# Patient Record
Sex: FEMALE | Race: WHITE | NOT HISPANIC OR LATINO | Employment: OTHER | URBAN - METROPOLITAN AREA
[De-identification: names, ages, dates, MRNs, and addresses within clinical notes are randomized per-mention and may not be internally consistent; named-entity substitution may affect disease eponyms.]

---

## 2017-01-30 ENCOUNTER — OFFICE VISIT (OUTPATIENT)
Dept: NEUROSURGERY | Facility: CLINIC | Age: 62
End: 2017-01-30

## 2017-01-30 VITALS
SYSTOLIC BLOOD PRESSURE: 102 MMHG | DIASTOLIC BLOOD PRESSURE: 64 MMHG | WEIGHT: 225 LBS | BODY MASS INDEX: 34.1 KG/M2 | HEART RATE: 68 BPM | RESPIRATION RATE: 16 BRPM | HEIGHT: 68 IN

## 2017-01-30 DIAGNOSIS — I72.9 ANEURYSM (HCC): Primary | ICD-10-CM

## 2017-01-30 PROCEDURE — 99243 OFF/OP CNSLTJ NEW/EST LOW 30: CPT | Performed by: NURSE PRACTITIONER

## 2017-01-30 RX ORDER — OMEPRAZOLE 40 MG/1
40 CAPSULE, DELAYED RELEASE ORAL EVERY MORNING
COMMUNITY
End: 2021-03-25 | Stop reason: SDUPTHER

## 2017-01-30 RX ORDER — CHLORAL HYDRATE 500 MG
CAPSULE ORAL
COMMUNITY
End: 2017-04-04 | Stop reason: ALTCHOICE

## 2017-01-30 RX ORDER — RANITIDINE 150 MG/1
150 TABLET ORAL 2 TIMES DAILY
COMMUNITY
End: 2020-08-14 | Stop reason: HOSPADM

## 2017-01-30 NOTE — LETTER
"January 30, 2017     Lex Morris MD  [address] [place] [id number]    Patient: Khalida Gilliland   YOB: 1955   Date of Visit: 1/30/2017       Dear Dr. Arturo MD:    Khalida Gilliland was in my office today. Below is a copy of my note.    If you have questions, please do not hesitate to call me. I look forward to following Khalida along with you.         Sincerely,        Lucila Lu, TATE        CC: Mikael David MD    Subjective   Patient ID: Khalida Gilliland is a 61 y.o. female is being seen for consultation today at the request of Mikael David MD for cerebral aneurysm. She is unaccompanied.     History of Present Illness   Patient since for evaluation and treatment recommendations of cerebral aneurysm.  She has a history of a \"leaking\" right MCA aneurysm status post craniotomy and clipping in 2003 by Dr. Sims.  Patient's last imaging was in 2011. She believes that she had an angiogram following the surgery. She has been followed by neurology in the past.     She reports right frontal rare headaches; however, she had a sudden onset of severe holocephalic headache at [place] time while out walking. She went home and used a hot pack that resolved the headache within 30 minutes. There was no associated nausea, vomiting, visual changes. It has not happened again. She did not go to ER.     The following portions of the patient's history were reviewed and updated as appropriate: allergies, current medications, past family history, past medical history, past social history, past surgical history and problem list.    Review of Systems   Constitutional: Negative for fever.   HENT: Negative for trouble swallowing.    Eyes: Negative for visual disturbance.   Respiratory: Negative for cough and wheezing.    Cardiovascular: Negative for chest pain and palpitations.   Gastrointestinal: Positive for abdominal pain (possible current kidney stone).   Genitourinary: Negative for " difficulty urinating and enuresis.   Musculoskeletal: Positive for back pain (possible kidney stone). Negative for gait problem.   Neurological: Negative for dizziness, speech difficulty, light-headedness and headaches (history of migraines).   Psychiatric/Behavioral: Negative for confusion.       Objective   Physical Exam   Constitutional: She is oriented to person, place, and time. She appears well-developed and well-nourished.   obese   HENT:   Head: Normocephalic and atraumatic.   Neck: Normal range of motion. Neck supple. Carotid bruit is not present.   Cardiovascular: Normal rate, regular rhythm, normal heart sounds and intact distal pulses.    Pulmonary/Chest: Effort normal. No respiratory distress. She has rhonchi in the right upper field.   Neurological: She is alert and oriented to person, place, and time. She has normal strength. She has a normal Finger-Nose-Finger Test, a normal Heel to Shin Test, a normal Romberg Test and a normal Tandem Gait Test. Gait normal.   Skin: Skin is warm and dry.   Psychiatric: She has a normal mood and affect. Her speech is normal and behavior is normal. Thought content normal.   Vitals reviewed.    Neurologic Exam     Mental Status   Oriented to person, place, and time.   Attention: normal. Concentration: normal.   Speech: speech is normal   Level of consciousness: alert  Knowledge: good.   Normal comprehension.     Cranial Nerves   Cranial nerves II through XII intact.     Motor Exam   Muscle bulk: normal  Overall muscle tone: normal  Right arm pronator drift: absent  Left arm pronator drift: absent    Strength   Strength 5/5 throughout.     Sensory Exam   Light touch normal.     Gait, Coordination, and Reflexes     Gait  Gait: normal    Coordination   Romberg: negative  Finger to nose coordination: normal  Heel to shin coordination: normal  Tandem walking coordination: normal    Reflexes   Right Chowdary: absent  Left Chowdary: absent      Assessment/Plan   Independent  Review of Radiographic Studies:    MRA head from [facility] dated May 31, 2011 by report indicates artifact seen related to aneurysm clip on the right.  Otherwise negative MRA.    CTA head dated February 2, 2007 by report indicates no intracranial stenosis or aneurysm identified however, the cavernous regions are poorly seen due to skull base artifact from aneurysm clip.    Medical Decision Making:    Patient presents to establish care for history of cerebral aneurysm status post craniotomy and clipping over 13 years ago.  It seems that she has not had a cerebral angiogram in some prolonged period of time; however, she reports an episode of severe swelling and respiratory difficulties following IV contrast injection in [place] for an abdominal study several years ago.  The swelling was in just 1 arm and based on her report it sounds like she had contrast extravasation, but she does report that she had severe respiratory difficulties and was near intubation.  She recently had a severe sudden headache and instead of presenting to the hospital for evaluation with a history of aneurysm she went home and the symptoms subsided rather quickly.  She has had no further episodes.    Her exam is as noted above with no neurologic red flags.  Her most recent imaging that she brings with her is from 2011.  It would be a best case scenario for her to be able to have a cerebral angiogram, but I'm concerned about her having contrast without a definitive need.  We will repeat an MRA for comparison.  She will follow-up with Dr. Bella after.    Plan: Return to office after MRA head no contrast.  Khalida was seen today for cerebral aneurysm.    Diagnoses and all orders for this visit:    Aneurysm  -     Cancel: MRI Angiogram Head Without Contrast; Future  -     MRI Angiogram Head Without Contrast; Future      Return for Follow-up with Dr. Bella, with imaging.

## 2017-01-30 NOTE — LETTER
"January 30, 2017     Mikael David MD  [address]  [address]  [place] [id number]    Patient: Khalida Gilliland   YOB: 1955   Date of Visit: 1/30/2017       Dear Dr. Joann MD:    Thank you for referring Khalida Gilliland to me for evaluation. Below is a copy of my consult note.    If you have questions, please do not hesitate to call me. I look forward to following Khalida along with you.         Sincerely,        TATE Walls        CC: No Recipients    Subjective   Patient ID: Khalida Gilliland is a 61 y.o. female is being seen for consultation today at the request of Mikael David MD for cerebral aneurysm. She is unaccompanied.     History of Present Illness   Patient since for evaluation and treatment recommendations of cerebral aneurysm.  She has a history of a \"leaking\" right MCA aneurysm status post craniotomy and clipping in 2003 by Dr. Sims.  Patient's last imaging was in 2011. She believes that she had an angiogram following the surgery. She has been followed by neurology in the past.     She reports right frontal rare headaches; however, she had a sudden onset of severe holocephalic headache at [place] time while out walking. She went home and used a hot pack that resolved the headache within 30 minutes. There was no associated nausea, vomiting, visual changes. It has not happened again. She did not go to ER.     The following portions of the patient's history were reviewed and updated as appropriate: allergies, current medications, past family history, past medical history, past social history, past surgical history and problem list.    Review of Systems   Constitutional: Negative for fever.   HENT: Negative for trouble swallowing.    Eyes: Negative for visual disturbance.   Respiratory: Negative for cough and wheezing.    Cardiovascular: Negative for chest pain and palpitations.   Gastrointestinal: Positive for abdominal pain (possible current kidney stone).   "   Genitourinary: Negative for difficulty urinating and enuresis.   Musculoskeletal: Positive for back pain (possible kidney stone). Negative for gait problem.   Neurological: Negative for dizziness, speech difficulty, light-headedness and headaches (history of migraines).   Psychiatric/Behavioral: Negative for confusion.       Objective   Physical Exam   Constitutional: She is oriented to person, place, and time. She appears well-developed and well-nourished.   obese   HENT:   Head: Normocephalic and atraumatic.   Neck: Normal range of motion. Neck supple. Carotid bruit is not present.   Cardiovascular: Normal rate, regular rhythm, normal heart sounds and intact distal pulses.    Pulmonary/Chest: Effort normal. No respiratory distress. She has rhonchi in the right upper field.   Neurological: She is alert and oriented to person, place, and time. She has normal strength. She has a normal Finger-Nose-Finger Test, a normal Heel to Shin Test, a normal Romberg Test and a normal Tandem Gait Test. Gait normal.   Skin: Skin is warm and dry.   Psychiatric: She has a normal mood and affect. Her speech is normal and behavior is normal. Thought content normal.   Vitals reviewed.    Neurologic Exam     Mental Status   Oriented to person, place, and time.   Attention: normal. Concentration: normal.   Speech: speech is normal   Level of consciousness: alert  Knowledge: good.   Normal comprehension.     Cranial Nerves   Cranial nerves II through XII intact.     Motor Exam   Muscle bulk: normal  Overall muscle tone: normal  Right arm pronator drift: absent  Left arm pronator drift: absent    Strength   Strength 5/5 throughout.     Sensory Exam   Light touch normal.     Gait, Coordination, and Reflexes     Gait  Gait: normal    Coordination   Romberg: negative  Finger to nose coordination: normal  Heel to shin coordination: normal  Tandem walking coordination: normal    Reflexes   Right Chowdary: absent  Left Chowdary:  absent      Assessment/Plan   Independent Review of Radiographic Studies:    MRA head from [facility] dated May 31, 2011 by report indicates artifact seen related to aneurysm clip on the right.  Otherwise negative MRA.    CTA head dated February 2, 2007 by report indicates no intracranial stenosis or aneurysm identified however, the cavernous regions are poorly seen due to skull base artifact from aneurysm clip.    Medical Decision Making:    Patient presents to establish care for history of cerebral aneurysm status post craniotomy and clipping over 13 years ago.  It seems that she has not had a cerebral angiogram in some prolonged period of time; however, she reports an episode of severe swelling and respiratory difficulties following IV contrast injection in [place] for an abdominal study several years ago.  The swelling was in just 1 arm and based on her report it sounds like she had contrast extravasation, but she does report that she had severe respiratory difficulties and was near intubation.  She recently had a severe sudden headache and instead of presenting to the hospital for evaluation with a history of aneurysm she went home and the symptoms subsided rather quickly.  She has had no further episodes.    Her exam is as noted above with no neurologic red flags.  Her most recent imaging that she brings with her is from 2011.  It would be a best case scenario for her to be able to have a cerebral angiogram, but I'm concerned about her having contrast without a definitive need.  We will repeat an MRA for comparison.  She will follow-up with Dr. Bella after.    Plan: Return to office after MRA head no contrast.  Khalida was seen today for cerebral aneurysm.    Diagnoses and all orders for this visit:    Aneurysm  -     Cancel: MRI Angiogram Head Without Contrast; Future  -     MRI Angiogram Head Without Contrast; Future      Return for Follow-up with Dr. Bella, with imaging.

## 2017-01-30 NOTE — PROGRESS NOTES
"Subjective   Patient ID: Khalida Gilliland is a 61 y.o. female is being seen for consultation today at the request of Mikael David MD for cerebral aneurysm. She is unaccompanied.     History of Present Illness   Patient since for evaluation and treatment recommendations of cerebral aneurysm.  She has a history of a \"leaking\" right MCA aneurysm status post craniotomy and clipping in 2003 by Dr. Sims.  Patient's last imaging was in 2011. She believes that she had an angiogram following the surgery. She has been followed by neurology in the past.     She reports right frontal rare headaches; however, she had a sudden onset of severe holocephalic headache at [place] time while out walking. She went home and used a hot pack that resolved the headache within 30 minutes. There was no associated nausea, vomiting, visual changes. It has not happened again. She did not go to ER.     The following portions of the patient's history were reviewed and updated as appropriate: allergies, current medications, past family history, past medical history, past social history, past surgical history and problem list.    Review of Systems   Constitutional: Negative for fever.   HENT: Negative for trouble swallowing.    Eyes: Negative for visual disturbance.   Respiratory: Negative for cough and wheezing.    Cardiovascular: Negative for chest pain and palpitations.   Gastrointestinal: Positive for abdominal pain (possible current kidney stone).   Genitourinary: Negative for difficulty urinating and enuresis.   Musculoskeletal: Positive for back pain (possible kidney stone). Negative for gait problem.   Neurological: Negative for dizziness, speech difficulty, light-headedness and headaches (history of migraines).   Psychiatric/Behavioral: Negative for confusion.       Objective   Physical Exam   Constitutional: She is oriented to person, place, and time. She appears well-developed and well-nourished.   obese   HENT:   Head: " Normocephalic and atraumatic.   Neck: Normal range of motion. Neck supple. Carotid bruit is not present.   Cardiovascular: Normal rate, regular rhythm, normal heart sounds and intact distal pulses.    Pulmonary/Chest: Effort normal. No respiratory distress. She has rhonchi in the right upper field.   Neurological: She is alert and oriented to person, place, and time. She has normal strength. She has a normal Finger-Nose-Finger Test, a normal Heel to Shin Test, a normal Romberg Test and a normal Tandem Gait Test. Gait normal.   Skin: Skin is warm and dry.   Psychiatric: She has a normal mood and affect. Her speech is normal and behavior is normal. Thought content normal.   Vitals reviewed.    Neurologic Exam     Mental Status   Oriented to person, place, and time.   Attention: normal. Concentration: normal.   Speech: speech is normal   Level of consciousness: alert  Knowledge: good.   Normal comprehension.     Cranial Nerves   Cranial nerves II through XII intact.     Motor Exam   Muscle bulk: normal  Overall muscle tone: normal  Right arm pronator drift: absent  Left arm pronator drift: absent    Strength   Strength 5/5 throughout.     Sensory Exam   Light touch normal.     Gait, Coordination, and Reflexes     Gait  Gait: normal    Coordination   Romberg: negative  Finger to nose coordination: normal  Heel to shin coordination: normal  Tandem walking coordination: normal    Reflexes   Right Chowdary: absent  Left Chowdary: absent      Assessment/Plan   Independent Review of Radiographic Studies:    MRA head from [facility] dated May 31, 2011 by report indicates artifact seen related to aneurysm clip on the right.  Otherwise negative MRA.    CTA head dated February 2, 2007 by report indicates no intracranial stenosis or aneurysm identified however, the cavernous regions are poorly seen due to skull base artifact from aneurysm clip.    Medical Decision Making:    Patient presents to establish  care for history of cerebral aneurysm status post craniotomy and clipping over 13 years ago.  It seems that she has not had a cerebral angiogram in some prolonged period of time; however, she reports an episode of severe swelling and respiratory difficulties following IV contrast injection in [place] for an abdominal study several years ago.  The swelling was in just 1 arm and based on her report it sounds like she had contrast extravasation, but she does report that she had severe respiratory difficulties and was near intubation.  She recently had a severe sudden headache and instead of presenting to the hospital for evaluation with a history of aneurysm she went home and the symptoms subsided rather quickly.  She has had no further episodes.    Her exam is as noted above with no neurologic red flags.  Her most recent imaging that she brings with her is from 2011.  It would be a best case scenario for her to be able to have a cerebral angiogram, but I'm concerned about her having contrast without a definitive need.  We will repeat an MRA for comparison.  She will follow-up with Dr. Bella after.    Plan: Return to office after MRA head no contrast.  Khalida was seen today for cerebral aneurysm.    Diagnoses and all orders for this visit:    Aneurysm  -     Cancel: MRI Angiogram Head Without Contrast; Future  -     MRI Angiogram Head Without Contrast; Future      Return for Follow-up with Dr. Bella, with imaging.

## 2017-01-30 NOTE — MR AVS SNAPSHOT
Khalida Gilliland   1/30/2017 11:00 AM   Office Visit    Dept Phone:  [phone]   Encounter #:  [id number]    Provider:  TATE Swain   Department:  [facility] CTR ADV NEUROSURGERY                Your Full Care Plan              Today's Medication Changes          These changes are accurate as of: 1/30/17 11:31 AM.  If you have any questions, ask your nurse or doctor.               Medication(s)that have changed:     albuterol 108 (90 BASE) MCG/ACT inhaler   Commonly known as:  PROVENTIL HFA;VENTOLIN HFA   Inhale 2 puffs every 6 (six) hours as needed for wheezing or shortness of breath (Cough).   What changed:  Another medication with the same name was removed. Continue taking this medication, and follow the directions you see here.         Stop taking medication(s)listed here:     pantoprazole 40 MG EC tablet   Commonly known as:  PROTONIX   Stopped by:  TATE Swain                      Your Updated Medication List          This list is accurate as of: 1/30/17 11:31 AM.  Always use your most recent med list.                albuterol 108 (90 BASE) MCG/ACT inhaler   Commonly known as:  PROVENTIL HFA;VENTOLIN HFA   Inhale 2 puffs every 6 (six) hours as needed for wheezing or shortness of breath (Cough).       aspirin 325 MG tablet       cycloSPORINE 0.05 % ophthalmic emulsion   Commonly known as:  RESTASIS       diphenhydrAMINE-acetaminophen  MG tablet per tablet   Commonly known as:  TYLENOL PM       fish oil 1000 MG capsule capsule       fluticasone 27.5 MCG/SPRAY nasal spray   Commonly known as:  VERAMYST       glucosamine-chondroitin 500-400 MG capsule capsule       hydrochlorothiazide 12.5 MG tablet   Commonly known as:  HYDRODIURIL       MULTIVITAL PO       omeprazole 40 MG capsule   Commonly known as:  priLOSEC       pregabalin 100 MG capsule   Commonly known as:  LYRICA       propranolol 40 MG tablet   Commonly known as:  INDERAL       raNITIdine 150 MG  tablet   Commonly known as:  ZANTAC       VITAMIN C PO               You Were Diagnosed With        Codes Comments    Aneurysm    -  Primary ICD-10-CM: I72.9  ICD-9-CM: 442.9       Instructions     None    Patient Instructions History      Upcoming Appointments     Visit Type Date Time Department    NEW PATIENT 1/30/2017 11:00 AM [facility] CTR ADV NEURO [facility]    MRA RONALDO HEAD WO CONTRAST 2/2/2017  8:45 PM  [facility]    OFFICE VISIT 2/21/2017 11:45 AM [facility] ADV NEUR[facility]    LAB 5/24/2017 10:00 AM  [facility] ON[facility] CBC LAB [facility]      MyChart Signup     Our records indicate that you have an active [facility] [employer] account.    You can view your After Visit Summary by going to [employer] and logging in with your [employer] username and password.  If you don't have a [employer] username and password but a parent or guardian has access to your record, the parent or guardian should login with their own [employer] username and password and access your record to view the After Visit Summary.    If you have questions, you can email [email or web address] or call [phone] to talk to our [employer] staff.  Remember, [employer] is NOT to be used for urgent needs.  For medical emergencies, dial 911.               Other Info from Your Visit           Your Appointments     Feb 02, 2017  8:45 PM EST   MRA ronaldo head wo contrast with RON[facility] MRI 3   [facility] ([place])    [address] [facility] [address] [phone]           Arrive 30 minutes prior to exam time. Bring a current list of medications. Wear clothing without metal snaps, zippers, or other metalic artifacts. Do not wear jewelry.            Feb 21, 2017 11:45 AM EST   Office Visit with Orlando Bella MD   [facility] CTR ADV NEUROSURGERY (--)    [address]  [facility] [address] [phone]           Arrive 15 minutes prior to appointment.            May 24, 2017 10:00 AM EDT   Lab with LAB CHAIR 3 [facility]  "[facility] ONCOLOGY CBC LAB ([place])    [address]  [facility] [address] [phone]              Allergies     Contrast Dye  Shortness Of Breath    Iodinated Diagnostic Agents  Shortness Of Breath    Codeine  Nausea Only    Methylprednisolone  Anxiety      Reason for Visit     Cerebral Aneurysm           Vital Signs     Blood Pressure Pulse Respirations Height Weight Body Mass Index    102/64 (BP Location: Right arm, Patient Position: Sitting) 68 16 68\" (172.7 cm) 225 lb (102 kg) 34.21 kg/m2    Smoking Status                   Never Smoker           No Longer an Issue     Aneurysm        "

## 2017-02-02 ENCOUNTER — HOSPITAL ENCOUNTER (OUTPATIENT)
Dept: MRI IMAGING | Facility: HOSPITAL | Age: 62
Discharge: HOME | End: 2017-02-02
Admitting: NURSE PRACTITIONER

## 2017-02-02 DIAGNOSIS — I72.9 ANEURYSM (HCC): ICD-10-CM

## 2017-02-02 PROCEDURE — 70544 MR ANGIOGRAPHY HEAD W/O DYE: CPT

## 2017-02-21 ENCOUNTER — OFFICE VISIT (OUTPATIENT)
Dept: NEUROSURGERY | Facility: CLINIC | Age: 62
End: 2017-02-21

## 2017-02-21 VITALS
BODY MASS INDEX: 34.36 KG/M2 | WEIGHT: 226 LBS | DIASTOLIC BLOOD PRESSURE: 76 MMHG | RESPIRATION RATE: 16 BRPM | SYSTOLIC BLOOD PRESSURE: 132 MMHG | HEART RATE: 64 BPM

## 2017-02-21 DIAGNOSIS — Z98.890 HX OF CEREBRAL ANEURYSM REPAIR: Primary | ICD-10-CM

## 2017-02-21 DIAGNOSIS — Z86.79 HX OF CEREBRAL ANEURYSM REPAIR: Primary | ICD-10-CM

## 2017-02-21 PROCEDURE — 99213 OFFICE O/P EST LOW 20 MIN: CPT | Performed by: NEUROLOGICAL SURGERY

## 2017-02-21 RX ORDER — FAMOTIDINE 10 MG
20 TABLET ORAL
Status: CANCELLED | OUTPATIENT
Start: 2017-02-21

## 2017-02-21 RX ORDER — SODIUM CHLORIDE 0.9 % (FLUSH) 0.9 %
1-10 SYRINGE (ML) INJECTION AS NEEDED
Status: CANCELLED | OUTPATIENT
Start: 2017-02-21

## 2017-02-21 RX ORDER — SODIUM CHLORIDE 9 MG/ML
100 INJECTION, SOLUTION INTRAVENOUS CONTINUOUS
Status: CANCELLED | OUTPATIENT
Start: 2017-02-21

## 2017-02-21 NOTE — PROGRESS NOTES
Subjective   Patient ID: Khalida Gilliland is a 61 y.o. female is here today for follow-up of cerebral aneurysm with new imaging MRA head. She is unaccompanied.    History of Present Illness     The patient is an extremely pleasant woman who returns to the office following an MRA for evaluation of an aneurysm clip 13 years ago.    He denies any changes since her last office visit.    The following portions of the patient's history were reviewed and updated as appropriate: allergies, current medications, past family history, past medical history, past social history, past surgical history and problem list.    Review of Systems   Musculoskeletal: Negative for gait problem.   Neurological: Negative for dizziness, speech difficulty, light-headedness and headaches.       Objective   Physical Exam   Constitutional: She is oriented to person, place, and time.   Eyes: EOM are normal. Pupils are equal, round, and reactive to light.   Neurological: She is oriented to person, place, and time. She has a normal Finger-Nose-Finger Test, a normal Heel to Shin Test, a normal Romberg Test and a normal Tandem Gait Test. Gait normal.   Reflex Scores:       Tricep reflexes are 2+ on the right side and 2+ on the left side.       Bicep reflexes are 2+ on the right side and 2+ on the left side.       Brachioradialis reflexes are 2+ on the right side and 2+ on the left side.       Patellar reflexes are 2+ on the right side and 2+ on the left side.       Achilles reflexes are 2+ on the right side and 2+ on the left side.  Psychiatric: Her speech is normal.     Neurologic Exam     Mental Status   Oriented to person, place, and time.   Registration: recalls 3 of 3 objects. Recall at 5 minutes: recalls 3 of 3 objects. Follows 3 step commands.   Attention: normal. Concentration: normal.   Speech: speech is normal   Level of consciousness: alert  Knowledge: good.   Able to name object. Able to read. Able to repeat. Able to write. Normal  comprehension.     Cranial Nerves     CN II   Visual fields full to confrontation.     CN III, IV, VI   Pupils are equal, round, and reactive to light.  Extraocular motions are normal.   Right pupil: Consensual response: intact.   Left pupil: Consensual response: intact.   CN III: no CN III palsy  CN VI: no CN VI palsy  Nystagmus: none   Diplopia: none  Ophthalmoparesis: none  Upgaze: normal  Downgaze: normal  Conjugate gaze: present  Vestibulo-ocular reflex: present    CN V   Facial sensation intact.     CN VII   Facial expression full, symmetric.     CN VIII   CN VIII normal.     CN IX, X   CN IX normal.     CN XI   CN XI normal.     CN XII   CN XII normal.     Motor Exam   Muscle bulk: normal  Overall muscle tone: normal  Right arm tone: normal  Left arm tone: normal  Right arm pronator drift: absent  Left arm pronator drift: absent  Right leg tone: normal  Left leg tone: normal    Strength   Right neck flexion: 5/5  Left neck flexion: 5/5  Right neck extension: 5/5  Left neck extension: 5/5  Right deltoid: 5/5  Left deltoid: 5/5  Right biceps: 5/5  Left biceps: 5/5  Right triceps: 5/5  Left triceps: 5/5  Right wrist flexion: 5/5  Left wrist flexion: 5/5  Right wrist extension: 5/5  Left wrist extension: 5/5  Right interossei: 5/5  Left interossei: 5/5  Right abdominals: 5/5  Left abdominals: 5/5  Right iliopsoas: 5/5  Left iliopsoas: 5/5  Right quadriceps: 5/5  Left quadriceps: 5/5  Right hamstrin/5  Left hamstrin/5  Right glutei: 5/5  Left glutei: 5/5  Right anterior tibial: 5/5  Left anterior tibial: 5/5  Right posterior tibial: 5/5  Left posterior tibial: 5/5  Right peroneal: 5/5  Left peroneal: 5/5  Right gastroc: 5/5  Left gastroc: 5/5    Sensory Exam   Light touch normal.   Vibration normal.   Proprioception normal.   Pinprick normal.     Gait, Coordination, and Reflexes     Gait  Gait: normal    Coordination   Romberg: negative  Finger to nose coordination: normal  Heel to shin coordination:  normal  Tandem walking coordination: normal    Tremor   Resting tremor: absent  Intention tremor: absent  Action tremor: absent    Reflexes   Right brachioradialis: 2+  Left brachioradialis: 2+  Right biceps: 2+  Left biceps: 2+  Right triceps: 2+  Left triceps: 2+  Right patellar: 2+  Left patellar: 2+  Right achilles: 2+  Left achilles: 2+  Right : 2+  Left : 2+  Right plantar: normal  Left plantar: normal  Right Chowdary: absent  Left Chowdary: absent  Right ankle clonus: absent  Left ankle clonus: absent      Assessment/Plan   Independent Review of Radiographic Studies:    I personally reviewed the MRI and MRA that was done recently.  There is so much clip artifact that I am unable to assess the region of the aneurysm.  The previous imaging that was accomplished also had a significant amount of clip artifact and the radiologist could not comment on the region of the previous aneurysm.  Medical Decision Making:    The patient returns the office and is doing well.  There are certainly no red flags.    I have recommended that she undergo a cerebral angiogram to assess the adequacy of clipping of her aneurysm now these many years following surgical intervention.    The risks, benefits and alternatives of cerebral angiography were explained in detail to the patient. The alternative is not to undergo this procedure. The benefit of cerebral angiography should be, though is not guaranteed, the elucidation of the vascular anatomy of the brain and the blood vessels leading to the brain. The risks of the procedure include, but are not limited to, the possibility of stroke, bleeding, damage to an artery in the brain, groin or neck(possibly requiring surgery to correct), infection, reaction to the contrast dye resulting in itching, swelling, anaphylaxis or even death, lack of ability to perform the procedure, need for further operative intervention, blindness, etcetera. The patient voiced understanding of the risks,  benefits and alternatives and requests that we proceed with cerebral angiography.    Plan: Cerebral angiography and return to the office.  This will be at the patient's convenience.  Khalida was seen today for cerebral aneurysm.    Diagnoses and all orders for this visit:    Hx of cerebral aneurysm repair  -     Case Request; Standing  -     CBC and Differential; Future  -     Basic metabolic panel; Future  -     XR chest 1 vw; Future  -     sodium chloride 0.9 % flush 1-10 mL; Infuse 1-10 mL into a venous catheter As Needed for line care.  -     sodium chloride 0.9 % infusion; Infuse 100 mL/hr into a venous catheter Continuous.  -     famotidine (PEPCID) tablet 20 mg; Take 2 tablets by mouth 30 Min Pre-Op.  -     Case Request    Other orders  -     Follow anesthesia standing orders.  -     Obtain informed consent  -     JR hose- To be placed on patient in pre-op; Standing  -     SCD (sequential compression device)- to be placed on patient in Pre-op; Standing  -     Clip operative site; Standing  -     Insert Peripheral IV; Standing  -     Saline Lock & Maintain IV Access; Standing      Return for Follow up after testing.

## 2017-02-23 ENCOUNTER — TELEPHONE (OUTPATIENT)
Dept: NEUROSURGERY | Facility: CLINIC | Age: 62
End: 2017-02-23

## 2017-02-23 RX ORDER — PREDNISONE 50 MG/1
50 TABLET ORAL TAKE AS DIRECTED
Qty: 3 TABLET | Refills: 0 | OUTPATIENT
Start: 2017-02-23 | End: 2017-04-18

## 2017-02-23 NOTE — TELEPHONE ENCOUNTER
[phone] LVM telling patient Rx has been ordered.

## 2017-02-23 NOTE — TELEPHONE ENCOUNTER
----- Message from TATE Swain sent at 2/23/2017  3:29 PM EST -----  Can you take care of this please?    ----- Message -----     From: Aminata Dunn     Sent: 2/23/2017  12:27 PM       To: TATE Swain    Pt was here Tuesday to see Hodes and we have an angio scheduled for April. However patient has a dye allergy so she needs the prednisone 50 mg (1 pill 13 hours prior, 1  Pill 7 hours prior, and 1 pill 1 hour prior) ordered to her pharmacy as soon as possible as she is going out of town for the month prior. Can you call this in for her today? Thank you!

## 2017-02-26 ENCOUNTER — RESULTS ENCOUNTER (OUTPATIENT)
Dept: NEUROSURGERY | Facility: CLINIC | Age: 62
End: 2017-02-26

## 2017-02-26 DIAGNOSIS — Z86.79 HX OF CEREBRAL ANEURYSM REPAIR: ICD-10-CM

## 2017-02-26 DIAGNOSIS — Z98.890 HX OF CEREBRAL ANEURYSM REPAIR: ICD-10-CM

## 2017-04-04 ENCOUNTER — HOSPITAL ENCOUNTER (OUTPATIENT)
Dept: GENERAL RADIOLOGY | Facility: HOSPITAL | Age: 62
Discharge: HOME | End: 2017-04-04
Admitting: NEUROLOGICAL SURGERY

## 2017-04-04 ENCOUNTER — APPOINTMENT (OUTPATIENT)
Dept: PREADMISSION TESTING | Facility: HOSPITAL | Age: 62
End: 2017-04-04

## 2017-04-04 ENCOUNTER — OFFICE VISIT (OUTPATIENT)
Dept: NEUROSURGERY | Facility: CLINIC | Age: 62
End: 2017-04-04

## 2017-04-04 VITALS
HEART RATE: 72 BPM | SYSTOLIC BLOOD PRESSURE: 124 MMHG | WEIGHT: 229 LBS | DIASTOLIC BLOOD PRESSURE: 70 MMHG | HEIGHT: 68 IN | BODY MASS INDEX: 34.71 KG/M2

## 2017-04-04 VITALS
TEMPERATURE: 97.4 F | HEIGHT: 68 IN | RESPIRATION RATE: 16 BRPM | DIASTOLIC BLOOD PRESSURE: 72 MMHG | SYSTOLIC BLOOD PRESSURE: 145 MMHG | WEIGHT: 230 LBS | HEART RATE: 72 BPM | BODY MASS INDEX: 34.86 KG/M2 | OXYGEN SATURATION: 98 %

## 2017-04-04 DIAGNOSIS — Z98.890 HX OF CEREBRAL ANEURYSM REPAIR: ICD-10-CM

## 2017-04-04 DIAGNOSIS — Z98.890 HX OF CEREBRAL ANEURYSM REPAIR: Primary | ICD-10-CM

## 2017-04-04 DIAGNOSIS — Z86.79 HX OF CEREBRAL ANEURYSM REPAIR: ICD-10-CM

## 2017-04-04 DIAGNOSIS — Z86.79 HX OF CEREBRAL ANEURYSM REPAIR: Primary | ICD-10-CM

## 2017-04-04 DIAGNOSIS — Z91.041 ALLERGY TO RADIOGRAPHIC DYE: ICD-10-CM

## 2017-04-04 LAB
ANION GAP SERPL CALCULATED.3IONS-SCNC: 14.5 MMOL/L
BASOPHILS # BLD AUTO: 0.02 10*3/MM3 (ref 0–0.2)
BASOPHILS NFR BLD AUTO: 0.4 % (ref 0–1.5)
BUN BLD-MCNC: 12 MG/DL (ref 8–23)
BUN/CREAT SERPL: 19 (ref 7–25)
CALCIUM SPEC-SCNC: 9.5 MG/DL (ref 8.6–10.5)
CHLORIDE SERPL-SCNC: 101 MMOL/L (ref 98–107)
CO2 SERPL-SCNC: 24.5 MMOL/L (ref 22–29)
CREAT BLD-MCNC: 0.63 MG/DL (ref 0.57–1)
DEPRECATED RDW RBC AUTO: 46.6 FL (ref 37–54)
EOSINOPHIL # BLD AUTO: 0.2 10*3/MM3 (ref 0–0.7)
EOSINOPHIL NFR BLD AUTO: 4.2 % (ref 0.3–6.2)
ERYTHROCYTE [DISTWIDTH] IN BLOOD BY AUTOMATED COUNT: 13.6 % (ref 11.7–13)
GFR SERPL CREATININE-BSD FRML MDRD: 96 ML/MIN/1.73
GLUCOSE BLD-MCNC: 138 MG/DL (ref 65–99)
HCT VFR BLD AUTO: 41.4 % (ref 35.6–45.5)
HGB BLD-MCNC: 13.7 G/DL (ref 11.9–15.5)
IMM GRANULOCYTES # BLD: 0 10*3/MM3 (ref 0–0.03)
IMM GRANULOCYTES NFR BLD: 0 % (ref 0–0.5)
LYMPHOCYTES # BLD AUTO: 2.39 10*3/MM3 (ref 0.9–4.8)
LYMPHOCYTES NFR BLD AUTO: 50.7 % (ref 19.6–45.3)
MCH RBC QN AUTO: 31.1 PG (ref 26.9–32)
MCHC RBC AUTO-ENTMCNC: 33.1 G/DL (ref 32.4–36.3)
MCV RBC AUTO: 93.9 FL (ref 80.5–98.2)
MONOCYTES # BLD AUTO: 0.21 10*3/MM3 (ref 0.2–1.2)
MONOCYTES NFR BLD AUTO: 4.5 % (ref 5–12)
NEUTROPHILS # BLD AUTO: 1.89 10*3/MM3 (ref 1.9–8.1)
NEUTROPHILS NFR BLD AUTO: 40.2 % (ref 42.7–76)
PLATELET # BLD AUTO: 200 10*3/MM3 (ref 140–500)
PMV BLD AUTO: 10.6 FL (ref 6–12)
POTASSIUM BLD-SCNC: 4.3 MMOL/L (ref 3.5–5.2)
RBC # BLD AUTO: 4.41 10*6/MM3 (ref 3.9–5.2)
SODIUM BLD-SCNC: 140 MMOL/L (ref 136–145)
WBC NRBC COR # BLD: 4.71 10*3/MM3 (ref 4.5–10.7)

## 2017-04-04 PROCEDURE — 99213 OFFICE O/P EST LOW 20 MIN: CPT | Performed by: NEUROLOGICAL SURGERY

## 2017-04-04 PROCEDURE — 36415 COLL VENOUS BLD VENIPUNCTURE: CPT | Performed by: NEUROLOGICAL SURGERY

## 2017-04-04 PROCEDURE — 71010 HC CHEST PA OR AP: CPT

## 2017-04-04 PROCEDURE — 85025 COMPLETE CBC W/AUTO DIFF WBC: CPT | Performed by: NEUROLOGICAL SURGERY

## 2017-04-04 PROCEDURE — 80048 BASIC METABOLIC PNL TOTAL CA: CPT | Performed by: NEUROLOGICAL SURGERY

## 2017-04-04 PROCEDURE — 93005 ELECTROCARDIOGRAM TRACING: CPT

## 2017-04-04 PROCEDURE — 93010 ELECTROCARDIOGRAM REPORT: CPT | Performed by: INTERNAL MEDICINE

## 2017-04-04 RX ORDER — DIPHENHYDRAMINE HCL 25 MG
25 CAPSULE ORAL EVERY 8 HOURS
Status: SHIPPED | OUTPATIENT
Start: 2017-04-04 | End: 2017-04-08

## 2017-04-04 RX ORDER — CETIRIZINE HYDROCHLORIDE 10 MG/1
10 TABLET ORAL DAILY
COMMUNITY
End: 2020-11-11 | Stop reason: SDUPTHER

## 2017-04-04 NOTE — PROGRESS NOTES
Subjective   Patient ID: Khalida Gilliland is a 61 y.o. female who is here today for follow-up for a cerebral aneurysm. She is here for a new H&P for a C-Angio on 4/12/17. She presents unaccompanied.     History of Present Illness     The patient returns today for preoperative evaluation prior to undergoing the cerebral arteriogram April 12, 2017.  She denies any particular issues or changes.  He is concerned with regard to a contrast allergy that she has.    The following portions of the patient's history were reviewed and updated as appropriate: allergies, current medications, past family history, past medical history, past social history, past surgical history and problem list.    Review of Systems   Constitutional: Negative for chills and fever.   Eyes: Negative for visual disturbance.   Respiratory: Positive for cough. Negative for shortness of breath.    Cardiovascular: Negative for chest pain and palpitations.   Gastrointestinal: Negative for nausea and vomiting.   Musculoskeletal: Negative for gait problem and neck pain.   Skin: Negative for rash and wound.   Neurological: Negative for dizziness, weakness and headaches.   Hematological: Does not bruise/bleed easily.   Psychiatric/Behavioral: Negative for sleep disturbance. The patient is not nervous/anxious.      Past Medical History:   Diagnosis Date   • Aneurysm    • Asthma    • Cancer 07/30/2014    Basal Cell Carcinoma Left Arm,SKIN CANCER   • Diabetes mellitus    • GERD (gastroesophageal reflux disease)    • H/O diverticulitis of colon     Sigmoid colon   • History of kidney stones 06/2011   • History of surgery for cerebral aneurysm     Clipping of cerebral Aneurysm   • Hx of migraines    • Hypertension    • MGUS (monoclonal gammopathy of unknown significance)    • Neuropathy, feet    • PONV (postoperative nausea and vomiting)    • Protein overload    • Stroke 10/2011    Possible, workup negative       Past Surgical History:   Procedure Laterality Date    • APPENDECTOMY  1982    Emergency at time of 2nd    • BASAL CELL CARCINOMA EXCISION Left 2014    Excision basal cell carcinoma, left arm (1.1 cm) with frozen section control and layered wound closure ( 3.1 cm), Dr. Isael Andrade, [facility]   • BRAIN SURGERY      Ruptured aneurysm, clipped/Dr. Sims   • CEREBRAL ANEURYSM REPAIR      clipping of cerebral aneurysm   •  SECTION  ,    • CHOLECYSTECTOMY     • COLON RESECTION WITH COLOSTOMY Right    • COLON SURGERY      Sigmoid colectomy in the past   • COLONOSCOPY     • INCISIONAL HERNIA REPAIR      Patient suffered some nerve entrapment secondary to the attack, some of which were removed during a secondary operation.   • OSTOMY TAKE DOWN     • TUBAL ABDOMINAL LIGATION     • URETERAL STENT INSERTION  2011    Due to kidney stones       Social History     Social History   • Marital status:      Spouse name: Reo   • Number of children: 3   • Years of education: N/A     Occupational History   •  Unemployed     Social History Main Topics   • Smoking status: Never Smoker   • Smokeless tobacco: Never Used   • Alcohol use Yes      Comment: Occasional   • Drug use: No   • Sexual activity: Yes     Partners: Male     Other Topics Concern   • Not on file     Social History Narrative       Family History   Problem Relation Age of Onset   • Skin cancer Mother    • Heart disease Father    • Heart attack Father    • Hypertension Father    • Cholelithiasis Son 22        Allergies   Allergen Reactions   • Contrast Dye Shortness Of Breath   • Iodinated Diagnostic Agents Shortness Of Breath   • Codeine Nausea Only   • Methylprednisolone Anxiety         Current Outpatient Prescriptions:   •  albuterol (PROVENTIL HFA;VENTOLIN HFA) 108 (90 BASE) MCG/ACT inhaler, Inhale 2 puffs every 6 (six) hours as needed for wheezing or shortness of breath (Cough)., Disp: 1 inhaler, Rfl: 0  •  Ascorbic Acid (VITAMIN C PO), Take 1,000 mg by mouth Daily. HOLD  PRIOR TO SURGERY, Disp: , Rfl:   •  aspirin 325 MG tablet, Take 325 mg by mouth Daily. CONTINUE UP TO DAY OF SURGERY NOT ON DAY OF SURGERY PER DR. CALLUM BELLA,, Disp: , Rfl:   •  cetirizine (zyrTEC) 10 MG tablet, Take 10 mg by mouth Daily., Disp: , Rfl:   •  cycloSPORINE (RESTASIS) 0.05 % ophthalmic emulsion, 1 drop 2 (two) times a day., Disp: , Rfl:   •  diphenhydrAMINE-acetaminophen (TYLENOL PM)  MG tablet per tablet, Take 1 tablet by mouth at night as needed for sleep., Disp: , Rfl:   •  fluticasone (VERAMYST) 27.5 MCG/SPRAY nasal spray, 2 sprays into each nostril daily., Disp: , Rfl:   •  glucosamine-chondroitin 500-400 MG capsule capsule, Take 1 capsule by mouth 2 (Two) Times a Day With Meals. HOLD PRIOR TO SURGERY, Disp: , Rfl:   •  hydrochlorothiazide (HYDRODIURIL) 12.5 MG tablet, Take 12.5 mg by mouth daily., Disp: , Rfl:   •  omeprazole (priLOSEC) 40 MG capsule, Take 40 mg by mouth Daily., Disp: , Rfl:   •  pregabalin (LYRICA) 100 MG capsule, Take 100 mg by mouth 2 (two) times a day., Disp: , Rfl:   •  propranolol (INDERAL) 40 MG tablet, Take 40 mg by mouth 2 (two) times a day., Disp: , Rfl:   •  raNITIdine (ZANTAC) 150 MG tablet, Take 150 mg by mouth 2 (Two) Times a Day., Disp: , Rfl:   •  predniSONE (DELTASONE) 50 MG tablet, Take 1 tablet by mouth Take As Directed., Disp: 3 tablet, Rfl: 0    Current Facility-Administered Medications:   •  diphenhydrAMINE (BENADRYL) capsule 25 mg, 25 mg, Oral, Q8H, Callum Bella MD    Objective   Physical Exam   Constitutional: She is oriented to person, place, and time. She appears well-developed and well-nourished. She is cooperative.   HENT:   Head: Normocephalic and atraumatic.   Eyes: EOM are normal. Pupils are equal, round, and reactive to light. No scleral icterus.   Neck: Normal range of motion. Neck supple.   Cardiovascular: Normal rate, regular rhythm and intact distal pulses.    No murmur heard.  Pulmonary/Chest: Effort normal and breath sounds  normal.   Abdominal: Soft. Bowel sounds are normal. There is no tenderness.   Musculoskeletal: Normal range of motion.   Neurological: She is alert and oriented to person, place, and time. She has normal strength. She displays no atrophy. No cranial nerve deficit or sensory deficit. She exhibits normal muscle tone. She displays a negative Romberg sign. Coordination and gait normal. GCS eye subscore is 4. GCS verbal subscore is 5. GCS motor subscore is 6.   Reflex Scores:       Tricep reflexes are 2+ on the right side and 2+ on the left side.       Bicep reflexes are 2+ on the right side and 2+ on the left side.       Brachioradialis reflexes are 2+ on the right side and 2+ on the left side.       Patellar reflexes are 2+ on the right side and 2+ on the left side.       Achilles reflexes are 2+ on the right side and 2+ on the left side.  Negative Chowdary and clonus  Finger to nose intact   Heel to shin intact  Able to tandem walk  Able to walk on heels and toes   Skin: Skin is warm, dry and intact.   Psychiatric: She has a normal mood and affect. Her speech is normal and behavior is normal. Judgment and thought content normal. Cognition and memory are normal.   Vitals reviewed.    Neurologic Exam     Mental Status   Oriented to person, place, and time.   Speech: speech is normal     Cranial Nerves     CN III, IV, VI   Pupils are equal, round, and reactive to light.  Extraocular motions are normal.     Motor Exam     Strength   Strength 5/5 throughout.     Gait, Coordination, and Reflexes     Reflexes   Right brachioradialis: 2+  Left brachioradialis: 2+  Right biceps: 2+  Left biceps: 2+  Right triceps: 2+  Left triceps: 2+  Right patellar: 2+  Left patellar: 2+  Right achilles: 2+  Left achilles: 2+      Assessment/Plan   Independent Review of Radiographic Studies:    none  Medical Decision Making:    The patient returns to office for reevaluation prior to undergoing cerebral angiography for history of a cerebral  aneurysm.    Questions are answered.    The risks, benefits and alternatives of cerebral angiography were explained in detail to the patient. The alternative is not to undergo this procedure. The benefit of cerebral angiography should be, though is not guaranteed, the elucidation of the vascular anatomy of the brain and the blood vessels leading to the brain. The risks of the procedure include, but are not limited to, the possibility of stroke, bleeding, damage to an artery in the brain, groin or neck(possibly requiring surgery to correct), infection, reaction to the contrast dye resulting in itching, swelling, anaphylaxis or even death, lack of ability to perform the procedure, need for further operative intervention, blindness, etcetera. The patient voiced understanding of the risks, benefits and alternatives and requests that we proceed with cerebral angiography.    Khalida was seen today for cerebral aneurysm.    Diagnoses and all orders for this visit:    Hx of cerebral aneurysm repair    Allergy to radiographic dye  -     diphenhydrAMINE (BENADRYL) capsule 25 mg; Take 1 capsule by mouth Every 8 (Eight) Hours.      Return for Recheck 2 weeks after surgery.

## 2017-04-04 NOTE — DISCHARGE INSTRUCTIONS
Take the following medications the morning of surgery with a small sip of water:    ALBUTEROL  OMEPRAZOLE  PROPRANOLOL      General Instructions:  • Do not eat solid food after midnight the night before surgery.  • You may drink clear liquids the day of surgery prior to leaving your house for the hospital.  Do not have anything once you have left your house for the hospital.  • You are to have nothing to drink two hours before surgery.  • Clear liquids are liquids you can see through. Nothing red in color.  Plain water    Sports drinks  Sodas     Gelatin (Jell-O)  Fruit juices without pulp such as white grape juice and apple juice  Popsicles that contain no fruit or yogurt  Tea or coffee (no cream or milk added)    • It is beneficial for you to have a clear drink that contains carbohydrates just before you leave your house and before your fasting time begins.  We suggest a 20 ounce bottle of Gatorade or Powerade for non-diabetic patients or a 20 ounce bottle of G2 or Powerade Zero for diabetic patients. (Pediatric patients will not be advised to drink a 20 ounce carbohydrate drink).   • Patients who avoid smoking, chewing tobacco and alcohol for 4 weeks prior to surgery have a reduced risk of post-operative complications.  Quit smoking as many days before surgery as you can.  • Do not smoke, use chewing tobacco or drink alcohol the day of surgery.   • If applicable bring your C-PAP/ BI-PAP machine.  • Bring any papers given to you in the doctor’s office.  • Wear clean comfortable clothes and socks.  • Do not wear contact lenses or make-up.  Bring a case for your glasses.   • Leave all other valuables and jewelry at home.  • The Pre-Admission Testing nurse will instruct you to bring medications if unable to obtain an accurate list in Pre-Admission Testing.      Preventing a Surgical Site Infection:  • For 2 to 3 days before surgery, avoid shaving with a razor because the razor can irritate skin and make it easier to  develop an infection.  • The night prior to surgery sleep in a clean bed with clean clothing.  Do not allow pets to sleep with you.  • Shower on the morning of surgery using a fresh bar of anti-bacterial soap (such as Dial) and clean washcloth.  Dry with a clean towel and dress in clean clothing.  • Ask your surgeon if you will be receiving antibiotics prior to surgery.  • Make sure you, your family, and all healthcare providers clean their hands with soap and water or an alcohol based hand  before caring for you or your wound.    Day of surgery: 04- ARRIVE @ 0600 AM REPORT TO THE MAIN OR   Upon arrival, a Pre-op nurse and Anesthesiologist will review your health history, obtain vital signs, and answer questions you may have.  The only belongings needed at this time will be your home medications.  If you are staying overnight your family can leave the rest of your belongings in the car and bring them to your room later.  A Pre-op nurse will start an IV and you may receive medication in preparation for surgery, including something to help you relax.  Your family will be able to see you in the Pre-op area.  While you are in surgery your family should notify the waiting room  if they leave the waiting room area and provide a contact phone number.    Please be aware that surgery does come with discomfort.  We want to make every effort to control your discomfort so please discuss any uncontrolled symptoms with your nurse.   Your doctor will most likely have prescribed pain medications.      If you are going home after surgery you will receive individualized written care instructions before being discharged.  A responsible adult must drive you to and from the hospital on the day of your surgery and stay with you for 24 hours.     If you are staying overnight following surgery, you will be transported to your hospital room following the recovery period.  [facility] has all private  rooms.    If you have any questions please call Pre-Admission Testing at [phone].  Deductibles and co-payments are collected on the day of service. Please be prepared to pay the required co-pay, deductible or deposit on the day of service as defined by your plan.

## 2017-04-12 ENCOUNTER — ANESTHESIA EVENT (OUTPATIENT)
Dept: PERIOP | Facility: HOSPITAL | Age: 62
End: 2017-04-12

## 2017-04-12 ENCOUNTER — ANESTHESIA (OUTPATIENT)
Dept: PERIOP | Facility: HOSPITAL | Age: 62
End: 2017-04-12

## 2017-04-12 ENCOUNTER — APPOINTMENT (OUTPATIENT)
Dept: GENERAL RADIOLOGY | Facility: HOSPITAL | Age: 62
End: 2017-04-12

## 2017-04-12 ENCOUNTER — HOSPITAL ENCOUNTER (OUTPATIENT)
Facility: HOSPITAL | Age: 62
Setting detail: HOSPITAL OUTPATIENT SURGERY
Discharge: HOME | End: 2017-04-12
Attending: NEUROLOGICAL SURGERY | Admitting: NEUROLOGICAL SURGERY

## 2017-04-12 VITALS
OXYGEN SATURATION: 94 % | SYSTOLIC BLOOD PRESSURE: 113 MMHG | HEART RATE: 91 BPM | WEIGHT: 228.38 LBS | DIASTOLIC BLOOD PRESSURE: 54 MMHG | HEIGHT: 68 IN | BODY MASS INDEX: 34.61 KG/M2 | TEMPERATURE: 98 F | RESPIRATION RATE: 18 BRPM

## 2017-04-12 DIAGNOSIS — G44.201 ACUTE INTRACTABLE TENSION-TYPE HEADACHE: Primary | ICD-10-CM

## 2017-04-12 DIAGNOSIS — D47.2 MONOCLONAL GAMMOPATHY: ICD-10-CM

## 2017-04-12 DIAGNOSIS — Z91.041 ALLERGY HISTORY, RADIOGRAPHIC DYE: ICD-10-CM

## 2017-04-12 DIAGNOSIS — Z98.890 HX OF CEREBRAL ANEURYSM REPAIR: ICD-10-CM

## 2017-04-12 DIAGNOSIS — Z86.79 HX OF CEREBRAL ANEURYSM REPAIR: ICD-10-CM

## 2017-04-12 LAB
GLUCOSE BLDC GLUCOMTR-MCNC: 180 MG/DL (ref 70–130)
GLUCOSE BLDC GLUCOMTR-MCNC: 231 MG/DL (ref 70–130)

## 2017-04-12 PROCEDURE — C1760 CLOSURE DEV, VASC: HCPCS | Performed by: NEUROLOGICAL SURGERY

## 2017-04-12 PROCEDURE — 36226 PLACE CATH VERTEBRAL ART: CPT | Performed by: NEUROLOGICAL SURGERY

## 2017-04-12 PROCEDURE — 0 IODIXANOL PER 1 ML: Performed by: NEUROLOGICAL SURGERY

## 2017-04-12 PROCEDURE — C1769 GUIDE WIRE: HCPCS | Performed by: NEUROLOGICAL SURGERY

## 2017-04-12 PROCEDURE — 82962 GLUCOSE BLOOD TEST: CPT

## 2017-04-12 PROCEDURE — 25010000002 HEPARIN (PORCINE) PER 1000 UNITS: Performed by: NEUROLOGICAL SURGERY

## 2017-04-12 PROCEDURE — C1894 INTRO/SHEATH, NON-LASER: HCPCS | Performed by: NEUROLOGICAL SURGERY

## 2017-04-12 PROCEDURE — 25010000002 MIDAZOLAM PER 1 MG: Performed by: ANESTHESIOLOGY

## 2017-04-12 PROCEDURE — 25010000002 HYDROCORTISONE SODIUM SUCCINATE 100 MG RECONSTITUTED SOLUTION: Performed by: ANESTHESIOLOGY

## 2017-04-12 PROCEDURE — 76377 3D RENDER W/INTRP POSTPROCES: CPT

## 2017-04-12 PROCEDURE — 25010000002 DEXAMETHASONE PER 1 MG: Performed by: NEUROLOGICAL SURGERY

## 2017-04-12 PROCEDURE — 25010000002 FENTANYL CITRATE (PF) 100 MCG/2ML SOLUTION: Performed by: NURSE ANESTHETIST, CERTIFIED REGISTERED

## 2017-04-12 PROCEDURE — 25010000002 DIPHENHYDRAMINE PER 50 MG: Performed by: ANESTHESIOLOGY

## 2017-04-12 PROCEDURE — 36224 PLACE CATH CAROTD ART: CPT | Performed by: NEUROLOGICAL SURGERY

## 2017-04-12 PROCEDURE — 25010000002 ONDANSETRON PER 1 MG: Performed by: NURSE ANESTHETIST, CERTIFIED REGISTERED

## 2017-04-12 RX ORDER — LIDOCAINE AND PRILOCAINE 25; 25 MG/G; MG/G
CREAM TOPICAL
Status: COMPLETED
Start: 2017-04-12 | End: 2017-04-12

## 2017-04-12 RX ORDER — PROMETHAZINE HYDROCHLORIDE 25 MG/ML
6.25 INJECTION, SOLUTION INTRAMUSCULAR; INTRAVENOUS ONCE AS NEEDED
Status: DISCONTINUED | OUTPATIENT
Start: 2017-04-12 | End: 2017-04-12 | Stop reason: HOSPADM

## 2017-04-12 RX ORDER — PROMETHAZINE HYDROCHLORIDE 25 MG/ML
12.5 INJECTION, SOLUTION INTRAMUSCULAR; INTRAVENOUS ONCE AS NEEDED
Status: DISCONTINUED | OUTPATIENT
Start: 2017-04-12 | End: 2017-04-12 | Stop reason: HOSPADM

## 2017-04-12 RX ORDER — MIDAZOLAM HYDROCHLORIDE 1 MG/ML
2 INJECTION INTRAMUSCULAR; INTRAVENOUS
Status: DISCONTINUED | OUTPATIENT
Start: 2017-04-12 | End: 2017-04-12 | Stop reason: HOSPADM

## 2017-04-12 RX ORDER — PROMETHAZINE HYDROCHLORIDE 25 MG/1
25 SUPPOSITORY RECTAL ONCE AS NEEDED
Status: DISCONTINUED | OUTPATIENT
Start: 2017-04-12 | End: 2017-04-12 | Stop reason: HOSPADM

## 2017-04-12 RX ORDER — BUTALBITAL, ACETAMINOPHEN AND CAFFEINE 50; 325; 40 MG/1; MG/1; MG/1
2 TABLET ORAL EVERY 4 HOURS PRN
Status: DISCONTINUED | OUTPATIENT
Start: 2017-04-12 | End: 2017-04-12 | Stop reason: HOSPADM

## 2017-04-12 RX ORDER — SODIUM CHLORIDE, SODIUM LACTATE, POTASSIUM CHLORIDE, CALCIUM CHLORIDE 600; 310; 30; 20 MG/100ML; MG/100ML; MG/100ML; MG/100ML
INJECTION, SOLUTION INTRAVENOUS CONTINUOUS PRN
Status: DISCONTINUED | OUTPATIENT
Start: 2017-04-12 | End: 2017-04-12

## 2017-04-12 RX ORDER — FENTANYL CITRATE 50 UG/ML
INJECTION, SOLUTION INTRAMUSCULAR; INTRAVENOUS AS NEEDED
Status: DISCONTINUED | OUTPATIENT
Start: 2017-04-12 | End: 2017-04-12 | Stop reason: SURG

## 2017-04-12 RX ORDER — IODIXANOL 320 MG/ML
150 INJECTION, SOLUTION INTRAVASCULAR
Status: COMPLETED | OUTPATIENT
Start: 2017-04-12 | End: 2017-04-12

## 2017-04-12 RX ORDER — SODIUM CHLORIDE, SODIUM LACTATE, POTASSIUM CHLORIDE, CALCIUM CHLORIDE 600; 310; 30; 20 MG/100ML; MG/100ML; MG/100ML; MG/100ML
9 INJECTION, SOLUTION INTRAVENOUS CONTINUOUS
Status: DISCONTINUED | OUTPATIENT
Start: 2017-04-12 | End: 2017-04-12 | Stop reason: HOSPADM

## 2017-04-12 RX ORDER — DIPHENHYDRAMINE HYDROCHLORIDE 50 MG/ML
12.5 INJECTION INTRAMUSCULAR; INTRAVENOUS
Status: DISCONTINUED | OUTPATIENT
Start: 2017-04-12 | End: 2017-04-12 | Stop reason: HOSPADM

## 2017-04-12 RX ORDER — FAMOTIDINE 10 MG/ML
20 INJECTION, SOLUTION INTRAVENOUS ONCE
Status: COMPLETED | OUTPATIENT
Start: 2017-04-12 | End: 2017-04-12

## 2017-04-12 RX ORDER — MIDAZOLAM HYDROCHLORIDE 1 MG/ML
1 INJECTION INTRAMUSCULAR; INTRAVENOUS
Status: DISCONTINUED | OUTPATIENT
Start: 2017-04-12 | End: 2017-04-12 | Stop reason: HOSPADM

## 2017-04-12 RX ORDER — DIPHENHYDRAMINE HCL 25 MG
25 CAPSULE ORAL ONCE
COMMUNITY
Start: 2017-04-11 | End: 2017-05-22

## 2017-04-12 RX ORDER — HEPARIN SODIUM 1000 [USP'U]/ML
INJECTION, SOLUTION INTRAVENOUS; SUBCUTANEOUS
Status: DISCONTINUED | OUTPATIENT
Start: 1840-12-31 | End: 2017-04-12 | Stop reason: HOSPADM

## 2017-04-12 RX ORDER — PROMETHAZINE HYDROCHLORIDE 25 MG/1
25 TABLET ORAL ONCE AS NEEDED
Status: DISCONTINUED | OUTPATIENT
Start: 2017-04-12 | End: 2017-04-12 | Stop reason: HOSPADM

## 2017-04-12 RX ORDER — FENTANYL CITRATE 50 UG/ML
50 INJECTION, SOLUTION INTRAMUSCULAR; INTRAVENOUS
Status: DISCONTINUED | OUTPATIENT
Start: 2017-04-12 | End: 2017-04-12 | Stop reason: HOSPADM

## 2017-04-12 RX ORDER — ONDANSETRON 2 MG/ML
4 INJECTION INTRAMUSCULAR; INTRAVENOUS ONCE AS NEEDED
Status: COMPLETED | OUTPATIENT
Start: 2017-04-12 | End: 2017-04-12

## 2017-04-12 RX ORDER — FAMOTIDINE 20 MG/1
TABLET, FILM COATED ORAL
Status: COMPLETED
Start: 2017-04-12 | End: 2017-04-12

## 2017-04-12 RX ORDER — SODIUM CHLORIDE 0.9 % (FLUSH) 0.9 %
1-10 SYRINGE (ML) INJECTION AS NEEDED
Status: DISCONTINUED | OUTPATIENT
Start: 2017-04-12 | End: 2017-04-12 | Stop reason: HOSPADM

## 2017-04-12 RX ORDER — DIPHENHYDRAMINE HYDROCHLORIDE 50 MG/ML
25 INJECTION INTRAMUSCULAR; INTRAVENOUS ONCE
Status: COMPLETED | OUTPATIENT
Start: 2017-04-12 | End: 2017-04-12

## 2017-04-12 RX ORDER — SODIUM CHLORIDE 9 MG/ML
100 INJECTION, SOLUTION INTRAVENOUS CONTINUOUS
Status: DISCONTINUED | OUTPATIENT
Start: 2017-04-12 | End: 2017-04-12 | Stop reason: HOSPADM

## 2017-04-12 RX ORDER — FAMOTIDINE 10 MG
20 TABLET ORAL
Status: COMPLETED | OUTPATIENT
Start: 2017-04-12 | End: 2017-04-12

## 2017-04-12 RX ADMIN — MIDAZOLAM HYDROCHLORIDE 0.5 MG: 1 INJECTION, SOLUTION INTRAMUSCULAR; INTRAVENOUS at 09:01

## 2017-04-12 RX ADMIN — BUTALBITAL, ACETAMINOPHEN, AND CAFFEINE 2 TABLET: 50; 325; 40 TABLET ORAL at 12:30

## 2017-04-12 RX ADMIN — IODIXANOL 101 ML: 320 INJECTION, SOLUTION INTRAVASCULAR at 09:40

## 2017-04-12 RX ADMIN — FENTANYL CITRATE 25 MCG: 50 INJECTION INTRAMUSCULAR; INTRAVENOUS at 09:15

## 2017-04-12 RX ADMIN — DEXAMETHASONE SODIUM PHOSPHATE 8 MG: 4 INJECTION, SOLUTION INTRAMUSCULAR; INTRAVENOUS at 11:37

## 2017-04-12 RX ADMIN — MIDAZOLAM 2 MG: 1 INJECTION INTRAMUSCULAR; INTRAVENOUS at 07:44

## 2017-04-12 RX ADMIN — FAMOTIDINE 20 MG: 10 INJECTION, SOLUTION INTRAVENOUS at 07:45

## 2017-04-12 RX ADMIN — FENTANYL CITRATE 50 MCG: 50 INJECTION INTRAMUSCULAR; INTRAVENOUS at 09:51

## 2017-04-12 RX ADMIN — SODIUM CHLORIDE 100 ML/HR: 9 INJECTION, SOLUTION INTRAVENOUS at 07:46

## 2017-04-12 RX ADMIN — LIDOCAINE AND PRILOCAINE: 25; 25 CREAM TOPICAL at 07:23

## 2017-04-12 RX ADMIN — MIDAZOLAM HYDROCHLORIDE 1 MG: 1 INJECTION, SOLUTION INTRAMUSCULAR; INTRAVENOUS at 08:09

## 2017-04-12 RX ADMIN — ONDANSETRON 4 MG: 2 INJECTION INTRAMUSCULAR; INTRAVENOUS at 10:39

## 2017-04-12 RX ADMIN — MIDAZOLAM HYDROCHLORIDE 0.5 MG: 1 INJECTION, SOLUTION INTRAMUSCULAR; INTRAVENOUS at 08:22

## 2017-04-12 RX ADMIN — DIPHENHYDRAMINE HYDROCHLORIDE 25 MG: 50 INJECTION INTRAMUSCULAR; INTRAVENOUS at 07:46

## 2017-04-12 RX ADMIN — FENTANYL CITRATE 50 MCG: 50 INJECTION INTRAMUSCULAR; INTRAVENOUS at 08:09

## 2017-04-12 RX ADMIN — FENTANYL CITRATE 50 MCG: 50 INJECTION INTRAMUSCULAR; INTRAVENOUS at 09:28

## 2017-04-12 RX ADMIN — FENTANYL CITRATE 25 MCG: 50 INJECTION INTRAMUSCULAR; INTRAVENOUS at 08:22

## 2017-04-12 RX ADMIN — FAMOTIDINE 20 MG: 20 TABLET, FILM COATED ORAL at 07:21

## 2017-04-12 RX ADMIN — Medication 20 MG: at 07:21

## 2017-04-12 RX ADMIN — FENTANYL CITRATE 50 MCG: 50 INJECTION INTRAMUSCULAR; INTRAVENOUS at 11:08

## 2017-04-12 RX ADMIN — HYDROCORTISONE SODIUM SUCCINATE 100 MG: 100 INJECTION, POWDER, FOR SOLUTION INTRAMUSCULAR; INTRAVENOUS at 07:45

## 2017-04-12 NOTE — PERIOPERATIVE NURSING NOTE
Phone call to Dr. Bella in the OR to inform him of patient's c/o severe headache worsening.  Orders received.  Call to Cyn Rene at his direction to request him to come evaluate patient. She will come.

## 2017-04-12 NOTE — PLAN OF CARE
Problem: Perioperative Period (Adult)  Goal: Signs and Symptoms of Listed Potential Problems Will be Absent or Manageable (Perioperative Period)  Outcome: Ongoing (interventions implemented as appropriate)    04/12/17 0703   Perioperative Period   Problems Assessed (Perioperative Period) infection   Problems Present (Perioperative Period) none

## 2017-04-12 NOTE — ANESTHESIA PREPROCEDURE EVALUATION
Anesthesia Evaluation     Patient summary reviewed and Nursing notes reviewed   history of anesthetic complications:  NPO Status: > 8 hours   Airway   Mallampati: III  TM distance: <3 FB  Neck ROM: full  possible difficult intubation  Dental - normal exam     Pulmonary     breath sounds clear to auscultation  (+) asthma,   Cardiovascular     Rhythm: regular    (+) hypertension,       Neuro/Psych  (+) CVA,    GI/Hepatic/Renal/Endo    (+) obesity, morbid obesity, GERD, diabetes mellitus,     Musculoskeletal     Abdominal   (+) obese,    Substance History      OB/GYN          Other                                    Anesthesia Plan    ASA 3     MAC     intravenous induction   Anesthetic plan and risks discussed with patient.

## 2017-04-12 NOTE — DISCHARGE INSTRUCTIONS
Orlando Bella MD  [facility]    [address]  [address]  [place] [address]   P: [phone]    SEDATION DISCHARGE INSTRUCTIONS.  IMPORTANT: The following information will help you return to your best level of health.  Sedation.  You have had a procedure that called for some medicine to reduce anxiety and pain. This medicine (or medicines) is called sedation. After receiving the medicine, you may be sleepy, but able to breathe on your own. The effects of the medicine may last for several hours.  Follow these instructions after sedation:  Go right home. Rest quietly at home today, then you can be up and about.  Do not drink alcohol, drive or operate machinery for 24 hours.  Do not do anything where light-headedness or clumsiness would be dangerous.  Do not make important decisions or sign any legal papers for the next 24 hours.  Make sure A RESPONSIBLE PERSON stays with you the rest of today and overnight for your protection and safety.  Start your diet with fluids and light foods (jello, soup, juice, toast). Then, slowly progress to your usual diet if you are not sick to your stomach.  Call your doctor if you have:  a gray or blue skin color.  excess sleepiness.  repeated vomiting.  trouble breathing.  any new problems or concerns.    POSTOPERATIVE CARE INSTRUCTIONS FOR CEREBRAL ANGIOGRAPHY    1. After your angiogram, you will need to be less active than normal. you should plan to be off work and relax for the next two days. This is because Dr. Bella has placed a plug in your artery to close it up, but it still needs some time to heal.    2. You may feel some stinging and see some slight swelling. You may also see bruising; possibly a large amount of bruising. This is normal. An ice pack will help relieve the stinging and swelling. Use the pack at your puncture site for about 20 minutes; then remove it for at least 30 minutes. You may repeat this as often as you  need.    3. Because Dr. Bella injected some dye into your artery, you will need to drink a lot of fluids over the next 2 days in order to flush it out of your body. Water and juice are the best choices. If you need to drink soda, coffee or tea, choose decaffeinated. Caffeine will dehydrate you, and it will take you longer to flush out the dye from your body. You will know you are getting enough fluids if your urine is clear or very pale yellow.    4. Avoid strenuous activity and heavy lifting intil you return to the office. Heavy lifting is considered anything over 10 pounds. A gallon of milk weighs 8 pounds. If you have to hold anything more than 10 pounds (such as a baby), have someone place that object in your lap or arms.    5. Check your dressing occasionally. You may remove it 48 hours after your procedure. If you notice that you are bleeding or your dressing becomes soaked with fresh blood, call 911 and have them take you to the emergency room.   Hold pressure over the site. Call our office immediately ([phone]) if you notice the following: any bright redness at the puncture site, coldness in the leg, periods of the chills, red streaks running up your abdomen or down your leg, a fever over 101 degrees F orally, green or yellow discharge or discharge that has a foul odor. If it is after office hours, your call will be forwarded to the neurosurgeon on-call.    6. If you have any questions or concerns at any time, please feel free to call the office at ([phone]).Dr. Bella or neurosurgeon on-call is available 24-hours a day. Our staff is here to help you in any way we can.

## 2017-04-12 NOTE — PLAN OF CARE
Problem: Patient Care Overview (Adult)  Goal: Plan of Care Review  Outcome: Outcome(s) achieved Date Met:  04/12/17 04/12/17 8584   Outcome Evaluation   Outcome Summary/Follow up Plan READY FOR D/C       Goal: Adult Individualization and Mutuality  Outcome: Outcome(s) achieved Date Met:  04/12/17  Goal: Discharge Needs Assessment  Outcome: Outcome(s) achieved Date Met:  04/12/17    Problem: Perioperative Period (Adult)  Goal: Signs and Symptoms of Listed Potential Problems Will be Absent or Manageable (Perioperative Period)  Outcome: Outcome(s) achieved Date Met:  04/12/17

## 2017-04-12 NOTE — H&P (VIEW-ONLY)
Subjective   Patient ID: Khalida Gilliland is a 61 y.o. female who is here today for follow-up for a cerebral aneurysm. She is here for a new H&P for a C-Angio on 4/12/17. She presents unaccompanied.     History of Present Illness     The patient returns today for preoperative evaluation prior to undergoing the cerebral arteriogram April 12, 2017.  She denies any particular issues or changes.  He is concerned with regard to a contrast allergy that she has.    The following portions of the patient's history were reviewed and updated as appropriate: allergies, current medications, past family history, past medical history, past social history, past surgical history and problem list.    Review of Systems   Constitutional: Negative for chills and fever.   Eyes: Negative for visual disturbance.   Respiratory: Positive for cough. Negative for shortness of breath.    Cardiovascular: Negative for chest pain and palpitations.   Gastrointestinal: Negative for nausea and vomiting.   Musculoskeletal: Negative for gait problem and neck pain.   Skin: Negative for rash and wound.   Neurological: Negative for dizziness, weakness and headaches.   Hematological: Does not bruise/bleed easily.   Psychiatric/Behavioral: Negative for sleep disturbance. The patient is not nervous/anxious.      Past Medical History:   Diagnosis Date   • Aneurysm    • Asthma    • Cancer 07/30/2014    Basal Cell Carcinoma Left Arm,SKIN CANCER   • Diabetes mellitus    • GERD (gastroesophageal reflux disease)    • H/O diverticulitis of colon     Sigmoid colon   • History of kidney stones 06/2011   • History of surgery for cerebral aneurysm     Clipping of cerebral Aneurysm   • Hx of migraines    • Hypertension    • MGUS (monoclonal gammopathy of unknown significance)    • Neuropathy, feet    • PONV (postoperative nausea and vomiting)    • Protein overload    • Stroke 10/2011    Possible, workup negative       Past Surgical History:   Procedure Laterality Date    • APPENDECTOMY  1982    Emergency at time of 2nd    • BASAL CELL CARCINOMA EXCISION Left 2014    Excision basal cell carcinoma, left arm (1.1 cm) with frozen section control and layered wound closure ( 3.1 cm), Dr. Isael Andrade, [facility]   • BRAIN SURGERY      Ruptured aneurysm, clipped/Dr. Sims   • CEREBRAL ANEURYSM REPAIR      clipping of cerebral aneurysm   •  SECTION  ,    • CHOLECYSTECTOMY     • COLON RESECTION WITH COLOSTOMY Right    • COLON SURGERY      Sigmoid colectomy in the past   • COLONOSCOPY     • INCISIONAL HERNIA REPAIR      Patient suffered some nerve entrapment secondary to the attack, some of which were removed during a secondary operation.   • OSTOMY TAKE DOWN     • TUBAL ABDOMINAL LIGATION     • URETERAL STENT INSERTION  2011    Due to kidney stones       Social History     Social History   • Marital status:      Spouse name: Roe   • Number of children: 3   • Years of education: N/A     Occupational History   •  Unemployed     Social History Main Topics   • Smoking status: Never Smoker   • Smokeless tobacco: Never Used   • Alcohol use Yes      Comment: Occasional   • Drug use: No   • Sexual activity: Yes     Partners: Male     Other Topics Concern   • Not on file     Social History Narrative       Family History   Problem Relation Age of Onset   • Skin cancer Mother    • Heart disease Father    • Heart attack Father    • Hypertension Father    • Cholelithiasis Son 22        Allergies   Allergen Reactions   • Contrast Dye Shortness Of Breath   • Iodinated Diagnostic Agents Shortness Of Breath   • Codeine Nausea Only   • Methylprednisolone Anxiety         Current Outpatient Prescriptions:   •  albuterol (PROVENTIL HFA;VENTOLIN HFA) 108 (90 BASE) MCG/ACT inhaler, Inhale 2 puffs every 6 (six) hours as needed for wheezing or shortness of breath (Cough)., Disp: 1 inhaler, Rfl: 0  •  Ascorbic Acid (VITAMIN C PO), Take 1,000 mg by mouth Daily. HOLD  PRIOR TO SURGERY, Disp: , Rfl:   •  aspirin 325 MG tablet, Take 325 mg by mouth Daily. CONTINUE UP TO DAY OF SURGERY NOT ON DAY OF SURGERY PER DR. CALLUM BELLA,, Disp: , Rfl:   •  cetirizine (zyrTEC) 10 MG tablet, Take 10 mg by mouth Daily., Disp: , Rfl:   •  cycloSPORINE (RESTASIS) 0.05 % ophthalmic emulsion, 1 drop 2 (two) times a day., Disp: , Rfl:   •  diphenhydrAMINE-acetaminophen (TYLENOL PM)  MG tablet per tablet, Take 1 tablet by mouth at night as needed for sleep., Disp: , Rfl:   •  fluticasone (VERAMYST) 27.5 MCG/SPRAY nasal spray, 2 sprays into each nostril daily., Disp: , Rfl:   •  glucosamine-chondroitin 500-400 MG capsule capsule, Take 1 capsule by mouth 2 (Two) Times a Day With Meals. HOLD PRIOR TO SURGERY, Disp: , Rfl:   •  hydrochlorothiazide (HYDRODIURIL) 12.5 MG tablet, Take 12.5 mg by mouth daily., Disp: , Rfl:   •  omeprazole (priLOSEC) 40 MG capsule, Take 40 mg by mouth Daily., Disp: , Rfl:   •  pregabalin (LYRICA) 100 MG capsule, Take 100 mg by mouth 2 (two) times a day., Disp: , Rfl:   •  propranolol (INDERAL) 40 MG tablet, Take 40 mg by mouth 2 (two) times a day., Disp: , Rfl:   •  raNITIdine (ZANTAC) 150 MG tablet, Take 150 mg by mouth 2 (Two) Times a Day., Disp: , Rfl:   •  predniSONE (DELTASONE) 50 MG tablet, Take 1 tablet by mouth Take As Directed., Disp: 3 tablet, Rfl: 0    Current Facility-Administered Medications:   •  diphenhydrAMINE (BENADRYL) capsule 25 mg, 25 mg, Oral, Q8H, Callum Bella MD    Objective   Physical Exam   Constitutional: She is oriented to person, place, and time. She appears well-developed and well-nourished. She is cooperative.   HENT:   Head: Normocephalic and atraumatic.   Eyes: EOM are normal. Pupils are equal, round, and reactive to light. No scleral icterus.   Neck: Normal range of motion. Neck supple.   Cardiovascular: Normal rate, regular rhythm and intact distal pulses.    No murmur heard.  Pulmonary/Chest: Effort normal and breath sounds  normal.   Abdominal: Soft. Bowel sounds are normal. There is no tenderness.   Musculoskeletal: Normal range of motion.   Neurological: She is alert and oriented to person, place, and time. She has normal strength. She displays no atrophy. No cranial nerve deficit or sensory deficit. She exhibits normal muscle tone. She displays a negative Romberg sign. Coordination and gait normal. GCS eye subscore is 4. GCS verbal subscore is 5. GCS motor subscore is 6.   Reflex Scores:       Tricep reflexes are 2+ on the right side and 2+ on the left side.       Bicep reflexes are 2+ on the right side and 2+ on the left side.       Brachioradialis reflexes are 2+ on the right side and 2+ on the left side.       Patellar reflexes are 2+ on the right side and 2+ on the left side.       Achilles reflexes are 2+ on the right side and 2+ on the left side.  Negative Chowdary and clonus  Finger to nose intact   Heel to shin intact  Able to tandem walk  Able to walk on heels and toes   Skin: Skin is warm, dry and intact.   Psychiatric: She has a normal mood and affect. Her speech is normal and behavior is normal. Judgment and thought content normal. Cognition and memory are normal.   Vitals reviewed.    Neurologic Exam     Mental Status   Oriented to person, place, and time.   Speech: speech is normal     Cranial Nerves     CN III, IV, VI   Pupils are equal, round, and reactive to light.  Extraocular motions are normal.     Motor Exam     Strength   Strength 5/5 throughout.     Gait, Coordination, and Reflexes     Reflexes   Right brachioradialis: 2+  Left brachioradialis: 2+  Right biceps: 2+  Left biceps: 2+  Right triceps: 2+  Left triceps: 2+  Right patellar: 2+  Left patellar: 2+  Right achilles: 2+  Left achilles: 2+      Assessment/Plan   Independent Review of Radiographic Studies:    none  Medical Decision Making:    The patient returns to office for reevaluation prior to undergoing cerebral angiography for history of a cerebral  aneurysm.    Questions are answered.    The risks, benefits and alternatives of cerebral angiography were explained in detail to the patient. The alternative is not to undergo this procedure. The benefit of cerebral angiography should be, though is not guaranteed, the elucidation of the vascular anatomy of the brain and the blood vessels leading to the brain. The risks of the procedure include, but are not limited to, the possibility of stroke, bleeding, damage to an artery in the brain, groin or neck(possibly requiring surgery to correct), infection, reaction to the contrast dye resulting in itching, swelling, anaphylaxis or even death, lack of ability to perform the procedure, need for further operative intervention, blindness, etcetera. The patient voiced understanding of the risks, benefits and alternatives and requests that we proceed with cerebral angiography.    Khalida was seen today for cerebral aneurysm.    Diagnoses and all orders for this visit:    Hx of cerebral aneurysm repair    Allergy to radiographic dye  -     diphenhydrAMINE (BENADRYL) capsule 25 mg; Take 1 capsule by mouth Every 8 (Eight) Hours.      Return for Recheck 2 weeks after surgery.

## 2017-04-12 NOTE — PERIOPERATIVE NURSING NOTE
Educated on post femoral sheath removal restrictions/instructions. Verbalized and demonstrated results.

## 2017-04-12 NOTE — OP NOTE
CEREBRAL ANGIOGRAM  Procedure Note  Cerebral Angiogram    Khalida Gilliland  4/12/2017  [id number]    SURGEON  Orlando Bella MD    ASSISTANT:  Sophy Robison CFA  INDICATION:  History of SAH and repair of aneurysm    Pre-op Diagnosis:   Hx of cerebral aneurysm repair [Z98.890, Z86.79]    Post-op Diagnosis:     Post-Op Diagnosis Codes:     * Hx of cerebral aneurysm repair [Z98.890, Z86.79]    PROCEDURE PERFORMED:  Procedure(s):  CEREBRAL ANGIOGRAM     1. Selective catheterization and bilateral internal carotid angiography  2. 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 Continuous monitoring was accomplished during the operative intervention. She was given intravenous sedation and the groin was prepped in the usual sterile manner.     Local anesthesia was given.  Singlewall puncture technique 5 Chinese introducing sheath. 5 Chinese Jacob catheter.    Under fluoroscopic guidance selective catheterization of the right internal carotid artery and the left internal carotid artery.  Bilateral internal carotid artery cerebral angiography in multiple projections demonstrates the normal branching pattern of the internal carotid artery bilaterally. There is no evidence of aneurysmal formation, arterio-venous malformation, arterio-venous fistula or venous anomaly. There is evidence of prior craniotomy and two aneurysm clips are seen near the  right internal carotid artery. 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 It is in the area of the carotid cave.    Selective catheterization of the right vertebral artery and the left vertebral artery.  Bilateral vertebral arteriography multiple projections demonstrates the normal branching pattern of the vertebral arteries bilaterally. There is no evidence of aneurysmal formation, arterio-venous malformation, arterio-venous fistula or venous anomaly.    Arteriography through the existing sheath demonstrates the sheath to be within the common femoral artery. Minyx system was used to close the artery, pressure was held for the appropriate length of time and the patient was transferred to the recovery room in stable and good condition.      Orlando Bella MD     Date: 4/12/2017  Time: 9:11 AM

## 2017-04-12 NOTE — ANESTHESIA POSTPROCEDURE EVALUATION
Patient: Khalida Gilliland    Procedure Summary     Date Anesthesia Start Anesthesia Stop Room / Location    04/12/17 0755 0923  [facility] OR 19 INV /  [facility] HYBRID OR 18/19       Procedure Diagnosis Provider Provider    CEREBRAL ANGIOGRAM  (N/A ) Hx of cerebral aneurysm repair  (Hx of cerebral aneurysm repair [Z98.890, Z86.79]) MD Gino Valentino MD          Anesthesia Type: MAC  Last vitals  /81 (04/12/17 1025)    Temp      Pulse 95 (04/12/17 1025)   Resp 18 (04/12/17 1025)    SpO2 97 % (04/12/17 1025)      Post Anesthesia Care and Evaluation    Patient location during evaluation: PACU  Patient participation: complete - patient participated  Level of consciousness: awake and alert  Pain management: adequate  Airway patency: patent  Anesthetic complications: No anesthetic complications    Cardiovascular status: acceptable  Respiratory status: acceptable  Hydration status: acceptable

## 2017-04-12 NOTE — PLAN OF CARE
Problem: Patient Care Overview (Adult)  Goal: Plan of Care Review  Outcome: Ongoing (interventions implemented as appropriate)    04/12/17 0702   Coping/Psychosocial Response Interventions   Plan Of Care Reviewed With patient   Patient Care Overview   Progress progress toward functional goals as expected       Goal: Adult Individualization and Mutuality  Outcome: Ongoing (interventions implemented as appropriate)

## 2017-04-18 ENCOUNTER — OFFICE VISIT (OUTPATIENT)
Dept: NEUROSURGERY | Facility: CLINIC | Age: 62
End: 2017-04-18

## 2017-04-18 VITALS
HEART RATE: 64 BPM | BODY MASS INDEX: 34.25 KG/M2 | DIASTOLIC BLOOD PRESSURE: 88 MMHG | SYSTOLIC BLOOD PRESSURE: 136 MMHG | WEIGHT: 226 LBS | HEIGHT: 68 IN

## 2017-04-18 DIAGNOSIS — M54.2 NECK PAIN: ICD-10-CM

## 2017-04-18 DIAGNOSIS — Z86.79 HX OF CEREBRAL ANEURYSM REPAIR: Primary | ICD-10-CM

## 2017-04-18 DIAGNOSIS — Z98.890 HX OF CEREBRAL ANEURYSM REPAIR: Primary | ICD-10-CM

## 2017-04-18 DIAGNOSIS — I67.1 CEREBRAL ANEURYSM, NONRUPTURED: ICD-10-CM

## 2017-04-18 PROCEDURE — 99214 OFFICE O/P EST MOD 30 MIN: CPT | Performed by: NEUROLOGICAL SURGERY

## 2017-04-18 RX ORDER — SODIUM CHLORIDE 9 MG/ML
100 INJECTION, SOLUTION INTRAVENOUS CONTINUOUS
Status: CANCELLED | OUTPATIENT
Start: 2017-04-18

## 2017-04-18 RX ORDER — SODIUM CHLORIDE 0.9 % (FLUSH) 0.9 %
1-10 SYRINGE (ML) INJECTION AS NEEDED
Status: CANCELLED | OUTPATIENT
Start: 2017-04-18

## 2017-04-18 RX ORDER — CEFAZOLIN SODIUM 2 G/100ML
2 INJECTION, SOLUTION INTRAVENOUS ONCE
Status: CANCELLED | OUTPATIENT
Start: 2017-04-18 | End: 2017-04-18

## 2017-04-18 NOTE — PROGRESS NOTES
" HPI:   Khalida Gilliland is a 61 y.o. female for follow-up of her treated cerebral aneurysm. She is status post renal angiography on April 12, 2017. Pathology revealed post operative changes. She was discharged to home. She has not had postoperative complications.    She presents with continuing neck discomfort since her arteriogram.  This is on the right side of the neck.  She it feels a bit like a \"cord\" in the neck and the back part of the neck.  He is also been having some headaches.  Post angiogram in the recovery area she developed a very severe migrainous headache which has thankfully mostly resolved.    She does note that in December she had a sudden onset of headache which really inspired her to seek further evaluation of her cerebral aneurysm because, today she states that the headache that she had in December reminded her specifically of the aneurysmal headache that she had that prompted treatment 13 years ago.    Review of Systems   Eyes: Negative for visual disturbance.   Cardiovascular: Negative for leg swelling.   Musculoskeletal: Positive for neck pain.   Skin: Negative for color change (or coolness of procedure leg), pallor and wound (Denies swelling, excess bruising or bleeding of groin site  ).   Neurological: Positive for headaches. Negative for facial asymmetry, speech difficulty, weakness and numbness (of procedure leg).        /88 (BP Location: Right arm, Patient Position: Sitting, Cuff Size: Large Adult)  Pulse 64  Ht 68\" (172.7 cm)  Wt 226 lb (103 kg)  BMI 34.36 kg/m2    Physical Exam   Constitutional: She is oriented to person, place, and time. She appears well-developed and well-nourished. She is cooperative.   HENT:   Head: Normocephalic and atraumatic.   Eyes: EOM are normal. Pupils are equal, round, and reactive to light. No scleral icterus.   Neck: Normal range of motion. Neck supple.       Cardiovascular: Normal rate, regular rhythm and intact distal pulses.    No murmur " heard.  Pulmonary/Chest: Effort normal and breath sounds normal.   Abdominal: Soft. Bowel sounds are normal. There is no tenderness.   Musculoskeletal: Normal range of motion.   Neurological: She is alert and oriented to person, place, and time. She has normal strength. She displays no atrophy. No cranial nerve deficit or sensory deficit. She exhibits normal muscle tone. She displays a negative Romberg sign. Coordination and gait normal. GCS eye subscore is 4. GCS verbal subscore is 5. GCS motor subscore is 6.   Reflex Scores:       Tricep reflexes are 2+ on the right side and 2+ on the left side.       Bicep reflexes are 2+ on the right side and 2+ on the left side.       Brachioradialis reflexes are 2+ on the right side and 2+ on the left side.       Patellar reflexes are 2+ on the right side and 2+ on the left side.       Achilles reflexes are 2+ on the right side and 2+ on the left side.  Negative Chowdary and clonus  Finger to nose intact   Heel to shin intact  Able to tandem walk  Able to walk on heels and toes   Skin: Skin is warm, dry and intact.   Psychiatric: She has a normal mood and affect. Her speech is normal and behavior is normal. Judgment and thought content normal. Cognition and memory are normal.   Vitals reviewed.    Neurologic Exam     Mental Status   Oriented to person, place, and time.   Speech: speech is normal     Cranial Nerves     CN III, IV, VI   Pupils are equal, round, and reactive to light.  Extraocular motions are normal.     Motor Exam     Strength   Strength 5/5 throughout.     Gait, Coordination, and Reflexes     Reflexes   Right brachioradialis: 2+  Left brachioradialis: 2+  Right biceps: 2+  Left biceps: 2+  Right triceps: 2+  Left triceps: 2+  Right patellar: 2+  Left patellar: 2+  Right achilles: 2+  Left achilles: 2+      Findings/Results:  I personally reviewed the cerebral angiogram that I performed April 12: This demonstrates postoperative changes around the right internal  carotid artery with a laterally projecting 4 mm aneurysm adjacent to 2 cerebral aneurysm clips.  Assessment/Plan:    Khalida was seen today for cerebral aneurysm.    Diagnoses and all orders for this visit:    Hx of cerebral aneurysm repair    Cerebral aneurysm, nonruptured  -     Case Request; Standing  -     CBC and Differential; Future  -     Basic metabolic panel; Future  -     XR chest 1 vw; Future  -     sodium chloride 0.9 % flush 1-10 mL; Infuse 1-10 mL into a venous catheter As Needed for Line Care.  -     sodium chloride 0.9 % infusion; Infuse 100 mL/hr into a venous catheter Continuous.  -     Platelet Response Aspirin; Future  -     P2Y12 Platelet Inhibition; Future  -     ceFAZolin (ANCEF) 2 g in sodium chloride 0.9 % 100 mL IVPB; Infuse 2 g into a venous catheter 1 (One) Time.  -     Case Request    Neck pain  -     Ambulatory Referral to Physical Therapy Evaluate and treat    Other orders  -     Follow anesthesia standing orders.  -     Obtain informed consent  -     Follow Anesthesia Guidelines / Standing Orders; Standing  -     JR hose- To be placed on patient in pre-op; Standing  -     SCD (sequential compression device)- to be placed on patient in Pre-op; Standing  -     Clip operative site; Standing  -     Insert Peripheral IV; Standing  -     Saline Lock & Maintain IV Access; Standing  -     Inpatient Admission; Standing  -     Verify informed consent; Standing  -     Verify NPO Status; Standing  -     Clorhexidine skin prep  -     Provide instructions to patient on NPO status        Discussion/Summary    The patient presents at this time with some postoperative neck discomfort.  We will prescribe physical therapy for her and that would likely help.    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  Most disturbing is the severe headache that she experienced in  December that reminds her very specifically of a headache that she had when she had bleeding from her cerebral aneurysm.    Treatment options are observation or endovascular management with a pipeline embolization device.  I explained to the patient the treatment would involve essentially lining the carotid artery with a device that would die of her blood flow from the aneurysm and over a 3-6 month timeframe allow the aneurysm to go ahead and clot and be cured.    As this office visit has progressed and it has become clear to me that she had an extremely severe headache in December I am inclined to recommend that we go ahead and treat the carotid aneurysm on the right side.    The risks, benefits and alternatives of cerebral angiography and placement of PIPELINE stent for use in the affected artery to prevent blood flow into the aneurysm and to support coil embolization (if needed) were explained in detail to the patient. The alternative is not to undergo this procedure. The benefit of cerebral angiography with embolization should be, though is not guaranteed, the elucidation of the vascular anatomy of the brain and the blood vessels leading to the brain, and placement of a series of coils into the aneurysm in order to prevent blood from flowing into it, thus potentially preventing rupture of the aneurysm. The benefit of PIPELINE stent is that it is less invasive than other surgical procedures, and provides a network of support for the coils and is useful for complex aneurysm. The risks of the procedure include, but are not limited to, the possibility of stroke, bleeding, blindness, damage to an artery in the brain, groin or neck (possibly requiring surgery to correct), infection, reaction to the contrast dye resulting in itching, swelling, anaphylaxis or even death, lack of ability to perform the procedure, need for further operative intervention, lack of ability to completely obliterate the aneurysm, reopening of  the aneurysm, need for further embolization treatment or even open surgery to obliterate the aneurysm, compaction of the coils within the aneurysm causing possible reopening or rupture, etcetera. The patient voiced understanding of these risks, benefits and alternatives and requests that we proceed with cerebral angiography with coil embolization and stent placement.    After a complete physical exam, the patient has been informed of the consequences, benefits, appropriate us, and office policies regarding the medication being prescribed. A MARTITA check will be made on-line, and will be repeated if prescription is renewed after a 90 day period. The patient agrees to adhering to the medication regimen as prescribed.      The patient has been advised that we will manage post-operative pain for 1 month. If further narcotic medication is needed beyond that period, a referral back to the primary care physician or to a pain management specialist will be made. If the patient cancels or fails to show for scheduled follow-up visits or the pain management referral,  further narcotic prescriptions from this practice may cease.          Plan: Return for Recheck 2 weeks after surgery, Follow up with nurse practitioner.         Patient Care Team    Patient Care Team:  Mikael David MD as PCP - General (Internal Medicine)    Orlando Bella MD  4/18/2017

## 2017-04-19 ENCOUNTER — TELEPHONE (OUTPATIENT)
Dept: NEUROSURGERY | Facility: CLINIC | Age: 62
End: 2017-04-19

## 2017-04-19 RX ORDER — CLOPIDOGREL BISULFATE 75 MG/1
75 TABLET ORAL DAILY
Qty: 30 TABLET | Refills: 6 | Status: SHIPPED | OUTPATIENT
Start: 2017-04-19 | End: 2017-05-05

## 2017-04-19 RX ORDER — ASPIRIN 81 MG/1
81 TABLET ORAL DAILY
Qty: 30 TABLET | Refills: 6 | Status: SHIPPED | OUTPATIENT
Start: 2017-04-19

## 2017-04-19 NOTE — TELEPHONE ENCOUNTER
----- Message from Aminata Dunn sent at 4/19/2017 10:41 AM EDT -----  Dr. Bella never finished her note from yesterday and he did confirm with me that she needed to be on aspirin and plavix up to surgery. I do not know how to check to see if he entered a script for plavix. Can you check and see and order this if he has not? Thank you!

## 2017-04-19 NOTE — TELEPHONE ENCOUNTER
Done. Patient needs to begin 3 days prior to preadmission testing (PAT). I sent to her Aryanr. She needs to stop full strength aspirin and take the baby aspirin prescribed with the Plavix.

## 2017-04-21 ENCOUNTER — TELEPHONE (OUTPATIENT)
Dept: NEUROSURGERY | Facility: CLINIC | Age: 62
End: 2017-04-21

## 2017-04-21 NOTE — TELEPHONE ENCOUNTER
We received a form from [employer] asking us to consider either having patient undergo a test to see how her system responds to taking both Plavix (our Rx prior to stent) and omeprazole (PCP rx for GI problems).    Lucila has reviewed form and requests that a better option would be to have patient contact her PCP to see if he can prescribe a different medication that would not interfere with the Plavix. (specifically, omeprazole, esomeprazole and cimetidine are all contra-indicated).     Patient contacted. She doesn't start the Plavix until 4/27; she will contact her PCP on Monday.

## 2017-04-23 ENCOUNTER — RESULTS ENCOUNTER (OUTPATIENT)
Dept: NEUROSURGERY | Facility: CLINIC | Age: 62
End: 2017-04-23

## 2017-04-23 DIAGNOSIS — I67.1 CEREBRAL ANEURYSM, NONRUPTURED: ICD-10-CM

## 2017-05-01 ENCOUNTER — APPOINTMENT (OUTPATIENT)
Dept: PREADMISSION TESTING | Facility: HOSPITAL | Age: 62
End: 2017-05-01

## 2017-05-01 ENCOUNTER — HOSPITAL ENCOUNTER (OUTPATIENT)
Dept: GENERAL RADIOLOGY | Facility: HOSPITAL | Age: 62
Discharge: HOME | End: 2017-05-01
Admitting: NEUROLOGICAL SURGERY

## 2017-05-01 VITALS
RESPIRATION RATE: 16 BRPM | HEIGHT: 68 IN | OXYGEN SATURATION: 98 % | BODY MASS INDEX: 34.86 KG/M2 | HEART RATE: 62 BPM | DIASTOLIC BLOOD PRESSURE: 74 MMHG | WEIGHT: 230 LBS | TEMPERATURE: 98 F | SYSTOLIC BLOOD PRESSURE: 115 MMHG

## 2017-05-01 DIAGNOSIS — I67.1 CEREBRAL ANEURYSM, NONRUPTURED: ICD-10-CM

## 2017-05-01 LAB
ANION GAP SERPL CALCULATED.3IONS-SCNC: 13.6 MMOL/L
ASA PLATELET INHIBITION: 410 ARU
BASOPHILS # BLD AUTO: 0.01 10*3/MM3 (ref 0–0.2)
BASOPHILS NFR BLD AUTO: 0.2 % (ref 0–1.5)
BUN BLD-MCNC: 11 MG/DL (ref 8–23)
BUN/CREAT SERPL: 17.2 (ref 7–25)
CALCIUM SPEC-SCNC: 9.8 MG/DL (ref 8.6–10.5)
CHLORIDE SERPL-SCNC: 100 MMOL/L (ref 98–107)
CO2 SERPL-SCNC: 26.4 MMOL/L (ref 22–29)
CREAT BLD-MCNC: 0.64 MG/DL (ref 0.57–1)
DEPRECATED RDW RBC AUTO: 46.5 FL (ref 37–54)
EOSINOPHIL # BLD AUTO: 0.26 10*3/MM3 (ref 0–0.7)
EOSINOPHIL NFR BLD AUTO: 4.6 % (ref 0.3–6.2)
ERYTHROCYTE [DISTWIDTH] IN BLOOD BY AUTOMATED COUNT: 13.6 % (ref 11.7–13)
GFR SERPL CREATININE-BSD FRML MDRD: 94 ML/MIN/1.73
GLUCOSE BLD-MCNC: 118 MG/DL (ref 65–99)
HCT VFR BLD AUTO: 41.6 % (ref 35.6–45.5)
HGB BLD-MCNC: 13.5 G/DL (ref 11.9–15.5)
IMM GRANULOCYTES # BLD: 0 10*3/MM3 (ref 0–0.03)
IMM GRANULOCYTES NFR BLD: 0 % (ref 0–0.5)
LYMPHOCYTES # BLD AUTO: 2.53 10*3/MM3 (ref 0.9–4.8)
LYMPHOCYTES NFR BLD AUTO: 44.7 % (ref 19.6–45.3)
MCH RBC QN AUTO: 30.5 PG (ref 26.9–32)
MCHC RBC AUTO-ENTMCNC: 32.5 G/DL (ref 32.4–36.3)
MCV RBC AUTO: 94.1 FL (ref 80.5–98.2)
MONOCYTES # BLD AUTO: 0.61 10*3/MM3 (ref 0.2–1.2)
MONOCYTES NFR BLD AUTO: 10.8 % (ref 5–12)
NEUTROPHILS # BLD AUTO: 2.25 10*3/MM3 (ref 1.9–8.1)
NEUTROPHILS NFR BLD AUTO: 39.7 % (ref 42.7–76)
PA ADP PRP-ACNC: 295 PRU (ref 194–418)
PLATELET # BLD AUTO: 213 10*3/MM3 (ref 140–500)
PMV BLD AUTO: 11 FL (ref 6–12)
POTASSIUM BLD-SCNC: 4.1 MMOL/L (ref 3.5–5.2)
RBC # BLD AUTO: 4.42 10*6/MM3 (ref 3.9–5.2)
SODIUM BLD-SCNC: 140 MMOL/L (ref 136–145)
WBC NRBC COR # BLD: 5.66 10*3/MM3 (ref 4.5–10.7)

## 2017-05-01 PROCEDURE — 85576 BLOOD PLATELET AGGREGATION: CPT | Performed by: NEUROLOGICAL SURGERY

## 2017-05-01 PROCEDURE — 36415 COLL VENOUS BLD VENIPUNCTURE: CPT | Performed by: NEUROLOGICAL SURGERY

## 2017-05-01 PROCEDURE — 85025 COMPLETE CBC W/AUTO DIFF WBC: CPT | Performed by: NEUROLOGICAL SURGERY

## 2017-05-01 PROCEDURE — 80048 BASIC METABOLIC PNL TOTAL CA: CPT | Performed by: NEUROLOGICAL SURGERY

## 2017-05-02 ENCOUNTER — TELEPHONE (OUTPATIENT)
Dept: NEUROSURGERY | Facility: CLINIC | Age: 62
End: 2017-05-02

## 2017-05-02 RX ORDER — CLOPIDOGREL BISULFATE 75 MG/1
300 TABLET ORAL ONCE
Start: 2017-05-02 | End: 2017-05-02

## 2017-05-05 ENCOUNTER — LAB (OUTPATIENT)
Dept: LAB | Facility: HOSPITAL | Age: 62
End: 2017-05-05

## 2017-05-05 ENCOUNTER — TELEPHONE (OUTPATIENT)
Dept: NEUROSURGERY | Facility: CLINIC | Age: 62
End: 2017-05-05

## 2017-05-05 DIAGNOSIS — I72.9 ANEURYSM (HCC): Primary | ICD-10-CM

## 2017-05-05 DIAGNOSIS — I67.1 CEREBRAL ANEURYSM, NONRUPTURED: ICD-10-CM

## 2017-05-05 LAB — PA ADP PRP-ACNC: 222 PRU (ref 194–418)

## 2017-05-05 PROCEDURE — 36415 COLL VENOUS BLD VENIPUNCTURE: CPT

## 2017-05-05 PROCEDURE — 85576 BLOOD PLATELET AGGREGATION: CPT

## 2017-05-08 ENCOUNTER — HOSPITAL ENCOUNTER (INPATIENT)
Facility: HOSPITAL | Age: 62
LOS: 2 days | Discharge: HOME | End: 2017-05-10
Attending: NEUROLOGICAL SURGERY | Admitting: NEUROLOGICAL SURGERY

## 2017-05-08 ENCOUNTER — ANESTHESIA EVENT (OUTPATIENT)
Dept: PERIOP | Facility: HOSPITAL | Age: 62
End: 2017-05-08

## 2017-05-08 ENCOUNTER — APPOINTMENT (OUTPATIENT)
Dept: GENERAL RADIOLOGY | Facility: HOSPITAL | Age: 62
End: 2017-05-08

## 2017-05-08 ENCOUNTER — ANESTHESIA (OUTPATIENT)
Dept: PERIOP | Facility: HOSPITAL | Age: 62
End: 2017-05-08

## 2017-05-08 DIAGNOSIS — I67.1 CEREBRAL ANEURYSM, NONRUPTURED: ICD-10-CM

## 2017-05-08 DIAGNOSIS — I72.9 ANEURYSM (HCC): ICD-10-CM

## 2017-05-08 DIAGNOSIS — G44.201 ACUTE INTRACTABLE TENSION-TYPE HEADACHE: Primary | ICD-10-CM

## 2017-05-08 LAB
GLUCOSE BLDC GLUCOMTR-MCNC: 168 MG/DL (ref 70–130)
GLUCOSE BLDC GLUCOMTR-MCNC: 199 MG/DL (ref 70–130)
PA ADP PRP-ACNC: 52 PRU (ref 194–418)

## 2017-05-08 PROCEDURE — 25010000003 CEFAZOLIN IN DEXTROSE 2-4 GM/100ML-% SOLUTION: Performed by: NEUROLOGICAL SURGERY

## 2017-05-08 PROCEDURE — 25010000002 FENTANYL CITRATE (PF) 100 MCG/2ML SOLUTION: Performed by: NURSE ANESTHETIST, CERTIFIED REGISTERED

## 2017-05-08 PROCEDURE — 25010000002 PROMETHAZINE PER 50 MG: Performed by: NURSE ANESTHETIST, CERTIFIED REGISTERED

## 2017-05-08 PROCEDURE — 25010000002 FENTANYL CITRATE (PF) 100 MCG/2ML SOLUTION: Performed by: ANESTHESIOLOGY

## 2017-05-08 PROCEDURE — 25810000003 SODIUM CHLORIDE 0.9 % WITH KCL 20 MEQ 20-0.9 MEQ/L-% SOLUTION: Performed by: NEUROLOGICAL SURGERY

## 2017-05-08 PROCEDURE — 61624 TCAT PERM OCCLS/EMBOLJ CNS: CPT | Performed by: NEUROLOGICAL SURGERY

## 2017-05-08 PROCEDURE — 25010000002 HEPARIN (PORCINE) PER 1000 UNITS: Performed by: NURSE ANESTHETIST, CERTIFIED REGISTERED

## 2017-05-08 PROCEDURE — 75894 X-RAYS TRANSCATH THERAPY: CPT

## 2017-05-08 PROCEDURE — 25010000002 MORPHINE SULFATE (PF) 2 MG/ML SOLUTION

## 2017-05-08 PROCEDURE — 25010000002 PROTAMINE SULFATE PER 10 MG: Performed by: NURSE ANESTHETIST, CERTIFIED REGISTERED

## 2017-05-08 PROCEDURE — B3161ZZ FLUOROSCOPY OF RIGHT INTERNAL CAROTID ARTERY USING LOW OSMOLAR CONTRAST: ICD-10-PCS | Performed by: NEUROLOGICAL SURGERY

## 2017-05-08 PROCEDURE — 25010000002 MIDAZOLAM PER 1 MG: Performed by: ANESTHESIOLOGY

## 2017-05-08 PROCEDURE — C1769 GUIDE WIRE: HCPCS | Performed by: NEUROLOGICAL SURGERY

## 2017-05-08 PROCEDURE — 85576 BLOOD PLATELET AGGREGATION: CPT | Performed by: NEUROLOGICAL SURGERY

## 2017-05-08 PROCEDURE — 25010000002 PHENYLEPHRINE PER 1 ML: Performed by: NURSE ANESTHETIST, CERTIFIED REGISTERED

## 2017-05-08 PROCEDURE — C1894 INTRO/SHEATH, NON-LASER: HCPCS | Performed by: NEUROLOGICAL SURGERY

## 2017-05-08 PROCEDURE — 36228 PLACE CATH INTRACRANIAL ART: CPT | Performed by: NEUROLOGICAL SURGERY

## 2017-05-08 PROCEDURE — 25010000002 ONDANSETRON PER 1 MG: Performed by: NURSE ANESTHETIST, CERTIFIED REGISTERED

## 2017-05-08 PROCEDURE — 94799 UNLISTED PULMONARY SVC/PX: CPT

## 2017-05-08 PROCEDURE — 75894 X-RAYS TRANSCATH THERAPY: CPT | Performed by: NEUROLOGICAL SURGERY

## 2017-05-08 PROCEDURE — C1887 CATHETER, GUIDING: HCPCS | Performed by: NEUROLOGICAL SURGERY

## 2017-05-08 PROCEDURE — 85347 COAGULATION TIME ACTIVATED: CPT

## 2017-05-08 PROCEDURE — 36224 PLACE CATH CAROTD ART: CPT | Performed by: NEUROLOGICAL SURGERY

## 2017-05-08 PROCEDURE — 82962 GLUCOSE BLOOD TEST: CPT

## 2017-05-08 PROCEDURE — 75898 FOLLOW-UP ANGIOGRAPHY: CPT

## 2017-05-08 PROCEDURE — C1760 CLOSURE DEV, VASC: HCPCS | Performed by: NEUROLOGICAL SURGERY

## 2017-05-08 PROCEDURE — 03VK3DZ RESTRICTION OF RIGHT INTERNAL CAROTID ARTERY WITH INTRALUMINAL DEVICE, PERCUTANEOUS APPROACH: ICD-10-PCS | Performed by: NEUROLOGICAL SURGERY

## 2017-05-08 PROCEDURE — 0 IOVERSOL 68 % SOLUTION: Performed by: NEUROLOGICAL SURGERY

## 2017-05-08 PROCEDURE — 25010000002 MIDAZOLAM PER 1 MG: Performed by: NURSE ANESTHETIST, CERTIFIED REGISTERED

## 2017-05-08 PROCEDURE — 25010000002 DEXAMETHASONE PER 1 MG: Performed by: NURSE ANESTHETIST, CERTIFIED REGISTERED

## 2017-05-08 PROCEDURE — 25010000002 HEPARIN (PORCINE) PER 1000 UNITS: Performed by: NEUROLOGICAL SURGERY

## 2017-05-08 PROCEDURE — 63710000001 INSULIN ASPART PER 5 UNITS: Performed by: INTERNAL MEDICINE

## 2017-05-08 PROCEDURE — 25010000002 PROPOFOL 10 MG/ML EMULSION: Performed by: NURSE ANESTHETIST, CERTIFIED REGISTERED

## 2017-05-08 PROCEDURE — 25010000002 MORPHINE PER 10 MG: Performed by: NEUROLOGICAL SURGERY

## 2017-05-08 DEVICE — IMPLANTABLE DEVICE: Type: IMPLANTABLE DEVICE | Site: CEREBRUM | Status: FUNCTIONAL

## 2017-05-08 RX ORDER — PROTAMINE SULFATE 10 MG/ML
INJECTION, SOLUTION INTRAVENOUS AS NEEDED
Status: DISCONTINUED | OUTPATIENT
Start: 2017-05-08 | End: 2017-05-08 | Stop reason: SURG

## 2017-05-08 RX ORDER — PROMETHAZINE HYDROCHLORIDE 25 MG/1
25 TABLET ORAL ONCE AS NEEDED
Status: COMPLETED | OUTPATIENT
Start: 2017-05-08 | End: 2017-05-08

## 2017-05-08 RX ORDER — DIPHENHYDRAMINE HCL 25 MG
25 CAPSULE ORAL ONCE
Status: DISCONTINUED | OUTPATIENT
Start: 2017-05-08 | End: 2017-05-08

## 2017-05-08 RX ORDER — CYCLOSPORINE 0.5 MG/ML
1 EMULSION OPHTHALMIC 2 TIMES DAILY
Status: DISCONTINUED | OUTPATIENT
Start: 2017-05-08 | End: 2017-05-10 | Stop reason: HOSPADM

## 2017-05-08 RX ORDER — ACETAMINOPHEN,DIPHENHYDRAMINE HCL 500; 25 MG/1; MG/1
1 TABLET, FILM COATED ORAL NIGHTLY PRN
Status: DISCONTINUED | OUTPATIENT
Start: 2017-05-08 | End: 2017-05-08 | Stop reason: CLARIF

## 2017-05-08 RX ORDER — MIDAZOLAM HYDROCHLORIDE 1 MG/ML
2 INJECTION INTRAMUSCULAR; INTRAVENOUS
Status: DISCONTINUED | OUTPATIENT
Start: 2017-05-08 | End: 2017-05-08 | Stop reason: HOSPADM

## 2017-05-08 RX ORDER — DIPHENHYDRAMINE HCL 25 MG
25 CAPSULE ORAL NIGHTLY PRN
Status: DISCONTINUED | OUTPATIENT
Start: 2017-05-08 | End: 2017-05-08

## 2017-05-08 RX ORDER — PROMETHAZINE HYDROCHLORIDE 25 MG/ML
12.5 INJECTION, SOLUTION INTRAMUSCULAR; INTRAVENOUS ONCE AS NEEDED
Status: COMPLETED | OUTPATIENT
Start: 2017-05-08 | End: 2017-05-08

## 2017-05-08 RX ORDER — HYDRALAZINE HYDROCHLORIDE 20 MG/ML
5 INJECTION INTRAMUSCULAR; INTRAVENOUS
Status: DISCONTINUED | OUTPATIENT
Start: 2017-05-08 | End: 2017-05-08 | Stop reason: HOSPADM

## 2017-05-08 RX ORDER — PROPOFOL 10 MG/ML
VIAL (ML) INTRAVENOUS AS NEEDED
Status: DISCONTINUED | OUTPATIENT
Start: 2017-05-08 | End: 2017-05-08 | Stop reason: SURG

## 2017-05-08 RX ORDER — LIDOCAINE HYDROCHLORIDE 20 MG/ML
INJECTION, SOLUTION INFILTRATION; PERINEURAL AS NEEDED
Status: DISCONTINUED | OUTPATIENT
Start: 2017-05-08 | End: 2017-05-08 | Stop reason: SURG

## 2017-05-08 RX ORDER — HYDROMORPHONE HYDROCHLORIDE 1 MG/ML
0.5 INJECTION, SOLUTION INTRAMUSCULAR; INTRAVENOUS; SUBCUTANEOUS
Status: DISCONTINUED | OUTPATIENT
Start: 2017-05-08 | End: 2017-05-08 | Stop reason: HOSPADM

## 2017-05-08 RX ORDER — ACETAMINOPHEN 500 MG
500 TABLET ORAL NIGHTLY PRN
Status: DISCONTINUED | OUTPATIENT
Start: 2017-05-08 | End: 2017-05-08

## 2017-05-08 RX ORDER — SODIUM CHLORIDE 0.9 % (FLUSH) 0.9 %
1-10 SYRINGE (ML) INJECTION AS NEEDED
Status: DISCONTINUED | OUTPATIENT
Start: 2017-05-08 | End: 2017-05-08 | Stop reason: HOSPADM

## 2017-05-08 RX ORDER — SODIUM CHLORIDE 9 MG/ML
100 INJECTION, SOLUTION INTRAVENOUS CONTINUOUS
Status: DISCONTINUED | OUTPATIENT
Start: 2017-05-08 | End: 2017-05-08 | Stop reason: SDUPTHER

## 2017-05-08 RX ORDER — PREGABALIN 100 MG/1
100 CAPSULE ORAL 2 TIMES DAILY
Status: DISCONTINUED | OUTPATIENT
Start: 2017-05-08 | End: 2017-05-10 | Stop reason: HOSPADM

## 2017-05-08 RX ORDER — FENTANYL CITRATE 50 UG/ML
50 INJECTION, SOLUTION INTRAMUSCULAR; INTRAVENOUS
Status: DISCONTINUED | OUTPATIENT
Start: 2017-05-08 | End: 2017-05-08 | Stop reason: HOSPADM

## 2017-05-08 RX ORDER — FLUMAZENIL 0.1 MG/ML
0.2 INJECTION INTRAVENOUS AS NEEDED
Status: DISCONTINUED | OUTPATIENT
Start: 2017-05-08 | End: 2017-05-08 | Stop reason: HOSPADM

## 2017-05-08 RX ORDER — ONDANSETRON 2 MG/ML
INJECTION INTRAMUSCULAR; INTRAVENOUS AS NEEDED
Status: DISCONTINUED | OUTPATIENT
Start: 2017-05-08 | End: 2017-05-08 | Stop reason: SURG

## 2017-05-08 RX ORDER — ZOLPIDEM TARTRATE 5 MG/1
5 TABLET ORAL NIGHTLY PRN
Status: DISCONTINUED | OUTPATIENT
Start: 2017-05-08 | End: 2017-05-10 | Stop reason: HOSPADM

## 2017-05-08 RX ORDER — PREDNISONE 50 MG/1
50 TABLET ORAL TAKE AS DIRECTED
Status: DISCONTINUED | OUTPATIENT
Start: 2017-05-08 | End: 2017-05-08

## 2017-05-08 RX ORDER — SODIUM CHLORIDE AND POTASSIUM CHLORIDE 150; 900 MG/100ML; MG/100ML
60 INJECTION, SOLUTION INTRAVENOUS CONTINUOUS
Status: DISCONTINUED | OUTPATIENT
Start: 2017-05-08 | End: 2017-05-10 | Stop reason: HOSPADM

## 2017-05-08 RX ORDER — MIDAZOLAM HYDROCHLORIDE 1 MG/ML
1 INJECTION INTRAMUSCULAR; INTRAVENOUS
Status: DISCONTINUED | OUTPATIENT
Start: 2017-05-08 | End: 2017-05-08 | Stop reason: HOSPADM

## 2017-05-08 RX ORDER — MORPHINE SULFATE 2 MG/ML
2 INJECTION, SOLUTION INTRAMUSCULAR; INTRAVENOUS EVERY 4 HOURS PRN
Status: DISCONTINUED | OUTPATIENT
Start: 2017-05-08 | End: 2017-05-10 | Stop reason: HOSPADM

## 2017-05-08 RX ORDER — HYDROCODONE BITARTRATE AND ACETAMINOPHEN 5; 325 MG/1; MG/1
1 TABLET ORAL EVERY 4 HOURS PRN
Status: DISCONTINUED | OUTPATIENT
Start: 2017-05-08 | End: 2017-05-10 | Stop reason: HOSPADM

## 2017-05-08 RX ORDER — NALOXONE HCL 0.4 MG/ML
0.4 VIAL (ML) INJECTION
Status: DISCONTINUED | OUTPATIENT
Start: 2017-05-08 | End: 2017-05-10 | Stop reason: HOSPADM

## 2017-05-08 RX ORDER — CEFAZOLIN SODIUM 2 G/100ML
2 INJECTION, SOLUTION INTRAVENOUS ONCE
Status: COMPLETED | OUTPATIENT
Start: 2017-05-08 | End: 2017-05-08

## 2017-05-08 RX ORDER — ROCURONIUM BROMIDE 10 MG/ML
INJECTION, SOLUTION INTRAVENOUS AS NEEDED
Status: DISCONTINUED | OUTPATIENT
Start: 2017-05-08 | End: 2017-05-08 | Stop reason: SURG

## 2017-05-08 RX ORDER — PROMETHAZINE HYDROCHLORIDE 25 MG/1
25 SUPPOSITORY RECTAL ONCE AS NEEDED
Status: COMPLETED | OUTPATIENT
Start: 2017-05-08 | End: 2017-05-08

## 2017-05-08 RX ORDER — PROPRANOLOL HYDROCHLORIDE 40 MG/1
40 TABLET ORAL 2 TIMES DAILY
Status: DISCONTINUED | OUTPATIENT
Start: 2017-05-08 | End: 2017-05-10 | Stop reason: HOSPADM

## 2017-05-08 RX ORDER — NICOTINE POLACRILEX 4 MG
15 LOZENGE BUCCAL
Status: DISCONTINUED | OUTPATIENT
Start: 2017-05-08 | End: 2017-05-10 | Stop reason: HOSPADM

## 2017-05-08 RX ORDER — MORPHINE SULFATE 2 MG/ML
INJECTION, SOLUTION INTRAMUSCULAR; INTRAVENOUS
Status: COMPLETED
Start: 2017-05-08 | End: 2017-05-08

## 2017-05-08 RX ORDER — CETIRIZINE HYDROCHLORIDE 10 MG/1
10 TABLET ORAL DAILY
Status: DISCONTINUED | OUTPATIENT
Start: 2017-05-08 | End: 2017-05-09

## 2017-05-08 RX ORDER — ONDANSETRON 2 MG/ML
4 INJECTION INTRAMUSCULAR; INTRAVENOUS ONCE AS NEEDED
Status: DISCONTINUED | OUTPATIENT
Start: 2017-05-08 | End: 2017-05-08 | Stop reason: HOSPADM

## 2017-05-08 RX ORDER — ACETAMINOPHEN 325 MG/1
650 TABLET ORAL EVERY 6 HOURS PRN
Status: DISCONTINUED | OUTPATIENT
Start: 2017-05-08 | End: 2017-05-10 | Stop reason: HOSPADM

## 2017-05-08 RX ORDER — OXYCODONE AND ACETAMINOPHEN 7.5; 325 MG/1; MG/1
1 TABLET ORAL ONCE AS NEEDED
Status: DISCONTINUED | OUTPATIENT
Start: 2017-05-08 | End: 2017-05-08 | Stop reason: HOSPADM

## 2017-05-08 RX ORDER — SODIUM CHLORIDE 9 MG/ML
100 INJECTION, SOLUTION INTRAVENOUS CONTINUOUS
Status: ACTIVE | OUTPATIENT
Start: 2017-05-08 | End: 2017-05-08

## 2017-05-08 RX ORDER — PREDNISONE 50 MG/1
50 TABLET ORAL TAKE AS DIRECTED
COMMUNITY
End: 2017-05-10 | Stop reason: HOSPADM

## 2017-05-08 RX ORDER — PROMETHAZINE HYDROCHLORIDE 25 MG/1
12.5 TABLET ORAL ONCE AS NEEDED
Status: DISCONTINUED | OUTPATIENT
Start: 2017-05-08 | End: 2017-05-08 | Stop reason: HOSPADM

## 2017-05-08 RX ORDER — DIPHENHYDRAMINE HYDROCHLORIDE 50 MG/ML
12.5 INJECTION INTRAMUSCULAR; INTRAVENOUS
Status: DISCONTINUED | OUTPATIENT
Start: 2017-05-08 | End: 2017-05-08 | Stop reason: HOSPADM

## 2017-05-08 RX ORDER — HYDROCODONE BITARTRATE AND ACETAMINOPHEN 7.5; 325 MG/1; MG/1
1 TABLET ORAL ONCE AS NEEDED
Status: DISCONTINUED | OUTPATIENT
Start: 2017-05-08 | End: 2017-05-08 | Stop reason: HOSPADM

## 2017-05-08 RX ORDER — NALOXONE HCL 0.4 MG/ML
0.2 VIAL (ML) INJECTION AS NEEDED
Status: DISCONTINUED | OUTPATIENT
Start: 2017-05-08 | End: 2017-05-08 | Stop reason: HOSPADM

## 2017-05-08 RX ORDER — DEXTROSE MONOHYDRATE 25 G/50ML
25 INJECTION, SOLUTION INTRAVENOUS
Status: DISCONTINUED | OUTPATIENT
Start: 2017-05-08 | End: 2017-05-10 | Stop reason: HOSPADM

## 2017-05-08 RX ORDER — LABETALOL HYDROCHLORIDE 5 MG/ML
5 INJECTION, SOLUTION INTRAVENOUS
Status: DISCONTINUED | OUTPATIENT
Start: 2017-05-08 | End: 2017-05-08 | Stop reason: HOSPADM

## 2017-05-08 RX ORDER — ONDANSETRON 2 MG/ML
4 INJECTION INTRAMUSCULAR; INTRAVENOUS EVERY 6 HOURS PRN
Status: DISCONTINUED | OUTPATIENT
Start: 2017-05-08 | End: 2017-05-10 | Stop reason: HOSPADM

## 2017-05-08 RX ORDER — FENTANYL CITRATE 50 UG/ML
25 INJECTION, SOLUTION INTRAMUSCULAR; INTRAVENOUS
Status: DISCONTINUED | OUTPATIENT
Start: 2017-05-08 | End: 2017-05-08 | Stop reason: HOSPADM

## 2017-05-08 RX ORDER — ASPIRIN 81 MG/1
81 TABLET ORAL DAILY
Status: DISCONTINUED | OUTPATIENT
Start: 2017-05-09 | End: 2017-05-10 | Stop reason: HOSPADM

## 2017-05-08 RX ORDER — SCOLOPAMINE TRANSDERMAL SYSTEM 1 MG/1
1 PATCH, EXTENDED RELEASE TRANSDERMAL ONCE
Status: DISCONTINUED | OUTPATIENT
Start: 2017-05-08 | End: 2017-05-09

## 2017-05-08 RX ORDER — HYDROCHLOROTHIAZIDE 25 MG/1
12.5 TABLET ORAL DAILY
Status: DISCONTINUED | OUTPATIENT
Start: 2017-05-08 | End: 2017-05-10 | Stop reason: HOSPADM

## 2017-05-08 RX ORDER — FAMOTIDINE 10 MG/ML
20 INJECTION, SOLUTION INTRAVENOUS
Status: DISCONTINUED | OUTPATIENT
Start: 2017-05-08 | End: 2017-05-08 | Stop reason: HOSPADM

## 2017-05-08 RX ORDER — DEXAMETHASONE SODIUM PHOSPHATE 10 MG/ML
INJECTION INTRAMUSCULAR; INTRAVENOUS AS NEEDED
Status: DISCONTINUED | OUTPATIENT
Start: 2017-05-08 | End: 2017-05-08 | Stop reason: SURG

## 2017-05-08 RX ORDER — FENTANYL CITRATE 50 UG/ML
INJECTION, SOLUTION INTRAMUSCULAR; INTRAVENOUS AS NEEDED
Status: DISCONTINUED | OUTPATIENT
Start: 2017-05-08 | End: 2017-05-08 | Stop reason: SURG

## 2017-05-08 RX ORDER — HEPARIN SODIUM 1000 [USP'U]/ML
INJECTION, SOLUTION INTRAVENOUS; SUBCUTANEOUS AS NEEDED
Status: DISCONTINUED | OUTPATIENT
Start: 2017-05-08 | End: 2017-05-08 | Stop reason: SURG

## 2017-05-08 RX ORDER — DIPHENHYDRAMINE HCL 25 MG
25 CAPSULE ORAL EVERY 6 HOURS PRN
Status: DISCONTINUED | OUTPATIENT
Start: 2017-05-08 | End: 2017-05-09

## 2017-05-08 RX ORDER — ALBUTEROL SULFATE 90 UG/1
2 AEROSOL, METERED RESPIRATORY (INHALATION) EVERY 4 HOURS PRN
Status: DISCONTINUED | OUTPATIENT
Start: 2017-05-08 | End: 2017-05-10 | Stop reason: CLARIF

## 2017-05-08 RX ADMIN — FENTANYL CITRATE 25 MCG: 50 INJECTION INTRAMUSCULAR; INTRAVENOUS at 12:49

## 2017-05-08 RX ADMIN — FENTANYL CITRATE 50 MCG: 50 INJECTION INTRAMUSCULAR; INTRAVENOUS at 16:04

## 2017-05-08 RX ADMIN — PHENYLEPHRINE HYDROCHLORIDE 200 MCG: 10 INJECTION INTRAVENOUS at 13:48

## 2017-05-08 RX ADMIN — PREGABALIN 100 MG: 100 CAPSULE ORAL at 17:43

## 2017-05-08 RX ADMIN — MORPHINE SULFATE 2 MG: 2 INJECTION, SOLUTION INTRAMUSCULAR; INTRAVENOUS at 16:34

## 2017-05-08 RX ADMIN — MIDAZOLAM 1 MG: 1 INJECTION INTRAMUSCULAR; INTRAVENOUS at 12:49

## 2017-05-08 RX ADMIN — EPHEDRINE SULFATE 10 MG: 50 INJECTION INTRAMUSCULAR; INTRAVENOUS; SUBCUTANEOUS at 13:59

## 2017-05-08 RX ADMIN — CEFAZOLIN SODIUM 2 G: 2 INJECTION, SOLUTION INTRAVENOUS at 12:59

## 2017-05-08 RX ADMIN — POTASSIUM CHLORIDE AND SODIUM CHLORIDE 60 ML/HR: 900; 150 INJECTION, SOLUTION INTRAVENOUS at 20:51

## 2017-05-08 RX ADMIN — PROMETHAZINE HYDROCHLORIDE 12.5 MG: 25 INJECTION INTRAMUSCULAR; INTRAVENOUS at 15:28

## 2017-05-08 RX ADMIN — ROCURONIUM BROMIDE 40 MG: 10 INJECTION INTRAVENOUS at 13:09

## 2017-05-08 RX ADMIN — HYDROCODONE BITARTRATE AND ACETAMINOPHEN 1 TABLET: 5; 325 TABLET ORAL at 18:42

## 2017-05-08 RX ADMIN — LIDOCAINE HYDROCHLORIDE 60 MG: 20 INJECTION, SOLUTION INFILTRATION; PERINEURAL at 13:09

## 2017-05-08 RX ADMIN — PROTAMINE SULFATE 40 MG: 10 INJECTION, SOLUTION INTRAVENOUS at 14:32

## 2017-05-08 RX ADMIN — MORPHINE SULFATE 2 MG: 2 INJECTION, SOLUTION INTRAMUSCULAR; INTRAVENOUS at 20:47

## 2017-05-08 RX ADMIN — CETIRIZINE HYDROCHLORIDE 10 MG: 10 TABLET, FILM COATED ORAL at 17:43

## 2017-05-08 RX ADMIN — DEXAMETHASONE SODIUM PHOSPHATE 8 MG: 10 INJECTION INTRAMUSCULAR; INTRAVENOUS at 14:25

## 2017-05-08 RX ADMIN — PHENYLEPHRINE HYDROCHLORIDE 200 MCG: 10 INJECTION INTRAVENOUS at 14:13

## 2017-05-08 RX ADMIN — FENTANYL CITRATE 25 MCG: 50 INJECTION INTRAMUSCULAR; INTRAVENOUS at 11:43

## 2017-05-08 RX ADMIN — SODIUM CHLORIDE 100 ML/HR: 9 INJECTION, SOLUTION INTRAVENOUS at 17:28

## 2017-05-08 RX ADMIN — PHENYLEPHRINE HYDROCHLORIDE 200 MCG: 10 INJECTION INTRAVENOUS at 13:57

## 2017-05-08 RX ADMIN — SUGAMMADEX 400 MG: 100 INJECTION, SOLUTION INTRAVENOUS at 14:44

## 2017-05-08 RX ADMIN — MIDAZOLAM 1 MG: 1 INJECTION INTRAMUSCULAR; INTRAVENOUS at 15:15

## 2017-05-08 RX ADMIN — INSULIN ASPART 2 UNITS: 100 INJECTION, SOLUTION INTRAVENOUS; SUBCUTANEOUS at 20:32

## 2017-05-08 RX ADMIN — IOVERSOL 84.4 ML: 678 INJECTION INTRA-ARTERIAL; INTRAVENOUS at 15:08

## 2017-05-08 RX ADMIN — FENTANYL CITRATE 100 MCG: 50 INJECTION INTRAMUSCULAR; INTRAVENOUS at 13:05

## 2017-05-08 RX ADMIN — HEPARIN SODIUM 8000 UNITS: 1000 INJECTION, SOLUTION INTRAVENOUS; SUBCUTANEOUS at 13:54

## 2017-05-08 RX ADMIN — FAMOTIDINE 20 MG: 10 INJECTION, SOLUTION INTRAVENOUS at 11:42

## 2017-05-08 RX ADMIN — SCOPALAMINE 1 PATCH: 1 PATCH, EXTENDED RELEASE TRANSDERMAL at 11:42

## 2017-05-08 RX ADMIN — PROPRANOLOL HYDROCHLORIDE 40 MG: 40 TABLET ORAL at 18:32

## 2017-05-08 RX ADMIN — PROPOFOL 150 MG: 10 INJECTION, EMULSION INTRAVENOUS at 13:09

## 2017-05-08 RX ADMIN — MIDAZOLAM 1 MG: 1 INJECTION INTRAMUSCULAR; INTRAVENOUS at 11:43

## 2017-05-08 RX ADMIN — PHENYLEPHRINE HYDROCHLORIDE 200 MCG: 10 INJECTION INTRAVENOUS at 14:06

## 2017-05-08 RX ADMIN — SODIUM CHLORIDE: 9 INJECTION, SOLUTION INTRAVENOUS at 14:38

## 2017-05-08 RX ADMIN — INSULIN ASPART 2 UNITS: 100 INJECTION, SOLUTION INTRAVENOUS; SUBCUTANEOUS at 18:36

## 2017-05-08 RX ADMIN — CYCLOSPORINE 1 DROP: 0.5 EMULSION OPHTHALMIC at 18:32

## 2017-05-08 RX ADMIN — FENTANYL CITRATE 50 MCG: 50 INJECTION INTRAMUSCULAR; INTRAVENOUS at 15:42

## 2017-05-08 RX ADMIN — ONDANSETRON 4 MG: 2 INJECTION INTRAMUSCULAR; INTRAVENOUS at 14:41

## 2017-05-08 RX ADMIN — SODIUM CHLORIDE 100 ML/HR: 9 INJECTION, SOLUTION INTRAVENOUS at 11:42

## 2017-05-08 RX ADMIN — ROCURONIUM BROMIDE 20 MG: 10 INJECTION INTRAVENOUS at 14:14

## 2017-05-08 RX ADMIN — HYDROCHLOROTHIAZIDE 12.5 MG: 25 TABLET ORAL at 17:43

## 2017-05-08 RX ADMIN — HEPARIN SODIUM 2000 UNITS: 1000 INJECTION, SOLUTION INTRAVENOUS; SUBCUTANEOUS at 14:10

## 2017-05-09 LAB
ACT BLD: 142 SECONDS (ref 82–152)
ACT BLD: 147 SECONDS (ref 82–152)
ACT BLD: 270 SECONDS (ref 82–152)
ACT BLD: 281 SECONDS (ref 82–152)
ALBUMIN SERPL-MCNC: 3.5 G/DL (ref 3.5–5.2)
ALBUMIN/GLOB SERPL: 1.1 G/DL
ALP SERPL-CCNC: 65 U/L (ref 39–117)
ALT SERPL W P-5'-P-CCNC: 62 U/L (ref 1–33)
ANION GAP SERPL CALCULATED.3IONS-SCNC: 13.9 MMOL/L
AST SERPL-CCNC: 33 U/L (ref 1–32)
BILIRUB SERPL-MCNC: 0.3 MG/DL (ref 0.1–1.2)
BUN BLD-MCNC: 16 MG/DL (ref 8–23)
BUN/CREAT SERPL: 25 (ref 7–25)
CALCIUM SPEC-SCNC: 8.8 MG/DL (ref 8.6–10.5)
CHLORIDE SERPL-SCNC: 103 MMOL/L (ref 98–107)
CO2 SERPL-SCNC: 24.1 MMOL/L (ref 22–29)
CREAT BLD-MCNC: 0.64 MG/DL (ref 0.57–1)
DEPRECATED RDW RBC AUTO: 50.3 FL (ref 37–54)
ERYTHROCYTE [DISTWIDTH] IN BLOOD BY AUTOMATED COUNT: 14.6 % (ref 11.7–13)
GFR SERPL CREATININE-BSD FRML MDRD: 94 ML/MIN/1.73
GLOBULIN UR ELPH-MCNC: 3.1 GM/DL
GLUCOSE BLD-MCNC: 207 MG/DL (ref 65–99)
GLUCOSE BLDC GLUCOMTR-MCNC: 134 MG/DL (ref 70–130)
GLUCOSE BLDC GLUCOMTR-MCNC: 182 MG/DL (ref 70–130)
GLUCOSE BLDC GLUCOMTR-MCNC: 201 MG/DL (ref 70–130)
GLUCOSE BLDC GLUCOMTR-MCNC: 252 MG/DL (ref 70–130)
HCT VFR BLD AUTO: 37.4 % (ref 35.6–45.5)
HGB BLD-MCNC: 11.8 G/DL (ref 11.9–15.5)
INR PPP: 1.11 (ref 0.9–1.1)
MCH RBC QN AUTO: 30.3 PG (ref 26.9–32)
MCHC RBC AUTO-ENTMCNC: 31.6 G/DL (ref 32.4–36.3)
MCV RBC AUTO: 95.9 FL (ref 80.5–98.2)
PLATELET # BLD AUTO: 200 10*3/MM3 (ref 140–500)
PMV BLD AUTO: 10.9 FL (ref 6–12)
POTASSIUM BLD-SCNC: 4.2 MMOL/L (ref 3.5–5.2)
PROT SERPL-MCNC: 6.6 G/DL (ref 6–8.5)
PROTHROMBIN TIME: 13.9 SECONDS (ref 11.7–14.2)
RBC # BLD AUTO: 3.9 10*6/MM3 (ref 3.9–5.2)
SODIUM BLD-SCNC: 141 MMOL/L (ref 136–145)
WBC NRBC COR # BLD: 7.52 10*3/MM3 (ref 4.5–10.7)

## 2017-05-09 PROCEDURE — 63710000001 INSULIN ASPART PER 5 UNITS: Performed by: INTERNAL MEDICINE

## 2017-05-09 PROCEDURE — 25010000002 ONDANSETRON PER 1 MG: Performed by: INTERNAL MEDICINE

## 2017-05-09 PROCEDURE — 99024 POSTOP FOLLOW-UP VISIT: CPT | Performed by: NEUROLOGICAL SURGERY

## 2017-05-09 PROCEDURE — 82962 GLUCOSE BLOOD TEST: CPT

## 2017-05-09 PROCEDURE — 25010000002 MORPHINE SULFATE (PF) 2 MG/ML SOLUTION: Performed by: NEUROLOGICAL SURGERY

## 2017-05-09 PROCEDURE — 25010000002 DEXAMETHASONE PER 1 MG: Performed by: NURSE PRACTITIONER

## 2017-05-09 PROCEDURE — 25010000002 MORPHINE PER 10 MG: Performed by: NEUROLOGICAL SURGERY

## 2017-05-09 PROCEDURE — 85027 COMPLETE CBC AUTOMATED: CPT | Performed by: INTERNAL MEDICINE

## 2017-05-09 PROCEDURE — 80053 COMPREHEN METABOLIC PANEL: CPT | Performed by: INTERNAL MEDICINE

## 2017-05-09 PROCEDURE — 85610 PROTHROMBIN TIME: CPT | Performed by: INTERNAL MEDICINE

## 2017-05-09 RX ORDER — PANTOPRAZOLE SODIUM 40 MG/1
40 TABLET, DELAYED RELEASE ORAL
Status: DISCONTINUED | OUTPATIENT
Start: 2017-05-09 | End: 2017-05-09

## 2017-05-09 RX ORDER — ALPRAZOLAM 0.25 MG/1
0.25 TABLET ORAL 3 TIMES DAILY PRN
Status: DISCONTINUED | OUTPATIENT
Start: 2017-05-09 | End: 2017-05-10 | Stop reason: HOSPADM

## 2017-05-09 RX ORDER — BUTALBITAL, ACETAMINOPHEN AND CAFFEINE 50; 325; 40 MG/1; MG/1; MG/1
2 TABLET ORAL EVERY 6 HOURS PRN
Status: DISCONTINUED | OUTPATIENT
Start: 2017-05-09 | End: 2017-05-10 | Stop reason: HOSPADM

## 2017-05-09 RX ORDER — DIPHENHYDRAMINE HCL 25 MG
25 CAPSULE ORAL NIGHTLY PRN
Status: DISCONTINUED | OUTPATIENT
Start: 2017-05-09 | End: 2017-05-10 | Stop reason: HOSPADM

## 2017-05-09 RX ADMIN — PREGABALIN 100 MG: 100 CAPSULE ORAL at 17:16

## 2017-05-09 RX ADMIN — CYCLOSPORINE 1 DROP: 0.5 EMULSION OPHTHALMIC at 09:05

## 2017-05-09 RX ADMIN — BUTALBITAL, ACETAMINOPHEN, AND CAFFEINE 2 TABLET: 50; 325; 40 TABLET ORAL at 11:18

## 2017-05-09 RX ADMIN — HYDROCODONE BITARTRATE AND ACETAMINOPHEN 1 TABLET: 5; 325 TABLET ORAL at 17:16

## 2017-05-09 RX ADMIN — BUTALBITAL, ACETAMINOPHEN, AND CAFFEINE 2 TABLET: 50; 325; 40 TABLET ORAL at 20:42

## 2017-05-09 RX ADMIN — INSULIN ASPART 3 UNITS: 100 INJECTION, SOLUTION INTRAVENOUS; SUBCUTANEOUS at 11:17

## 2017-05-09 RX ADMIN — ASPIRIN 81 MG: 81 TABLET ORAL at 09:05

## 2017-05-09 RX ADMIN — DEXAMETHASONE SODIUM PHOSPHATE 10 MG: 4 INJECTION, SOLUTION INTRAMUSCULAR; INTRAVENOUS at 14:23

## 2017-05-09 RX ADMIN — MORPHINE SULFATE 2 MG: 2 INJECTION, SOLUTION INTRAMUSCULAR; INTRAVENOUS at 19:08

## 2017-05-09 RX ADMIN — MORPHINE SULFATE 2 MG: 2 INJECTION, SOLUTION INTRAMUSCULAR; INTRAVENOUS at 23:00

## 2017-05-09 RX ADMIN — INSULIN ASPART 2 UNITS: 100 INJECTION, SOLUTION INTRAVENOUS; SUBCUTANEOUS at 08:58

## 2017-05-09 RX ADMIN — HYDROCODONE BITARTRATE AND ACETAMINOPHEN 1 TABLET: 5; 325 TABLET ORAL at 00:33

## 2017-05-09 RX ADMIN — MORPHINE SULFATE 2 MG: 2 INJECTION, SOLUTION INTRAMUSCULAR; INTRAVENOUS at 08:27

## 2017-05-09 RX ADMIN — ONDANSETRON 4 MG: 2 INJECTION INTRAMUSCULAR; INTRAVENOUS at 09:05

## 2017-05-09 RX ADMIN — MORPHINE SULFATE 2 MG: 2 INJECTION, SOLUTION INTRAMUSCULAR; INTRAVENOUS at 04:42

## 2017-05-09 RX ADMIN — CYCLOSPORINE 1 DROP: 0.5 EMULSION OPHTHALMIC at 17:16

## 2017-05-09 RX ADMIN — HYDROCHLOROTHIAZIDE 12.5 MG: 25 TABLET ORAL at 09:05

## 2017-05-09 RX ADMIN — ZOLPIDEM TARTRATE 5 MG: 5 TABLET, FILM COATED ORAL at 22:55

## 2017-05-09 RX ADMIN — MORPHINE SULFATE 2 MG: 2 INJECTION, SOLUTION INTRAMUSCULAR; INTRAVENOUS at 00:33

## 2017-05-09 RX ADMIN — CETIRIZINE HYDROCHLORIDE 10 MG: 10 TABLET, FILM COATED ORAL at 09:05

## 2017-05-09 RX ADMIN — PREGABALIN 100 MG: 100 CAPSULE ORAL at 09:05

## 2017-05-09 RX ADMIN — INSULIN ASPART 4 UNITS: 100 INJECTION, SOLUTION INTRAVENOUS; SUBCUTANEOUS at 20:45

## 2017-05-09 RX ADMIN — HYDROCODONE BITARTRATE AND ACETAMINOPHEN 1 TABLET: 5; 325 TABLET ORAL at 08:27

## 2017-05-09 RX ADMIN — MORPHINE SULFATE 2 MG: 2 INJECTION, SOLUTION INTRAMUSCULAR; INTRAVENOUS at 15:07

## 2017-05-10 ENCOUNTER — TELEPHONE (OUTPATIENT)
Dept: NEUROSURGERY | Facility: CLINIC | Age: 62
End: 2017-05-10

## 2017-05-10 VITALS
HEIGHT: 68 IN | SYSTOLIC BLOOD PRESSURE: 113 MMHG | HEART RATE: 68 BPM | DIASTOLIC BLOOD PRESSURE: 60 MMHG | RESPIRATION RATE: 16 BRPM | TEMPERATURE: 97.6 F | BODY MASS INDEX: 34.19 KG/M2 | OXYGEN SATURATION: 97 % | WEIGHT: 225.6 LBS

## 2017-05-10 LAB
GLUCOSE BLDC GLUCOMTR-MCNC: 115 MG/DL (ref 70–130)
GLUCOSE BLDC GLUCOMTR-MCNC: 174 MG/DL (ref 70–130)

## 2017-05-10 PROCEDURE — 63710000001 INSULIN ASPART PER 5 UNITS: Performed by: INTERNAL MEDICINE

## 2017-05-10 PROCEDURE — 82962 GLUCOSE BLOOD TEST: CPT

## 2017-05-10 RX ORDER — BISACODYL 10 MG
10 SUPPOSITORY, RECTAL RECTAL DAILY PRN
Status: DISCONTINUED | OUTPATIENT
Start: 2017-05-10 | End: 2017-05-10 | Stop reason: HOSPADM

## 2017-05-10 RX ORDER — ALBUTEROL SULFATE 2.5 MG/3ML
2.5 SOLUTION RESPIRATORY (INHALATION) EVERY 4 HOURS PRN
Status: DISCONTINUED | OUTPATIENT
Start: 2017-05-10 | End: 2017-05-10 | Stop reason: HOSPADM

## 2017-05-10 RX ORDER — HYDROCODONE BITARTRATE AND ACETAMINOPHEN 5; 325 MG/1; MG/1
1 TABLET ORAL EVERY 6 HOURS PRN
Qty: 30 TABLET | Refills: 0 | Status: SHIPPED | OUTPATIENT
Start: 2017-05-10 | End: 2017-05-18

## 2017-05-10 RX ADMIN — BISACODYL 10 MG: 10 SUPPOSITORY RECTAL at 09:54

## 2017-05-10 RX ADMIN — PREGABALIN 100 MG: 100 CAPSULE ORAL at 08:38

## 2017-05-10 RX ADMIN — INSULIN ASPART 2 UNITS: 100 INJECTION, SOLUTION INTRAVENOUS; SUBCUTANEOUS at 08:38

## 2017-05-10 RX ADMIN — HYDROCODONE BITARTRATE AND ACETAMINOPHEN 1 TABLET: 5; 325 TABLET ORAL at 12:13

## 2017-05-10 RX ADMIN — ASPIRIN 81 MG: 81 TABLET ORAL at 08:38

## 2017-05-10 RX ADMIN — HYDROCHLOROTHIAZIDE 12.5 MG: 25 TABLET ORAL at 08:38

## 2017-05-10 RX ADMIN — HYDROCODONE BITARTRATE AND ACETAMINOPHEN 1 TABLET: 5; 325 TABLET ORAL at 05:49

## 2017-05-10 RX ADMIN — PROPRANOLOL HYDROCHLORIDE 40 MG: 40 TABLET ORAL at 08:38

## 2017-05-15 ENCOUNTER — TELEPHONE (OUTPATIENT)
Dept: NEUROSURGERY | Facility: CLINIC | Age: 62
End: 2017-05-15

## 2017-05-22 ENCOUNTER — LAB (OUTPATIENT)
Dept: LAB | Facility: HOSPITAL | Age: 62
End: 2017-05-22

## 2017-05-22 ENCOUNTER — OFFICE VISIT (OUTPATIENT)
Dept: NEUROSURGERY | Facility: CLINIC | Age: 62
End: 2017-05-22

## 2017-05-22 VITALS
BODY MASS INDEX: 34.4 KG/M2 | DIASTOLIC BLOOD PRESSURE: 86 MMHG | HEIGHT: 68 IN | SYSTOLIC BLOOD PRESSURE: 142 MMHG | WEIGHT: 227 LBS | HEART RATE: 60 BPM

## 2017-05-22 DIAGNOSIS — I67.1 CEREBRAL ANEURYSM, NONRUPTURED: ICD-10-CM

## 2017-05-22 DIAGNOSIS — I67.1 CEREBRAL ANEURYSM, NONRUPTURED: Primary | ICD-10-CM

## 2017-05-22 DIAGNOSIS — D47.2 MONOCLONAL GAMMOPATHY: Primary | ICD-10-CM

## 2017-05-22 LAB
BASOPHILS # BLD AUTO: 0.03 10*3/MM3 (ref 0–0.1)
BASOPHILS NFR BLD AUTO: 0.4 % (ref 0–1.1)
DEPRECATED RDW RBC AUTO: 46.3 FL (ref 37–49)
EOSINOPHIL # BLD AUTO: 0.36 10*3/MM3 (ref 0–0.36)
EOSINOPHIL NFR BLD AUTO: 4.9 % (ref 1–5)
ERYTHROCYTE [DISTWIDTH] IN BLOOD BY AUTOMATED COUNT: 13.5 % (ref 11.7–14.5)
HCT VFR BLD AUTO: 42.1 % (ref 34–45)
HGB BLD-MCNC: 13.8 G/DL (ref 11.5–14.9)
IMM GRANULOCYTES # BLD: 0.03 10*3/MM3 (ref 0–0.03)
IMM GRANULOCYTES NFR BLD: 0.4 % (ref 0–0.5)
LYMPHOCYTES # BLD AUTO: 2.59 10*3/MM3 (ref 1–3.5)
LYMPHOCYTES NFR BLD AUTO: 35.2 % (ref 20–49)
MCH RBC QN AUTO: 30.5 PG (ref 27–33)
MCHC RBC AUTO-ENTMCNC: 32.8 G/DL (ref 32–35)
MCV RBC AUTO: 93.1 FL (ref 83–97)
MONOCYTES # BLD AUTO: 0.72 10*3/MM3 (ref 0.25–0.8)
MONOCYTES NFR BLD AUTO: 9.8 % (ref 4–12)
NEUTROPHILS # BLD AUTO: 3.62 10*3/MM3 (ref 1.5–7)
NEUTROPHILS NFR BLD AUTO: 49.3 % (ref 39–75)
NRBC BLD MANUAL-RTO: 0 /100 WBC (ref 0–0)
PA ADP PRP-ACNC: 81 PRU (ref 194–418)
PLATELET # BLD AUTO: 248 10*3/MM3 (ref 150–375)
PMV BLD AUTO: 10.1 FL (ref 8.9–12.1)
RBC # BLD AUTO: 4.52 10*6/MM3 (ref 3.9–5)
WBC NRBC COR # BLD: 7.35 10*3/MM3 (ref 4–10)

## 2017-05-22 PROCEDURE — 85576 BLOOD PLATELET AGGREGATION: CPT

## 2017-05-22 PROCEDURE — 99213 OFFICE O/P EST LOW 20 MIN: CPT | Performed by: NURSE PRACTITIONER

## 2017-05-22 PROCEDURE — 85025 COMPLETE CBC W/AUTO DIFF WBC: CPT

## 2017-05-22 PROCEDURE — 36415 COLL VENOUS BLD VENIPUNCTURE: CPT

## 2017-05-24 ENCOUNTER — APPOINTMENT (OUTPATIENT)
Dept: LAB | Facility: HOSPITAL | Age: 62
End: 2017-05-24

## 2017-05-24 LAB
ALBUMIN SERPL-MCNC: 3.8 G/DL (ref 2.9–4.4)
ALBUMIN/GLOB SERPL: 1.1 {RATIO} (ref 0.7–1.7)
ALPHA1 GLOB FLD ELPH-MCNC: 0.2 G/DL (ref 0–0.4)
ALPHA2 GLOB SERPL ELPH-MCNC: 0.9 G/DL (ref 0.4–1)
B-GLOBULIN SERPL ELPH-MCNC: 1.1 G/DL (ref 0.7–1.3)
GAMMA GLOB SERPL ELPH-MCNC: 1.6 G/DL (ref 0.4–1.8)
GLOBULIN SER CALC-MCNC: 3.8 G/DL (ref 2.2–3.9)
IGA SERPL-MCNC: 121 MG/DL (ref 87–352)
IGG SERPL-MCNC: 1497 MG/DL (ref 700–1600)
IGM SERPL-MCNC: 129 MG/DL (ref 26–217)
INTERPRETATION SERPL IEP-IMP: ABNORMAL
KAPPA LC SERPL-MCNC: 16.98 MG/L (ref 3.3–19.4)
KAPPA LC/LAMBDA SER: 1.25 {RATIO} (ref 0.26–1.65)
LAMBDA LC FREE SERPL-MCNC: 13.61 MG/L (ref 5.71–26.3)
Lab: ABNORMAL
M-SPIKE: 0.9 G/DL
PROT SERPL-MCNC: 7.6 G/DL (ref 6–8.5)

## 2017-05-26 ENCOUNTER — TELEPHONE (OUTPATIENT)
Dept: ONCOLOGY | Facility: CLINIC | Age: 62
End: 2017-05-26

## 2017-06-03 ENCOUNTER — HOSPITAL ENCOUNTER (EMERGENCY)
Facility: HOSPITAL | Age: 62
Discharge: HOME | End: 2017-06-03
Attending: EMERGENCY MEDICINE | Admitting: EMERGENCY MEDICINE

## 2017-06-03 ENCOUNTER — APPOINTMENT (OUTPATIENT)
Dept: CT IMAGING | Facility: HOSPITAL | Age: 62
End: 2017-06-03

## 2017-06-03 VITALS
HEIGHT: 68 IN | OXYGEN SATURATION: 94 % | WEIGHT: 220 LBS | BODY MASS INDEX: 33.34 KG/M2 | TEMPERATURE: 97.1 F | HEART RATE: 58 BPM | SYSTOLIC BLOOD PRESSURE: 96 MMHG | RESPIRATION RATE: 16 BRPM | DIASTOLIC BLOOD PRESSURE: 45 MMHG

## 2017-06-03 DIAGNOSIS — G89.29 CHRONIC NONINTRACTABLE HEADACHE, UNSPECIFIED HEADACHE TYPE: Primary | ICD-10-CM

## 2017-06-03 DIAGNOSIS — R51.9 CHRONIC NONINTRACTABLE HEADACHE, UNSPECIFIED HEADACHE TYPE: Primary | ICD-10-CM

## 2017-06-03 LAB
HBV SURFACE AG SERPL QL IA: NORMAL
HCV AB SER DONR QL: NORMAL
HIV1 P24 AG SER QL: NORMAL
HIV1+2 AB SER QL: NORMAL

## 2017-06-03 PROCEDURE — 87521 HEPATITIS C PROBE&RVRS TRNSC: CPT | Performed by: EMERGENCY MEDICINE

## 2017-06-03 PROCEDURE — 25010000002 HYDROMORPHONE PER 4 MG: Performed by: EMERGENCY MEDICINE

## 2017-06-03 PROCEDURE — 87899 AGENT NOS ASSAY W/OPTIC: CPT | Performed by: EMERGENCY MEDICINE

## 2017-06-03 PROCEDURE — G0432 EIA HIV-1/HIV-2 SCREEN: HCPCS | Performed by: EMERGENCY MEDICINE

## 2017-06-03 PROCEDURE — 25010000002 PROCHLORPERAZINE EDISYLATE PER 10 MG: Performed by: EMERGENCY MEDICINE

## 2017-06-03 PROCEDURE — 96376 TX/PRO/DX INJ SAME DRUG ADON: CPT

## 2017-06-03 PROCEDURE — 86803 HEPATITIS C AB TEST: CPT | Performed by: EMERGENCY MEDICINE

## 2017-06-03 PROCEDURE — 25010000002 DIPHENHYDRAMINE PER 50 MG: Performed by: EMERGENCY MEDICINE

## 2017-06-03 PROCEDURE — 25010000002 ONDANSETRON PER 1 MG: Performed by: EMERGENCY MEDICINE

## 2017-06-03 PROCEDURE — 87340 HEPATITIS B SURFACE AG IA: CPT | Performed by: EMERGENCY MEDICINE

## 2017-06-03 PROCEDURE — 99283 EMERGENCY DEPT VISIT LOW MDM: CPT

## 2017-06-03 PROCEDURE — 96374 THER/PROPH/DIAG INJ IV PUSH: CPT

## 2017-06-03 PROCEDURE — 96375 TX/PRO/DX INJ NEW DRUG ADDON: CPT

## 2017-06-03 PROCEDURE — 70450 CT HEAD/BRAIN W/O DYE: CPT

## 2017-06-03 RX ORDER — ONDANSETRON 2 MG/ML
4 INJECTION INTRAMUSCULAR; INTRAVENOUS ONCE
Status: COMPLETED | OUTPATIENT
Start: 2017-06-03 | End: 2017-06-03

## 2017-06-03 RX ORDER — OXYCODONE HYDROCHLORIDE AND ACETAMINOPHEN 5; 325 MG/1; MG/1
1-2 TABLET ORAL EVERY 4 HOURS PRN
Qty: 10 TABLET | Refills: 0 | Status: SHIPPED | OUTPATIENT
Start: 2017-06-03 | End: 2017-09-28

## 2017-06-03 RX ORDER — ONDANSETRON 4 MG/1
4 TABLET, FILM COATED ORAL EVERY 8 HOURS PRN
Qty: 10 TABLET | Refills: 0 | Status: SHIPPED | OUTPATIENT
Start: 2017-06-03 | End: 2017-09-28

## 2017-06-03 RX ORDER — SODIUM CHLORIDE 0.9 % (FLUSH) 0.9 %
10 SYRINGE (ML) INJECTION AS NEEDED
Status: DISCONTINUED | OUTPATIENT
Start: 2017-06-03 | End: 2017-06-03 | Stop reason: HOSPADM

## 2017-06-03 RX ORDER — DIPHENHYDRAMINE HYDROCHLORIDE 50 MG/ML
25 INJECTION INTRAMUSCULAR; INTRAVENOUS ONCE
Status: COMPLETED | OUTPATIENT
Start: 2017-06-03 | End: 2017-06-03

## 2017-06-03 RX ADMIN — Medication 10 ML: at 05:19

## 2017-06-03 RX ADMIN — Medication 10 ML: at 06:03

## 2017-06-03 RX ADMIN — HYDROMORPHONE HYDROCHLORIDE 1 MG: 1 INJECTION, SOLUTION INTRAMUSCULAR; INTRAVENOUS; SUBCUTANEOUS at 06:02

## 2017-06-03 RX ADMIN — ONDANSETRON 4 MG: 2 INJECTION INTRAMUSCULAR; INTRAVENOUS at 05:19

## 2017-06-03 RX ADMIN — HYDROMORPHONE HYDROCHLORIDE 1 MG: 1 INJECTION, SOLUTION INTRAMUSCULAR; INTRAVENOUS; SUBCUTANEOUS at 05:19

## 2017-06-03 RX ADMIN — DIPHENHYDRAMINE HYDROCHLORIDE 25 MG: 50 INJECTION, SOLUTION INTRAMUSCULAR; INTRAVENOUS at 06:02

## 2017-06-03 RX ADMIN — PROCHLORPERAZINE EDISYLATE 10 MG: 5 INJECTION INTRAMUSCULAR; INTRAVENOUS at 06:03

## 2017-06-03 NOTE — ED NOTES
"Pt complains of pain in her head when she stands, but she states that when she lays down it goes away. I asked pt if she felt like she was ok with going home and she said \"I just need to go home and lay down in my own bed and I will feel better.\"  is with pt and feels comfortable to take home     Katie Vickers RN  06/03/17 7747    "

## 2017-06-03 NOTE — ED PROVIDER NOTES
EMERGENCY DEPARTMENT ENCOUNTER    CHIEF COMPLAINT  Chief Complaint: Headache  History given by: patient   History limited by: n/a  Room Number:   PMD: Mikael David MD      HPI:  Pt is a 61 y.o. female who presents complaining of a posterior headache. Pt is s/p cerebral embolization on , and states that she has had a headache since the procedure. Pt states that the headache acutely worsened tonight at 22:30. Pt also complains of nausea. Pt states that she took Loratab and Tylenol without improvement      Duration:  26 days, worse tonight   Onset: gradual  Timing: constant   Location: posterior   Radiation: none  Quality: pain  Intensity/Severity: severe   Progression: worse tonight  Associated Symptoms: nausea  Aggravating Factors: none  Alleviating Factors: none  Previous Episodes: s/p cerebral aneurysm repair  Treatment before arrival: Loratab, Tylenol    PAST MEDICAL HISTORY  Active Ambulatory Problems     Diagnosis Date Noted   • Monoclonal gammopathy 2016   • Hx of cerebral aneurysm repair 2017   • Allergy history, radiographic dye 2017   • Cerebral aneurysm, nonruptured 2017   • Neck pain 2017     Resolved Ambulatory Problems     Diagnosis Date Noted   • Aneurysm 2017     Past Medical History:   Diagnosis Date   • Aneurysm    • Asthma    • Cancer 2014   • Diabetes mellitus    • GERD (gastroesophageal reflux disease)    • H/O diverticulitis of colon    • History of kidney stones 2011   • History of surgery for cerebral aneurysm    • Hx of migraines    • Hypertension    • MGUS (monoclonal gammopathy of unknown significance)    • Neuropathy, feet    • PONV (postoperative nausea and vomiting)    • Protein overload    • Stroke 10/2011       PAST SURGICAL HISTORY  Past Surgical History:   Procedure Laterality Date   • APPENDECTOMY  1982    Emergency at time of 2nd    • BASAL CELL CARCINOMA EXCISION Left 2014    Excision basal cell carcinoma,  left arm (1.1 cm) with frozen section control and layered wound closure ( 3.1 cm), Dr. Isael Andrade, [facility]   • BRAIN SURGERY  2004    Ruptured aneurysm, clipped/Dr. Sims   • CEREBRAL ANEURYSM REPAIR      clipping of cerebral aneurysm   • CEREBRAL ANGIOGRAM N/A 2017    Procedure: CEREBRAL ANGIOGRAM ;  Surgeon: Orlando Bella MD;  Location: [facility] ;  Service:    •  SECTION  , ,    • CHOLECYSTECTOMY     • COLON RESECTION WITH COLOSTOMY Right    • COLON SURGERY      Sigmoid colectomy in the past   • COLONOSCOPY     • EMBOLIZATION CEREBRAL N/A 2017    Procedure: Cerebral angiography and embolization of cerebral aneurysm with Pipeline Embolization Device;  Surgeon: Orlando Bella MD;  Location: [facility] ;  Service:    • INCISIONAL HERNIA REPAIR      Patient suffered some nerve entrapment secondary to the attack, some of which were removed during a secondary operation.   • OSTOMY TAKE DOWN     • TUBAL ABDOMINAL LIGATION     • URETERAL STENT INSERTION  2011    Due to kidney stones       FAMILY HISTORY  Family History   Problem Relation Age of Onset   • Skin cancer Mother    • Heart disease Father    • Heart attack Father    • Hypertension Father    • Cholelithiasis Son 22       SOCIAL HISTORY  Social History     Social History   • Marital status:      Spouse name: Roe   • Number of children: 3   • Years of education: N/A     Occupational History   •  Unemployed     Social History Main Topics   • Smoking status: Never Smoker   • Smokeless tobacco: Never Used   • Alcohol use Yes      Comment: Occasional   • Drug use: No   • Sexual activity: Defer     Other Topics Concern   • Not on file     Social History Narrative       ALLERGIES  Contrast dye; Iodinated diagnostic agents; Codeine; and Methylprednisolone    REVIEW OF SYSTEMS  Review of Systems   Constitutional: Negative for chills and fever.   HENT: Negative for congestion and sore throat.     Eyes: Negative.    Respiratory: Negative for cough and shortness of breath.    Cardiovascular: Negative for chest pain and leg swelling.   Gastrointestinal: Positive for nausea. Negative for abdominal pain, diarrhea and vomiting.   Genitourinary: Negative for difficulty urinating and dysuria.   Musculoskeletal: Negative for back pain and neck pain.   Skin: Negative for rash and wound.   Allergic/Immunologic: Negative.    Neurological: Positive for headaches. Negative for dizziness, weakness and numbness.   Psychiatric/Behavioral: Negative.    All other systems reviewed and are negative.      PHYSICAL EXAM  ED Triage Vitals   Temp Heart Rate Resp BP SpO2   06/03/17 0405 06/03/17 0405 06/03/17 0405 06/03/17 0418 06/03/17 0405   98 °F (36.7 °C) 85 24 122/76 96 %      Temp src Heart Rate Source Patient Position BP Location FiO2 (%)   06/03/17 0405 06/03/17 0405 -- -- --   Tympanic Monitor          Physical Exam   Constitutional: She is oriented to person, place, and time. She appears distressed.   HENT:   Head: Normocephalic and atraumatic.   Eyes: EOM are normal. Pupils are equal, round, and reactive to light.   Photophobia present   Neck: Normal range of motion. Neck supple.   Cardiovascular: Normal rate, regular rhythm and normal heart sounds.    Pulmonary/Chest: Effort normal and breath sounds normal. No respiratory distress.   Abdominal: Soft. There is no tenderness. There is no rebound and no guarding.   Musculoskeletal: Normal range of motion. She exhibits no edema.   Neurological: She is alert and oriented to person, place, and time.   Skin: Skin is warm and dry. No rash noted.   Psychiatric: Mood and affect normal.   Nursing note and vitals reviewed.      LAB RESULTS  Lab Results (last 24 hours)     Procedure Component Value Units Date/Time    Needle Stick Pt Source [[id number]] Collected:  06/03/17 0536    Specimen:  Blood Updated:  06/03/17 0628    Narrative:       The following orders were created for panel  order Needle Stick Pt Source.  Procedure                               Abnormality         Status                     ---------                               -----------         ------                     HCV RNA, PCR, Qualitative[[id number]]                        In process                 Hepatitis B Surface Antigen[[id number]]  Normal              Final result               HIV-1 / O / 2 Ag / Antib...[[id number]]  Normal              Final result               Hepatitis C Antibody[[id number]]         Normal              Final result                 Please view results for these tests on the individual orders.    HCV RNA, PCR, Qualitative [[id number]] Collected:  06/03/17 0536    Specimen:  Blood Updated:  06/03/17 0559    Hepatitis B Surface Antigen [[id number]]  (Normal) Collected:  06/03/17 0536    Specimen:  Blood Updated:  06/03/17 0628     Hepatitis B Surface Ag Non-Reactive    HIV-1 / O / 2 Ag / Antibody 4th Generation [[id number]]  (Normal) Collected:  06/03/17 0536    Specimen:  Blood Updated:  06/03/17 0626     HIV-1/ HIV-2 Non-Reactive     HIV-1 P24 Ag Screen Non-Reactive    Hepatitis C Antibody [[id number]]  (Normal) Collected:  06/03/17 0536    Specimen:  Blood Updated:  06/03/17 0628     Hepatitis C Ab Non-Reactive          I ordered the above labs and reviewed the results    RADIOLOGY  CT Head Without Contrast   Preliminary Result   No definite acute intracranial pathology. Overall no significant change.   Findings suggestive of acute sinusitis of the left sphenoid sinus                       I ordered the above noted radiological studies. Interpreted by radiologist. Reviewed by me in PACS.       PROCEDURES  Procedures      PROGRESS AND CONSULTS  ED Course     04:30  CT head ordered for further evaluation. Dilaudid and Zofran ordered to treat pain.     05:19  BP- 122/76 HR- 85 Temp- 98 °F (36.7 °C) (Tympanic) O2 sat- 96%  Rechecked the patient who is in NAD and is resting comfortably. Advised pt that the  CT shows NAD. Pt states that her headache is not improved, pt has not yet received pain medication. Plan to monitor in the ED. Pt understands and agrees with the plan, all questions answered.    05:47  BP- 122/76 HR- 85 Temp- 98 °F (36.7 °C) (Tympanic) O2 sat- 96%  Rechecked the patient who is in NAD and is resting comfortably. Pt states that the headache is unimproved. Will ordered additional medication. Pt understands and agrees with the plan, all questions answered.    05:50  Dilaudid, Compazine, and Benadryl ordered to treat headache.     06:45  BP- 122/76 HR- 85 Temp- 98 °F (36.7 °C) (Tympanic) O2 sat- 96%   Pt is now sleeping. Pt will be discharged.     MEDICAL DECISION MAKING  Results were reviewed/discussed with the patient and they were also made aware of online access. Pt also made aware that some labs, such as cultures, will not be resulted during ER visit and follow up with PMD is necessary.     MDM  Number of Diagnoses or Management Options     Amount and/or Complexity of Data Reviewed  Tests in the radiology section of CPT®: reviewed and ordered (CT head shows NAD)    Patient Progress  Patient progress: stable         DIAGNOSIS  Final diagnoses:   Chronic nonintractable headache, unspecified headache type       DISPOSITION  DISCHARGE    Patient discharged in stable condition.    Reviewed implications of results, diagnosis, meds, responsibility to follow up, warning signs and symptoms of possible worsening, potential complications and reasons to return to ER.    Patient/Family voiced understanding of above instructions.    Discussed plan for discharge, as there is no emergent indication for admission.  Pt/family is agreeable and understands need for follow up and repeat testing.  Pt is aware that discharge does not mean that nothing is wrong but it indicates no emergency is present that requires admission and they must continue care with follow-up as given below or physician of their choice.      FOLLOW-UP  Mikael David MD  [address]   [address]  [facility] [address]  [phone]    Call           Medication List      New Prescriptions          ondansetron 4 MG tablet   Commonly known as:  ZOFRAN   Take 1 tablet by mouth Every 8 (Eight) Hours As Needed for Nausea.       oxyCODONE-acetaminophen 5-325 MG per tablet   Commonly known as:  PERCOCET   Take 1-2 tablets by mouth Every 4 (Four) Hours As Needed for Moderate Pain   (4-6).         Changed          albuterol 108 (90 BASE) MCG/ACT inhaler   Commonly known as:  PROVENTIL HFA;VENTOLIN HFA   Inhale 2 puffs every 6 (six) hours as needed for wheezing or shortness of   breath (Cough).   What changed:  when to take this       aspirin 81 MG EC tablet   Take 1 tablet by mouth Daily.   What changed:  additional instructions           Latest Documented Vital Signs:  As of 7:00 AM  BP- 122/76 HR- 85 Temp- 98 °F (36.7 °C) (Tympanic) O2 sat- 96%    --  Documentation assistance provided by nicola Tavera for Dr Martins.  Information recorded by the nicola was done at my direction and has been verified and validated by me.          Maricruz Tavera  06/03/17 [id number]       Lane Martins MD  06/03/17 1101

## 2017-06-03 NOTE — ED TRIAGE NOTES
Had cerebral aneurysm repair on may 8 by Dr Bella, peter started last night approx 2230, pain worse in left posterior head, nausea

## 2017-06-05 LAB — HEPATITIS C RNA-NAA: NEGATIVE

## 2017-06-06 ENCOUNTER — TELEPHONE (OUTPATIENT)
Dept: NEUROSURGERY | Facility: CLINIC | Age: 62
End: 2017-06-06

## 2017-06-06 DIAGNOSIS — G44.001 INTRACTABLE CLUSTER HEADACHE SYNDROME, UNSPECIFIED CHRONICITY PATTERN: Primary | ICD-10-CM

## 2017-06-06 NOTE — TELEPHONE ENCOUNTER
Explained Mulu's note to patient. She will check w/PCP, has used [facility] in past but that doesn't seem to work now. I have put in the referral for neurology. Niurka please follow-up on that referral.

## 2017-06-06 NOTE — TELEPHONE ENCOUNTER
----- Message from Abdirahman Shahid Rep sent at 6/5/2017  9:41 AM EDT -----  Regarding: Pt recently in ER  Contact: [phone]  Pt went to ED this past Saturday with severe headaches.  Pt states she has had headaches since her last Angio.  Dr David in the ER recommended her seeing someone in Neurology for the headaches and she would like to know who we recommend for this?    Pt can be reached at [phone]

## 2017-06-06 NOTE — TELEPHONE ENCOUNTER
There is nothing to do from our standpoint to manage her migraine-like headaches.  There should be no correlation between last angio with pipeline stent placement and increase in frequency/duration of migraine headaches, which she has a known history of.  I would have her get these managed by primary care provider until she can get in to see neurology

## 2017-06-06 NOTE — TELEPHONE ENCOUNTER
Patient had a cerebral angiography done on 5/8 by Dr Bella. Patient last seen on 5/22. Patient called to say that she has been having headaches and they are getting worse by the hour. She went to [facility] ER on sat and they did a CT.She said that the headaches are so unbearable that she can't walk, has shooting pains, some feel like electric shock. Patient was crying because she does not know what to do. Patient wants to know what we recommend. Khalida [phone]

## 2017-07-25 RX ORDER — TICAGRELOR 60 MG/1
TABLET ORAL
Qty: 60 TABLET | Refills: 3 | Status: SHIPPED | OUTPATIENT
Start: 2017-07-25 | End: 2017-07-28 | Stop reason: SDUPTHER

## 2017-07-27 ENCOUNTER — TELEPHONE (OUTPATIENT)
Dept: NEUROSURGERY | Facility: CLINIC | Age: 62
End: 2017-07-27

## 2017-07-27 DIAGNOSIS — I67.1 CEREBRAL ANEURYSM, NONRUPTURED: Primary | ICD-10-CM

## 2017-07-27 DIAGNOSIS — Z86.79 HX OF CEREBRAL ANEURYSM REPAIR: ICD-10-CM

## 2017-07-27 DIAGNOSIS — Z98.890 HX OF CEREBRAL ANEURYSM REPAIR: ICD-10-CM

## 2017-07-27 NOTE — TELEPHONE ENCOUNTER
Patient last seen on 5/22/17 for aneurysm. Pt is calling about her Brilinta 60 mg.  She would like to know if we can give her more than 60 pills a month. She said that when she travels she will run short on her pills. She does not like having to go each month to the pharmacy. She asked if we couldn't increase pills if we could please  send in a prescription to [facility] mail order company (not in her chart)  She gave me these phone numbers for [employer] [employer]  [phone] and [phone]

## 2017-09-28 ENCOUNTER — OFFICE VISIT (OUTPATIENT)
Dept: NEUROSURGERY | Facility: CLINIC | Age: 62
End: 2017-09-28

## 2017-09-28 VITALS
DIASTOLIC BLOOD PRESSURE: 80 MMHG | SYSTOLIC BLOOD PRESSURE: 110 MMHG | BODY MASS INDEX: 32.74 KG/M2 | RESPIRATION RATE: 18 BRPM | HEIGHT: 68 IN | HEART RATE: 60 BPM | WEIGHT: 216 LBS

## 2017-09-28 DIAGNOSIS — Z91.041 CONTRAST MEDIA ALLERGY: ICD-10-CM

## 2017-09-28 DIAGNOSIS — Z86.79 HX OF CEREBRAL ANEURYSM REPAIR: ICD-10-CM

## 2017-09-28 DIAGNOSIS — Z98.890 HX OF CEREBRAL ANEURYSM REPAIR: ICD-10-CM

## 2017-09-28 DIAGNOSIS — I67.1 CEREBRAL ANEURYSM, NONRUPTURED: Primary | ICD-10-CM

## 2017-09-28 PROCEDURE — 99214 OFFICE O/P EST MOD 30 MIN: CPT | Performed by: NURSE PRACTITIONER

## 2017-09-28 RX ORDER — CEFAZOLIN SODIUM IN 0.9 % NACL 3 G/100 ML
3 INTRAVENOUS SOLUTION, PIGGYBACK (ML) INTRAVENOUS ONCE
Status: CANCELLED | OUTPATIENT
Start: 2017-10-11 | End: 2017-09-28

## 2017-09-28 RX ORDER — PREDNISONE 50 MG/1
TABLET ORAL
Qty: 3 TABLET | Refills: 0 | Status: SHIPPED | OUTPATIENT
Start: 2017-09-28 | End: 2017-12-29

## 2017-09-28 RX ORDER — SODIUM CHLORIDE 9 MG/ML
100 INJECTION, SOLUTION INTRAVENOUS CONTINUOUS
Status: CANCELLED | OUTPATIENT
Start: 2017-10-11

## 2017-09-28 NOTE — PROGRESS NOTES
Subjective   Patient ID: Khalida Gilliland is a 62 y.o. female is here today for follow-up aneurysm. Patient presents unaccompanied.    History of Present Illness   Patient presents for follow-up of cerebral aneurysm.  She has a history of a right ICA aneurysm status post craniotomy and clipping nearly 13 years ago.  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  Pipeline stent placement was accomplished on May 8, 2017.  She has remained on Brillinta and aspirin since that time.  Since her last office visit she has had no particular problems.  She denies any significant headaches but reports intermittent mild headaches.    The following portions of the patient's history were reviewed and updated as appropriate: allergies, current medications, past family history, past medical history, past social history, past surgical history and problem list.    Review of Systems   Constitutional: Negative for chills and fever.   Eyes: Negative for visual disturbance.   Respiratory: Negative for cough and shortness of breath.    Cardiovascular: Negative for chest pain and palpitations.   Gastrointestinal: Negative for nausea and vomiting.   Musculoskeletal: Negative for gait problem and neck pain.   Skin: Negative for rash and wound.   Neurological: Negative for dizziness, weakness and headaches.   Hematological: Does not bruise/bleed easily.   Psychiatric/Behavioral: Negative for sleep disturbance. The patient is not nervous/anxious.        Objective   Physical Exam   Constitutional: She appears well-developed and well-nourished.   obese   Neck: Neck supple. Normal carotid pulses present. Carotid bruit is not present.   Cardiovascular: Normal rate, regular rhythm, normal heart sounds and intact distal pulses.    Pulmonary/Chest: Effort normal and breath sounds normal.   Abdominal: Soft. There is no tenderness.   obese   Neurological:  She is alert. She has normal strength. She has a normal Finger-Nose-Finger Test. Gait normal.   Skin: Skin is warm and dry.   Psychiatric: She has a normal mood and affect. Her speech is normal and behavior is normal. Judgment normal.   Vitals reviewed.    Neurologic Exam     Mental Status   Attention: normal. Concentration: normal.   Speech: speech is normal   Level of consciousness: alert  Knowledge: good.   Normal comprehension.     Cranial Nerves   Cranial nerves II through XII intact.     Motor Exam   Right arm pronator drift: absent  Left arm pronator drift: absent    Strength   Strength 5/5 throughout.     Sensory Exam   Light touch normal.     Gait, Coordination, and Reflexes     Gait  Gait: normal    Coordination   Finger to nose coordination: normal      Assessment/Plan   Independent Review of Radiographic Studies:    No new imaging    Medical Decision Making:    Patient presents for follow-up of cerebral aneurysm.  She is due for surveillance cerebral angiogram.  She's had no particular problems since her last office visit.  Her exam is as noted above with no neurologic red flags.  She remains on aspirin and Brillinta at this time.  She reports a contrast allergy and therefore I have prescribed prednisone as well as our dye allergy protocol which includes histamine blockers.    The risks, benefits and alternatives of cerebral angiography were explained in detail to the patient. The alternative is not to undergo this procedure. The benefit of cerebral angiography should be, though is not guaranteed, the elucidation of the vascular anatomy of the brain and the blood vessels leading to the brain. The risks of the procedure include, but are not limited to, the possibility of stroke, bleeding, damage to an artery in the brain, groin or neck(possibly requiring surgery to correct), infection, reaction to the contrast dye resulting in itching, swelling, anaphylaxis or even death, lack of ability to perform the  procedure, need for further operative intervention, blindness, etcetera. The patient voiced understanding of the risks, benefits and alternatives and requests that we proceed with cerebral angiography.    After a complete physical exam, the patient has been informed of the consequences, benefits, appropriate us, and office policies regarding the medication being prescribed. A MARTITA check will be made on-line, and will be repeated if prescription is renewed after a 90 day period. The patient agrees to adhering to the medication regimen as prescribed.    Plan: Cerebral angiogram and return to office following.     Khalida was seen today for cerebral aneurysm.    Diagnoses and all orders for this visit:    Cerebral aneurysm, nonruptured  -     Case Request; Standing  -     CBC and Differential; Future  -     Basic metabolic panel; Future  -     ECG 12 Lead; Future  -     sodium chloride 0.9 % infusion; Infuse 100 mL/hr into a venous catheter Continuous.  -     ceFAZolin (ANCEF) 3 g in sodium chloride 0.9 % 100 mL IVPB; Infuse 3 g into a venous catheter 1 (One) Time.  -     Case Request    Hx of cerebral aneurysm repair  -     Case Request; Standing  -     CBC and Differential; Future  -     Basic metabolic panel; Future  -     ECG 12 Lead; Future  -     sodium chloride 0.9 % infusion; Infuse 100 mL/hr into a venous catheter Continuous.  -     ceFAZolin (ANCEF) 3 g in sodium chloride 0.9 % 100 mL IVPB; Infuse 3 g into a venous catheter 1 (One) Time.  -     Case Request    Contrast media allergy    Other orders  -     predniSONE (DELTASONE) 50 MG tablet; 1 PO 13 hours, 1 PO 7 hours, 1 PO 1 hour prior  -     Provide instructions to patient on NPO status  -     Obtain informed consent  -     Follow Anesthesia Guidelines / Standing Orders; Standing  -     Verify NPO Status; Standing  -     Clip operative site; Standing  -     Have Patient Void Prior to Procedure; Standing      Return for After angiogram.

## 2017-10-03 ENCOUNTER — RESULTS ENCOUNTER (OUTPATIENT)
Dept: NEUROSURGERY | Facility: CLINIC | Age: 62
End: 2017-10-03

## 2017-10-03 DIAGNOSIS — Z98.890 HX OF CEREBRAL ANEURYSM REPAIR: ICD-10-CM

## 2017-10-03 DIAGNOSIS — Z86.79 HX OF CEREBRAL ANEURYSM REPAIR: ICD-10-CM

## 2017-10-03 DIAGNOSIS — I67.1 CEREBRAL ANEURYSM, NONRUPTURED: ICD-10-CM

## 2017-10-04 ENCOUNTER — APPOINTMENT (OUTPATIENT)
Dept: PREADMISSION TESTING | Facility: HOSPITAL | Age: 62
End: 2017-10-04

## 2017-10-05 ENCOUNTER — APPOINTMENT (OUTPATIENT)
Dept: PREADMISSION TESTING | Facility: HOSPITAL | Age: 62
End: 2017-10-05

## 2017-10-05 VITALS
OXYGEN SATURATION: 99 % | TEMPERATURE: 97.3 F | HEART RATE: 57 BPM | BODY MASS INDEX: 32.58 KG/M2 | WEIGHT: 215 LBS | SYSTOLIC BLOOD PRESSURE: 139 MMHG | DIASTOLIC BLOOD PRESSURE: 72 MMHG | HEIGHT: 68 IN | RESPIRATION RATE: 16 BRPM

## 2017-10-05 DIAGNOSIS — Z98.890 HX OF CEREBRAL ANEURYSM REPAIR: ICD-10-CM

## 2017-10-05 DIAGNOSIS — I67.1 CEREBRAL ANEURYSM, NONRUPTURED: ICD-10-CM

## 2017-10-05 DIAGNOSIS — Z86.79 HX OF CEREBRAL ANEURYSM REPAIR: ICD-10-CM

## 2017-10-05 LAB
ANION GAP SERPL CALCULATED.3IONS-SCNC: 13.3 MMOL/L
BASOPHILS # BLD AUTO: 0.03 10*3/MM3 (ref 0–0.2)
BASOPHILS NFR BLD AUTO: 0.5 % (ref 0–1.5)
BUN BLD-MCNC: 11 MG/DL (ref 8–23)
BUN/CREAT SERPL: 16.9 (ref 7–25)
CALCIUM SPEC-SCNC: 10.4 MG/DL (ref 8.6–10.5)
CHLORIDE SERPL-SCNC: 102 MMOL/L (ref 98–107)
CO2 SERPL-SCNC: 27.7 MMOL/L (ref 22–29)
CREAT BLD-MCNC: 0.65 MG/DL (ref 0.57–1)
DEPRECATED RDW RBC AUTO: 47.9 FL (ref 37–54)
EOSINOPHIL # BLD AUTO: 0.47 10*3/MM3 (ref 0–0.7)
EOSINOPHIL NFR BLD AUTO: 8.2 % (ref 0.3–6.2)
ERYTHROCYTE [DISTWIDTH] IN BLOOD BY AUTOMATED COUNT: 14 % (ref 11.7–13)
GFR SERPL CREATININE-BSD FRML MDRD: 92 ML/MIN/1.73
GLUCOSE BLD-MCNC: 102 MG/DL (ref 65–99)
HCT VFR BLD AUTO: 44.5 % (ref 35.6–45.5)
HGB BLD-MCNC: 14.5 G/DL (ref 11.9–15.5)
IMM GRANULOCYTES # BLD: 0 10*3/MM3 (ref 0–0.03)
IMM GRANULOCYTES NFR BLD: 0 % (ref 0–0.5)
LYMPHOCYTES # BLD AUTO: 2.36 10*3/MM3 (ref 0.9–4.8)
LYMPHOCYTES NFR BLD AUTO: 41.1 % (ref 19.6–45.3)
MCH RBC QN AUTO: 30.9 PG (ref 26.9–32)
MCHC RBC AUTO-ENTMCNC: 32.6 G/DL (ref 32.4–36.3)
MCV RBC AUTO: 94.9 FL (ref 80.5–98.2)
MONOCYTES # BLD AUTO: 0.53 10*3/MM3 (ref 0.2–1.2)
MONOCYTES NFR BLD AUTO: 9.2 % (ref 5–12)
NEUTROPHILS # BLD AUTO: 2.35 10*3/MM3 (ref 1.9–8.1)
NEUTROPHILS NFR BLD AUTO: 41 % (ref 42.7–76)
PLATELET # BLD AUTO: 214 10*3/MM3 (ref 140–500)
PMV BLD AUTO: 11.3 FL (ref 6–12)
POTASSIUM BLD-SCNC: 4.5 MMOL/L (ref 3.5–5.2)
RBC # BLD AUTO: 4.69 10*6/MM3 (ref 3.9–5.2)
SODIUM BLD-SCNC: 143 MMOL/L (ref 136–145)
WBC NRBC COR # BLD: 5.74 10*3/MM3 (ref 4.5–10.7)

## 2017-10-05 PROCEDURE — 93010 ELECTROCARDIOGRAM REPORT: CPT | Performed by: INTERNAL MEDICINE

## 2017-10-05 PROCEDURE — 80048 BASIC METABOLIC PNL TOTAL CA: CPT | Performed by: NURSE PRACTITIONER

## 2017-10-05 PROCEDURE — 85025 COMPLETE CBC W/AUTO DIFF WBC: CPT | Performed by: NURSE PRACTITIONER

## 2017-10-05 PROCEDURE — 36415 COLL VENOUS BLD VENIPUNCTURE: CPT | Performed by: NURSE PRACTITIONER

## 2017-10-05 PROCEDURE — 93005 ELECTROCARDIOGRAM TRACING: CPT

## 2017-10-05 RX ORDER — MAGNESIUM GLUCONATE 30 MG(550)
595 TABLET ORAL DAILY
COMMUNITY
End: 2019-11-22

## 2017-10-05 NOTE — DISCHARGE INSTRUCTIONS
Take the following medications the morning of surgery with a small sip of water:    ZANTAC,  PROPANOLOL, LYRICA, OMEPRAZOLE    ARRIVE AT 9:30    General Instructions:  • Do not eat or drink anything after midnight the night before surgery.  • Infants may have breast milk up to four hours before surgery.  • Infants drinking formula may drink formula up to six hours before surgery.   • Patients who avoid smoking, chewing tobacco and alcohol for 4 weeks prior to surgery have a reduced risk of post-operative complications.  Quit smoking as many days before surgery as you can.  • Do not smoke, use chewing tobacco or drink alcohol the day of surgery.   • If applicable bring your C-PAP/ BI-PAP machine.  • Bring any papers given to you in the doctor’s office.  • Wear clean comfortable clothes and socks.  • Do not wear contact lenses or make-up.  Bring a case for your glasses.   • Bring crutches or walker if applicable.  • Leave all other valuables and jewelry at home.  • The Pre-Admission Testing nurse will instruct you to bring medications if unable to obtain an accurate list in Pre-Admission Testing.        If you were given a blood bank ID arm band remember to bring it with you the day of surgery.    Preventing a Surgical Site Infection:  • For 2 to 3 days before surgery, avoid shaving with a razor because the razor can irritate skin and make it easier to develop an infection.  • The night prior to surgery sleep in a clean bed with clean clothing.  Do not allow pets to sleep with you.  • Shower on the morning of surgery using a fresh bar of anti-bacterial soap (such as Dial) and clean washcloth.  Dry with a clean towel and dress in clean clothing.  • Ask your surgeon if you will be receiving antibiotics prior to surgery.  • Make sure you, your family, and all healthcare providers clean their hands with soap and water or an alcohol based hand  before caring for you or your wound.    Day of surgery:  Upon arrival,  a Pre-op nurse and Anesthesiologist will review your health history, obtain vital signs, and answer questions you may have.  The only belongings needed at this time will be your home medications and if applicable your C-PAP/BI-PAP machine.  If you are staying overnight your family can leave the rest of your belongings in the car and bring them to your room later.  A Pre-op nurse will start an IV and you may receive medication in preparation for surgery, including something to help you relax.  Your family will be able to see you in the Pre-op area.  While you are in surgery your family should notify the waiting room  if they leave the waiting room area and provide a contact phone number.    Please be aware that surgery does come with discomfort.  We want to make every effort to control your discomfort so please discuss any uncontrolled symptoms with your nurse.   Your doctor will most likely have prescribed pain medications.      If you are going home after surgery you will receive individualized written care instructions before being discharged.  A responsible adult must drive you to and from the hospital on the day of your surgery and stay with you for 24 hours.    If you are staying overnight following surgery, you will be transported to your hospital room following the recovery period.  [facility].    If you have any questions please call Pre-Admission Testing at [phone].  Deductibles and co-payments are collected on the day of service. Please be prepared to pay the required co-pay, deductible or deposit on the day of service as defined by your plan.

## 2017-10-11 ENCOUNTER — HOSPITAL ENCOUNTER (OUTPATIENT)
Facility: HOSPITAL | Age: 62
Setting detail: HOSPITAL OUTPATIENT SURGERY
Discharge: HOME | End: 2017-10-11
Attending: NEUROLOGICAL SURGERY | Admitting: NEUROLOGICAL SURGERY

## 2017-10-11 ENCOUNTER — ANESTHESIA (OUTPATIENT)
Dept: PERIOP | Facility: HOSPITAL | Age: 62
End: 2017-10-11

## 2017-10-11 ENCOUNTER — ANESTHESIA EVENT (OUTPATIENT)
Dept: PERIOP | Facility: HOSPITAL | Age: 62
End: 2017-10-11

## 2017-10-11 ENCOUNTER — APPOINTMENT (OUTPATIENT)
Dept: GENERAL RADIOLOGY | Facility: HOSPITAL | Age: 62
End: 2017-10-11

## 2017-10-11 VITALS
BODY MASS INDEX: 33.38 KG/M2 | HEART RATE: 77 BPM | RESPIRATION RATE: 18 BRPM | HEIGHT: 68 IN | DIASTOLIC BLOOD PRESSURE: 57 MMHG | WEIGHT: 220.25 LBS | SYSTOLIC BLOOD PRESSURE: 118 MMHG | TEMPERATURE: 97.9 F | OXYGEN SATURATION: 95 %

## 2017-10-11 DIAGNOSIS — Z86.79 HX OF CEREBRAL ANEURYSM REPAIR: ICD-10-CM

## 2017-10-11 DIAGNOSIS — I67.1 CEREBRAL ANEURYSM, NONRUPTURED: ICD-10-CM

## 2017-10-11 DIAGNOSIS — Z98.890 HX OF CEREBRAL ANEURYSM REPAIR: ICD-10-CM

## 2017-10-11 LAB — GLUCOSE BLDC GLUCOMTR-MCNC: 179 MG/DL (ref 70–130)

## 2017-10-11 PROCEDURE — C1760 CLOSURE DEV, VASC: HCPCS | Performed by: NEUROLOGICAL SURGERY

## 2017-10-11 PROCEDURE — 36224 PLACE CATH CAROTD ART: CPT | Performed by: NEUROLOGICAL SURGERY

## 2017-10-11 PROCEDURE — 25010000002 HEPARIN (PORCINE) PER 1000 UNITS: Performed by: NEUROLOGICAL SURGERY

## 2017-10-11 PROCEDURE — 76377 3D RENDER W/INTRP POSTPROCES: CPT

## 2017-10-11 PROCEDURE — 82962 GLUCOSE BLOOD TEST: CPT

## 2017-10-11 PROCEDURE — C1894 INTRO/SHEATH, NON-LASER: HCPCS | Performed by: NEUROLOGICAL SURGERY

## 2017-10-11 PROCEDURE — 25010000002 FENTANYL CITRATE (PF) 100 MCG/2ML SOLUTION: Performed by: NEUROLOGICAL SURGERY

## 2017-10-11 PROCEDURE — 0 IODIXANOL PER 1 ML: Performed by: NEUROLOGICAL SURGERY

## 2017-10-11 PROCEDURE — 76377 3D RENDER W/INTRP POSTPROCES: CPT | Performed by: NEUROLOGICAL SURGERY

## 2017-10-11 PROCEDURE — C1769 GUIDE WIRE: HCPCS | Performed by: NEUROLOGICAL SURGERY

## 2017-10-11 PROCEDURE — 25010000002 MIDAZOLAM PER 1 MG: Performed by: NEUROLOGICAL SURGERY

## 2017-10-11 RX ORDER — LIDOCAINE AND PRILOCAINE 25; 25 MG/G; MG/G
CREAM TOPICAL
Status: COMPLETED
Start: 2017-10-11 | End: 2017-10-11

## 2017-10-11 RX ORDER — MIDAZOLAM HYDROCHLORIDE 5 MG/ML
INJECTION INTRAMUSCULAR; INTRAVENOUS
Status: DISCONTINUED | OUTPATIENT
Start: 2017-10-11 | End: 2017-10-11 | Stop reason: HOSPADM

## 2017-10-11 RX ORDER — CEFAZOLIN SODIUM IN 0.9 % NACL 3 G/100 ML
3 INTRAVENOUS SOLUTION, PIGGYBACK (ML) INTRAVENOUS ONCE
Status: COMPLETED | OUTPATIENT
Start: 2017-10-11 | End: 2017-10-11

## 2017-10-11 RX ORDER — MIDAZOLAM HYDROCHLORIDE 5 MG/ML
INJECTION INTRAMUSCULAR; INTRAVENOUS
Status: COMPLETED | OUTPATIENT
Start: 2017-10-11 | End: 2017-10-11

## 2017-10-11 RX ORDER — FENTANYL CITRATE 50 UG/ML
INJECTION, SOLUTION INTRAMUSCULAR; INTRAVENOUS
Status: COMPLETED | OUTPATIENT
Start: 2017-10-11 | End: 2017-10-11

## 2017-10-11 RX ORDER — LIDOCAINE AND PRILOCAINE 25; 25 MG/G; MG/G
CREAM TOPICAL ONCE
Status: COMPLETED | OUTPATIENT
Start: 2017-10-11 | End: 2017-10-11

## 2017-10-11 RX ORDER — HYDROCODONE BITARTRATE AND ACETAMINOPHEN 5; 325 MG/1; MG/1
1 TABLET ORAL EVERY 4 HOURS PRN
Status: DISCONTINUED | OUTPATIENT
Start: 2017-10-11 | End: 2017-10-11 | Stop reason: HOSPADM

## 2017-10-11 RX ORDER — SODIUM CHLORIDE 9 MG/ML
100 INJECTION, SOLUTION INTRAVENOUS CONTINUOUS
Status: DISCONTINUED | OUTPATIENT
Start: 2017-10-11 | End: 2017-10-11 | Stop reason: HOSPADM

## 2017-10-11 RX ORDER — DIPHENHYDRAMINE HCL 25 MG
25 CAPSULE ORAL ONCE
COMMUNITY
End: 2017-12-29

## 2017-10-11 RX ORDER — IODIXANOL 320 MG/ML
250 INJECTION, SOLUTION INTRAVASCULAR
Status: COMPLETED | OUTPATIENT
Start: 2017-10-11 | End: 2017-10-11

## 2017-10-11 RX ADMIN — SODIUM CHLORIDE 100 ML/HR: 9 INJECTION, SOLUTION INTRAVENOUS at 10:16

## 2017-10-11 RX ADMIN — MIDAZOLAM HYDROCHLORIDE 2 MG: 5 INJECTION, SOLUTION INTRAMUSCULAR; INTRAVENOUS at 12:35

## 2017-10-11 RX ADMIN — FENTANYL CITRATE 50 MCG: 50 INJECTION INTRAMUSCULAR; INTRAVENOUS at 12:28

## 2017-10-11 RX ADMIN — LIDOCAINE AND PRILOCAINE 1 APPLICATION: 25; 25 CREAM TOPICAL at 10:40

## 2017-10-11 RX ADMIN — MIDAZOLAM HYDROCHLORIDE 2 MG: 5 INJECTION, SOLUTION INTRAMUSCULAR; INTRAVENOUS at 12:27

## 2017-10-11 RX ADMIN — CEFAZOLIN 3 G: 1 INJECTION, POWDER, FOR SOLUTION INTRAMUSCULAR; INTRAVENOUS; PARENTERAL at 12:12

## 2017-10-11 RX ADMIN — IODIXANOL 50.4 ML: 320 INJECTION, SOLUTION INTRAVASCULAR at 12:15

## 2017-10-11 NOTE — PLAN OF CARE
Problem: Patient Care Overview (Adult)  Goal: Discharge Needs Assessment  Outcome: Outcome(s) achieved Date Met:  10/11/17    10/11/17 3060   Discharge Needs Assessment   Concerns To Be Addressed no discharge needs identified

## 2017-10-11 NOTE — PLAN OF CARE
Problem: Patient Care Overview (Adult)  Goal: Adult Individualization and Mutuality  Outcome: Outcome(s) achieved Date Met:  10/11/17

## 2017-10-11 NOTE — PLAN OF CARE
Problem: Perioperative Period (Adult)  Goal: Signs and Symptoms of Listed Potential Problems Will be Absent or Manageable (Perioperative Period)  Outcome: Outcome(s) achieved Date Met:  10/11/17    10/11/17 1520   Perioperative Period   Problems Assessed (Perioperative Period) all   Problems Present (Perioperative Period) none

## 2017-10-11 NOTE — DISCHARGE INSTRUCTIONS
Orlando Bella MD  [facility]    [address]  [address]  [place] [address]   P: [phone]    SEDATION DISCHARGE INSTRUCTIONS.    IMPORTANT: The following information will help you return to your best level of health.  Sedation.  You have had a procedure that called for some medicine to reduce anxiety and pain. This medicine (or medicines) is called sedation. After receiving the medicine, you may be sleepy, but able to breathe on your own. The effects of the medicine may last for several hours.    Follow these instructions after sedation:  Go right home. Rest quietly at home today, then you can be up and about.  Do not drink alcohol, drive or operate machinery for 24 hours.  Do not do anything where light-headedness or clumsiness would be dangerous.  Do not make important decisions or sign any legal papers for the next 24 hours.  Make sure A RESPONSIBLE PERSON stays with you the rest of today and overnight for your protection and safety.  Start your diet with fluids and light foods (jello, soup, juice, toast). Then, slowly progress to your usual diet if you are not sick to your stomach.      Call your doctor if you have:  a gray or blue skin color.  excess sleepiness.  repeated vomiting.  trouble breathing.  any new problems or concerns.    POSTOPERATIVE CARE INSTRUCTIONS FOR CEREBRAL ANGIOGRAPHY    1. After your angiogram, you will need to be less active than normal. you should plan to be off work and relax for the next two days. This is because Dr. Bella has placed a plug in your artery to close it up, but it still needs some time to heal.    2. You may feel some stinging and see some slight swelling. You may also see bruising; possibly a large amount of bruising. This is normal. An ice pack will help relieve the stinging and swelling. Use the pack at your puncture site for about 20 minutes; then remove it for at least 30 minutes. You may repeat this as often as you  need.    3. Because Dr. Bella injected some dye into your artery, you will need to drink a lot of fluids over the next 2 days in order to flush it out of your body. Water and juice are the best choices. If you need to drink soda, coffee or tea, choose decaffeinated. Caffeine will dehydrate you, and it will take you longer to flush out the dye from your body. You will know you are getting enough fluids if your urine is clear or very pale yellow.    4. Avoid strenuous activity and heavy lifting intil you return to the office. Heavy lifting is considered anything over 10 pounds. A gallon of milk weighs 8 pounds. If you have to hold anything more than 10 pounds (such as a baby), have someone place that object in your lap or arms.    5. Check your dressing occasionally. You may remove it 48 hours after your procedure. If you notice that you are bleeding or your dressing becomes soaked with fresh blood, call 911 and have them take you to the emergency room.   Hold pressure over the site. Call our office immediately ([phone]) if you notice the following: any bright redness at the puncture site, coldness in the leg, periods of the chills, red streaks running up your abdomen or down your leg, a fever over 101 degrees F orally, green or yellow discharge or discharge that has a foul odor. If it is after office hours, your call will be forwarded to the neurosurgeon on-call.    6. If you have any questions or concerns at any time, please feel free to call the office at ([phone]).Dr. Bella or neurosurgeon on-call is available 24-hours a day. Our staff is here to help you in any way we can.

## 2017-10-11 NOTE — PLAN OF CARE
Problem: Patient Care Overview (Adult)  Goal: Plan of Care Review  Outcome: Outcome(s) achieved Date Met:  10/11/17    10/11/17 6300   Coping/Psychosocial Response Interventions   Plan Of Care Reviewed With patient;spouse   Patient Care Overview   Progress improving   Outcome Evaluation   Outcome Summary/Follow up Plan ready for d/c

## 2017-10-18 ENCOUNTER — OFFICE VISIT (OUTPATIENT)
Dept: NEUROSURGERY | Facility: CLINIC | Age: 62
End: 2017-10-18

## 2017-10-18 VITALS
WEIGHT: 221 LBS | SYSTOLIC BLOOD PRESSURE: 120 MMHG | RESPIRATION RATE: 18 BRPM | HEART RATE: 72 BPM | BODY MASS INDEX: 33.49 KG/M2 | HEIGHT: 68 IN | DIASTOLIC BLOOD PRESSURE: 72 MMHG

## 2017-10-18 DIAGNOSIS — Z86.79 HX OF CEREBRAL ANEURYSM REPAIR: ICD-10-CM

## 2017-10-18 DIAGNOSIS — Z98.890 HX OF CEREBRAL ANEURYSM REPAIR: ICD-10-CM

## 2017-10-18 DIAGNOSIS — I67.1 CEREBRAL ANEURYSM, NONRUPTURED: Primary | ICD-10-CM

## 2017-10-18 PROCEDURE — 99213 OFFICE O/P EST LOW 20 MIN: CPT | Performed by: NURSE PRACTITIONER

## 2017-10-18 NOTE — PROGRESS NOTES
" HPI:   Khalida Gilliland is a 62 y.o. female she returns to the office today for first postop visit after undergoing repeat cerebral angiogram for continued surveillance of a previously embolized right medial supraclinoid carotid wall cerebral aneurysm.  Cervical angio was on October 11 with Dr. Bella.    She has a history of a right ICA aneurysm status post craniotomy and clipping 13 years ago.  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  She underwent pipeline stent placement on May 8, 2017 and was started on Brilinta and aspirin      She is doing well following the most recent cerebral angiogram.  She states that she felt more awake for this last procedure than ever before as she was able to remember specific details about the angiogram.  She denies groin site swelling, bruising or hematoma.    She continues with intermittent headaches, overall she is doing very well.  She remains on Brilinta twice daily as well as baby aspirin    She presents with her .        /72 (BP Location: Left arm, Patient Position: Sitting)  Pulse 72  Resp 18  Ht 68\" (172.7 cm)  Wt 221 lb (100 kg)  BMI 33.6 kg/m2      Review of Systems   Constitutional: Negative for chills and fever.   HENT: Positive for rhinorrhea. Negative for facial swelling and trouble swallowing.    Eyes: Negative for photophobia, redness and visual disturbance.   Musculoskeletal: Negative for gait problem.   Skin: Negative for color change.   Neurological: Positive for headaches. Negative for dizziness, tremors, seizures, syncope, facial asymmetry, speech difficulty, weakness, light-headedness and numbness.   Psychiatric/Behavioral: Negative for sleep disturbance.        /72 (BP Location: Left arm, Patient Position: Sitting)  Pulse 72  Resp 18  Ht 68\" (172.7 cm)  Wt 221 lb (100 kg)  BMI 33.6 kg/m2    Physical Exam   Constitutional: She is " "oriented to person, place, and time. She appears well-developed and well-nourished. She is cooperative.   Very pleasant female   HENT:   Head: Normocephalic and atraumatic.   Eyes: EOM are normal. Pupils are equal, round, and reactive to light.   Neck: Normal range of motion. Neck supple.   Pulmonary/Chest: Effort normal and breath sounds normal.   Abdominal: Soft.   Musculoskeletal: Normal range of motion.   Neurological: She is alert and oriented to person, place, and time. She has normal strength. No cranial nerve deficit or sensory deficit. Coordination and gait normal. GCS eye subscore is 4. GCS verbal subscore is 5. GCS motor subscore is 6.   Skin: Skin is warm and dry.   Psychiatric: She has a normal mood and affect. Her behavior is normal. Judgment and thought content normal.   Vitals reviewed.    Neurologic Exam     Mental Status   Oriented to person, place, and time.     Cranial Nerves     CN III, IV, VI   Pupils are equal, round, and reactive to light.  Extraocular motions are normal.     Motor Exam     Strength   Strength 5/5 throughout.       Findings/Results:  OP notes as d/w Dr Bella with reveals pipeline embolization device in good position covering the neck of the previously clipped right lateral internal carotid supraclinoid aneurysm and that\" right medial supraclinoid carotid wall cerebral aneurysm.  However, there is persistent filling of the right medial supraclinoid carotid wall cerebral aneurysm.        Assessment/Plan:  Khalida was seen today for cerebral aneurysm.    Diagnoses and all orders for this visit:    Cerebral aneurysm, nonruptured    Hx of cerebral aneurysm repair        Discussion/Summary  She returns the office today for ongoing follow-up status post recent cerebral aneurysm with Dr. Bella for continued aneurysm surveillance.  Exam as noted above, no red flags.  As discussed with Dr. Bella, she does have some refilling the aneurysm following stent placement.  He would like for her " to return to the office in 1 year for a repeat cerebral angiogram.  In the interim, she is to stay on the Brilinta twice daily as well as the baby aspirin.  We re-addressed signs of aneurysm rupture and she expressed understanding.  She is to call our office at anytime with any questions or concerns.    Plan: Return to office in 1 year for repeat cerebral angiogram for continued aneurysm surveillance.        Plan: Return in about 1 year (around 10/18/2018).         Patient Care Team    Patient Care Team:  Mikael David MD as PCP - General (Internal Medicine)    Mulu Rene, APRN  10/18/2017    Dragon disclaimer:   Much of this encounter note is an electronic transcription/translation of spoken language to printed text. The electronic translation of spoken language may permit erroneous, or at times, nonsensical words or phrases to be inadvertently transcribed; Although I have reviewed the note for such errors, some may still exist.

## 2017-12-29 ENCOUNTER — OFFICE VISIT (OUTPATIENT)
Dept: NEUROSURGERY | Facility: CLINIC | Age: 62
End: 2017-12-29

## 2017-12-29 VITALS
DIASTOLIC BLOOD PRESSURE: 70 MMHG | SYSTOLIC BLOOD PRESSURE: 150 MMHG | WEIGHT: 225 LBS | RESPIRATION RATE: 16 BRPM | BODY MASS INDEX: 34.21 KG/M2 | HEART RATE: 84 BPM

## 2017-12-29 DIAGNOSIS — Z86.79 HX OF CEREBRAL ANEURYSM REPAIR: Primary | ICD-10-CM

## 2017-12-29 DIAGNOSIS — Z98.890 HX OF CEREBRAL ANEURYSM REPAIR: Primary | ICD-10-CM

## 2017-12-29 DIAGNOSIS — G43.711 INTRACTABLE CHRONIC MIGRAINE WITHOUT AURA AND WITH STATUS MIGRAINOSUS: ICD-10-CM

## 2017-12-29 PROBLEM — G43.901 MIGRAINE WITH STATUS MIGRAINOSUS: Status: ACTIVE | Noted: 2017-12-29

## 2017-12-29 PROCEDURE — 99213 OFFICE O/P EST LOW 20 MIN: CPT | Performed by: NEUROLOGICAL SURGERY

## 2017-12-29 RX ORDER — BUTALBITAL, ACETAMINOPHEN AND CAFFEINE 50; 325; 40 MG/1; MG/1; MG/1
1 TABLET ORAL EVERY 4 HOURS PRN
COMMUNITY
End: 2019-04-15 | Stop reason: SDUPTHER

## 2017-12-29 NOTE — PROGRESS NOTES
Subjective   Patient ID: Khalida Gilliland is a 62 y.o. female is here today for follow-up of aneurysm with questions. She is accompanied by her .    History of Present Illness     The patient returns with intractable headaches.  The headaches have been a chronic problem for her but appeared to be worsening as time.  They are chronic.  They are persistent.  They are thought to be migrating in nature.  She previously was treated with Wellbutrin but is a little longer on the market and no longer is available and therefore she is living with this chronic problem.  Nothing seems to help much.  Lites bother her.  She is worried with regard to her headache.    She denies any other neurologic issues.    The following portions of the patient's history were reviewed and updated as appropriate: allergies, current medications, past family history, past medical history, past social history, past surgical history and problem list.    Review of Systems   Eyes: Negative for visual disturbance.   Musculoskeletal: Negative for gait problem.   Neurological: Positive for headaches. Negative for dizziness and light-headedness.       Objective   Physical Exam   Constitutional: She is oriented to person, place, and time.   Eyes: EOM are normal. Pupils are equal, round, and reactive to light.   Neurological: She is oriented to person, place, and time. She has a normal Finger-Nose-Finger Test, a normal Heel to Shin Test, a normal Romberg Test and a normal Tandem Gait Test. Gait normal.   Reflex Scores:       Tricep reflexes are 2+ on the right side and 2+ on the left side.       Bicep reflexes are 2+ on the right side and 2+ on the left side.       Brachioradialis reflexes are 2+ on the right side and 2+ on the left side.       Patellar reflexes are 2+ on the right side and 2+ on the left side.       Achilles reflexes are 2+ on the right side and 2+ on the left side.  Psychiatric: Her speech is normal.     Neurologic Exam     Mental  Status   Oriented to person, place, and time.   Follows 3 step commands.   Attention: normal. Concentration: normal.   Speech: speech is normal   Level of consciousness: alert  Knowledge: good.   Normal comprehension.     Cranial Nerves     CN II   Visual fields full to confrontation.     CN III, IV, VI   Pupils are equal, round, and reactive to light.  Extraocular motions are normal.   Right pupil: Consensual response: intact.   Left pupil: Consensual response: intact.   CN III: no CN III palsy  CN VI: no CN VI palsy  Nystagmus: none   Diplopia: none  Ophthalmoparesis: none  Upgaze: normal  Downgaze: normal  Conjugate gaze: present  Vestibulo-ocular reflex: present    CN V   Facial sensation intact.     CN VII   Facial expression full, symmetric.     CN VIII   CN VIII normal.     CN IX, X   CN IX normal.     CN XI   CN XI normal.     CN XII   CN XII normal.     Motor Exam   Muscle bulk: normal  Overall muscle tone: normal  Right arm tone: normal  Left arm tone: normal  Right arm pronator drift: absent  Left arm pronator drift: absent  Right leg tone: normal  Left leg tone: normal    Strength   Right neck flexion: 5/5  Left neck flexion: 5/5  Right neck extension: 5/5  Left neck extension: 5/5  Right deltoid: 5/5  Left deltoid: 5/5  Right biceps: 5/5  Left biceps: 5/5  Right triceps: 5/5  Left triceps: 5/5  Right wrist flexion: 5/5  Left wrist flexion: 5/5  Right wrist extension: 5/5  Left wrist extension: 5/5  Right interossei: 5/5  Left interossei: 5/5  Right abdominals: 5/5  Left abdominals: 5/5  Right iliopsoas: 5/5  Left iliopsoas: 5/5  Right quadriceps: 5/5  Left quadriceps: 5/5  Right hamstrin/5  Left hamstrin/5  Right glutei: 5/5  Left glutei: 5/5  Right anterior tibial: 5/5  Left anterior tibial: 5/5  Right posterior tibial: 5/5  Left posterior tibial: 5/5  Right peroneal: 5/5  Left peroneal: 5/5  Right gastroc: 5/5  Left gastroc: 5/5    Sensory Exam   Light touch normal.   Vibration normal.    Proprioception normal.   Pinprick normal.     Gait, Coordination, and Reflexes     Gait  Gait: normal    Coordination   Romberg: negative  Finger to nose coordination: normal  Heel to shin coordination: normal  Tandem walking coordination: normal    Tremor   Resting tremor: absent  Intention tremor: absent  Action tremor: absent    Reflexes   Right brachioradialis: 2+  Left brachioradialis: 2+  Right biceps: 2+  Left biceps: 2+  Right triceps: 2+  Left triceps: 2+  Right patellar: 2+  Left patellar: 2+  Right achilles: 2+  Left achilles: 2+  Right : 2+  Left : 2+  Right plantar: normal  Left plantar: normal  Right Chowdary: absent  Left Chowdary: absent  Right ankle clonus: absent  Left ankle clonus: absent      Assessment/Plan   Independent Review of Radiographic Studies:      none  Medical Decision Making:    The patient presents primarily with headache symptomatology.  She has no other significant symptoms.    I recommended that she be evaluated by a neurologist with 8 subspecialty in headache management.  I am making the specific referral.    With regard to her cerebral aneurysm I anticipate a follow-up cerebral angiogram at the end of October 2018.  Explained to the patient that it may take a year to year and a half for the aneurysm to occlude after placement of the flow diverting pipeline embolization device.  She remains on Brillenta and a baby aspirin.    OV in September for preop visit for angio in October.    Khalida was seen today for cerebral aneurysm.    Diagnoses and all orders for this visit:    Hx of cerebral aneurysm repair    Intractable chronic migraine without aura and with status migrainosus  -     Ambulatory Referral to Neurology      Return in about 9 months (around 9/29/2018).

## 2018-01-30 ENCOUNTER — LAB (OUTPATIENT)
Dept: LAB | Facility: HOSPITAL | Age: 63
End: 2018-01-30

## 2018-01-30 DIAGNOSIS — Z86.79 HX OF CEREBRAL ANEURYSM REPAIR: ICD-10-CM

## 2018-01-30 DIAGNOSIS — I67.1 CEREBRAL ANEURYSM, NONRUPTURED: ICD-10-CM

## 2018-01-30 DIAGNOSIS — D47.2 MONOCLONAL GAMMOPATHY: Primary | ICD-10-CM

## 2018-01-30 DIAGNOSIS — Z98.890 HX OF CEREBRAL ANEURYSM REPAIR: ICD-10-CM

## 2018-01-30 LAB
ALBUMIN SERPL-MCNC: 4.6 G/DL (ref 3.5–5.2)
ALBUMIN/GLOB SERPL: 1.3 G/DL (ref 1.1–2.4)
ALP SERPL-CCNC: 84 U/L (ref 38–116)
ALT SERPL W P-5'-P-CCNC: 48 U/L (ref 0–33)
ANION GAP SERPL CALCULATED.3IONS-SCNC: 11.5 MMOL/L
AST SERPL-CCNC: 37 U/L (ref 0–32)
BASOPHILS # BLD AUTO: 0.03 10*3/MM3 (ref 0–0.1)
BASOPHILS NFR BLD AUTO: 0.5 % (ref 0–1.1)
BILIRUB SERPL-MCNC: 0.6 MG/DL (ref 0.1–1.2)
BUN BLD-MCNC: 14 MG/DL (ref 6–20)
BUN/CREAT SERPL: 23 (ref 7.3–30)
CALCIUM SPEC-SCNC: 10 MG/DL (ref 8.5–10.2)
CHLORIDE SERPL-SCNC: 103 MMOL/L (ref 98–107)
CO2 SERPL-SCNC: 27.5 MMOL/L (ref 22–29)
CREAT BLD-MCNC: 0.61 MG/DL (ref 0.6–1.1)
DEPRECATED RDW RBC AUTO: 44.8 FL (ref 37–49)
EOSINOPHIL # BLD AUTO: 0.27 10*3/MM3 (ref 0–0.36)
EOSINOPHIL NFR BLD AUTO: 4.5 % (ref 1–5)
ERYTHROCYTE [DISTWIDTH] IN BLOOD BY AUTOMATED COUNT: 13.2 % (ref 11.7–14.5)
GFR SERPL CREATININE-BSD FRML MDRD: 99 ML/MIN/1.73
GLOBULIN UR ELPH-MCNC: 3.6 GM/DL (ref 1.8–3.5)
GLUCOSE BLD-MCNC: 133 MG/DL (ref 74–124)
HCT VFR BLD AUTO: 44.6 % (ref 34–45)
HGB BLD-MCNC: 14.6 G/DL (ref 11.5–14.9)
IMM GRANULOCYTES # BLD: 0.01 10*3/MM3 (ref 0–0.03)
IMM GRANULOCYTES NFR BLD: 0.2 % (ref 0–0.5)
LYMPHOCYTES # BLD AUTO: 2.52 10*3/MM3 (ref 1–3.5)
LYMPHOCYTES NFR BLD AUTO: 42.2 % (ref 20–49)
MCH RBC QN AUTO: 30.5 PG (ref 27–33)
MCHC RBC AUTO-ENTMCNC: 32.7 G/DL (ref 32–35)
MCV RBC AUTO: 93.3 FL (ref 83–97)
MONOCYTES # BLD AUTO: 0.5 10*3/MM3 (ref 0.25–0.8)
MONOCYTES NFR BLD AUTO: 8.4 % (ref 4–12)
NEUTROPHILS # BLD AUTO: 2.64 10*3/MM3 (ref 1.5–7)
NEUTROPHILS NFR BLD AUTO: 44.2 % (ref 39–75)
NRBC BLD MANUAL-RTO: 0 /100 WBC (ref 0–0)
PLATELET # BLD AUTO: 232 10*3/MM3 (ref 150–375)
PMV BLD AUTO: 10.6 FL (ref 8.9–12.1)
POTASSIUM BLD-SCNC: 4.6 MMOL/L (ref 3.5–4.7)
PROT SERPL-MCNC: 8.2 G/DL (ref 6.3–8)
RBC # BLD AUTO: 4.78 10*6/MM3 (ref 3.9–5)
SODIUM BLD-SCNC: 142 MMOL/L (ref 134–145)
WBC NRBC COR # BLD: 5.97 10*3/MM3 (ref 4–10)

## 2018-01-30 PROCEDURE — 80053 COMPREHEN METABOLIC PANEL: CPT

## 2018-01-30 PROCEDURE — 85025 COMPLETE CBC W/AUTO DIFF WBC: CPT | Performed by: INTERNAL MEDICINE

## 2018-01-30 PROCEDURE — 36415 COLL VENOUS BLD VENIPUNCTURE: CPT | Performed by: INTERNAL MEDICINE

## 2018-01-31 LAB
ALBUMIN SERPL-MCNC: 3.9 G/DL (ref 2.9–4.4)
ALBUMIN/GLOB SERPL: 1.1 {RATIO} (ref 0.7–1.7)
ALPHA1 GLOB FLD ELPH-MCNC: 0.3 G/DL (ref 0–0.4)
ALPHA2 GLOB SERPL ELPH-MCNC: 0.9 G/DL (ref 0.4–1)
B-GLOBULIN SERPL ELPH-MCNC: 1 G/DL (ref 0.7–1.3)
GAMMA GLOB SERPL ELPH-MCNC: 1.7 G/DL (ref 0.4–1.8)
GLOBULIN SER CALC-MCNC: 3.9 G/DL (ref 2.2–3.9)
IGA SERPL-MCNC: 108 MG/DL (ref 87–352)
IGG SERPL-MCNC: 1461 MG/DL (ref 700–1600)
IGM SERPL-MCNC: 122 MG/DL (ref 26–217)
INTERPRETATION SERPL IEP-IMP: ABNORMAL
KAPPA LC SERPL-MCNC: 14.7 MG/L (ref 3.3–19.4)
KAPPA LC/LAMBDA SER: 1.43 {RATIO} (ref 0.26–1.65)
LAMBDA LC FREE SERPL-MCNC: 10.3 MG/L (ref 5.7–26.3)
Lab: ABNORMAL
M-SPIKE: 1.1 G/DL
PROT SERPL-MCNC: 7.8 G/DL (ref 6–8.5)

## 2018-02-06 ENCOUNTER — OFFICE VISIT (OUTPATIENT)
Dept: ONCOLOGY | Facility: CLINIC | Age: 63
End: 2018-02-06

## 2018-02-06 ENCOUNTER — APPOINTMENT (OUTPATIENT)
Dept: LAB | Facility: HOSPITAL | Age: 63
End: 2018-02-06

## 2018-02-06 VITALS
WEIGHT: 215.2 LBS | HEIGHT: 69 IN | BODY MASS INDEX: 31.87 KG/M2 | HEART RATE: 55 BPM | OXYGEN SATURATION: 97 % | TEMPERATURE: 97.8 F | SYSTOLIC BLOOD PRESSURE: 110 MMHG | RESPIRATION RATE: 16 BRPM | DIASTOLIC BLOOD PRESSURE: 72 MMHG

## 2018-02-06 DIAGNOSIS — D47.2 MONOCLONAL GAMMOPATHY: Primary | ICD-10-CM

## 2018-02-06 DIAGNOSIS — I67.1 CEREBRAL ANEURYSM, NONRUPTURED: ICD-10-CM

## 2018-02-06 PROCEDURE — 99213 OFFICE O/P EST LOW 20 MIN: CPT | Performed by: INTERNAL MEDICINE

## 2018-02-06 PROCEDURE — G0463 HOSPITAL OUTPT CLINIC VISIT: HCPCS | Performed by: INTERNAL MEDICINE

## 2018-02-06 NOTE — PROGRESS NOTES
Subjective     REASON FOR FOLLOWUP:   1. MGUS. Since June 2010  2. ? TIA 10/2011   3. Recurrent diverticulitis October 2014.             History of Present Illness  patient is a 61-year-old female with monoclonal gammopathy of undetermined significance for the last 6 years she's here for routine annual follow-up feeling very well overall.  She is found to be diabetic and is managing this with oral diet control but has not been able to reduce any more weight.  Her neuropathy stable she has no bone pain and otherwise feels well she is up-to-date with mammography Pap smears and colonoscopy  Her IgG kappa MGUS is slightly higher at 1.1 g/dL with a normal free light chain ratio  She had further stenting of an aneurysm in her brain which apparently was not successful and she is going to have the procedure done again-she tells me her son also has an aneurysm and I think some genetic testing is indicated possibly for [facility] station and we will look into this further  Because of the slight upward increase in her M protein we will see her in 4 months with repeat testing I told her bone marrow be done if she is clearly increasing and going up to 1.5 g/dL      PAST MEDICAL HISTORY: Significant for kidney stones in June 2011. Reflux symptoms, hypertension. She has been anemic in the past but more recently this has not been an issue. She complains of neuropathy in her feet for the las t year and has had migraine headaches all her life. Possible TIA in October 2011. Negative workup for stroke.       PAST SURGICAL HISTORY: C section 1980 and 1982. Emergency appendectomy at the time of her second C section. Tubal ligation with C section. G allbladder surgery 1997. Ruptured brain aneurysm 2004 which was clipped by Dr. Sims. Colon resection for diverticulitis but the anastomosis broke down and she had to have a diverting ileostomy for 3 months. She had repair of the colon and reanastomosi s with takedown of the ileostomy in 2009.  Stent was placed for kidney stones in 2011. Incisional hernia repair in 2009 withy mesh done laparoscopically.       OB/GYN: G3, P3, menopause . She has not been on any hormones.       HEMATOLOGY HISTORY: History from previous dates can be found in the separate document.         HEMATOLOGY HISTORY: The patient is a 56-year-old white female with hypertension, reflux, kidney stones, who had multiple surgeries over the last couple of years for diverticulosis, breakdown of the anastomosis, and most recently incisional hernia repair b y Dr. Peter Kay in 2011 with a mesh done laparoscopically. The patient was noted to have a monoclonal gammopathy on blood testing in  and this was done because of some complaints of neuropathy in her feet for the last year, which actually ap pears to be improving as she has lost 20 pounds over the last 3 months after her surgery.       She has no bone pain, anemia, or other complaints at this time and the neuropathy does not involve her fingers although she states when she exercises in the gym in the past her hands have felt numb at the end of exercising. Most of the problems in her feet are pain and aching which is sometimes worse at night, especially if she has been on her feet all day, but she has not tried any medication for this.       Immunoglobulins are quantitatively normal. Serum free light chain ratio was normal. Urine electrophoresis was negative. Small MGUS, which is to be followed perspectively and no significant signs of amyloidosis.       The patient was seen 2014. MGUS is stable. Q.6 month paraprotein checks continued.   Patient seen in 2016 with a stable MGUS is 0.9 g/dL of IgG kappa        SOCIAL HISTORY: , lives with her . 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 Nonsmoker, nondrinker. No risk factors for HIV.       FAMILY HISTORY: Father  at 46 of MI. Mother  recently at 95 of old age. Two brothers,  healthy. No blood cancers or malignancies. Son kidney stones at age 22.       Review of Systems   A comprehensive 14 point review of systems was performed and was negative except as mentioned.    Medications:  The current medication list was reviewed in the EMR    ALLERGIES:    Allergies   Allergen Reactions   • Contrast Dye Shortness Of Breath   • Iodinated Diagnostic Agents Shortness Of Breath   • Codeine Nausea Only   • Methylprednisolone Anxiety       Objective      There were no vitals filed for this visit.  Current Status 11/21/2016   ECOG score 0       Physical Exam    GENERAL:  Well-developed, well-nourished in no acute distress.   SKIN:  Warm, dry without rashes, purpura or petechiae.  EYES:  Pupils equal, round and reactive to light.  EOMs intact.  Conjunctivae normal.  EARS:  Hearing intact.  NOSE:  Septum midline.  No excoriations or nasal discharge.  MOUTH:  Tongue is well-papillated; no stomatitis or ulcers.  Lips normal.  THROAT:  Oropharynx without lesions or exudates.  NECK:  Supple with good range of motion; no thyromegaly or masses, no JVD.  LYMPHATICS:  No cervical, supraclavicular, axillary or inguinal adenopathy.  CHEST:  Lungs clear to auscultation. Good airflow.  CARDIAC:  Regular rate and rhythm without murmurs, rubs or gallops. Normal S1,S2.  ABDOMEN:  Soft, nontender with no hepatosplenomegaly or masses.  EXTREMITIES:  No clubbing, cyanosis or edema.  NEUROLOGICAL:  Cranial Nerves II-XII grossly intact.  No focal neurological deficits.  PSYCHIATRIC:  Normal affect and mood.        RECENT LABS:  Hematology WBC   Date Value Ref Range Status   01/30/2018 5.97 4.00 - 10.00 10*3/mm3 Final     RBC   Date Value Ref Range Status   01/30/2018 4.78 3.90 - 5.00 10*6/mm3 Final     Hemoglobin   Date Value Ref Range Status   01/30/2018 14.6 11.5 - 14.9 g/dL Final     Hematocrit   Date Value Ref Range Status   01/30/2018 44.6 34.0 - 45.0 % Final     Platelets   Date Value Ref Range Status   01/30/2018  232 150 - 375 10*3/mm3 Final       M Protein 1.1 g/dl       Assessment/Plan   1.  IgG kappa MGUS with a normal light chain ratio The slight increase from 0.9-1.1 g/dL     Plan    1.  Repeat SPEP in 4 months and see me a week later-I told her if there is an upward trend we will do a bone marrow although the normal light chain ratio is reassuring                       2/6/2018      CC:

## 2018-02-11 ENCOUNTER — HOSPITAL ENCOUNTER (EMERGENCY)
Facility: HOSPITAL | Age: 63
Discharge: HOME | End: 2018-02-11
Attending: EMERGENCY MEDICINE | Admitting: EMERGENCY MEDICINE

## 2018-02-11 VITALS
TEMPERATURE: 97.9 F | WEIGHT: 190 LBS | BODY MASS INDEX: 28.79 KG/M2 | OXYGEN SATURATION: 98 % | RESPIRATION RATE: 18 BRPM | SYSTOLIC BLOOD PRESSURE: 106 MMHG | HEIGHT: 68 IN | DIASTOLIC BLOOD PRESSURE: 53 MMHG | HEART RATE: 58 BPM

## 2018-02-11 DIAGNOSIS — R11.0 NAUSEA: Primary | ICD-10-CM

## 2018-02-11 LAB
ALBUMIN SERPL-MCNC: 4.2 G/DL (ref 3.5–5.2)
ALBUMIN/GLOB SERPL: 1.1 G/DL
ALP SERPL-CCNC: 77 U/L (ref 39–117)
ALT SERPL W P-5'-P-CCNC: 37 U/L (ref 1–33)
ANION GAP SERPL CALCULATED.3IONS-SCNC: 13 MMOL/L
AST SERPL-CCNC: 26 U/L (ref 1–32)
BASOPHILS # BLD AUTO: 0.02 10*3/MM3 (ref 0–0.2)
BASOPHILS NFR BLD AUTO: 0.3 % (ref 0–1.5)
BILIRUB SERPL-MCNC: 0.3 MG/DL (ref 0.1–1.2)
BILIRUB UR QL STRIP: NEGATIVE
BUN BLD-MCNC: 12 MG/DL (ref 8–23)
BUN/CREAT SERPL: 20.3 (ref 7–25)
CALCIUM SPEC-SCNC: 10 MG/DL (ref 8.6–10.5)
CHLORIDE SERPL-SCNC: 102 MMOL/L (ref 98–107)
CLARITY UR: CLEAR
CO2 SERPL-SCNC: 28 MMOL/L (ref 22–29)
COLOR UR: YELLOW
CREAT BLD-MCNC: 0.59 MG/DL (ref 0.57–1)
DEPRECATED RDW RBC AUTO: 47.1 FL (ref 37–54)
EOSINOPHIL # BLD AUTO: 0.33 10*3/MM3 (ref 0–0.7)
EOSINOPHIL NFR BLD AUTO: 5.4 % (ref 0.3–6.2)
ERYTHROCYTE [DISTWIDTH] IN BLOOD BY AUTOMATED COUNT: 13.7 % (ref 11.7–13)
GFR SERPL CREATININE-BSD FRML MDRD: 103 ML/MIN/1.73
GLOBULIN UR ELPH-MCNC: 3.8 GM/DL
GLUCOSE BLD-MCNC: 125 MG/DL (ref 65–99)
GLUCOSE UR STRIP-MCNC: NEGATIVE MG/DL
HCT VFR BLD AUTO: 42.7 % (ref 35.6–45.5)
HGB BLD-MCNC: 14 G/DL (ref 11.9–15.5)
HGB UR QL STRIP.AUTO: NEGATIVE
HOLD SPECIMEN: NORMAL
IMM GRANULOCYTES # BLD: 0 10*3/MM3 (ref 0–0.03)
IMM GRANULOCYTES NFR BLD: 0 % (ref 0–0.5)
KETONES UR QL STRIP: NEGATIVE
LEUKOCYTE ESTERASE UR QL STRIP.AUTO: NEGATIVE
LIPASE SERPL-CCNC: 14 U/L (ref 13–60)
LYMPHOCYTES # BLD AUTO: 2.43 10*3/MM3 (ref 0.9–4.8)
LYMPHOCYTES NFR BLD AUTO: 40 % (ref 19.6–45.3)
MCH RBC QN AUTO: 31 PG (ref 26.9–32)
MCHC RBC AUTO-ENTMCNC: 32.8 G/DL (ref 32.4–36.3)
MCV RBC AUTO: 94.5 FL (ref 80.5–98.2)
MONOCYTES # BLD AUTO: 0.57 10*3/MM3 (ref 0.2–1.2)
MONOCYTES NFR BLD AUTO: 9.4 % (ref 5–12)
NEUTROPHILS # BLD AUTO: 2.73 10*3/MM3 (ref 1.9–8.1)
NEUTROPHILS NFR BLD AUTO: 44.9 % (ref 42.7–76)
NITRITE UR QL STRIP: NEGATIVE
PH UR STRIP.AUTO: 6.5 [PH] (ref 5–8)
PLATELET # BLD AUTO: 206 10*3/MM3 (ref 140–500)
PMV BLD AUTO: 10.7 FL (ref 6–12)
POTASSIUM BLD-SCNC: 4.6 MMOL/L (ref 3.5–5.2)
PROT SERPL-MCNC: 8 G/DL (ref 6–8.5)
PROT UR QL STRIP: NEGATIVE
RBC # BLD AUTO: 4.52 10*6/MM3 (ref 3.9–5.2)
SODIUM BLD-SCNC: 143 MMOL/L (ref 136–145)
SP GR UR STRIP: 1.01 (ref 1–1.03)
UROBILINOGEN UR QL STRIP: NORMAL
WBC NRBC COR # BLD: 6.08 10*3/MM3 (ref 4.5–10.7)
WHOLE BLOOD HOLD SPECIMEN: NORMAL

## 2018-02-11 PROCEDURE — 85025 COMPLETE CBC W/AUTO DIFF WBC: CPT | Performed by: PHYSICIAN ASSISTANT

## 2018-02-11 PROCEDURE — 81003 URINALYSIS AUTO W/O SCOPE: CPT | Performed by: PHYSICIAN ASSISTANT

## 2018-02-11 PROCEDURE — 96361 HYDRATE IV INFUSION ADD-ON: CPT

## 2018-02-11 PROCEDURE — 25010000002 ONDANSETRON PER 1 MG: Performed by: PHYSICIAN ASSISTANT

## 2018-02-11 PROCEDURE — 83690 ASSAY OF LIPASE: CPT | Performed by: PHYSICIAN ASSISTANT

## 2018-02-11 PROCEDURE — 96374 THER/PROPH/DIAG INJ IV PUSH: CPT

## 2018-02-11 PROCEDURE — 80053 COMPREHEN METABOLIC PANEL: CPT | Performed by: PHYSICIAN ASSISTANT

## 2018-02-11 PROCEDURE — 99284 EMERGENCY DEPT VISIT MOD MDM: CPT

## 2018-02-11 RX ORDER — ONDANSETRON 2 MG/ML
4 INJECTION INTRAMUSCULAR; INTRAVENOUS ONCE
Status: COMPLETED | OUTPATIENT
Start: 2018-02-11 | End: 2018-02-11

## 2018-02-11 RX ORDER — ONDANSETRON 4 MG/1
4-8 TABLET, ORALLY DISINTEGRATING ORAL EVERY 8 HOURS PRN
Qty: 15 TABLET | Refills: 0 | Status: SHIPPED | OUTPATIENT
Start: 2018-02-11 | End: 2018-11-09

## 2018-02-11 RX ADMIN — ONDANSETRON 4 MG: 2 INJECTION INTRAMUSCULAR; INTRAVENOUS at 11:58

## 2018-02-11 RX ADMIN — SODIUM CHLORIDE 1000 ML: 9 INJECTION, SOLUTION INTRAVENOUS at 11:58

## 2018-02-11 NOTE — DISCHARGE INSTRUCTIONS
Rest and drink plenty of fluids. Progress your diet slowly as tolerated.  Recheck with your PCP in 1-2 days.  Take the medications as prescribed.  If you have develop diarrhea, Imodium may help, but could also prolong your symptoms.  Return to the ER for severe pain, intractable vomiting, fever >100.4, new or worsening symptoms, any concerns.

## 2018-02-11 NOTE — ED PROVIDER NOTES
Pt presents complaining of abd pain onset last night. Pt also complains of diarrhea but denies nausea, vomiting, or any other symptoms at this time. She reports her symptoms feel like she has the flu.     On exam:  Heart: RRR without murmur  Abd: soft, nondistended, nontender    I reviewed pt's lab results which are unremarkable. Will discharge. Pt understands and agrees with the plan. All questions answered.    I supervised care provided by the midlevel provider.    We have discussed this patient's history, physical exam, and treatment plan.   I have reviewed the note and personally saw and examined the patient and agree with the plan of care. Documentation assistance provided by nicola Jennings for Dr. Sullivan.  Information recorded by the scribe was done at my direction and has been verified and validated by me.     Venecia Jennings  02/11/18 [id number]       Yoni Sullivan MD  02/11/18 [id number]

## 2018-02-11 NOTE — ED PROVIDER NOTES
"EMERGENCY DEPARTMENT ENCOUNTER    Room Number:  19/19  Date seen:  2/11/2018  Time seen: 11:33 AM  PCP: Mikael David MD    HPI:  Chief complaint: Abd pain    Context:Khalida Gilliland is a 62 y.o. female who presents to the ED with c/o mid upper abd pain onset last night with symptoms worsening in severity to the point she came to the ED. The pt states that nothing makes the pain better or worse. The pt states the pain is 6/10 and describes it as \"feeling like I have the flu.\" Pt reports diarrhea X 2 this morning with normal color and nausea.    Onset: Gradual  Location: Mid upper abd  Radiation: None  Duration: Since last night with symptoms worsening in severity to the point she came to the ED  Timing: Constant  Character: \"Feeling like I have the flu\"  Aggravating Factors: None  Alleviating Factors: None  Severity: Moderate    ALLERGIES  Contrast dye; Iodinated diagnostic agents; Codeine; and Methylprednisolone    PAST MEDICAL HISTORY  Active Ambulatory Problems     Diagnosis Date Noted   • Monoclonal gammopathy 06/03/2016   • Hx of cerebral aneurysm repair 02/21/2017   • Allergy history, radiographic dye 04/04/2017   • Cerebral aneurysm, nonruptured 04/18/2017   • Neck pain 04/18/2017   • Contrast media allergy 09/28/2017   • Migraine with status migrainosus 12/29/2017     Resolved Ambulatory Problems     Diagnosis Date Noted   • Aneurysm 01/30/2017     Past Medical History:   Diagnosis Date   • Anemia    • Aneurysm    • Asthma    • Cancer 07/30/2014   • Diabetes mellitus    • GERD (gastroesophageal reflux disease)    • H/O diverticulitis of colon    • History of kidney stones 06/2011   • History of surgery for cerebral aneurysm    • Hx of migraines    • Hyperlipidemia    • Hypertension    • MGUS (monoclonal gammopathy of unknown significance)    • Neuropathy, feet    • Peptic ulceration    • PONV (postoperative nausea and vomiting)    • Stroke 10/2011       PAST SURGICAL HISTORY  Past Surgical History: "   Procedure Laterality Date   • APPENDECTOMY      Emergency at time of 2nd    • BASAL CELL CARCINOMA EXCISION Left 2014    Excision basal cell carcinoma, left arm (1.1 cm) with frozen section control and layered wound closure ( 3.1 cm), Dr. Isael Andrade, [facility]   • BRAIN SURGERY      Ruptured aneurysm, clipped/Dr. Sims   • CEREBRAL ANEURYSM REPAIR      clipping of cerebral aneurysm   • CEREBRAL ANGIOGRAM N/A 2017    Procedure: CEREBRAL ANGIOGRAM ;  Surgeon: Orlando Bella MD;  Location: [facility] ;  Service:    • CEREBRAL ANGIOGRAM N/A 10/11/2017    Procedure: CEREBRAL ANGIOGRAM;  Surgeon: Orlando Bella MD;  Location: [facility] ;  Service:    •  SECTION  , ,    • CHOLECYSTECTOMY     • COLON RESECTION WITH COLOSTOMY Right    • COLON SURGERY      Sigmoid colectomy in the past   • COLONOSCOPY     • EMBOLIZATION CEREBRAL N/A 2017    Procedure: Cerebral angiography and embolization of cerebral aneurysm with Pipeline Embolization Device;  Surgeon: Orlando Bella MD;  Location: [facility] ;  Service:    • INCISIONAL HERNIA REPAIR      Patient suffered some nerve entrapment secondary to the attack, some of which were removed during a secondary operation.   • OSTOMY TAKE DOWN     • TUBAL ABDOMINAL LIGATION     • URETERAL STENT INSERTION  2011    Due to kidney stones       FAMILY HISTORY  Family History   Problem Relation Age of Onset   • Skin cancer Mother    • Heart disease Father    • Heart attack Father    • Hypertension Father    • Cholelithiasis Son 22   • Malig Hyperthermia Neg Hx        SOCIAL HISTORY  Social History     Social History   • Marital status:      Spouse name: Roe   • Number of children: 3   • Years of education: College     Occupational History   • Teacher Unemployed     Social History Main Topics   • Smoking status: Former Smoker     Years: 1.00     Types: Cigarettes   • Smokeless tobacco:  Never Used   • Alcohol use 0.6 - 1.2 oz/week     1 - 2 Glasses of wine per week      Comment: once a week   • Drug use: No   • Sexual activity: Defer     Other Topics Concern   • Not on file     Social History Narrative       REVIEW OF SYSTEMS  Review of Systems   Constitutional: Negative for chills and fever.   HENT: Negative.    Eyes: Negative.    Respiratory: Negative for shortness of breath.    Cardiovascular: Negative for chest pain.   Gastrointestinal: Positive for abdominal pain (Mid upper abd), diarrhea (X 2 this morning with normal color) and nausea.   Genitourinary: Negative.    Musculoskeletal: Negative.    Skin: Negative.    Neurological: Negative.    Psychiatric/Behavioral: Negative.        PHYSICAL EXAM  ED Triage Vitals   Temp Heart Rate Resp BP SpO2   02/11/18 1114 02/11/18 1114 02/11/18 1114 02/11/18 1125 02/11/18 1114   97.9 °F (36.6 °C) 63 18 130/72 99 %      Temp src Heart Rate Source Patient Position BP Location FiO2 (%)   02/11/18 1114 -- 02/11/18 1125 02/11/18 1125 --   Tympanic  Sitting Right arm      Physical Exam   Constitutional: She is oriented to person, place, and time and well-developed, well-nourished, and in no distress. No distress.   HENT:   Head: Normocephalic and atraumatic.   Right Ear: External ear normal.   Left Ear: External ear normal.   Nose: Nose normal.   Eyes: Conjunctivae are normal.   Neck: Normal range of motion.   Cardiovascular: Normal rate and regular rhythm.    Pulmonary/Chest: Effort normal and breath sounds normal.   Abdominal:   Normal bowel sounds  Soft  Nontender  Nondistended  No rebound, guarding, or rigidity  No appreciable organomegaly  No CVA tenderness  Well healed Post operative scarring to the abd   Musculoskeletal: Normal range of motion.   Neurological: She is alert and oriented to person, place, and time.   Skin: Skin is warm and dry.   Psychiatric: Affect normal.   Nursing note and vitals reviewed.      LAB RESULTS  Recent Results (from the past  24 hour(s))   Comprehensive Metabolic Panel    Collection Time: 02/11/18 11:55 AM   Result Value Ref Range    Glucose 125 (H) 65 - 99 mg/dL    BUN 12 8 - 23 mg/dL    Creatinine 0.59 0.57 - 1.00 mg/dL    Sodium 143 136 - 145 mmol/L    Potassium 4.6 3.5 - 5.2 mmol/L    Chloride 102 98 - 107 mmol/L    CO2 28.0 22.0 - 29.0 mmol/L    Calcium 10.0 8.6 - 10.5 mg/dL    Total Protein 8.0 6.0 - 8.5 g/dL    Albumin 4.20 3.50 - 5.20 g/dL    ALT (SGPT) 37 (H) 1 - 33 U/L    AST (SGOT) 26 1 - 32 U/L    Alkaline Phosphatase 77 39 - 117 U/L    Total Bilirubin 0.3 0.1 - 1.2 mg/dL    eGFR Non African Amer 103 >60 mL/min/1.73    Globulin 3.8 gm/dL    A/G Ratio 1.1 g/dL    BUN/Creatinine Ratio 20.3 7.0 - 25.0    Anion Gap 13.0 mmol/L   Lipase    Collection Time: 02/11/18 11:55 AM   Result Value Ref Range    Lipase 14 13 - 60 U/L   CBC Auto Differential    Collection Time: 02/11/18 11:55 AM   Result Value Ref Range    WBC 6.08 4.50 - 10.70 10*3/mm3    RBC 4.52 3.90 - 5.20 10*6/mm3    Hemoglobin 14.0 11.9 - 15.5 g/dL    Hematocrit 42.7 35.6 - 45.5 %    MCV 94.5 80.5 - 98.2 fL    MCH 31.0 26.9 - 32.0 pg    MCHC 32.8 32.4 - 36.3 g/dL    RDW 13.7 (H) 11.7 - 13.0 %    RDW-SD 47.1 37.0 - 54.0 fl    MPV 10.7 6.0 - 12.0 fL    Platelets 206 140 - 500 10*3/mm3    Neutrophil % 44.9 42.7 - 76.0 %    Lymphocyte % 40.0 19.6 - 45.3 %    Monocyte % 9.4 5.0 - 12.0 %    Eosinophil % 5.4 0.3 - 6.2 %    Basophil % 0.3 0.0 - 1.5 %    Immature Grans % 0.0 0.0 - 0.5 %    Neutrophils, Absolute 2.73 1.90 - 8.10 10*3/mm3    Lymphocytes, Absolute 2.43 0.90 - 4.80 10*3/mm3    Monocytes, Absolute 0.57 0.20 - 1.20 10*3/mm3    Eosinophils, Absolute 0.33 0.00 - 0.70 10*3/mm3    Basophils, Absolute 0.02 0.00 - 0.20 10*3/mm3    Immature Grans, Absolute 0.00 0.00 - 0.03 10*3/mm3   Light Blue Top    Collection Time: 02/11/18 11:55 AM   Result Value Ref Range    Extra Tube hold for add-on    Gold Top - SST    Collection Time: 02/11/18 11:55 AM   Result Value Ref Range     "Extra Tube Hold for add-ons.    Urinalysis With / Culture If Indicated - Urine, Clean Catch    Collection Time: 02/11/18 12:57 PM   Result Value Ref Range    Color, UA Yellow Yellow, Straw    Appearance, UA Clear Clear    pH, UA 6.5 5.0 - 8.0    Specific Gravity, UA 1.013 1.005 - 1.030    Glucose, UA Negative Negative    Ketones, UA Negative Negative    Bilirubin, UA Negative Negative    Blood, UA Negative Negative    Protein, UA Negative Negative    Leuk Esterase, UA Negative Negative    Nitrite, UA Negative Negative    Urobilinogen, UA 0.2 E.U./dL 0.2 - 1.0 E.U./dL       I ordered the above labs and reviewed the results    MEDICATIONS GIVEN IN ER  Medications   sodium chloride 0.9 % bolus 1,000 mL (1,000 mL Intravenous New Bag 2/11/18 1158)   ondansetron (ZOFRAN) injection 4 mg (4 mg Intravenous Given 2/11/18 1158)       PROCEDURES  Procedures      PROGRESS AND CONSULTS    Progress Notes:    ED Course     1322  BP- 106/57 HR- 56 Temp- 97.9 °F (36.6 °C) (Tympanic) O2 sat- 97%    Rechecked pt, she is resting comfortably after receiving the Zofran. Discussed the negative lab results with the pt including the normal WBC. Discussed the plan to avoid imaging due to the pt's normal labs and benign abdominal exam. Discussed the plan to discharge the pt due to her negative work up. I advised the pt to return to the ED upon onset of worsening abd pain, intractable vomiting, or fever. The pt understands and agrees with the plan.    1324  Reviewed pt's history and workup with Dr. Sullivan.  After a bedside evaluation; Dr. Sullivan agrees with the plan of care.      Disposition vitals:  /57 (BP Location: Right arm, Patient Position: Lying)  Pulse 56  Temp 97.9 °F (36.6 °C) (Tympanic)   Resp 16  Ht 172.7 cm (68\")  Wt 86.2 kg (190 lb)  SpO2 97%  BMI 28.89 kg/m2      DIAGNOSIS  Final diagnoses:   Nausea       FOLLOW UP   Mikael David MD  [address] [facility] [address]  [place] [address]  [phone]    In 2 " days        RX     Medication List      New Prescriptions          ondansetron ODT 4 MG disintegrating tablet   Commonly known as:  ZOFRAN-ODT   Take 1-2 tablets by mouth Every 8 (Eight) Hours As Needed for Nausea or   Vomiting.         Changed          albuterol 108 (90 Base) MCG/ACT inhaler   Commonly known as:  PROVENTIL HFA;VENTOLIN HFA   Inhale 2 puffs every 6 (six) hours as needed for wheezing or shortness of   breath (Cough).   What changed:  when to take this             Documentation assistance provided by nicola Sorto for Faiza Kline PA-C.  Information recorded by the nicola was done at my direction and has been verified and validated by me.         Don Sorto  02/11/18 1341       [address]  02/13/18 [id number]

## 2018-03-07 RX ORDER — TICAGRELOR 60 MG/1
TABLET ORAL
Qty: 180 TABLET | Refills: 3 | Status: SHIPPED | OUTPATIENT
Start: 2018-03-07 | End: 2018-10-24

## 2018-05-10 ENCOUNTER — LAB (OUTPATIENT)
Dept: LAB | Facility: HOSPITAL | Age: 63
End: 2018-05-10

## 2018-05-10 DIAGNOSIS — D47.2 MONOCLONAL GAMMOPATHY: ICD-10-CM

## 2018-05-10 DIAGNOSIS — I67.1 CEREBRAL ANEURYSM, NONRUPTURED: ICD-10-CM

## 2018-05-10 LAB
ALBUMIN SERPL-MCNC: 4.3 G/DL (ref 3.5–5.2)
ALBUMIN/GLOB SERPL: 1.3 G/DL (ref 1.1–2.4)
ALP SERPL-CCNC: 87 U/L (ref 38–116)
ALT SERPL W P-5'-P-CCNC: 49 U/L (ref 0–33)
ANION GAP SERPL CALCULATED.3IONS-SCNC: 11.8 MMOL/L
AST SERPL-CCNC: 38 U/L (ref 0–32)
B2 MICROGLOB SERPL-MCNC: 1.7 MG/L (ref 0.8–2.2)
BASOPHILS # BLD AUTO: 0.03 10*3/MM3 (ref 0–0.1)
BASOPHILS NFR BLD AUTO: 0.6 % (ref 0–1.1)
BILIRUB SERPL-MCNC: 0.3 MG/DL (ref 0.1–1.2)
BUN BLD-MCNC: 15 MG/DL (ref 6–20)
BUN/CREAT SERPL: 26.3 (ref 7.3–30)
CALCIUM SPEC-SCNC: 9.7 MG/DL (ref 8.5–10.2)
CHLORIDE SERPL-SCNC: 103 MMOL/L (ref 98–107)
CO2 SERPL-SCNC: 28.2 MMOL/L (ref 22–29)
CREAT BLD-MCNC: 0.57 MG/DL (ref 0.6–1.1)
DEPRECATED RDW RBC AUTO: 46.7 FL (ref 37–49)
EOSINOPHIL # BLD AUTO: 0.26 10*3/MM3 (ref 0–0.36)
EOSINOPHIL NFR BLD AUTO: 5 % (ref 1–5)
ERYTHROCYTE [DISTWIDTH] IN BLOOD BY AUTOMATED COUNT: 13.4 % (ref 11.7–14.5)
GFR SERPL CREATININE-BSD FRML MDRD: 107 ML/MIN/1.73
GLOBULIN UR ELPH-MCNC: 3.3 GM/DL (ref 1.8–3.5)
GLUCOSE BLD-MCNC: 140 MG/DL (ref 74–124)
HCT VFR BLD AUTO: 41.9 % (ref 34–45)
HGB BLD-MCNC: 13.5 G/DL (ref 11.5–14.9)
IMM GRANULOCYTES # BLD: 0.01 10*3/MM3 (ref 0–0.03)
IMM GRANULOCYTES NFR BLD: 0.2 % (ref 0–0.5)
LYMPHOCYTES # BLD AUTO: 2.12 10*3/MM3 (ref 1–3.5)
LYMPHOCYTES NFR BLD AUTO: 40.7 % (ref 20–49)
MCH RBC QN AUTO: 30.5 PG (ref 27–33)
MCHC RBC AUTO-ENTMCNC: 32.2 G/DL (ref 32–35)
MCV RBC AUTO: 94.8 FL (ref 83–97)
MONOCYTES # BLD AUTO: 0.43 10*3/MM3 (ref 0.25–0.8)
MONOCYTES NFR BLD AUTO: 8.3 % (ref 4–12)
NEUTROPHILS # BLD AUTO: 2.36 10*3/MM3 (ref 1.5–7)
NEUTROPHILS NFR BLD AUTO: 45.2 % (ref 39–75)
NRBC BLD MANUAL-RTO: 0 /100 WBC (ref 0–0)
PLATELET # BLD AUTO: 211 10*3/MM3 (ref 150–375)
PMV BLD AUTO: 10.6 FL (ref 8.9–12.1)
POTASSIUM BLD-SCNC: 4.6 MMOL/L (ref 3.5–4.7)
PROT SERPL-MCNC: 7.6 G/DL (ref 6.3–8)
RBC # BLD AUTO: 4.42 10*6/MM3 (ref 3.9–5)
SODIUM BLD-SCNC: 143 MMOL/L (ref 134–145)
WBC NRBC COR # BLD: 5.21 10*3/MM3 (ref 4–10)

## 2018-05-10 PROCEDURE — 82232 ASSAY OF BETA-2 PROTEIN: CPT | Performed by: INTERNAL MEDICINE

## 2018-05-10 PROCEDURE — 80053 COMPREHEN METABOLIC PANEL: CPT | Performed by: INTERNAL MEDICINE

## 2018-05-10 PROCEDURE — 36415 COLL VENOUS BLD VENIPUNCTURE: CPT | Performed by: INTERNAL MEDICINE

## 2018-05-10 PROCEDURE — 85025 COMPLETE CBC W/AUTO DIFF WBC: CPT | Performed by: INTERNAL MEDICINE

## 2018-05-11 ENCOUNTER — APPOINTMENT (OUTPATIENT)
Dept: LAB | Facility: HOSPITAL | Age: 63
End: 2018-05-11

## 2018-05-13 LAB
ALBUMIN SERPL-MCNC: 3.5 G/DL (ref 2.9–4.4)
ALBUMIN/GLOB SERPL: 0.9 {RATIO} (ref 0.7–1.7)
ALPHA1 GLOB FLD ELPH-MCNC: 0.2 G/DL (ref 0–0.4)
ALPHA2 GLOB SERPL ELPH-MCNC: 1 G/DL (ref 0.4–1)
B-GLOBULIN SERPL ELPH-MCNC: 1 G/DL (ref 0.7–1.3)
GAMMA GLOB SERPL ELPH-MCNC: 1.7 G/DL (ref 0.4–1.8)
GLOBULIN SER CALC-MCNC: 3.9 G/DL (ref 2.2–3.9)
IGA SERPL-MCNC: 100 MG/DL (ref 87–352)
IGG SERPL-MCNC: 1338 MG/DL (ref 700–1600)
IGM SERPL-MCNC: 108 MG/DL (ref 26–217)
INTERPRETATION SERPL IEP-IMP: ABNORMAL
KAPPA LC SERPL-MCNC: 14.4 MG/L (ref 3.3–19.4)
KAPPA LC/LAMBDA SER: 0.96 {RATIO} (ref 0.26–1.65)
LAMBDA LC FREE SERPL-MCNC: 15 MG/L (ref 5.7–26.3)
Lab: ABNORMAL
M-SPIKE: 1 G/DL
PROT SERPL-MCNC: 7.4 G/DL (ref 6–8.5)

## 2018-05-18 ENCOUNTER — OFFICE VISIT (OUTPATIENT)
Dept: ONCOLOGY | Facility: CLINIC | Age: 63
End: 2018-05-18

## 2018-05-18 ENCOUNTER — APPOINTMENT (OUTPATIENT)
Dept: LAB | Facility: HOSPITAL | Age: 63
End: 2018-05-18

## 2018-05-18 VITALS
HEIGHT: 69 IN | BODY MASS INDEX: 33.15 KG/M2 | RESPIRATION RATE: 12 BRPM | SYSTOLIC BLOOD PRESSURE: 136 MMHG | HEART RATE: 63 BPM | OXYGEN SATURATION: 99 % | WEIGHT: 223.8 LBS | DIASTOLIC BLOOD PRESSURE: 78 MMHG | TEMPERATURE: 98.3 F

## 2018-05-18 DIAGNOSIS — I67.1 CEREBRAL ANEURYSM, NONRUPTURED: Primary | ICD-10-CM

## 2018-05-18 DIAGNOSIS — D47.2 MONOCLONAL GAMMOPATHY: ICD-10-CM

## 2018-05-18 PROCEDURE — G0463 HOSPITAL OUTPT CLINIC VISIT: HCPCS | Performed by: INTERNAL MEDICINE

## 2018-05-18 PROCEDURE — 99213 OFFICE O/P EST LOW 20 MIN: CPT | Performed by: INTERNAL MEDICINE

## 2018-05-18 RX ORDER — NARATRIPTAN 2.5 MG/1
TABLET ORAL
COMMUNITY
Start: 2018-03-29 | End: 2018-09-14 | Stop reason: SDUPTHER

## 2018-05-18 NOTE — PROGRESS NOTES
Subjective     REASON FOR FOLLOWUP:   1. MGUS. Since June 2010  2. ? TIA 10/2011   3. Recurrent diverticulitis October 2014.             History of Present Illness  patient is a 61-year-old female with monoclonal gammopathy of undetermined significance for the last 6 years she's here for routine annual follow-up feeling very well overall.  She is found to be diabetic and is managing this with oral diet control but has not been able to reduce any more weight.  Her neuropathy stable she has no bone pain and otherwise feels well she is up-to-date with mammography Pap smears and colonoscopy  Her IgG kappa MGUS is stable at 1.0 g/dL with a normal free light chain ratio  She had further stenting of an aneurysm in her brain which apparently was not successful and she is going to have the procedure done again-she tells me her son also has an aneurysm and I think some genetic testing is indicated possibly for [facility] station and we will look into this further    PAST MEDICAL HISTORY: Significant for kidney stones in June 2011. Reflux symptoms, hypertension. She has been anemic in the past but more recently this has not been an issue. She complains of neuropathy in her feet for the las t year and has had migraine headaches all her life. Possible TIA in October 2011. Negative workup for stroke.       PAST SURGICAL HISTORY: C section 1980 and 1982. Emergency appendectomy at the time of her second C section. Tubal ligation with C section. G allbladder surgery 1997. Ruptured brain aneurysm 2004 which was clipped by Dr. Sims. Colon resection for diverticulitis but the anastomosis broke down and she had to have a diverting ileostomy for 3 months. She had repair of the colon and reanastomosi s with takedown of the ileostomy in 2009. Stent was placed for kidney stones in June 2011. Incisional hernia repair in June 2009 withy mesh done laparoscopically.       OB/GYN: G3, P3, menopause 2004. She has not been on any hormones.        HEMATOLOGY HISTORY: History from previous dates can be found in the separate document.         HEMATOLOGY HISTORY: The patient is a 56-year-old white female with hypertension, reflux, kidney stones, who had multiple surgeries over the last couple of years for diverticulosis, breakdown of the anastomosis, and most recently incisional hernia repair b y Dr. Peter Kay in 2011 with a mesh done laparoscopically. The patient was noted to have a monoclonal gammopathy on blood testing in  and this was done because of some complaints of neuropathy in her feet for the last year, which actually ap pears to be improving as she has lost 20 pounds over the last 3 months after her surgery.       She has no bone pain, anemia, or other complaints at this time and the neuropathy does not involve her fingers although she states when she exercises in the gym in the past her hands have felt numb at the end of exercising. Most of the problems in her feet are pain and aching which is sometimes worse at night, especially if she has been on her feet all day, but she has not tried any medication for this.       Immunoglobulins are quantitatively normal. Serum free light chain ratio was normal. Urine electrophoresis was negative. Small MGUS, which is to be followed perspectively and no significant signs of amyloidosis.       The patient was seen 2014. MGUS is stable. Q.6 month paraprotein checks continued.   Patient seen in 2016 with a stable MGUS is 0.9 g/dL of IgG kappa        SOCIAL HISTORY: , lives with her . 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 Nonsmoker, nondrinker. No risk factors for HIV.       FAMILY HISTORY: Father  at 46 of MI. Mother  recently at 95 of old age. Two brothers, healthy. No blood cancers or malignancies. Son kidney stones at age 22.       Review of Systems   A comprehensive 14 point review of systems was performed and was negative except as  "mentioned.    Medications:  The current medication list was reviewed in the EMR    ALLERGIES:    Allergies   Allergen Reactions   • Contrast Dye Shortness Of Breath   • Iodinated Diagnostic Agents Shortness Of Breath   • Codeine Nausea Only   • Methylprednisolone Anxiety       Objective      Vitals:    05/18/18 1425   BP: 136/78   Pulse: 63   Resp: 12   Temp: 98.3 °F (36.8 °C)   TempSrc: Oral   SpO2: 99%   Weight: 102 kg (223 lb 12.8 oz)   Height: 175 cm (68.9\")   PainSc: 0-No pain     Current Status 5/18/2018   ECOG score 0       Physical Exam    GENERAL:  Well-developed, well-nourished in no acute distress.   SKIN:  Warm, dry without rashes, purpura or petechiae.  EYES:  Pupils equal, round and reactive to light.  EOMs intact.  Conjunctivae normal.  EARS:  Hearing intact.  NOSE:  Septum midline.  No excoriations or nasal discharge.  MOUTH:  Tongue is well-papillated; no stomatitis or ulcers.  Lips normal.  THROAT:  Oropharynx without lesions or exudates.  NECK:  Supple with good range of motion; no thyromegaly or masses, no JVD.  LYMPHATICS:  No cervical, supraclavicular, axillary or inguinal adenopathy.  CHEST:  Lungs clear to auscultation. Good airflow.  CARDIAC:  Regular rate and rhythm without murmurs, rubs or gallops. Normal S1,S2.  ABDOMEN:  Soft, nontender with no hepatosplenomegaly or masses.  EXTREMITIES:  No clubbing, cyanosis or edema.  NEUROLOGICAL:  Cranial Nerves II-XII grossly intact.  No focal neurological deficits.  PSYCHIATRIC:  Normal affect and mood.        RECENT LABS:  Hematology WBC   Date Value Ref Range Status   05/10/2018 5.21 4.00 - 10.00 10*3/mm3 Final     RBC   Date Value Ref Range Status   05/10/2018 4.42 3.90 - 5.00 10*6/mm3 Final     Hemoglobin   Date Value Ref Range Status   05/10/2018 13.5 11.5 - 14.9 g/dL Final     Hematocrit   Date Value Ref Range Status   05/10/2018 41.9 34.0 - 45.0 % Final     Platelets   Date Value Ref Range Status   05/10/2018 211 150 - 375 10*3/mm3 Final "       M Protein 1.0 g/dl       Assessment/Plan   1.  IgG kappa MGUS with a normal light chain ratio is stable  Plan    1.  Repeat SPEP in 3 months and see me in 6 months with repeat HARISH     if there is an upward trend we will do a bone marrow although the normal light chain ratio is reassuring                       5/18/2018      CC:

## 2018-08-17 ENCOUNTER — LAB (OUTPATIENT)
Dept: LAB | Facility: HOSPITAL | Age: 63
End: 2018-08-17

## 2018-08-17 DIAGNOSIS — D47.2 MONOCLONAL GAMMOPATHY: Primary | ICD-10-CM

## 2018-08-17 LAB
ALBUMIN SERPL-MCNC: 4.4 G/DL (ref 3.5–5.2)
ALBUMIN/GLOB SERPL: 1.2 G/DL (ref 1.1–2.4)
ALP SERPL-CCNC: 79 U/L (ref 38–116)
ALT SERPL W P-5'-P-CCNC: 47 U/L (ref 0–33)
ANION GAP SERPL CALCULATED.3IONS-SCNC: 14 MMOL/L
AST SERPL-CCNC: 40 U/L (ref 0–32)
BASOPHILS # BLD AUTO: 0.04 10*3/MM3 (ref 0–0.1)
BASOPHILS NFR BLD AUTO: 0.5 % (ref 0–1.1)
BILIRUB SERPL-MCNC: 0.6 MG/DL (ref 0.1–1.2)
BUN BLD-MCNC: 12 MG/DL (ref 6–20)
BUN/CREAT SERPL: 20.3 (ref 7.3–30)
CALCIUM SPEC-SCNC: 9.9 MG/DL (ref 8.5–10.2)
CHLORIDE SERPL-SCNC: 100 MMOL/L (ref 98–107)
CO2 SERPL-SCNC: 27 MMOL/L (ref 22–29)
CREAT BLD-MCNC: 0.59 MG/DL (ref 0.6–1.1)
DEPRECATED RDW RBC AUTO: 46.1 FL (ref 37–49)
EOSINOPHIL # BLD AUTO: 0.25 10*3/MM3 (ref 0–0.36)
EOSINOPHIL NFR BLD AUTO: 3.3 % (ref 1–5)
ERYTHROCYTE [DISTWIDTH] IN BLOOD BY AUTOMATED COUNT: 13.5 % (ref 11.7–14.5)
GFR SERPL CREATININE-BSD FRML MDRD: 103 ML/MIN/1.73
GLOBULIN UR ELPH-MCNC: 3.8 GM/DL (ref 1.8–3.5)
GLUCOSE BLD-MCNC: 140 MG/DL (ref 74–124)
HCT VFR BLD AUTO: 44.1 % (ref 34–45)
HGB BLD-MCNC: 14.1 G/DL (ref 11.5–14.9)
IMM GRANULOCYTES # BLD: 0.02 10*3/MM3 (ref 0–0.03)
IMM GRANULOCYTES NFR BLD: 0.3 % (ref 0–0.5)
LYMPHOCYTES # BLD AUTO: 2.37 10*3/MM3 (ref 1–3.5)
LYMPHOCYTES NFR BLD AUTO: 31.6 % (ref 20–49)
MCH RBC QN AUTO: 30 PG (ref 27–33)
MCHC RBC AUTO-ENTMCNC: 32 G/DL (ref 32–35)
MCV RBC AUTO: 93.8 FL (ref 83–97)
MONOCYTES # BLD AUTO: 0.64 10*3/MM3 (ref 0.25–0.8)
MONOCYTES NFR BLD AUTO: 8.5 % (ref 4–12)
NEUTROPHILS # BLD AUTO: 4.18 10*3/MM3 (ref 1.5–7)
NEUTROPHILS NFR BLD AUTO: 55.8 % (ref 39–75)
NRBC BLD MANUAL-RTO: 0 /100 WBC (ref 0–0)
PLATELET # BLD AUTO: 215 10*3/MM3 (ref 150–375)
PMV BLD AUTO: 10.4 FL (ref 8.9–12.1)
POTASSIUM BLD-SCNC: 4.6 MMOL/L (ref 3.5–4.7)
PROT SERPL-MCNC: 8.2 G/DL (ref 6.3–8)
RBC # BLD AUTO: 4.7 10*6/MM3 (ref 3.9–5)
SODIUM BLD-SCNC: 141 MMOL/L (ref 134–145)
WBC NRBC COR # BLD: 7.5 10*3/MM3 (ref 4–10)

## 2018-08-17 PROCEDURE — 36415 COLL VENOUS BLD VENIPUNCTURE: CPT | Performed by: INTERNAL MEDICINE

## 2018-08-17 PROCEDURE — 85025 COMPLETE CBC W/AUTO DIFF WBC: CPT | Performed by: INTERNAL MEDICINE

## 2018-08-17 PROCEDURE — 80053 COMPREHEN METABOLIC PANEL: CPT | Performed by: INTERNAL MEDICINE

## 2018-08-20 LAB
ALBUMIN SERPL-MCNC: 3.9 G/DL (ref 2.9–4.4)
ALBUMIN/GLOB SERPL: 1.1 {RATIO} (ref 0.7–1.7)
ALPHA1 GLOB FLD ELPH-MCNC: 0.3 G/DL (ref 0–0.4)
ALPHA2 GLOB SERPL ELPH-MCNC: 1 G/DL (ref 0.4–1)
B-GLOBULIN SERPL ELPH-MCNC: 1.1 G/DL (ref 0.7–1.3)
GAMMA GLOB SERPL ELPH-MCNC: 1.6 G/DL (ref 0.4–1.8)
GLOBULIN SER CALC-MCNC: 3.9 G/DL (ref 2.2–3.9)
IGA SERPL-MCNC: 108 MG/DL (ref 87–352)
IGG SERPL-MCNC: 1542 MG/DL (ref 700–1600)
IGM SERPL-MCNC: 114 MG/DL (ref 26–217)
INTERPRETATION SERPL IEP-IMP: ABNORMAL
KAPPA LC SERPL-MCNC: 14.3 MG/L (ref 3.3–19.4)
KAPPA LC/LAMBDA SER: 1.1 {RATIO} (ref 0.26–1.65)
LAMBDA LC FREE SERPL-MCNC: 13 MG/L (ref 5.7–26.3)
Lab: ABNORMAL
M-SPIKE: 1 G/DL
PROT SERPL-MCNC: 7.8 G/DL (ref 6–8.5)

## 2018-09-03 NOTE — OP NOTE
CEREBRAL ANGIOGRAM  Procedure Note  Cerebral Angiogram    Khalida Gilliland  10/11/2017  [id number]    SURGEON  Orlando Bella MD    ASSISTANT:  Sophy Robison CFA    INDICATION:    Follow up of previously embolized right medial supraclinoid carotid wall cerebral aneurysm    Pre-op Diagnosis:   Pre-Op Diagnosis Codes:     * Cerebral aneurysm, nonruptured [I67.1]     * Hx of cerebral aneurysm repair [Z98.890, Z86.79]    Cerebal aneurysm    Post-op Diagnosis:     Post-Op Diagnosis Codes:     * Cerebral aneurysm, nonruptured [I67.1]     * Hx of cerebral aneurysm repair [Z98.890, Z86.79]  Cerebral aneurysm    PROCEDURE PERFORMED:  Procedure(s):  CEREBRAL ANGIOGRAM    1. Selective catheterization and right  internal carotid angiography including rotational angiography and 3D reconstruction on a separate workstation.    Anesthesia: Local    Staff:   Circulator: Mati Pace RN  Scrub Person: Vinita Masterson; Juan Rouse  Monitor/Sedation Nurse: Ijeoma Garces RN  Assistant: Sophy Robison CSA  Vascular Radiology Technician: Sejal Akbar    Estimated Blood Loss: minimal    Specimens:                None    Findings: Pipeline embolization device in good position covering the neck of the previously clipped right lateral internal carotid supraclinoid aneurysm and the de dacia right medial supraclinoid carotid wall cerebral aneurysm. Persistent filling of the  right medial supraclinoid carotid wall cerebral aneurysm.      Complications: none    PROCEDURE IN DETAIL: The patient was brought to the operating room where she was hooked up to EKG, oxygen saturation, and blood pressure monitoring and placed on the biplane angiography table. Continuous monitoring was accomplished during the operative intervention. She was given intravenous sedation and the groin was prepped in the usual sterile manner.    Local anesthesia was given. Singlewall puncture technique 5 [place] introducing sheath  under flouroscopic guidance. 5 [place] Jacob catheter.    Under fluoroscopic guidance selective catheterization of the right internal carotid artery and right internal carotid artery cerebral angiography in multiple projections including rotational angiography with 3D reconstructions on a separate workstation.    This demonstrates the normal branching pattern of the internal carotid artery. There is no evidence of  arterio-venous malformation, arterio-venous fistula or venous anomaly. The previously placed clips are in good position.     The films demonstrate the Pipeline embolization device in good position covering the neck of the previously clipped right lateral internal carotid supraclinoid aneurysm and the de dacia right medial supraclinoid carotid wall cerebral aneurysm. Persistent filling of the  right medial supraclinoid carotid wall cerebral aneurysm.    Arteriography through the existing sheath demonstrates the sheath to be within the femoral artery. Minyx system was used to close the artery, pressure was held for the appropriate length of time and the patient was transferred to the recovery room in stable and good condition.    Orlando Bella MD     Date: 10/11/2017  Time: 1:02 PM           normal...

## 2018-09-14 ENCOUNTER — OFFICE VISIT (OUTPATIENT)
Dept: NEUROSURGERY | Facility: CLINIC | Age: 63
End: 2018-09-14

## 2018-09-14 ENCOUNTER — TELEPHONE (OUTPATIENT)
Dept: NEUROSURGERY | Facility: CLINIC | Age: 63
End: 2018-09-14

## 2018-09-14 VITALS
DIASTOLIC BLOOD PRESSURE: 82 MMHG | WEIGHT: 222 LBS | SYSTOLIC BLOOD PRESSURE: 136 MMHG | RESPIRATION RATE: 16 BRPM | BODY MASS INDEX: 32.88 KG/M2 | HEART RATE: 64 BPM

## 2018-09-14 DIAGNOSIS — Z86.79 HX OF CEREBRAL ANEURYSM REPAIR: Primary | ICD-10-CM

## 2018-09-14 DIAGNOSIS — Z98.890 HX OF CEREBRAL ANEURYSM REPAIR: Primary | ICD-10-CM

## 2018-09-14 PROCEDURE — 99214 OFFICE O/P EST MOD 30 MIN: CPT | Performed by: NEUROLOGICAL SURGERY

## 2018-09-14 RX ORDER — NARATRIPTAN 2.5 MG/1
TABLET ORAL
COMMUNITY
Start: 2018-05-22 | End: 2018-10-24

## 2018-09-14 RX ORDER — CEFAZOLIN SODIUM 2 G/100ML
2 INJECTION, SOLUTION INTRAVENOUS ONCE
Status: CANCELLED | OUTPATIENT
Start: 2018-10-31 | End: 2018-10-31

## 2018-09-14 RX ORDER — SODIUM CHLORIDE 9 MG/ML
100 INJECTION, SOLUTION INTRAVENOUS CONTINUOUS
Status: CANCELLED | OUTPATIENT
Start: 2018-10-31

## 2018-09-14 NOTE — PROGRESS NOTES
Subjective   Patient ID: Khalida Gilliland is a 63 y.o. female is here today for follow-up of aneurysm. She is accompanied by her .    History of Present Illness     This very pleasant patient returns to the office now approximately 1 year and 4 months following pipeline embolization of a recurrent internal carotid artery cerebral aneurysm following the aneurysm clipping.  She underwent arteriography for follow-up October 17th of 2017 that demonstrated continued filling of the very small recurrence.    He presents today to schedule cerebral arteriography and possible retreatment.    The following portions of the patient's history were reviewed and updated as appropriate: allergies, current medications, past family history, past medical history, past social history, past surgical history and problem list.    Review of Systems   Constitutional: Negative for fever.   Eyes: Negative for visual disturbance.        Reports floaters in left eye   Respiratory: Negative for cough.    Musculoskeletal: Negative for gait problem.   Neurological: Positive for headaches. Negative for dizziness, speech difficulty and light-headedness.   Psychiatric/Behavioral: Negative for decreased concentration.       Objective   Physical Exam   Constitutional: She is oriented to person, place, and time.   Eyes: Pupils are equal, round, and reactive to light. EOM are normal.   Neurological: She is oriented to person, place, and time. She has a normal Finger-Nose-Finger Test, a normal Heel to Shin Test, a normal Romberg Test and a normal Tandem Gait Test. Gait normal.   Reflex Scores:       Tricep reflexes are 2+ on the right side and 2+ on the left side.       Bicep reflexes are 2+ on the right side and 2+ on the left side.       Brachioradialis reflexes are 2+ on the right side and 2+ on the left side.       Patellar reflexes are 2+ on the right side and 2+ on the left side.       Achilles reflexes are 2+ on the right side and 2+ on the  left side.  Psychiatric: Her speech is normal.     Neurologic Exam     Mental Status   Oriented to person, place, and time.   Registration: recalls 3 of 3 objects. Recall at 5 minutes: recalls 3 of 3 objects. Follows 3 step commands.   Attention: normal. Concentration: normal.   Speech: speech is normal   Level of consciousness: alert  Knowledge: good.   Able to name object. Able to read. Able to repeat. Able to write. Normal comprehension.     Cranial Nerves     CN II   Visual fields full to confrontation.     CN III, IV, VI   Pupils are equal, round, and reactive to light.  Extraocular motions are normal.   Right pupil: Consensual response: intact.   Left pupil: Consensual response: intact.   CN III: no CN III palsy  CN VI: no CN VI palsy  Nystagmus: none   Diplopia: none  Ophthalmoparesis: none  Upgaze: normal  Downgaze: normal  Conjugate gaze: present  Vestibulo-ocular reflex: present    CN V   Facial sensation intact.     CN VII   Facial expression full, symmetric.     CN VIII   CN VIII normal.     CN IX, X   CN IX normal.     CN XI   CN XI normal.     CN XII   CN XII normal.     Motor Exam   Muscle bulk: normal  Overall muscle tone: normal  Right arm tone: normal  Left arm tone: normal  Right arm pronator drift: absent  Left arm pronator drift: absent  Right leg tone: normal  Left leg tone: normal    Strength   Right neck flexion: 5/5  Left neck flexion: 5/5  Right neck extension: 5/5  Left neck extension: 5/5  Right deltoid: 5/5  Left deltoid: 5/5  Right biceps: 5/5  Left biceps: 5/5  Right triceps: 5/5  Left triceps: 5/5  Right wrist flexion: 5/5  Left wrist flexion: 5/5  Right wrist extension: 5/5  Left wrist extension: 5/5  Right interossei: 5/5  Left interossei: 5/5  Right abdominals: 5/5  Left abdominals: 5/5  Right iliopsoas: 5/5  Left iliopsoas: 5/5  Right quadriceps: 5/5  Left quadriceps: 5/5  Right hamstrin/5  Left hamstrin/5  Right glutei: 5/5  Left glutei: 5/5  Right anterior tibial:  5/5  Left anterior tibial: 5/5  Right posterior tibial: 5/5  Left posterior tibial: 5/5  Right peroneal: 5/5  Left peroneal: 5/5  Right gastroc: 5/5  Left gastroc: 5/5    Sensory Exam   Light touch normal.   Vibration normal.   Proprioception normal.   Pinprick normal.     Gait, Coordination, and Reflexes     Gait  Gait: normal    Coordination   Romberg: negative  Finger to nose coordination: normal  Heel to shin coordination: normal  Tandem walking coordination: normal    Tremor   Resting tremor: absent  Intention tremor: absent  Action tremor: absent    Reflexes   Right brachioradialis: 2+  Left brachioradialis: 2+  Right biceps: 2+  Left biceps: 2+  Right triceps: 2+  Left triceps: 2+  Right patellar: 2+  Left patellar: 2+  Right achilles: 2+  Left achilles: 2+  Right : 2+  Left : 2+  Right plantar: normal  Left plantar: normal  Right Chowdary: absent  Left Chowdary: absent  Right ankle clonus: absent  Left ankle clonus: absent      Assessment/Plan   Independent Review of Radiographic Studies:    None new  Medical Decision Making:    The patient returns to the office today as noted above.  Clinically she is doing well.  Thankfully she has gotten over the treatment.    Our plan will be to perform follow-up cerebral arteriography at the end of October or the very beginning of November.  At the same time if she has any residual aneurysm we will consider re-treatment with an additional Pipeline Embolization Device.     The risks, benefits and alternatives of cerebral angiography and placement of PIPELINE embolization device for use in the affected artery to prevent blood flow into the aneurysm and to support coil embolization (if needed) were explained in detail to the patient. The alternative is not to undergo this procedure. The benefit of cerebral angiography with embolization should be, though is not guaranteed, the elucidation of the vascular anatomy of the brain and the blood vessels leading to the brain,  and placement of a series of coils into the aneurysm in order to prevent blood from flowing into it, thus potentially preventing rupture of the aneurysm. The benefit of PIPELINE stent is that it is less invasive than other surgical procedures, and provides a network of support for the coils and is useful for complex aneurysm. The risks of the procedure include, but are not limited to, the possibility of stroke, bleeding, blindness, damage to an artery in the brain, groin or neck (possibly requiring surgery to correct), infection, reaction to the contrast dye resulting in itching, swelling, anaphylaxis or even death, lack of ability to perform the procedure, need for further operative intervention, lack of ability to completely obliterate the aneurysm, reopening of the aneurysm, need for further embolization treatment or even open surgery to obliterate the aneurysm, compaction of the coils within the aneurysm causing possible reopening or rupture, etcetera. The patient voiced understanding of these risks, benefits and alternatives and requests that we proceed with cerebral angiography with coil embolization and stent placement.    Plan:cerebral arteriography and possible pipeline embolization device    Khalida was seen today for cerebral aneurysm.    Diagnoses and all orders for this visit:    Hx of cerebral aneurysm repair  -     Case Request; Standing  -     CBC and Differential; Future  -     Basic metabolic panel; Future  -     XR chest 1 vw; Future  -     sodium chloride 0.9 % infusion; Infuse 100 mL/hr into a venous catheter Continuous.  -     ceFAZolin (ANCEF) 2 g in sodium chloride 0.9 % 100 mL IVPB; Infuse 2 g into a venous catheter 1 (One) Time.  -     Case Request    Other orders  -     Follow anesthesia standing orders.  -     Obtain informed consent  -     Clorhexidine skin prep; Future  -     Follow Anesthesia Guidelines / Standing Orders; Standing  -     SCD (sequential compression device)- to be placed  on patient in Pre-op; Standing  -     Clip operative site; Standing      Return for Recheck 2 weeks after surgery.

## 2018-09-19 ENCOUNTER — RESULTS ENCOUNTER (OUTPATIENT)
Dept: NEUROSURGERY | Facility: CLINIC | Age: 63
End: 2018-09-19

## 2018-09-19 DIAGNOSIS — Z86.79 HX OF CEREBRAL ANEURYSM REPAIR: ICD-10-CM

## 2018-09-19 DIAGNOSIS — Z98.890 HX OF CEREBRAL ANEURYSM REPAIR: ICD-10-CM

## 2018-10-24 ENCOUNTER — TELEPHONE (OUTPATIENT)
Dept: NEUROSURGERY | Facility: CLINIC | Age: 63
End: 2018-10-24

## 2018-10-24 ENCOUNTER — HOSPITAL ENCOUNTER (OUTPATIENT)
Dept: GENERAL RADIOLOGY | Facility: HOSPITAL | Age: 63
Discharge: HOME | End: 2018-10-24
Admitting: NEUROLOGICAL SURGERY

## 2018-10-24 ENCOUNTER — APPOINTMENT (OUTPATIENT)
Dept: PREADMISSION TESTING | Facility: HOSPITAL | Age: 63
End: 2018-10-24

## 2018-10-24 ENCOUNTER — OFFICE VISIT (OUTPATIENT)
Dept: NEUROSURGERY | Facility: CLINIC | Age: 63
End: 2018-10-24

## 2018-10-24 VITALS
DIASTOLIC BLOOD PRESSURE: 64 MMHG | HEART RATE: 68 BPM | BODY MASS INDEX: 32.29 KG/M2 | SYSTOLIC BLOOD PRESSURE: 120 MMHG | WEIGHT: 218 LBS | RESPIRATION RATE: 16 BRPM

## 2018-10-24 VITALS
TEMPERATURE: 98 F | WEIGHT: 218.38 LBS | HEART RATE: 59 BPM | RESPIRATION RATE: 16 BRPM | OXYGEN SATURATION: 97 % | DIASTOLIC BLOOD PRESSURE: 66 MMHG | HEIGHT: 68 IN | SYSTOLIC BLOOD PRESSURE: 109 MMHG | BODY MASS INDEX: 33.1 KG/M2

## 2018-10-24 DIAGNOSIS — Z98.890 HX OF CEREBRAL ANEURYSM REPAIR: ICD-10-CM

## 2018-10-24 DIAGNOSIS — Z91.041 CONTRAST MEDIA ALLERGY: ICD-10-CM

## 2018-10-24 DIAGNOSIS — I67.1 CEREBRAL ANEURYSM, NONRUPTURED: Primary | ICD-10-CM

## 2018-10-24 DIAGNOSIS — Z86.79 HX OF CEREBRAL ANEURYSM REPAIR: ICD-10-CM

## 2018-10-24 LAB
ANION GAP SERPL CALCULATED.3IONS-SCNC: 14.9 MMOL/L
BASOPHILS # BLD AUTO: 0.04 10*3/MM3 (ref 0–0.2)
BASOPHILS NFR BLD AUTO: 0.6 % (ref 0–1.5)
BUN BLD-MCNC: 13 MG/DL (ref 8–23)
BUN/CREAT SERPL: 15.9 (ref 7–25)
CALCIUM SPEC-SCNC: 10.4 MG/DL (ref 8.6–10.5)
CHLORIDE SERPL-SCNC: 100 MMOL/L (ref 98–107)
CO2 SERPL-SCNC: 24.1 MMOL/L (ref 22–29)
CREAT BLD-MCNC: 0.82 MG/DL (ref 0.57–1)
DEPRECATED RDW RBC AUTO: 44.9 FL (ref 37–54)
EOSINOPHIL # BLD AUTO: 0.18 10*3/MM3 (ref 0–0.7)
EOSINOPHIL NFR BLD AUTO: 2.8 % (ref 0.3–6.2)
ERYTHROCYTE [DISTWIDTH] IN BLOOD BY AUTOMATED COUNT: 13.5 % (ref 11.7–13)
GFR SERPL CREATININE-BSD FRML MDRD: 70 ML/MIN/1.73
GLUCOSE BLD-MCNC: 125 MG/DL (ref 65–99)
HCT VFR BLD AUTO: 42.9 % (ref 35.6–45.5)
HGB BLD-MCNC: 14.2 G/DL (ref 11.9–15.5)
IMM GRANULOCYTES # BLD: 0.01 10*3/MM3 (ref 0–0.03)
IMM GRANULOCYTES NFR BLD: 0.2 % (ref 0–0.5)
LYMPHOCYTES # BLD AUTO: 2.93 10*3/MM3 (ref 0.9–4.8)
LYMPHOCYTES NFR BLD AUTO: 45 % (ref 19.6–45.3)
MCH RBC QN AUTO: 30.4 PG (ref 26.9–32)
MCHC RBC AUTO-ENTMCNC: 33.1 G/DL (ref 32.4–36.3)
MCV RBC AUTO: 91.9 FL (ref 80.5–98.2)
MONOCYTES # BLD AUTO: 0.73 10*3/MM3 (ref 0.2–1.2)
MONOCYTES NFR BLD AUTO: 11.2 % (ref 5–12)
NEUTROPHILS # BLD AUTO: 2.63 10*3/MM3 (ref 1.9–8.1)
NEUTROPHILS NFR BLD AUTO: 40.4 % (ref 42.7–76)
PLATELET # BLD AUTO: 249 10*3/MM3 (ref 140–500)
PMV BLD AUTO: 10.8 FL (ref 6–12)
POTASSIUM BLD-SCNC: 4.5 MMOL/L (ref 3.5–5.2)
RBC # BLD AUTO: 4.67 10*6/MM3 (ref 3.9–5.2)
SODIUM BLD-SCNC: 139 MMOL/L (ref 136–145)
WBC NRBC COR # BLD: 6.51 10*3/MM3 (ref 4.5–10.7)

## 2018-10-24 PROCEDURE — 80048 BASIC METABOLIC PNL TOTAL CA: CPT | Performed by: NEUROLOGICAL SURGERY

## 2018-10-24 PROCEDURE — 85025 COMPLETE CBC W/AUTO DIFF WBC: CPT | Performed by: NEUROLOGICAL SURGERY

## 2018-10-24 PROCEDURE — 99214 OFFICE O/P EST MOD 30 MIN: CPT | Performed by: NURSE PRACTITIONER

## 2018-10-24 PROCEDURE — 36415 COLL VENOUS BLD VENIPUNCTURE: CPT | Performed by: NEUROLOGICAL SURGERY

## 2018-10-24 PROCEDURE — 93010 ELECTROCARDIOGRAM REPORT: CPT | Performed by: INTERNAL MEDICINE

## 2018-10-24 PROCEDURE — 93005 ELECTROCARDIOGRAM TRACING: CPT

## 2018-10-24 PROCEDURE — 71045 X-RAY EXAM CHEST 1 VIEW: CPT

## 2018-10-24 RX ORDER — NARATRIPTAN 2.5 MG/1
2.5 TABLET ORAL ONCE AS NEEDED
COMMUNITY
End: 2018-11-01 | Stop reason: HOSPADM

## 2018-10-24 RX ORDER — ATORVASTATIN CALCIUM 20 MG/1
20 TABLET, FILM COATED ORAL DAILY
COMMUNITY
End: 2019-07-15 | Stop reason: SDUPTHER

## 2018-10-24 RX ORDER — MELATONIN
1000 DAILY
COMMUNITY

## 2018-10-24 RX ORDER — PREDNISONE 50 MG/1
TABLET ORAL
Qty: 3 TABLET | Refills: 0 | Status: SHIPPED | OUTPATIENT
Start: 2018-10-24 | End: 2018-11-01 | Stop reason: HOSPADM

## 2018-10-24 NOTE — TELEPHONE ENCOUNTER
----- Message from Sapna Gonzalez sent at 10/24/2018  2:30 PM EDT -----  Patient called and is confused about her medication and what she is to take prior to her procedure. She said she thought Dr. Bella told her she didn't have to stop anything but she says at the visit today you told her to stop everything.     She needs clarification.

## 2018-10-24 NOTE — PROGRESS NOTES
Subjective   Patient ID: Khalida Gilliland is a 63 y.o. female is here today for follow-up of aneurysm prior to cerebral angiography scheduled for 10-31-18. She is unaccompanied.    History of Present Illness  Patient presents for ongoing follow-up of aneurysm.  She has a history of a recurrent YANETH aneurysm status post craniotomy and clipping in 2003 followed by placement of pipeline embolization device 5/2017 for 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  Last cerebral angiogram in October 2017 showed continued filling of a very small recurrence.  She remains on daily aspirin and twice daily Brillinta.  She is anticipating repeat cerebral angiogram +/-  further embolization with pipeline embolization device dependent upon results.  She is scheduled for preadmission testing today following this visit. She reports diarrhea over the last week. No fever or blood in stool. Headaches are few and far between. She is being followed by neurology- she was prescribed NARATRIPTAN and she has taken no more than 4 times in the past 6 months. Still has floaters- followed by ophthalmology.    The following portions of the patient's history were reviewed and updated as appropriate: allergies, current medications, past family history, past medical history, past social history, past surgical history and problem list.    Review of Systems   Constitutional: Negative for chills and fever.   Eyes: Negative for visual disturbance.   Respiratory: Negative for cough and shortness of breath.    Cardiovascular: Negative for chest pain and palpitations.   Gastrointestinal: Positive for diarrhea. Negative for nausea and vomiting.   Musculoskeletal: Negative for gait problem and neck pain.   Skin: Negative for rash and wound.   Neurological: Negative for dizziness, weakness, light-headedness and headaches.   Hematological: Bruises/bleeds easily.   Psychiatric/Behavioral: Negative for sleep disturbance. The  patient is not nervous/anxious.        Objective   Physical Exam   Constitutional: She is oriented to person, place, and time. She appears well-developed and well-nourished.   Obese  Body mass index is 32.29 kg/m².     Neck: Neck supple. Normal carotid pulses present. Carotid bruit is not present.   Cardiovascular: Normal rate, regular rhythm, normal heart sounds and intact distal pulses.    Pulses:       Carotid pulses are 2+ on the right side, and 2+ on the left side.       Radial pulses are 2+ on the right side, and 2+ on the left side.        Femoral pulses are 2+ on the right side, and 2+ on the left side.       Popliteal pulses are 2+ on the right side, and 2+ on the left side.        Dorsalis pedis pulses are 2+ on the right side, and 2+ on the left side.        Posterior tibial pulses are 2+ on the right side, and 2+ on the left side.   Pulmonary/Chest: Effort normal and breath sounds normal.   Abdominal: Soft. There is no tenderness.   obese   Neurological: She is alert and oriented to person, place, and time. She has normal strength. She has a normal Finger-Nose-Finger Test, a normal Heel to [place]o Test and a normal Romberg Test. Gait normal.   Skin: Skin is warm and dry.   Psychiatric: She has a normal mood and affect. Her speech is normal and behavior is normal. Judgment normal. Cognition and memory are impaired.   Vitals reviewed.    Neurologic Exam     Mental Status   Oriented to person, place, and time.   Follows 3 step commands.   Attention: normal. Concentration: decreased.   Speech: speech is normal   Level of consciousness: alert  Knowledge: good.   Normal comprehension.     Cranial Nerves   Cranial nerves II through XII intact.     Motor Exam   Right arm pronator drift: absent  Left arm pronator drift: absent    Strength   Strength 5/5 throughout.     Gait, Coordination, and Reflexes     Gait  Gait: normal    Coordination   Romberg: negative  Finger to nose coordination: normal  Heel to shin  coordination: normal      Assessment/Plan   Independent Review of Radiographic Studies:    No new imaging    Medical Decision Making:    Patient presents for ongoing follow-up of residual right IAC aneurysm.  She denies any new issues.  She states that medication prescribed by neurology has significantly helped to abort her headaches.  It is naratriptan.  She is only use it 4 times or so in the last 6 months.  Otherwise she has issues with her headaches.  Her exam is as noted above with no neurologic red flags.  She does have mild memory dysfunction that is stable.  Rbas of surgery discussed in detail the last office visit with Dr. Bella.  The plan is for cerebral angiogram with possible pipeline embolization if residual aneurysm is seen.  She will remain on her aspirin and Brillinta this time.  I have given her prescription for prednisone secondary to dye reaction.  She is already on zyrtec and Zantac.  She may take Benadryl needed.  I reminded her that she is ready taking Tylenol PM so not to take Benadryl in addition to this at bedtime.  We will see her back following her procedure.    Plan: Cerebral angiogram +/- embolization and return to office following.    Khalida was seen today for pre-op exam.    Diagnoses and all orders for this visit:    Cerebral aneurysm, nonruptured    Hx of cerebral aneurysm repair    Contrast media allergy    Other orders  -     predniSONE (DELTASONE) 50 MG tablet; 1 tablet 13 hours, 7 hours, and 1 hour prior to cerebral angiogram      Return for After angiogram.

## 2018-10-24 NOTE — TELEPHONE ENCOUNTER
We did not discuss this in particular. She can take ALL meds including through the day PRIOR to angio. She SHOULD take her propanolol, zantac, Zyrtec, prednisone, aspirin, Brillinta the morning of the procedure.     She SHOULD NOT take the hydrocholothiazide the morning of surgery. She can take her Fioricet if she needs to. The other meds can be held and taken after her procedure.    PLEASE REMIND her to take the prednisone as directed on the bottle.

## 2018-10-31 ENCOUNTER — APPOINTMENT (OUTPATIENT)
Dept: GENERAL RADIOLOGY | Facility: HOSPITAL | Age: 63
End: 2018-10-31

## 2018-10-31 ENCOUNTER — ANESTHESIA (OUTPATIENT)
Dept: PERIOP | Facility: HOSPITAL | Age: 63
End: 2018-10-31

## 2018-10-31 ENCOUNTER — HOSPITAL ENCOUNTER (INPATIENT)
Facility: HOSPITAL | Age: 63
LOS: 1 days | Discharge: HOME | End: 2018-11-01
Attending: NEUROLOGICAL SURGERY | Admitting: NEUROLOGICAL SURGERY

## 2018-10-31 ENCOUNTER — ANESTHESIA EVENT (OUTPATIENT)
Dept: PERIOP | Facility: HOSPITAL | Age: 63
End: 2018-10-31

## 2018-10-31 DIAGNOSIS — Z86.79 HX OF CEREBRAL ANEURYSM REPAIR: ICD-10-CM

## 2018-10-31 DIAGNOSIS — Z98.890 HX OF CEREBRAL ANEURYSM REPAIR: ICD-10-CM

## 2018-10-31 LAB
GLUCOSE BLDC GLUCOMTR-MCNC: 179 MG/DL (ref 70–130)
GLUCOSE BLDC GLUCOMTR-MCNC: 203 MG/DL (ref 70–130)
GLUCOSE BLDC GLUCOMTR-MCNC: 294 MG/DL (ref 70–130)

## 2018-10-31 PROCEDURE — 25010000002 MIDAZOLAM PER 1 MG: Performed by: ANESTHESIOLOGY

## 2018-10-31 PROCEDURE — 36224 PLACE CATH CAROTD ART: CPT | Performed by: NEUROLOGICAL SURGERY

## 2018-10-31 PROCEDURE — G0378 HOSPITAL OBSERVATION PER HR: HCPCS

## 2018-10-31 PROCEDURE — 61624 TCAT PERM OCCLS/EMBOLJ CNS: CPT | Performed by: NEUROLOGICAL SURGERY

## 2018-10-31 PROCEDURE — 25010000002 HYDROMORPHONE PER 4 MG: Performed by: ANESTHESIOLOGY

## 2018-10-31 PROCEDURE — 25010000002 PROPOFOL 10 MG/ML EMULSION: Performed by: NURSE ANESTHETIST, CERTIFIED REGISTERED

## 2018-10-31 PROCEDURE — 25010000002 FENTANYL CITRATE (PF) 100 MCG/2ML SOLUTION: Performed by: ANESTHESIOLOGY

## 2018-10-31 PROCEDURE — 03VG3DZ RESTRICTION OF INTRACRANIAL ARTERY WITH INTRALUMINAL DEVICE, PERCUTANEOUS APPROACH: ICD-10-PCS | Performed by: NEUROLOGICAL SURGERY

## 2018-10-31 PROCEDURE — 63710000001 DIPHENHYDRAMINE PER 50 MG: Performed by: NEUROLOGICAL SURGERY

## 2018-10-31 PROCEDURE — 25010000002 HEPARIN (PORCINE) PER 1000 UNITS: Performed by: NURSE ANESTHETIST, CERTIFIED REGISTERED

## 2018-10-31 PROCEDURE — 25010000003 CEFAZOLIN IN DEXTROSE 2-4 GM/100ML-% SOLUTION: Performed by: NEUROLOGICAL SURGERY

## 2018-10-31 PROCEDURE — 25810000003 SODIUM CHLORIDE 0.9 % WITH KCL 20 MEQ 20-0.9 MEQ/L-% SOLUTION: Performed by: NEUROLOGICAL SURGERY

## 2018-10-31 PROCEDURE — C1887 CATHETER, GUIDING: HCPCS | Performed by: NEUROLOGICAL SURGERY

## 2018-10-31 PROCEDURE — C1769 GUIDE WIRE: HCPCS | Performed by: NEUROLOGICAL SURGERY

## 2018-10-31 PROCEDURE — 75894 X-RAYS TRANSCATH THERAPY: CPT

## 2018-10-31 PROCEDURE — 82962 GLUCOSE BLOOD TEST: CPT

## 2018-10-31 PROCEDURE — C1894 INTRO/SHEATH, NON-LASER: HCPCS | Performed by: NEUROLOGICAL SURGERY

## 2018-10-31 PROCEDURE — B3161ZZ FLUOROSCOPY OF RIGHT INTERNAL CAROTID ARTERY USING LOW OSMOLAR CONTRAST: ICD-10-PCS | Performed by: NEUROLOGICAL SURGERY

## 2018-10-31 PROCEDURE — 0 IODIXANOL PER 1 ML: Performed by: NEUROLOGICAL SURGERY

## 2018-10-31 PROCEDURE — 25010000002 HEPARIN (PORCINE) PER 1000 UNITS: Performed by: NEUROLOGICAL SURGERY

## 2018-10-31 PROCEDURE — 75898 FOLLOW-UP ANGIOGRAPHY: CPT

## 2018-10-31 PROCEDURE — C1760 CLOSURE DEV, VASC: HCPCS

## 2018-10-31 PROCEDURE — 76377 3D RENDER W/INTRP POSTPROCES: CPT

## 2018-10-31 PROCEDURE — 85347 COAGULATION TIME ACTIVATED: CPT

## 2018-10-31 PROCEDURE — 75898 FOLLOW-UP ANGIOGRAPHY: CPT | Performed by: NEUROLOGICAL SURGERY

## 2018-10-31 PROCEDURE — 25010000002 PROTAMINE SULFATE PER 10 MG: Performed by: NURSE ANESTHETIST, CERTIFIED REGISTERED

## 2018-10-31 DEVICE — IMPLANTABLE DEVICE: Type: IMPLANTABLE DEVICE | Status: FUNCTIONAL

## 2018-10-31 RX ORDER — HEPARIN SODIUM 1000 [USP'U]/ML
INJECTION, SOLUTION INTRAVENOUS; SUBCUTANEOUS AS NEEDED
Status: DISCONTINUED | OUTPATIENT
Start: 2018-10-31 | End: 2018-10-31 | Stop reason: SURG

## 2018-10-31 RX ORDER — EPHEDRINE SULFATE 50 MG/ML
5 INJECTION, SOLUTION INTRAVENOUS ONCE AS NEEDED
Status: DISCONTINUED | OUTPATIENT
Start: 2018-10-31 | End: 2018-10-31

## 2018-10-31 RX ORDER — CETIRIZINE HYDROCHLORIDE 10 MG/1
10 TABLET ORAL DAILY
Status: DISCONTINUED | OUTPATIENT
Start: 2018-10-31 | End: 2018-11-01 | Stop reason: HOSPADM

## 2018-10-31 RX ORDER — HYDROCHLOROTHIAZIDE 25 MG/1
12.5 TABLET ORAL DAILY
Status: DISCONTINUED | OUTPATIENT
Start: 2018-10-31 | End: 2018-11-01 | Stop reason: HOSPADM

## 2018-10-31 RX ORDER — ALBUTEROL SULFATE 2.5 MG/3ML
2.5 SOLUTION RESPIRATORY (INHALATION) EVERY 4 HOURS PRN
Status: DISCONTINUED | OUTPATIENT
Start: 2018-10-31 | End: 2018-11-01 | Stop reason: HOSPADM

## 2018-10-31 RX ORDER — MIDAZOLAM HYDROCHLORIDE 1 MG/ML
1 INJECTION INTRAMUSCULAR; INTRAVENOUS
Status: DISCONTINUED | OUTPATIENT
Start: 2018-10-31 | End: 2018-10-31 | Stop reason: HOSPADM

## 2018-10-31 RX ORDER — PROMETHAZINE HYDROCHLORIDE 25 MG/ML
12.5 INJECTION, SOLUTION INTRAMUSCULAR; INTRAVENOUS ONCE AS NEEDED
Status: DISCONTINUED | OUTPATIENT
Start: 2018-10-31 | End: 2018-10-31

## 2018-10-31 RX ORDER — PROMETHAZINE HYDROCHLORIDE 25 MG/1
25 TABLET ORAL ONCE AS NEEDED
Status: DISCONTINUED | OUTPATIENT
Start: 2018-10-31 | End: 2018-10-31

## 2018-10-31 RX ORDER — PROTAMINE SULFATE 10 MG/ML
INJECTION, SOLUTION INTRAVENOUS AS NEEDED
Status: DISCONTINUED | OUTPATIENT
Start: 2018-10-31 | End: 2018-10-31 | Stop reason: SURG

## 2018-10-31 RX ORDER — ONDANSETRON 4 MG/1
4 TABLET, ORALLY DISINTEGRATING ORAL EVERY 8 HOURS PRN
Status: DISCONTINUED | OUTPATIENT
Start: 2018-10-31 | End: 2018-11-01 | Stop reason: HOSPADM

## 2018-10-31 RX ORDER — NALOXONE HCL 0.4 MG/ML
0.2 VIAL (ML) INJECTION AS NEEDED
Status: DISCONTINUED | OUTPATIENT
Start: 2018-10-31 | End: 2018-10-31

## 2018-10-31 RX ORDER — DIPHENHYDRAMINE HCL 50 MG
50 CAPSULE ORAL ONCE
Status: ON HOLD | COMMUNITY
End: 2018-11-01

## 2018-10-31 RX ORDER — BUTALBITAL, ACETAMINOPHEN AND CAFFEINE 50; 325; 40 MG/1; MG/1; MG/1
1 TABLET ORAL EVERY 4 HOURS PRN
Status: DISCONTINUED | OUTPATIENT
Start: 2018-10-31 | End: 2018-11-01 | Stop reason: HOSPADM

## 2018-10-31 RX ORDER — SODIUM CHLORIDE AND POTASSIUM CHLORIDE 150; 900 MG/100ML; MG/100ML
60 INJECTION, SOLUTION INTRAVENOUS CONTINUOUS
Status: DISCONTINUED | OUTPATIENT
Start: 2018-10-31 | End: 2018-11-01 | Stop reason: HOSPADM

## 2018-10-31 RX ORDER — DIPHENHYDRAMINE HCL 50 MG
50 CAPSULE ORAL ONCE
Status: DISCONTINUED | OUTPATIENT
Start: 2018-10-31 | End: 2018-11-01 | Stop reason: HOSPADM

## 2018-10-31 RX ORDER — SODIUM CHLORIDE, SODIUM LACTATE, POTASSIUM CHLORIDE, CALCIUM CHLORIDE 600; 310; 30; 20 MG/100ML; MG/100ML; MG/100ML; MG/100ML
9 INJECTION, SOLUTION INTRAVENOUS CONTINUOUS
Status: DISCONTINUED | OUTPATIENT
Start: 2018-10-31 | End: 2018-11-01 | Stop reason: HOSPADM

## 2018-10-31 RX ORDER — FENTANYL CITRATE 50 UG/ML
INJECTION, SOLUTION INTRAMUSCULAR; INTRAVENOUS AS NEEDED
Status: DISCONTINUED | OUTPATIENT
Start: 2018-10-31 | End: 2018-10-31 | Stop reason: SURG

## 2018-10-31 RX ORDER — HYDROCODONE BITARTRATE AND ACETAMINOPHEN 5; 325 MG/1; MG/1
1 TABLET ORAL EVERY 4 HOURS PRN
Status: DISCONTINUED | OUTPATIENT
Start: 2018-10-31 | End: 2018-11-01 | Stop reason: HOSPADM

## 2018-10-31 RX ORDER — FENTANYL CITRATE 50 UG/ML
50 INJECTION, SOLUTION INTRAMUSCULAR; INTRAVENOUS
Status: DISCONTINUED | OUTPATIENT
Start: 2018-10-31 | End: 2018-10-31

## 2018-10-31 RX ORDER — LABETALOL HYDROCHLORIDE 5 MG/ML
5 INJECTION, SOLUTION INTRAVENOUS
Status: DISCONTINUED | OUTPATIENT
Start: 2018-10-31 | End: 2018-10-31

## 2018-10-31 RX ORDER — FAMOTIDINE 20 MG/1
20 TABLET, FILM COATED ORAL DAILY
Status: DISCONTINUED | OUTPATIENT
Start: 2018-10-31 | End: 2018-11-01 | Stop reason: HOSPADM

## 2018-10-31 RX ORDER — HYDROCODONE BITARTRATE AND ACETAMINOPHEN 7.5; 325 MG/1; MG/1
1 TABLET ORAL ONCE AS NEEDED
Status: DISCONTINUED | OUTPATIENT
Start: 2018-10-31 | End: 2018-10-31

## 2018-10-31 RX ORDER — SODIUM CHLORIDE 0.9 % (FLUSH) 0.9 %
3-10 SYRINGE (ML) INJECTION AS NEEDED
Status: DISCONTINUED | OUTPATIENT
Start: 2018-10-31 | End: 2018-10-31 | Stop reason: HOSPADM

## 2018-10-31 RX ORDER — MIDAZOLAM HYDROCHLORIDE 1 MG/ML
INJECTION INTRAMUSCULAR; INTRAVENOUS AS NEEDED
Status: DISCONTINUED | OUTPATIENT
Start: 2018-10-31 | End: 2018-10-31 | Stop reason: SURG

## 2018-10-31 RX ORDER — ASPIRIN 81 MG/1
81 TABLET ORAL DAILY
Status: DISCONTINUED | OUTPATIENT
Start: 2018-10-31 | End: 2018-11-01 | Stop reason: HOSPADM

## 2018-10-31 RX ORDER — PREDNISONE 50 MG/1
50 TABLET ORAL DAILY
Status: DISCONTINUED | OUTPATIENT
Start: 2018-10-31 | End: 2018-11-01 | Stop reason: HOSPADM

## 2018-10-31 RX ORDER — ACETAMINOPHEN,DIPHENHYDRAMINE HCL 500; 25 MG/1; MG/1
2 TABLET, FILM COATED ORAL NIGHTLY
Status: DISCONTINUED | OUTPATIENT
Start: 2018-10-31 | End: 2018-10-31 | Stop reason: CLARIF

## 2018-10-31 RX ORDER — DIPHENHYDRAMINE HYDROCHLORIDE 50 MG/ML
12.5 INJECTION INTRAMUSCULAR; INTRAVENOUS
Status: DISCONTINUED | OUTPATIENT
Start: 2018-10-31 | End: 2018-10-31

## 2018-10-31 RX ORDER — ALBUTEROL SULFATE 90 UG/1
2 AEROSOL, METERED RESPIRATORY (INHALATION) EVERY 4 HOURS PRN
Status: DISCONTINUED | OUTPATIENT
Start: 2018-10-31 | End: 2018-10-31 | Stop reason: CLARIF

## 2018-10-31 RX ORDER — ATORVASTATIN CALCIUM 20 MG/1
20 TABLET, FILM COATED ORAL DAILY
Status: DISCONTINUED | OUTPATIENT
Start: 2018-10-31 | End: 2018-11-01 | Stop reason: HOSPADM

## 2018-10-31 RX ORDER — PROPRANOLOL HYDROCHLORIDE 40 MG/1
40 TABLET ORAL EVERY 12 HOURS SCHEDULED
Status: DISCONTINUED | OUTPATIENT
Start: 2018-10-31 | End: 2018-11-01 | Stop reason: HOSPADM

## 2018-10-31 RX ORDER — PROPOFOL 10 MG/ML
VIAL (ML) INTRAVENOUS AS NEEDED
Status: DISCONTINUED | OUTPATIENT
Start: 2018-10-31 | End: 2018-10-31 | Stop reason: SURG

## 2018-10-31 RX ORDER — HYDROMORPHONE HYDROCHLORIDE 1 MG/ML
0.5 INJECTION, SOLUTION INTRAMUSCULAR; INTRAVENOUS; SUBCUTANEOUS
Status: DISCONTINUED | OUTPATIENT
Start: 2018-10-31 | End: 2018-10-31

## 2018-10-31 RX ORDER — PREGABALIN 100 MG/1
100 CAPSULE ORAL EVERY 12 HOURS SCHEDULED
Status: DISCONTINUED | OUTPATIENT
Start: 2018-10-31 | End: 2018-11-01 | Stop reason: HOSPADM

## 2018-10-31 RX ORDER — ONDANSETRON 2 MG/ML
4 INJECTION INTRAMUSCULAR; INTRAVENOUS ONCE AS NEEDED
Status: DISCONTINUED | OUTPATIENT
Start: 2018-10-31 | End: 2018-10-31

## 2018-10-31 RX ORDER — CEFAZOLIN SODIUM 2 G/100ML
2 INJECTION, SOLUTION INTRAVENOUS ONCE
Status: COMPLETED | OUTPATIENT
Start: 2018-10-31 | End: 2018-10-31

## 2018-10-31 RX ORDER — PANTOPRAZOLE SODIUM 40 MG/1
40 TABLET, DELAYED RELEASE ORAL EVERY MORNING
Status: DISCONTINUED | OUTPATIENT
Start: 2018-10-31 | End: 2018-11-01 | Stop reason: HOSPADM

## 2018-10-31 RX ORDER — OXYCODONE AND ACETAMINOPHEN 7.5; 325 MG/1; MG/1
1 TABLET ORAL ONCE AS NEEDED
Status: DISCONTINUED | OUTPATIENT
Start: 2018-10-31 | End: 2018-10-31

## 2018-10-31 RX ORDER — ROCURONIUM BROMIDE 10 MG/ML
INJECTION, SOLUTION INTRAVENOUS AS NEEDED
Status: DISCONTINUED | OUTPATIENT
Start: 2018-10-31 | End: 2018-10-31 | Stop reason: SURG

## 2018-10-31 RX ORDER — PROMETHAZINE HYDROCHLORIDE 25 MG/1
25 SUPPOSITORY RECTAL ONCE AS NEEDED
Status: DISCONTINUED | OUTPATIENT
Start: 2018-10-31 | End: 2018-10-31

## 2018-10-31 RX ORDER — SODIUM CHLORIDE 0.9 % (FLUSH) 0.9 %
3 SYRINGE (ML) INJECTION EVERY 12 HOURS SCHEDULED
Status: DISCONTINUED | OUTPATIENT
Start: 2018-10-31 | End: 2018-10-31 | Stop reason: HOSPADM

## 2018-10-31 RX ORDER — SCOLOPAMINE TRANSDERMAL SYSTEM 1 MG/1
1 PATCH, EXTENDED RELEASE TRANSDERMAL CONTINUOUS
Status: DISPENSED | OUTPATIENT
Start: 2018-10-31 | End: 2018-11-01

## 2018-10-31 RX ORDER — DIPHENHYDRAMINE HCL 50 MG
50 CAPSULE ORAL NIGHTLY PRN
Status: DISCONTINUED | OUTPATIENT
Start: 2018-10-31 | End: 2018-11-01 | Stop reason: HOSPADM

## 2018-10-31 RX ORDER — FAMOTIDINE 10 MG/ML
20 INJECTION, SOLUTION INTRAVENOUS ONCE
Status: COMPLETED | OUTPATIENT
Start: 2018-10-31 | End: 2018-10-31

## 2018-10-31 RX ORDER — IODIXANOL 320 MG/ML
300 INJECTION, SOLUTION INTRAVASCULAR
Status: COMPLETED | OUTPATIENT
Start: 2018-10-31 | End: 2018-10-31

## 2018-10-31 RX ORDER — ACETAMINOPHEN 500 MG
1000 TABLET ORAL NIGHTLY PRN
Status: DISCONTINUED | OUTPATIENT
Start: 2018-10-31 | End: 2018-11-01 | Stop reason: HOSPADM

## 2018-10-31 RX ORDER — CYCLOSPORINE 0.5 MG/ML
1 EMULSION OPHTHALMIC 2 TIMES DAILY
Status: DISCONTINUED | OUTPATIENT
Start: 2018-10-31 | End: 2018-11-01 | Stop reason: HOSPADM

## 2018-10-31 RX ORDER — MELATONIN
1000 DAILY
Status: DISCONTINUED | OUTPATIENT
Start: 2018-10-31 | End: 2018-11-01 | Stop reason: HOSPADM

## 2018-10-31 RX ORDER — PROMETHAZINE HYDROCHLORIDE 25 MG/1
12.5 TABLET ORAL ONCE AS NEEDED
Status: DISCONTINUED | OUTPATIENT
Start: 2018-10-31 | End: 2018-10-31

## 2018-10-31 RX ORDER — MIDAZOLAM HYDROCHLORIDE 1 MG/ML
2 INJECTION INTRAMUSCULAR; INTRAVENOUS
Status: DISCONTINUED | OUTPATIENT
Start: 2018-10-31 | End: 2018-10-31 | Stop reason: HOSPADM

## 2018-10-31 RX ORDER — SODIUM CHLORIDE 9 MG/ML
100 INJECTION, SOLUTION INTRAVENOUS CONTINUOUS
Status: DISCONTINUED | OUTPATIENT
Start: 2018-10-31 | End: 2018-10-31 | Stop reason: DRUGHIGH

## 2018-10-31 RX ORDER — FLUMAZENIL 0.1 MG/ML
0.2 INJECTION INTRAVENOUS AS NEEDED
Status: DISCONTINUED | OUTPATIENT
Start: 2018-10-31 | End: 2018-10-31

## 2018-10-31 RX ADMIN — PROPOFOL 200 MG: 10 INJECTION, EMULSION INTRAVENOUS at 09:01

## 2018-10-31 RX ADMIN — HYDROMORPHONE HYDROCHLORIDE 0.5 MG: 1 INJECTION, SOLUTION INTRAMUSCULAR; INTRAVENOUS; SUBCUTANEOUS at 12:20

## 2018-10-31 RX ADMIN — POTASSIUM CHLORIDE AND SODIUM CHLORIDE 60 ML/HR: 900; 150 INJECTION, SOLUTION INTRAVENOUS at 18:11

## 2018-10-31 RX ADMIN — MIDAZOLAM 2 MG: 1 INJECTION INTRAMUSCULAR; INTRAVENOUS at 07:44

## 2018-10-31 RX ADMIN — FENTANYL CITRATE 50 MCG: 50 INJECTION, SOLUTION INTRAMUSCULAR; INTRAVENOUS at 11:55

## 2018-10-31 RX ADMIN — FENTANYL CITRATE 50 MCG: 50 INJECTION, SOLUTION INTRAMUSCULAR; INTRAVENOUS at 11:18

## 2018-10-31 RX ADMIN — ROCURONIUM BROMIDE 50 MG: 10 INJECTION INTRAVENOUS at 09:01

## 2018-10-31 RX ADMIN — FENTANYL CITRATE 25 MCG: 50 INJECTION INTRAMUSCULAR; INTRAVENOUS at 08:09

## 2018-10-31 RX ADMIN — SODIUM CHLORIDE: 9 INJECTION, SOLUTION INTRAVENOUS at 09:53

## 2018-10-31 RX ADMIN — FAMOTIDINE 20 MG: 10 INJECTION, SOLUTION INTRAVENOUS at 07:44

## 2018-10-31 RX ADMIN — HYDROCODONE BITARTRATE AND ACETAMINOPHEN 1 TABLET: 5; 325 TABLET ORAL at 16:57

## 2018-10-31 RX ADMIN — PROTAMINE SULFATE 30 MG: 10 INJECTION, SOLUTION INTRAVENOUS at 09:33

## 2018-10-31 RX ADMIN — SODIUM CHLORIDE: 9 INJECTION, SOLUTION INTRAVENOUS at 07:40

## 2018-10-31 RX ADMIN — FENTANYL CITRATE 50 MCG: 50 INJECTION, SOLUTION INTRAMUSCULAR; INTRAVENOUS at 10:38

## 2018-10-31 RX ADMIN — SODIUM CHLORIDE: 9 INJECTION, SOLUTION INTRAVENOUS at 12:00

## 2018-10-31 RX ADMIN — TICAGRELOR 60 MG: 60 TABLET ORAL at 21:05

## 2018-10-31 RX ADMIN — Medication 1 MG: at 08:02

## 2018-10-31 RX ADMIN — IODIXANOL 134.6 ML: 320 INJECTION, SOLUTION INTRAVASCULAR at 06:55

## 2018-10-31 RX ADMIN — PROPRANOLOL HYDROCHLORIDE 40 MG: 40 TABLET ORAL at 21:04

## 2018-10-31 RX ADMIN — HYDROMORPHONE HYDROCHLORIDE 0.5 MG: 1 INJECTION, SOLUTION INTRAMUSCULAR; INTRAVENOUS; SUBCUTANEOUS at 14:59

## 2018-10-31 RX ADMIN — Medication 1 MG: at 08:09

## 2018-10-31 RX ADMIN — SCOPOLAMINE 1 PATCH: 1 PATCH, EXTENDED RELEASE TRANSDERMAL at 07:44

## 2018-10-31 RX ADMIN — FENTANYL CITRATE 50 MCG: 50 INJECTION, SOLUTION INTRAMUSCULAR; INTRAVENOUS at 10:24

## 2018-10-31 RX ADMIN — SUGAMMADEX 400 MG: 100 INJECTION, SOLUTION INTRAVENOUS at 09:47

## 2018-10-31 RX ADMIN — CYCLOSPORINE 1 DROP: 0.5 EMULSION OPHTHALMIC at 21:05

## 2018-10-31 RX ADMIN — PREGABALIN 100 MG: 100 CAPSULE ORAL at 21:05

## 2018-10-31 RX ADMIN — HYDROCODONE BITARTRATE AND ACETAMINOPHEN 1 TABLET: 5; 325 TABLET ORAL at 21:04

## 2018-10-31 RX ADMIN — Medication 1 MG: at 08:23

## 2018-10-31 RX ADMIN — HEPARIN SODIUM 8000 UNITS: 1000 INJECTION, SOLUTION INTRAVENOUS; SUBCUTANEOUS at 09:23

## 2018-10-31 RX ADMIN — CEFAZOLIN SODIUM 2 G: 2 INJECTION, SOLUTION INTRAVENOUS at 08:02

## 2018-10-31 RX ADMIN — HYDROMORPHONE HYDROCHLORIDE 0.5 MG: 1 INJECTION, SOLUTION INTRAMUSCULAR; INTRAVENOUS; SUBCUTANEOUS at 13:58

## 2018-10-31 RX ADMIN — HYDROMORPHONE HYDROCHLORIDE 0.5 MG: 1 INJECTION, SOLUTION INTRAMUSCULAR; INTRAVENOUS; SUBCUTANEOUS at 10:57

## 2018-10-31 RX ADMIN — DIPHENHYDRAMINE HYDROCHLORIDE 50 MG: 25 CAPSULE ORAL at 21:05

## 2018-10-31 NOTE — ANESTHESIA PREPROCEDURE EVALUATION
Anesthesia Evaluation     Patient summary reviewed   history of anesthetic complications: PONV  NPO Solid Status: > 8 hours  NPO Liquid Status: > 4 hours           Airway   Mallampati: II  Neck ROM: full  no difficulty expected  Dental - normal exam     Pulmonary    (+) asthma,   Cardiovascular     Rhythm: regular    (+) hypertension,       Neuro/Psych  (+) CVA,     GI/Hepatic/Renal/Endo    (+) obesity,  GERD,  diabetes mellitus,     Musculoskeletal     Abdominal    Substance History      OB/GYN          Other                          Anesthesia Plan    ASA 3     general and MAC     intravenous induction   Anesthetic plan, all risks, benefits, and alternatives have been provided, discussed and informed consent has been obtained with: patient.  Use of blood products discussed with patient .

## 2018-10-31 NOTE — ANESTHESIA PROCEDURE NOTES
Airway  Urgency: elective    Difficult airway (used CMAC)    General Information and Staff    Patient location during procedure: OR  Anesthesiologist: RAYSHAWN BISHOP  CRNA: DELFIN STODDARD    Indications and Patient Condition  Indications for airway management: airway protection    Preoxygenated: yes  Mask difficulty assessment: 1 - vent by mask    Final Airway Details  Final airway type: endotracheal airway      Successful airway: ETT  Cuffed: yes   Successful intubation technique: video laryngoscopy  Facilitating devices/methods: intubating stylet and anterior pressure/BURP  Endotracheal tube insertion site: oral  Blade: CMAC  Blade size: D  ETT size: 7.0 mm  Cormack-Lehane Classification: grade IIa - partial view of glottis  Placement verified by: chest auscultation and capnometry   Cuff volume (mL): 6  Measured from: teeth  ETT to teeth (cm): 21  Number of attempts at approach: 1

## 2018-10-31 NOTE — ANESTHESIA POSTPROCEDURE EVALUATION
"Patient: Khalida Gilliland    Procedure Summary     Date:  10/31/18 Room / Location:  [facility] OR 19 INV / [facility] HYBRID OR 18/19    Anesthesia Start:  0756 Anesthesia Stop:  1009    Procedure:  Cerebral angiogram with embolization of cerebral aneurysm (N/A ) Diagnosis:       Hx of cerebral aneurysm repair      (Hx of cerebral aneurysm repair [Z98.890, Z86.79])    Surgeon:  Orlando Bella MD Provider:  Isael Moreland MD    Anesthesia Type:  general, MAC ASA Status:  3          Anesthesia Type: general, MAC  Last vitals  BP   132/73 (10/31/18 1305)   Temp   36.3 °C (97.4 °F) (10/31/18 1002)   Pulse   86 (10/31/18 1305)   Resp   16 (10/31/18 1305)     SpO2   98 % (10/31/18 1305)     Post Anesthesia Care and Evaluation    Patient location during evaluation: bedside  Patient participation: complete - patient participated  Level of consciousness: sleepy but conscious  Pain score: 0  Pain management: adequate  Airway patency: patent  Anesthetic complications: No anesthetic complications    Cardiovascular status: acceptable  Respiratory status: acceptable  Hydration status: acceptable    Comments: /73   Pulse 86   Temp 36.3 °C (97.4 °F) (Oral)   Resp 16   Ht 172.7 cm (68\")   Wt 101 kg (222 lb 0.1 oz)   SpO2 98%   BMI 33.76 kg/m²         "

## 2018-11-01 VITALS
RESPIRATION RATE: 16 BRPM | HEART RATE: 62 BPM | WEIGHT: 225.75 LBS | BODY MASS INDEX: 34.21 KG/M2 | SYSTOLIC BLOOD PRESSURE: 150 MMHG | OXYGEN SATURATION: 98 % | DIASTOLIC BLOOD PRESSURE: 65 MMHG | HEIGHT: 68 IN | TEMPERATURE: 98.1 F

## 2018-11-01 PROCEDURE — 63710000001 PREDNISONE PER 5 MG: Performed by: NEUROLOGICAL SURGERY

## 2018-11-01 PROCEDURE — 94640 AIRWAY INHALATION TREATMENT: CPT

## 2018-11-01 PROCEDURE — 99024 POSTOP FOLLOW-UP VISIT: CPT | Performed by: NURSE PRACTITIONER

## 2018-11-01 RX ORDER — HYDROCODONE BITARTRATE AND ACETAMINOPHEN 5; 325 MG/1; MG/1
1 TABLET ORAL EVERY 6 HOURS PRN
Qty: 15 TABLET | Refills: 0 | Status: SHIPPED | OUTPATIENT
Start: 2018-11-01 | End: 2018-11-10

## 2018-11-01 RX ORDER — IPRATROPIUM BROMIDE AND ALBUTEROL SULFATE 2.5; .5 MG/3ML; MG/3ML
3 SOLUTION RESPIRATORY (INHALATION) ONCE
Status: COMPLETED | OUTPATIENT
Start: 2018-11-01 | End: 2018-11-01

## 2018-11-01 RX ORDER — DOCUSATE SODIUM 100 MG/1
100 CAPSULE, LIQUID FILLED ORAL ONCE
Status: COMPLETED | OUTPATIENT
Start: 2018-11-01 | End: 2018-11-01

## 2018-11-01 RX ORDER — PSEUDOEPHEDRINE HCL 30 MG
100 TABLET ORAL 2 TIMES DAILY PRN
COMMUNITY
Start: 2018-11-01 | End: 2020-02-21

## 2018-11-01 RX ORDER — DIPHENHYDRAMINE HCL 50 MG
50 CAPSULE ORAL EVERY 6 HOURS PRN
Qty: 1 CAPSULE | Refills: 0 | COMMUNITY
Start: 2018-11-01 | End: 2019-03-29

## 2018-11-01 RX ADMIN — TICAGRELOR 60 MG: 60 TABLET ORAL at 09:33

## 2018-11-01 RX ADMIN — HYDROCHLOROTHIAZIDE 12.5 MG: 25 TABLET ORAL at 09:31

## 2018-11-01 RX ADMIN — PREDNISONE 50 MG: 50 TABLET ORAL at 09:33

## 2018-11-01 RX ADMIN — CETIRIZINE HYDROCHLORIDE 10 MG: 10 TABLET, FILM COATED ORAL at 09:33

## 2018-11-01 RX ADMIN — PREGABALIN 100 MG: 100 CAPSULE ORAL at 09:49

## 2018-11-01 RX ADMIN — VITAMIN D, TAB 1000IU (100/BT) 1000 UNITS: 25 TAB at 09:33

## 2018-11-01 RX ADMIN — IPRATROPIUM BROMIDE AND ALBUTEROL SULFATE 3 ML: .5; 3 SOLUTION RESPIRATORY (INHALATION) at 10:09

## 2018-11-01 RX ADMIN — PANTOPRAZOLE SODIUM 40 MG: 40 TABLET, DELAYED RELEASE ORAL at 06:34

## 2018-11-01 RX ADMIN — DOCUSATE SODIUM 100 MG: 100 CAPSULE, LIQUID FILLED ORAL at 11:58

## 2018-11-01 RX ADMIN — PROPRANOLOL HYDROCHLORIDE 40 MG: 40 TABLET ORAL at 09:33

## 2018-11-01 RX ADMIN — HYDROCODONE BITARTRATE AND ACETAMINOPHEN 1 TABLET: 5; 325 TABLET ORAL at 09:49

## 2018-11-01 RX ADMIN — CYCLOSPORINE 1 DROP: 0.5 EMULSION OPHTHALMIC at 09:33

## 2018-11-01 RX ADMIN — ASPIRIN 81 MG: 81 TABLET, DELAYED RELEASE ORAL at 09:32

## 2018-11-01 RX ADMIN — ATORVASTATIN CALCIUM 20 MG: 20 TABLET, FILM COATED ORAL at 09:33

## 2018-11-01 NOTE — PLAN OF CARE
Problem: Patient Care Overview  Goal: Plan of Care Review  Outcome: Ongoing (interventions implemented as appropriate)   11/01/18 0611   Coping/Psychosocial   Plan of Care Reviewed With patient   Plan of Care Review   Progress improving   OTHER   Outcome Summary Uneventful night, pt continues neuro intact, ambulating to bathroom independently, pain well controlled with PRN norco (only given x1 this shift) vitals stable, tolerating PO pills and regular diet. In good spirits. Will continue to monitor. JORGE Huff RN

## 2018-11-01 NOTE — PROGRESS NOTES
Discharge Planning Assessment  [facility]     Patient Name: Khalida Gilliland  MRN: [id number]  Today's Date: 11/1/2018    Admit Date: 10/31/2018          Discharge Needs Assessment     Row Name 11/01/18 1140       Living Environment    Lives With spouse    Name(s) of Who Lives With Patient Roe Gilliland  [phone]    Current Living Arrangements home/apartment/condo    Primary Care Provided by self    Provides Primary Care For no one    Family Caregiver if Needed spouse    Family Caregiver Names Roe Gilliland  [phone]    Quality of Family Relationships supportive    Able to Return to Prior Arrangements yes       Resource/Environmental Concerns    Resource/Environmental Concerns none    Transportation Concerns car, none       Transition Planning    Patient/Family Anticipates Transition to home    Patient/Family Anticipated Services at Transition none    Transportation Anticipated family or friend will provide       Discharge Needs Assessment    Readmission Within the Last 30 Days no previous admission in last 30 days    Concerns to be Addressed no discharge needs identified    Equipment Currently Used at Home none    Anticipated Changes Related to Illness none    Equipment Needed After Discharge none    Offered/Gave Vendor List no            Discharge Plan     Row Name 11/01/18 4227       Plan    Plan Home with family    Patient/Family in Agreement with Plan yes    Plan Comments Met with patient at bedside, patient cheerful and talkative.  Prior to admission patient was independent with all aspects of her ADL's. Patient does use any type of adaptive equipment.  Patient states she uses the [facility] pharmacy on [address] and [address], denies issues obtaining, affording or taking her medications.  Patient does not have a POA or living will but states Roe Gilliland  [phone] knows her wishes.  Discharge plans are to return home, family will transport.          Destination      No service coordination in this encounter.      Durable Medical Equipment     No service coordination in this encounter.      Dialysis/Infusion     No service coordination in this encounter.      Home Medical Care     No service coordination in this encounter.      Social Care     No service coordination in this encounter.                Demographic Summary     Row Name 11/01/18 1138       General Information    Admission Type inpatient    Arrived From home    Referral Source admission list    Reason for Consult discharge planning    Preferred Language English     Used During This Interaction no       Contact Information    Permission Granted to Share Info With family/designee   Roe Gilliland  [phone]            Functional Status     Row Name 11/01/18 1139       Functional Status    Usual Activity Tolerance excellent    Current Activity Tolerance good       Functional Status, IADL    Medications independent    Meal Preparation independent    Housekeeping independent    Laundry independent    Shopping independent       Mental Status    General Appearance WDL WDL;appearance    General Appearance well-kept, clean       Mental Status Summary    Recent Changes in Mental Status/Cognitive Functioning no changes       Employment/    Employment Status unemployed            Psychosocial    No documentation.           Abuse/Neglect    No documentation.           Legal    No documentation.           Substance Abuse    No documentation.           Patient Forms    No documentation.         Adri Harman RN

## 2018-11-01 NOTE — PROGRESS NOTES
Tyson Carrasco MD                          [phone]      Patient ID:    Name:  Khalida Gilliland    MRN:  [id number]    1955   63 y.o.  female            Patient Care Team:  Mikael David MD as PCP - General (Internal Medicine)  Lex Morris MD as Consulting Physician (Hematology and Oncology)  Mikael David MD as Referring Physician (Internal Medicine)    CC/Reason for visit: Per A/P mentioned below    Subjective: Pt seen and examined this AM. No acute overnight events noted. Doing better.  Mild wheezing heard this morning.  No significant shortness of breath    Objective     Vital Signs past 24hrs    BP range: BP: ()/(42-86) 138/86  Pulse range: Heart Rate:  [54-86] 72  Resp rate range: Resp:  [16-18] 16  Temp range: Temp (24hrs), Av.9 °F (36.6 °C), Min:97.5 °F (36.4 °C), Max:98.4 °F (36.9 °C)      Ventilator/Non-Invasive Ventilation Settings     None          Device (Oxygen Therapy): room air       102 kg (225 lb 12 oz); Body mass index is 34.33 kg/m².      Intake/Output Summary (Last 24 hours) at 18 1201  Last data filed at 18 0633   Gross per 24 hour   Intake             1096 ml   Output              350 ml   Net              746 ml       Exam:  Constitutional: No acute distress, no acc muscle use  Head: - NCAT  Eyes: No pallor, Anicteric conjunctiva, EOMI.  ENMT:  Mallampati 3 ,No oral thrush. No palpable cervical lymphadenopathy  Heart: RRR, no murmur  Lungs: MEME +, Faint wheezes/ No crackles heard    Abdomen: Obese. Soft. No tenderness, guarding or rigidity  Extremities: Extremities warm and well perfused, No pitting edema  Neuro: Conscious, alert, oriented x3, no gross focal neuro deficits    Scheduled meds:    aspirin 81 mg Oral Daily   atorvastatin 20 mg Oral Daily   cetirizine 10 mg Oral Daily   cholecalciferol 1,000 Units Oral Daily   cycloSPORINE 1 drop Both Eyes BID   diphenhydrAMINE 50 mg Oral Once   famotidine 20  mg Oral Daily   hydrochlorothiazide 12.5 mg Oral Daily   pantoprazole 40 mg Oral QAM   predniSONE 50 mg Oral Daily   pregabalin 100 mg Oral Q12H   propranolol 40 mg Oral Q12H   ticagrelor 60 mg Oral BID       IV meds:                        lactated ringers 9 mL/hr    sodium chloride 0.9 % with KCl 20 mEq 60 mL/hr Last Rate: 60 mL/hr (10/31/18 1811)       Data Review:            Invalid input(s): LABALBU, PROT, PT, LABRCNTIP    Lab Results   Component Value Date    CALCIUM 10.4 10/24/2018                    Results Review:    I have reviewed the available laboratory results and reviewed the chest imaging personally and summarized it below    Imaging Results (all)     Procedure Component Value Units Date/Time    Arteriogram (Autofinalize) [[id number]] Resulted:  10/31/18 1010     Updated:  10/31/18 1010    Narrative:       This procedure was auto-finalized with no dictation required.               ASSESSMENT:  Cerebral aneurysm status post embolization  History of recurrent aneurysm status post craniotomy/clipping  Dual antiplatelet therapy  Mild Asthma exacerbation  H/o MGUS  H/o CVA  H/o sigmoid colectomy    PLAN:  Patient has done well overnight.  Noted mild wheezing and shortness of breath.  We will give her rescue DuoNeb.  She has to go back on her home inhaler regimen.  No indication for steroids or prolonging inpatient care.  Agree with discharge from primary.  Pain control.  No outpatient follow-up indicated.    I have discussed my findings and recommendations with patient, nursing staff and consulting provider.     Tyson Carrasco MD  11/1/2018

## 2018-11-01 NOTE — DISCHARGE SUMMARY
Khalida Gilliland  1955    Patient Care Team:  Mikael David MD as PCP - General (Internal Medicine)  Lex Morris MD as Consulting Physician (Hematology and Oncology)  Mikael David MD as Referring Physician (Internal Medicine)    Date of Admit: 10/31/2018    Date of Discharge:  11/1/2018    Discharge Diagnosis:  Hx of cerebral aneurysm repair      Procedures Performed  Procedure(s):  Cerebral angiogram with embolization of cerebral aneurysm       Complications: none    Consultants:   Consults     Date and Time Order Name Status Description    10/31/2018 1630 Inpatient Consult to Intensivist Completed           Condition on Discharge: stable    Discharge disposition: home    Pertinent Test Results: none    Brief HPI: Patient evaluated in office for complaints of recurrent YANETH aneurysm status post craniotomy and clipping in 2003 followed by placement of pipeline embolization device 5/2017 for 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  Last cerebral angiogram in October 2017 showed continued filling of a very small recurrence. Plan for repeat cerebral angiogram +/- additional embolization dependent upon if residual aneurysm persisted. RBAs of treatment were discussed including the above procedure. Patient consented to above procedure.    Hospital Course: Patient admitted for above procedure. The procedure itself was without complication. The patient was transferred to ICU following recovery. She has had no PO complications. She has mild HA 3/10. No visual complaints. She does have some cough with wheezing. No rash, difficulty swallowing, chest pain. She has a history of asthma. PATRICIA Marcial, states she spoke with intensivist and she will be given Duoneb and OK for DC if no other issues. She will be ambulated by RN prior to DC.     Discharge Physical Exam:    Temp:  [97.5 °F (36.4 °C)-98.4 °F (36.9 °C)] 97.5 °F (36.4 °C)  Heart Rate:  [54-86] 72  Resp:   [16-18] 16  BP: ()/(42-86) 138/86    Current labs:  Lab Results (last 24 hours)     Procedure Component Value Units Date/Time    POC Glucose Once [[id number]]  (Abnormal) Collected:  10/31/18 1610    Specimen:  Blood Updated:  10/31/18 1622     Glucose 179 (H) mg/dL     Narrative:       Meter: [id number] : [id number] Brandon Delgadillo RN            General Appearance No acute distress   HEENT NC/AT; no tongue swelling, no exudate   Neurological Awake, Alert, and oriented x 3   Cranial nerves EOMI, face symmetric   Motor VILLANUEVA=   Neck Supple   Extremities Moves all extremities well, no edema, no cyanosis, no redness; right groin site with CDI dressing, no hematoma; distal pulses 2+     Chest Lungs with inspiratory wheeze and rhonchi that clears with cough; heart RRR   skin No rash or erythema     Discharge Medications  Nick has been reviewed and narcotic consent is on file in the patient's chart.     Your medication list      START taking these medications      Instructions Last Dose Given Next Dose Due   docusate sodium 100 MG capsule      Take 100 mg by mouth 2 (Two) Times a Day As Needed for Constipation.       HYDROcodone-acetaminophen 5-325 MG per tablet  Commonly known as:  NORCO      Take 1 tablet by mouth Every 6 (Six) Hours As Needed for Moderate Pain  for up to 9 days.          CHANGE how you take these medications      Instructions Last Dose Given Next Dose Due   albuterol 108 (90 Base) MCG/ACT inhaler  Commonly known as:  PROVENTIL HFA;VENTOLIN HFA  What changed:  when to take this      Inhale 2 puffs every 6 (six) hours as needed for wheezing or shortness of breath (Cough).       aspirin 81 MG EC tablet  What changed:  additional instructions      Take 1 tablet by mouth Daily.       diphenhydrAMINE 50 MG capsule  Commonly known as:  BENADRYL  What changed:  · when to take this  · reasons to take this      Take 1 capsule by mouth Every 6 (Six) Hours As Needed for Itching.          CONTINUE taking  these medications      Instructions Last Dose Given Next Dose Due   atorvastatin 20 MG tablet  Commonly known as:  LIPITOR      Take 20 mg by mouth Daily.       BRILINTA 60 MG tablet tablet  Generic drug:  ticagrelor      Take 60 mg by mouth 2 (Two) Times a Day. PT TO CONTINUE PER DR. CALLUM URBINA       butalbital-acetaminophen-caffeine -40 MG per tablet  Commonly known as:  FIORICET, ESGIC      Take 1 tablet by mouth Every 4 (Four) Hours As Needed for Headache.       cetirizine 10 MG tablet  Commonly known as:  zyrTEC      Take 10 mg by mouth Daily.       cholecalciferol 1000 units tablet  Commonly known as:  VITAMIN D3      Take 1,000 Units by mouth Daily. PER PT TO CONTINUE PER DR. CALLUM URBINA       cycloSPORINE 0.05 % ophthalmic emulsion  Commonly known as:  RESTASIS      Administer 1 drop to both eyes 2 (Two) Times a Day.       diphenhydrAMINE-acetaminophen  MG tablet per tablet  Commonly known as:  TYLENOL PM      Take 2 tablets by mouth Every Night.       fluticasone 27.5 MCG/SPRAY nasal spray  Commonly known as:  VERAMYST      2 sprays by Each Nare route Daily.       glucosamine-chondroitin 500-400 MG capsule capsule      Take 1 capsule by mouth 2 (Two) Times a Day With Meals. PT TO CONTINUE PER DR. CALLUM URBINA       hydrochlorothiazide 12.5 MG tablet  Commonly known as:  HYDRODIURIL      Take 12.5 mg by mouth daily.       omeprazole 40 MG capsule  Commonly known as:  priLOSEC      Take 40 mg by mouth Every Morning.       ondansetron ODT 4 MG disintegrating tablet  Commonly known as:  ZOFRAN-ODT      Take 1-2 tablets by mouth Every 8 (Eight) Hours As Needed for Nausea or Vomiting.       potassium gluconate 595 (99 K) MG tablet tablet      Take 595 mg by mouth Daily.       pregabalin 100 MG capsule  Commonly known as:  LYRICA      Take 100 mg by mouth 2 (two) times a day.       propranolol 40 MG tablet  Commonly known as:  INDERAL      Take 40 mg by mouth 2 (two) times a day.        raNITIdine 150 MG tablet  Commonly known as:  ZANTAC      Take 150 mg by mouth 2 (Two) Times a Day.       VITAMIN C PO      Take 1,000 mg by mouth Daily. PT INSTRUCTED TO CONTINUE PER DR. CALLUM BELLA          STOP taking these medications    naratriptan 2.5 MG tablet  Commonly known as:  AMERGE        predniSONE 50 MG tablet  Commonly known as:  DELTASONE              Where to Get Your Medications      You can get these medications from any pharmacy    Bring a paper prescription for each of these medications  · HYDROcodone-acetaminophen 5-325 MG per tablet  You don't need a prescription for these medications  · diphenhydrAMINE 50 MG capsule  · docusate sodium 100 MG capsule         Discharge Diet:   Diet Instructions     Diet:       Diet Texture / Consistency:  Regular    Advance as tolerated        Diet Order   Procedures   • Diet Regular       Activity at Discharge:   Activity Instructions     Discharge Activity       1) No driving until cleared by us and no longer taking narcotics.   2) Off work/school until cleared by us.  3) May shower but no submerging wound in tub, pool, etc.  4) Do not lift / push / pull more then 10 lbs.  5.) No exertional activity    Pelvic Rest       1 week          Call for: questions or concerns    Follow-up Appointments  Future Appointments  Date Time Provider Department Center   11/9/2018 10:45 AM Callum Bella MD [facility] None   11/9/2018 11:30 AM LAB CHAIR 6 [facility]  [facility]   11/16/2018 11:20 AM VITALS ONLY [facility]  [facility]   11/16/2018 11:40 AM Lex Morris MD [facility]  [facility]     Follow-up Information     Mikael David MD .    Specialty:  Internal Medicine  Contact information:  [address] [place] [address]  [phone]                 Additional Instructions for the Follow-ups that You Need to Schedule     Notify Physician or Go To The ED For the Following Conditions    As directed      Including but not  limited to fever >100.5, chills, wound concerns (redness, swelling, drainage), new symptoms of numbness, tingling, weakness; new or uncontrolled pain despite using prescribed medications; call 911 for shortness of air, acute worsening wheezing or cough    Order Comments:  Including but not limited to fever >100.5, chills, wound concerns (redness, swelling, drainage), new symptoms of numbness, tingling, weakness; new or uncontrolled pain despite using prescribed medications; call 911 for shortness of air, acute worsening wheezing or cough                Test Results Pending at Discharge     None    I discussed the discharge instructions with patient    Lucila DELEONTATE Vigil  11/01/18  11:53 AM    25 min spent in reviewing records, discussion and examination of the patient and discussion with other members of the patient's medical team.

## 2018-11-01 NOTE — PROGRESS NOTES
Case Management Discharge Note    Final Note: Discharged home.     Destination     No service has been selected for the patient.      Durable Medical Equipment     No service has been selected for the patient.      Dialysis/Infusion     No service has been selected for the patient.      Home Medical Care     No service has been selected for the patient.      Social Care     No service has been selected for the patient.        Other:  (Private auto)    Final Discharge Disposition Code: 01 - home or self-care

## 2018-11-01 NOTE — PLAN OF CARE
Problem: Patient Care Overview  Goal: Plan of Care Review  Outcome: Outcome(s) achieved Date Met: 11/01/18      Problem: Fall Risk (Adult)  Goal: Identify Related Risk Factors and Signs and Symptoms  Outcome: Outcome(s) achieved Date Met: 11/01/18    Goal: Absence of Fall  Outcome: Outcome(s) achieved Date Met: 11/01/18

## 2018-11-01 NOTE — PLAN OF CARE
Problem: Patient Care Overview  Goal: Individualization and Mutuality  Outcome: Outcome(s) achieved Date Met: 11/01/18    Goal: Discharge Needs Assessment  Outcome: Outcome(s) achieved Date Met: 11/01/18    Goal: Interprofessional Rounds/Family Conf  Outcome: Outcome(s) achieved Date Met: 11/01/18

## 2018-11-06 LAB — ACT BLD: 136 SECONDS (ref 82–152)

## 2018-11-07 NOTE — OP NOTE
EMBOLIZATION CEREBRAL  Procedure Note  Cerebral Angiogram and Placement of Pipeline Embolization Device for treatment of Cerebral aneurysm    Khalida Gilliland  10/31/2018  [id number]    SURGEON  Orlando Bella MD    ASSISTANT:  Sophy Robison CFA  INDICATION:  The patient underwent treatment with a Pipeline Embolization Device May 8, 2017.  She was treated for a very small aneurysm because of the prior history of a ruptured aneurysm and prior craniotomy and clipping years ago.    Is indicated to perform cerebral arteriography now approximately 18 months after treatment and if there is residual aneurysm to place an additional pipeline embolization device to improve the chances of occlusion of the previously ruptured recurrently aneurysm.    Pre-op Diagnosis:   Hx of cerebral aneurysm repair [Z98.890, Z86.79]      Post-Op Diagnosis Codes:     * Hx of cerebral aneurysm repair [Z98.890, Z86.79]    PROCEDURE PERFORMED:  Procedure(s):  Cerebral angiogram with embolization of cerebral aneurysm    1. Selective catheterization and right internal carotid angiography  2. Superselective catheterization of the right middle cerebral artery (MCA) with Marksman microcatheter  3. Embolization of cerebral aneurysm with 3 X 10 mm Pipeline Embolization Device (PED)  4. Post embolization cerebral angiography  5. Supervision and interpretation of embolization    Anesthesia: Monitor Anesthesia Care    Staff:   Circulator: Morena Steven RN  Scrub Person: Afua Ruiz; Sophy Bray  Assistant: Sophy Robison CSA  Vascular Radiology Technician: Mercedez Hu    Estimated Blood Loss: minimal    Specimens: none      Drains:   [REMOVED] Urethral Catheter Non-latex 16 Fr. (Removed)       Findings: Residual small YANETH aneurysm     Complications: none    PROCEDURE IN DETAIL: The patient was brought to the operating room where she was hooked up to EKG, oxygen saturation, and blood pressure monitoring and placed on the  biplane angiography table. Continuous monitoring was accomplished during the operative intervention.General endotracheal anesthesia was induce after discovery of the residual aneurysm.     Local anesthesia was given.  Singlewall puncture technique 6 Sudanese introducing sheath. 5 Sudanese Jacob catheter.    Under fluoroscopic guidance selective catheterization of the right internal carotid artery.  Right internal carotid artery cerebral angiography in multiple projections demonstrates the normal branching pattern of the internal carotid artery. There is no evidence of  arterio-venous malformation, arterio-venous fistula or venous anomaly.     The previously placed pipeline embolization device is in good position.  There is no evidence of in-stent stenosis.  The previously identified small residual didn't know though internal carotid artery aneurysm is smaller than it was after placement of the pipeline embolization device but about the same size as it was 6 months following embolization.    The patient had requested that if there was any evidence of residual aneurysm that further embolization be accomplished.    General endotracheal anesthesia was induced.    The patient was heparinized to 2X baseline ACT.    The Jacob catheter was exchanged over an Amplatz super stiff exchange length wire for a 90cm 6F sheath and placed into the common cartoid. A Marksman catheter was prepared with a Synchro wire and placed coaxially into a Navien Distal Access Catheter (DAC). Continuous heparinized saline flush was run through the Y-connectors. The Marksman/DAC combination were placed into the 6 F long sheath coaxially with another continuous heparinized saline flush.    Under live flouroscopy, the Marksman was placed into the right MCA and the DAC was advanced over the Marksman into the petrous carotid.     Confirmation of MCA location of the microcatheter was accomplished with a short flouroscopic angiogram.    Based on the initial  angiogram and measurements of the size of the internal carotid artery, I chose a 10 mm X 3 mm PED. The PED sheath was allowed to flush with heparinized saline in the hub of the rotating hemostatic valve attached to the Marksman delivery microcatheter and than was advanced into the catheter.    Live overlay flouroscopy was used and the PED was advanced to the tip of the Marksman catheter in the  MCA. The device was pushed out about 5-6mm and allowed to begin to expand. Once the device was partially open, the distal portion of the device was pulled back to the target point distal to the neck of the aneurysm. The device was than advanced out of the microcatheter until the proximal portion of the PED was expanded. I waited until ful expansion of the PED was confirmed. I than advanced the Marksman catheter over the delivery wire though the PED into the MCA.    Arteriography through the existing DAC showed the PED to be fully deployed across the neck of the aneurysm and to apposed to the internal carotid walls very well.     Arteriography through the existing sheath demonstrates the sheath to be within the common femoral artery. Minyx system was used to close the artery, pressure was held for the appropriate length of time and the patient was transferred to the recovery room in stable and good condition.      Orlando Bella MD     Date: 11/7/2018  Time: 3:20 PM

## 2018-11-09 ENCOUNTER — OFFICE VISIT (OUTPATIENT)
Dept: NEUROSURGERY | Facility: CLINIC | Age: 63
End: 2018-11-09

## 2018-11-09 ENCOUNTER — LAB (OUTPATIENT)
Dept: LAB | Facility: HOSPITAL | Age: 63
End: 2018-11-09

## 2018-11-09 VITALS
HEART RATE: 72 BPM | BODY MASS INDEX: 33.45 KG/M2 | DIASTOLIC BLOOD PRESSURE: 72 MMHG | SYSTOLIC BLOOD PRESSURE: 140 MMHG | WEIGHT: 220 LBS | RESPIRATION RATE: 16 BRPM

## 2018-11-09 DIAGNOSIS — I67.1 CEREBRAL ANEURYSM, NONRUPTURED: Primary | ICD-10-CM

## 2018-11-09 DIAGNOSIS — D47.2 MONOCLONAL GAMMOPATHY: ICD-10-CM

## 2018-11-09 LAB
ALBUMIN SERPL-MCNC: 4.4 G/DL (ref 3.5–5.2)
ALBUMIN/GLOB SERPL: 1.2 G/DL (ref 1.1–2.4)
ALP SERPL-CCNC: 69 U/L (ref 38–116)
ALT SERPL W P-5'-P-CCNC: 55 U/L (ref 0–33)
ANION GAP SERPL CALCULATED.3IONS-SCNC: 12 MMOL/L
AST SERPL-CCNC: 46 U/L (ref 0–32)
BASOPHILS # BLD AUTO: 0.03 10*3/MM3 (ref 0–0.1)
BASOPHILS NFR BLD AUTO: 0.4 % (ref 0–1.1)
BILIRUB SERPL-MCNC: 0.6 MG/DL (ref 0.1–1.2)
BUN BLD-MCNC: 14 MG/DL (ref 6–20)
BUN/CREAT SERPL: 23 (ref 7.3–30)
CALCIUM SPEC-SCNC: 9.8 MG/DL (ref 8.5–10.2)
CHLORIDE SERPL-SCNC: 101 MMOL/L (ref 98–107)
CO2 SERPL-SCNC: 28 MMOL/L (ref 22–29)
CREAT BLD-MCNC: 0.61 MG/DL (ref 0.6–1.1)
DEPRECATED RDW RBC AUTO: 46.2 FL (ref 37–49)
EOSINOPHIL # BLD AUTO: 0.22 10*3/MM3 (ref 0–0.36)
EOSINOPHIL NFR BLD AUTO: 2.6 % (ref 1–5)
ERYTHROCYTE [DISTWIDTH] IN BLOOD BY AUTOMATED COUNT: 13.5 % (ref 11.7–14.5)
GFR SERPL CREATININE-BSD FRML MDRD: 99 ML/MIN/1.73
GLOBULIN UR ELPH-MCNC: 3.6 GM/DL (ref 1.8–3.5)
GLUCOSE BLD-MCNC: 117 MG/DL (ref 74–124)
HCT VFR BLD AUTO: 43.1 % (ref 34–45)
HGB BLD-MCNC: 13.9 G/DL (ref 11.5–14.9)
IMM GRANULOCYTES # BLD: 0.03 10*3/MM3 (ref 0–0.03)
IMM GRANULOCYTES NFR BLD: 0.4 % (ref 0–0.5)
LYMPHOCYTES # BLD AUTO: 2.91 10*3/MM3 (ref 1–3.5)
LYMPHOCYTES NFR BLD AUTO: 34.5 % (ref 20–49)
MCH RBC QN AUTO: 30.3 PG (ref 27–33)
MCHC RBC AUTO-ENTMCNC: 32.3 G/DL (ref 32–35)
MCV RBC AUTO: 94.1 FL (ref 83–97)
MONOCYTES # BLD AUTO: 0.82 10*3/MM3 (ref 0.25–0.8)
MONOCYTES NFR BLD AUTO: 9.7 % (ref 4–12)
NEUTROPHILS # BLD AUTO: 4.43 10*3/MM3 (ref 1.5–7)
NEUTROPHILS NFR BLD AUTO: 52.4 % (ref 39–75)
NRBC BLD MANUAL-RTO: 0 /100 WBC (ref 0–0)
PLATELET # BLD AUTO: 241 10*3/MM3 (ref 150–375)
PMV BLD AUTO: 10.6 FL (ref 8.9–12.1)
POTASSIUM BLD-SCNC: 4.4 MMOL/L (ref 3.5–4.7)
PROT SERPL-MCNC: 8 G/DL (ref 6.3–8)
RBC # BLD AUTO: 4.58 10*6/MM3 (ref 3.9–5)
SODIUM BLD-SCNC: 141 MMOL/L (ref 134–145)
WBC NRBC COR # BLD: 8.44 10*3/MM3 (ref 4–10)

## 2018-11-09 PROCEDURE — 36415 COLL VENOUS BLD VENIPUNCTURE: CPT | Performed by: INTERNAL MEDICINE

## 2018-11-09 PROCEDURE — 99213 OFFICE O/P EST LOW 20 MIN: CPT | Performed by: NEUROLOGICAL SURGERY

## 2018-11-09 PROCEDURE — 80053 COMPREHEN METABOLIC PANEL: CPT | Performed by: INTERNAL MEDICINE

## 2018-11-09 PROCEDURE — 85025 COMPLETE CBC W/AUTO DIFF WBC: CPT | Performed by: INTERNAL MEDICINE

## 2018-11-09 NOTE — PROGRESS NOTES
Subjective   Patient ID: Khalida Gilliland is a 63 y.o. female is here today for follow-up of aneurysm s/p angiography with embolization on 10-31-18. She is accompanied by her .    History of Present Illness  Patient presents for follow-up after cerebral angiogram and further embolization with pipeline stent placement secondary to residual right ICA aneurysm.  She status post pipeline embolization device placement in May 2017 for right superior hypophyseal artery region aneurysm.  She is status post craniotomy and clipping of right ICA aneurysm in 2003.  She remains on Brillinta and aspirin at this time. No headaches since procedure. She has no leg pain. She has been having some right sided abdominal pain that she feels is related to scar tissue from prior surgery. She had constipation PO that seems to have flared her chronic pain. No blood in stool. Off pain meds. Wants to resume triptan for HAs.     The following portions of the patient's history were reviewed and updated as appropriate: allergies, current medications and problem list.    Review of Systems   Eyes: Negative for visual disturbance.   Cardiovascular: Negative for leg swelling.   Skin: Negative for color change (or coolness of procedure leg), pallor and wound (swelling, excess bruising or bleeding of groin site ).   Neurological: Negative for facial asymmetry, speech difficulty, weakness, numbness (of procedure leg) and headaches.   Hematological: Bruises/bleeds easily.       Objective   Physical Exam   Constitutional: She is oriented to person, place, and time. She appears well-developed and well-nourished.   Obese  Body mass index is 32.29 kg/m².     Eyes: EOM are normal.   Cardiovascular: Intact distal pulses.    Pulses:       Femoral pulses are 2+ on the right side.       Popliteal pulses are 2+ on the right side.        Dorsalis pedis pulses are 2+ on the right side, and 2+ on the left side.        Posterior tibial pulses are 2+ on the  right side, and 2+ on the left side.   Pulmonary/Chest: Effort normal.   Abdominal: Soft. She exhibits no mass. There is tenderness (RUQ- superficial and deep).   obese   Neurological: She is alert and oriented to person, place, and time. She has normal strength. Gait normal.   Skin: Skin is warm and dry.   Psychiatric: She has a normal mood and affect. Her speech is normal and behavior is normal. Judgment normal. Cognition and memory are impaired.   Vitals reviewed.    Neurologic Exam     Mental Status   Oriented to person, place, and time.   Follows 3 step commands.   Attention: normal. Concentration: decreased.   Speech: speech is normal   Level of consciousness: alert  Knowledge: good.   Normal comprehension.     Cranial Nerves     CN III, IV, VI   Extraocular motions are normal.     CN VII   Facial expression full, symmetric.     Motor Exam   Muscle bulk: normal  Right arm pronator drift: absent  Left arm pronator drift: absent    Strength   Strength 5/5 throughout.     Gait, Coordination, and Reflexes     Gait  Gait: normal      Assessment/Plan   Independent Review of Radiographic Studies:      I personally reviewed the cerebral angiogram and embolization angiogram done this past week.  The pre-embolization angiogram demonstrated a small residual rim of the previously treated didn't go below aneurysm.  Following placement of the second pipeline embolization device there appears to be stagnation/obliteration of the small aneurysm.    Medical Decision Making:    I confirmed and obtained the above history as recorded by the nurse practitioner acting as a scribe. I performed the above examination and it is documented by the nurse practitioner acting as a scribe.    Patient returns to the office and thankfully neurologically and from the headache standpoint she is doing very well.  She is having right sided abdominal pain and she has many good reasons for this.    Our plan will be a follow-up cerebral angiogram in  6 months to 9 months to confirm obliteration of the aneurysm.    I can see no contraindication to any particular medications but do not interfere with her dual antiplatelets treatment.    Khalida was seen today for cerebral aneurysm.    Diagnoses and all orders for this visit:    Cerebral aneurysm, nonruptured      Return in about 6 months (around 5/9/2019) for Follow up with nurse practitioner.

## 2018-11-12 LAB
ALBUMIN SERPL-MCNC: 3.8 G/DL (ref 2.9–4.4)
ALBUMIN/GLOB SERPL: 1 {RATIO} (ref 0.7–1.7)
ALPHA1 GLOB FLD ELPH-MCNC: 0.2 G/DL (ref 0–0.4)
ALPHA2 GLOB SERPL ELPH-MCNC: 1.1 G/DL (ref 0.4–1)
B-GLOBULIN SERPL ELPH-MCNC: 1.1 G/DL (ref 0.7–1.3)
GAMMA GLOB SERPL ELPH-MCNC: 1.5 G/DL (ref 0.4–1.8)
GLOBULIN SER CALC-MCNC: 4 G/DL (ref 2.2–3.9)
IGA SERPL-MCNC: 99 MG/DL (ref 87–352)
IGG SERPL-MCNC: 1504 MG/DL (ref 700–1600)
IGM SERPL-MCNC: 123 MG/DL (ref 26–217)
INTERPRETATION SERPL IEP-IMP: ABNORMAL
KAPPA LC SERPL-MCNC: 13.2 MG/L (ref 3.3–19.4)
KAPPA LC/LAMBDA SER: 1.1 {RATIO} (ref 0.26–1.65)
LAMBDA LC FREE SERPL-MCNC: 12 MG/L (ref 5.7–26.3)
Lab: ABNORMAL
M-SPIKE: 1 G/DL
PROT SERPL-MCNC: 7.8 G/DL (ref 6–8.5)

## 2018-11-16 ENCOUNTER — OFFICE VISIT (OUTPATIENT)
Dept: ONCOLOGY | Facility: CLINIC | Age: 63
End: 2018-11-16

## 2018-11-16 ENCOUNTER — APPOINTMENT (OUTPATIENT)
Dept: LAB | Facility: HOSPITAL | Age: 63
End: 2018-11-16

## 2018-11-16 VITALS
HEART RATE: 69 BPM | HEIGHT: 68 IN | TEMPERATURE: 97.8 F | DIASTOLIC BLOOD PRESSURE: 72 MMHG | RESPIRATION RATE: 22 BRPM | BODY MASS INDEX: 33.49 KG/M2 | SYSTOLIC BLOOD PRESSURE: 153 MMHG | WEIGHT: 221 LBS | OXYGEN SATURATION: 96 %

## 2018-11-16 DIAGNOSIS — D47.2 MONOCLONAL GAMMOPATHY: Primary | ICD-10-CM

## 2018-11-16 PROCEDURE — 99213 OFFICE O/P EST LOW 20 MIN: CPT | Performed by: INTERNAL MEDICINE

## 2018-11-16 PROCEDURE — G0463 HOSPITAL OUTPT CLINIC VISIT: HCPCS | Performed by: INTERNAL MEDICINE

## 2018-11-16 NOTE — PROGRESS NOTES
Subjective     REASON FOR FOLLOWUP:   1. MGUS. Since June 2010  2. ? TIA 10/2011   3. Recurrent diverticulitis October 2014.                    4.  Referring neuropathy chronic-fat pad biopsy negative for amyloid        History of Present Illness  patient is a 61-year-old female with monoclonal gammopathy of undetermined significance for the last 7 years she's here for routine annual follow-up feeling very well overall.    She is found to be diabetic and is managing this with oral diet control but has not been able to reduce any more weight.    Her neuropathy stable she has no bone pain and otherwise feels well she is up-to-date with mammography Pap smears and colonoscopy    Her IgG kappa MGUS is stable at 1.0 g/dL with a normal free light chain ratio and beta-2 microglobulin    She had further stenting of an aneurysm in her brain which apparently was not successful and she is going to have the procedure done again-she tells me her son also has an aneurysm and I think some genetic testing is indicated possibly for [facility] station    PAST MEDICAL HISTORY: Significant for kidney stones in June 2011. Reflux symptoms, hypertension. She has been anemic in the past but more recently this has not been an issue. She complains of neuropathy in her feet for the las t year and has had migraine headaches all her life. Possible TIA in October 2011. Negative workup for stroke.       PAST SURGICAL HISTORY: C section 1980 and 1982. Emergency appendectomy at the time of her second C section. Tubal ligation with C section. G allbladder surgery 1997. Ruptured brain aneurysm 2004 which was clipped by Dr. Sims. Colon resection for diverticulitis but the anastomosis broke down and she had to have a diverting ileostomy for 3 months. She had repair of the colon and reanastomosi s with takedown of the ileostomy in 2009. Stent was placed for kidney stones in June 2011. Incisional hernia repair in June 2009 withy mesh done laparoscopically.        OB/GYN: G3, P3, menopause . She has not been on any hormones.       HEMATOLOGY HISTORY: History from previous dates can be found in the separate document.         HEMATOLOGY HISTORY: The patient is a 56-year-old white female with hypertension, reflux, kidney stones, who had multiple surgeries over the last couple of years for diverticulosis, breakdown of the anastomosis, and most recently incisional hernia repair b y Dr. Peter Kay in 2011 with a mesh done laparoscopically. The patient was noted to have a monoclonal gammopathy on blood testing in  and this was done because of some complaints of neuropathy in her feet for the last year, which actually ap pears to be improving as she has lost 20 pounds over the last 3 months after her surgery.       She has no bone pain, anemia, or other complaints at this time and the neuropathy does not involve her fingers although she states when she exercises in the gym in the past her hands have felt numb at the end of exercising. Most of the problems in her feet are pain and aching which is sometimes worse at night, especially if she has been on her feet all day, but she has not tried any medication for this.       Immunoglobulins are quantitatively normal. Serum free light chain ratio was normal. Urine electrophoresis was negative. Small MGUS, which is to be followed perspectively and no significant signs of amyloidosis.       The patient was seen 2014. MGUS is stable. Q.6 month paraprotein checks continued.   Patient seen in 2016 with a stable MGUS is 0.9 g/dL of IgG kappa        SOCIAL HISTORY: , lives with her . 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 Nonsmoker, nondrinker. No risk factors for HIV.       FAMILY HISTORY: Father  at 46 of MI. Mother  recently at 95 of old age. Two brothers, healthy. No blood cancers or malignancies. Son kidney stones at age 22.       Review of Systems   A comprehensive 14 point  "review of systems was performed and was negative except as mentioned.    Medications:  The current medication list was reviewed in the EMR    ALLERGIES:    Allergies   Allergen Reactions   • Contrast Dye Shortness Of Breath   • Iodinated Diagnostic Agents Shortness Of Breath   • Codeine Nausea Only   • Methylprednisolone Anxiety       Objective      Vitals:    11/16/18 1226   BP: 153/72   Pulse: 69   Resp: 22   Temp: 97.8 °F (36.6 °C)   TempSrc: Oral   SpO2: 96%   Weight: 100 kg (221 lb)   Height: 172.7 cm (68\")  Comment: new 6 mo height   PainSc:   3   PainLoc: Abdomen     Current Status 11/16/2018   ECOG score 0       Physical Exam    GENERAL:  Well-developed, well-nourished in no acute distress.   SKIN:  Warm, dry without rashes, purpura or petechiae.  EYES:  Pupils equal, round and reactive to light.  EOMs intact.  Conjunctivae normal.  EARS:  Hearing intact.  NOSE:  Septum midline.  No excoriations or nasal discharge.  MOUTH:  Tongue is well-papillated; no stomatitis or ulcers.  Lips normal.  THROAT:  Oropharynx without lesions or exudates.  NECK:  Supple with good range of motion; no thyromegaly or masses, no JVD.  LYMPHATICS:  No cervical, supraclavicular, axillary or inguinal adenopathy.  CHEST:  Lungs clear to auscultation. Good airflow.  CARDIAC:  Regular rate and rhythm without murmurs, rubs or gallops. Normal S1,S2.  ABDOMEN:  Soft, nontender with no hepatosplenomegaly or masses.  EXTREMITIES:  No clubbing, cyanosis or edema.  NEUROLOGICAL:  Cranial Nerves II-XII grossly intact.  No focal neurological deficits.  PSYCHIATRIC:  Normal affect and mood.        RECENT LABS:  Hematology WBC   Date Value Ref Range Status   11/09/2018 8.44 4.00 - 10.00 10*3/mm3 Final     RBC   Date Value Ref Range Status   11/09/2018 4.58 3.90 - 5.00 10*6/mm3 Final     Hemoglobin   Date Value Ref Range Status   11/09/2018 13.9 11.5 - 14.9 g/dL Final     Hematocrit   Date Value Ref Range Status   11/09/2018 43.1 34.0 - 45.0 % " Final     Platelets   Date Value Ref Range Status   11/09/2018 241 150 - 375 10*3/mm3 Final       M Protein 1.0 g/dl       Assessment/Plan   1.  IgG kappa MGUS with a normal light chain ratio is stable-abdominal fat pad negative for amyloid  2., history of aneurysm of the brain  3.  Peripheral neuropathy    Plan    1.  Repeat SPEP in  3 months and see me in 6 months with repeat HARISH  2     if there is an upward trend we will do a bone marrow although the normal light chain ratio is reassuring                       11/16/2018      CC:

## 2019-01-06 ENCOUNTER — APPOINTMENT (OUTPATIENT)
Dept: CT IMAGING | Facility: HOSPITAL | Age: 64
End: 2019-01-06

## 2019-01-06 ENCOUNTER — HOSPITAL ENCOUNTER (EMERGENCY)
Facility: HOSPITAL | Age: 64
Discharge: HOME | End: 2019-01-06
Attending: EMERGENCY MEDICINE | Admitting: EMERGENCY MEDICINE

## 2019-01-06 VITALS
TEMPERATURE: 97.8 F | DIASTOLIC BLOOD PRESSURE: 72 MMHG | HEIGHT: 68 IN | WEIGHT: 224.1 LBS | HEART RATE: 81 BPM | BODY MASS INDEX: 33.96 KG/M2 | SYSTOLIC BLOOD PRESSURE: 143 MMHG | OXYGEN SATURATION: 93 % | RESPIRATION RATE: 18 BRPM

## 2019-01-06 DIAGNOSIS — R51.9 ACUTE NONINTRACTABLE HEADACHE, UNSPECIFIED HEADACHE TYPE: ICD-10-CM

## 2019-01-06 DIAGNOSIS — M54.2 NECK PAIN ON RIGHT SIDE: Primary | ICD-10-CM

## 2019-01-06 LAB
ALBUMIN SERPL-MCNC: 4.3 G/DL (ref 3.5–5.2)
ALBUMIN/GLOB SERPL: 1.1 G/DL
ALP SERPL-CCNC: 67 U/L (ref 39–117)
ALT SERPL W P-5'-P-CCNC: 58 U/L (ref 1–33)
ANION GAP SERPL CALCULATED.3IONS-SCNC: 12.2 MMOL/L
AST SERPL-CCNC: 49 U/L (ref 1–32)
BASOPHILS # BLD AUTO: 0.02 10*3/MM3 (ref 0–0.2)
BASOPHILS NFR BLD AUTO: 0.3 % (ref 0–1.5)
BILIRUB SERPL-MCNC: 0.4 MG/DL (ref 0.1–1.2)
BUN BLD-MCNC: 14 MG/DL (ref 8–23)
BUN/CREAT SERPL: 19.7 (ref 7–25)
CALCIUM SPEC-SCNC: 9.9 MG/DL (ref 8.6–10.5)
CHLORIDE SERPL-SCNC: 101 MMOL/L (ref 98–107)
CO2 SERPL-SCNC: 26.8 MMOL/L (ref 22–29)
CREAT BLD-MCNC: 0.71 MG/DL (ref 0.57–1)
DEPRECATED RDW RBC AUTO: 45.4 FL (ref 37–54)
EOSINOPHIL # BLD AUTO: 0.28 10*3/MM3 (ref 0–0.7)
EOSINOPHIL NFR BLD AUTO: 4.2 % (ref 0.3–6.2)
ERYTHROCYTE [DISTWIDTH] IN BLOOD BY AUTOMATED COUNT: 13.5 % (ref 11.7–13)
GFR SERPL CREATININE-BSD FRML MDRD: 83 ML/MIN/1.73
GLOBULIN UR ELPH-MCNC: 3.9 GM/DL
GLUCOSE BLD-MCNC: 114 MG/DL (ref 65–99)
HCT VFR BLD AUTO: 40.2 % (ref 35.6–45.5)
HGB BLD-MCNC: 13.5 G/DL (ref 11.9–15.5)
IMM GRANULOCYTES # BLD AUTO: 0.01 10*3/MM3 (ref 0–0.03)
IMM GRANULOCYTES NFR BLD AUTO: 0.1 % (ref 0–0.5)
LYMPHOCYTES # BLD AUTO: 3.03 10*3/MM3 (ref 0.9–4.8)
LYMPHOCYTES NFR BLD AUTO: 45 % (ref 19.6–45.3)
MCH RBC QN AUTO: 30.8 PG (ref 26.9–32)
MCHC RBC AUTO-ENTMCNC: 33.6 G/DL (ref 32.4–36.3)
MCV RBC AUTO: 91.6 FL (ref 80.5–98.2)
MONOCYTES # BLD AUTO: 0.57 10*3/MM3 (ref 0.2–1.2)
MONOCYTES NFR BLD AUTO: 8.5 % (ref 5–12)
NEUTROPHILS # BLD AUTO: 2.83 10*3/MM3 (ref 1.9–8.1)
NEUTROPHILS NFR BLD AUTO: 42 % (ref 42.7–76)
PLATELET # BLD AUTO: 209 10*3/MM3 (ref 140–500)
PMV BLD AUTO: 10.4 FL (ref 6–12)
POTASSIUM BLD-SCNC: 3.7 MMOL/L (ref 3.5–5.2)
PROT SERPL-MCNC: 8.2 G/DL (ref 6–8.5)
RBC # BLD AUTO: 4.39 10*6/MM3 (ref 3.9–5.2)
SODIUM BLD-SCNC: 140 MMOL/L (ref 136–145)
TROPONIN T SERPL-MCNC: <0.01 NG/ML (ref 0–0.03)
WBC NRBC COR # BLD: 6.73 10*3/MM3 (ref 4.5–10.7)

## 2019-01-06 PROCEDURE — 25010000002 MORPHINE (PF) 10 MG/ML SOLUTION: Performed by: EMERGENCY MEDICINE

## 2019-01-06 PROCEDURE — 84484 ASSAY OF TROPONIN QUANT: CPT | Performed by: EMERGENCY MEDICINE

## 2019-01-06 PROCEDURE — 96374 THER/PROPH/DIAG INJ IV PUSH: CPT

## 2019-01-06 PROCEDURE — 99284 EMERGENCY DEPT VISIT MOD MDM: CPT

## 2019-01-06 PROCEDURE — 80053 COMPREHEN METABOLIC PANEL: CPT | Performed by: EMERGENCY MEDICINE

## 2019-01-06 PROCEDURE — 93005 ELECTROCARDIOGRAM TRACING: CPT

## 2019-01-06 PROCEDURE — 25010000002 METOCLOPRAMIDE PER 10 MG: Performed by: EMERGENCY MEDICINE

## 2019-01-06 PROCEDURE — 93010 ELECTROCARDIOGRAM REPORT: CPT | Performed by: INTERNAL MEDICINE

## 2019-01-06 PROCEDURE — 93005 ELECTROCARDIOGRAM TRACING: CPT | Performed by: EMERGENCY MEDICINE

## 2019-01-06 PROCEDURE — 96375 TX/PRO/DX INJ NEW DRUG ADDON: CPT

## 2019-01-06 PROCEDURE — 25010000002 METHYLPREDNISOLONE PER 125 MG: Performed by: EMERGENCY MEDICINE

## 2019-01-06 PROCEDURE — 25010000002 DIPHENHYDRAMINE PER 50 MG: Performed by: EMERGENCY MEDICINE

## 2019-01-06 PROCEDURE — 0 IOPAMIDOL PER 1 ML: Performed by: EMERGENCY MEDICINE

## 2019-01-06 PROCEDURE — 70496 CT ANGIOGRAPHY HEAD: CPT

## 2019-01-06 PROCEDURE — 70498 CT ANGIOGRAPHY NECK: CPT

## 2019-01-06 PROCEDURE — 85025 COMPLETE CBC W/AUTO DIFF WBC: CPT | Performed by: EMERGENCY MEDICINE

## 2019-01-06 RX ORDER — SODIUM CHLORIDE 0.9 % (FLUSH) 0.9 %
10 SYRINGE (ML) INJECTION AS NEEDED
Status: DISCONTINUED | OUTPATIENT
Start: 2019-01-06 | End: 2019-01-07 | Stop reason: HOSPADM

## 2019-01-06 RX ORDER — HYDROCODONE BITARTRATE AND ACETAMINOPHEN 5; 325 MG/1; MG/1
1 TABLET ORAL EVERY 6 HOURS PRN
Qty: 10 TABLET | Refills: 0 | Status: SHIPPED | OUTPATIENT
Start: 2019-01-06 | End: 2019-03-29

## 2019-01-06 RX ORDER — MORPHINE SULFATE 2 MG/ML
4 INJECTION, SOLUTION INTRAMUSCULAR; INTRAVENOUS ONCE
Status: COMPLETED | OUTPATIENT
Start: 2019-01-06 | End: 2019-01-06

## 2019-01-06 RX ORDER — METHYLPREDNISOLONE SODIUM SUCCINATE 125 MG/2ML
125 INJECTION, POWDER, LYOPHILIZED, FOR SOLUTION INTRAMUSCULAR; INTRAVENOUS ONCE
Status: COMPLETED | OUTPATIENT
Start: 2019-01-06 | End: 2019-01-06

## 2019-01-06 RX ORDER — DIPHENHYDRAMINE HYDROCHLORIDE 50 MG/ML
50 INJECTION INTRAMUSCULAR; INTRAVENOUS ONCE
Status: COMPLETED | OUTPATIENT
Start: 2019-01-06 | End: 2019-01-06

## 2019-01-06 RX ORDER — METOCLOPRAMIDE HYDROCHLORIDE 5 MG/ML
10 INJECTION INTRAMUSCULAR; INTRAVENOUS ONCE
Status: COMPLETED | OUTPATIENT
Start: 2019-01-06 | End: 2019-01-06

## 2019-01-06 RX ADMIN — MORPHINE SULFATE 4 MG: 10 INJECTION INTRAVENOUS at 22:12

## 2019-01-06 RX ADMIN — IOPAMIDOL 95 ML: 755 INJECTION, SOLUTION INTRAVENOUS at 21:33

## 2019-01-06 RX ADMIN — DIPHENHYDRAMINE HYDROCHLORIDE 50 MG: 50 INJECTION, SOLUTION INTRAMUSCULAR; INTRAVENOUS at 20:22

## 2019-01-06 RX ADMIN — METOCLOPRAMIDE 10 MG: 5 INJECTION, SOLUTION INTRAMUSCULAR; INTRAVENOUS at 20:22

## 2019-01-06 RX ADMIN — METHYLPREDNISOLONE SODIUM SUCCINATE 125 MG: 125 INJECTION, POWDER, FOR SOLUTION INTRAMUSCULAR; INTRAVENOUS at 20:22

## 2019-01-07 NOTE — ED NOTES
"Pt reports \"really bad jaw pain and a headache that is just killing me\" States I was walking my dog and all of a sudden my jaw started hurting and it caused me to get a really bad headache and now my neck is hurting me\" Reports pain 8/10. No acute distress noted at this time.  at bedside.      Luiza Conrad RN  01/06/19 2052    "
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
## 2019-02-08 ENCOUNTER — LAB (OUTPATIENT)
Dept: LAB | Facility: HOSPITAL | Age: 64
End: 2019-02-08

## 2019-02-08 DIAGNOSIS — D47.2 MONOCLONAL GAMMOPATHY: ICD-10-CM

## 2019-02-08 LAB
B2 MICROGLOB SERPL-MCNC: 1.6 MG/L (ref 0.8–2.2)
BASOPHILS # BLD AUTO: 0.05 10*3/MM3 (ref 0–0.1)
BASOPHILS NFR BLD AUTO: 0.6 % (ref 0–1.1)
DEPRECATED RDW RBC AUTO: 45.8 FL (ref 37–49)
EOSINOPHIL # BLD AUTO: 0.46 10*3/MM3 (ref 0–0.36)
EOSINOPHIL NFR BLD AUTO: 5.9 % (ref 1–5)
ERYTHROCYTE [DISTWIDTH] IN BLOOD BY AUTOMATED COUNT: 13.3 % (ref 11.7–14.5)
HCT VFR BLD AUTO: 41.7 % (ref 34–45)
HGB BLD-MCNC: 13.7 G/DL (ref 11.5–14.9)
IMM GRANULOCYTES # BLD AUTO: 0.02 10*3/MM3 (ref 0–0.03)
IMM GRANULOCYTES NFR BLD AUTO: 0.3 % (ref 0–0.5)
LYMPHOCYTES # BLD AUTO: 2.64 10*3/MM3 (ref 1–3.5)
LYMPHOCYTES NFR BLD AUTO: 34 % (ref 20–49)
MCH RBC QN AUTO: 31 PG (ref 27–33)
MCHC RBC AUTO-ENTMCNC: 32.9 G/DL (ref 32–35)
MCV RBC AUTO: 94.3 FL (ref 83–97)
MONOCYTES # BLD AUTO: 0.57 10*3/MM3 (ref 0.25–0.8)
MONOCYTES NFR BLD AUTO: 7.3 % (ref 4–12)
NEUTROPHILS # BLD AUTO: 4.02 10*3/MM3 (ref 1.5–7)
NEUTROPHILS NFR BLD AUTO: 51.9 % (ref 39–75)
NRBC BLD AUTO-RTO: 0 /100 WBC (ref 0–0)
PLATELET # BLD AUTO: 234 10*3/MM3 (ref 150–375)
PMV BLD AUTO: 10.5 FL (ref 8.9–12.1)
RBC # BLD AUTO: 4.42 10*6/MM3 (ref 3.9–5)
WBC NRBC COR # BLD: 7.76 10*3/MM3 (ref 4–10)

## 2019-02-08 PROCEDURE — 82232 ASSAY OF BETA-2 PROTEIN: CPT | Performed by: INTERNAL MEDICINE

## 2019-02-08 PROCEDURE — 36415 COLL VENOUS BLD VENIPUNCTURE: CPT | Performed by: INTERNAL MEDICINE

## 2019-02-08 PROCEDURE — 85025 COMPLETE CBC W/AUTO DIFF WBC: CPT | Performed by: INTERNAL MEDICINE

## 2019-02-11 LAB
ALBUMIN SERPL-MCNC: 3.7 G/DL (ref 2.9–4.4)
ALBUMIN/GLOB SERPL: 1 {RATIO} (ref 0.7–1.7)
ALPHA1 GLOB FLD ELPH-MCNC: 0.3 G/DL (ref 0–0.4)
ALPHA2 GLOB SERPL ELPH-MCNC: 1 G/DL (ref 0.4–1)
B-GLOBULIN SERPL ELPH-MCNC: 1.1 G/DL (ref 0.7–1.3)
GAMMA GLOB SERPL ELPH-MCNC: 1.6 G/DL (ref 0.4–1.8)
GLOBULIN SER CALC-MCNC: 4 G/DL (ref 2.2–3.9)
IGA SERPL-MCNC: 91 MG/DL (ref 87–352)
IGG SERPL-MCNC: 1371 MG/DL (ref 700–1600)
IGM SERPL-MCNC: 108 MG/DL (ref 26–217)
INTERPRETATION SERPL IEP-IMP: ABNORMAL
KAPPA LC SERPL-MCNC: 13.8 MG/L (ref 3.3–19.4)
KAPPA LC/LAMBDA SER: 1.25 {RATIO} (ref 0.26–1.65)
LAMBDA LC FREE SERPL-MCNC: 11 MG/L (ref 5.7–26.3)
Lab: ABNORMAL
M-SPIKE: 1 G/DL
PROT SERPL-MCNC: 7.7 G/DL (ref 6–8.5)

## 2019-03-04 NOTE — TELEPHONE ENCOUNTER
The patient's prescription is changing from generic ticagrelor to Brilinta?  Please check with pharmacy before I approve medication.  I do not want patient to be confused and have both prescriptions active.  She has been on the generic, but the refill is requesting brand name.

## 2019-03-05 RX ORDER — TICAGRELOR 60 MG/1
TABLET ORAL
Qty: 180 TABLET | Refills: 3 | Status: SHIPPED | OUTPATIENT
Start: 2019-03-05 | End: 2020-02-21

## 2019-03-05 NOTE — TELEPHONE ENCOUNTER
Spoke with Amber at [facility] who states that they do not carry the generic for Brilinta. States that patient has been receiving the name brand Brilinta the whole time.

## 2019-03-27 ENCOUNTER — TELEPHONE (OUTPATIENT)
Dept: INTERNAL MEDICINE | Facility: CLINIC | Age: 64
End: 2019-03-27

## 2019-03-27 DIAGNOSIS — Z79.4 TYPE 2 DIABETES MELLITUS WITHOUT COMPLICATION, WITH LONG-TERM CURRENT USE OF INSULIN (HCC): Primary | ICD-10-CM

## 2019-03-27 DIAGNOSIS — E11.9 TYPE 2 DIABETES MELLITUS WITHOUT COMPLICATION, WITH LONG-TERM CURRENT USE OF INSULIN (HCC): Primary | ICD-10-CM

## 2019-03-27 NOTE — TELEPHONE ENCOUNTER
She needs to get a cmp,flp, urine microalbumin (one with creatinine) and a1c tomorrow for her OV on Friday

## 2019-03-29 ENCOUNTER — OFFICE VISIT (OUTPATIENT)
Dept: INTERNAL MEDICINE | Facility: CLINIC | Age: 64
End: 2019-03-29

## 2019-03-29 VITALS
RESPIRATION RATE: 20 BRPM | BODY MASS INDEX: 32.73 KG/M2 | WEIGHT: 221 LBS | TEMPERATURE: 98.3 F | HEART RATE: 66 BPM | OXYGEN SATURATION: 98 % | SYSTOLIC BLOOD PRESSURE: 134 MMHG | HEIGHT: 69 IN | DIASTOLIC BLOOD PRESSURE: 76 MMHG

## 2019-03-29 DIAGNOSIS — E11.9 TYPE 2 DIABETES MELLITUS WITHOUT COMPLICATION, WITHOUT LONG-TERM CURRENT USE OF INSULIN (HCC): ICD-10-CM

## 2019-03-29 DIAGNOSIS — E78.49 OTHER HYPERLIPIDEMIA: Primary | ICD-10-CM

## 2019-03-29 DIAGNOSIS — G89.29 CHRONIC ABDOMINAL PAIN: ICD-10-CM

## 2019-03-29 DIAGNOSIS — I10 ESSENTIAL HYPERTENSION: ICD-10-CM

## 2019-03-29 DIAGNOSIS — R10.9 CHRONIC ABDOMINAL PAIN: ICD-10-CM

## 2019-03-29 LAB
HBA1C MFR BLD: 6.7 %
LDLC SERPL DIRECT ASSAY-MCNC: 47 MG/DL
NON HDL CHOLESTEROL: 89 MG/DL

## 2019-03-29 PROCEDURE — 99214 OFFICE O/P EST MOD 30 MIN: CPT | Performed by: INTERNAL MEDICINE

## 2019-03-29 NOTE — PROGRESS NOTES
Subjective   Khalida Gilliland is a 63 y.o. female who presents for     Chief Complaint   Patient presents with   • Diabetes     fup labs    • Anxiety       She went to the [facility] in [place]. She was seen by Dr. Neely; she had normal blood work and she had an USN that showed fatty liver. He felt that she has nerve entrapment. She has pain on the right side of her abdomen since wearing a boot for her right foot. She thinks pulling and lifting makes it worse. She has been in a boot for 3 days for a heel spur; she is supposed to be in it for 6 weeks. She has one more day of steroids for it. She has not been checking her sugars regularly. She does not check her BP. She denies chest pain or dyspnea. She has been doing well emotionally.        Patient Active Problem List   Diagnosis   • Monoclonal gammopathy   • Hx of cerebral aneurysm repair   • Allergy history, radiographic dye   • Cerebral aneurysm, nonruptured   • Neck pain   • Contrast media allergy   • Migraine with status migrainosus   • Other hyperlipidemia   • Type 2 diabetes mellitus without complication, without long-term current use of insulin (CMS/[facility])   • Essential hypertension   • Chronic abdominal pain       Allergies   Allergen Reactions   • Contrast Dye Shortness Of Breath   • Iodinated Diagnostic Agents Shortness Of Breath   • Codeine Nausea Only   • Methylprednisolone Anxiety       Current Outpatient Medications on File Prior to Visit   Medication Sig Dispense Refill   • albuterol (PROVENTIL HFA;VENTOLIN HFA) 108 (90 BASE) MCG/ACT inhaler Inhale 2 puffs every 6 (six) hours as needed for wheezing or shortness of breath (Cough). (Patient taking differently: Inhale 2 puffs Every 4 (Four) Hours As Needed for Wheezing or Shortness of Air (Cough).) 1 inhaler 0   • Ascorbic Acid (VITAMIN C PO) Take 1,000 mg by mouth Daily. PT INSTRUCTED TO CONTINUE PER DR. CALLUM URBINA     • aspirin 81 MG EC tablet Take 1 tablet by mouth Daily. 30 tablet 6   • atorvastatin  (LIPITOR) 20 MG tablet Take 20 mg by mouth Daily.     • BRILINTA 60 MG tablet tablet TAKE 1 TABLET TWICE A  tablet 3   • butalbital-acetaminophen-caffeine (FIORICET, ESGIC) -40 MG per tablet Take 1 tablet by mouth Every 4 (Four) Hours As Needed for Headache.     • cetirizine (zyrTEC) 10 MG tablet Take 10 mg by mouth Daily.     • cholecalciferol (VITAMIN D3) 1000 units tablet Take 1,000 Units by mouth Daily. PER PT TO CONTINUE PER DR. CALLUM URBINA     • cycloSPORINE (RESTASIS) 0.05 % ophthalmic emulsion Administer 1 drop to both eyes 2 (Two) Times a Day.     • docusate sodium 100 MG capsule Take 100 mg by mouth 2 (Two) Times a Day As Needed for Constipation.     • fluticasone (VERAMYST) 27.5 MCG/SPRAY nasal spray 2 sprays by Each Nare route Daily.     • glucosamine-chondroitin 500-400 MG capsule capsule Take 1 capsule by mouth 2 (Two) Times a Day With Meals. PT TO CONTINUE PER DR. CALLUM URBINA     • hydrochlorothiazide (HYDRODIURIL) 12.5 MG tablet Take 12.5 mg by mouth daily.     • metFORMIN (GLUCOPHAGE) 500 MG tablet Take 500 mg by mouth Daily With Breakfast.     • omeprazole (priLOSEC) 40 MG capsule Take 40 mg by mouth Every Morning.     • potassium gluconate 595 (99 K) MG tablet tablet Take 595 mg by mouth Daily.     • pregabalin (LYRICA) 100 MG capsule Take 100 mg by mouth 2 (two) times a day.     • propranolol (INDERAL) 40 MG tablet Take 40 mg by mouth 2 (two) times a day.     • raNITIdine (ZANTAC) 150 MG tablet Take 150 mg by mouth 2 (Two) Times a Day.     • [DISCONTINUED] diphenhydrAMINE (BENADRYL) 50 MG capsule Take 1 capsule by mouth Every 6 (Six) Hours As Needed for Itching. 1 capsule 0   • [DISCONTINUED] diphenhydrAMINE-acetaminophen (TYLENOL PM)  MG tablet per tablet Take 2 tablets by mouth Every Night.     • [DISCONTINUED] HYDROcodone-acetaminophen (NORCO) 5-325 MG per tablet Take 1 tablet by mouth Every 6 (Six) Hours As Needed for Moderate Pain . 10 tablet 0     No current  facility-administered medications on file prior to visit.        Past Medical History:   Diagnosis Date   • Anemia    • Aneurysm (CMS/HCC)    • Asthma    • Cancer (CMS/HCC) 2014    Basal Cell Carcinoma Left Arm,SKIN CANCER   • Depression    • Diabetes mellitus (CMS/HCC)    • Diverticulosis    • Fatty liver    • GERD (gastroesophageal reflux disease)    • H/O diverticulitis of colon     Sigmoid colon   • History of kidney stones 2011   • History of surgery for cerebral aneurysm     Clipping of cerebral Aneurysm   • Hx of migraines    • Hyperlipidemia    • Hypertension    • Irritable bowel syndrome    • MGUS (monoclonal gammopathy of unknown significance)    • Monoclonal gammopathy of unknown significance    • Neuropathy, feet    • Obesity    • Peptic ulceration    • Pneumonia    • PONV (postoperative nausea and vomiting)    • Stroke (CMS/HCC) 10/2011    Possible, workup negative       Family History   Problem Relation Age of Onset   • Skin cancer Mother    • Dementia Mother    • KALYAN disease Mother    • Heart disease Father    • Heart attack Father    • Hypertension Father    • Aneurysm Son         cerebral   • Nephrolithiasis Son    • Malig Hyperthermia Neg Hx        Social History     Socioeconomic History   • Marital status:      Spouse name: Roe   • Number of children: 3   • Years of education: College   • Highest education level: Not on file   Occupational History   • Occupation: unemployed     Employer: UNEMPLOYED   Tobacco Use   • Smoking status: Former Smoker     Years: 1.00     Types: Cigarettes   • Smokeless tobacco: Never Used   Substance and Sexual Activity   • Alcohol use: Yes     Comment: 1-2 DRINKS OF WINE MONTHLY    • Drug use: No   • Sexual activity: Defer       Past Surgical History:   Procedure Laterality Date   • APPENDECTOMY  1982    Emergency at time of 2nd    • BASAL CELL CARCINOMA EXCISION Left 2014    Excision basal cell carcinoma, left arm (1.1 cm) with  frozen section control and layered wound closure ( 3.1 cm), Dr. Isael Andrade, [facility]   • BRAIN SURGERY  2004    Ruptured aneurysm, clipped/Dr. Sims   • CEREBRAL ANEURYSM REPAIR      clipping of cerebral aneurysm   • CEREBRAL ANGIOGRAM N/A 2017    Procedure: CEREBRAL ANGIOGRAM ;  Surgeon: Orlando Bella MD;  Location: [facility] ;  Service:    • CEREBRAL ANGIOGRAM N/A 10/11/2017    Procedure: CEREBRAL ANGIOGRAM;  Surgeon: Orlando Bella MD;  Location: [facility] ;  Service:    •  SECTION  , ,    •  SECTION     • CHOLECYSTECTOMY     • COLON RESECTION WITH COLOSTOMY Right    • COLON SURGERY      Sigmoid colectomy in the past   • COLONOSCOPY  2009   • EMBOLIZATION CEREBRAL N/A 2017    Procedure: Cerebral angiography and embolization of cerebral aneurysm with Pipeline Embolization Device;  Surgeon: Orlando Bella MD;  Location: [facility] ;  Service:    • EMBOLIZATION CEREBRAL N/A 10/31/2018    Procedure: Cerebral angiogram with embolization of cerebral aneurysm;  Surgeon: Orlando Bella MD;  Location: [facility] ;  Service: Neurosurgery   • ILEOSTOMY REVISION     • INCISIONAL HERNIA REPAIR      Patient suffered some nerve entrapment secondary to the attack, some of which were removed during a secondary operation.   • INGUINAL HERNIA REPAIR     • LARYNX SURGERY     • OSTOMY TAKE DOWN     • TUBAL ABDOMINAL LIGATION     • TUBAL ABDOMINAL LIGATION     • URETERAL STENT INSERTION  2011    Due to kidney stones       The following portions of the patient's history were reviewed and updated as appropriate: allergies, current medications, past medical history, past family history, past social history and past surgical history.    Review of Systems   Respiratory: Negative for shortness of breath.    Cardiovascular: Negative for chest pain.   Gastrointestinal: Positive for abdominal pain.       Objective   Vitals:    19  "1102   BP: 134/76   Pulse: 66   Resp: 20   Temp: 98.3 °F (36.8 °C)   TempSrc: Oral   SpO2: 98%   Weight: 100 kg (221 lb)   Height: 175.3 cm (69\")     Physical Exam   Constitutional: She appears well-developed and well-nourished.   Cardiovascular: Normal rate, regular rhythm and normal heart sounds.   Pulmonary/Chest: Effort normal and breath sounds normal.   Abdominal: Soft. There is tenderness.   Tender RLQ. It is not as severe  As in past   Musculoskeletal:   Boot right foot   Neurological: She is alert.   Psychiatric: She has a normal mood and affect.   Vitals reviewed.      Procedures    Assessment/Plan   Khalida was seen today for diabetes and anxiety.    Diagnoses and all orders for this visit:    Other hyperlipidemia    Type 2 diabetes mellitus without complication, without long-term current use of insulin (CMS/[facility])  -     Comprehensive Metabolic Panel; Future  -     Hemoglobin A1c; Future    Essential hypertension    Chronic abdominal pain        Return in about 6 months (around 9/29/2019).    Labs and note from [place] reviewed. Her a1c and LDL are excellent. Encouraged eye exam. I will get her old records to see when she is due for a cscope. Her SBP is 4 points over goal but she is on steroids today.        "

## 2019-04-04 ENCOUNTER — TELEPHONE (OUTPATIENT)
Dept: INTERNAL MEDICINE | Facility: CLINIC | Age: 64
End: 2019-04-04

## 2019-04-04 DIAGNOSIS — Z12.11 COLON CANCER SCREENING: Primary | ICD-10-CM

## 2019-04-15 RX ORDER — BUTALBITAL, ACETAMINOPHEN AND CAFFEINE 50; 325; 40 MG/1; MG/1; MG/1
1 TABLET ORAL 2 TIMES DAILY
Qty: 20 TABLET | Refills: 2 | Status: SHIPPED | OUTPATIENT
Start: 2019-04-15 | End: 2019-08-19 | Stop reason: SDUPTHER

## 2019-04-18 ENCOUNTER — TELEPHONE (OUTPATIENT)
Dept: INTERNAL MEDICINE | Facility: CLINIC | Age: 64
End: 2019-04-18

## 2019-04-18 NOTE — TELEPHONE ENCOUNTER
----- Message from [facility] sent at 4/18/2019  2:56 PM EDT -----  Contact: [phone]  LEFT EYE INFECTION (SWOLLEN) WANTS EYE DROPS SENT IN TO PHARMACY

## 2019-04-19 ENCOUNTER — OFFICE VISIT (OUTPATIENT)
Dept: INTERNAL MEDICINE | Facility: CLINIC | Age: 64
End: 2019-04-19

## 2019-04-19 VITALS
WEIGHT: 217 LBS | DIASTOLIC BLOOD PRESSURE: 82 MMHG | HEIGHT: 68 IN | TEMPERATURE: 97.4 F | SYSTOLIC BLOOD PRESSURE: 106 MMHG | BODY MASS INDEX: 32.89 KG/M2 | HEART RATE: 76 BPM | OXYGEN SATURATION: 98 % | RESPIRATION RATE: 20 BRPM

## 2019-04-19 DIAGNOSIS — H10.32 ACUTE CONJUNCTIVITIS OF LEFT EYE, UNSPECIFIED ACUTE CONJUNCTIVITIS TYPE: Primary | ICD-10-CM

## 2019-04-19 PROCEDURE — 99213 OFFICE O/P EST LOW 20 MIN: CPT | Performed by: INTERNAL MEDICINE

## 2019-04-19 RX ORDER — TOBRAMYCIN 3 MG/ML
2 SOLUTION/ DROPS OPHTHALMIC
Qty: 1 BOTTLE | Refills: 0 | Status: SHIPPED | OUTPATIENT
Start: 2019-04-19 | End: 2020-02-21

## 2019-04-19 NOTE — PROGRESS NOTES
Subjective        Chief Complaint   Patient presents with   • Follow-up     eye pain discharge x2 days   • Eye Pain     Lt eye 20/25  Rt eye 20/25       Khalida Gilliland is a 63 y.o. female who presents for    Patient Active Problem List   Diagnosis   • Monoclonal gammopathy   • Hx of cerebral aneurysm repair   • Allergy history, radiographic dye   • Cerebral aneurysm, nonruptured   • Neck pain   • Contrast media allergy   • Migraine with status migrainosus   • Other hyperlipidemia   • Type 2 diabetes mellitus without complication, without long-term current use of insulin (CMS/[facility])   • Essential hypertension   • Chronic abdominal pain   • Acute conjunctivitis of left eye       History of Present Illness     Her left eye has had yellow discharge for 2 days without pain. She is wiping it a lot. She is not sneezing a lot. She did not get anything in it. Her nose was running a little yesterday. She has not had a lot of allergies this spring. She has opened up the pool. She denies fever or chills.  Allergies   Allergen Reactions   • Contrast Dye Shortness Of Breath   • Iodinated Diagnostic Agents Shortness Of Breath   • Codeine Nausea Only   • Methylprednisolone Anxiety       Current Outpatient Medications on File Prior to Visit   Medication Sig Dispense Refill   • albuterol (PROVENTIL HFA;VENTOLIN HFA) 108 (90 BASE) MCG/ACT inhaler Inhale 2 puffs every 6 (six) hours as needed for wheezing or shortness of breath (Cough). (Patient taking differently: Inhale 2 puffs Every 4 (Four) Hours As Needed for Wheezing or Shortness of Air (Cough).) 1 inhaler 0   • Ascorbic Acid (VITAMIN C PO) Take 1,000 mg by mouth Daily. PT INSTRUCTED TO CONTINUE PER DR. CALLUM URBINA     • aspirin 81 MG EC tablet Take 1 tablet by mouth Daily. 30 tablet 6   • atorvastatin (LIPITOR) 20 MG tablet Take 20 mg by mouth Daily.     • BRILINTA 60 MG tablet tablet TAKE 1 TABLET TWICE A  tablet 3   • butalbital-acetaminophen-caffeine (FIORICET,  ESGIC) -40 MG per tablet Take 1 tablet by mouth 2 (Two) Times a Day. 20 tablet 2   • cetirizine (zyrTEC) 10 MG tablet Take 10 mg by mouth Daily.     • cholecalciferol (VITAMIN D3) 1000 units tablet Take 1,000 Units by mouth Daily. PER PT TO CONTINUE PER DR. CALLUM URBINA     • cycloSPORINE (RESTASIS) 0.05 % ophthalmic emulsion Administer 1 drop to both eyes 2 (Two) Times a Day.     • docusate sodium 100 MG capsule Take 100 mg by mouth 2 (Two) Times a Day As Needed for Constipation.     • fluticasone (VERAMYST) 27.5 MCG/SPRAY nasal spray 2 sprays by Each Nare route Daily.     • glucosamine-chondroitin 500-400 MG capsule capsule Take 1 capsule by mouth 2 (Two) Times a Day With Meals. PT TO CONTINUE PER DR. CALLUM URBINA     • hydrochlorothiazide (HYDRODIURIL) 12.5 MG tablet Take 12.5 mg by mouth daily.     • metFORMIN (GLUCOPHAGE) 500 MG tablet Take 500 mg by mouth Daily With Breakfast.     • omeprazole (priLOSEC) 40 MG capsule Take 40 mg by mouth Every Morning.     • potassium gluconate 595 (99 K) MG tablet tablet Take 595 mg by mouth Daily.     • pregabalin (LYRICA) 100 MG capsule Take 100 mg by mouth 2 (two) times a day.     • propranolol (INDERAL) 40 MG tablet Take 40 mg by mouth 2 (two) times a day.     • raNITIdine (ZANTAC) 150 MG tablet Take 150 mg by mouth 2 (Two) Times a Day.       No current facility-administered medications on file prior to visit.        Past Medical History:   Diagnosis Date   • Anemia    • Aneurysm (CMS/HCC)    • Asthma    • Cancer (CMS/HCC) 07/30/2014    Basal Cell Carcinoma Left Arm,SKIN CANCER   • Depression    • Diabetes mellitus (CMS/HCC)    • Diverticulosis    • Fatty liver    • GERD (gastroesophageal reflux disease)    • H/O diverticulitis of colon     Sigmoid colon   • History of kidney stones 06/2011   • History of surgery for cerebral aneurysm     Clipping of cerebral Aneurysm   • Hx of migraines    • Hyperlipidemia    • Hypertension    • Irritable bowel syndrome    •  MGUS (monoclonal gammopathy of unknown significance)    • Monoclonal gammopathy of unknown significance    • Neuropathy, feet    • Obesity    • Peptic ulceration    • Pneumonia    • PONV (postoperative nausea and vomiting)    • Stroke (CMS/HCC) 10/2011    Possible, workup negative       Past Surgical History:   Procedure Laterality Date   • APPENDECTOMY      Emergency at time of 2nd    • BASAL CELL CARCINOMA EXCISION Left 2014    Excision basal cell carcinoma, left arm (1.1 cm) with frozen section control and layered wound closure ( 3.1 cm), Dr. Isael Andrade, [facility]   • BRAIN SURGERY  2004    Ruptured aneurysm, clipped/Dr. Sims   • CEREBRAL ANEURYSM REPAIR      clipping of cerebral aneurysm   • CEREBRAL ANGIOGRAM N/A 2017    Procedure: CEREBRAL ANGIOGRAM ;  Surgeon: Orlando Bella MD;  Location: [facility] ;  Service:    • CEREBRAL ANGIOGRAM N/A 10/11/2017    Procedure: CEREBRAL ANGIOGRAM;  Surgeon: Orlando Bella MD;  Location: [facility] ;  Service:    •  SECTION  , ,    •  SECTION     • CHOLECYSTECTOMY     • COLON RESECTION WITH COLOSTOMY Right    • COLON SURGERY      Sigmoid colectomy in the past   • COLONOSCOPY  2009   • EMBOLIZATION CEREBRAL N/A 2017    Procedure: Cerebral angiography and embolization of cerebral aneurysm with Pipeline Embolization Device;  Surgeon: Orlando Bella MD;  Location: [facility] ;  Service:    • EMBOLIZATION CEREBRAL N/A 10/31/2018    Procedure: Cerebral angiogram with embolization of cerebral aneurysm;  Surgeon: Orlando Bella MD;  Location: [facility] ;  Service: Neurosurgery   • ILEOSTOMY REVISION     • INCISIONAL HERNIA REPAIR      Patient suffered some nerve entrapment secondary to the attack, some of which were removed during a secondary operation.   • INGUINAL HERNIA REPAIR     • LARYNX SURGERY     • OSTOMY TAKE DOWN     • TUBAL ABDOMINAL LIGATION     •  "TUBAL ABDOMINAL LIGATION     • URETERAL STENT INSERTION  06/2011    Due to kidney stones       Family History   Problem Relation Age of Onset   • Skin cancer Mother    • Dementia Mother    • KALYAN disease Mother    • Heart disease Father    • Heart attack Father    • Hypertension Father    • Aneurysm Son         cerebral   • Nephrolithiasis Son    • Malig Hyperthermia Neg Hx        Social History     Socioeconomic History   • Marital status:      Spouse name: Roe   • Number of children: 3   • Years of education: College   • Highest education level: Not on file   Occupational History   • Occupation: unemployed     Employer: UNEMPLOYED   Tobacco Use   • Smoking status: Former Smoker     Years: 1.00     Types: Cigarettes   • Smokeless tobacco: Never Used   Substance and Sexual Activity   • Alcohol use: Yes     Comment: 1-2 DRINKS OF WINE MONTHLY    • Drug use: No   • Sexual activity: Defer           The following portions of the patient's history were reviewed and updated as appropriate: problem list, allergies, current medications, past medical history, past family history, past social history and past surgical history.    Review of Systems   Constitutional: Negative for fever.   Eyes: Positive for redness and itching.       Immunization History   Administered Date(s) Administered   • Flu Vaccine Quad PF >36MO 11/18/2018   • Hepatitis A 12/19/2016, 06/22/2017   • Hepatitis B 12/19/2016, 06/22/2017, 11/18/2017   • Pneumococcal Conjugate 13-Valent (PCV13) 10/19/2015   • Pneumococcal Polysaccharide (PPSV23) 01/01/2008   • Tdap 09/03/2015   • Zostavax 10/09/2014       Objective   Vitals:    04/19/19 1031   BP: 106/82   Pulse: 76   Resp: 20   Temp: 97.4 °F (36.3 °C)   TempSrc: Oral   SpO2: 98%   Weight: 98.4 kg (217 lb)   Height: 172.7 cm (68\")     Physical Exam   Constitutional: She appears well-developed and well-nourished.   HENT:   Mouth/Throat: Oropharynx is clear and moist.   Eyes: EOM are normal. Pupils are " equal, round, and reactive to light. Right conjunctiva is not injected. Left conjunctiva is injected. Left conjunctiva has no hemorrhage.   Vitals reviewed.      Procedures    Assessment/Plan   Khalida was seen today for follow-up and eye pain.    Diagnoses and all orders for this visit:    Acute conjunctivitis of left eye, unspecified acute conjunctivitis type  -     tobramycin (TOBREX) 0.3 % solution ophthalmic solution; Administer 2 drops into the left eye Every 4 (Four) Hours.                 No Follow-up on file.

## 2019-05-01 ENCOUNTER — LAB (OUTPATIENT)
Dept: LAB | Facility: HOSPITAL | Age: 64
End: 2019-05-01

## 2019-05-01 DIAGNOSIS — D47.2 MONOCLONAL GAMMOPATHY: ICD-10-CM

## 2019-05-01 LAB
ALBUMIN SERPL-MCNC: 4 G/DL (ref 3.5–5.2)
ALBUMIN/GLOB SERPL: 1.1 G/DL (ref 1.1–2.4)
ALP SERPL-CCNC: 88 U/L (ref 38–116)
ALT SERPL W P-5'-P-CCNC: 54 U/L (ref 0–33)
ANION GAP SERPL CALCULATED.3IONS-SCNC: 12.1 MMOL/L
AST SERPL-CCNC: 42 U/L (ref 0–32)
BASOPHILS # BLD AUTO: 0.04 10*3/MM3 (ref 0–0.2)
BASOPHILS NFR BLD AUTO: 0.6 % (ref 0–1.5)
BILIRUB SERPL-MCNC: 0.3 MG/DL (ref 0.2–1.2)
BUN BLD-MCNC: 12 MG/DL (ref 6–20)
BUN/CREAT SERPL: 20.3 (ref 7.3–30)
CALCIUM SPEC-SCNC: 9.5 MG/DL (ref 8.5–10.2)
CHLORIDE SERPL-SCNC: 103 MMOL/L (ref 98–107)
CO2 SERPL-SCNC: 25.9 MMOL/L (ref 22–29)
CREAT BLD-MCNC: 0.59 MG/DL (ref 0.6–1.1)
DEPRECATED RDW RBC AUTO: 46.1 FL (ref 37–54)
EOSINOPHIL # BLD AUTO: 0.42 10*3/MM3 (ref 0–0.4)
EOSINOPHIL NFR BLD AUTO: 6 % (ref 0.3–6.2)
ERYTHROCYTE [DISTWIDTH] IN BLOOD BY AUTOMATED COUNT: 13.4 % (ref 12.3–15.4)
GFR SERPL CREATININE-BSD FRML MDRD: 103 ML/MIN/1.73
GLOBULIN UR ELPH-MCNC: 3.5 GM/DL (ref 1.8–3.5)
GLUCOSE BLD-MCNC: 157 MG/DL (ref 74–124)
HCT VFR BLD AUTO: 40.7 % (ref 34–46.6)
HGB BLD-MCNC: 13.4 G/DL (ref 12–15.9)
IMM GRANULOCYTES # BLD AUTO: 0.02 10*3/MM3 (ref 0–0.05)
IMM GRANULOCYTES NFR BLD AUTO: 0.3 % (ref 0–0.5)
LYMPHOCYTES # BLD AUTO: 2.17 10*3/MM3 (ref 0.7–3.1)
LYMPHOCYTES NFR BLD AUTO: 30.8 % (ref 19.6–45.3)
MCH RBC QN AUTO: 30.9 PG (ref 26.6–33)
MCHC RBC AUTO-ENTMCNC: 32.9 G/DL (ref 31.5–35.7)
MCV RBC AUTO: 93.8 FL (ref 79–97)
MONOCYTES # BLD AUTO: 0.61 10*3/MM3 (ref 0.1–0.9)
MONOCYTES NFR BLD AUTO: 8.7 % (ref 5–12)
NEUTROPHILS # BLD AUTO: 3.78 10*3/MM3 (ref 1.7–7)
NEUTROPHILS NFR BLD AUTO: 53.6 % (ref 42.7–76)
NRBC BLD AUTO-RTO: 0 /100 WBC (ref 0–0.2)
PLATELET # BLD AUTO: 234 10*3/MM3 (ref 140–450)
PMV BLD AUTO: 10.8 FL (ref 6–12)
POTASSIUM BLD-SCNC: 4.1 MMOL/L (ref 3.5–4.7)
PROT SERPL-MCNC: 7.5 G/DL (ref 6.3–8)
RBC # BLD AUTO: 4.34 10*6/MM3 (ref 3.77–5.28)
SODIUM BLD-SCNC: 141 MMOL/L (ref 134–145)
WBC NRBC COR # BLD: 7.04 10*3/MM3 (ref 3.4–10.8)

## 2019-05-01 PROCEDURE — 36415 COLL VENOUS BLD VENIPUNCTURE: CPT | Performed by: INTERNAL MEDICINE

## 2019-05-01 PROCEDURE — 85025 COMPLETE CBC W/AUTO DIFF WBC: CPT | Performed by: INTERNAL MEDICINE

## 2019-05-01 PROCEDURE — 80053 COMPREHEN METABOLIC PANEL: CPT | Performed by: INTERNAL MEDICINE

## 2019-05-02 LAB
ALBUMIN SERPL-MCNC: 3.3 G/DL (ref 2.9–4.4)
ALBUMIN/GLOB SERPL: 0.9 {RATIO} (ref 0.7–1.7)
ALPHA1 GLOB FLD ELPH-MCNC: 0.2 G/DL (ref 0–0.4)
ALPHA2 GLOB SERPL ELPH-MCNC: 0.9 G/DL (ref 0.4–1)
B-GLOBULIN SERPL ELPH-MCNC: 1 G/DL (ref 0.7–1.3)
GAMMA GLOB SERPL ELPH-MCNC: 1.5 G/DL (ref 0.4–1.8)
GLOBULIN SER CALC-MCNC: 3.7 G/DL (ref 2.2–3.9)
IGA SERPL-MCNC: 91 MG/DL (ref 87–352)
IGG SERPL-MCNC: 1242 MG/DL (ref 700–1600)
IGM SERPL-MCNC: 102 MG/DL (ref 26–217)
INTERPRETATION SERPL IEP-IMP: ABNORMAL
KAPPA LC SERPL-MCNC: 17.3 MG/L (ref 3.3–19.4)
KAPPA LC/LAMBDA SER: 1.54 {RATIO} (ref 0.26–1.65)
LAMBDA LC FREE SERPL-MCNC: 11.2 MG/L (ref 5.7–26.3)
Lab: ABNORMAL
M-SPIKE: 1 G/DL
PROT SERPL-MCNC: 7 G/DL (ref 6–8.5)

## 2019-05-03 DIAGNOSIS — R10.9 RIGHT SIDED ABDOMINAL PAIN: ICD-10-CM

## 2019-05-03 DIAGNOSIS — Z12.11 COLON CANCER SCREENING: Primary | ICD-10-CM

## 2019-05-03 RX ORDER — SODIUM CHLORIDE, SODIUM LACTATE, POTASSIUM CHLORIDE, CALCIUM CHLORIDE 600; 310; 30; 20 MG/100ML; MG/100ML; MG/100ML; MG/100ML
30 INJECTION, SOLUTION INTRAVENOUS CONTINUOUS
Status: CANCELLED | OUTPATIENT
Start: 2019-05-03

## 2019-05-09 NOTE — PROGRESS NOTES
Subjective     REASON FOR FOLLOWUP:   1. MGUS. Since June 2010 0.8g/dl  2. ? TIA 10/2011   3. Recurrent diverticulitis October 2014.                    4.   neuropathy chronic-fat pad biopsy negative for amyloid        History of Present Illness  patient is a 61-year-old female with monoclonal gammopathy of undetermined significance for the last 9 years she's here for routine annual follow-up feeling very well overall.    She is found to be diabetic and is managing this with oral diet control but has not been able to reduce any more weight.    Her neuropathy stable she has no bone pain and otherwise feels well she is up-to-date with mammography Pap smears and colonoscopy    Her IgG kappa MGUS is stable at 1.0 g/dL with a normal free light chain ratio and beta-2 microglobulin last week    She had further stenting of an aneurysm in her brain which apparently was not successful and she is going to have the procedure done again-she tells me her son also has an aneurysm and I think some genetic testing is indicated possibly for V mutation     PAST MEDICAL HISTORY: Significant for kidney stones in June 2011. Reflux symptoms, hypertension. She has been anemic in the past but more recently this has not been an issue. She complains of neuropathy in her feet for the las t year and has had migraine headaches all her life. Possible TIA in October 2011. Negative workup for stroke.       PAST SURGICAL HISTORY: C section 1980 and 1982. Emergency appendectomy at the time of her second C section. Tubal ligation with C section. G allbladder surgery 1997. Ruptured brain aneurysm 2004 which was clipped by Dr. Sims. Colon resection for diverticulitis but the anastomosis broke down and she had to have a diverting ileostomy for 3 months. She had repair of the colon and reanastomosi s with takedown of the ileostomy in 2009. Stent was placed for kidney stones in June 2011. Incisional hernia repair in June 2009 withy mesh done  laparoscopically.       OB/GYN: G3, P3, menopause . She has not been on any hormones.       HEMATOLOGY HISTORY: History from previous dates can be found in the separate document.         HEMATOLOGY HISTORY: The patient is a 56-year-old white female with hypertension, reflux, kidney stones, who had multiple surgeries over the last couple of years for diverticulosis, breakdown of the anastomosis, and most recently incisional hernia repair b y Dr. Peter Kay in 2011 with a mesh done laparoscopically. The patient was noted to have a monoclonal gammopathy on blood testing in  and this was done because of some complaints of neuropathy in her feet for the last year, which actually ap pears to be improving as she has lost 20 pounds over the last 3 months after her surgery.       She has no bone pain, anemia, or other complaints at this time and the neuropathy does not involve her fingers although she states when she exercises in the gym in the past her hands have felt numb at the end of exercising. Most of the problems in her feet are pain and aching which is sometimes worse at night, especially if she has been on her feet all day, but she has not tried any medication for this.       Immunoglobulins are quantitatively normal. Serum free light chain ratio was normal. Urine electrophoresis was negative. Small MGUS, which is to be followed perspectively and no significant signs of amyloidosis.       The patient was seen 2014. MGUS is stable. Q.6 month paraprotein checks continued.   Patient seen in 2016 with a stable MGUS is 0.9 g/dL of IgG kappa        SOCIAL HISTORY: , lives with her . 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 Nonsmoker, nondrinker. No risk factors for HIV.       FAMILY HISTORY: Father  at 46 of MI. Mother  recently at 95 of old age. Two brothers, healthy. No blood cancers or malignancies. Son kidney stones at age 22.       Review of Systems    Constitutional: Positive for fatigue (5/10/19).   Respiratory: Negative for chest tightness and shortness of breath.    Cardiovascular: Negative for chest pain.   Gastrointestinal: Positive for abdominal pain (right side 5/10/19), diarrhea (5/10/19) and nausea (5/10/19). Negative for constipation and vomiting.   Musculoskeletal: Negative for arthralgias, back pain and gait problem.   Psychiatric/Behavioral: The patient is not nervous/anxious.       A comprehensive 14 point review of systems was performed and was negative except as mentioned.    Medications:  The current medication list was reviewed in the EMR    ALLERGIES:    Allergies   Allergen Reactions   • Contrast Dye Shortness Of Breath   • Iodinated Diagnostic Agents Shortness Of Breath   • Codeine Nausea Only   • Methylprednisolone Anxiety       Objective      There were no vitals filed for this visit.  Current Status 11/16/2018   ECOG score 0       Physical Exam    GENERAL:  Well-developed, well-nourished in no acute distress.   SKIN:  Warm, dry without rashes, purpura or petechiae.  EYES:  Pupils equal, round and reactive to light.  EOMs intact.  Conjunctivae normal.  EARS:  Hearing intact.  NOSE:  Septum midline.  No excoriations or nasal discharge.  MOUTH:  Tongue is well-papillated; no stomatitis or ulcers.  Lips normal.  THROAT:  Oropharynx without lesions or exudates.  NECK:  Supple with good range of motion; no thyromegaly or masses, no JVD.  LYMPHATICS:  No cervical, supraclavicular, axillary or inguinal adenopathy.  CHEST:  Lungs clear to auscultation. Good airflow.  CARDIAC:  Regular rate and rhythm without murmurs, rubs or gallops. Normal S1,S2.  ABDOMEN:  Soft, nontender with no hepatosplenomegaly or masses.  EXTREMITIES:  No clubbing, cyanosis or edema.  NEUROLOGICAL:  Cranial Nerves II-XII grossly intact.  No focal neurological deficits.  PSYCHIATRIC:  Normal affect and mood.        RECENT LABS:  Hematology WBC   Date Value Ref Range Status    05/01/2019 7.04 3.40 - 10.80 10*3/mm3 Final     RBC   Date Value Ref Range Status   05/01/2019 4.34 3.77 - 5.28 10*6/mm3 Final     Hemoglobin   Date Value Ref Range Status   05/01/2019 13.4 12.0 - 15.9 g/dL Final     Hematocrit   Date Value Ref Range Status   05/01/2019 40.7 34.0 - 46.6 % Final     Platelets   Date Value Ref Range Status   05/01/2019 234 140 - 450 10*3/mm3 Final       M Protein 1.0 g/dl       Assessment/Plan   1.  IgG kappa MGUS with a normal light chain ratio is stable-abdominal fat pad negative for amyloid  2., history of aneurysm of the brain  3.  Peripheral neuropathy    Plan    1.  Repeat SPEP in  6 months with repeat HARISH before her next visit in 1 year       if there is an upward trend we will do a bone marrow although the normal light chain ratio is reassuring                       5/9/2019      CC:

## 2019-05-10 ENCOUNTER — APPOINTMENT (OUTPATIENT)
Dept: LAB | Facility: HOSPITAL | Age: 64
End: 2019-05-10

## 2019-05-10 ENCOUNTER — HOSPITAL ENCOUNTER (OUTPATIENT)
Facility: HOSPITAL | Age: 64
Setting detail: HOSPITAL OUTPATIENT SURGERY
End: 2019-05-10
Attending: INTERNAL MEDICINE | Admitting: INTERNAL MEDICINE

## 2019-05-10 ENCOUNTER — OFFICE VISIT (OUTPATIENT)
Dept: NEUROSURGERY | Facility: CLINIC | Age: 64
End: 2019-05-10

## 2019-05-10 ENCOUNTER — HOSPITAL ENCOUNTER (OUTPATIENT)
Facility: HOSPITAL | Age: 64
Setting detail: HOSPITAL OUTPATIENT SURGERY
End: 2019-05-10
Attending: NEUROLOGICAL SURGERY | Admitting: NEUROLOGICAL SURGERY

## 2019-05-10 ENCOUNTER — OFFICE VISIT (OUTPATIENT)
Dept: ONCOLOGY | Facility: CLINIC | Age: 64
End: 2019-05-10

## 2019-05-10 VITALS
HEART RATE: 64 BPM | DIASTOLIC BLOOD PRESSURE: 72 MMHG | RESPIRATION RATE: 16 BRPM | HEIGHT: 68 IN | BODY MASS INDEX: 33.65 KG/M2 | SYSTOLIC BLOOD PRESSURE: 111 MMHG | WEIGHT: 222 LBS | OXYGEN SATURATION: 98 % | TEMPERATURE: 97.7 F

## 2019-05-10 VITALS
BODY MASS INDEX: 35 KG/M2 | RESPIRATION RATE: 16 BRPM | HEIGHT: 67 IN | WEIGHT: 223 LBS | DIASTOLIC BLOOD PRESSURE: 64 MMHG | HEART RATE: 72 BPM | SYSTOLIC BLOOD PRESSURE: 124 MMHG

## 2019-05-10 DIAGNOSIS — D47.2 MONOCLONAL GAMMOPATHY: Primary | ICD-10-CM

## 2019-05-10 DIAGNOSIS — I67.1 CEREBRAL ANEURYSM, NONRUPTURED: Primary | ICD-10-CM

## 2019-05-10 DIAGNOSIS — Z91.041 CONTRAST MEDIA ALLERGY: ICD-10-CM

## 2019-05-10 PROBLEM — R10.9 RIGHT SIDED ABDOMINAL PAIN: Status: ACTIVE | Noted: 2019-05-10

## 2019-05-10 PROBLEM — G43.901 MIGRAINE WITH STATUS MIGRAINOSUS: Status: RESOLVED | Noted: 2017-12-29 | Resolved: 2019-05-10

## 2019-05-10 PROBLEM — Z12.11 COLON CANCER SCREENING: Status: ACTIVE | Noted: 2019-05-10

## 2019-05-10 PROCEDURE — 99214 OFFICE O/P EST MOD 30 MIN: CPT | Performed by: NURSE PRACTITIONER

## 2019-05-10 PROCEDURE — 99213 OFFICE O/P EST LOW 20 MIN: CPT | Performed by: INTERNAL MEDICINE

## 2019-05-10 RX ORDER — CEFAZOLIN SODIUM 2 G/100ML
2 INJECTION, SOLUTION INTRAVENOUS ONCE
Status: CANCELLED | OUTPATIENT
Start: 2019-10-07 | End: 2019-05-10

## 2019-05-10 RX ORDER — PREDNISONE 50 MG/1
TABLET ORAL
Qty: 3 TABLET | Refills: 0 | Status: SHIPPED | OUTPATIENT
Start: 2019-05-10 | End: 2019-11-22

## 2019-05-10 RX ORDER — SODIUM CHLORIDE 9 MG/ML
100 INJECTION, SOLUTION INTRAVENOUS CONTINUOUS
Status: CANCELLED | OUTPATIENT
Start: 2019-10-07

## 2019-05-10 NOTE — PROGRESS NOTES
Subjective   Patient ID: hKalida Gilliland is a 63 y.o. female is here today for follow-up of aneurysm/pre-angio. Pt is accompanied by her .    History of Present Illness  Patient presents for follow-up of right ICA aneurysm.  She is status post craniotomy and clipping in 2003.  She had recurrence of aneurysm is status post pipeline embolization device placement in May 2017 for new adjacent right superior hypophyseal artery region aneurysm (and carotid cave).  Angiogram in October 2017 showed continued filling of a very small recurrence.  She had further pipeline embolization device placement in October 2018.  She remains on dual antiplatelet therapy with aspirin and Brilinta.     She 2 episodes of very brief electrical shock pain in the right frontal region. No associated N/V/ vision changes. No major medical changes since last visit.     The following portions of the patient's history were reviewed and updated as appropriate: allergies, current medications, past family history, past medical history, past social history, past surgical history and problem list.    Review of Systems   Gastrointestinal: Positive for diarrhea (feels relatd to Metformin). Negative for constipation.   Genitourinary: Negative for difficulty urinating and enuresis.   Neurological: Positive for numbness (bilateral feet). Negative for dizziness, tremors, seizures, syncope, facial asymmetry, speech difficulty, weakness, light-headedness and headaches.   Psychiatric/Behavioral: Negative for confusion and decreased concentration.       Objective   Physical Exam   Constitutional: She is oriented to person, place, and time. She appears well-developed and well-nourished.   Obese  Body mass index is 34.93 kg/m².       Neck: Neck supple. Normal carotid pulses present. Carotid bruit is not present.   Cardiovascular: Normal rate, regular rhythm, normal heart sounds and intact distal pulses.   Pulses:       Carotid pulses are 2+ on the right  side, and 2+ on the left side.       Radial pulses are 2+ on the right side, and 2+ on the left side.        Femoral pulses are 2+ on the right side, and 2+ on the left side.       Popliteal pulses are 2+ on the right side, and 2+ on the left side.        Dorsalis pedis pulses are 2+ on the right side, and 2+ on the left side.        Posterior tibial pulses are 2+ on the right side, and 2+ on the left side.   Pulmonary/Chest: Effort normal and breath sounds normal.   Abdominal: Soft. She exhibits no pulsatile midline mass. There is no tenderness.   obese   Neurological: She is alert and oriented to person, place, and time. She has normal strength. She has a normal Finger-Nose-Finger Test. Gait normal.   Skin: Skin is warm and dry.   Psychiatric: She has a normal mood and affect. Her speech is normal and behavior is normal. Judgment normal. Cognition and memory are impaired.   Vitals reviewed.    Neurologic Exam     Mental Status   Oriented to person, place, and time.   Follows 3 step commands.   Attention: normal. Concentration: decreased.   Speech: speech is normal   Level of consciousness: alert  Knowledge: good.   Normal comprehension.     Cranial Nerves   Cranial nerves II through XII intact.     Motor Exam   Right arm pronator drift: absent  Left arm pronator drift: absent    Strength   Strength 5/5 throughout.     Gait, Coordination, and Reflexes     Gait  Gait: normal    Coordination   Finger to nose coordination: normal      Assessment/Plan   Independent Review of Radiographic Studies:    No new imaging    Medical Decision Making:    Patient presents for follow-up of aneurysm.  She is due for surveillance cerebral angiogram.  She reports only 2-minute head pains since her last visit.  No significant headaches.  She remains on dual antiplatelet therapy.  Her exam is as noted above with no neurologic red flags.  We will arrange her cerebral angiogram and return to office following.  We will determine  adjustment in her antiplatelet therapy following the procedure.  She reports allergy to contrast dye.  She will be premedicated with our protocol.  She is already on Zyrtec and Zantac so she will continue those and will take as needed Benadryl along with prednisone.    Pain: Return to office after cerebral angiogram.    The risks, benefits and alternatives of cerebral angiography were explained in detail to the patient. The alternative is not to undergo this procedure. The benefit of cerebral angiography should be, though is not guaranteed, the elucidation of the vascular anatomy of the brain and the blood vessels leading to the brain. The risks of the procedure include, but are not limited to, the possibility of stroke, bleeding, damage to an artery in the brain, groin or neck(possibly requiring surgery to correct), infection, reaction to the contrast dye resulting in itching, swelling, anaphylaxis or even death, lack of ability to perform the procedure, need for further operative intervention, blindness, etcetera. The patient voiced understanding of the risks, benefits and alternatives and requests that we proceed with cerebral angiography.    After a complete physical exam, the patient has been informed of the consequences, benefits, appropriate us, and office policies regarding the medication being prescribed. A MARTITA check will be made on-line, and will be repeated if prescription is renewed after a 90 day period. The patient agrees to adhering to the medication regimen as prescribed.    Khalida was seen today for cerebral aneurysm.    Diagnoses and all orders for this visit:    Cerebral aneurysm, nonruptured  -     Case Request; Standing  -     CBC and Differential; Future  -     Basic metabolic panel; Future  -     ECG 12 Lead; Future  -     sodium chloride 0.9 % infusion  -     ceFAZolin (ANCEF) 2 g in sodium chloride 0.9 % 100 mL IVPB  -     Case Request    Contrast media allergy    Other orders  -      predniSONE (DELTASONE) 50 MG tablet; 1 tablet 13 hours, 7 hours, and 1 hour prior to Ct/MR scan  -     Obtain informed consent  -     Provide NPO Instructions to Patient; Future  -     Follow Anesthesia Guidelines / Standing Orders; Standing  -     Verify NPO Status; Standing  -     Clip operative site; Standing  -     Have Patient Void Prior to Procedure; Standing      Return for Follow-up with NP, After angiogram.

## 2019-05-15 ENCOUNTER — RESULTS ENCOUNTER (OUTPATIENT)
Dept: NEUROSURGERY | Facility: CLINIC | Age: 64
End: 2019-05-15

## 2019-05-15 DIAGNOSIS — I67.1 CEREBRAL ANEURYSM, NONRUPTURED: ICD-10-CM

## 2019-05-28 ENCOUNTER — APPOINTMENT (OUTPATIENT)
Dept: PREADMISSION TESTING | Facility: HOSPITAL | Age: 64
End: 2019-05-28

## 2019-06-04 ENCOUNTER — TELEPHONE (OUTPATIENT)
Dept: NEUROSURGERY | Facility: CLINIC | Age: 64
End: 2019-06-04

## 2019-06-04 NOTE — TELEPHONE ENCOUNTER
----- Message from Sydnee Oliver RN sent at 6/4/2019  8:03 AM EDT -----  Regarding: Brilinta  Ms Lu,  Ms Gilliland is scheduled to have a colonoscopy with Dr Norris on 7/8/19.  Dr Norris recommends that the patient hold her Brilinta for 3 days prior to her scheduled colonoscopy if it is okay with you and Dr Bella.  Please advise.  Thank you, Sydnee  ----- Message -----  From: Luiza Norris MD  Sent: 5/3/2019   3:30 PM  To: Sydnee Oliver RN  Subject: RE: OA PAPERWORK                                 I have entered her case request.  It looks like Dr. Bella prescribes her Brilinta; can we call his office to make sure it is okay for her to hold the Brilinta at least 3 days prior to her procedure-- thx    ----- Message -----  From: Sydnee Oliver RN  Sent: 5/1/2019   2:57 PM  To: Luiza Norris MD  Subject: OA PAPERWORK                                     Open Access/Recall paperwork scanned in under the media tab.  Please review and return to Sydnee.

## 2019-06-04 NOTE — TELEPHONE ENCOUNTER
Sydnee,  She had a second stent placed in October. She has a follow up cerebral angiogram on 6/27 to determine stability/closusre of the aneurysm. If it is obliterated, we will DC her Brillinta at that time. She does not have f/u in our office until 7/5 but I will leave myself a message to follow up with Dr. Bella the day of or following the angiogram. I will let you know at that time. I cannot give a decision prior to that time, though. That does give 10 days of lead time to cancel the case, but I suspect we will be able to clear her.

## 2019-06-12 ENCOUNTER — APPOINTMENT (OUTPATIENT)
Dept: PREADMISSION TESTING | Facility: HOSPITAL | Age: 64
End: 2019-06-12

## 2019-06-19 ENCOUNTER — APPOINTMENT (OUTPATIENT)
Dept: PREADMISSION TESTING | Facility: HOSPITAL | Age: 64
End: 2019-06-19

## 2019-07-02 ENCOUNTER — APPOINTMENT (OUTPATIENT)
Dept: PREADMISSION TESTING | Facility: HOSPITAL | Age: 64
End: 2019-07-02

## 2019-07-02 ENCOUNTER — OFFICE VISIT (OUTPATIENT)
Dept: NEUROSURGERY | Facility: CLINIC | Age: 64
End: 2019-07-02

## 2019-07-02 VITALS
HEART RATE: 60 BPM | HEIGHT: 68 IN | SYSTOLIC BLOOD PRESSURE: 124 MMHG | RESPIRATION RATE: 16 BRPM | DIASTOLIC BLOOD PRESSURE: 78 MMHG | WEIGHT: 222 LBS | BODY MASS INDEX: 33.65 KG/M2

## 2019-07-02 DIAGNOSIS — I67.1 CEREBRAL ANEURYSM, NONRUPTURED: Primary | ICD-10-CM

## 2019-07-02 PROCEDURE — 99213 OFFICE O/P EST LOW 20 MIN: CPT | Performed by: NEUROLOGICAL SURGERY

## 2019-07-02 NOTE — PROGRESS NOTES
Subjective   Patient ID: Khalida Gilliland is a 63 y.o. female patient is here for H&P c-angio. She has some questions. She is accompanied by her .     History of Present Illness     Patient returns today to the office with anticipation of undergoing cerebral arteriography.  She underwent a revision treatment of her revision treatment of a previously clipped cerebral aneurysm in October.  She is now about 9 months from that treatment.    She underwent placement of a pipeline stent-a second pipeline stent in October.    The following portions of the patient's history were reviewed and updated as appropriate: allergies, current medications, past family history, past medical history, past social history, past surgical history and problem list.    Review of Systems   Constitutional: Negative for chills and fever.   Respiratory: Positive for chest tightness. Negative for shortness of breath.    Gastrointestinal: Negative for constipation.   Genitourinary: Negative for frequency.       Objective   Physical Exam   Constitutional: She is oriented to person, place, and time.   Eyes: EOM are normal. Pupils are equal, round, and reactive to light.   Neurological: She is oriented to person, place, and time. She has a normal Finger-Nose-Finger Test, a normal Heel to Shin Test, a normal Romberg Test and a normal Tandem Gait Test. Gait normal.   Reflex Scores:       Tricep reflexes are 2+ on the right side and 2+ on the left side.       Bicep reflexes are 2+ on the right side and 2+ on the left side.       Brachioradialis reflexes are 2+ on the right side and 2+ on the left side.       Patellar reflexes are 2+ on the right side and 2+ on the left side.       Achilles reflexes are 2+ on the right side and 2+ on the left side.  Psychiatric: Her speech is normal.     Neurologic Exam     Mental Status   Oriented to person, place, and time.   Registration: recalls 3 of 3 objects. Recall at 5 minutes: recalls 3 of 3 objects.  Follows 3 step commands.   Attention: normal. Concentration: normal.   Speech: speech is normal   Level of consciousness: alert  Knowledge: good.   Able to name object. Able to read. Able to repeat. Able to write. Normal comprehension.     Cranial Nerves     CN II   Visual fields full to confrontation.     CN III, IV, VI   Pupils are equal, round, and reactive to light.  Extraocular motions are normal.   Right pupil: Consensual response: intact.   Left pupil: Consensual response: intact.   CN III: no CN III palsy  CN VI: no CN VI palsy  Nystagmus: none   Diplopia: none  Ophthalmoparesis: none  Upgaze: normal  Downgaze: normal  Conjugate gaze: present  Vestibulo-ocular reflex: present    CN V   Facial sensation intact.     CN VII   Facial expression full, symmetric.     CN VIII   CN VIII normal.     CN IX, X   CN IX normal.     CN XI   CN XI normal.     CN XII   CN XII normal.     Motor Exam   Muscle bulk: normal  Overall muscle tone: normal  Right arm tone: normal  Left arm tone: normal  Right arm pronator drift: absent  Left arm pronator drift: absent  Right leg tone: normal  Left leg tone: normal    Strength   Right neck flexion: 5/5  Left neck flexion: 5/5  Right neck extension: 5/5  Left neck extension: 5/5  Right deltoid: 5/5  Left deltoid: 5/5  Right biceps: 5/5  Left biceps: 5/5  Right triceps: 5/5  Left triceps: 5/5  Right wrist flexion: 5/5  Left wrist flexion: 5/5  Right wrist extension: 5/5  Left wrist extension: 5/5  Right interossei: 5/5  Left interossei: 5/5  Right abdominals: 5/5  Left abdominals: 5/5  Right iliopsoas: 5/5  Left iliopsoas: 5/5  Right quadriceps: 5/5  Left quadriceps: 5/5  Right hamstrin/5  Left hamstrin/5  Right glutei: 5/5  Left glutei: 5/5  Right anterior tibial: 5/5  Left anterior tibial: 5/5  Right posterior tibial: 5/5  Left posterior tibial: 5/5  Right peroneal: 5/5  Left peroneal: 5/5  Right gastroc: 5/5  Left gastroc: 5/5    Sensory Exam   Light touch normal.    Vibration normal.   Proprioception normal.   Pinprick normal.     Gait, Coordination, and Reflexes     Gait  Gait: normal    Coordination   Romberg: negative  Finger to nose coordination: normal  Heel to shin coordination: normal  Tandem walking coordination: normal    Tremor   Resting tremor: absent  Intention tremor: absent  Action tremor: absent    Reflexes   Right brachioradialis: 2+  Left brachioradialis: 2+  Right biceps: 2+  Left biceps: 2+  Right triceps: 2+  Left triceps: 2+  Right patellar: 2+  Left patellar: 2+  Right achilles: 2+  Left achilles: 2+  Right : 2+  Left : 2+  Right plantar: normal  Left plantar: normal  Right Chowdary: absent  Left Chowdary: absent  Right ankle clonus: absent  Left ankle clonus: absent      Assessment/Plan   Independent Review of Radiographic Studies:    None new  Medical Decision Making:    The patient and I agree to postpone follow-up angiography ~the 1 year time sage.    She also understands that if she has a sudden onset of a headache she is to report immediately to the emergency room.      Diagnoses and all orders for this visit:    Cerebral aneurysm, nonruptured      Return in about 3 months (around 10/2/2019) for Follow up with nurse practitioner.

## 2019-07-09 ENCOUNTER — OFFICE VISIT (OUTPATIENT)
Dept: INTERNAL MEDICINE | Facility: CLINIC | Age: 64
End: 2019-07-09

## 2019-07-09 ENCOUNTER — TELEPHONE (OUTPATIENT)
Dept: INTERNAL MEDICINE | Facility: CLINIC | Age: 64
End: 2019-07-09

## 2019-07-09 ENCOUNTER — APPOINTMENT (OUTPATIENT)
Dept: PREADMISSION TESTING | Facility: HOSPITAL | Age: 64
End: 2019-07-09

## 2019-07-09 VITALS
DIASTOLIC BLOOD PRESSURE: 80 MMHG | WEIGHT: 222 LBS | TEMPERATURE: 98.5 F | RESPIRATION RATE: 16 BRPM | HEIGHT: 68 IN | BODY MASS INDEX: 33.65 KG/M2 | SYSTOLIC BLOOD PRESSURE: 126 MMHG | HEART RATE: 56 BPM

## 2019-07-09 DIAGNOSIS — R30.0 DYSURIA: Primary | ICD-10-CM

## 2019-07-09 DIAGNOSIS — N39.0 URINARY TRACT INFECTION WITHOUT HEMATURIA, SITE UNSPECIFIED: ICD-10-CM

## 2019-07-09 LAB
BILIRUB BLD-MCNC: NEGATIVE MG/DL
CLARITY, POC: CLEAR
COLOR UR: YELLOW
GLUCOSE UR STRIP-MCNC: NEGATIVE MG/DL
KETONES UR QL: ABNORMAL
LEUKOCYTE EST, POC: ABNORMAL
NITRITE UR-MCNC: POSITIVE MG/ML
PH UR: 6 [PH] (ref 5–8)
PROT UR STRIP-MCNC: ABNORMAL MG/DL
RBC # UR STRIP: ABNORMAL /UL
SP GR UR: 1.02 (ref 1–1.03)
UROBILINOGEN UR QL: NORMAL

## 2019-07-09 PROCEDURE — 99213 OFFICE O/P EST LOW 20 MIN: CPT | Performed by: INTERNAL MEDICINE

## 2019-07-09 PROCEDURE — 81003 URINALYSIS AUTO W/O SCOPE: CPT | Performed by: INTERNAL MEDICINE

## 2019-07-09 RX ORDER — NITROFURANTOIN 25; 75 MG/1; MG/1
100 CAPSULE ORAL 2 TIMES DAILY
Qty: 10 CAPSULE | Refills: 0 | Status: SHIPPED | OUTPATIENT
Start: 2019-07-09 | End: 2019-11-22

## 2019-07-09 NOTE — TELEPHONE ENCOUNTER
She believes she has a bladder infection and would like to know if she can be worked in today.     Thank you

## 2019-07-09 NOTE — PROGRESS NOTES
Subjective        Chief Complaint   Patient presents with   • Urinary Tract Infection           Khalida Gilliland is a 63 y.o. female who presents for    Patient Active Problem List   Diagnosis   • Monoclonal gammopathy   • Hx of cerebral aneurysm repair   • Allergy history, radiographic dye   • Cerebral aneurysm, nonruptured   • Neck pain   • Contrast media allergy   • Other hyperlipidemia   • Type 2 diabetes mellitus without complication, without long-term current use of insulin (CMS/[facility])   • Essential hypertension   • Chronic abdominal pain   • Right sided abdominal pain       History of Present Illness     She has urinary frequency with pressure. It started yesterday. She denies hematuria, fever, chills, or nausea. Her brother  two weeks ago from suicide.  Allergies   Allergen Reactions   • Contrast Dye Shortness Of Breath   • Iodinated Diagnostic Agents Shortness Of Breath   • Codeine Nausea Only   • Methylprednisolone Anxiety       Current Outpatient Medications on File Prior to Visit   Medication Sig Dispense Refill   • albuterol (PROVENTIL HFA;VENTOLIN HFA) 108 (90 BASE) MCG/ACT inhaler Inhale 2 puffs every 6 (six) hours as needed for wheezing or shortness of breath (Cough). (Patient taking differently: Inhale 2 puffs Every 4 (Four) Hours As Needed for Wheezing or Shortness of Air (Cough).) 1 inhaler 0   • Ascorbic Acid (VITAMIN C PO) Take 1,000 mg by mouth Daily. PT INSTRUCTED TO CONTINUE PER DR. CALLUM URBINA     • aspirin 81 MG EC tablet Take 1 tablet by mouth Daily. 30 tablet 6   • atorvastatin (LIPITOR) 20 MG tablet Take 20 mg by mouth Daily.     • BRILINTA 60 MG tablet tablet TAKE 1 TABLET TWICE A  tablet 3   • butalbital-acetaminophen-caffeine (FIORICET, ESGIC) -40 MG per tablet Take 1 tablet by mouth 2 (Two) Times a Day. 20 tablet 2   • cetirizine (zyrTEC) 10 MG tablet Take 10 mg by mouth Daily.     • cholecalciferol (VITAMIN D3) 1000 units tablet Take 1,000 Units by mouth Daily. PER  PT TO CONTINUE PER DR. CALLUM URBINA     • cycloSPORINE (RESTASIS) 0.05 % ophthalmic emulsion Administer 1 drop to both eyes 2 (Two) Times a Day.     • docusate sodium 100 MG capsule Take 100 mg by mouth 2 (Two) Times a Day As Needed for Constipation.     • fluticasone (VERAMYST) 27.5 MCG/SPRAY nasal spray 2 sprays by Each Nare route Daily.     • glucosamine-chondroitin 500-400 MG capsule capsule Take 1 capsule by mouth 2 (Two) Times a Day With Meals. PT TO CONTINUE PER DR. CALLUM URBINA     • hydrochlorothiazide (HYDRODIURIL) 12.5 MG tablet Take 12.5 mg by mouth daily.     • metFORMIN (GLUCOPHAGE) 500 MG tablet Take 500 mg by mouth Daily With Breakfast.     • omeprazole (priLOSEC) 40 MG capsule Take 40 mg by mouth Every Morning.     • potassium gluconate 595 (99 K) MG tablet tablet Take 595 mg by mouth Daily.     • pregabalin (LYRICA) 100 MG capsule Take 100 mg by mouth 2 (two) times a day.     • propranolol (INDERAL) 40 MG tablet Take 40 mg by mouth 2 (two) times a day.     • raNITIdine (ZANTAC) 150 MG tablet Take 150 mg by mouth 2 (Two) Times a Day.     • tobramycin (TOBREX) 0.3 % solution ophthalmic solution Administer 2 drops into the left eye Every 4 (Four) Hours. 1 bottle 0   • predniSONE (DELTASONE) 50 MG tablet 1 tablet 13 hours, 7 hours, and 1 hour prior to Ct/MR scan 3 tablet 0     No current facility-administered medications on file prior to visit.        Past Medical History:   Diagnosis Date   • Anemia    • Asthma    • Cancer (CMS/HCC) 07/30/2014    Basal Cell Carcinoma Left Arm,SKIN CANCER   • Depression    • Diverticulosis    • Fatty liver    • GERD (gastroesophageal reflux disease)    • H/O diverticulitis of colon     Sigmoid colon   • History of kidney stones 06/2011   • History of surgery for cerebral aneurysm     Clipping of cerebral Aneurysm   • Hx of migraines    • Irritable bowel syndrome    • Neuropathy, feet    • Obesity    • Peptic ulceration    • Pneumonia    • Stroke (CMS/HCC) 10/2011     Possible, workup negative       Past Surgical History:   Procedure Laterality Date   • APPENDECTOMY      Emergency at time of 2nd    • BASAL CELL CARCINOMA EXCISION Left 2014    Excision basal cell carcinoma, left arm (1.1 cm) with frozen section control and layered wound closure ( 3.1 cm), Dr. Isael Andrade, [facility]   • BRAIN SURGERY      Ruptured aneurysm, clipped/Dr. Sims   • CEREBRAL ANEURYSM REPAIR      clipping of cerebral aneurysm   • CEREBRAL ANGIOGRAM N/A 2017    Procedure: CEREBRAL ANGIOGRAM ;  Surgeon: Orlando Bella MD;  Location: [facility] ;  Service:    • CEREBRAL ANGIOGRAM N/A 10/11/2017    Procedure: CEREBRAL ANGIOGRAM;  Surgeon: Orlando Bella MD;  Location: [facility] ;  Service:    •  SECTION  , ,    • CHOLECYSTECTOMY     • COLON RESECTION WITH COLOSTOMY Right    • COLONOSCOPY  2009   • EMBOLIZATION CEREBRAL N/A 2017    Procedure: Cerebral angiography and embolization of cerebral aneurysm with Pipeline Embolization Device;  Surgeon: Orlando Bella MD;  Location: [facility] ;  Service:    • EMBOLIZATION CEREBRAL N/A 10/31/2018    Procedure: Cerebral angiogram with embolization of cerebral aneurysm;  Surgeon: Orlando Bella MD;  Location: [facility] ;  Service: Neurosurgery   • ILEOSTOMY REVISION     • INCISIONAL HERNIA REPAIR      Patient suffered some nerve entrapment secondary to the attack, some of which were removed during a secondary operation.   • INGUINAL HERNIA REPAIR     • LARYNX SURGERY     • OSTOMY TAKE DOWN     • TUBAL ABDOMINAL LIGATION     • URETERAL STENT INSERTION  2011    Due to kidney stones       Family History   Problem Relation Age of Onset   • Skin cancer Mother    • Dementia Mother    • KALYAN disease Mother    • Heart disease Father    • Heart attack Father    • Hypertension Father    • Aneurysm Son         cerebral   • Nephrolithiasis Son    • Suicide  "Attempts Brother    • Malig Hyperthermia Neg Hx        Social History     Socioeconomic History   • Marital status:      Spouse name: Roe   • Number of children: 3   • Years of education: College   • Highest education level: Not on file   Occupational History   • Occupation: unemployed     Employer: UNEMPLOYED   Tobacco Use   • Smoking status: Former Smoker     Years: 1.00     Types: Cigarettes   • Smokeless tobacco: Never Used   Substance and Sexual Activity   • Alcohol use: Yes     Comment: 1-2 DRINKS OF WINE MONTHLY    • Drug use: No   • Sexual activity: Defer           The following portions of the patient's history were reviewed and updated as appropriate: problem list, allergies, current medications, past medical history, past family history, past social history and past surgical history.    Review of Systems   Constitutional: Negative for fever.   Genitourinary: Positive for urgency.       Immunization History   Administered Date(s) Administered   • Flu Vaccine Quad PF >36MO 11/18/2018   • Hepatitis A 12/19/2016, 06/22/2017   • Hepatitis B 12/19/2016, 06/22/2017, 11/18/2017   • Pneumococcal Conjugate 13-Valent (PCV13) 10/19/2015   • Pneumococcal Polysaccharide (PPSV23) 01/01/2008   • Tdap 09/03/2015   • Zostavax 10/09/2014       Objective   Vitals:    07/09/19 1520   BP: 126/80   Pulse: 56   Resp: 16   Temp: 98.5 °F (36.9 °C)   TempSrc: Oral   Weight: 101 kg (222 lb)   Height: 172.9 cm (68.06\")     Physical Exam   Constitutional: She appears well-developed and well-nourished.   Abdominal: Soft. She exhibits no mass. There is no tenderness.   Vitals reviewed.      Procedures    Assessment/Plan   Khalida was seen today for urinary tract infection.    Diagnoses and all orders for this visit:    Dysuria  -     POCT urinalysis dipstick, automated    Urinary tract infection without hematuria, site unspecified  -     nitrofurantoin, macrocrystal-monohydrate, (MACROBID) 100 MG capsule; Take 1 capsule by mouth " 2 (Two) Times a Day.    She did not have enough urine to run a culture.             No Follow-up on file.

## 2019-07-12 ENCOUNTER — TELEPHONE (OUTPATIENT)
Dept: INTERNAL MEDICINE | Facility: CLINIC | Age: 64
End: 2019-07-12

## 2019-07-12 RX ORDER — SULFAMETHOXAZOLE AND TRIMETHOPRIM 800; 160 MG/1; MG/1
1 TABLET ORAL 2 TIMES DAILY
Qty: 10 TABLET | Refills: 0 | Status: SHIPPED | OUTPATIENT
Start: 2019-07-12 | End: 2019-11-22

## 2019-07-12 RX ORDER — ONDANSETRON 4 MG/1
4 TABLET, FILM COATED ORAL EVERY 8 HOURS PRN
Qty: 5 TABLET | Refills: 0 | Status: SHIPPED | OUTPATIENT
Start: 2019-07-12 | End: 2020-05-28

## 2019-07-12 RX ORDER — SULFAMETHOXAZOLE AND TRIMETHOPRIM 800; 160 MG/1; MG/1
1 TABLET ORAL 2 TIMES DAILY
COMMUNITY
End: 2019-07-12 | Stop reason: SDUPTHER

## 2019-07-12 NOTE — TELEPHONE ENCOUNTER
Pt calling in on the antibiotic that she was put on for the uti is making her sick n&v diarrhea please advise

## 2019-07-15 RX ORDER — ATORVASTATIN CALCIUM 20 MG/1
20 TABLET, FILM COATED ORAL DAILY
Qty: 30 TABLET | Refills: 3 | Status: SHIPPED | OUTPATIENT
Start: 2019-07-15 | End: 2019-11-23 | Stop reason: SDUPTHER

## 2019-08-19 RX ORDER — BUTALBITAL, ACETAMINOPHEN AND CAFFEINE 50; 325; 40 MG/1; MG/1; MG/1
1 TABLET ORAL 2 TIMES DAILY
Qty: 20 TABLET | Refills: 1 | Status: SHIPPED | OUTPATIENT
Start: 2019-08-19 | End: 2019-12-13 | Stop reason: SDUPTHER

## 2019-08-19 RX ORDER — BUTALBITAL, ACETAMINOPHEN AND CAFFEINE 50; 325; 40 MG/1; MG/1; MG/1
TABLET ORAL
Qty: 20 TABLET | Refills: 1 | OUTPATIENT
Start: 2019-08-19

## 2019-08-19 RX ORDER — PREGABALIN 100 MG/1
100 CAPSULE ORAL 2 TIMES DAILY
Qty: 180 CAPSULE | Refills: 1 | Status: SHIPPED | OUTPATIENT
Start: 2019-08-19 | End: 2019-10-18 | Stop reason: SDUPTHER

## 2019-08-20 RX ORDER — OMEPRAZOLE 20 MG/1
CAPSULE, DELAYED RELEASE ORAL
Qty: 180 CAPSULE | Refills: 4 | Status: SHIPPED | OUTPATIENT
Start: 2019-08-20 | End: 2019-11-22

## 2019-09-17 ENCOUNTER — LAB (OUTPATIENT)
Dept: LAB | Facility: HOSPITAL | Age: 64
End: 2019-09-17

## 2019-09-17 DIAGNOSIS — E11.9 TYPE 2 DIABETES MELLITUS WITHOUT COMPLICATION, WITHOUT LONG-TERM CURRENT USE OF INSULIN (HCC): ICD-10-CM

## 2019-09-17 LAB
ALBUMIN SERPL-MCNC: 4.4 G/DL (ref 3.5–5.2)
ALBUMIN UR-MCNC: <1.2 MG/DL
ALBUMIN/GLOB SERPL: 1.3 G/DL
ALP SERPL-CCNC: 65 U/L (ref 39–117)
ALT SERPL W P-5'-P-CCNC: 64 U/L (ref 1–33)
ANION GAP SERPL CALCULATED.3IONS-SCNC: 11.7 MMOL/L (ref 5–15)
AST SERPL-CCNC: 76 U/L (ref 1–32)
BILIRUB SERPL-MCNC: 0.7 MG/DL (ref 0.2–1.2)
BUN BLD-MCNC: 10 MG/DL (ref 8–23)
BUN/CREAT SERPL: 14.5 (ref 7–25)
CALCIUM SPEC-SCNC: 9.9 MG/DL (ref 8.6–10.5)
CHLORIDE SERPL-SCNC: 98 MMOL/L (ref 98–107)
CHOLEST SERPL-MCNC: 123 MG/DL (ref 0–200)
CO2 SERPL-SCNC: 27.3 MMOL/L (ref 22–29)
CREAT BLD-MCNC: 0.69 MG/DL (ref 0.57–1)
CREAT UR-MCNC: 78.1 MG/DL
GFR SERPL CREATININE-BSD FRML MDRD: 86 ML/MIN/1.73
GLOBULIN UR ELPH-MCNC: 3.5 GM/DL
GLUCOSE BLD-MCNC: 168 MG/DL (ref 65–99)
HBA1C MFR BLD: 7.7 % (ref 4.8–5.6)
HDLC SERPL-MCNC: 33 MG/DL (ref 40–60)
LDLC SERPL CALC-MCNC: 50 MG/DL (ref 0–100)
LDLC/HDLC SERPL: 1.53 {RATIO}
MICROALBUMIN/CREAT UR: NORMAL MG/G{CREAT}
POTASSIUM BLD-SCNC: 4.5 MMOL/L (ref 3.5–5.2)
PROT SERPL-MCNC: 7.9 G/DL (ref 6–8.5)
SODIUM BLD-SCNC: 137 MMOL/L (ref 136–145)
TRIGL SERPL-MCNC: 198 MG/DL (ref 0–150)
VLDLC SERPL-MCNC: 39.6 MG/DL (ref 5–40)

## 2019-09-17 PROCEDURE — 80053 COMPREHEN METABOLIC PANEL: CPT

## 2019-09-17 PROCEDURE — 83036 HEMOGLOBIN GLYCOSYLATED A1C: CPT

## 2019-09-17 PROCEDURE — 82043 UR ALBUMIN QUANTITATIVE: CPT | Performed by: INTERNAL MEDICINE

## 2019-09-17 PROCEDURE — 82570 ASSAY OF URINE CREATININE: CPT | Performed by: INTERNAL MEDICINE

## 2019-09-17 PROCEDURE — 36415 COLL VENOUS BLD VENIPUNCTURE: CPT

## 2019-09-17 PROCEDURE — 80061 LIPID PANEL: CPT | Performed by: INTERNAL MEDICINE

## 2019-09-19 ENCOUNTER — TELEPHONE (OUTPATIENT)
Dept: NEUROSURGERY | Facility: CLINIC | Age: 64
End: 2019-09-19

## 2019-09-19 NOTE — TELEPHONE ENCOUNTER
Cancelled appt with  and discussed plan of where she will go for aneurysm. She discussed Dr. Hernandez at [place]. Offered to help her with any referral needed.

## 2019-10-15 RX ORDER — FLUTICASONE PROPIONATE 50 MCG
SPRAY, SUSPENSION (ML) NASAL
Qty: 16 G | Refills: 3 | OUTPATIENT
Start: 2019-10-15

## 2019-10-18 DIAGNOSIS — R10.9 CHRONIC ABDOMINAL PAIN: Primary | ICD-10-CM

## 2019-10-18 DIAGNOSIS — G89.29 CHRONIC ABDOMINAL PAIN: Primary | ICD-10-CM

## 2019-10-18 RX ORDER — PREGABALIN 100 MG/1
100 CAPSULE ORAL 2 TIMES DAILY
Qty: 60 CAPSULE | Refills: 5 | Status: SHIPPED | OUTPATIENT
Start: 2019-10-18 | End: 2019-11-25 | Stop reason: SDUPTHER

## 2019-10-28 RX ORDER — FLUTICASONE PROPIONATE 50 MCG
SPRAY, SUSPENSION (ML) NASAL
Qty: 16 G | Refills: 4 | Status: ON HOLD | OUTPATIENT
Start: 2019-10-28 | End: 2020-03-24

## 2019-11-01 ENCOUNTER — APPOINTMENT (OUTPATIENT)
Dept: LAB | Facility: HOSPITAL | Age: 64
End: 2019-11-01

## 2019-11-05 ENCOUNTER — LAB (OUTPATIENT)
Dept: LAB | Facility: HOSPITAL | Age: 64
End: 2019-11-05

## 2019-11-05 DIAGNOSIS — D47.2 MONOCLONAL GAMMOPATHY: ICD-10-CM

## 2019-11-05 LAB
ALBUMIN SERPL-MCNC: 4.2 G/DL (ref 3.5–5.2)
ALBUMIN/GLOB SERPL: 1.1 G/DL (ref 1.1–2.4)
ALP SERPL-CCNC: 85 U/L (ref 38–116)
ALT SERPL W P-5'-P-CCNC: 46 U/L (ref 0–33)
ANION GAP SERPL CALCULATED.3IONS-SCNC: 16.6 MMOL/L (ref 5–15)
AST SERPL-CCNC: 52 U/L (ref 0–32)
B2 MICROGLOB SERPL-MCNC: 1.7 MG/L (ref 0.8–2.2)
BASOPHILS # BLD AUTO: 0.04 10*3/MM3 (ref 0–0.2)
BASOPHILS NFR BLD AUTO: 0.7 % (ref 0–1.5)
BILIRUB SERPL-MCNC: 0.4 MG/DL (ref 0.2–1.2)
BUN BLD-MCNC: 16 MG/DL (ref 6–20)
BUN/CREAT SERPL: 34 (ref 7.3–30)
CALCIUM SPEC-SCNC: 9.8 MG/DL (ref 8.5–10.2)
CHLORIDE SERPL-SCNC: 100 MMOL/L (ref 98–107)
CO2 SERPL-SCNC: 23.4 MMOL/L (ref 22–29)
CREAT BLD-MCNC: 0.47 MG/DL (ref 0.6–1.1)
DEPRECATED RDW RBC AUTO: 45.3 FL (ref 37–54)
EOSINOPHIL # BLD AUTO: 0.3 10*3/MM3 (ref 0–0.4)
EOSINOPHIL NFR BLD AUTO: 4.9 % (ref 0.3–6.2)
ERYTHROCYTE [DISTWIDTH] IN BLOOD BY AUTOMATED COUNT: 13.3 % (ref 12.3–15.4)
GFR SERPL CREATININE-BSD FRML MDRD: 133 ML/MIN/1.73
GLOBULIN UR ELPH-MCNC: 3.9 GM/DL (ref 1.8–3.5)
GLUCOSE BLD-MCNC: 168 MG/DL (ref 74–124)
HCT VFR BLD AUTO: 42.9 % (ref 34–46.6)
HGB BLD-MCNC: 14 G/DL (ref 12–15.9)
IMM GRANULOCYTES # BLD AUTO: 0.01 10*3/MM3 (ref 0–0.05)
IMM GRANULOCYTES NFR BLD AUTO: 0.2 % (ref 0–0.5)
LYMPHOCYTES # BLD AUTO: 2.09 10*3/MM3 (ref 0.7–3.1)
LYMPHOCYTES NFR BLD AUTO: 34.1 % (ref 19.6–45.3)
MCH RBC QN AUTO: 30.6 PG (ref 26.6–33)
MCHC RBC AUTO-ENTMCNC: 32.6 G/DL (ref 31.5–35.7)
MCV RBC AUTO: 93.7 FL (ref 79–97)
MONOCYTES # BLD AUTO: 0.62 10*3/MM3 (ref 0.1–0.9)
MONOCYTES NFR BLD AUTO: 10.1 % (ref 5–12)
NEUTROPHILS # BLD AUTO: 3.07 10*3/MM3 (ref 1.7–7)
NEUTROPHILS NFR BLD AUTO: 50 % (ref 42.7–76)
NRBC BLD AUTO-RTO: 0 /100 WBC (ref 0–0.2)
PLATELET # BLD AUTO: 244 10*3/MM3 (ref 140–450)
PMV BLD AUTO: 10.7 FL (ref 6–12)
POTASSIUM BLD-SCNC: 4.4 MMOL/L (ref 3.5–4.7)
PROT SERPL-MCNC: 8.1 G/DL (ref 6.3–8)
RBC # BLD AUTO: 4.58 10*6/MM3 (ref 3.77–5.28)
SODIUM BLD-SCNC: 140 MMOL/L (ref 134–145)
WBC NRBC COR # BLD: 6.13 10*3/MM3 (ref 3.4–10.8)

## 2019-11-05 PROCEDURE — 82232 ASSAY OF BETA-2 PROTEIN: CPT | Performed by: INTERNAL MEDICINE

## 2019-11-05 PROCEDURE — 85025 COMPLETE CBC W/AUTO DIFF WBC: CPT

## 2019-11-05 PROCEDURE — 36415 COLL VENOUS BLD VENIPUNCTURE: CPT

## 2019-11-05 PROCEDURE — 80053 COMPREHEN METABOLIC PANEL: CPT

## 2019-11-22 ENCOUNTER — OFFICE VISIT (OUTPATIENT)
Dept: INTERNAL MEDICINE | Facility: CLINIC | Age: 64
End: 2019-11-22

## 2019-11-22 VITALS
DIASTOLIC BLOOD PRESSURE: 82 MMHG | SYSTOLIC BLOOD PRESSURE: 124 MMHG | BODY MASS INDEX: 36 KG/M2 | HEIGHT: 66 IN | WEIGHT: 224 LBS

## 2019-11-22 DIAGNOSIS — G89.29 CHRONIC ABDOMINAL PAIN: Primary | ICD-10-CM

## 2019-11-22 DIAGNOSIS — Z12.11 COLON CANCER SCREENING: ICD-10-CM

## 2019-11-22 DIAGNOSIS — K76.0 FATTY LIVER: ICD-10-CM

## 2019-11-22 DIAGNOSIS — R10.9 CHRONIC ABDOMINAL PAIN: Primary | ICD-10-CM

## 2019-11-22 DIAGNOSIS — E11.9 TYPE 2 DIABETES MELLITUS WITHOUT COMPLICATION, WITHOUT LONG-TERM CURRENT USE OF INSULIN (HCC): ICD-10-CM

## 2019-11-22 DIAGNOSIS — I10 ESSENTIAL HYPERTENSION: ICD-10-CM

## 2019-11-22 PROCEDURE — 90471 IMMUNIZATION ADMIN: CPT | Performed by: INTERNAL MEDICINE

## 2019-11-22 PROCEDURE — 90674 CCIIV4 VAC NO PRSV 0.5 ML IM: CPT | Performed by: INTERNAL MEDICINE

## 2019-11-22 PROCEDURE — 99214 OFFICE O/P EST MOD 30 MIN: CPT | Performed by: INTERNAL MEDICINE
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
## 2019-11-25 ENCOUNTER — TELEPHONE (OUTPATIENT)
Dept: INTERNAL MEDICINE | Facility: CLINIC | Age: 64
End: 2019-11-25

## 2019-11-25 DIAGNOSIS — R10.9 CHRONIC ABDOMINAL PAIN: ICD-10-CM

## 2019-11-25 DIAGNOSIS — G89.29 CHRONIC ABDOMINAL PAIN: ICD-10-CM

## 2019-11-25 RX ORDER — ATORVASTATIN CALCIUM 20 MG/1
TABLET, FILM COATED ORAL
Qty: 30 TABLET | Refills: 2 | Status: SHIPPED | OUTPATIENT
Start: 2019-11-25 | End: 2019-12-09 | Stop reason: SDUPTHER

## 2019-11-25 RX ORDER — PREGABALIN 100 MG/1
100 CAPSULE ORAL 2 TIMES DAILY
Qty: 60 CAPSULE | Refills: 5 | Status: SHIPPED | OUTPATIENT
Start: 2019-11-25 | End: 2019-12-09 | Stop reason: SDUPTHER

## 2019-12-09 ENCOUNTER — TELEPHONE (OUTPATIENT)
Dept: INTERNAL MEDICINE | Facility: CLINIC | Age: 64
End: 2019-12-09

## 2019-12-09 DIAGNOSIS — G89.29 CHRONIC ABDOMINAL PAIN: ICD-10-CM

## 2019-12-09 DIAGNOSIS — R10.9 CHRONIC ABDOMINAL PAIN: ICD-10-CM

## 2019-12-09 RX ORDER — PREGABALIN 100 MG/1
100 CAPSULE ORAL 2 TIMES DAILY
Qty: 20 CAPSULE | Refills: 0 | Status: SHIPPED | OUTPATIENT
Start: 2019-12-09 | End: 2019-12-09 | Stop reason: SDUPTHER

## 2019-12-09 RX ORDER — ATORVASTATIN CALCIUM 20 MG/1
20 TABLET, FILM COATED ORAL DAILY
Qty: 90 TABLET | Refills: 2 | OUTPATIENT
Start: 2019-12-09

## 2019-12-09 RX ORDER — PREGABALIN 100 MG/1
100 CAPSULE ORAL 2 TIMES DAILY
Qty: 20 CAPSULE | Refills: 0 | Status: SHIPPED | OUTPATIENT
Start: 2019-12-09 | End: 2020-02-21

## 2019-12-09 RX ORDER — ATORVASTATIN CALCIUM 20 MG/1
20 TABLET, FILM COATED ORAL DAILY
Qty: 30 TABLET | Refills: 2 | Status: SHIPPED | OUTPATIENT
Start: 2019-12-09 | End: 2020-05-20

## 2019-12-09 RX ORDER — PREGABALIN 100 MG/1
100 CAPSULE ORAL 2 TIMES DAILY
Qty: 60 CAPSULE | Refills: 3 | OUTPATIENT
Start: 2019-12-09

## 2019-12-09 NOTE — TELEPHONE ENCOUNTER
pT CALLING GOING OUT OF COUNTRY WILL NOT BE BACK IN [place]OWN UNTIL JAN 3RD NEEDS REFILLS ON [place] HAS 2 WEEKS LEFT ONLY NEEDS A 10 DAY SUPPLY TO GET HER THROUGH UNTIL BACK INTO THE [place] ALONG WITH [employer]

## 2019-12-10 ENCOUNTER — TELEPHONE (OUTPATIENT)
Dept: INTERNAL MEDICINE | Facility: CLINIC | Age: 64
End: 2019-12-10

## 2019-12-10 DIAGNOSIS — Z78.9 HISTORY OF RECENT TRAVEL: Primary | ICD-10-CM

## 2019-12-10 RX ORDER — PROPRANOLOL HYDROCHLORIDE 40 MG/1
TABLET ORAL
Qty: 180 TABLET | Refills: 4 | Status: SHIPPED | OUTPATIENT
Start: 2019-12-10 | End: 2020-03-29 | Stop reason: HOSPADM

## 2019-12-10 RX ORDER — ZOLPIDEM TARTRATE 10 MG/1
10 TABLET ORAL NIGHTLY PRN
Qty: 2 TABLET | Refills: 0 | Status: SHIPPED | OUTPATIENT
Start: 2019-12-10 | End: 2021-03-25

## 2019-12-13 DIAGNOSIS — Z86.79 HX OF CEREBRAL ANEURYSM REPAIR: ICD-10-CM

## 2019-12-13 DIAGNOSIS — R51.9 NONINTRACTABLE HEADACHE, UNSPECIFIED CHRONICITY PATTERN, UNSPECIFIED HEADACHE TYPE: ICD-10-CM

## 2019-12-13 DIAGNOSIS — I67.1 CEREBRAL ANEURYSM, NONRUPTURED: Primary | ICD-10-CM

## 2019-12-13 DIAGNOSIS — Z98.890 HX OF CEREBRAL ANEURYSM REPAIR: ICD-10-CM

## 2019-12-13 RX ORDER — BUTALBITAL, ACETAMINOPHEN AND CAFFEINE 50; 325; 40 MG/1; MG/1; MG/1
TABLET ORAL
Qty: 20 TABLET | Refills: 0 | OUTPATIENT
Start: 2019-12-13

## 2019-12-13 RX ORDER — BUTALBITAL, ACETAMINOPHEN AND CAFFEINE 50; 325; 40 MG/1; MG/1; MG/1
1 TABLET ORAL 2 TIMES DAILY
Qty: 20 TABLET | Refills: 1 | Status: SHIPPED | OUTPATIENT
Start: 2019-12-13 | End: 2020-02-11

## 2019-12-13 RX ORDER — BLOOD-GLUCOSE METER
KIT MISCELLANEOUS
Qty: 100 EACH | Refills: 2 | OUTPATIENT
Start: 2019-12-13

## 2020-01-17 DIAGNOSIS — E11.9 TYPE 2 DIABETES MELLITUS WITHOUT COMPLICATION, WITHOUT LONG-TERM CURRENT USE OF INSULIN (HCC): Primary | ICD-10-CM

## 2020-01-17 RX ORDER — LANCETS 28 GAUGE
EACH MISCELLANEOUS
Qty: 100 EACH | Refills: 6 | Status: SHIPPED | OUTPATIENT
Start: 2020-01-17 | End: 2020-01-17 | Stop reason: SDUPTHER

## 2020-01-17 RX ORDER — LANCETS 28 GAUGE
EACH MISCELLANEOUS
Qty: 100 EACH | Refills: 6 | Status: SHIPPED | OUTPATIENT
Start: 2020-01-17 | End: 2021-01-04 | Stop reason: SDUPTHER

## 2020-01-20 DIAGNOSIS — E11.9 TYPE 2 DIABETES MELLITUS WITHOUT COMPLICATION, WITHOUT LONG-TERM CURRENT USE OF INSULIN (HCC): Primary | ICD-10-CM

## 2020-02-11 DIAGNOSIS — R51.9 NONINTRACTABLE HEADACHE, UNSPECIFIED CHRONICITY PATTERN, UNSPECIFIED HEADACHE TYPE: ICD-10-CM

## 2020-02-11 DIAGNOSIS — E11.9 TYPE 2 DIABETES MELLITUS WITHOUT COMPLICATION, WITHOUT LONG-TERM CURRENT USE OF INSULIN (HCC): ICD-10-CM

## 2020-02-11 RX ORDER — BUTALBITAL, ACETAMINOPHEN AND CAFFEINE 50; 325; 40 MG/1; MG/1; MG/1
TABLET ORAL
Qty: 20 TABLET | Refills: 1 | Status: SHIPPED | OUTPATIENT
Start: 2020-02-11 | End: 2020-07-30

## 2020-02-11 RX ORDER — SITAGLIPTIN 100 MG/1
TABLET, FILM COATED ORAL
Qty: 30 TABLET | Refills: 11 | Status: SHIPPED | OUTPATIENT
Start: 2020-02-11 | End: 2020-08-24 | Stop reason: SDUPTHER

## 2020-02-13 DIAGNOSIS — E11.9 TYPE 2 DIABETES MELLITUS WITHOUT COMPLICATION, WITHOUT LONG-TERM CURRENT USE OF INSULIN (HCC): ICD-10-CM

## 2020-02-15 LAB
ALBUMIN SERPL-MCNC: 4.3 G/DL (ref 3.5–5.2)
ALBUMIN/CREAT UR: 9 MG/G CREAT (ref 0–29)
ALBUMIN/GLOB SERPL: 1.4 G/DL
ALP SERPL-CCNC: 63 U/L (ref 39–117)
ALT SERPL-CCNC: 33 U/L (ref 1–33)
AST SERPL-CCNC: 24 U/L (ref 1–32)
BILIRUB SERPL-MCNC: 0.3 MG/DL (ref 0.2–1.2)
BUN SERPL-MCNC: 12 MG/DL (ref 8–23)
BUN/CREAT SERPL: 18.5 (ref 7–25)
CALCIUM SERPL-MCNC: 9.5 MG/DL (ref 8.6–10.5)
CHLORIDE SERPL-SCNC: 99 MMOL/L (ref 98–107)
CO2 SERPL-SCNC: 28.8 MMOL/L (ref 22–29)
CREAT SERPL-MCNC: 0.65 MG/DL (ref 0.57–1)
CREAT UR-MCNC: 104.7 MG/DL
GLOBULIN SER CALC-MCNC: 3.1 GM/DL
GLUCOSE SERPL-MCNC: 158 MG/DL (ref 65–99)
HBA1C MFR BLD: 7.6 % (ref 4.8–5.6)
MICROALBUMIN UR-MCNC: 9.3 UG/ML
POTASSIUM SERPL-SCNC: 4.5 MMOL/L (ref 3.5–5.2)
PROT SERPL-MCNC: 7.4 G/DL (ref 6–8.5)
SODIUM SERPL-SCNC: 140 MMOL/L (ref 136–145)

## 2020-02-21 ENCOUNTER — OFFICE VISIT (OUTPATIENT)
Dept: INTERNAL MEDICINE | Facility: CLINIC | Age: 65
End: 2020-02-21

## 2020-02-21 VITALS
HEIGHT: 66 IN | BODY MASS INDEX: 35.84 KG/M2 | SYSTOLIC BLOOD PRESSURE: 122 MMHG | DIASTOLIC BLOOD PRESSURE: 82 MMHG | WEIGHT: 223 LBS

## 2020-02-21 DIAGNOSIS — I10 ESSENTIAL HYPERTENSION: ICD-10-CM

## 2020-02-21 DIAGNOSIS — E11.9 TYPE 2 DIABETES MELLITUS WITHOUT COMPLICATION, WITHOUT LONG-TERM CURRENT USE OF INSULIN (HCC): Primary | ICD-10-CM

## 2020-02-21 DIAGNOSIS — G59 MONONEUROPATHY DUE TO UNDERLYING DISEASE: ICD-10-CM

## 2020-02-21 PROCEDURE — 99214 OFFICE O/P EST MOD 30 MIN: CPT | Performed by: INTERNAL MEDICINE

## 2020-02-21 RX ORDER — GLIPIZIDE 5 MG/1
5 TABLET, FILM COATED, EXTENDED RELEASE ORAL DAILY
Qty: 30 TABLET | Refills: 2 | Status: SHIPPED | OUTPATIENT
Start: 2020-02-21 | End: 2020-05-20

## 2020-02-21 RX ORDER — PREGABALIN 100 MG/1
100 CAPSULE ORAL 3 TIMES DAILY
Qty: 90 CAPSULE | Refills: 3 | Status: SHIPPED | OUTPATIENT
Start: 2020-02-21 | End: 2020-06-30 | Stop reason: SDUPTHER
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
## 2020-03-13 DIAGNOSIS — I10 ESSENTIAL HYPERTENSION: Primary | ICD-10-CM

## 2020-03-13 RX ORDER — HYDROCHLOROTHIAZIDE 12.5 MG/1
12.5 TABLET ORAL DAILY
Qty: 90 TABLET | Refills: 1 | Status: SHIPPED | OUTPATIENT
Start: 2020-03-13 | End: 2020-03-29 | Stop reason: HOSPADM

## 2020-03-18 ENCOUNTER — TELEPHONE (OUTPATIENT)
Dept: INTERNAL MEDICINE | Facility: CLINIC | Age: 65
End: 2020-03-18

## 2020-03-18 NOTE — TELEPHONE ENCOUNTER
Patient is complaining in pain and pressure in both ears.  X 3 days  No fever, cough-non productive (unable to get up)  Tons of drainage,  No sob  Did get flu shot    Pharmacy: [address]    Phone: [phone]

## 2020-03-18 NOTE — TELEPHONE ENCOUNTER
Make sure she is taking her flonase daily, if she is not on an antihistamine such as zyrtec please start. Her symptoms sound like allergies. Call us if she is worsening.

## 2020-03-19 ENCOUNTER — TELEPHONE (OUTPATIENT)
Dept: INTERNAL MEDICINE | Facility: CLINIC | Age: 65
End: 2020-03-19

## 2020-03-19 ENCOUNTER — APPOINTMENT (OUTPATIENT)
Dept: GENERAL RADIOLOGY | Facility: HOSPITAL | Age: 65
End: 2020-03-19

## 2020-03-19 ENCOUNTER — HOSPITAL ENCOUNTER (INPATIENT)
Facility: HOSPITAL | Age: 65
LOS: 10 days | Discharge: HOME | End: 2020-03-29
Attending: EMERGENCY MEDICINE | Admitting: INTERNAL MEDICINE

## 2020-03-19 DIAGNOSIS — B34.2 CORONAVIRUS INFECTION: ICD-10-CM

## 2020-03-19 DIAGNOSIS — J45.901 MODERATE ASTHMA WITH ACUTE EXACERBATION, UNSPECIFIED WHETHER PERSISTENT: Primary | ICD-10-CM

## 2020-03-19 DIAGNOSIS — R09.02 HYPOXIA: ICD-10-CM

## 2020-03-19 PROBLEM — K21.9 GERD (GASTROESOPHAGEAL REFLUX DISEASE): Status: ACTIVE | Noted: 2020-03-19

## 2020-03-19 PROBLEM — J45.909 ASTHMA: Status: ACTIVE | Noted: 2020-03-19

## 2020-03-19 LAB
ALBUMIN SERPL-MCNC: 4.6 G/DL (ref 3.5–5.2)
ALBUMIN/GLOB SERPL: 1.4 G/DL
ALP SERPL-CCNC: 61 U/L (ref 39–117)
ALT SERPL W P-5'-P-CCNC: 27 U/L (ref 1–33)
ANION GAP SERPL CALCULATED.3IONS-SCNC: 13.7 MMOL/L (ref 5–15)
ARTERIAL PATENCY WRIST A: POSITIVE
AST SERPL-CCNC: 20 U/L (ref 1–32)
ATMOSPHERIC PRESS: 748.4 MMHG
B PARAPERT DNA SPEC QL NAA+PROBE: NOT DETECTED
B PERT DNA SPEC QL NAA+PROBE: NOT DETECTED
BASE EXCESS BLDA CALC-SCNC: 2 MMOL/L (ref 0–2)
BASOPHILS # BLD AUTO: 0.04 10*3/MM3 (ref 0–0.2)
BASOPHILS NFR BLD AUTO: 0.4 % (ref 0–1.5)
BDY SITE: ABNORMAL
BILIRUB SERPL-MCNC: 0.5 MG/DL (ref 0.2–1.2)
BUN BLD-MCNC: 11 MG/DL (ref 8–23)
BUN/CREAT SERPL: 15.7 (ref 7–25)
C PNEUM DNA NPH QL NAA+NON-PROBE: NOT DETECTED
CALCIUM SPEC-SCNC: 10 MG/DL (ref 8.6–10.5)
CHLORIDE SERPL-SCNC: 102 MMOL/L (ref 98–107)
CO2 SERPL-SCNC: 25.3 MMOL/L (ref 22–29)
CREAT BLD-MCNC: 0.7 MG/DL (ref 0.57–1)
DEPRECATED RDW RBC AUTO: 42.5 FL (ref 37–54)
EOSINOPHIL # BLD AUTO: 0.38 10*3/MM3 (ref 0–0.4)
EOSINOPHIL NFR BLD AUTO: 4.2 % (ref 0.3–6.2)
ERYTHROCYTE [DISTWIDTH] IN BLOOD BY AUTOMATED COUNT: 12.9 % (ref 12.3–15.4)
FLUAV H1 2009 PAND RNA NPH QL NAA+PROBE: NOT DETECTED
FLUAV H1 HA GENE NPH QL NAA+PROBE: NOT DETECTED
FLUAV H3 RNA NPH QL NAA+PROBE: NOT DETECTED
FLUAV SUBTYP SPEC NAA+PROBE: NOT DETECTED
FLUBV RNA ISLT QL NAA+PROBE: NOT DETECTED
GFR SERPL CREATININE-BSD FRML MDRD: 84 ML/MIN/1.73
GLOBULIN UR ELPH-MCNC: 3.3 GM/DL
GLUCOSE BLD-MCNC: 94 MG/DL (ref 65–99)
HADV DNA SPEC NAA+PROBE: NOT DETECTED
HCO3 BLDA-SCNC: 26.3 MMOL/L (ref 22–28)
HCOV 229E RNA SPEC QL NAA+PROBE: NOT DETECTED
HCOV HKU1 RNA SPEC QL NAA+PROBE: NOT DETECTED
HCOV NL63 RNA SPEC QL NAA+PROBE: DETECTED
HCOV OC43 RNA SPEC QL NAA+PROBE: NOT DETECTED
HCT VFR BLD AUTO: 43 % (ref 34–46.6)
HGB BLD-MCNC: 14.4 G/DL (ref 12–15.9)
HMPV RNA NPH QL NAA+NON-PROBE: NOT DETECTED
HOROWITZ INDEX BLD+IHG-RTO: 21 %
HPIV1 RNA SPEC QL NAA+PROBE: NOT DETECTED
HPIV2 RNA SPEC QL NAA+PROBE: NOT DETECTED
HPIV3 RNA NPH QL NAA+PROBE: NOT DETECTED
HPIV4 P GENE NPH QL NAA+PROBE: NOT DETECTED
IMM GRANULOCYTES # BLD AUTO: 0.03 10*3/MM3 (ref 0–0.05)
IMM GRANULOCYTES NFR BLD AUTO: 0.3 % (ref 0–0.5)
LYMPHOCYTES # BLD AUTO: 2.12 10*3/MM3 (ref 0.7–3.1)
LYMPHOCYTES NFR BLD AUTO: 23.3 % (ref 19.6–45.3)
M PNEUMO IGG SER IA-ACNC: NOT DETECTED
MCH RBC QN AUTO: 30.4 PG (ref 26.6–33)
MCHC RBC AUTO-ENTMCNC: 33.5 G/DL (ref 31.5–35.7)
MCV RBC AUTO: 90.7 FL (ref 79–97)
MODALITY: ABNORMAL
MONOCYTES # BLD AUTO: 0.78 10*3/MM3 (ref 0.1–0.9)
MONOCYTES NFR BLD AUTO: 8.6 % (ref 5–12)
NEUTROPHILS # BLD AUTO: 5.73 10*3/MM3 (ref 1.7–7)
NEUTROPHILS NFR BLD AUTO: 63.2 % (ref 42.7–76)
NRBC BLD AUTO-RTO: 0 /100 WBC (ref 0–0.2)
NT-PROBNP SERPL-MCNC: 42.6 PG/ML (ref 5–900)
O2 A-A PPRESDIFF RESPIRATORY: 0.6 MMHG
PCO2 BLDA: 39.2 MM HG (ref 35–45)
PH BLDA: 7.43 PH UNITS (ref 7.35–7.45)
PLATELET # BLD AUTO: 206 10*3/MM3 (ref 140–450)
PMV BLD AUTO: 10.4 FL (ref 6–12)
PO2 BLDA: 67.6 MM HG (ref 80–100)
POTASSIUM BLD-SCNC: 4.4 MMOL/L (ref 3.5–5.2)
PROCALCITONIN SERPL-MCNC: 0.05 NG/ML (ref 0.1–0.25)
PROT SERPL-MCNC: 7.9 G/DL (ref 6–8.5)
RBC # BLD AUTO: 4.74 10*6/MM3 (ref 3.77–5.28)
RHINOVIRUS RNA SPEC NAA+PROBE: NOT DETECTED
RSV RNA NPH QL NAA+NON-PROBE: NOT DETECTED
SAO2 % BLDCOA: 93.8 % (ref 92–99)
SET MECH RESP RATE: 18
SODIUM BLD-SCNC: 141 MMOL/L (ref 136–145)
TOTAL RATE: 18 BREATHS/MINUTE
TROPONIN T SERPL-MCNC: <0.01 NG/ML (ref 0–0.03)
WBC NRBC COR # BLD: 9.08 10*3/MM3 (ref 3.4–10.8)

## 2020-03-19 PROCEDURE — 94640 AIRWAY INHALATION TREATMENT: CPT

## 2020-03-19 PROCEDURE — U0002 COVID-19 LAB TEST NON-CDC: HCPCS | Performed by: INTERNAL MEDICINE

## 2020-03-19 PROCEDURE — 87635 SARS-COV-2 COVID-19 AMP PRB: CPT | Performed by: INTERNAL MEDICINE

## 2020-03-19 PROCEDURE — 0100U HC BIOFIRE FILMARRAY RESP PANEL 2: CPT | Performed by: EMERGENCY MEDICINE

## 2020-03-19 PROCEDURE — 84145 PROCALCITONIN (PCT): CPT | Performed by: INTERNAL MEDICINE

## 2020-03-19 PROCEDURE — 82803 BLOOD GASES ANY COMBINATION: CPT

## 2020-03-19 PROCEDURE — 83880 ASSAY OF NATRIURETIC PEPTIDE: CPT | Performed by: EMERGENCY MEDICINE

## 2020-03-19 PROCEDURE — 94799 UNLISTED PULMONARY SVC/PX: CPT

## 2020-03-19 PROCEDURE — 85025 COMPLETE CBC W/AUTO DIFF WBC: CPT | Performed by: EMERGENCY MEDICINE

## 2020-03-19 PROCEDURE — 80053 COMPREHEN METABOLIC PANEL: CPT | Performed by: EMERGENCY MEDICINE

## 2020-03-19 PROCEDURE — 99285 EMERGENCY DEPT VISIT HI MDM: CPT

## 2020-03-19 PROCEDURE — 71045 X-RAY EXAM CHEST 1 VIEW: CPT

## 2020-03-19 PROCEDURE — 36600 WITHDRAWAL OF ARTERIAL BLOOD: CPT

## 2020-03-19 PROCEDURE — 84484 ASSAY OF TROPONIN QUANT: CPT | Performed by: EMERGENCY MEDICINE

## 2020-03-19 RX ORDER — BUTALBITAL, ACETAMINOPHEN AND CAFFEINE 50; 325; 40 MG/1; MG/1; MG/1
1 TABLET ORAL 2 TIMES DAILY
Status: DISCONTINUED | OUTPATIENT
Start: 2020-03-19 | End: 2020-03-21

## 2020-03-19 RX ORDER — SODIUM CHLORIDE 0.9 % (FLUSH) 0.9 %
10 SYRINGE (ML) INJECTION EVERY 12 HOURS SCHEDULED
Status: DISCONTINUED | OUTPATIENT
Start: 2020-03-19 | End: 2020-03-29 | Stop reason: HOSPADM

## 2020-03-19 RX ORDER — PROPRANOLOL HYDROCHLORIDE 40 MG/1
40 TABLET ORAL 2 TIMES DAILY
Status: DISCONTINUED | OUTPATIENT
Start: 2020-03-19 | End: 2020-03-25

## 2020-03-19 RX ORDER — CETIRIZINE HYDROCHLORIDE 10 MG/1
10 TABLET ORAL DAILY
Status: DISCONTINUED | OUTPATIENT
Start: 2020-03-20 | End: 2020-03-29 | Stop reason: HOSPADM

## 2020-03-19 RX ORDER — NITROGLYCERIN 0.4 MG/1
0.4 TABLET SUBLINGUAL
Status: DISCONTINUED | OUTPATIENT
Start: 2020-03-19 | End: 2020-03-29 | Stop reason: HOSPADM

## 2020-03-19 RX ORDER — ASPIRIN 81 MG/1
81 TABLET ORAL DAILY
Status: DISCONTINUED | OUTPATIENT
Start: 2020-03-20 | End: 2020-03-29 | Stop reason: HOSPADM

## 2020-03-19 RX ORDER — ACETAMINOPHEN 650 MG/1
650 SUPPOSITORY RECTAL EVERY 4 HOURS PRN
Status: DISCONTINUED | OUTPATIENT
Start: 2020-03-19 | End: 2020-03-29 | Stop reason: HOSPADM

## 2020-03-19 RX ORDER — HYDROCHLOROTHIAZIDE 12.5 MG/1
12.5 TABLET ORAL DAILY
Status: DISCONTINUED | OUTPATIENT
Start: 2020-03-20 | End: 2020-03-27

## 2020-03-19 RX ORDER — MELATONIN
1000 DAILY
Status: DISCONTINUED | OUTPATIENT
Start: 2020-03-20 | End: 2020-03-29 | Stop reason: HOSPADM

## 2020-03-19 RX ORDER — ATORVASTATIN CALCIUM 20 MG/1
20 TABLET, FILM COATED ORAL DAILY
Status: DISCONTINUED | OUTPATIENT
Start: 2020-03-20 | End: 2020-03-29 | Stop reason: HOSPADM

## 2020-03-19 RX ORDER — ACETAMINOPHEN 160 MG/5ML
650 SOLUTION ORAL EVERY 4 HOURS PRN
Status: DISCONTINUED | OUTPATIENT
Start: 2020-03-19 | End: 2020-03-29 | Stop reason: HOSPADM

## 2020-03-19 RX ORDER — NICOTINE POLACRILEX 4 MG
15 LOZENGE BUCCAL
Status: DISCONTINUED | OUTPATIENT
Start: 2020-03-19 | End: 2020-03-29 | Stop reason: HOSPADM

## 2020-03-19 RX ORDER — CYCLOSPORINE 0.5 MG/ML
1 EMULSION OPHTHALMIC 2 TIMES DAILY
Status: DISCONTINUED | OUTPATIENT
Start: 2020-03-19 | End: 2020-03-29 | Stop reason: HOSPADM

## 2020-03-19 RX ORDER — ZOLPIDEM TARTRATE 5 MG/1
5 TABLET ORAL NIGHTLY PRN
Status: DISCONTINUED | OUTPATIENT
Start: 2020-03-19 | End: 2020-03-29 | Stop reason: HOSPADM

## 2020-03-19 RX ORDER — IPRATROPIUM BROMIDE AND ALBUTEROL SULFATE 2.5; .5 MG/3ML; MG/3ML
3 SOLUTION RESPIRATORY (INHALATION) ONCE
Status: DISCONTINUED | OUTPATIENT
Start: 2020-03-19 | End: 2020-03-19

## 2020-03-19 RX ORDER — PREGABALIN 100 MG/1
100 CAPSULE ORAL 3 TIMES DAILY
Status: DISCONTINUED | OUTPATIENT
Start: 2020-03-19 | End: 2020-03-29 | Stop reason: HOSPADM

## 2020-03-19 RX ORDER — FLUTICASONE PROPIONATE 50 MCG
2 SPRAY, SUSPENSION (ML) NASAL DAILY
Status: DISCONTINUED | OUTPATIENT
Start: 2020-03-20 | End: 2020-03-29 | Stop reason: HOSPADM

## 2020-03-19 RX ORDER — METHYLPREDNISOLONE SODIUM SUCCINATE 125 MG/2ML
125 INJECTION, POWDER, LYOPHILIZED, FOR SOLUTION INTRAMUSCULAR; INTRAVENOUS ONCE
Status: DISCONTINUED | OUTPATIENT
Start: 2020-03-19 | End: 2020-03-20

## 2020-03-19 RX ORDER — SODIUM CHLORIDE 0.9 % (FLUSH) 0.9 %
10 SYRINGE (ML) INJECTION AS NEEDED
Status: DISCONTINUED | OUTPATIENT
Start: 2020-03-19 | End: 2020-03-29 | Stop reason: HOSPADM

## 2020-03-19 RX ORDER — ALBUTEROL SULFATE 2.5 MG/3ML
2.5 SOLUTION RESPIRATORY (INHALATION)
Status: DISCONTINUED | OUTPATIENT
Start: 2020-03-19 | End: 2020-03-20

## 2020-03-19 RX ORDER — PANTOPRAZOLE SODIUM 40 MG/1
40 TABLET, DELAYED RELEASE ORAL EVERY MORNING
Status: DISCONTINUED | OUTPATIENT
Start: 2020-03-20 | End: 2020-03-29 | Stop reason: HOSPADM

## 2020-03-19 RX ORDER — ACETAMINOPHEN 325 MG/1
650 TABLET ORAL EVERY 4 HOURS PRN
Status: DISCONTINUED | OUTPATIENT
Start: 2020-03-19 | End: 2020-03-29 | Stop reason: HOSPADM

## 2020-03-19 RX ORDER — METHYLPREDNISOLONE SODIUM SUCCINATE 125 MG/2ML
60 INJECTION, POWDER, LYOPHILIZED, FOR SOLUTION INTRAMUSCULAR; INTRAVENOUS EVERY 8 HOURS
Status: DISCONTINUED | OUTPATIENT
Start: 2020-03-19 | End: 2020-03-20

## 2020-03-19 RX ORDER — GLIPIZIDE 5 MG/1
2.5 TABLET ORAL
Status: DISCONTINUED | OUTPATIENT
Start: 2020-03-20 | End: 2020-03-20

## 2020-03-19 RX ORDER — ASCORBIC ACID 500 MG
1000 TABLET ORAL DAILY
Status: DISCONTINUED | OUTPATIENT
Start: 2020-03-20 | End: 2020-03-29 | Stop reason: HOSPADM

## 2020-03-19 RX ORDER — DEXTROSE MONOHYDRATE 25 G/50ML
25 INJECTION, SOLUTION INTRAVENOUS
Status: DISCONTINUED | OUTPATIENT
Start: 2020-03-19 | End: 2020-03-29 | Stop reason: HOSPADM

## 2020-03-19 RX ORDER — ONDANSETRON 2 MG/ML
4 INJECTION INTRAMUSCULAR; INTRAVENOUS EVERY 6 HOURS PRN
Status: DISCONTINUED | OUTPATIENT
Start: 2020-03-19 | End: 2020-03-29 | Stop reason: HOSPADM

## 2020-03-19 RX ADMIN — IPRATROPIUM BROMIDE AND ALBUTEROL SULFATE 3 ML: 2.5; .5 SOLUTION RESPIRATORY (INHALATION) at 16:46

## 2020-03-19 NOTE — TELEPHONE ENCOUNTER
We are not able to see her since we do not have personal protective suit    Recc she go to the [facility] in [place] to see if she needs to be swabbed for COVID 19

## 2020-03-19 NOTE — TELEPHONE ENCOUNTER
Patient called yesterday and was told to stay on flonase and add an antihistamine    She has called back today stating she is worse.  Believes its an URI, dry cough trouble breathing, no known fever.    Please see telephone message from yesterday for additional info      Please advise.

## 2020-03-20 LAB
ALBUMIN SERPL-MCNC: 4.6 G/DL (ref 3.5–5.2)
ALBUMIN/GLOB SERPL: 1.5 G/DL
ALP SERPL-CCNC: 57 U/L (ref 39–117)
ALT SERPL W P-5'-P-CCNC: 27 U/L (ref 1–33)
ANION GAP SERPL CALCULATED.3IONS-SCNC: 14.6 MMOL/L (ref 5–15)
AST SERPL-CCNC: 20 U/L (ref 1–32)
BASOPHILS # BLD AUTO: 0.01 10*3/MM3 (ref 0–0.2)
BASOPHILS NFR BLD AUTO: 0.1 % (ref 0–1.5)
BILIRUB SERPL-MCNC: 0.3 MG/DL (ref 0.2–1.2)
BUN BLD-MCNC: 21 MG/DL (ref 8–23)
BUN/CREAT SERPL: 27.6 (ref 7–25)
CALCIUM SPEC-SCNC: 10.2 MG/DL (ref 8.6–10.5)
CHLORIDE SERPL-SCNC: 99 MMOL/L (ref 98–107)
CO2 SERPL-SCNC: 21.4 MMOL/L (ref 22–29)
CREAT BLD-MCNC: 0.76 MG/DL (ref 0.57–1)
DEPRECATED RDW RBC AUTO: 42.2 FL (ref 37–54)
EOSINOPHIL # BLD AUTO: 0 10*3/MM3 (ref 0–0.4)
EOSINOPHIL NFR BLD AUTO: 0 % (ref 0.3–6.2)
ERYTHROCYTE [DISTWIDTH] IN BLOOD BY AUTOMATED COUNT: 12.7 % (ref 12.3–15.4)
GFR SERPL CREATININE-BSD FRML MDRD: 77 ML/MIN/1.73
GLOBULIN UR ELPH-MCNC: 3.1 GM/DL
GLUCOSE BLD-MCNC: 277 MG/DL (ref 65–99)
GLUCOSE BLDC GLUCOMTR-MCNC: 259 MG/DL (ref 70–130)
GLUCOSE BLDC GLUCOMTR-MCNC: 267 MG/DL (ref 70–130)
GLUCOSE BLDC GLUCOMTR-MCNC: 271 MG/DL (ref 70–130)
GLUCOSE BLDC GLUCOMTR-MCNC: 282 MG/DL (ref 70–130)
GLUCOSE BLDC GLUCOMTR-MCNC: 301 MG/DL (ref 70–130)
HCT VFR BLD AUTO: 41.1 % (ref 34–46.6)
HGB BLD-MCNC: 14 G/DL (ref 12–15.9)
IMM GRANULOCYTES # BLD AUTO: 0.06 10*3/MM3 (ref 0–0.05)
IMM GRANULOCYTES NFR BLD AUTO: 0.6 % (ref 0–0.5)
LYMPHOCYTES # BLD AUTO: 1.02 10*3/MM3 (ref 0.7–3.1)
LYMPHOCYTES NFR BLD AUTO: 10 % (ref 19.6–45.3)
MCH RBC QN AUTO: 30.9 PG (ref 26.6–33)
MCHC RBC AUTO-ENTMCNC: 34.1 G/DL (ref 31.5–35.7)
MCV RBC AUTO: 90.7 FL (ref 79–97)
MONOCYTES # BLD AUTO: 0.14 10*3/MM3 (ref 0.1–0.9)
MONOCYTES NFR BLD AUTO: 1.4 % (ref 5–12)
NEUTROPHILS # BLD AUTO: 8.92 10*3/MM3 (ref 1.7–7)
NEUTROPHILS NFR BLD AUTO: 87.9 % (ref 42.7–76)
NRBC BLD AUTO-RTO: 0 /100 WBC (ref 0–0.2)
PLATELET # BLD AUTO: 249 10*3/MM3 (ref 140–450)
PMV BLD AUTO: 10.8 FL (ref 6–12)
POTASSIUM BLD-SCNC: 4.3 MMOL/L (ref 3.5–5.2)
PROT SERPL-MCNC: 7.7 G/DL (ref 6–8.5)
RBC # BLD AUTO: 4.53 10*6/MM3 (ref 3.77–5.28)
SODIUM BLD-SCNC: 135 MMOL/L (ref 136–145)
WBC NRBC COR # BLD: 10.15 10*3/MM3 (ref 3.4–10.8)

## 2020-03-20 PROCEDURE — 80053 COMPREHEN METABOLIC PANEL: CPT | Performed by: INTERNAL MEDICINE

## 2020-03-20 PROCEDURE — 82962 GLUCOSE BLOOD TEST: CPT

## 2020-03-20 PROCEDURE — 94799 UNLISTED PULMONARY SVC/PX: CPT

## 2020-03-20 PROCEDURE — 25010000002 MORPHINE PER 10 MG: Performed by: INTERNAL MEDICINE

## 2020-03-20 PROCEDURE — 25010000002 ENOXAPARIN PER 10 MG: Performed by: INTERNAL MEDICINE

## 2020-03-20 PROCEDURE — 25010000002 METHYLPREDNISOLONE PER 40 MG: Performed by: INTERNAL MEDICINE

## 2020-03-20 PROCEDURE — 85025 COMPLETE CBC W/AUTO DIFF WBC: CPT | Performed by: INTERNAL MEDICINE

## 2020-03-20 PROCEDURE — 63710000001 INSULIN LISPRO (HUMAN) PER 5 UNITS: Performed by: INTERNAL MEDICINE

## 2020-03-20 PROCEDURE — 25010000002 METHYLPREDNISOLONE PER 125 MG: Performed by: INTERNAL MEDICINE

## 2020-03-20 RX ORDER — METHYLPREDNISOLONE SODIUM SUCCINATE 40 MG/ML
40 INJECTION, POWDER, LYOPHILIZED, FOR SOLUTION INTRAMUSCULAR; INTRAVENOUS 3 TIMES DAILY
Status: DISCONTINUED | OUTPATIENT
Start: 2020-03-20 | End: 2020-03-20

## 2020-03-20 RX ORDER — NARATRIPTAN 2.5 MG/1
2.5 TABLET ORAL DAILY PRN
Status: DISCONTINUED | OUTPATIENT
Start: 2020-03-20 | End: 2020-03-29 | Stop reason: HOSPADM

## 2020-03-20 RX ORDER — MORPHINE SULFATE 2 MG/ML
2 INJECTION, SOLUTION INTRAMUSCULAR; INTRAVENOUS ONCE
Status: COMPLETED | OUTPATIENT
Start: 2020-03-20 | End: 2020-03-20

## 2020-03-20 RX ORDER — BUDESONIDE AND FORMOTEROL FUMARATE DIHYDRATE 160; 4.5 UG/1; UG/1
2 AEROSOL RESPIRATORY (INHALATION)
Status: DISCONTINUED | OUTPATIENT
Start: 2020-03-20 | End: 2020-03-29 | Stop reason: HOSPADM

## 2020-03-20 RX ORDER — ALBUTEROL SULFATE 2.5 MG/3ML
2.5 SOLUTION RESPIRATORY (INHALATION) EVERY 4 HOURS PRN
Status: DISCONTINUED | OUTPATIENT
Start: 2020-03-20 | End: 2020-03-24

## 2020-03-20 RX ORDER — BUDESONIDE AND FORMOTEROL FUMARATE DIHYDRATE 160; 4.5 UG/1; UG/1
2 AEROSOL RESPIRATORY (INHALATION) EVERY 4 HOURS PRN
Status: DISCONTINUED | OUTPATIENT
Start: 2020-03-20 | End: 2020-03-21

## 2020-03-20 RX ORDER — METHYLPREDNISOLONE SODIUM SUCCINATE 40 MG/ML
40 INJECTION, POWDER, LYOPHILIZED, FOR SOLUTION INTRAMUSCULAR; INTRAVENOUS EVERY 8 HOURS
Status: DISCONTINUED | OUTPATIENT
Start: 2020-03-20 | End: 2020-03-20

## 2020-03-20 RX ADMIN — PREGABALIN 100 MG: 100 CAPSULE ORAL at 20:07

## 2020-03-20 RX ADMIN — PREGABALIN 100 MG: 100 CAPSULE ORAL at 08:48

## 2020-03-20 RX ADMIN — BUTALBITAL, ACETAMINOPHEN, AND CAFFEINE 1 TABLET: 50; 325; 40 TABLET ORAL at 08:47

## 2020-03-20 RX ADMIN — ATORVASTATIN CALCIUM 20 MG: 20 TABLET, FILM COATED ORAL at 08:47

## 2020-03-20 RX ADMIN — VITAMIN D, TAB 1000IU (100/BT) 1000 UNITS: 25 TAB at 11:33

## 2020-03-20 RX ADMIN — INSULIN LISPRO 7 UNITS: 100 INJECTION, SOLUTION INTRAVENOUS; SUBCUTANEOUS at 11:38

## 2020-03-20 RX ADMIN — PROPRANOLOL HYDROCHLORIDE 40 MG: 40 TABLET ORAL at 01:12

## 2020-03-20 RX ADMIN — INSULIN LISPRO 6 UNITS: 100 INJECTION, SOLUTION INTRAVENOUS; SUBCUTANEOUS at 01:16

## 2020-03-20 RX ADMIN — METHYLPREDNISOLONE SODIUM SUCCINATE 40 MG: 40 INJECTION, POWDER, FOR SOLUTION INTRAMUSCULAR; INTRAVENOUS at 08:47

## 2020-03-20 RX ADMIN — SODIUM CHLORIDE, PRESERVATIVE FREE 10 ML: 5 INJECTION INTRAVENOUS at 08:49

## 2020-03-20 RX ADMIN — PROPRANOLOL HYDROCHLORIDE 40 MG: 40 TABLET ORAL at 08:47

## 2020-03-20 RX ADMIN — NARATRIPTAN 2.5 MG: 2.5 TABLET ORAL at 15:53

## 2020-03-20 RX ADMIN — ENOXAPARIN SODIUM 40 MG: 40 INJECTION SUBCUTANEOUS at 20:07

## 2020-03-20 RX ADMIN — ACETAMINOPHEN 650 MG: 325 TABLET, FILM COATED ORAL at 11:45

## 2020-03-20 RX ADMIN — CYCLOSPORINE 1 DROP: 0.5 EMULSION OPHTHALMIC at 20:07

## 2020-03-20 RX ADMIN — CYCLOSPORINE 1 DROP: 0.5 EMULSION OPHTHALMIC at 01:12

## 2020-03-20 RX ADMIN — PROPRANOLOL HYDROCHLORIDE 40 MG: 40 TABLET ORAL at 20:07

## 2020-03-20 RX ADMIN — FLUTICASONE PROPIONATE 2 SPRAY: 50 SPRAY, METERED NASAL at 08:48

## 2020-03-20 RX ADMIN — LINAGLIPTIN 5 MG: 5 TABLET, FILM COATED ORAL at 20:06

## 2020-03-20 RX ADMIN — SODIUM CHLORIDE, PRESERVATIVE FREE 10 ML: 5 INJECTION INTRAVENOUS at 20:08

## 2020-03-20 RX ADMIN — PANTOPRAZOLE SODIUM 40 MG: 40 TABLET, DELAYED RELEASE ORAL at 08:47

## 2020-03-20 RX ADMIN — PREGABALIN 100 MG: 100 CAPSULE ORAL at 01:11

## 2020-03-20 RX ADMIN — OXYCODONE HYDROCHLORIDE AND ACETAMINOPHEN 1000 MG: 500 TABLET ORAL at 08:47

## 2020-03-20 RX ADMIN — INSULIN LISPRO 6 UNITS: 100 INJECTION, SOLUTION INTRAVENOUS; SUBCUTANEOUS at 20:20

## 2020-03-20 RX ADMIN — BUDESONIDE AND FORMOTEROL FUMARATE DIHYDRATE 2 PUFF: 160; 4.5 AEROSOL RESPIRATORY (INHALATION) at 21:45

## 2020-03-20 RX ADMIN — PREGABALIN 100 MG: 100 CAPSULE ORAL at 15:14

## 2020-03-20 RX ADMIN — SODIUM CHLORIDE, PRESERVATIVE FREE 10 ML: 5 INJECTION INTRAVENOUS at 08:50

## 2020-03-20 RX ADMIN — ASPIRIN 81 MG: 81 TABLET, COATED ORAL at 08:48

## 2020-03-20 RX ADMIN — INSULIN LISPRO 6 UNITS: 100 INJECTION, SOLUTION INTRAVENOUS; SUBCUTANEOUS at 08:50

## 2020-03-20 RX ADMIN — ACETAMINOPHEN 650 MG: 325 TABLET, FILM COATED ORAL at 17:22

## 2020-03-20 RX ADMIN — CETIRIZINE HYDROCHLORIDE 10 MG: 10 TABLET, FILM COATED ORAL at 08:47

## 2020-03-20 RX ADMIN — BUDESONIDE AND FORMOTEROL FUMARATE DIHYDRATE 2 PUFF: 160; 4.5 AEROSOL RESPIRATORY (INHALATION) at 09:54

## 2020-03-20 RX ADMIN — HYDROCHLOROTHIAZIDE 12.5 MG: 12.5 CAPSULE ORAL at 08:47

## 2020-03-20 RX ADMIN — METHYLPREDNISOLONE SODIUM SUCCINATE 60 MG: 125 INJECTION, POWDER, FOR SOLUTION INTRAMUSCULAR; INTRAVENOUS at 01:12

## 2020-03-20 RX ADMIN — MORPHINE SULFATE 2 MG: 2 INJECTION, SOLUTION INTRAMUSCULAR; INTRAVENOUS at 18:05

## 2020-03-20 RX ADMIN — SODIUM CHLORIDE, PRESERVATIVE FREE 10 ML: 5 INJECTION INTRAVENOUS at 01:14

## 2020-03-20 RX ADMIN — INSULIN LISPRO 6 UNITS: 100 INJECTION, SOLUTION INTRAVENOUS; SUBCUTANEOUS at 17:21

## 2020-03-20 RX ADMIN — METHYLPREDNISOLONE SODIUM SUCCINATE 40 MG: 40 INJECTION, POWDER, FOR SOLUTION INTRAMUSCULAR; INTRAVENOUS at 15:17

## 2020-03-20 RX ADMIN — SODIUM CHLORIDE, PRESERVATIVE FREE 10 ML: 5 INJECTION INTRAVENOUS at 01:13

## 2020-03-20 RX ADMIN — CYCLOSPORINE 1 DROP: 0.5 EMULSION OPHTHALMIC at 08:47

## 2020-03-20 RX ADMIN — ZOLPIDEM TARTRATE 5 MG: 5 TABLET ORAL at 20:07

## 2020-03-21 LAB
ANION GAP SERPL CALCULATED.3IONS-SCNC: 13.6 MMOL/L (ref 5–15)
BASOPHILS # BLD AUTO: 0.02 10*3/MM3 (ref 0–0.2)
BASOPHILS NFR BLD AUTO: 0.1 % (ref 0–1.5)
BUN BLD-MCNC: 23 MG/DL (ref 8–23)
BUN/CREAT SERPL: 35.4 (ref 7–25)
CALCIUM SPEC-SCNC: 10.3 MG/DL (ref 8.6–10.5)
CHLORIDE SERPL-SCNC: 103 MMOL/L (ref 98–107)
CO2 SERPL-SCNC: 23.4 MMOL/L (ref 22–29)
CREAT BLD-MCNC: 0.65 MG/DL (ref 0.57–1)
DEPRECATED RDW RBC AUTO: 41.5 FL (ref 37–54)
EOSINOPHIL # BLD AUTO: 0.01 10*3/MM3 (ref 0–0.4)
EOSINOPHIL NFR BLD AUTO: 0.1 % (ref 0.3–6.2)
ERYTHROCYTE [DISTWIDTH] IN BLOOD BY AUTOMATED COUNT: 12.9 % (ref 12.3–15.4)
GFR SERPL CREATININE-BSD FRML MDRD: 92 ML/MIN/1.73
GLUCOSE BLD-MCNC: 163 MG/DL (ref 65–99)
GLUCOSE BLDC GLUCOMTR-MCNC: 153 MG/DL (ref 70–130)
GLUCOSE BLDC GLUCOMTR-MCNC: 178 MG/DL (ref 70–130)
GLUCOSE BLDC GLUCOMTR-MCNC: 308 MG/DL (ref 70–130)
HCT VFR BLD AUTO: 41.1 % (ref 34–46.6)
HGB BLD-MCNC: 13.9 G/DL (ref 12–15.9)
IMM GRANULOCYTES # BLD AUTO: 0.11 10*3/MM3 (ref 0–0.05)
IMM GRANULOCYTES NFR BLD AUTO: 0.8 % (ref 0–0.5)
LYMPHOCYTES # BLD AUTO: 2.19 10*3/MM3 (ref 0.7–3.1)
LYMPHOCYTES NFR BLD AUTO: 16.4 % (ref 19.6–45.3)
MCH RBC QN AUTO: 30 PG (ref 26.6–33)
MCHC RBC AUTO-ENTMCNC: 33.8 G/DL (ref 31.5–35.7)
MCV RBC AUTO: 88.8 FL (ref 79–97)
MONOCYTES # BLD AUTO: 1.1 10*3/MM3 (ref 0.1–0.9)
MONOCYTES NFR BLD AUTO: 8.2 % (ref 5–12)
NEUTROPHILS # BLD AUTO: 9.94 10*3/MM3 (ref 1.7–7)
NEUTROPHILS NFR BLD AUTO: 74.4 % (ref 42.7–76)
NRBC BLD AUTO-RTO: 0 /100 WBC (ref 0–0.2)
PLATELET # BLD AUTO: 202 10*3/MM3 (ref 140–450)
PMV BLD AUTO: 11.5 FL (ref 6–12)
POTASSIUM BLD-SCNC: 4.4 MMOL/L (ref 3.5–5.2)
RBC # BLD AUTO: 4.63 10*6/MM3 (ref 3.77–5.28)
SODIUM BLD-SCNC: 140 MMOL/L (ref 136–145)
WBC NRBC COR # BLD: 13.37 10*3/MM3 (ref 3.4–10.8)

## 2020-03-21 PROCEDURE — 82962 GLUCOSE BLOOD TEST: CPT

## 2020-03-21 PROCEDURE — 63710000001 INSULIN GLARGINE PER 5 UNITS: Performed by: INTERNAL MEDICINE

## 2020-03-21 PROCEDURE — 63710000001 DIPHENHYDRAMINE PER 50 MG: Performed by: PSYCHIATRY & NEUROLOGY

## 2020-03-21 PROCEDURE — 80048 BASIC METABOLIC PNL TOTAL CA: CPT | Performed by: INTERNAL MEDICINE

## 2020-03-21 PROCEDURE — 94640 AIRWAY INHALATION TREATMENT: CPT

## 2020-03-21 PROCEDURE — 63710000001 INSULIN LISPRO (HUMAN) PER 5 UNITS: Performed by: INTERNAL MEDICINE

## 2020-03-21 PROCEDURE — 25010000002 METHYLPREDNISOLONE PER 40 MG: Performed by: INTERNAL MEDICINE

## 2020-03-21 PROCEDURE — 25010000002 PROCHLORPERAZINE 10 MG/2ML SOLUTION: Performed by: PSYCHIATRY & NEUROLOGY

## 2020-03-21 PROCEDURE — 94799 UNLISTED PULMONARY SVC/PX: CPT

## 2020-03-21 PROCEDURE — 25010000002 ENOXAPARIN PER 10 MG: Performed by: INTERNAL MEDICINE

## 2020-03-21 PROCEDURE — 63710000001 PREDNISONE PER 5 MG: Performed by: INTERNAL MEDICINE

## 2020-03-21 PROCEDURE — 25010000002 LEVETIRACETAM IN NACL 0.82% 500 MG/100ML SOLUTION: Performed by: PSYCHIATRY & NEUROLOGY

## 2020-03-21 PROCEDURE — 85025 COMPLETE CBC W/AUTO DIFF WBC: CPT | Performed by: INTERNAL MEDICINE

## 2020-03-21 PROCEDURE — 25010000002 MAGNESIUM SULFATE IN D5W 1G/100ML (PREMIX) 1-5 GM/100ML-% SOLUTION: Performed by: PSYCHIATRY & NEUROLOGY

## 2020-03-21 RX ORDER — LEVETIRACETAM 5 MG/ML
500 INJECTION INTRAVASCULAR EVERY 8 HOURS
Status: DISPENSED | OUTPATIENT
Start: 2020-03-21 | End: 2020-03-22

## 2020-03-21 RX ORDER — MAGNESIUM SULFATE 1 G/100ML
1 INJECTION INTRAVENOUS ONCE
Status: COMPLETED | OUTPATIENT
Start: 2020-03-21 | End: 2020-03-21

## 2020-03-21 RX ORDER — METHYLPREDNISOLONE SODIUM SUCCINATE 40 MG/ML
40 INJECTION, POWDER, LYOPHILIZED, FOR SOLUTION INTRAMUSCULAR; INTRAVENOUS EVERY 8 HOURS
Status: DISCONTINUED | OUTPATIENT
Start: 2020-03-21 | End: 2020-03-22

## 2020-03-21 RX ORDER — DIPHENHYDRAMINE HCL 25 MG
25 CAPSULE ORAL EVERY 8 HOURS
Status: DISPENSED | OUTPATIENT
Start: 2020-03-21 | End: 2020-03-22

## 2020-03-21 RX ORDER — PROCHLORPERAZINE EDISYLATE 5 MG/ML
10 INJECTION INTRAMUSCULAR; INTRAVENOUS EVERY 8 HOURS
Status: DISPENSED | OUTPATIENT
Start: 2020-03-21 | End: 2020-03-22

## 2020-03-21 RX ORDER — IPRATROPIUM BROMIDE AND ALBUTEROL SULFATE 2.5; .5 MG/3ML; MG/3ML
3 SOLUTION RESPIRATORY (INHALATION)
Status: DISCONTINUED | OUTPATIENT
Start: 2020-03-21 | End: 2020-03-22

## 2020-03-21 RX ORDER — BENZONATATE 100 MG/1
100 CAPSULE ORAL 3 TIMES DAILY PRN
Status: DISCONTINUED | OUTPATIENT
Start: 2020-03-21 | End: 2020-03-29 | Stop reason: HOSPADM

## 2020-03-21 RX ORDER — INSULIN GLARGINE 100 [IU]/ML
15 INJECTION, SOLUTION SUBCUTANEOUS NIGHTLY
Status: DISCONTINUED | OUTPATIENT
Start: 2020-03-21 | End: 2020-03-25

## 2020-03-21 RX ADMIN — PANTOPRAZOLE SODIUM 40 MG: 40 TABLET, DELAYED RELEASE ORAL at 08:58

## 2020-03-21 RX ADMIN — PREGABALIN 100 MG: 100 CAPSULE ORAL at 16:33

## 2020-03-21 RX ADMIN — DIPHENHYDRAMINE HYDROCHLORIDE 25 MG: 25 CAPSULE ORAL at 20:40

## 2020-03-21 RX ADMIN — SODIUM CHLORIDE, PRESERVATIVE FREE 10 ML: 5 INJECTION INTRAVENOUS at 20:39

## 2020-03-21 RX ADMIN — PREDNISONE 60 MG: 50 TABLET ORAL at 08:58

## 2020-03-21 RX ADMIN — ALBUTEROL SULFATE 2.5 MG: 2.5 SOLUTION RESPIRATORY (INHALATION) at 17:53

## 2020-03-21 RX ADMIN — CETIRIZINE HYDROCHLORIDE 10 MG: 10 TABLET, FILM COATED ORAL at 08:57

## 2020-03-21 RX ADMIN — ASPIRIN 81 MG: 81 TABLET, COATED ORAL at 08:58

## 2020-03-21 RX ADMIN — SODIUM CHLORIDE, PRESERVATIVE FREE 10 ML: 5 INJECTION INTRAVENOUS at 09:10

## 2020-03-21 RX ADMIN — ATORVASTATIN CALCIUM 20 MG: 20 TABLET, FILM COATED ORAL at 08:58

## 2020-03-21 RX ADMIN — INSULIN LISPRO 2 UNITS: 100 INJECTION, SOLUTION INTRAVENOUS; SUBCUTANEOUS at 21:02

## 2020-03-21 RX ADMIN — PREGABALIN 100 MG: 100 CAPSULE ORAL at 08:58

## 2020-03-21 RX ADMIN — METHYLPREDNISOLONE SODIUM SUCCINATE 40 MG: 40 INJECTION, POWDER, FOR SOLUTION INTRAMUSCULAR; INTRAVENOUS at 20:40

## 2020-03-21 RX ADMIN — BUDESONIDE AND FORMOTEROL FUMARATE DIHYDRATE 2 PUFF: 160; 4.5 AEROSOL RESPIRATORY (INHALATION) at 07:50

## 2020-03-21 RX ADMIN — FLUTICASONE PROPIONATE 2 SPRAY: 50 SPRAY, METERED NASAL at 09:01

## 2020-03-21 RX ADMIN — INSULIN LISPRO 2 UNITS: 100 INJECTION, SOLUTION INTRAVENOUS; SUBCUTANEOUS at 09:03

## 2020-03-21 RX ADMIN — SODIUM CHLORIDE, PRESERVATIVE FREE 10 ML: 5 INJECTION INTRAVENOUS at 20:41

## 2020-03-21 RX ADMIN — BUTALBITAL, ACETAMINOPHEN, AND CAFFEINE 1 TABLET: 50; 325; 40 TABLET ORAL at 08:57

## 2020-03-21 RX ADMIN — CYCLOSPORINE 1 DROP: 0.5 EMULSION OPHTHALMIC at 08:58

## 2020-03-21 RX ADMIN — PROPRANOLOL HYDROCHLORIDE 40 MG: 40 TABLET ORAL at 08:57

## 2020-03-21 RX ADMIN — CYCLOSPORINE 1 DROP: 0.5 EMULSION OPHTHALMIC at 20:39

## 2020-03-21 RX ADMIN — ZOLPIDEM TARTRATE 5 MG: 5 TABLET ORAL at 21:03

## 2020-03-21 RX ADMIN — LINAGLIPTIN 5 MG: 5 TABLET, FILM COATED ORAL at 20:40

## 2020-03-21 RX ADMIN — BUDESONIDE AND FORMOTEROL FUMARATE DIHYDRATE 2 PUFF: 160; 4.5 AEROSOL RESPIRATORY (INHALATION) at 23:19

## 2020-03-21 RX ADMIN — LEVETIRACETAM INJECTION 500 MG: 5 INJECTION INTRAVENOUS at 20:41

## 2020-03-21 RX ADMIN — SODIUM CHLORIDE, PRESERVATIVE FREE 10 ML: 5 INJECTION INTRAVENOUS at 09:11

## 2020-03-21 RX ADMIN — PREGABALIN 100 MG: 100 CAPSULE ORAL at 20:40

## 2020-03-21 RX ADMIN — IPRATROPIUM BROMIDE AND ALBUTEROL SULFATE 3 ML: 2.5; .5 SOLUTION RESPIRATORY (INHALATION) at 12:24

## 2020-03-21 RX ADMIN — METHYLPREDNISOLONE SODIUM SUCCINATE 40 MG: 40 INJECTION, POWDER, FOR SOLUTION INTRAMUSCULAR; INTRAVENOUS at 11:56

## 2020-03-21 RX ADMIN — PROPRANOLOL HYDROCHLORIDE 40 MG: 40 TABLET ORAL at 20:40

## 2020-03-21 RX ADMIN — VITAMIN D, TAB 1000IU (100/BT) 1000 UNITS: 25 TAB at 08:58

## 2020-03-21 RX ADMIN — NARATRIPTAN 2.5 MG: 2.5 TABLET ORAL at 18:00

## 2020-03-21 RX ADMIN — ENOXAPARIN SODIUM 40 MG: 40 INJECTION SUBCUTANEOUS at 20:39

## 2020-03-21 RX ADMIN — HYDROCHLOROTHIAZIDE 12.5 MG: 12.5 CAPSULE ORAL at 08:58

## 2020-03-21 RX ADMIN — PROCHLORPERAZINE EDISYLATE 10 MG: 5 INJECTION INTRAMUSCULAR; INTRAVENOUS at 20:40

## 2020-03-21 RX ADMIN — OXYCODONE HYDROCHLORIDE AND ACETAMINOPHEN 1000 MG: 500 TABLET ORAL at 08:58

## 2020-03-21 RX ADMIN — INSULIN GLARGINE 15 UNITS: 100 INJECTION, SOLUTION SUBCUTANEOUS at 20:39

## 2020-03-21 RX ADMIN — MAGNESIUM SULFATE 1 G: 1 INJECTION INTRAVENOUS at 20:41

## 2020-03-22 LAB
ANION GAP SERPL CALCULATED.3IONS-SCNC: 11.6 MMOL/L (ref 5–15)
BASOPHILS # BLD AUTO: 0.01 10*3/MM3 (ref 0–0.2)
BASOPHILS NFR BLD AUTO: 0.1 % (ref 0–1.5)
BUN BLD-MCNC: 20 MG/DL (ref 8–23)
BUN/CREAT SERPL: 29 (ref 7–25)
CALCIUM SPEC-SCNC: 9.7 MG/DL (ref 8.6–10.5)
CHLORIDE SERPL-SCNC: 101 MMOL/L (ref 98–107)
CO2 SERPL-SCNC: 25.4 MMOL/L (ref 22–29)
CREAT BLD-MCNC: 0.69 MG/DL (ref 0.57–1)
DEPRECATED RDW RBC AUTO: 42.5 FL (ref 37–54)
EOSINOPHIL # BLD AUTO: 0 10*3/MM3 (ref 0–0.4)
EOSINOPHIL NFR BLD AUTO: 0 % (ref 0.3–6.2)
ERYTHROCYTE [DISTWIDTH] IN BLOOD BY AUTOMATED COUNT: 13.1 % (ref 12.3–15.4)
GFR SERPL CREATININE-BSD FRML MDRD: 86 ML/MIN/1.73
GLUCOSE BLD-MCNC: 191 MG/DL (ref 65–99)
GLUCOSE BLDC GLUCOMTR-MCNC: 163 MG/DL (ref 70–130)
GLUCOSE BLDC GLUCOMTR-MCNC: 172 MG/DL (ref 70–130)
GLUCOSE BLDC GLUCOMTR-MCNC: 199 MG/DL (ref 70–130)
GLUCOSE BLDC GLUCOMTR-MCNC: 237 MG/DL (ref 70–130)
HCT VFR BLD AUTO: 42.4 % (ref 34–46.6)
HGB BLD-MCNC: 14.3 G/DL (ref 12–15.9)
IMM GRANULOCYTES # BLD AUTO: 0.06 10*3/MM3 (ref 0–0.05)
IMM GRANULOCYTES NFR BLD AUTO: 0.8 % (ref 0–0.5)
LYMPHOCYTES # BLD AUTO: 1.38 10*3/MM3 (ref 0.7–3.1)
LYMPHOCYTES NFR BLD AUTO: 18.2 % (ref 19.6–45.3)
MCH RBC QN AUTO: 30.3 PG (ref 26.6–33)
MCHC RBC AUTO-ENTMCNC: 33.7 G/DL (ref 31.5–35.7)
MCV RBC AUTO: 89.8 FL (ref 79–97)
MONOCYTES # BLD AUTO: 0.43 10*3/MM3 (ref 0.1–0.9)
MONOCYTES NFR BLD AUTO: 5.7 % (ref 5–12)
NEUTROPHILS # BLD AUTO: 5.71 10*3/MM3 (ref 1.7–7)
NEUTROPHILS NFR BLD AUTO: 75.2 % (ref 42.7–76)
NRBC BLD AUTO-RTO: 0 /100 WBC (ref 0–0.2)
PLATELET # BLD AUTO: 176 10*3/MM3 (ref 140–450)
PMV BLD AUTO: 12 FL (ref 6–12)
POTASSIUM BLD-SCNC: 4.8 MMOL/L (ref 3.5–5.2)
RBC # BLD AUTO: 4.72 10*6/MM3 (ref 3.77–5.28)
SODIUM BLD-SCNC: 138 MMOL/L (ref 136–145)
WBC NRBC COR # BLD: 7.59 10*3/MM3 (ref 3.4–10.8)

## 2020-03-22 PROCEDURE — 94799 UNLISTED PULMONARY SVC/PX: CPT

## 2020-03-22 PROCEDURE — 80048 BASIC METABOLIC PNL TOTAL CA: CPT | Performed by: INTERNAL MEDICINE

## 2020-03-22 PROCEDURE — 25010000002 LEVETIRACETAM IN NACL 0.82% 500 MG/100ML SOLUTION: Performed by: PSYCHIATRY & NEUROLOGY

## 2020-03-22 PROCEDURE — 25010000002 PROCHLORPERAZINE 10 MG/2ML SOLUTION: Performed by: PSYCHIATRY & NEUROLOGY

## 2020-03-22 PROCEDURE — 82962 GLUCOSE BLOOD TEST: CPT

## 2020-03-22 PROCEDURE — 25010000002 ENOXAPARIN PER 10 MG: Performed by: INTERNAL MEDICINE

## 2020-03-22 PROCEDURE — 85025 COMPLETE CBC W/AUTO DIFF WBC: CPT | Performed by: INTERNAL MEDICINE

## 2020-03-22 PROCEDURE — 63710000001 INSULIN LISPRO (HUMAN) PER 5 UNITS: Performed by: INTERNAL MEDICINE

## 2020-03-22 PROCEDURE — 63710000001 PREDNISONE PER 1 MG: Performed by: INTERNAL MEDICINE

## 2020-03-22 PROCEDURE — 25010000002 METHYLPREDNISOLONE PER 40 MG: Performed by: INTERNAL MEDICINE

## 2020-03-22 PROCEDURE — 63710000001 DIPHENHYDRAMINE PER 50 MG: Performed by: PSYCHIATRY & NEUROLOGY

## 2020-03-22 PROCEDURE — 63710000001 INSULIN GLARGINE PER 5 UNITS: Performed by: INTERNAL MEDICINE

## 2020-03-22 RX ORDER — PREDNISONE 20 MG/1
40 TABLET ORAL
Status: DISCONTINUED | OUTPATIENT
Start: 2020-03-22 | End: 2020-03-25

## 2020-03-22 RX ADMIN — DIPHENHYDRAMINE HYDROCHLORIDE 25 MG: 25 CAPSULE ORAL at 04:25

## 2020-03-22 RX ADMIN — FLUTICASONE PROPIONATE 2 SPRAY: 50 SPRAY, METERED NASAL at 11:49

## 2020-03-22 RX ADMIN — HYDROCHLOROTHIAZIDE 12.5 MG: 12.5 CAPSULE ORAL at 08:31

## 2020-03-22 RX ADMIN — CYCLOSPORINE 1 DROP: 0.5 EMULSION OPHTHALMIC at 08:29

## 2020-03-22 RX ADMIN — OXYCODONE HYDROCHLORIDE AND ACETAMINOPHEN 1000 MG: 500 TABLET ORAL at 08:31

## 2020-03-22 RX ADMIN — PREDNISONE 40 MG: 20 TABLET ORAL at 13:30

## 2020-03-22 RX ADMIN — BUDESONIDE AND FORMOTEROL FUMARATE DIHYDRATE 2 PUFF: 160; 4.5 AEROSOL RESPIRATORY (INHALATION) at 07:51

## 2020-03-22 RX ADMIN — INSULIN LISPRO 2 UNITS: 100 INJECTION, SOLUTION INTRAVENOUS; SUBCUTANEOUS at 17:23

## 2020-03-22 RX ADMIN — INSULIN LISPRO 4 UNITS: 100 INJECTION, SOLUTION INTRAVENOUS; SUBCUTANEOUS at 20:33

## 2020-03-22 RX ADMIN — LINAGLIPTIN 5 MG: 5 TABLET, FILM COATED ORAL at 20:33

## 2020-03-22 RX ADMIN — SODIUM CHLORIDE, PRESERVATIVE FREE 10 ML: 5 INJECTION INTRAVENOUS at 11:48

## 2020-03-22 RX ADMIN — SODIUM CHLORIDE, PRESERVATIVE FREE 10 ML: 5 INJECTION INTRAVENOUS at 11:50

## 2020-03-22 RX ADMIN — ENOXAPARIN SODIUM 40 MG: 40 INJECTION SUBCUTANEOUS at 20:33

## 2020-03-22 RX ADMIN — PREGABALIN 100 MG: 100 CAPSULE ORAL at 20:34

## 2020-03-22 RX ADMIN — SODIUM CHLORIDE, PRESERVATIVE FREE 10 ML: 5 INJECTION INTRAVENOUS at 20:39

## 2020-03-22 RX ADMIN — PROPRANOLOL HYDROCHLORIDE 40 MG: 40 TABLET ORAL at 20:34

## 2020-03-22 RX ADMIN — LEVETIRACETAM INJECTION 500 MG: 5 INJECTION INTRAVENOUS at 04:25

## 2020-03-22 RX ADMIN — CYCLOSPORINE 1 DROP: 0.5 EMULSION OPHTHALMIC at 20:34

## 2020-03-22 RX ADMIN — ASPIRIN 81 MG: 81 TABLET, COATED ORAL at 08:31

## 2020-03-22 RX ADMIN — BUDESONIDE AND FORMOTEROL FUMARATE DIHYDRATE 2 PUFF: 160; 4.5 AEROSOL RESPIRATORY (INHALATION) at 20:13

## 2020-03-22 RX ADMIN — PROCHLORPERAZINE EDISYLATE 10 MG: 5 INJECTION INTRAMUSCULAR; INTRAVENOUS at 04:25

## 2020-03-22 RX ADMIN — ATORVASTATIN CALCIUM 20 MG: 20 TABLET, FILM COATED ORAL at 08:31

## 2020-03-22 RX ADMIN — CETIRIZINE HYDROCHLORIDE 10 MG: 10 TABLET, FILM COATED ORAL at 08:31

## 2020-03-22 RX ADMIN — VITAMIN D, TAB 1000IU (100/BT) 1000 UNITS: 25 TAB at 08:31

## 2020-03-22 RX ADMIN — METHYLPREDNISOLONE SODIUM SUCCINATE 40 MG: 40 INJECTION, POWDER, FOR SOLUTION INTRAMUSCULAR; INTRAVENOUS at 04:25

## 2020-03-22 RX ADMIN — INSULIN LISPRO 2 UNITS: 100 INJECTION, SOLUTION INTRAVENOUS; SUBCUTANEOUS at 08:29

## 2020-03-22 RX ADMIN — PROPRANOLOL HYDROCHLORIDE 40 MG: 40 TABLET ORAL at 08:30

## 2020-03-22 RX ADMIN — PREGABALIN 100 MG: 100 CAPSULE ORAL at 08:30

## 2020-03-22 RX ADMIN — INSULIN GLARGINE 15 UNITS: 100 INJECTION, SOLUTION SUBCUTANEOUS at 20:33

## 2020-03-22 RX ADMIN — PREGABALIN 100 MG: 100 CAPSULE ORAL at 16:30

## 2020-03-22 RX ADMIN — ZOLPIDEM TARTRATE 5 MG: 5 TABLET ORAL at 20:34

## 2020-03-22 RX ADMIN — PANTOPRAZOLE SODIUM 40 MG: 40 TABLET, DELAYED RELEASE ORAL at 08:30

## 2020-03-22 RX ADMIN — SODIUM CHLORIDE, PRESERVATIVE FREE 10 ML: 5 INJECTION INTRAVENOUS at 20:34

## 2020-03-23 LAB
ANION GAP SERPL CALCULATED.3IONS-SCNC: 12.9 MMOL/L (ref 5–15)
BILIRUB UR QL STRIP: NEGATIVE
BUN BLD-MCNC: 23 MG/DL (ref 8–23)
BUN/CREAT SERPL: 32.4 (ref 7–25)
CALCIUM SPEC-SCNC: 9.4 MG/DL (ref 8.6–10.5)
CHLORIDE SERPL-SCNC: 100 MMOL/L (ref 98–107)
CLARITY UR: CLEAR
CO2 SERPL-SCNC: 25.1 MMOL/L (ref 22–29)
COLOR UR: YELLOW
CREAT BLD-MCNC: 0.71 MG/DL (ref 0.57–1)
DEPRECATED RDW RBC AUTO: 42.8 FL (ref 37–54)
ERYTHROCYTE [DISTWIDTH] IN BLOOD BY AUTOMATED COUNT: 13.1 % (ref 12.3–15.4)
GFR SERPL CREATININE-BSD FRML MDRD: 83 ML/MIN/1.73
GLUCOSE BLD-MCNC: 152 MG/DL (ref 65–99)
GLUCOSE BLDC GLUCOMTR-MCNC: 122 MG/DL (ref 70–130)
GLUCOSE BLDC GLUCOMTR-MCNC: 134 MG/DL (ref 70–130)
GLUCOSE BLDC GLUCOMTR-MCNC: 202 MG/DL (ref 70–130)
GLUCOSE BLDC GLUCOMTR-MCNC: 263 MG/DL (ref 70–130)
GLUCOSE UR STRIP-MCNC: NEGATIVE MG/DL
HCT VFR BLD AUTO: 43.2 % (ref 34–46.6)
HGB BLD-MCNC: 14.7 G/DL (ref 12–15.9)
HGB UR QL STRIP.AUTO: NEGATIVE
KETONES UR QL STRIP: NEGATIVE
LEUKOCYTE ESTERASE UR QL STRIP.AUTO: NEGATIVE
LYMPHOCYTES # BLD MANUAL: 4.54 10*3/MM3 (ref 0.7–3.1)
LYMPHOCYTES NFR BLD MANUAL: 47 % (ref 19.6–45.3)
LYMPHOCYTES NFR BLD MANUAL: 5 % (ref 5–12)
MCH RBC QN AUTO: 30.6 PG (ref 26.6–33)
MCHC RBC AUTO-ENTMCNC: 34 G/DL (ref 31.5–35.7)
MCV RBC AUTO: 89.8 FL (ref 79–97)
METAMYELOCYTES NFR BLD MANUAL: 1 % (ref 0–0)
MONOCYTES # BLD AUTO: 0.48 10*3/MM3 (ref 0.1–0.9)
MYELOCYTES NFR BLD MANUAL: 1 % (ref 0–0)
NEUTROPHILS # BLD AUTO: 4.45 10*3/MM3 (ref 1.7–7)
NEUTROPHILS NFR BLD MANUAL: 46 % (ref 42.7–76)
NITRITE UR QL STRIP: NEGATIVE
NRBC SPEC MANUAL: 1 /100 WBC (ref 0–0.2)
PH UR STRIP.AUTO: 6.5 [PH] (ref 5–8)
PLAT MORPH BLD: NORMAL
PLATELET # BLD AUTO: 151 10*3/MM3 (ref 140–450)
PMV BLD AUTO: 11.7 FL (ref 6–12)
POTASSIUM BLD-SCNC: 4.1 MMOL/L (ref 3.5–5.2)
PROT UR QL STRIP: NEGATIVE
RBC # BLD AUTO: 4.81 10*6/MM3 (ref 3.77–5.28)
RBC MORPH BLD: NORMAL
SODIUM BLD-SCNC: 138 MMOL/L (ref 136–145)
SP GR UR STRIP: 1.01 (ref 1–1.03)
UROBILINOGEN UR QL STRIP: NORMAL
WBC MORPH BLD: NORMAL
WBC NRBC COR # BLD: 9.67 10*3/MM3 (ref 3.4–10.8)

## 2020-03-23 PROCEDURE — 85007 BL SMEAR W/DIFF WBC COUNT: CPT | Performed by: INTERNAL MEDICINE

## 2020-03-23 PROCEDURE — 81003 URINALYSIS AUTO W/O SCOPE: CPT | Performed by: HOSPITALIST

## 2020-03-23 PROCEDURE — 80048 BASIC METABOLIC PNL TOTAL CA: CPT | Performed by: INTERNAL MEDICINE

## 2020-03-23 PROCEDURE — 25010000002 ENOXAPARIN PER 10 MG: Performed by: INTERNAL MEDICINE

## 2020-03-23 PROCEDURE — 94799 UNLISTED PULMONARY SVC/PX: CPT

## 2020-03-23 PROCEDURE — 63710000001 PREDNISONE PER 1 MG: Performed by: INTERNAL MEDICINE

## 2020-03-23 PROCEDURE — 82962 GLUCOSE BLOOD TEST: CPT

## 2020-03-23 PROCEDURE — 63710000001 INSULIN LISPRO (HUMAN) PER 5 UNITS: Performed by: INTERNAL MEDICINE

## 2020-03-23 PROCEDURE — 85025 COMPLETE CBC W/AUTO DIFF WBC: CPT | Performed by: INTERNAL MEDICINE

## 2020-03-23 PROCEDURE — 63710000001 INSULIN GLARGINE PER 5 UNITS: Performed by: INTERNAL MEDICINE

## 2020-03-23 RX ORDER — NYSTATIN 100000 [USP'U]/G
POWDER TOPICAL EVERY 12 HOURS SCHEDULED
Status: DISCONTINUED | OUTPATIENT
Start: 2020-03-23 | End: 2020-03-29 | Stop reason: HOSPADM

## 2020-03-23 RX ADMIN — CYCLOSPORINE 1 DROP: 0.5 EMULSION OPHTHALMIC at 21:18

## 2020-03-23 RX ADMIN — SODIUM CHLORIDE, PRESERVATIVE FREE 10 ML: 5 INJECTION INTRAVENOUS at 09:29

## 2020-03-23 RX ADMIN — BUDESONIDE AND FORMOTEROL FUMARATE DIHYDRATE 2 PUFF: 160; 4.5 AEROSOL RESPIRATORY (INHALATION) at 11:08

## 2020-03-23 RX ADMIN — FLUTICASONE PROPIONATE 2 SPRAY: 50 SPRAY, METERED NASAL at 09:28

## 2020-03-23 RX ADMIN — ATORVASTATIN CALCIUM 20 MG: 20 TABLET, FILM COATED ORAL at 09:27

## 2020-03-23 RX ADMIN — SODIUM CHLORIDE, PRESERVATIVE FREE 10 ML: 5 INJECTION INTRAVENOUS at 21:19

## 2020-03-23 RX ADMIN — PREGABALIN 100 MG: 100 CAPSULE ORAL at 09:28

## 2020-03-23 RX ADMIN — ASPIRIN 81 MG: 81 TABLET, COATED ORAL at 09:27

## 2020-03-23 RX ADMIN — INSULIN LISPRO 2 UNITS: 100 INJECTION, SOLUTION INTRAVENOUS; SUBCUTANEOUS at 18:21

## 2020-03-23 RX ADMIN — BUDESONIDE AND FORMOTEROL FUMARATE DIHYDRATE 2 PUFF: 160; 4.5 AEROSOL RESPIRATORY (INHALATION) at 19:35

## 2020-03-23 RX ADMIN — PROPRANOLOL HYDROCHLORIDE 40 MG: 40 TABLET ORAL at 09:28

## 2020-03-23 RX ADMIN — NYSTATIN: 100000 POWDER TOPICAL at 21:21

## 2020-03-23 RX ADMIN — CETIRIZINE HYDROCHLORIDE 10 MG: 10 TABLET, FILM COATED ORAL at 09:27

## 2020-03-23 RX ADMIN — PREDNISONE 40 MG: 20 TABLET ORAL at 09:28

## 2020-03-23 RX ADMIN — ENOXAPARIN SODIUM 40 MG: 40 INJECTION SUBCUTANEOUS at 21:18

## 2020-03-23 RX ADMIN — PANTOPRAZOLE SODIUM 40 MG: 40 TABLET, DELAYED RELEASE ORAL at 09:28

## 2020-03-23 RX ADMIN — ZOLPIDEM TARTRATE 5 MG: 5 TABLET ORAL at 21:19

## 2020-03-23 RX ADMIN — HYDROCHLOROTHIAZIDE 12.5 MG: 12.5 CAPSULE ORAL at 09:28

## 2020-03-23 RX ADMIN — PREGABALIN 100 MG: 100 CAPSULE ORAL at 16:49

## 2020-03-23 RX ADMIN — NYSTATIN: 100000 POWDER TOPICAL at 13:07

## 2020-03-23 RX ADMIN — CYCLOSPORINE 1 DROP: 0.5 EMULSION OPHTHALMIC at 09:28

## 2020-03-23 RX ADMIN — OXYCODONE HYDROCHLORIDE AND ACETAMINOPHEN 1000 MG: 500 TABLET ORAL at 09:28

## 2020-03-23 RX ADMIN — INSULIN GLARGINE 15 UNITS: 100 INJECTION, SOLUTION SUBCUTANEOUS at 21:17

## 2020-03-23 RX ADMIN — LINAGLIPTIN 5 MG: 5 TABLET, FILM COATED ORAL at 21:18

## 2020-03-23 RX ADMIN — ALBUTEROL SULFATE 2.5 MG: 2.5 SOLUTION RESPIRATORY (INHALATION) at 19:35

## 2020-03-23 RX ADMIN — INSULIN LISPRO 6 UNITS: 100 INJECTION, SOLUTION INTRAVENOUS; SUBCUTANEOUS at 21:17

## 2020-03-23 RX ADMIN — PROPRANOLOL HYDROCHLORIDE 40 MG: 40 TABLET ORAL at 21:18

## 2020-03-23 RX ADMIN — SODIUM CHLORIDE, PRESERVATIVE FREE 10 ML: 5 INJECTION INTRAVENOUS at 09:27

## 2020-03-23 RX ADMIN — VITAMIN D, TAB 1000IU (100/BT) 1000 UNITS: 25 TAB at 09:27

## 2020-03-23 RX ADMIN — PREGABALIN 100 MG: 100 CAPSULE ORAL at 21:19

## 2020-03-24 LAB
GLUCOSE BLDC GLUCOMTR-MCNC: 103 MG/DL (ref 70–130)
GLUCOSE BLDC GLUCOMTR-MCNC: 176 MG/DL (ref 70–130)
GLUCOSE BLDC GLUCOMTR-MCNC: 303 MG/DL (ref 70–130)
GLUCOSE BLDC GLUCOMTR-MCNC: 309 MG/DL (ref 70–130)

## 2020-03-24 PROCEDURE — 63710000001 PREDNISONE PER 1 MG: Performed by: INTERNAL MEDICINE

## 2020-03-24 PROCEDURE — 63710000001 INSULIN GLARGINE PER 5 UNITS: Performed by: INTERNAL MEDICINE

## 2020-03-24 PROCEDURE — 63710000001 INSULIN LISPRO (HUMAN) PER 5 UNITS: Performed by: INTERNAL MEDICINE

## 2020-03-24 PROCEDURE — 82962 GLUCOSE BLOOD TEST: CPT

## 2020-03-24 PROCEDURE — 94799 UNLISTED PULMONARY SVC/PX: CPT

## 2020-03-24 PROCEDURE — 25010000002 METHYLPREDNISOLONE PER 40 MG: Performed by: INTERNAL MEDICINE

## 2020-03-24 PROCEDURE — 25010000002 ENOXAPARIN PER 10 MG: Performed by: INTERNAL MEDICINE

## 2020-03-24 RX ORDER — ALBUTEROL SULFATE 90 UG/1
2 AEROSOL, METERED RESPIRATORY (INHALATION) EVERY 4 HOURS PRN
Status: DISCONTINUED | OUTPATIENT
Start: 2020-03-24 | End: 2020-03-24 | Stop reason: ALTCHOICE

## 2020-03-24 RX ORDER — FLUTICASONE PROPIONATE 50 MCG
SPRAY, SUSPENSION (ML) NASAL
Qty: 9.9 ML | Refills: 3 | Status: SHIPPED | OUTPATIENT
Start: 2020-03-24 | End: 2020-09-25 | Stop reason: SDUPTHER

## 2020-03-24 RX ORDER — METHYLPREDNISOLONE SODIUM SUCCINATE 40 MG/ML
40 INJECTION, POWDER, LYOPHILIZED, FOR SOLUTION INTRAMUSCULAR; INTRAVENOUS ONCE
Status: COMPLETED | OUTPATIENT
Start: 2020-03-24 | End: 2020-03-24

## 2020-03-24 RX ORDER — ALBUTEROL SULFATE 2.5 MG/3ML
2.5 SOLUTION RESPIRATORY (INHALATION) EVERY 4 HOURS PRN
Status: DISCONTINUED | OUTPATIENT
Start: 2020-03-24 | End: 2020-03-25

## 2020-03-24 RX ADMIN — OXYCODONE HYDROCHLORIDE AND ACETAMINOPHEN 1000 MG: 500 TABLET ORAL at 09:19

## 2020-03-24 RX ADMIN — NYSTATIN: 100000 POWDER TOPICAL at 09:23

## 2020-03-24 RX ADMIN — SODIUM CHLORIDE, PRESERVATIVE FREE 10 ML: 5 INJECTION INTRAVENOUS at 21:47

## 2020-03-24 RX ADMIN — PREGABALIN 100 MG: 100 CAPSULE ORAL at 09:19

## 2020-03-24 RX ADMIN — NYSTATIN: 100000 POWDER TOPICAL at 21:48

## 2020-03-24 RX ADMIN — ASPIRIN 81 MG: 81 TABLET, COATED ORAL at 09:20

## 2020-03-24 RX ADMIN — SODIUM CHLORIDE, PRESERVATIVE FREE 10 ML: 5 INJECTION INTRAVENOUS at 09:24

## 2020-03-24 RX ADMIN — INSULIN LISPRO 7 UNITS: 100 INJECTION, SOLUTION INTRAVENOUS; SUBCUTANEOUS at 21:46

## 2020-03-24 RX ADMIN — PANTOPRAZOLE SODIUM 40 MG: 40 TABLET, DELAYED RELEASE ORAL at 09:19

## 2020-03-24 RX ADMIN — BUDESONIDE AND FORMOTEROL FUMARATE DIHYDRATE 2 PUFF: 160; 4.5 AEROSOL RESPIRATORY (INHALATION) at 11:16

## 2020-03-24 RX ADMIN — LINAGLIPTIN 5 MG: 5 TABLET, FILM COATED ORAL at 21:21

## 2020-03-24 RX ADMIN — ZOLPIDEM TARTRATE 5 MG: 5 TABLET ORAL at 23:31

## 2020-03-24 RX ADMIN — CETIRIZINE HYDROCHLORIDE 10 MG: 10 TABLET, FILM COATED ORAL at 09:20

## 2020-03-24 RX ADMIN — PROPRANOLOL HYDROCHLORIDE 40 MG: 40 TABLET ORAL at 09:20

## 2020-03-24 RX ADMIN — PREDNISONE 40 MG: 20 TABLET ORAL at 09:19

## 2020-03-24 RX ADMIN — INSULIN LISPRO 7 UNITS: 100 INJECTION, SOLUTION INTRAVENOUS; SUBCUTANEOUS at 18:36

## 2020-03-24 RX ADMIN — HYDROCHLOROTHIAZIDE 12.5 MG: 12.5 CAPSULE ORAL at 09:20

## 2020-03-24 RX ADMIN — FLUTICASONE PROPIONATE 2 SPRAY: 50 SPRAY, METERED NASAL at 09:19

## 2020-03-24 RX ADMIN — ACETAMINOPHEN 650 MG: 325 TABLET, FILM COATED ORAL at 06:57

## 2020-03-24 RX ADMIN — PROPRANOLOL HYDROCHLORIDE 40 MG: 40 TABLET ORAL at 21:21

## 2020-03-24 RX ADMIN — ATORVASTATIN CALCIUM 20 MG: 20 TABLET, FILM COATED ORAL at 09:20

## 2020-03-24 RX ADMIN — ENOXAPARIN SODIUM 40 MG: 40 INJECTION SUBCUTANEOUS at 21:45

## 2020-03-24 RX ADMIN — INSULIN GLARGINE 15 UNITS: 100 INJECTION, SOLUTION SUBCUTANEOUS at 21:49

## 2020-03-24 RX ADMIN — PREGABALIN 100 MG: 100 CAPSULE ORAL at 17:04

## 2020-03-24 RX ADMIN — INSULIN LISPRO 2 UNITS: 100 INJECTION, SOLUTION INTRAVENOUS; SUBCUTANEOUS at 12:36

## 2020-03-24 RX ADMIN — PREGABALIN 100 MG: 100 CAPSULE ORAL at 21:22

## 2020-03-24 RX ADMIN — ALBUTEROL SULFATE 2.5 MG: 2.5 SOLUTION RESPIRATORY (INHALATION) at 11:06

## 2020-03-24 RX ADMIN — METHYLPREDNISOLONE SODIUM SUCCINATE 40 MG: 40 INJECTION, POWDER, FOR SOLUTION INTRAMUSCULAR; INTRAVENOUS at 18:36

## 2020-03-24 RX ADMIN — ALBUTEROL SULFATE 2.5 MG: 2.5 SOLUTION RESPIRATORY (INHALATION) at 15:22

## 2020-03-24 RX ADMIN — VITAMIN D, TAB 1000IU (100/BT) 1000 UNITS: 25 TAB at 09:20

## 2020-03-24 RX ADMIN — ALBUTEROL SULFATE 2.5 MG: 2.5 SOLUTION RESPIRATORY (INHALATION) at 00:26

## 2020-03-24 RX ADMIN — CYCLOSPORINE 1 DROP: 0.5 EMULSION OPHTHALMIC at 09:19

## 2020-03-25 LAB
ANION GAP SERPL CALCULATED.3IONS-SCNC: 14 MMOL/L (ref 5–15)
BASOPHILS # BLD AUTO: 0.01 10*3/MM3 (ref 0–0.2)
BASOPHILS NFR BLD AUTO: 0.1 % (ref 0–1.5)
BUN BLD-MCNC: 22 MG/DL (ref 8–23)
BUN/CREAT SERPL: 35.5 (ref 7–25)
CALCIUM SPEC-SCNC: 9.4 MG/DL (ref 8.6–10.5)
CHLORIDE SERPL-SCNC: 96 MMOL/L (ref 98–107)
CO2 SERPL-SCNC: 24 MMOL/L (ref 22–29)
CREAT BLD-MCNC: 0.62 MG/DL (ref 0.57–1)
DEPRECATED RDW RBC AUTO: 42.2 FL (ref 37–54)
EOSINOPHIL # BLD AUTO: 0.01 10*3/MM3 (ref 0–0.4)
EOSINOPHIL NFR BLD AUTO: 0.1 % (ref 0.3–6.2)
ERYTHROCYTE [DISTWIDTH] IN BLOOD BY AUTOMATED COUNT: 13.1 % (ref 12.3–15.4)
GFR SERPL CREATININE-BSD FRML MDRD: 97 ML/MIN/1.73
GLUCOSE BLD-MCNC: 218 MG/DL (ref 65–99)
GLUCOSE BLDC GLUCOMTR-MCNC: 185 MG/DL (ref 70–130)
GLUCOSE BLDC GLUCOMTR-MCNC: 205 MG/DL (ref 70–130)
GLUCOSE BLDC GLUCOMTR-MCNC: 207 MG/DL (ref 70–130)
GLUCOSE BLDC GLUCOMTR-MCNC: 250 MG/DL (ref 70–130)
HCT VFR BLD AUTO: 43.3 % (ref 34–46.6)
HGB BLD-MCNC: 14.9 G/DL (ref 12–15.9)
IMM GRANULOCYTES # BLD AUTO: 0.1 10*3/MM3 (ref 0–0.05)
IMM GRANULOCYTES NFR BLD AUTO: 1 % (ref 0–0.5)
LYMPHOCYTES # BLD AUTO: 2.13 10*3/MM3 (ref 0.7–3.1)
LYMPHOCYTES NFR BLD AUTO: 20.6 % (ref 19.6–45.3)
MCH RBC QN AUTO: 30.7 PG (ref 26.6–33)
MCHC RBC AUTO-ENTMCNC: 34.4 G/DL (ref 31.5–35.7)
MCV RBC AUTO: 89.1 FL (ref 79–97)
MONOCYTES # BLD AUTO: 0.65 10*3/MM3 (ref 0.1–0.9)
MONOCYTES NFR BLD AUTO: 6.3 % (ref 5–12)
NEUTROPHILS # BLD AUTO: 7.43 10*3/MM3 (ref 1.7–7)
NEUTROPHILS NFR BLD AUTO: 71.9 % (ref 42.7–76)
NRBC BLD AUTO-RTO: 0 /100 WBC (ref 0–0.2)
PLATELET # BLD AUTO: 218 10*3/MM3 (ref 140–450)
PMV BLD AUTO: 11.1 FL (ref 6–12)
POTASSIUM BLD-SCNC: 4.5 MMOL/L (ref 3.5–5.2)
RBC # BLD AUTO: 4.86 10*6/MM3 (ref 3.77–5.28)
SODIUM BLD-SCNC: 134 MMOL/L (ref 136–145)
WBC NRBC COR # BLD: 10.33 10*3/MM3 (ref 3.4–10.8)

## 2020-03-25 PROCEDURE — 82962 GLUCOSE BLOOD TEST: CPT

## 2020-03-25 PROCEDURE — 25010000002 METHYLPREDNISOLONE PER 40 MG: Performed by: INTERNAL MEDICINE

## 2020-03-25 PROCEDURE — 63710000001 INSULIN LISPRO (HUMAN) PER 5 UNITS: Performed by: INTERNAL MEDICINE

## 2020-03-25 PROCEDURE — 63710000001 INSULIN GLARGINE PER 5 UNITS: Performed by: HOSPITALIST

## 2020-03-25 PROCEDURE — 80048 BASIC METABOLIC PNL TOTAL CA: CPT | Performed by: HOSPITALIST

## 2020-03-25 PROCEDURE — 63710000001 INSULIN LISPRO (HUMAN) PER 5 UNITS: Performed by: HOSPITALIST

## 2020-03-25 PROCEDURE — 25010000002 ENOXAPARIN PER 10 MG: Performed by: INTERNAL MEDICINE

## 2020-03-25 PROCEDURE — 94799 UNLISTED PULMONARY SVC/PX: CPT

## 2020-03-25 PROCEDURE — 85025 COMPLETE CBC W/AUTO DIFF WBC: CPT | Performed by: HOSPITALIST

## 2020-03-25 RX ORDER — INSULIN GLARGINE 100 [IU]/ML
20 INJECTION, SOLUTION SUBCUTANEOUS NIGHTLY
Status: DISCONTINUED | OUTPATIENT
Start: 2020-03-25 | End: 2020-03-29 | Stop reason: HOSPADM

## 2020-03-25 RX ORDER — ALBUTEROL SULFATE 2.5 MG/3ML
2.5 SOLUTION RESPIRATORY (INHALATION)
Status: DISCONTINUED | OUTPATIENT
Start: 2020-03-25 | End: 2020-03-29 | Stop reason: HOSPADM

## 2020-03-25 RX ORDER — IPRATROPIUM BROMIDE AND ALBUTEROL SULFATE 2.5; .5 MG/3ML; MG/3ML
3 SOLUTION RESPIRATORY (INHALATION)
Status: DISCONTINUED | OUTPATIENT
Start: 2020-03-25 | End: 2020-03-25

## 2020-03-25 RX ORDER — IPRATROPIUM BROMIDE AND ALBUTEROL SULFATE 2.5; .5 MG/3ML; MG/3ML
3 SOLUTION RESPIRATORY (INHALATION)
Status: DISCONTINUED | OUTPATIENT
Start: 2020-03-25 | End: 2020-03-26

## 2020-03-25 RX ORDER — METHYLPREDNISOLONE SODIUM SUCCINATE 40 MG/ML
40 INJECTION, POWDER, LYOPHILIZED, FOR SOLUTION INTRAMUSCULAR; INTRAVENOUS EVERY 8 HOURS
Status: DISCONTINUED | OUTPATIENT
Start: 2020-03-25 | End: 2020-03-26

## 2020-03-25 RX ORDER — BUTALBITAL, ACETAMINOPHEN AND CAFFEINE 50; 325; 40 MG/1; MG/1; MG/1
2 TABLET ORAL EVERY 4 HOURS PRN
Status: DISCONTINUED | OUTPATIENT
Start: 2020-03-25 | End: 2020-03-29 | Stop reason: HOSPADM

## 2020-03-25 RX ADMIN — BUDESONIDE AND FORMOTEROL FUMARATE DIHYDRATE 2 PUFF: 160; 4.5 AEROSOL RESPIRATORY (INHALATION) at 09:00

## 2020-03-25 RX ADMIN — METHYLPREDNISOLONE SODIUM SUCCINATE 40 MG: 40 INJECTION, POWDER, LYOPHILIZED, FOR SOLUTION INTRAMUSCULAR; INTRAVENOUS at 18:35

## 2020-03-25 RX ADMIN — ATORVASTATIN CALCIUM 20 MG: 20 TABLET, FILM COATED ORAL at 09:01

## 2020-03-25 RX ADMIN — CETIRIZINE HYDROCHLORIDE 10 MG: 10 TABLET, FILM COATED ORAL at 09:01

## 2020-03-25 RX ADMIN — SODIUM CHLORIDE, PRESERVATIVE FREE 10 ML: 5 INJECTION INTRAVENOUS at 09:04

## 2020-03-25 RX ADMIN — CYCLOSPORINE 1 DROP: 0.5 EMULSION OPHTHALMIC at 21:30

## 2020-03-25 RX ADMIN — INSULIN GLARGINE 20 UNITS: 100 INJECTION, SOLUTION SUBCUTANEOUS at 21:30

## 2020-03-25 RX ADMIN — LINAGLIPTIN 5 MG: 5 TABLET, FILM COATED ORAL at 21:29

## 2020-03-25 RX ADMIN — IPRATROPIUM BROMIDE AND ALBUTEROL SULFATE 3 ML: 2.5; .5 SOLUTION RESPIRATORY (INHALATION) at 09:38

## 2020-03-25 RX ADMIN — FLUTICASONE PROPIONATE 2 SPRAY: 50 SPRAY, METERED NASAL at 09:00

## 2020-03-25 RX ADMIN — BUDESONIDE AND FORMOTEROL FUMARATE DIHYDRATE 2 PUFF: 160; 4.5 AEROSOL RESPIRATORY (INHALATION) at 19:23

## 2020-03-25 RX ADMIN — SODIUM CHLORIDE, PRESERVATIVE FREE 10 ML: 5 INJECTION INTRAVENOUS at 21:35

## 2020-03-25 RX ADMIN — VITAMIN D, TAB 1000IU (100/BT) 1000 UNITS: 25 TAB at 09:01

## 2020-03-25 RX ADMIN — BUTALBITAL, ACETAMINOPHEN, AND CAFFEINE 2 TABLET: 50; 325; 40 TABLET ORAL at 23:09

## 2020-03-25 RX ADMIN — INSULIN LISPRO 6 UNITS: 100 INJECTION, SOLUTION INTRAVENOUS; SUBCUTANEOUS at 18:35

## 2020-03-25 RX ADMIN — HYDROCHLOROTHIAZIDE 12.5 MG: 12.5 CAPSULE ORAL at 09:01

## 2020-03-25 RX ADMIN — ENOXAPARIN SODIUM 40 MG: 40 INJECTION SUBCUTANEOUS at 21:31

## 2020-03-25 RX ADMIN — NYSTATIN: 100000 POWDER TOPICAL at 09:03

## 2020-03-25 RX ADMIN — PREGABALIN 100 MG: 100 CAPSULE ORAL at 16:11

## 2020-03-25 RX ADMIN — PANTOPRAZOLE SODIUM 40 MG: 40 TABLET, DELAYED RELEASE ORAL at 09:01

## 2020-03-25 RX ADMIN — SODIUM CHLORIDE, PRESERVATIVE FREE 10 ML: 5 INJECTION INTRAVENOUS at 09:03

## 2020-03-25 RX ADMIN — OXYCODONE HYDROCHLORIDE AND ACETAMINOPHEN 1000 MG: 500 TABLET ORAL at 09:01

## 2020-03-25 RX ADMIN — PREGABALIN 100 MG: 100 CAPSULE ORAL at 09:01

## 2020-03-25 RX ADMIN — IPRATROPIUM BROMIDE AND ALBUTEROL SULFATE 3 ML: .5; 3 SOLUTION RESPIRATORY (INHALATION) at 14:54

## 2020-03-25 RX ADMIN — ZOLPIDEM TARTRATE 5 MG: 5 TABLET ORAL at 23:53

## 2020-03-25 RX ADMIN — INSULIN LISPRO 4 UNITS: 100 INJECTION, SOLUTION INTRAVENOUS; SUBCUTANEOUS at 09:01

## 2020-03-25 RX ADMIN — PREGABALIN 100 MG: 100 CAPSULE ORAL at 21:29

## 2020-03-25 RX ADMIN — NYSTATIN: 100000 POWDER TOPICAL at 21:34

## 2020-03-25 RX ADMIN — METHYLPREDNISOLONE SODIUM SUCCINATE 40 MG: 40 INJECTION, POWDER, LYOPHILIZED, FOR SOLUTION INTRAMUSCULAR; INTRAVENOUS at 09:11

## 2020-03-25 RX ADMIN — CYCLOSPORINE 1 DROP: 0.5 EMULSION OPHTHALMIC at 09:00

## 2020-03-25 RX ADMIN — ASPIRIN 81 MG: 81 TABLET, COATED ORAL at 09:01

## 2020-03-25 RX ADMIN — INSULIN LISPRO 4 UNITS: 100 INJECTION, SOLUTION INTRAVENOUS; SUBCUTANEOUS at 21:30

## 2020-03-25 RX ADMIN — INSULIN LISPRO 3 UNITS: 100 INJECTION, SOLUTION INTRAVENOUS; SUBCUTANEOUS at 18:36

## 2020-03-25 RX ADMIN — INSULIN LISPRO 2 UNITS: 100 INJECTION, SOLUTION INTRAVENOUS; SUBCUTANEOUS at 11:49

## 2020-03-26 PROBLEM — Z03.818 ENCNTR FOR OBS FOR SUSP EXPSR TO OTH BIOLG AGENTS RULED OUT: Status: ACTIVE | Noted: 2020-03-26

## 2020-03-26 LAB
GLUCOSE BLDC GLUCOMTR-MCNC: 161 MG/DL (ref 70–130)
GLUCOSE BLDC GLUCOMTR-MCNC: 171 MG/DL (ref 70–130)
GLUCOSE BLDC GLUCOMTR-MCNC: 182 MG/DL (ref 70–130)
GLUCOSE BLDC GLUCOMTR-MCNC: 314 MG/DL (ref 70–130)
REF LAB TEST METHOD: NORMAL
SARS-COV-2 RNA RESP QL NAA+PROBE: NOT DETECTED
TROPONIN T SERPL-MCNC: 0.04 NG/ML (ref 0–0.03)
TROPONIN T SERPL-MCNC: <0.01 NG/ML (ref 0–0.03)

## 2020-03-26 PROCEDURE — 94799 UNLISTED PULMONARY SVC/PX: CPT

## 2020-03-26 PROCEDURE — 93005 ELECTROCARDIOGRAM TRACING: CPT | Performed by: HOSPITALIST

## 2020-03-26 PROCEDURE — 84484 ASSAY OF TROPONIN QUANT: CPT | Performed by: HOSPITALIST

## 2020-03-26 PROCEDURE — 63710000001 INSULIN LISPRO (HUMAN) PER 5 UNITS: Performed by: INTERNAL MEDICINE

## 2020-03-26 PROCEDURE — 93005 ELECTROCARDIOGRAM TRACING: CPT | Performed by: NURSE PRACTITIONER

## 2020-03-26 PROCEDURE — 84484 ASSAY OF TROPONIN QUANT: CPT | Performed by: NURSE PRACTITIONER

## 2020-03-26 PROCEDURE — 63710000001 INSULIN LISPRO (HUMAN) PER 5 UNITS: Performed by: HOSPITALIST

## 2020-03-26 PROCEDURE — 25010000002 HYDRALAZINE PER 20 MG: Performed by: NURSE PRACTITIONER

## 2020-03-26 PROCEDURE — 82962 GLUCOSE BLOOD TEST: CPT

## 2020-03-26 PROCEDURE — 93010 ELECTROCARDIOGRAM REPORT: CPT | Performed by: INTERNAL MEDICINE

## 2020-03-26 PROCEDURE — 25010000002 METHYLPREDNISOLONE PER 40 MG: Performed by: INTERNAL MEDICINE

## 2020-03-26 PROCEDURE — 63710000001 INSULIN GLARGINE PER 5 UNITS: Performed by: HOSPITALIST

## 2020-03-26 PROCEDURE — 25010000002 ENOXAPARIN PER 10 MG: Performed by: HOSPITALIST

## 2020-03-26 RX ORDER — IPRATROPIUM BROMIDE AND ALBUTEROL SULFATE 2.5; .5 MG/3ML; MG/3ML
3 SOLUTION RESPIRATORY (INHALATION)
Status: DISCONTINUED | OUTPATIENT
Start: 2020-03-26 | End: 2020-03-29 | Stop reason: HOSPADM

## 2020-03-26 RX ORDER — HYDRALAZINE HYDROCHLORIDE 20 MG/ML
10 INJECTION INTRAMUSCULAR; INTRAVENOUS ONCE
Status: COMPLETED | OUTPATIENT
Start: 2020-03-26 | End: 2020-03-26

## 2020-03-26 RX ORDER — PREDNISONE 20 MG/1
40 TABLET ORAL
Status: DISCONTINUED | OUTPATIENT
Start: 2020-03-27 | End: 2020-03-28

## 2020-03-26 RX ORDER — METHYLPREDNISOLONE SODIUM SUCCINATE 40 MG/ML
40 INJECTION, POWDER, LYOPHILIZED, FOR SOLUTION INTRAMUSCULAR; INTRAVENOUS EVERY 12 HOURS
Status: COMPLETED | OUTPATIENT
Start: 2020-03-26 | End: 2020-03-27

## 2020-03-26 RX ADMIN — BUTALBITAL, ACETAMINOPHEN, AND CAFFEINE 2 TABLET: 50; 325; 40 TABLET ORAL at 19:44

## 2020-03-26 RX ADMIN — METHYLPREDNISOLONE SODIUM SUCCINATE 40 MG: 40 INJECTION, POWDER, LYOPHILIZED, FOR SOLUTION INTRAMUSCULAR; INTRAVENOUS at 08:30

## 2020-03-26 RX ADMIN — ASPIRIN 81 MG: 81 TABLET, COATED ORAL at 08:19

## 2020-03-26 RX ADMIN — SODIUM CHLORIDE, PRESERVATIVE FREE 10 ML: 5 INJECTION INTRAVENOUS at 08:30

## 2020-03-26 RX ADMIN — INSULIN LISPRO 2 UNITS: 100 INJECTION, SOLUTION INTRAVENOUS; SUBCUTANEOUS at 17:55

## 2020-03-26 RX ADMIN — PREGABALIN 100 MG: 100 CAPSULE ORAL at 21:13

## 2020-03-26 RX ADMIN — VITAMIN D, TAB 1000IU (100/BT) 1000 UNITS: 25 TAB at 08:18

## 2020-03-26 RX ADMIN — OXYCODONE HYDROCHLORIDE AND ACETAMINOPHEN 1000 MG: 500 TABLET ORAL at 08:18

## 2020-03-26 RX ADMIN — INSULIN GLARGINE 20 UNITS: 100 INJECTION, SOLUTION SUBCUTANEOUS at 21:30

## 2020-03-26 RX ADMIN — ENOXAPARIN SODIUM 40 MG: 40 INJECTION SUBCUTANEOUS at 21:14

## 2020-03-26 RX ADMIN — ATORVASTATIN CALCIUM 20 MG: 20 TABLET, FILM COATED ORAL at 08:19

## 2020-03-26 RX ADMIN — CYCLOSPORINE 1 DROP: 0.5 EMULSION OPHTHALMIC at 08:20

## 2020-03-26 RX ADMIN — METHYLPREDNISOLONE SODIUM SUCCINATE 40 MG: 40 INJECTION, POWDER, FOR SOLUTION INTRAMUSCULAR; INTRAVENOUS at 19:45

## 2020-03-26 RX ADMIN — LINAGLIPTIN 5 MG: 5 TABLET, FILM COATED ORAL at 21:14

## 2020-03-26 RX ADMIN — FLUTICASONE PROPIONATE 2 SPRAY: 50 SPRAY, METERED NASAL at 08:25

## 2020-03-26 RX ADMIN — INSULIN LISPRO 2 UNITS: 100 INJECTION, SOLUTION INTRAVENOUS; SUBCUTANEOUS at 21:15

## 2020-03-26 RX ADMIN — NYSTATIN: 100000 POWDER TOPICAL at 08:28

## 2020-03-26 RX ADMIN — CETIRIZINE HYDROCHLORIDE 10 MG: 10 TABLET, FILM COATED ORAL at 08:18

## 2020-03-26 RX ADMIN — BUTALBITAL, ACETAMINOPHEN, AND CAFFEINE 2 TABLET: 50; 325; 40 TABLET ORAL at 14:18

## 2020-03-26 RX ADMIN — INSULIN LISPRO 3 UNITS: 100 INJECTION, SOLUTION INTRAVENOUS; SUBCUTANEOUS at 17:56

## 2020-03-26 RX ADMIN — SODIUM CHLORIDE, PRESERVATIVE FREE 10 ML: 5 INJECTION INTRAVENOUS at 19:45

## 2020-03-26 RX ADMIN — NYSTATIN: 100000 POWDER TOPICAL at 21:20

## 2020-03-26 RX ADMIN — INSULIN LISPRO 7 UNITS: 100 INJECTION, SOLUTION INTRAVENOUS; SUBCUTANEOUS at 11:09

## 2020-03-26 RX ADMIN — BUDESONIDE AND FORMOTEROL FUMARATE DIHYDRATE 2 PUFF: 160; 4.5 AEROSOL RESPIRATORY (INHALATION) at 08:04

## 2020-03-26 RX ADMIN — PANTOPRAZOLE SODIUM 40 MG: 40 TABLET, DELAYED RELEASE ORAL at 08:21

## 2020-03-26 RX ADMIN — INSULIN LISPRO 2 UNITS: 100 INJECTION, SOLUTION INTRAVENOUS; SUBCUTANEOUS at 08:26

## 2020-03-26 RX ADMIN — BUDESONIDE AND FORMOTEROL FUMARATE DIHYDRATE 2 PUFF: 160; 4.5 AEROSOL RESPIRATORY (INHALATION) at 20:30

## 2020-03-26 RX ADMIN — ALBUTEROL SULFATE 2.5 MG: 2.5 SOLUTION RESPIRATORY (INHALATION) at 10:31

## 2020-03-26 RX ADMIN — HYDRALAZINE HYDROCHLORIDE 10 MG: 20 INJECTION INTRAMUSCULAR; INTRAVENOUS at 23:09

## 2020-03-26 RX ADMIN — INSULIN LISPRO 3 UNITS: 100 INJECTION, SOLUTION INTRAVENOUS; SUBCUTANEOUS at 08:23

## 2020-03-26 RX ADMIN — INSULIN LISPRO 3 UNITS: 100 INJECTION, SOLUTION INTRAVENOUS; SUBCUTANEOUS at 11:09

## 2020-03-26 RX ADMIN — IPRATROPIUM BROMIDE AND ALBUTEROL SULFATE 3 ML: 2.5; .5 SOLUTION RESPIRATORY (INHALATION) at 15:45

## 2020-03-26 RX ADMIN — METHYLPREDNISOLONE SODIUM SUCCINATE 40 MG: 40 INJECTION, POWDER, LYOPHILIZED, FOR SOLUTION INTRAMUSCULAR; INTRAVENOUS at 02:08

## 2020-03-26 RX ADMIN — PREGABALIN 100 MG: 100 CAPSULE ORAL at 08:19

## 2020-03-26 RX ADMIN — CYCLOSPORINE 1 DROP: 0.5 EMULSION OPHTHALMIC at 21:14

## 2020-03-26 RX ADMIN — SODIUM CHLORIDE, PRESERVATIVE FREE 10 ML: 5 INJECTION INTRAVENOUS at 08:29

## 2020-03-26 RX ADMIN — PREGABALIN 100 MG: 100 CAPSULE ORAL at 16:49

## 2020-03-26 RX ADMIN — HYDROCHLOROTHIAZIDE 12.5 MG: 12.5 CAPSULE ORAL at 08:18

## 2020-03-26 RX ADMIN — BUTALBITAL, ACETAMINOPHEN, AND CAFFEINE 2 TABLET: 50; 325; 40 TABLET ORAL at 08:19

## 2020-03-27 PROBLEM — R51.9 HEADACHE: Status: ACTIVE | Noted: 2020-03-27

## 2020-03-27 LAB
ANION GAP SERPL CALCULATED.3IONS-SCNC: 16.8 MMOL/L (ref 5–15)
BUN BLD-MCNC: 24 MG/DL (ref 8–23)
BUN/CREAT SERPL: 32.9 (ref 7–25)
CALCIUM SPEC-SCNC: 9.7 MG/DL (ref 8.6–10.5)
CHLORIDE SERPL-SCNC: 96 MMOL/L (ref 98–107)
CO2 SERPL-SCNC: 23.2 MMOL/L (ref 22–29)
CREAT BLD-MCNC: 0.73 MG/DL (ref 0.57–1)
GFR SERPL CREATININE-BSD FRML MDRD: 80 ML/MIN/1.73
GLUCOSE BLD-MCNC: 164 MG/DL (ref 65–99)
GLUCOSE BLDC GLUCOMTR-MCNC: 128 MG/DL (ref 70–130)
GLUCOSE BLDC GLUCOMTR-MCNC: 202 MG/DL (ref 70–130)
GLUCOSE BLDC GLUCOMTR-MCNC: 225 MG/DL (ref 70–130)
GLUCOSE BLDC GLUCOMTR-MCNC: 381 MG/DL (ref 70–130)
POTASSIUM BLD-SCNC: 4.2 MMOL/L (ref 3.5–5.2)
SODIUM BLD-SCNC: 136 MMOL/L (ref 136–145)
TROPONIN T SERPL-MCNC: 0.02 NG/ML (ref 0–0.03)
TROPONIN T SERPL-MCNC: 0.02 NG/ML (ref 0–0.03)

## 2020-03-27 PROCEDURE — 25010000002 MAGNESIUM SULFATE IN D5W 1G/100ML (PREMIX) 1-5 GM/100ML-% SOLUTION: Performed by: INTERNAL MEDICINE

## 2020-03-27 PROCEDURE — 63710000001 INSULIN LISPRO (HUMAN) PER 5 UNITS: Performed by: HOSPITALIST

## 2020-03-27 PROCEDURE — 25010000002 METHYLPREDNISOLONE PER 40 MG: Performed by: INTERNAL MEDICINE

## 2020-03-27 PROCEDURE — 94799 UNLISTED PULMONARY SVC/PX: CPT

## 2020-03-27 PROCEDURE — 80048 BASIC METABOLIC PNL TOTAL CA: CPT | Performed by: INTERNAL MEDICINE

## 2020-03-27 PROCEDURE — 25010000002 DIPHENHYDRAMINE PER 50 MG: Performed by: INTERNAL MEDICINE

## 2020-03-27 PROCEDURE — 63710000001 INSULIN GLARGINE PER 5 UNITS: Performed by: HOSPITALIST

## 2020-03-27 PROCEDURE — 25010000002 KETOROLAC TROMETHAMINE PER 15 MG: Performed by: NURSE PRACTITIONER

## 2020-03-27 PROCEDURE — 82962 GLUCOSE BLOOD TEST: CPT

## 2020-03-27 PROCEDURE — 25010000002 DEXAMETHASONE PER 1 MG: Performed by: PSYCHIATRY & NEUROLOGY

## 2020-03-27 PROCEDURE — 25010000002 ENOXAPARIN PER 10 MG: Performed by: HOSPITALIST

## 2020-03-27 PROCEDURE — 25010000002 MORPHINE PER 10 MG: Performed by: INTERNAL MEDICINE

## 2020-03-27 PROCEDURE — 25010000002 DIPHENHYDRAMINE PER 50 MG: Performed by: PSYCHIATRY & NEUROLOGY

## 2020-03-27 PROCEDURE — 25010000002 HYDROMORPHONE PER 4 MG: Performed by: PSYCHIATRY & NEUROLOGY

## 2020-03-27 PROCEDURE — 63710000001 PREDNISONE PER 1 MG: Performed by: HOSPITALIST

## 2020-03-27 PROCEDURE — 25010000002 PROCHLORPERAZINE 10 MG/2ML SOLUTION: Performed by: INTERNAL MEDICINE

## 2020-03-27 PROCEDURE — 84484 ASSAY OF TROPONIN QUANT: CPT | Performed by: NURSE PRACTITIONER

## 2020-03-27 RX ORDER — MAGNESIUM SULFATE 1 G/100ML
1 INJECTION INTRAVENOUS ONCE
Status: COMPLETED | OUTPATIENT
Start: 2020-03-27 | End: 2020-03-27

## 2020-03-27 RX ORDER — DIPHENHYDRAMINE HYDROCHLORIDE 50 MG/ML
25 INJECTION INTRAMUSCULAR; INTRAVENOUS EVERY 6 HOURS
Status: DISCONTINUED | OUTPATIENT
Start: 2020-03-27 | End: 2020-03-27

## 2020-03-27 RX ORDER — DEXAMETHASONE SODIUM PHOSPHATE 10 MG/ML
10 INJECTION INTRAMUSCULAR; INTRAVENOUS ONCE
Status: COMPLETED | OUTPATIENT
Start: 2020-03-27 | End: 2020-03-27

## 2020-03-27 RX ORDER — DIPHENHYDRAMINE HYDROCHLORIDE 50 MG/ML
25 INJECTION INTRAMUSCULAR; INTRAVENOUS ONCE
Status: COMPLETED | OUTPATIENT
Start: 2020-03-27 | End: 2020-03-27

## 2020-03-27 RX ORDER — AMLODIPINE BESYLATE 5 MG/1
5 TABLET ORAL
Status: DISCONTINUED | OUTPATIENT
Start: 2020-03-27 | End: 2020-03-29 | Stop reason: HOSPADM

## 2020-03-27 RX ORDER — PROCHLORPERAZINE EDISYLATE 5 MG/ML
10 INJECTION INTRAMUSCULAR; INTRAVENOUS EVERY 6 HOURS PRN
Status: DISCONTINUED | OUTPATIENT
Start: 2020-03-27 | End: 2020-03-29 | Stop reason: HOSPADM

## 2020-03-27 RX ORDER — MORPHINE SULFATE 2 MG/ML
2 INJECTION, SOLUTION INTRAMUSCULAR; INTRAVENOUS ONCE
Status: COMPLETED | OUTPATIENT
Start: 2020-03-27 | End: 2020-03-27

## 2020-03-27 RX ORDER — HYDROMORPHONE HYDROCHLORIDE 1 MG/ML
0.5 INJECTION, SOLUTION INTRAMUSCULAR; INTRAVENOUS; SUBCUTANEOUS ONCE
Status: COMPLETED | OUTPATIENT
Start: 2020-03-27 | End: 2020-03-27

## 2020-03-27 RX ORDER — KETOROLAC TROMETHAMINE 15 MG/ML
15 INJECTION, SOLUTION INTRAMUSCULAR; INTRAVENOUS ONCE
Status: COMPLETED | OUTPATIENT
Start: 2020-03-27 | End: 2020-03-27

## 2020-03-27 RX ORDER — HYDROCHLOROTHIAZIDE 25 MG/1
25 TABLET ORAL DAILY
Status: DISCONTINUED | OUTPATIENT
Start: 2020-03-28 | End: 2020-03-29 | Stop reason: HOSPADM

## 2020-03-27 RX ORDER — PROCHLORPERAZINE MALEATE 10 MG
10 TABLET ORAL EVERY 6 HOURS PRN
Status: DISCONTINUED | OUTPATIENT
Start: 2020-03-27 | End: 2020-03-27

## 2020-03-27 RX ORDER — DIPHENHYDRAMINE HYDROCHLORIDE 50 MG/ML
25 INJECTION INTRAMUSCULAR; INTRAVENOUS EVERY 6 HOURS PRN
Status: DISCONTINUED | OUTPATIENT
Start: 2020-03-27 | End: 2020-03-29 | Stop reason: HOSPADM

## 2020-03-27 RX ORDER — HYDROCHLOROTHIAZIDE 12.5 MG/1
12.5 CAPSULE, GELATIN COATED ORAL ONCE
Status: COMPLETED | OUTPATIENT
Start: 2020-03-27 | End: 2020-03-27

## 2020-03-27 RX ORDER — PROCHLORPERAZINE EDISYLATE 5 MG/ML
10 INJECTION INTRAMUSCULAR; INTRAVENOUS ONCE
Status: COMPLETED | OUTPATIENT
Start: 2020-03-27 | End: 2020-03-27

## 2020-03-27 RX ADMIN — AMLODIPINE BESYLATE 5 MG: 5 TABLET ORAL at 12:27

## 2020-03-27 RX ADMIN — PANTOPRAZOLE SODIUM 40 MG: 40 TABLET, DELAYED RELEASE ORAL at 08:09

## 2020-03-27 RX ADMIN — INSULIN GLARGINE 20 UNITS: 100 INJECTION, SOLUTION SUBCUTANEOUS at 22:24

## 2020-03-27 RX ADMIN — INSULIN LISPRO 3 UNITS: 100 INJECTION, SOLUTION INTRAVENOUS; SUBCUTANEOUS at 08:08

## 2020-03-27 RX ADMIN — BUTALBITAL, ACETAMINOPHEN, AND CAFFEINE 2 TABLET: 50; 325; 40 TABLET ORAL at 22:25

## 2020-03-27 RX ADMIN — PREGABALIN 100 MG: 100 CAPSULE ORAL at 08:08

## 2020-03-27 RX ADMIN — PREDNISONE 40 MG: 20 TABLET ORAL at 08:08

## 2020-03-27 RX ADMIN — PROCHLORPERAZINE EDISYLATE 10 MG: 5 INJECTION INTRAMUSCULAR; INTRAVENOUS at 22:25

## 2020-03-27 RX ADMIN — INSULIN LISPRO 3 UNITS: 100 INJECTION, SOLUTION INTRAVENOUS; SUBCUTANEOUS at 12:26

## 2020-03-27 RX ADMIN — PROCHLORPERAZINE EDISYLATE 10 MG: 5 INJECTION INTRAMUSCULAR; INTRAVENOUS at 12:22

## 2020-03-27 RX ADMIN — INSULIN LISPRO 3 UNITS: 100 INJECTION, SOLUTION INTRAVENOUS; SUBCUTANEOUS at 17:55

## 2020-03-27 RX ADMIN — INSULIN LISPRO 4 UNITS: 100 INJECTION, SOLUTION INTRAVENOUS; SUBCUTANEOUS at 12:25

## 2020-03-27 RX ADMIN — ASPIRIN 81 MG: 81 TABLET, COATED ORAL at 08:07

## 2020-03-27 RX ADMIN — CYCLOSPORINE 1 DROP: 0.5 EMULSION OPHTHALMIC at 21:03

## 2020-03-27 RX ADMIN — SODIUM CHLORIDE, PRESERVATIVE FREE 10 ML: 5 INJECTION INTRAVENOUS at 21:05

## 2020-03-27 RX ADMIN — OXYCODONE HYDROCHLORIDE AND ACETAMINOPHEN 1000 MG: 500 TABLET ORAL at 08:08

## 2020-03-27 RX ADMIN — HYDROCHLOROTHIAZIDE 12.5 MG: 12.5 CAPSULE ORAL at 08:08

## 2020-03-27 RX ADMIN — HYDROMORPHONE HYDROCHLORIDE 0.5 MG: 1 INJECTION, SOLUTION INTRAMUSCULAR; INTRAVENOUS; SUBCUTANEOUS at 17:59

## 2020-03-27 RX ADMIN — SODIUM CHLORIDE, PRESERVATIVE FREE 10 ML: 5 INJECTION INTRAVENOUS at 08:11

## 2020-03-27 RX ADMIN — BUDESONIDE AND FORMOTEROL FUMARATE DIHYDRATE 2 PUFF: 160; 4.5 AEROSOL RESPIRATORY (INHALATION) at 06:24

## 2020-03-27 RX ADMIN — LINAGLIPTIN 5 MG: 5 TABLET, FILM COATED ORAL at 21:03

## 2020-03-27 RX ADMIN — INSULIN LISPRO 4 UNITS: 100 INJECTION, SOLUTION INTRAVENOUS; SUBCUTANEOUS at 22:24

## 2020-03-27 RX ADMIN — INSULIN LISPRO 8 UNITS: 100 INJECTION, SOLUTION INTRAVENOUS; SUBCUTANEOUS at 17:54

## 2020-03-27 RX ADMIN — KETOROLAC TROMETHAMINE 15 MG: 15 INJECTION, SOLUTION INTRAMUSCULAR; INTRAVENOUS at 02:37

## 2020-03-27 RX ADMIN — BUTALBITAL, ACETAMINOPHEN, AND CAFFEINE 2 TABLET: 50; 325; 40 TABLET ORAL at 16:54

## 2020-03-27 RX ADMIN — CETIRIZINE HYDROCHLORIDE 10 MG: 10 TABLET, FILM COATED ORAL at 08:08

## 2020-03-27 RX ADMIN — MAGNESIUM SULFATE 1 G: 1 INJECTION INTRAVENOUS at 11:37

## 2020-03-27 RX ADMIN — IPRATROPIUM BROMIDE AND ALBUTEROL SULFATE 3 ML: 2.5; .5 SOLUTION RESPIRATORY (INHALATION) at 15:58

## 2020-03-27 RX ADMIN — MORPHINE SULFATE 2 MG: 2 INJECTION, SOLUTION INTRAMUSCULAR; INTRAVENOUS at 11:40

## 2020-03-27 RX ADMIN — PREGABALIN 100 MG: 100 CAPSULE ORAL at 16:30

## 2020-03-27 RX ADMIN — VITAMIN D, TAB 1000IU (100/BT) 1000 UNITS: 25 TAB at 08:08

## 2020-03-27 RX ADMIN — ENOXAPARIN SODIUM 40 MG: 40 INJECTION SUBCUTANEOUS at 21:03

## 2020-03-27 RX ADMIN — SODIUM CHLORIDE, PRESERVATIVE FREE 10 ML: 5 INJECTION INTRAVENOUS at 21:03

## 2020-03-27 RX ADMIN — ZOLPIDEM TARTRATE 5 MG: 5 TABLET ORAL at 00:00

## 2020-03-27 RX ADMIN — BUDESONIDE AND FORMOTEROL FUMARATE DIHYDRATE 2 PUFF: 160; 4.5 AEROSOL RESPIRATORY (INHALATION) at 19:34

## 2020-03-27 RX ADMIN — PREGABALIN 100 MG: 100 CAPSULE ORAL at 21:03

## 2020-03-27 RX ADMIN — IPRATROPIUM BROMIDE AND ALBUTEROL SULFATE 3 ML: 2.5; .5 SOLUTION RESPIRATORY (INHALATION) at 12:03

## 2020-03-27 RX ADMIN — METHYLPREDNISOLONE SODIUM SUCCINATE 40 MG: 40 INJECTION, POWDER, FOR SOLUTION INTRAMUSCULAR; INTRAVENOUS at 08:08

## 2020-03-27 RX ADMIN — NYSTATIN 1 APPLICATION: 100000 POWDER TOPICAL at 08:09

## 2020-03-27 RX ADMIN — FLUTICASONE PROPIONATE 2 SPRAY: 50 SPRAY, METERED NASAL at 08:19

## 2020-03-27 RX ADMIN — NYSTATIN: 100000 POWDER TOPICAL at 21:04

## 2020-03-27 RX ADMIN — HYDROCHLOROTHIAZIDE 12.5 MG: 12.5 CAPSULE ORAL at 12:27

## 2020-03-27 RX ADMIN — ATORVASTATIN CALCIUM 20 MG: 20 TABLET, FILM COATED ORAL at 08:08

## 2020-03-27 RX ADMIN — DEXAMETHASONE SODIUM PHOSPHATE 10 MG: 10 INJECTION INTRAMUSCULAR; INTRAVENOUS at 17:02

## 2020-03-27 RX ADMIN — DIPHENHYDRAMINE HYDROCHLORIDE 25 MG: 50 INJECTION, SOLUTION INTRAMUSCULAR; INTRAVENOUS at 22:25

## 2020-03-27 RX ADMIN — DIPHENHYDRAMINE HYDROCHLORIDE 25 MG: 50 INJECTION, SOLUTION INTRAMUSCULAR; INTRAVENOUS at 11:36

## 2020-03-28 LAB
GLUCOSE BLDC GLUCOMTR-MCNC: 145 MG/DL (ref 70–130)
GLUCOSE BLDC GLUCOMTR-MCNC: 202 MG/DL (ref 70–130)
GLUCOSE BLDC GLUCOMTR-MCNC: 214 MG/DL (ref 70–130)
GLUCOSE BLDC GLUCOMTR-MCNC: 255 MG/DL (ref 70–130)

## 2020-03-28 PROCEDURE — 82962 GLUCOSE BLOOD TEST: CPT

## 2020-03-28 PROCEDURE — 94799 UNLISTED PULMONARY SVC/PX: CPT

## 2020-03-28 PROCEDURE — 63710000001 INSULIN LISPRO (HUMAN) PER 5 UNITS: Performed by: HOSPITALIST

## 2020-03-28 PROCEDURE — 25010000002 ENOXAPARIN PER 10 MG: Performed by: HOSPITALIST

## 2020-03-28 PROCEDURE — 25010000002 DIPHENHYDRAMINE PER 50 MG: Performed by: PSYCHIATRY & NEUROLOGY

## 2020-03-28 PROCEDURE — 63710000001 INSULIN GLARGINE PER 5 UNITS: Performed by: HOSPITALIST

## 2020-03-28 PROCEDURE — 63710000001 PREDNISONE PER 1 MG: Performed by: INTERNAL MEDICINE

## 2020-03-28 PROCEDURE — 25010000002 PROCHLORPERAZINE 10 MG/2ML SOLUTION: Performed by: INTERNAL MEDICINE

## 2020-03-28 RX ORDER — BUDESONIDE AND FORMOTEROL FUMARATE DIHYDRATE 160; 4.5 UG/1; UG/1
2 AEROSOL RESPIRATORY (INHALATION)
Qty: 1 INHALER | Refills: 0 | Status: SHIPPED | OUTPATIENT
Start: 2020-03-28 | End: 2020-11-17

## 2020-03-28 RX ORDER — ALBUTEROL SULFATE 1.25 MG/3ML
1 SOLUTION RESPIRATORY (INHALATION) EVERY 6 HOURS PRN
Qty: 120 VIAL | Refills: 0 | Status: SHIPPED | OUTPATIENT
Start: 2020-03-28 | End: 2020-05-28

## 2020-03-28 RX ORDER — HYDROCHLOROTHIAZIDE 25 MG/1
25 TABLET ORAL DAILY
Qty: 30 TABLET | Refills: 0 | Status: SHIPPED | OUTPATIENT
Start: 2020-03-29 | End: 2020-07-30 | Stop reason: SDUPTHER

## 2020-03-28 RX ORDER — BENZONATATE 100 MG/1
100 CAPSULE ORAL 3 TIMES DAILY PRN
Qty: 30 CAPSULE | Refills: 0 | Status: SHIPPED | OUTPATIENT
Start: 2020-03-28 | End: 2020-05-28

## 2020-03-28 RX ORDER — PREDNISONE 20 MG/1
20 TABLET ORAL
Status: DISCONTINUED | OUTPATIENT
Start: 2020-03-29 | End: 2020-03-29 | Stop reason: HOSPADM

## 2020-03-28 RX ORDER — AMLODIPINE BESYLATE 10 MG/1
10 TABLET ORAL
Qty: 30 TABLET | Refills: 0 | Status: SHIPPED | OUTPATIENT
Start: 2020-03-29 | End: 2020-06-03

## 2020-03-28 RX ORDER — PREDNISONE 20 MG/1
20 TABLET ORAL
Qty: 7 TABLET | Refills: 0 | Status: SHIPPED | OUTPATIENT
Start: 2020-03-29 | End: 2020-04-05

## 2020-03-28 RX ORDER — ALBUTEROL SULFATE 90 UG/1
2 AEROSOL, METERED RESPIRATORY (INHALATION) EVERY 4 HOURS PRN
Qty: 1 INHALER | Refills: 0 | Status: SHIPPED | OUTPATIENT
Start: 2020-03-28

## 2020-03-28 RX ADMIN — BUDESONIDE AND FORMOTEROL FUMARATE DIHYDRATE 2 PUFF: 160; 4.5 AEROSOL RESPIRATORY (INHALATION) at 07:56

## 2020-03-28 RX ADMIN — PREDNISONE 40 MG: 20 TABLET ORAL at 09:41

## 2020-03-28 RX ADMIN — ENOXAPARIN SODIUM 40 MG: 40 INJECTION SUBCUTANEOUS at 20:24

## 2020-03-28 RX ADMIN — INSULIN LISPRO 3 UNITS: 100 INJECTION, SOLUTION INTRAVENOUS; SUBCUTANEOUS at 19:11

## 2020-03-28 RX ADMIN — INSULIN LISPRO 3 UNITS: 100 INJECTION, SOLUTION INTRAVENOUS; SUBCUTANEOUS at 09:41

## 2020-03-28 RX ADMIN — CETIRIZINE HYDROCHLORIDE 10 MG: 10 TABLET, FILM COATED ORAL at 09:48

## 2020-03-28 RX ADMIN — IPRATROPIUM BROMIDE AND ALBUTEROL SULFATE 3 ML: 2.5; .5 SOLUTION RESPIRATORY (INHALATION) at 12:56

## 2020-03-28 RX ADMIN — PANTOPRAZOLE SODIUM 40 MG: 40 TABLET, DELAYED RELEASE ORAL at 09:49

## 2020-03-28 RX ADMIN — SODIUM CHLORIDE, PRESERVATIVE FREE 10 ML: 5 INJECTION INTRAVENOUS at 20:24

## 2020-03-28 RX ADMIN — INSULIN LISPRO 4 UNITS: 100 INJECTION, SOLUTION INTRAVENOUS; SUBCUTANEOUS at 09:40

## 2020-03-28 RX ADMIN — ASPIRIN 81 MG: 81 TABLET, COATED ORAL at 09:49

## 2020-03-28 RX ADMIN — INSULIN GLARGINE 20 UNITS: 100 INJECTION, SOLUTION SUBCUTANEOUS at 22:23

## 2020-03-28 RX ADMIN — PREGABALIN 100 MG: 100 CAPSULE ORAL at 16:30

## 2020-03-28 RX ADMIN — IPRATROPIUM BROMIDE AND ALBUTEROL SULFATE 3 ML: 2.5; .5 SOLUTION RESPIRATORY (INHALATION) at 17:09

## 2020-03-28 RX ADMIN — NYSTATIN: 100000 POWDER TOPICAL at 09:49

## 2020-03-28 RX ADMIN — BUDESONIDE AND FORMOTEROL FUMARATE DIHYDRATE 2 PUFF: 160; 4.5 AEROSOL RESPIRATORY (INHALATION) at 20:29

## 2020-03-28 RX ADMIN — CYCLOSPORINE 1 DROP: 0.5 EMULSION OPHTHALMIC at 20:25

## 2020-03-28 RX ADMIN — FLUTICASONE PROPIONATE 2 SPRAY: 50 SPRAY, METERED NASAL at 09:51

## 2020-03-28 RX ADMIN — AMLODIPINE BESYLATE 5 MG: 5 TABLET ORAL at 09:49

## 2020-03-28 RX ADMIN — BUTALBITAL, ACETAMINOPHEN, AND CAFFEINE 2 TABLET: 50; 325; 40 TABLET ORAL at 06:24

## 2020-03-28 RX ADMIN — ATORVASTATIN CALCIUM 20 MG: 20 TABLET, FILM COATED ORAL at 09:49

## 2020-03-28 RX ADMIN — DIPHENHYDRAMINE HYDROCHLORIDE 25 MG: 50 INJECTION, SOLUTION INTRAMUSCULAR; INTRAVENOUS at 11:27

## 2020-03-28 RX ADMIN — BUTALBITAL, ACETAMINOPHEN, AND CAFFEINE 2 TABLET: 50; 325; 40 TABLET ORAL at 19:44

## 2020-03-28 RX ADMIN — SODIUM CHLORIDE, PRESERVATIVE FREE 10 ML: 5 INJECTION INTRAVENOUS at 20:28

## 2020-03-28 RX ADMIN — PREGABALIN 100 MG: 100 CAPSULE ORAL at 20:24

## 2020-03-28 RX ADMIN — BUTALBITAL, ACETAMINOPHEN, AND CAFFEINE 2 TABLET: 50; 325; 40 TABLET ORAL at 15:29

## 2020-03-28 RX ADMIN — HYDROCHLOROTHIAZIDE 25 MG: 25 TABLET ORAL at 09:51

## 2020-03-28 RX ADMIN — PROCHLORPERAZINE EDISYLATE 10 MG: 5 INJECTION INTRAMUSCULAR; INTRAVENOUS at 11:30

## 2020-03-28 RX ADMIN — INSULIN LISPRO 3 UNITS: 100 INJECTION, SOLUTION INTRAVENOUS; SUBCUTANEOUS at 12:59

## 2020-03-28 RX ADMIN — NYSTATIN: 100000 POWDER TOPICAL at 20:25

## 2020-03-28 RX ADMIN — INSULIN LISPRO 4 UNITS: 100 INJECTION, SOLUTION INTRAVENOUS; SUBCUTANEOUS at 12:59

## 2020-03-28 RX ADMIN — SODIUM CHLORIDE, PRESERVATIVE FREE 10 ML: 5 INJECTION INTRAVENOUS at 09:46

## 2020-03-28 RX ADMIN — CYCLOSPORINE 1 DROP: 0.5 EMULSION OPHTHALMIC at 09:49

## 2020-03-28 RX ADMIN — VITAMIN D, TAB 1000IU (100/BT) 1000 UNITS: 25 TAB at 09:52

## 2020-03-28 RX ADMIN — OXYCODONE HYDROCHLORIDE AND ACETAMINOPHEN 1000 MG: 500 TABLET ORAL at 09:50

## 2020-03-28 RX ADMIN — LINAGLIPTIN 5 MG: 5 TABLET, FILM COATED ORAL at 20:23

## 2020-03-28 RX ADMIN — INSULIN LISPRO 6 UNITS: 100 INJECTION, SOLUTION INTRAVENOUS; SUBCUTANEOUS at 19:10

## 2020-03-28 RX ADMIN — PREGABALIN 100 MG: 100 CAPSULE ORAL at 09:49

## 2020-03-29 VITALS
RESPIRATION RATE: 20 BRPM | TEMPERATURE: 97.6 F | BODY MASS INDEX: 33.34 KG/M2 | DIASTOLIC BLOOD PRESSURE: 63 MMHG | SYSTOLIC BLOOD PRESSURE: 110 MMHG | HEIGHT: 68 IN | HEART RATE: 83 BPM | OXYGEN SATURATION: 97 % | WEIGHT: 220 LBS

## 2020-03-29 LAB — GLUCOSE BLDC GLUCOMTR-MCNC: 154 MG/DL (ref 70–130)

## 2020-03-29 PROCEDURE — 94799 UNLISTED PULMONARY SVC/PX: CPT

## 2020-03-29 PROCEDURE — 25010000002 DIPHENHYDRAMINE PER 50 MG: Performed by: PSYCHIATRY & NEUROLOGY

## 2020-03-29 PROCEDURE — 63710000001 INSULIN LISPRO (HUMAN) PER 5 UNITS: Performed by: HOSPITALIST

## 2020-03-29 PROCEDURE — 82962 GLUCOSE BLOOD TEST: CPT

## 2020-03-29 PROCEDURE — 25010000002 PROCHLORPERAZINE 10 MG/2ML SOLUTION: Performed by: INTERNAL MEDICINE

## 2020-03-29 RX ADMIN — INSULIN LISPRO 2 UNITS: 100 INJECTION, SOLUTION INTRAVENOUS; SUBCUTANEOUS at 09:04

## 2020-03-29 RX ADMIN — INSULIN LISPRO 3 UNITS: 100 INJECTION, SOLUTION INTRAVENOUS; SUBCUTANEOUS at 09:04

## 2020-03-29 RX ADMIN — IPRATROPIUM BROMIDE AND ALBUTEROL SULFATE 3 ML: 2.5; .5 SOLUTION RESPIRATORY (INHALATION) at 00:18

## 2020-03-29 RX ADMIN — PROCHLORPERAZINE EDISYLATE 10 MG: 5 INJECTION INTRAMUSCULAR; INTRAVENOUS at 00:42

## 2020-03-29 RX ADMIN — DIPHENHYDRAMINE HYDROCHLORIDE 25 MG: 50 INJECTION, SOLUTION INTRAMUSCULAR; INTRAVENOUS at 00:42

## 2020-03-29 RX ADMIN — BUTALBITAL, ACETAMINOPHEN, AND CAFFEINE 2 TABLET: 50; 325; 40 TABLET ORAL at 00:42

## 2020-03-30 ENCOUNTER — READMISSION MANAGEMENT (OUTPATIENT)
Dept: CALL CENTER | Facility: HOSPITAL | Age: 65
End: 2020-03-30

## 2020-03-30 NOTE — OUTREACH NOTE
Prep Survey      Responses   [facility] facility patient discharged from?  [place]   Is LACE score < 7 ?  No   Eligibility  Readm Mgmt   Discharge diagnosis  Acute asthma exacerbation    Does the patient have one of the following disease processes/diagnoses(primary or secondary)?  Other   Does the patient have Home health ordered?  No   Is there a DME ordered?  No   General alerts for this patient  covid 19 negative   Prep survey completed?  Yes          Adri Franklin RN

## 2020-03-31 ENCOUNTER — TELEPHONE (OUTPATIENT)
Dept: INTERNAL MEDICINE | Facility: CLINIC | Age: 65
End: 2020-03-31

## 2020-03-31 ENCOUNTER — HOSPITAL ENCOUNTER (EMERGENCY)
Facility: HOSPITAL | Age: 65
Discharge: HOME | End: 2020-03-31
Attending: EMERGENCY MEDICINE | Admitting: EMERGENCY MEDICINE

## 2020-03-31 ENCOUNTER — APPOINTMENT (OUTPATIENT)
Dept: GENERAL RADIOLOGY | Facility: HOSPITAL | Age: 65
End: 2020-03-31

## 2020-03-31 ENCOUNTER — APPOINTMENT (OUTPATIENT)
Dept: CT IMAGING | Facility: HOSPITAL | Age: 65
End: 2020-03-31

## 2020-03-31 VITALS
SYSTOLIC BLOOD PRESSURE: 182 MMHG | RESPIRATION RATE: 17 BRPM | TEMPERATURE: 97.4 F | BODY MASS INDEX: 32.28 KG/M2 | WEIGHT: 213 LBS | HEART RATE: 70 BPM | OXYGEN SATURATION: 98 % | HEIGHT: 68 IN | DIASTOLIC BLOOD PRESSURE: 75 MMHG

## 2020-03-31 DIAGNOSIS — R07.9 CHEST PAIN IN ADULT: ICD-10-CM

## 2020-03-31 DIAGNOSIS — R06.00 DYSPNEA, UNSPECIFIED TYPE: ICD-10-CM

## 2020-03-31 DIAGNOSIS — Z87.09 HISTORY OF ASTHMA: ICD-10-CM

## 2020-03-31 DIAGNOSIS — B37.81 CANDIDA ESOPHAGITIS (HCC): ICD-10-CM

## 2020-03-31 DIAGNOSIS — R06.2 WHEEZING: ICD-10-CM

## 2020-03-31 DIAGNOSIS — B37.0 OROPHARYNGEAL CANDIDIASIS: Primary | ICD-10-CM

## 2020-03-31 LAB
ANION GAP SERPL CALCULATED.3IONS-SCNC: 11.8 MMOL/L (ref 5–15)
BASOPHILS # BLD AUTO: 0.01 10*3/MM3 (ref 0–0.2)
BASOPHILS NFR BLD AUTO: 0.1 % (ref 0–1.5)
BUN BLD-MCNC: 18 MG/DL (ref 8–23)
BUN/CREAT SERPL: 31 (ref 7–25)
CALCIUM SPEC-SCNC: 8.9 MG/DL (ref 8.6–10.5)
CHLORIDE SERPL-SCNC: 101 MMOL/L (ref 98–107)
CO2 SERPL-SCNC: 24.2 MMOL/L (ref 22–29)
CREAT BLD-MCNC: 0.58 MG/DL (ref 0.57–1)
DEPRECATED RDW RBC AUTO: 46.3 FL (ref 37–54)
EOSINOPHIL # BLD AUTO: 0.18 10*3/MM3 (ref 0–0.4)
EOSINOPHIL NFR BLD AUTO: 1.4 % (ref 0.3–6.2)
ERYTHROCYTE [DISTWIDTH] IN BLOOD BY AUTOMATED COUNT: 13.4 % (ref 12.3–15.4)
GFR SERPL CREATININE-BSD FRML MDRD: 105 ML/MIN/1.73
GLUCOSE BLD-MCNC: 227 MG/DL (ref 65–99)
HCT VFR BLD AUTO: 41 % (ref 34–46.6)
HETEROPH AB SER QL LA: NEGATIVE
HGB BLD-MCNC: 13.9 G/DL (ref 12–15.9)
IMM GRANULOCYTES # BLD AUTO: 0.1 10*3/MM3 (ref 0–0.05)
IMM GRANULOCYTES NFR BLD AUTO: 0.8 % (ref 0–0.5)
LYMPHOCYTES # BLD AUTO: 1.82 10*3/MM3 (ref 0.7–3.1)
LYMPHOCYTES NFR BLD AUTO: 13.7 % (ref 19.6–45.3)
MCH RBC QN AUTO: 31.4 PG (ref 26.6–33)
MCHC RBC AUTO-ENTMCNC: 33.9 G/DL (ref 31.5–35.7)
MCV RBC AUTO: 92.6 FL (ref 79–97)
MONOCYTES # BLD AUTO: 0.45 10*3/MM3 (ref 0.1–0.9)
MONOCYTES NFR BLD AUTO: 3.4 % (ref 5–12)
NEUTROPHILS # BLD AUTO: 10.7 10*3/MM3 (ref 1.7–7)
NEUTROPHILS NFR BLD AUTO: 80.6 % (ref 42.7–76)
NRBC BLD AUTO-RTO: 0 /100 WBC (ref 0–0.2)
PLATELET # BLD AUTO: 200 10*3/MM3 (ref 140–450)
PMV BLD AUTO: 10.8 FL (ref 6–12)
POTASSIUM BLD-SCNC: 4.2 MMOL/L (ref 3.5–5.2)
RBC # BLD AUTO: 4.43 10*6/MM3 (ref 3.77–5.28)
S PYO AG THROAT QL: NEGATIVE
SODIUM BLD-SCNC: 137 MMOL/L (ref 136–145)
TROPONIN T SERPL-MCNC: <0.01 NG/ML (ref 0–0.03)
WBC NRBC COR # BLD: 13.26 10*3/MM3 (ref 3.4–10.8)

## 2020-03-31 PROCEDURE — 71045 X-RAY EXAM CHEST 1 VIEW: CPT

## 2020-03-31 PROCEDURE — 96374 THER/PROPH/DIAG INJ IV PUSH: CPT

## 2020-03-31 PROCEDURE — 71275 CT ANGIOGRAPHY CHEST: CPT

## 2020-03-31 PROCEDURE — 0 IOPAMIDOL PER 1 ML: Performed by: EMERGENCY MEDICINE

## 2020-03-31 PROCEDURE — 85025 COMPLETE CBC W/AUTO DIFF WBC: CPT | Performed by: EMERGENCY MEDICINE

## 2020-03-31 PROCEDURE — 87081 CULTURE SCREEN ONLY: CPT | Performed by: EMERGENCY MEDICINE

## 2020-03-31 PROCEDURE — 93010 ELECTROCARDIOGRAM REPORT: CPT | Performed by: INTERNAL MEDICINE

## 2020-03-31 PROCEDURE — 93005 ELECTROCARDIOGRAM TRACING: CPT | Performed by: EMERGENCY MEDICINE

## 2020-03-31 PROCEDURE — 87880 STREP A ASSAY W/OPTIC: CPT | Performed by: EMERGENCY MEDICINE

## 2020-03-31 PROCEDURE — 86308 HETEROPHILE ANTIBODY SCREEN: CPT | Performed by: EMERGENCY MEDICINE

## 2020-03-31 PROCEDURE — 25010000002 DIPHENHYDRAMINE PER 50 MG: Performed by: EMERGENCY MEDICINE

## 2020-03-31 PROCEDURE — 99284 EMERGENCY DEPT VISIT MOD MDM: CPT

## 2020-03-31 PROCEDURE — 80048 BASIC METABOLIC PNL TOTAL CA: CPT | Performed by: EMERGENCY MEDICINE

## 2020-03-31 PROCEDURE — 84484 ASSAY OF TROPONIN QUANT: CPT | Performed by: EMERGENCY MEDICINE

## 2020-03-31 PROCEDURE — 36415 COLL VENOUS BLD VENIPUNCTURE: CPT

## 2020-03-31 RX ORDER — SODIUM CHLORIDE 0.9 % (FLUSH) 0.9 %
10 SYRINGE (ML) INJECTION AS NEEDED
Status: DISCONTINUED | OUTPATIENT
Start: 2020-03-31 | End: 2020-03-31 | Stop reason: HOSPADM

## 2020-03-31 RX ORDER — LIDOCAINE HYDROCHLORIDE 20 MG/ML
5 SOLUTION OROPHARYNGEAL 4 TIMES DAILY PRN
Qty: 100 ML | Refills: 0 | Status: SHIPPED | OUTPATIENT
Start: 2020-03-31 | End: 2020-05-28

## 2020-03-31 RX ORDER — ALUMINA, MAGNESIA, AND SIMETHICONE 2400; 2400; 240 MG/30ML; MG/30ML; MG/30ML
15 SUSPENSION ORAL ONCE
Status: COMPLETED | OUTPATIENT
Start: 2020-03-31 | End: 2020-03-31

## 2020-03-31 RX ORDER — LIDOCAINE HYDROCHLORIDE 20 MG/ML
15 SOLUTION OROPHARYNGEAL ONCE
Status: COMPLETED | OUTPATIENT
Start: 2020-03-31 | End: 2020-03-31

## 2020-03-31 RX ORDER — ACETAMINOPHEN 500 MG
1000 TABLET ORAL ONCE
Status: COMPLETED | OUTPATIENT
Start: 2020-03-31 | End: 2020-03-31

## 2020-03-31 RX ORDER — DIPHENHYDRAMINE HYDROCHLORIDE 50 MG/ML
25 INJECTION INTRAMUSCULAR; INTRAVENOUS ONCE
Status: COMPLETED | OUTPATIENT
Start: 2020-03-31 | End: 2020-03-31

## 2020-03-31 RX ADMIN — LIDOCAINE HYDROCHLORIDE 15 ML: 20 SOLUTION ORAL; TOPICAL at 13:54

## 2020-03-31 RX ADMIN — SODIUM CHLORIDE 500 ML: 9 INJECTION, SOLUTION INTRAVENOUS at 14:21

## 2020-03-31 RX ADMIN — NYSTATIN 500000 UNITS: 100000 SUSPENSION ORAL at 14:02

## 2020-03-31 RX ADMIN — SODIUM CHLORIDE, PRESERVATIVE FREE 10 ML: 5 INJECTION INTRAVENOUS at 14:17

## 2020-03-31 RX ADMIN — DIPHENHYDRAMINE HYDROCHLORIDE 25 MG: 50 INJECTION, SOLUTION INTRAMUSCULAR; INTRAVENOUS at 14:15

## 2020-03-31 RX ADMIN — ACETAMINOPHEN 1000 MG: 500 TABLET, FILM COATED ORAL at 15:00

## 2020-03-31 RX ADMIN — IOPAMIDOL 95 ML: 755 INJECTION, SOLUTION INTRAVENOUS at 15:35

## 2020-03-31 RX ADMIN — ALUMINUM HYDROXIDE, MAGNESIUM HYDROXIDE, AND DIMETHICONE: 400; 400; 40 SUSPENSION ORAL at 13:53
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
## 2020-04-01 ENCOUNTER — READMISSION MANAGEMENT (OUTPATIENT)
Dept: CALL CENTER | Facility: HOSPITAL | Age: 65
End: 2020-04-01

## 2020-04-01 NOTE — OUTREACH NOTE
Medical Week 1 Survey      Responses   [facility] [place] patient discharged from?  [place]   Does the patient have one of the following disease processes/diagnoses(primary or secondary)?  Other   Is there a successful TCM telephone encounter documented?  No   Week 1 attempt successful?  Yes   Call start time  1413   Call end time  1420   Is patient permission given to speak with other caregiver?  Yes   List who call center can speak with  -Dayna   Person spoke with today (if not patient) and relationship  Dayna   Meds reviewed with patient/caregiver?  Yes   Is the patient having any side effects they believe may be caused by any medication additions or changes?  No   Does the patient have all medications ordered at discharge?  Yes   Is the patient taking all medications as directed (includes completed medication regime)?  Yes   Does the patient have a primary care provider?   Yes   Does the patient have an appointment with their PCP within 7 days of discharge?  No [States will check into virtual visit on [employer].]   Has the patient kept scheduled appointments due by today?  N/A   Has home health visited the patient within 72 hours of discharge?  N/A   Psychosocial issues?  No   Did the patient receive a copy of their discharge instructions?  Yes   Nursing interventions  Reviewed instructions with patient, Educated on [employer] [uses [employer]]   What is the patient's perception of their health status since discharge?  Improving   Is the patient/caregiver able to teach back signs and symptoms related to disease process for when to call PCP?  Yes   Is the patient/caregiver able to teach back signs and symptoms related to disease process for when to call 911?  Yes   Is the patient/caregiver able to teach back the hierarchy of who to call/visit for symptoms/problems? PCP, Specialist, Home health nurse, Urgent Care, ED, 911  Yes   Week 1 call completed?  Yes   Graduated  Yes   Graduated/Revoked comments   states  is slightly better and has all medications needed. States will check in with PCP through virtual care. Denies any complaints today-states will call if any questions/concern.          Carolyne Chaney RN

## 2020-04-02 ENCOUNTER — APPOINTMENT (OUTPATIENT)
Dept: GENERAL RADIOLOGY | Facility: HOSPITAL | Age: 65
End: 2020-04-02

## 2020-04-02 ENCOUNTER — TELEMEDICINE (OUTPATIENT)
Dept: INTERNAL MEDICINE | Facility: CLINIC | Age: 65
End: 2020-04-02

## 2020-04-02 ENCOUNTER — TELEPHONE (OUTPATIENT)
Dept: INTERNAL MEDICINE | Facility: CLINIC | Age: 65
End: 2020-04-02

## 2020-04-02 ENCOUNTER — HOSPITAL ENCOUNTER (EMERGENCY)
Facility: HOSPITAL | Age: 65
Discharge: HOME | End: 2020-04-02
Attending: EMERGENCY MEDICINE | Admitting: EMERGENCY MEDICINE

## 2020-04-02 VITALS
SYSTOLIC BLOOD PRESSURE: 151 MMHG | OXYGEN SATURATION: 96 % | TEMPERATURE: 99.8 F | HEART RATE: 70 BPM | BODY MASS INDEX: 32.49 KG/M2 | DIASTOLIC BLOOD PRESSURE: 73 MMHG | WEIGHT: 214.4 LBS | HEIGHT: 68 IN | RESPIRATION RATE: 18 BRPM

## 2020-04-02 DIAGNOSIS — I10 ESSENTIAL HYPERTENSION: Primary | ICD-10-CM

## 2020-04-02 DIAGNOSIS — J20.8 ACUTE VIRAL BRONCHITIS: Primary | ICD-10-CM

## 2020-04-02 DIAGNOSIS — J45.901 ASTHMA WITH ACUTE EXACERBATION, UNSPECIFIED ASTHMA SEVERITY, UNSPECIFIED WHETHER PERSISTENT: ICD-10-CM

## 2020-04-02 DIAGNOSIS — E11.9 TYPE 2 DIABETES MELLITUS WITHOUT COMPLICATION, WITHOUT LONG-TERM CURRENT USE OF INSULIN (HCC): ICD-10-CM

## 2020-04-02 LAB — BACTERIA SPEC AEROBE CULT: NORMAL

## 2020-04-02 PROCEDURE — 99214 OFFICE O/P EST MOD 30 MIN: CPT | Performed by: INTERNAL MEDICINE

## 2020-04-02 PROCEDURE — 96372 THER/PROPH/DIAG INJ SC/IM: CPT

## 2020-04-02 PROCEDURE — 93010 ELECTROCARDIOGRAM REPORT: CPT | Performed by: INTERNAL MEDICINE

## 2020-04-02 PROCEDURE — 93005 ELECTROCARDIOGRAM TRACING: CPT

## 2020-04-02 PROCEDURE — 93005 ELECTROCARDIOGRAM TRACING: CPT | Performed by: EMERGENCY MEDICINE

## 2020-04-02 PROCEDURE — 99283 EMERGENCY DEPT VISIT LOW MDM: CPT

## 2020-04-02 PROCEDURE — 25010000002 DEXAMETHASONE SODIUM PHOSPHATE 20 MG/5ML SOLUTION: Performed by: EMERGENCY MEDICINE

## 2020-04-02 PROCEDURE — 71045 X-RAY EXAM CHEST 1 VIEW: CPT

## 2020-04-02 RX ORDER — DEXAMETHASONE SODIUM PHOSPHATE 4 MG/ML
10 INJECTION, SOLUTION INTRA-ARTICULAR; INTRALESIONAL; INTRAMUSCULAR; INTRAVENOUS; SOFT TISSUE ONCE
Status: COMPLETED | OUTPATIENT
Start: 2020-04-02 | End: 2020-04-02

## 2020-04-02 RX ADMIN — DEXAMETHASONE SODIUM PHOSPHATE 10 MG: 4 INJECTION, SOLUTION INTRA-ARTICULAR; INTRALESIONAL; INTRAMUSCULAR; INTRAVENOUS; SOFT TISSUE at 21:49

## 2020-04-02 NOTE — TELEPHONE ENCOUNTER
Patient called and left voicemail regarding her blood pressure medication. She said there were some changes made at the hospital and she wants to confirm what she should be taking for her bp.
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
## 2020-04-14 ENCOUNTER — TELEMEDICINE (OUTPATIENT)
Dept: INTERNAL MEDICINE | Facility: CLINIC | Age: 65
End: 2020-04-14

## 2020-04-14 DIAGNOSIS — N39.0 URINARY TRACT INFECTION WITHOUT HEMATURIA, SITE UNSPECIFIED: ICD-10-CM

## 2020-04-14 DIAGNOSIS — L27.0 DRUG RASH: Primary | ICD-10-CM

## 2020-04-14 PROCEDURE — 99213 OFFICE O/P EST LOW 20 MIN: CPT | Performed by: INTERNAL MEDICINE
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
## 2020-05-04 ENCOUNTER — TELEPHONE (OUTPATIENT)
Dept: ONCOLOGY | Facility: CLINIC | Age: 65
End: 2020-05-04

## 2020-05-04 ENCOUNTER — TELEPHONE (OUTPATIENT)
Dept: INTERNAL MEDICINE | Facility: CLINIC | Age: 65
End: 2020-05-04

## 2020-05-04 NOTE — TELEPHONE ENCOUNTER
PATIENT REPORTS THAT SHE IS HAVING LAB WORK DONE FOR DR. BLANCO  ON 5/7/2020 PATIENT WANTS TO KNOW IF DR. MIDDLETON CAN USE THOSE LABS.      PLEASE ADVISE

## 2020-05-04 NOTE — TELEPHONE ENCOUNTER
PT THOUGHT SHE HAD HER APPT ALREADY SET UP.     SHE NEEDS TO SCHEDULE HER LABS AND 1 YEAR FOLLOW UP WITH DR BLANCO. IT WOULD BE A YEAR MAY 2020.    PLEASE GIVE PT A CALL [phone].

## 2020-05-18 DIAGNOSIS — Z12.11 COLON CANCER SCREENING: Primary | ICD-10-CM

## 2020-05-18 RX ORDER — SODIUM CHLORIDE, SODIUM LACTATE, POTASSIUM CHLORIDE, CALCIUM CHLORIDE 600; 310; 30; 20 MG/100ML; MG/100ML; MG/100ML; MG/100ML
30 INJECTION, SOLUTION INTRAVENOUS CONTINUOUS
Status: CANCELLED | OUTPATIENT
Start: 2020-08-14

## 2020-05-19 PROBLEM — Z12.11 COLON CANCER SCREENING: Status: ACTIVE | Noted: 2020-05-19

## 2020-05-20 ENCOUNTER — RESULTS ENCOUNTER (OUTPATIENT)
Dept: INTERNAL MEDICINE | Facility: CLINIC | Age: 65
End: 2020-05-20

## 2020-05-20 DIAGNOSIS — G59 MONONEUROPATHY DUE TO UNDERLYING DISEASE: ICD-10-CM

## 2020-05-20 DIAGNOSIS — E11.9 TYPE 2 DIABETES MELLITUS WITHOUT COMPLICATION, WITHOUT LONG-TERM CURRENT USE OF INSULIN (HCC): ICD-10-CM

## 2020-05-20 RX ORDER — GLIPIZIDE 5 MG/1
TABLET, FILM COATED, EXTENDED RELEASE ORAL
Qty: 30 TABLET | Refills: 1 | Status: SHIPPED | OUTPATIENT
Start: 2020-05-20 | End: 2020-05-28

## 2020-05-20 RX ORDER — ATORVASTATIN CALCIUM 20 MG/1
TABLET, FILM COATED ORAL
Qty: 30 TABLET | Refills: 1 | Status: SHIPPED | OUTPATIENT
Start: 2020-05-20 | End: 2020-05-28

## 2020-05-22 ENCOUNTER — APPOINTMENT (OUTPATIENT)
Dept: LAB | Facility: HOSPITAL | Age: 65
End: 2020-05-22

## 2020-05-22 ENCOUNTER — LAB (OUTPATIENT)
Dept: LAB | Facility: HOSPITAL | Age: 65
End: 2020-05-22

## 2020-05-22 DIAGNOSIS — D47.2 MONOCLONAL GAMMOPATHY: Primary | ICD-10-CM

## 2020-05-22 PROCEDURE — 36415 COLL VENOUS BLD VENIPUNCTURE: CPT | Performed by: INTERNAL MEDICINE

## 2020-05-23 LAB
ALBUMIN SERPL-MCNC: 4.5 G/DL (ref 3.8–4.8)
ALBUMIN/GLOB SERPL: 1.4 {RATIO} (ref 1.2–2.2)
ALP SERPL-CCNC: 55 IU/L (ref 39–117)
ALT SERPL-CCNC: 53 IU/L (ref 0–32)
AST SERPL-CCNC: 49 IU/L (ref 0–40)
BILIRUB SERPL-MCNC: 0.3 MG/DL (ref 0–1.2)
BUN SERPL-MCNC: 11 MG/DL (ref 8–27)
BUN/CREAT SERPL: 17 (ref 12–28)
CALCIUM SERPL-MCNC: 10 MG/DL (ref 8.7–10.3)
CHLORIDE SERPL-SCNC: 97 MMOL/L (ref 96–106)
CO2 SERPL-SCNC: 24 MMOL/L (ref 20–29)
CREAT SERPL-MCNC: 0.65 MG/DL (ref 0.57–1)
GLOBULIN SER CALC-MCNC: 3.3 G/DL (ref 1.5–4.5)
GLUCOSE SERPL-MCNC: 183 MG/DL (ref 65–99)
HBA1C MFR BLD: 6.9 % (ref 4.8–5.6)
POTASSIUM SERPL-SCNC: 4.3 MMOL/L (ref 3.5–5.2)
PROT SERPL-MCNC: 7.8 G/DL (ref 6–8.5)
RPR SER QL: NON REACTIVE
SODIUM SERPL-SCNC: 140 MMOL/L (ref 134–144)
TSH SERPL-ACNC: 1.76 UIU/ML (ref 0.45–4.5)
VIT B12 SERPL-MCNC: 544 PG/ML (ref 232–1245)

## 2020-05-26 LAB
ALBUMIN SERPL-MCNC: 3.7 G/DL (ref 2.9–4.4)
ALBUMIN/GLOB SERPL: 1 {RATIO} (ref 0.7–1.7)
ALPHA1 GLOB FLD ELPH-MCNC: 0.2 G/DL (ref 0–0.4)
ALPHA2 GLOB SERPL ELPH-MCNC: 1.1 G/DL (ref 0.4–1)
B-GLOBULIN SERPL ELPH-MCNC: 1 G/DL (ref 0.7–1.3)
GAMMA GLOB SERPL ELPH-MCNC: 1.6 G/DL (ref 0.4–1.8)
GLOBULIN SER CALC-MCNC: 3.9 G/DL (ref 2.2–3.9)
IGA SERPL-MCNC: 100 MG/DL (ref 87–352)
IGG SERPL-MCNC: 1451 MG/DL (ref 586–1602)
IGM SERPL-MCNC: 139 MG/DL (ref 26–217)
INTERPRETATION SERPL IEP-IMP: ABNORMAL
KAPPA LC SERPL-MCNC: 12.7 MG/L (ref 3.3–19.4)
KAPPA LC/LAMBDA SER: 1.14 {RATIO} (ref 0.26–1.65)
LAMBDA LC FREE SERPL-MCNC: 11.1 MG/L (ref 5.7–26.3)
Lab: ABNORMAL
M-SPIKE: 0.8 G/DL
PROT SERPL-MCNC: 7.6 G/DL (ref 6–8.5)

## 2020-05-28 ENCOUNTER — OFFICE VISIT (OUTPATIENT)
Dept: INTERNAL MEDICINE | Facility: CLINIC | Age: 65
End: 2020-05-28

## 2020-05-28 VITALS
DIASTOLIC BLOOD PRESSURE: 80 MMHG | SYSTOLIC BLOOD PRESSURE: 136 MMHG | WEIGHT: 212 LBS | BODY MASS INDEX: 32.13 KG/M2 | HEIGHT: 68 IN

## 2020-05-28 DIAGNOSIS — I10 ESSENTIAL HYPERTENSION: Primary | ICD-10-CM

## 2020-05-28 DIAGNOSIS — K76.0 FATTY LIVER: ICD-10-CM

## 2020-05-28 DIAGNOSIS — J45.909 UNCOMPLICATED ASTHMA, UNSPECIFIED ASTHMA SEVERITY, UNSPECIFIED WHETHER PERSISTENT: ICD-10-CM

## 2020-05-28 DIAGNOSIS — E11.9 TYPE 2 DIABETES MELLITUS WITHOUT COMPLICATION, WITHOUT LONG-TERM CURRENT USE OF INSULIN (HCC): ICD-10-CM

## 2020-05-28 PROCEDURE — 99214 OFFICE O/P EST MOD 30 MIN: CPT | Performed by: INTERNAL MEDICINE

## 2020-05-28 RX ORDER — ATORVASTATIN CALCIUM 20 MG/1
TABLET, FILM COATED ORAL
Qty: 30 TABLET | Refills: 1 | Status: SHIPPED | OUTPATIENT
Start: 2020-05-28 | End: 2020-09-17

## 2020-05-28 RX ORDER — GLIPIZIDE 5 MG/1
TABLET, FILM COATED, EXTENDED RELEASE ORAL
Qty: 30 TABLET | Refills: 1 | Status: SHIPPED | OUTPATIENT
Start: 2020-05-28 | End: 2020-08-24 | Stop reason: SDUPTHER

## 2020-05-28 RX ORDER — MONTELUKAST SODIUM 10 MG/1
TABLET ORAL
COMMUNITY
Start: 2020-05-19 | End: 2020-10-13 | Stop reason: SDUPTHER

## 2020-05-28 RX ORDER — LISINOPRIL 10 MG/1
10 TABLET ORAL DAILY
Qty: 30 TABLET | Refills: 3 | Status: SHIPPED | OUTPATIENT
Start: 2020-05-28 | End: 2020-09-17
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
## 2020-06-02 NOTE — PROGRESS NOTES
Subjective     REASON FOR FOLLOWUP:   1. MGUS. Since June 2010 0.8g/dl  2. ? TIA 10/2011   3. Recurrent diverticulitis October 2014.                    4.   neuropathy chronic-fat pad biopsy negative for amyloid        History of Present Illness  patient is a 61-year-old female with monoclonal gammopathy of undetermined significance for the last 10 years she's here for routine annual follow-up feeling very well overall.    She is found to be diabetic and is managing this with oral diet control but has not been able to reduce any more weight.    Her neuropathy stable she has no bone pain and otherwise feels well she is up-to-date with mammography Pap smears and colonoscopy    Her IgG kappa MGUS is stable at 0.8 g/dL with a normal free light chain ratio last week    She had further stenting of an aneurysm in her brain last October and everything is doing well.  Her son was also found to have an aneurysm and had this stented 2 years ago -I think some genetic testing is indicated possibly for VHL  mutation     PAST MEDICAL HISTORY: Significant for kidney stones in June 2011. Reflux symptoms, hypertension. She has been anemic in the past but more recently this has not been an issue. She complains of neuropathy in her feet for the las t year and has had migraine headaches all her life. Possible TIA in October 2011. Negative workup for stroke.       PAST SURGICAL HISTORY: C section 1980 and 1982. Emergency appendectomy at the time of her second C section. Tubal ligation with C section. G allbladder surgery 1997. Ruptured brain aneurysm 2004 which was clipped by Dr. Sims. Colon resection for diverticulitis but the anastomosis broke down and she had to have a diverting ileostomy for 3 months. She had repair of the colon and reanastomosi s with takedown of the ileostomy in 2009. Stent was placed for kidney stones in June 2011. Incisional hernia repair in June 2009 withy mesh done laparoscopically.       OB/GYN: G3, P3,  menopause . She has not been on any hormones.       HEMATOLOGY HISTORY: History from previous dates can be found in the separate document.         HEMATOLOGY HISTORY: The patient is a 56-year-old white female with hypertension, reflux, kidney stones, who had multiple surgeries over the last couple of years for diverticulosis, breakdown of the anastomosis, and most recently incisional hernia repair b y Dr. Peter Kay in 2011 with a mesh done laparoscopically. The patient was noted to have a monoclonal gammopathy on blood testing in  and this was done because of some complaints of neuropathy in her feet for the last year, which actually ap pears to be improving as she has lost 20 pounds over the last 3 months after her surgery.       She has no bone pain, anemia, or other complaints at this time and the neuropathy does not involve her fingers although she states when she exercises in the gym in the past her hands have felt numb at the end of exercising. Most of the problems in her feet are pain and aching which is sometimes worse at night, especially if she has been on her feet all day, but she has not tried any medication for this.       Immunoglobulins are quantitatively normal. Serum free light chain ratio was normal. Urine electrophoresis was negative. Small MGUS, which is to be followed perspectively and no significant signs of amyloidosis.       The patient was seen 2014. MGUS is stable. Q.6 month paraprotein checks continued.   Patient seen in 2016 with a stable MGUS is 0.9 g/dL of IgG kappa        SOCIAL HISTORY: , lives with her . 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 Nonsmoker, nondrinker. No risk factors for HIV.       FAMILY HISTORY: Father  at 46 of MI. Mother  recently at 95 of old age. Two brothers, healthy. No blood cancers or malignancies. Son kidney stones at age 22.       Review of Systems   Constitutional: Positive for fatigue (6/3/2020  stable).   Respiratory: Negative for chest tightness and shortness of breath.    Cardiovascular: Negative for chest pain.   Gastrointestinal: Positive for abdominal pain (stable 6/3/2020). Negative for constipation, diarrhea (stable 6/3/2020), nausea (stable 6/3/2020) and vomiting.   Musculoskeletal: Positive for joint swelling (feet at the end of the day 6/3/2020). Negative for arthralgias, back pain and gait problem.   Psychiatric/Behavioral: The patient is not nervous/anxious.       A comprehensive 14 point review of systems was performed and was negative except as mentioned.    Medications:  The current medication list was reviewed in the EMR    ALLERGIES:    Allergies   Allergen Reactions   • Contrast Dye Shortness Of Breath   • Iodinated Diagnostic Agents Shortness Of Breath   • Codeine Nausea Only   • Methylprednisolone Anxiety   • Ciprofloxacin Hcl Rash       Objective      There were no vitals filed for this visit.  Current Status 5/10/2019   ECOG score 0       Physical Exam    GENERAL:  Well-developed, well-nourished in no acute distress.   SKIN:  Warm, dry without rashes, purpura or petechiae.  EYES:  Pupils equal, round and reactive to light.  EOMs intact.  Conjunctivae normal.  EARS:  Hearing intact.  NOSE:  Septum midline.  No excoriations or nasal discharge.  MOUTH:  Tongue is well-papillated; no stomatitis or ulcers.  Lips normal.  THROAT:  Oropharynx without lesions or exudates.  NECK:  Supple with good range of motion; no thyromegaly or masses, no JVD.  LYMPHATICS:  No cervical, supraclavicular, axillary or inguinal adenopathy.  CHEST:  Lungs clear to auscultation. Good airflow.  CARDIAC:  Regular rate and rhythm without murmurs, rubs or gallops. Normal S1,S2.  ABDOMEN:  Soft, nontender with no hepatosplenomegaly or masses.  EXTREMITIES:  No clubbing, cyanosis or edema.  NEUROLOGICAL:  Cranial Nerves II-XII grossly intact.  No focal neurological deficits.  PSYCHIATRIC:  Normal affect and mood.         RECENT LABS:  Hematology WBC   Date Value Ref Range Status   03/31/2020 13.26 (H) 3.40 - 10.80 10*3/mm3 Final   10/21/2019 4.75 4.5 - 11.0 10*3/uL Final     RBC   Date Value Ref Range Status   03/31/2020 4.43 3.77 - 5.28 10*6/mm3 Final   10/21/2019 4.46 4.0 - 5.2 10*6/uL Final     Hemoglobin   Date Value Ref Range Status   03/31/2020 13.9 12.0 - 15.9 g/dL Final   10/21/2019 13.4 12.0 - 16.0 g/dL Final     Hematocrit   Date Value Ref Range Status   03/31/2020 41.0 34.0 - 46.6 % Final   10/21/2019 41.9 36.0 - 46.0 % Final     Platelets   Date Value Ref Range Status   03/31/2020 200 140 - 450 10*3/mm3 Final   10/21/2019 155 140 - 440 10*3/uL Final       M Protein 1.0 g/dl       DIGITAL BILATERAL SCREENING MAMMOGRAM WITH TOMOSYNTHESIS    IMPRESSION:  There is no mammographic evidence of malignancy.    Screening Mammogram in 1 year is recommended.    BI-RADS Category 1:  Negative    The patient has been entered into an automated reminder system.    <Electronically signed by UMM RIVERA M.D.>  05/08/2020 1225          Assessment/Plan   1.  IgG kappa MGUS with a normal light chain ratio is stable-abdominal fat pad negative for amyloid  · Stable paraprotein from 2010 2020-move to annual follow-up  2., history of aneurysm of the brain-with strong family history?  VHL mutation  3.  Peripheral neuropathy    Plan    1.  Return in 1 year for follow-up with labs before  2.  Referred to genetics   if there is an upward trend we will do a bone marrow although the normal light chain ratio is reassuring and the stability over 10 years is very good news                      6/2/2020      CC:

## 2020-06-03 ENCOUNTER — APPOINTMENT (OUTPATIENT)
Dept: LAB | Facility: HOSPITAL | Age: 65
End: 2020-06-03

## 2020-06-03 ENCOUNTER — OFFICE VISIT (OUTPATIENT)
Dept: ONCOLOGY | Facility: CLINIC | Age: 65
End: 2020-06-03

## 2020-06-03 ENCOUNTER — TELEPHONE (OUTPATIENT)
Dept: INTERNAL MEDICINE | Facility: CLINIC | Age: 65
End: 2020-06-03

## 2020-06-03 VITALS
TEMPERATURE: 98.2 F | OXYGEN SATURATION: 96 % | HEIGHT: 68 IN | WEIGHT: 225.4 LBS | HEART RATE: 76 BPM | DIASTOLIC BLOOD PRESSURE: 78 MMHG | BODY MASS INDEX: 34.16 KG/M2 | SYSTOLIC BLOOD PRESSURE: 158 MMHG | RESPIRATION RATE: 16 BRPM

## 2020-06-03 DIAGNOSIS — D47.2 MONOCLONAL GAMMOPATHY: Primary | ICD-10-CM

## 2020-06-03 PROCEDURE — 99213 OFFICE O/P EST LOW 20 MIN: CPT | Performed by: INTERNAL MEDICINE

## 2020-06-03 RX ORDER — ASCORBIC ACID 1000 MG
120 TABLET ORAL DAILY
COMMUNITY

## 2020-06-03 RX ORDER — NYSTATIN 100000 [USP'U]/G
POWDER TOPICAL
COMMUNITY
Start: 2020-05-28

## 2020-06-03 RX ORDER — ACETAMINOPHEN,DIPHENHYDRAMINE HCL 500; 25 MG/1; MG/1
1 TABLET, FILM COATED ORAL NIGHTLY PRN
COMMUNITY

## 2020-06-03 RX ORDER — UBIDECARENONE 30 MG
CAPSULE ORAL
COMMUNITY

## 2020-06-03 RX ORDER — CHLORAL HYDRATE 500 MG
CAPSULE ORAL
COMMUNITY

## 2020-06-03 NOTE — TELEPHONE ENCOUNTER
Patient called to let you know that she was taking HCT at the hospital and they just refilled it.  Do you want her to continue taking the new medication that you sent in for her (she didn't know the name of it) and also the HCT?  She was not sure what to do and asked that I send you a message please.  Thank you

## 2020-06-03 NOTE — TELEPHONE ENCOUNTER
Patient is aware and verbalized understanding.    Patient went to cancer dr today and she said that her numbers were like a .8 and said she has been doing good and only needs to come once a year.

## 2020-06-03 NOTE — TELEPHONE ENCOUNTER
Stay on HCTZ which was on her med list when I saw her. I added Lisinopril in addition to it.    Please bring all bottles and inhalers to next OV so we can review

## 2020-06-25 DIAGNOSIS — R51.9 NONINTRACTABLE HEADACHE, UNSPECIFIED CHRONICITY PATTERN, UNSPECIFIED HEADACHE TYPE: ICD-10-CM

## 2020-06-25 DIAGNOSIS — G59 MONONEUROPATHY DUE TO UNDERLYING DISEASE: ICD-10-CM

## 2020-06-25 RX ORDER — BUTALBITAL, ACETAMINOPHEN AND CAFFEINE 50; 325; 40 MG/1; MG/1; MG/1
TABLET ORAL
Qty: 20 TABLET | Refills: 0 | OUTPATIENT
Start: 2020-06-25

## 2020-06-25 RX ORDER — PREGABALIN 100 MG/1
CAPSULE ORAL
Qty: 90 CAPSULE | Refills: 0 | OUTPATIENT
Start: 2020-06-25

## 2020-06-29 ENCOUNTER — TELEPHONE (OUTPATIENT)
Dept: INTERNAL MEDICINE | Facility: CLINIC | Age: 65
End: 2020-06-29

## 2020-06-29 NOTE — TELEPHONE ENCOUNTER
Requesting refill: Lyrica 100 MG 1 tablet 3 x's daily, patient is completely out of medication. [facility] [phone]

## 2020-06-30 ENCOUNTER — RESULTS ENCOUNTER (OUTPATIENT)
Dept: INTERNAL MEDICINE | Facility: CLINIC | Age: 65
End: 2020-06-30

## 2020-06-30 DIAGNOSIS — I10 ESSENTIAL HYPERTENSION: ICD-10-CM

## 2020-06-30 DIAGNOSIS — G59 MONONEUROPATHY DUE TO UNDERLYING DISEASE: ICD-10-CM

## 2020-06-30 RX ORDER — PREGABALIN 100 MG/1
100 CAPSULE ORAL 3 TIMES DAILY
Qty: 90 CAPSULE | Refills: 5 | Status: SHIPPED | OUTPATIENT
Start: 2020-06-30 | End: 2020-08-24 | Stop reason: SDUPTHER

## 2020-07-03 LAB
ALBUMIN SERPL-MCNC: 4.6 G/DL (ref 3.5–5.2)
ALBUMIN/GLOB SERPL: 1.4 G/DL
ALP SERPL-CCNC: 60 U/L (ref 39–117)
ALT SERPL-CCNC: 42 U/L (ref 1–33)
AST SERPL-CCNC: 33 U/L (ref 1–32)
BILIRUB SERPL-MCNC: 0.5 MG/DL (ref 0.2–1.2)
BUN SERPL-MCNC: 13 MG/DL (ref 8–23)
BUN/CREAT SERPL: 16.7 (ref 7–25)
CALCIUM SERPL-MCNC: 10 MG/DL (ref 8.6–10.5)
CHLORIDE SERPL-SCNC: 101 MMOL/L (ref 98–107)
CO2 SERPL-SCNC: 28.3 MMOL/L (ref 22–29)
CREAT SERPL-MCNC: 0.78 MG/DL (ref 0.57–1)
GLOBULIN SER CALC-MCNC: 3.3 GM/DL
GLUCOSE SERPL-MCNC: 139 MG/DL (ref 65–99)
HBA1C MFR BLD: 6.6 % (ref 4.8–5.6)
POTASSIUM SERPL-SCNC: 4.7 MMOL/L (ref 3.5–5.2)
PROT SERPL-MCNC: 7.9 G/DL (ref 6–8.5)
RPR SER QL: NORMAL
SODIUM SERPL-SCNC: 139 MMOL/L (ref 136–145)
TSH SERPL DL<=0.005 MIU/L-ACNC: 1.25 UIU/ML (ref 0.27–4.2)
VIT B12 SERPL-MCNC: 526 PG/ML (ref 211–946)

## 2020-07-16 ENCOUNTER — OFFICE VISIT (OUTPATIENT)
Dept: INTERNAL MEDICINE | Facility: CLINIC | Age: 65
End: 2020-07-16

## 2020-07-16 VITALS
BODY MASS INDEX: 32.74 KG/M2 | WEIGHT: 216 LBS | SYSTOLIC BLOOD PRESSURE: 108 MMHG | DIASTOLIC BLOOD PRESSURE: 80 MMHG | HEIGHT: 68 IN | TEMPERATURE: 98.2 F

## 2020-07-16 DIAGNOSIS — E11.9 TYPE 2 DIABETES MELLITUS WITHOUT COMPLICATION, WITHOUT LONG-TERM CURRENT USE OF INSULIN (HCC): ICD-10-CM

## 2020-07-16 DIAGNOSIS — K76.0 FATTY LIVER: ICD-10-CM

## 2020-07-16 DIAGNOSIS — I10 ESSENTIAL HYPERTENSION: Primary | ICD-10-CM

## 2020-07-16 PROCEDURE — 99213 OFFICE O/P EST LOW 20 MIN: CPT | Performed by: INTERNAL MEDICINE

## 2020-07-16 PROCEDURE — 90471 IMMUNIZATION ADMIN: CPT | Performed by: INTERNAL MEDICINE

## 2020-07-16 PROCEDURE — 90732 PPSV23 VACC 2 YRS+ SUBQ/IM: CPT | Performed by: INTERNAL MEDICINE

## 2020-07-16 NOTE — PROGRESS NOTES
Subjective        Chief Complaint   Patient presents with   • Hypertension           Khalida Gilliland is a 65 y.o. female who presents for    Patient Active Problem List   Diagnosis   • Monoclonal gammopathy   • Hx of cerebral aneurysm repair   • Allergy history, radiographic dye   • Cerebral aneurysm, nonruptured   • Neck pain   • Contrast media allergy   • Other hyperlipidemia   • Type 2 diabetes mellitus without complication, without long-term current use of insulin (CMS/[facility])   • Essential hypertension   • Chronic abdominal pain   • Right sided abdominal pain   • Fatty liver   • Mononeuropathy due to underlying disease   • Asthma   • GERD (gastroesophageal reflux disease)   • Headache   • Drug rash       History of Present Illness     She has been checking her BP and it has been 114-134/61-84. Her sugars have been . She denies chest pain or dyspnea. She is jogging in the pool for one hour at a time.  Allergies   Allergen Reactions   • Contrast Dye Shortness Of Breath   • Iodinated Diagnostic Agents Shortness Of Breath   • Codeine Nausea Only   • Methylprednisolone Anxiety   • Ciprofloxacin Hcl Rash       Current Outpatient Medications on File Prior to Visit   Medication Sig Dispense Refill   • albuterol sulfate  (90 Base) MCG/ACT inhaler Inhale 2 puffs Every 4 (Four) Hours As Needed for Wheezing or Shortness of Air (Cough). 1 inhaler 0   • Ascorbic Acid (VITAMIN C PO) Take 1,000 mg by mouth Daily. PT INSTRUCTED TO CONTINUE PER DR. CALLUM URBINA     • aspirin 81 MG EC tablet Take 1 tablet by mouth Daily. 30 tablet 6   • atorvastatin (LIPITOR) 20 MG tablet TAKE ONE TABLET BY MOUTH DAILY 30 tablet 1   • budesonide-formoterol (SYMBICORT) 160-4.5 MCG/ACT inhaler Inhale 2 puffs 2 (Two) Times a Day. 1 inhaler 0   • butalbital-acetaminophen-caffeine (FIORICET, ESGIC) -40 MG per tablet TAKE ONE TABLET BY MOUTH TWICE A DAY 20 tablet 1   • cetirizine (zyrTEC) 10 MG tablet Take 10 mg by mouth Daily.     •  cholecalciferol (VITAMIN D3) 1000 units tablet Take 1,000 Units by mouth Daily. PER PT TO CONTINUE PER DR. CALLUM URBINA     • Coenzyme Q10 (CO Q 10 PO) Take  by mouth.     • cycloSPORINE (RESTASIS) 0.05 % ophthalmic emulsion Administer 1 drop to both eyes 2 (Two) Times a Day.     • diphenhydrAMINE-acetaminophen (TYLENOL PM)  MG tablet per tablet Take 1 tablet by mouth At Night As Needed for Sleep.     • fluticasone (FLONASE) 50 MCG/ACT nasal spray PLACE 2 SPRAYS IN EACH NOSTRIL DAILY 9.9 mL 3   • Ginkgo Biloba 40 MG tablet Take  by mouth.     • glipizide (GLUCOTROL XL) 5 MG ER tablet TAKE ONE TABLET BY MOUTH DAILY 30 tablet 1   • glucosamine-chondroitin 500-400 MG capsule capsule Take 1 capsule by mouth 2 (Two) Times a Day With Meals. PT TO CONTINUE PER DR. CALLUM URBINA     • glucose blood test strip Pt tests daily 100 each 12   • hydroCHLOROthiazide (HYDRODIURIL) 25 MG tablet Take 1 tablet by mouth Daily. 30 tablet 0   • JANUVIA 100 MG tablet TAKE ONE TABLET BY MOUTH DAILY 30 tablet 11   • Lancets (FREESTYLE) lancets Pt tests daily 100 each 6   • lisinopril (PRINIVIL,ZESTRIL) 10 MG tablet Take 1 tablet by mouth Daily. 30 tablet 3   • montelukast (SINGULAIR) 10 MG tablet      • NYSTATIN 824937 UNIT/GM topical powder      • Omega-3 Fatty Acids (FISH OIL) 1000 MG capsule capsule Take  by mouth Daily With Breakfast.     • omeprazole (priLOSEC) 40 MG capsule Take 40 mg by mouth Every Morning.     • Potassium Gluconate 550 MG tablet Take  by mouth.     • pregabalin (Lyrica) 100 MG capsule Take 1 capsule by mouth 3 (Three) Times a Day. 90 capsule 5   • Probiotic Product (PROBIOTIC-10 PO) Take  by mouth.     • raNITIdine (ZANTAC) 150 MG tablet Take 150 mg by mouth 2 (Two) Times a Day.     • zolpidem (AMBIEN) 10 MG tablet Take 1 tablet by mouth At Night As Needed for Sleep. 2 tablet 0     No current facility-administered medications on file prior to visit.        Past Medical History:   Diagnosis Date   •  Abdominal pain, right lower quadrant    • Abnormal liver enzymes    • Anemia    • Asthma    • Bilateral shoulder pain    • Cancer (CMS/HCC) 2014    Basal Cell Carcinoma Left Arm,SKIN CANCER   • Cyst of left kidney    • Depression    • Diverticulosis    • Fatty liver    • GERD (gastroesophageal reflux disease)    • H/O diverticulitis of colon     Sigmoid colon   • Headache    • History of kidney stones 2011   • History of surgery for cerebral aneurysm     Clipping of cerebral Aneurysm   • Hx of migraines    • Hyperlipidemia    • Hypertension    • Insomnia    • Irritable bowel syndrome    • Loose stools    • Neck pain    • Obesity    • Peptic ulceration    • Pneumonia    • Postnasal drip    • Stroke (CMS/HCC) 10/2011    Possible, workup negative       Past Surgical History:   Procedure Laterality Date   • APPENDECTOMY  1982    Emergency at time of 2nd    • BASAL CELL CARCINOMA EXCISION Left 2014    Excision basal cell carcinoma, left arm (1.1 cm) with frozen section control and layered wound closure ( 3.1 cm), Dr. Isael Andrade, [facility]   • BRAIN SURGERY  2004    Ruptured aneurysm, clipped/Dr. Sims   • CEREBRAL ANEURYSM REPAIR      clipping of cerebral aneurysm   • CEREBRAL ANGIOGRAM N/A 2017    Procedure: CEREBRAL ANGIOGRAM ;  Surgeon: Orlando Bella MD;  Location: [facility] ;  Service:    • CEREBRAL ANGIOGRAM N/A 10/11/2017    Procedure: CEREBRAL ANGIOGRAM;  Surgeon: Orlando Bella MD;  Location: [facility] ;  Service:    •  SECTION  , ,    • CHOLECYSTECTOMY     • COLON RESECTION WITH COLOSTOMY Right    • COLONOSCOPY  2009   • EMBOLIZATION CEREBRAL N/A 2017    Procedure: Cerebral angiography and embolization of cerebral aneurysm with Pipeline Embolization Device;  Surgeon: Orlando Bella MD;  Location: [facility] ;  Service:    • EMBOLIZATION CEREBRAL N/A 10/31/2018    Procedure: Cerebral angiogram with embolization  of cerebral aneurysm;  Surgeon: Orlando Bella MD;  Location: [facility] 18/19;  Service: Neurosurgery   • ILEOSTOMY REVISION     • INCISIONAL HERNIA REPAIR  6/229    Patient suffered some nerve entrapment secondary to the attack, some of which were removed during a secondary operation.   • INGUINAL HERNIA REPAIR     • LARYNX SURGERY     • OSTOMY TAKE DOWN     • TUBAL ABDOMINAL LIGATION     • URETERAL STENT INSERTION  06/2011    Due to kidney stones       Family History   Problem Relation Age of Onset   • Skin cancer Mother    • Dementia Mother    • KALYAN disease Mother    • Heart disease Father    • Heart attack Father    • Hypertension Father    • Aneurysm Son         cerebral   • Nephrolithiasis Son    • Suicide Attempts Brother    • Malig Hyperthermia Neg Hx        Social History     Socioeconomic History   • Marital status:      Spouse name: Roe   • Number of children: 3   • Years of education: College   • Highest education level: Not on file   Occupational History   • Occupation: unemployed     Employer: UNEMPLOYED   Tobacco Use   • Smoking status: Former Smoker     Years: 1.00     Types: Cigarettes   • Smokeless tobacco: Never Used   Substance and Sexual Activity   • Alcohol use: Yes     Comment: 1-2 DRINKS OF WINE MONTHLY    • Drug use: No   • Sexual activity: Defer           The following portions of the patient's history were reviewed and updated as appropriate: problem list, allergies, current medications, past medical history, past family history, past social history and past surgical history.    Review of Systems   Respiratory: Negative for shortness of breath.    Cardiovascular: Negative for chest pain.       Immunization History   Administered Date(s) Administered   • Flu Vaccine Quad PF >36MO 11/18/2018   • Hepatitis A 12/19/2016, 06/22/2017   • Hepatitis B 12/19/2016, 06/22/2017, 11/18/2017   • Pneumococcal Conjugate 13-Valent (PCV13) 10/19/2015   • Pneumococcal Polysaccharide  "(PPSV23) 01/01/2008   • Tdap 09/03/2015   • Zostavax 10/09/2014   • flucelvax quad pfs =>4 YRS 11/22/2019       Objective   Vitals:    07/16/20 1400   BP: 108/80   Temp: 98.2 °F (36.8 °C)   Weight: 98 kg (216 lb)   Height: 172.7 cm (67.99\")     Body mass index is 32.85 kg/m².  Physical Exam   Constitutional: She appears well-developed and well-nourished.   HENT:   Head: Normocephalic and atraumatic.   Cardiovascular: Normal rate, regular rhythm, S1 normal, S2 normal and normal heart sounds.   Pulmonary/Chest: Effort normal and breath sounds normal.   Neurological: She is alert.   Skin: Skin is warm.   Psychiatric: She has a normal mood and affect.   Vitals reviewed.      Procedures    Assessment/Plan   Khalida was seen today for hypertension.    Diagnoses and all orders for this visit:    Essential hypertension  -     Comprehensive Metabolic Panel; Future    Fatty liver    Type 2 diabetes mellitus without complication, without long-term current use of insulin (CMS/[facility])  -     Hemoglobin A1c; Future  -     Lipid Panel With / Chol / HDL Ratio; Future  -     Microalbumin / Creatinine Urine Ratio - Urine, Clean Catch; Future    Other orders  -     Pneumococcal Polysaccharide Vaccine 23-Valent Greater Than or Equal To 3yo Subcutaneous / IM             BP is good. Pneumovax 23 today. She has a cscope set up for August. A1c is at goal.    Return in about 6 months (around 1/16/2021), or 30 minutes.  "

## 2020-07-29 ENCOUNTER — TELEPHONE (OUTPATIENT)
Dept: INTERNAL MEDICINE | Facility: CLINIC | Age: 65
End: 2020-07-29

## 2020-07-29 DIAGNOSIS — R51.9 NONINTRACTABLE HEADACHE, UNSPECIFIED CHRONICITY PATTERN, UNSPECIFIED HEADACHE TYPE: ICD-10-CM

## 2020-07-29 NOTE — TELEPHONE ENCOUNTER
Patient is out of Hydrochlorothiazide 25 mg and said that [facility] is going to fax us a request.  After thinking about it, she is not sure if Dr. David has taken her off of the medication or not.  She would like for someone to return her call at [phone].  Thank you

## 2020-07-30 DIAGNOSIS — I10 ESSENTIAL HYPERTENSION: Primary | ICD-10-CM

## 2020-07-30 DIAGNOSIS — I10 ESSENTIAL HYPERTENSION: ICD-10-CM

## 2020-07-30 RX ORDER — HYDROCHLOROTHIAZIDE 25 MG/1
25 TABLET ORAL DAILY
Qty: 90 TABLET | Refills: 3 | Status: SHIPPED | OUTPATIENT
Start: 2020-07-30 | End: 2020-07-30 | Stop reason: SDUPTHER

## 2020-07-30 RX ORDER — HYDROCHLOROTHIAZIDE 25 MG/1
25 TABLET ORAL DAILY
Qty: 30 TABLET | Refills: 3 | Status: SHIPPED | OUTPATIENT
Start: 2020-07-30 | End: 2021-05-27

## 2020-07-30 RX ORDER — BUTALBITAL, ACETAMINOPHEN AND CAFFEINE 50; 325; 40 MG/1; MG/1; MG/1
TABLET ORAL
Qty: 20 TABLET | Refills: 1 | Status: SHIPPED | OUTPATIENT
Start: 2020-07-30 | End: 2021-01-06

## 2020-07-30 NOTE — TELEPHONE ENCOUNTER
She would like to know if you can send in a temp rx for this so she can make it until she receives it from her mail order. (raoul on file)

## 2020-08-10 ENCOUNTER — TELEPHONE (OUTPATIENT)
Dept: INTERNAL MEDICINE | Facility: CLINIC | Age: 65
End: 2020-08-10

## 2020-08-10 ENCOUNTER — TRANSCRIBE ORDERS (OUTPATIENT)
Dept: SLEEP MEDICINE | Facility: HOSPITAL | Age: 65
End: 2020-08-10

## 2020-08-10 DIAGNOSIS — Z01.818 OTHER SPECIFIED PRE-OPERATIVE EXAMINATION: Primary | ICD-10-CM

## 2020-08-10 NOTE — TELEPHONE ENCOUNTER
Patient wants to know if /Dayna, can have the Covid-19 test (and you order it, he is a patient also), along with her (due to her having a Colonoscopy on Friday 8/14/2020)? [phone]. Please advise.

## 2020-08-12 ENCOUNTER — LAB (OUTPATIENT)
Dept: LAB | Facility: HOSPITAL | Age: 65
End: 2020-08-12

## 2020-08-12 DIAGNOSIS — Z01.818 OTHER SPECIFIED PRE-OPERATIVE EXAMINATION: ICD-10-CM

## 2020-08-12 PROCEDURE — C9803 HOPD COVID-19 SPEC COLLECT: HCPCS

## 2020-08-12 PROCEDURE — U0004 COV-19 TEST NON-CDC HGH THRU: HCPCS

## 2020-08-13 LAB
REF LAB TEST METHOD: NORMAL
SARS-COV-2 RNA RESP QL NAA+PROBE: NOT DETECTED

## 2020-08-14 ENCOUNTER — ANESTHESIA (OUTPATIENT)
Dept: GASTROENTEROLOGY | Facility: HOSPITAL | Age: 65
End: 2020-08-14

## 2020-08-14 ENCOUNTER — HOSPITAL ENCOUNTER (OUTPATIENT)
Facility: HOSPITAL | Age: 65
Setting detail: HOSPITAL OUTPATIENT SURGERY
Discharge: HOME | End: 2020-08-14
Attending: INTERNAL MEDICINE | Admitting: INTERNAL MEDICINE

## 2020-08-14 ENCOUNTER — ANESTHESIA EVENT (OUTPATIENT)
Dept: GASTROENTEROLOGY | Facility: HOSPITAL | Age: 65
End: 2020-08-14

## 2020-08-14 VITALS
SYSTOLIC BLOOD PRESSURE: 111 MMHG | RESPIRATION RATE: 16 BRPM | DIASTOLIC BLOOD PRESSURE: 68 MMHG | TEMPERATURE: 97.8 F | OXYGEN SATURATION: 95 % | HEART RATE: 54 BPM | WEIGHT: 210.13 LBS | HEIGHT: 68 IN | BODY MASS INDEX: 31.85 KG/M2

## 2020-08-14 DIAGNOSIS — Z12.11 COLON CANCER SCREENING: ICD-10-CM

## 2020-08-14 LAB — GLUCOSE BLDC GLUCOMTR-MCNC: 141 MG/DL (ref 70–130)

## 2020-08-14 PROCEDURE — 82962 GLUCOSE BLOOD TEST: CPT

## 2020-08-14 PROCEDURE — 25010000002 PROPOFOL 10 MG/ML EMULSION: Performed by: NURSE ANESTHETIST, CERTIFIED REGISTERED

## 2020-08-14 PROCEDURE — G0121 COLON CA SCRN NOT HI RSK IND: HCPCS | Performed by: INTERNAL MEDICINE

## 2020-08-14 PROCEDURE — S0260 H&P FOR SURGERY: HCPCS | Performed by: INTERNAL MEDICINE

## 2020-08-14 RX ORDER — PROPOFOL 10 MG/ML
VIAL (ML) INTRAVENOUS AS NEEDED
Status: DISCONTINUED | OUTPATIENT
Start: 2020-08-14 | End: 2020-08-14 | Stop reason: SURG

## 2020-08-14 RX ORDER — PROPOFOL 10 MG/ML
VIAL (ML) INTRAVENOUS CONTINUOUS PRN
Status: DISCONTINUED | OUTPATIENT
Start: 2020-08-14 | End: 2020-08-14 | Stop reason: SURG

## 2020-08-14 RX ORDER — ONDANSETRON 2 MG/ML
4 INJECTION INTRAMUSCULAR; INTRAVENOUS ONCE AS NEEDED
Status: DISCONTINUED | OUTPATIENT
Start: 2020-08-14 | End: 2020-08-14 | Stop reason: HOSPADM

## 2020-08-14 RX ORDER — LIDOCAINE HYDROCHLORIDE 20 MG/ML
INJECTION, SOLUTION INFILTRATION; PERINEURAL AS NEEDED
Status: DISCONTINUED | OUTPATIENT
Start: 2020-08-14 | End: 2020-08-14 | Stop reason: SURG

## 2020-08-14 RX ORDER — GLYCOPYRROLATE 0.2 MG/ML
INJECTION INTRAMUSCULAR; INTRAVENOUS AS NEEDED
Status: DISCONTINUED | OUTPATIENT
Start: 2020-08-14 | End: 2020-08-14 | Stop reason: SURG

## 2020-08-14 RX ORDER — SODIUM CHLORIDE, SODIUM LACTATE, POTASSIUM CHLORIDE, CALCIUM CHLORIDE 600; 310; 30; 20 MG/100ML; MG/100ML; MG/100ML; MG/100ML
30 INJECTION, SOLUTION INTRAVENOUS CONTINUOUS
Status: DISCONTINUED | OUTPATIENT
Start: 2020-08-14 | End: 2020-08-14 | Stop reason: HOSPADM

## 2020-08-14 RX ADMIN — PROPOFOL 50 MG: 10 INJECTION, EMULSION INTRAVENOUS at 08:11

## 2020-08-14 RX ADMIN — GLYCOPYRROLATE 0.2 MCG: 0.2 INJECTION INTRAMUSCULAR; INTRAVENOUS at 08:14

## 2020-08-14 RX ADMIN — SODIUM CHLORIDE, POTASSIUM CHLORIDE, SODIUM LACTATE AND CALCIUM CHLORIDE 30 ML/HR: 600; 310; 30; 20 INJECTION, SOLUTION INTRAVENOUS at 07:58

## 2020-08-14 RX ADMIN — PROPOFOL 50 MG: 10 INJECTION, EMULSION INTRAVENOUS at 08:12

## 2020-08-14 RX ADMIN — LIDOCAINE HYDROCHLORIDE 40 MG: 20 INJECTION, SOLUTION INFILTRATION; PERINEURAL at 08:10

## 2020-08-14 RX ADMIN — PROPOFOL 200 MCG/KG/MIN: 10 INJECTION, EMULSION INTRAVENOUS at 08:11

## 2020-08-14 NOTE — H&P
[facility]  Pre Procedure History & Physical    Chief Complaint: Colon cancer screening      HPI: 66yo W with PMH as below here for screening colonoscopy.  No family history of GI malignancies nor colon polyps.  Denies blood in stool, change in bowel habits, abdominal pain.  Otherwise feels well.        Past Medical History:   Past Medical History:   Diagnosis Date   • Abdominal pain, right lower quadrant    • Abnormal liver enzymes    • Anemia    • Asthma    • Bilateral shoulder pain    • Cancer (CMS/HCC) 07/30/2014    Basal Cell Carcinoma Left Arm,SKIN CANCER   • Cyst of left kidney    • Depression    • Diverticulosis    • Fatty liver    • GERD (gastroesophageal reflux disease)    • H/O diverticulitis of colon     Sigmoid colon   • Headache    • History of kidney stones 06/2011   • History of surgery for cerebral aneurysm     Clipping of cerebral Aneurysm   • Hx of colonoscopy    • Hx of migraines    • Hyperlipidemia    • Hypertension    • Insomnia    • Irritable bowel syndrome    • Loose stools    • Neck pain    • Obesity    • Peptic ulceration    • Pneumonia    • PONV (postoperative nausea and vomiting)    • Postnasal drip    • Stroke (CMS/HCC) 10/2011    Possible, workup negative       Family History:  Family History   Problem Relation Age of Onset   • Skin cancer Mother    • Dementia Mother    • KALYAN disease Mother    • Heart disease Father    • Heart attack Father    • Hypertension Father    • Aneurysm Son         cerebral   • Nephrolithiasis Son    • Suicide Attempts Brother    • Malig Hyperthermia Neg Hx        Social History:   reports that she has quit smoking. Her smoking use included cigarettes. She quit after 1.00 year of use. She has never used smokeless tobacco. She reports that she drinks alcohol. She reports that she does not use drugs.    Medications:   Medications Prior to Admission   Medication Sig Dispense Refill Last Dose   • Ascorbic Acid (VITAMIN C PO) Take 1,000 mg  by mouth Daily. PT INSTRUCTED TO CONTINUE PER DR. CALLUM URBINA   8/14/2020 at Unknown time   • aspirin 81 MG EC tablet Take 1 tablet by mouth Daily. 30 tablet 6 8/14/2020 at Unknown time   • atorvastatin (LIPITOR) 20 MG tablet TAKE ONE TABLET BY MOUTH DAILY 30 tablet 1 8/14/2020 at Unknown time   • budesonide-formoterol (SYMBICORT) 160-4.5 MCG/ACT inhaler Inhale 2 puffs 2 (Two) Times a Day. 1 inhaler 0 8/14/2020 at Unknown time   • cetirizine (zyrTEC) 10 MG tablet Take 10 mg by mouth Daily.   8/14/2020 at Unknown time   • cholecalciferol (VITAMIN D3) 1000 units tablet Take 1,000 Units by mouth Daily. PER PT TO CONTINUE PER DR. CALLUM URBINA   8/13/2020 at Unknown time   • Coenzyme Q10 (CO Q 10 PO) Take  by mouth.   8/14/2020 at Unknown time   • cycloSPORINE (RESTASIS) 0.05 % ophthalmic emulsion Administer 1 drop to both eyes 2 (Two) Times a Day.   8/14/2020 at Unknown time   • diphenhydrAMINE-acetaminophen (TYLENOL PM)  MG tablet per tablet Take 1 tablet by mouth At Night As Needed for Sleep.   8/13/2020 at Unknown time   • fluticasone (FLONASE) 50 MCG/ACT nasal spray PLACE 2 SPRAYS IN EACH NOSTRIL DAILY 9.9 mL 3 8/14/2020 at Unknown time   • Ginkgo Biloba 40 MG tablet Take  by mouth.   8/13/2020 at Unknown time   • glipizide (GLUCOTROL XL) 5 MG ER tablet TAKE ONE TABLET BY MOUTH DAILY 30 tablet 1 8/14/2020 at Unknown time   • glucosamine-chondroitin 500-400 MG capsule capsule Take 1 capsule by mouth 2 (Two) Times a Day With Meals. PT TO CONTINUE PER DR. CALLUM URBINA   8/14/2020 at Unknown time   • hydroCHLOROthiazide (HYDRODIURIL) 25 MG tablet Take 1 tablet by mouth Daily. 30 tablet 3 8/14/2020 at Unknown time   • JANUVIA 100 MG tablet TAKE ONE TABLET BY MOUTH DAILY 30 tablet 11 8/13/2020 at Unknown time   • lisinopril (PRINIVIL,ZESTRIL) 10 MG tablet Take 1 tablet by mouth Daily. 30 tablet 3 8/14/2020 at Unknown time   • montelukast (SINGULAIR) 10 MG tablet    8/14/2020 at Unknown time   • NYSTATIN  "134438 UNIT/GM topical powder    8/14/2020 at Unknown time   • Omega-3 Fatty Acids (FISH OIL) 1000 MG capsule capsule Take  by mouth Daily With Breakfast.   8/14/2020 at Unknown time   • omeprazole (priLOSEC) 40 MG capsule Take 40 mg by mouth Every Morning.   8/14/2020 at Unknown time   • Potassium Gluconate 550 MG tablet Take  by mouth.   8/14/2020 at Unknown time   • pregabalin (Lyrica) 100 MG capsule Take 1 capsule by mouth 3 (Three) Times a Day. 90 capsule 5 8/14/2020 at Unknown time   • Probiotic Product (PROBIOTIC-10 PO) Take  by mouth.   8/14/2020 at Unknown time   • raNITIdine (ZANTAC) 150 MG tablet Take 150 mg by mouth 2 (Two) Times a Day.   8/14/2020 at Unknown time   • albuterol sulfate  (90 Base) MCG/ACT inhaler Inhale 2 puffs Every 4 (Four) Hours As Needed for Wheezing or Shortness of Air (Cough). 1 inhaler 0 More than a month at Unknown time   • butalbital-acetaminophen-caffeine (FIORICET, ESGIC) -40 MG per tablet TAKE ONE TABLET BY MOUTH TWICE A DAY 20 tablet 1 More than a month at Unknown time   • glucose blood test strip Pt tests daily 100 each 12 Taking   • Lancets (FREESTYLE) lancets Pt tests daily 100 each 6 Taking   • zolpidem (AMBIEN) 10 MG tablet Take 1 tablet by mouth At Night As Needed for Sleep. 2 tablet 0 Unknown at Unknown time       Allergies:  Contrast dye; Iodinated diagnostic agents; Codeine; Methylprednisolone; and Ciprofloxacin hcl    ROS:    Pertinent items are noted in HPI     Objective     Resp. rate 16, height 172.7 cm (68\"), weight 95.3 kg (210 lb 2 oz).    Physical Exam   Constitutional: Pt is oriented to person, place, and time and well-developed, well-nourished, and in no distress.   HENT:   Mouth/Throat: Oropharynx is clear and moist.   Neck: Normal range of motion. Neck supple.   Cardiovascular: Normal rate, regular rhythm and normal heart sounds.    Pulmonary/Chest: Effort normal and breath sounds normal. No respiratory distress. No  wheezes.   Abdominal: " Soft. Bowel sounds are normal.   Skin: Skin is warm and dry.   Psychiatric: Mood, memory, affect and judgment normal.     Assessment/Plan     Diagnosis: Colon cancer screening      Anticipated Surgical Procedure:    Colonoscopy    The risks, benefits, and alternatives of this procedure have been discussed with the patient or the responsible party- the patient understands and agrees to proceed.

## 2020-08-14 NOTE — ANESTHESIA PREPROCEDURE EVALUATION
Anesthesia Evaluation     Patient summary reviewed and Nursing notes reviewed   history of anesthetic complications: difficult airway               Airway   Mallampati: I  TM distance: >3 FB  Neck ROM: full  No difficulty expected  Dental - normal exam     Pulmonary - normal exam   (+) pneumonia , a smoker Former, asthma,  Cardiovascular - normal exam    (+) hypertension, hyperlipidemia,       Neuro/Psych  (+) CVA, headaches, numbness, psychiatric history Depression,     GI/Hepatic/Renal/Endo    (+) obesity,  GERD, PUD,  liver disease, renal disease, diabetes mellitus,   (-) morbid obesity    Musculoskeletal     (+) neck pain,   Abdominal  - normal exam    Bowel sounds: normal.   Substance History - negative use     OB/GYN negative ob/gyn ROS         Other      history of cancer                    Anesthesia Plan    ASA 3     MAC       Anesthetic plan, all risks, benefits, and alternatives have been provided, discussed and informed consent has been obtained with: patient.

## 2020-08-14 NOTE — ANESTHESIA POSTPROCEDURE EVALUATION
"Patient: Khalida Gilliland    Procedure Summary     Date:  08/14/20 Room / Location:  [facility] ENDOSCOPY 8 / [facility] ENDOSCOPY    Anesthesia Start:  0809 Anesthesia Stop:  0846    Procedure:  COLONOSCOPY to terminal ileum (N/A ) Diagnosis:       Colon cancer screening      (Colon cancer screening [Z12.11])    Surgeon:  Luiza Norris MD Provider:  Stefan Momin MD    Anesthesia Type:  MAC ASA Status:  3          Anesthesia Type: MAC    Vitals  Vitals Value Taken Time   /68 8/14/2020  9:13 AM   Temp     Pulse 54 8/14/2020  9:13 AM   Resp 16 8/14/2020  9:13 AM   SpO2 95 % 8/14/2020  9:13 AM           Post Anesthesia Care and Evaluation    Patient location during evaluation: PACU  Patient participation: complete - patient participated  Level of consciousness: awake  Pain score: 0  Pain management: adequate  Airway patency: patent  Anesthetic complications: No anesthetic complications  PONV Status: none  Cardiovascular status: acceptable  Respiratory status: acceptable  Hydration status: acceptable    Comments: /68 (BP Location: Left arm, Patient Position: Sitting)   Pulse 54   Temp 36.6 °C (97.8 °F) (Oral)   Resp 16   Ht 172.7 cm (68\")   Wt 95.3 kg (210 lb 2 oz)   SpO2 95%   BMI 31.95 kg/m²       "

## 2020-08-14 NOTE — DISCHARGE INSTRUCTIONS
For the next 24 hours patient needs to be with a responsible adult.    For 24 hours DO NOT drive, operate machinery, appliances, drink alcohol, make important decisions or sign legal documents.    Start with a light or bland diet and advance to regular diet as tolerated.    Follow recommendations on procedure report provided by your doctor.    Call Dr Norris for problems [phone]    Problems may include but not limited to: large amounts of bleeding, trouble breathing, repeated vomiting, severe unrelieved pain, fever or chills.

## 2020-08-24 ENCOUNTER — TELEPHONE (OUTPATIENT)
Dept: INTERNAL MEDICINE | Facility: CLINIC | Age: 65
End: 2020-08-24

## 2020-08-24 DIAGNOSIS — E11.9 TYPE 2 DIABETES MELLITUS WITHOUT COMPLICATION, WITHOUT LONG-TERM CURRENT USE OF INSULIN (HCC): ICD-10-CM

## 2020-08-24 DIAGNOSIS — G59 MONONEUROPATHY DUE TO UNDERLYING DISEASE: ICD-10-CM

## 2020-08-24 RX ORDER — GLIPIZIDE 5 MG/1
5 TABLET, FILM COATED, EXTENDED RELEASE ORAL DAILY
Qty: 30 TABLET | Refills: 2 | Status: SHIPPED | OUTPATIENT
Start: 2020-08-24 | End: 2020-09-25 | Stop reason: SDUPTHER

## 2020-08-24 RX ORDER — PREGABALIN 100 MG/1
100 CAPSULE ORAL 3 TIMES DAILY
Qty: 90 CAPSULE | Refills: 0 | Status: SHIPPED | OUTPATIENT
Start: 2020-08-24 | End: 2020-09-24 | Stop reason: SDUPTHER

## 2020-08-24 NOTE — TELEPHONE ENCOUNTER
Patient called and is needing a few refills. She is going out of town.    Lyrica  Glipizide   Januvia    Thanks!

## 2020-08-24 NOTE — TELEPHONE ENCOUNTER
Requesting refill Lyrica 100 MG, 1 tablet 3 x's daily to [facility] pharmacy, patient is leaving to go out of town.

## 2020-08-25 ENCOUNTER — TELEPHONE (OUTPATIENT)
Dept: INTERNAL MEDICINE | Facility: CLINIC | Age: 65
End: 2020-08-25

## 2020-08-25 NOTE — TELEPHONE ENCOUNTER
Pt calling on needing an emergency refill on Propanolol which is not currently on her med list she states she is going out of town and needs med refilled please advise

## 2020-08-27 ENCOUNTER — TELEPHONE (OUTPATIENT)
Dept: INTERNAL MEDICINE | Facility: CLINIC | Age: 65
End: 2020-08-27

## 2020-08-27 NOTE — TELEPHONE ENCOUNTER
Patient not feeling well x's 3 days (headache, diarrhea, tired, upset stomach-no vommitting, want to know if she can do Covid-19 testing? [phone], leaving for [place] next week, Please advise.

## 2020-09-17 DIAGNOSIS — I10 ESSENTIAL HYPERTENSION: ICD-10-CM

## 2020-09-17 RX ORDER — ATORVASTATIN CALCIUM 20 MG/1
TABLET, FILM COATED ORAL
Qty: 30 TABLET | Refills: 5 | Status: SHIPPED | OUTPATIENT
Start: 2020-09-17 | End: 2020-09-25 | Stop reason: SDUPTHER

## 2020-09-17 RX ORDER — LISINOPRIL 10 MG/1
TABLET ORAL
Qty: 30 TABLET | Refills: 5 | Status: SHIPPED | OUTPATIENT
Start: 2020-09-17 | End: 2020-09-25 | Stop reason: SDUPTHER

## 2020-09-22 ENCOUNTER — CLINICAL SUPPORT (OUTPATIENT)
Dept: INTERNAL MEDICINE | Facility: CLINIC | Age: 65
End: 2020-09-22

## 2020-09-22 DIAGNOSIS — Z23 NEED FOR INFLUENZA VACCINATION: Primary | ICD-10-CM

## 2020-09-22 PROCEDURE — G0008 ADMIN INFLUENZA VIRUS VAC: HCPCS | Performed by: INTERNAL MEDICINE

## 2020-09-22 PROCEDURE — 90694 VACC AIIV4 NO PRSRV 0.5ML IM: CPT | Performed by: INTERNAL MEDICINE

## 2020-09-24 DIAGNOSIS — G59 MONONEUROPATHY DUE TO UNDERLYING DISEASE: ICD-10-CM

## 2020-09-25 DIAGNOSIS — G59 MONONEUROPATHY DUE TO UNDERLYING DISEASE: ICD-10-CM

## 2020-09-25 DIAGNOSIS — E78.49 OTHER HYPERLIPIDEMIA: Primary | ICD-10-CM

## 2020-09-25 DIAGNOSIS — E11.9 TYPE 2 DIABETES MELLITUS WITHOUT COMPLICATION, WITHOUT LONG-TERM CURRENT USE OF INSULIN (HCC): ICD-10-CM

## 2020-09-25 DIAGNOSIS — I10 ESSENTIAL HYPERTENSION: ICD-10-CM

## 2020-09-25 RX ORDER — GLIPIZIDE 5 MG/1
5 TABLET, FILM COATED, EXTENDED RELEASE ORAL DAILY
Qty: 90 TABLET | Refills: 1 | Status: SHIPPED | OUTPATIENT
Start: 2020-09-25 | End: 2020-10-13 | Stop reason: SDUPTHER

## 2020-09-25 RX ORDER — PREGABALIN 100 MG/1
100 CAPSULE ORAL 3 TIMES DAILY
Qty: 90 CAPSULE | Refills: 3 | Status: SHIPPED | OUTPATIENT
Start: 2020-09-25 | End: 2020-09-25 | Stop reason: SDUPTHER

## 2020-09-25 RX ORDER — ATORVASTATIN CALCIUM 20 MG/1
20 TABLET, FILM COATED ORAL DAILY
Qty: 90 TABLET | Refills: 1 | Status: SHIPPED | OUTPATIENT
Start: 2020-09-25 | End: 2020-11-11 | Stop reason: SDUPTHER

## 2020-09-25 RX ORDER — PREGABALIN 100 MG/1
100 CAPSULE ORAL 3 TIMES DAILY
Qty: 90 CAPSULE | Refills: 3 | Status: SHIPPED | OUTPATIENT
Start: 2020-09-25 | End: 2021-03-22 | Stop reason: SDUPTHER

## 2020-09-25 RX ORDER — LISINOPRIL 10 MG/1
10 TABLET ORAL DAILY
Qty: 90 TABLET | Refills: 1 | Status: SHIPPED | OUTPATIENT
Start: 2020-09-25 | End: 2020-10-13 | Stop reason: SDUPTHER

## 2020-09-25 RX ORDER — FLUTICASONE PROPIONATE 50 MCG
2 SPRAY, SUSPENSION (ML) NASAL DAILY
Qty: 9.9 ML | Refills: 3 | Status: SHIPPED | OUTPATIENT
Start: 2020-09-25 | End: 2021-03-25 | Stop reason: SDUPTHER

## 2020-10-06 ENCOUNTER — CLINICAL SUPPORT (OUTPATIENT)
Dept: OTHER | Facility: HOSPITAL | Age: 65
End: 2020-10-06

## 2020-10-06 DIAGNOSIS — D47.2 MONOCLONAL GAMMOPATHY: ICD-10-CM

## 2020-10-06 DIAGNOSIS — I67.1 BRAIN ANEURYSM: Primary | ICD-10-CM

## 2020-10-06 DIAGNOSIS — Z82.49 FAMILY HISTORY OF BRAIN ANEURYSM: ICD-10-CM

## 2020-10-06 PROCEDURE — 99204 OFFICE O/P NEW MOD 45 MIN: CPT | Performed by: MEDICAL GENETICS

## 2020-10-13 ENCOUNTER — TELEPHONE (OUTPATIENT)
Dept: INTERNAL MEDICINE | Facility: CLINIC | Age: 65
End: 2020-10-13

## 2020-10-13 DIAGNOSIS — E11.9 TYPE 2 DIABETES MELLITUS WITHOUT COMPLICATION, WITHOUT LONG-TERM CURRENT USE OF INSULIN (HCC): ICD-10-CM

## 2020-10-13 DIAGNOSIS — I10 ESSENTIAL HYPERTENSION: ICD-10-CM

## 2020-10-13 RX ORDER — MONTELUKAST SODIUM 10 MG/1
10 TABLET ORAL NIGHTLY
Qty: 30 TABLET | Refills: 0 | Status: SHIPPED | OUTPATIENT
Start: 2020-10-13 | End: 2022-03-07 | Stop reason: SDUPTHER

## 2020-10-13 RX ORDER — LISINOPRIL 10 MG/1
10 TABLET ORAL DAILY
Qty: 30 TABLET | Refills: 0 | Status: SHIPPED | OUTPATIENT
Start: 2020-10-13 | End: 2020-11-11 | Stop reason: SDUPTHER

## 2020-10-13 RX ORDER — GLIPIZIDE 5 MG/1
5 TABLET, FILM COATED, EXTENDED RELEASE ORAL DAILY
Qty: 30 TABLET | Refills: 0 | Status: SHIPPED | OUTPATIENT
Start: 2020-10-13 | End: 2020-11-11 | Stop reason: SDUPTHER

## 2020-10-13 NOTE — TELEPHONE ENCOUNTER
This is on med list and last office note didn't state that she needed to D/c. Should patient continue

## 2020-10-13 NOTE — TELEPHONE ENCOUNTER
Ok I spoke with patient - she is needing the 3 medications sent to local, once you approve I'll send the 90 to you for mail order.

## 2020-10-13 NOTE — PROGRESS NOTES
GENETIC COUNSELING    Ms. Khalida Hardy was seen for genetic counseling at the [facility].  Her referral was initiated by Dr. Morris.  He is currently monitoring Hayley for for a diagnosis of monoclonal gammopathy of undetermined significance, and Ms. Guajardo has had two brain aneurysms, and a brain aneurysm has also been diagnosed in her  and son, raising the possibility of a familial basis for the vascular anomaly.    Review of Ms. Hardy's history reveals that she has undergone a colon resection for diverticulitis.  She also has type 2 diabetes and she still has her ovaries.  She has been followed by Dr. Morris for monoclonal gammopathy of undetermined significance for the last 10 years.  She also has neuropathy involving her feet. Eighteen years ago Ms. Guajardo was diagnosed with a right frontal brain aneurysm and last October a second aneurysm in the same area was diagnosed she has had stenting.    Review of the family history and pedigree indicates that Ms. Gilliland is 65 years of age.  Her  is 66 years of age and she indicated that he too has had a brain aneurysm.  Ms. Boyd has 2 sons 40 and 38 years of age and a daughter 36 years of age.  40-year-old son has had stenting for a brain aneurysm 2 years ago.  He has 3 children.  The rest of the family history was noncontributory and there is no Ashkenazi Roman Catholic descent.    The presence of aneurysm and a parent and child raises the possibility of a familial basis for the vascular anomaly.  No specific genetic testing for brain aneurysm currently is available.  However, because of the suspected familial nature of the condition in this family, I would like to pursue whole genome sequencing.  Ideally Atrio would be performed in which the proband would be Ms. Gilliland's son.  However if not possible, genetic testing along could be performed on Ms. Gilliland.  I will reviewed the case with 1 of  our genetic counselors in order to proceed with consent for the study.  And after a tissue sample is obtained, results are usually available in 8 to 12 weeks.  We will be available in the interim.  In anticipation of reviewing results with Ms. Gilliland.  She has any questions I can be reached at [phone].    The encounter was 40 minutes and counseling was provided 40 minutes.

## 2020-11-11 DIAGNOSIS — E11.9 TYPE 2 DIABETES MELLITUS WITHOUT COMPLICATION, WITHOUT LONG-TERM CURRENT USE OF INSULIN (HCC): ICD-10-CM

## 2020-11-11 DIAGNOSIS — J45.909 UNCOMPLICATED ASTHMA, UNSPECIFIED ASTHMA SEVERITY, UNSPECIFIED WHETHER PERSISTENT: Primary | ICD-10-CM

## 2020-11-11 DIAGNOSIS — E78.49 OTHER HYPERLIPIDEMIA: ICD-10-CM

## 2020-11-11 DIAGNOSIS — I10 ESSENTIAL HYPERTENSION: ICD-10-CM

## 2020-11-11 RX ORDER — CETIRIZINE HYDROCHLORIDE 10 MG/1
10 TABLET ORAL DAILY
Qty: 90 TABLET | Refills: 1 | Status: SHIPPED | OUTPATIENT
Start: 2020-11-11 | End: 2021-04-23

## 2020-11-11 RX ORDER — LISINOPRIL 10 MG/1
10 TABLET ORAL DAILY
Qty: 90 TABLET | Refills: 1 | Status: SHIPPED | OUTPATIENT
Start: 2020-11-11 | End: 2021-06-03

## 2020-11-11 RX ORDER — ATORVASTATIN CALCIUM 20 MG/1
20 TABLET, FILM COATED ORAL DAILY
Qty: 90 TABLET | Refills: 1 | Status: SHIPPED | OUTPATIENT
Start: 2020-11-11 | End: 2021-06-03

## 2020-11-11 RX ORDER — GLIPIZIDE 5 MG/1
5 TABLET, FILM COATED, EXTENDED RELEASE ORAL DAILY
Qty: 90 TABLET | Refills: 1 | Status: SHIPPED | OUTPATIENT
Start: 2020-11-11 | End: 2021-04-23

## 2020-11-11 NOTE — TELEPHONE ENCOUNTER
FYI: Patient is wanting Dr. David to know that she is taking a stress hormone, Cortisol, and she can tell the difference.

## 2020-11-17 ENCOUNTER — OFFICE VISIT (OUTPATIENT)
Dept: INTERNAL MEDICINE | Facility: CLINIC | Age: 65
End: 2020-11-17

## 2020-11-17 ENCOUNTER — HOSPITAL ENCOUNTER (OUTPATIENT)
Dept: GENERAL RADIOLOGY | Facility: HOSPITAL | Age: 65
Discharge: HOME | End: 2020-11-17
Admitting: INTERNAL MEDICINE

## 2020-11-17 VITALS
DIASTOLIC BLOOD PRESSURE: 80 MMHG | SYSTOLIC BLOOD PRESSURE: 122 MMHG | HEIGHT: 68 IN | TEMPERATURE: 97.6 F | WEIGHT: 218 LBS | BODY MASS INDEX: 33.04 KG/M2

## 2020-11-17 DIAGNOSIS — M25.511 RIGHT SHOULDER PAIN, UNSPECIFIED CHRONICITY: Primary | ICD-10-CM

## 2020-11-17 PROCEDURE — 73030 X-RAY EXAM OF SHOULDER: CPT

## 2020-11-17 PROCEDURE — 99213 OFFICE O/P EST LOW 20 MIN: CPT | Performed by: INTERNAL MEDICINE

## 2020-11-17 RX ORDER — MELOXICAM 7.5 MG/1
7.5 TABLET ORAL DAILY
Qty: 30 TABLET | Refills: 0 | Status: SHIPPED | OUTPATIENT
Start: 2020-11-17 | End: 2021-03-25

## 2020-11-17 RX ORDER — PROPRANOLOL HYDROCHLORIDE 40 MG/1
TABLET ORAL
COMMUNITY
Start: 2020-11-10 | End: 2021-01-21

## 2020-11-17 RX ORDER — BUDESONIDE AND FORMOTEROL FUMARATE DIHYDRATE 160; 4.5 UG/1; UG/1
2 AEROSOL RESPIRATORY (INHALATION)
COMMUNITY

## 2020-11-17 NOTE — PROGRESS NOTES
Subjective        Chief Complaint   Patient presents with   • Arm Pain     Rt arm pain-no injury            Khalida Gilliland is a 65 y.o. female who presents for    Patient Active Problem List   Diagnosis   • Monoclonal gammopathy   • Hx of cerebral aneurysm repair   • Allergy history, radiographic dye   • Cerebral aneurysm, nonruptured   • Neck pain   • Contrast media allergy   • Other hyperlipidemia   • Type 2 diabetes mellitus without complication, without long-term current use of insulin (CMS/HCC)   • Essential hypertension   • Chronic abdominal pain   • Right sided abdominal pain   • Fatty liver   • Mononeuropathy due to underlying disease   • Asthma   • GERD (gastroesophageal reflux disease)   • Headache   • Drug rash       History of Present Illness     Her right arm has been hurting for one month. It is the top of her shoulder down her right arm. Her neck does not hurt. She does not have any numbness or tingling. It is worse with reaching in the back seat. She takes an occasional ibuprofen which may help.  Allergies   Allergen Reactions   • Contrast Dye Shortness Of Breath   • Iodinated Diagnostic Agents Shortness Of Breath   • Codeine Nausea Only   • Methylprednisolone Anxiety   • Ciprofloxacin Hcl Rash       Current Outpatient Medications on File Prior to Visit   Medication Sig Dispense Refill   • albuterol sulfate  (90 Base) MCG/ACT inhaler Inhale 2 puffs Every 4 (Four) Hours As Needed for Wheezing or Shortness of Air (Cough). 1 inhaler 0   • Ascorbic Acid (VITAMIN C PO) Take 1,000 mg by mouth Daily. PT INSTRUCTED TO CONTINUE PER DR. CALLUM URBINA     • aspirin 81 MG EC tablet Take 1 tablet by mouth Daily. 30 tablet 6   • atorvastatin (LIPITOR) 20 MG tablet Take 1 tablet by mouth Daily. 90 tablet 1   • budesonide-formoterol (SYMBICORT) 160-4.5 MCG/ACT inhaler Inhale 2 puffs 2 (Two) Times a Day.     • butalbital-acetaminophen-caffeine (FIORICET, ESGIC) -40 MG per tablet TAKE ONE TABLET BY  MOUTH TWICE A DAY 20 tablet 1   • cetirizine (zyrTEC) 10 MG tablet Take 1 tablet by mouth Daily. 90 tablet 1   • cholecalciferol (VITAMIN D3) 1000 units tablet Take 1,000 Units by mouth Daily. PER PT TO CONTINUE PER DR. CALLUM URBINA     • Coenzyme Q10 (CO Q 10 PO) Take  by mouth.     • cycloSPORINE (RESTASIS) 0.05 % ophthalmic emulsion Administer 1 drop to both eyes 2 (Two) Times a Day.     • diphenhydrAMINE-acetaminophen (TYLENOL PM)  MG tablet per tablet Take 1 tablet by mouth At Night As Needed for Sleep.     • fluticasone (FLONASE) 50 MCG/ACT nasal spray 2 sprays by Each Nare route Daily. 9.9 mL 3   • Ginkgo Biloba 40 MG tablet Take  by mouth.     • glipizide (GLUCOTROL XL) 5 MG ER tablet Take 1 tablet by mouth Daily. 90 tablet 1   • glucosamine-chondroitin 500-400 MG capsule capsule Take 1 capsule by mouth 2 (Two) Times a Day With Meals. PT TO CONTINUE PER DR. CALLUM URBINA     • glucose blood test strip Pt tests daily 100 each 12   • hydroCHLOROthiazide (HYDRODIURIL) 25 MG tablet Take 1 tablet by mouth Daily. 30 tablet 3   • Lancets (FREESTYLE) lancets Pt tests daily 100 each 6   • lisinopril (PRINIVIL,ZESTRIL) 10 MG tablet Take 1 tablet by mouth Daily. 90 tablet 1   • Misc Natural Products (CORTISOL PO) Take  by mouth.     • montelukast (SINGULAIR) 10 MG tablet Take 1 tablet by mouth Every Night. 30 tablet 0   • NYSTATIN 465850 UNIT/GM topical powder      • omeprazole (priLOSEC) 40 MG capsule Take 40 mg by mouth Every Morning.     • Potassium Gluconate 550 MG tablet Take  by mouth.     • pregabalin (Lyrica) 100 MG capsule Take 1 capsule by mouth 3 (Three) Times a Day. 90 capsule 3   • Probiotic Product (PROBIOTIC-10 PO) Take  by mouth.     • SITagliptin (Januvia) 100 MG tablet Take 1 tablet by mouth Daily. 90 tablet 1   • Omega-3 Fatty Acids (FISH OIL) 1000 MG capsule capsule Take  by mouth Daily With Breakfast.     • propranolol (INDERAL) 40 MG tablet      • zolpidem (AMBIEN) 10 MG tablet Take 1  tablet by mouth At Night As Needed for Sleep. 2 tablet 0   • [DISCONTINUED] budesonide-formoterol (SYMBICORT) 160-4.5 MCG/ACT inhaler Inhale 2 puffs 2 (Two) Times a Day. 1 inhaler 0     No current facility-administered medications on file prior to visit.        Past Medical History:   Diagnosis Date   • Abdominal pain, right lower quadrant    • Abnormal liver enzymes    • Anemia    • Asthma    • Bilateral shoulder pain    • Cancer (CMS/HCC) 2014    Basal Cell Carcinoma Left Arm,SKIN CANCER   • Cyst of left kidney    • Depression    • Diverticulosis    • Fatty liver    • GERD (gastroesophageal reflux disease)    • H/O diverticulitis of colon     Sigmoid colon   • Headache    • History of kidney stones 2011   • History of surgery for cerebral aneurysm     Clipping of cerebral Aneurysm   • Hx of colonoscopy    • Hx of migraines    • Hyperlipidemia    • Hypertension    • Insomnia    • Irritable bowel syndrome    • Loose stools    • Neck pain    • Obesity    • Peptic ulceration    • Pneumonia    • PONV (postoperative nausea and vomiting)    • Postnasal drip    • Stroke (CMS/HCC) 10/2011    Possible, workup negative       Past Surgical History:   Procedure Laterality Date   • APPENDECTOMY      Emergency at time of 2nd    • BASAL CELL CARCINOMA EXCISION Left 2014    Excision basal cell carcinoma, left arm (1.1 cm) with frozen section control and layered wound closure ( 3.1 cm), Dr. Isael Andrade, [facility]   • BRAIN SURGERY      Ruptured aneurysm, clipped/Dr. Sims   • CEREBRAL ANEURYSM REPAIR      clipping of cerebral aneurysm   • CEREBRAL ANGIOGRAM N/A 2017    Procedure: CEREBRAL ANGIOGRAM ;  Surgeon: Orlando Bella MD;  Location: [facility] OR ;  Service:    • CEREBRAL ANGIOGRAM N/A 10/11/2017    Procedure: CEREBRAL ANGIOGRAM;  Surgeon: Orlando Bella MD;  Location: [facility] OR ;  Service:    •  SECTION  , ,    • CHOLECYSTECTOMY     • COLON  RESECTION WITH COLOSTOMY Right    • COLONOSCOPY  01/01/2009   • COLONOSCOPY N/A 8/14/2020    Procedure: COLONOSCOPY to terminal ileum;  Surgeon: Luiza Norris MD;  Location: [facility] ENDOSCOPY;  Service: Gastroenterology;  Laterality: N/A;  PREOP/ SCREENING  POSTOP/ DIVERTICULOSIS. POOR PREP   • EMBOLIZATION CEREBRAL N/A 5/8/2017    Procedure: Cerebral angiography and embolization of cerebral aneurysm with Pipeline Embolization Device;  Surgeon: Orlando Bella MD;  Location: [facility] OR 18/19;  Service:    • EMBOLIZATION CEREBRAL N/A 10/31/2018    Procedure: Cerebral angiogram with embolization of cerebral aneurysm;  Surgeon: Orlando Bella MD;  Location: [facility] OR 18/19;  Service: Neurosurgery   • ILEOSTOMY REVISION     • INCISIONAL HERNIA REPAIR  6/229    Patient suffered some nerve entrapment secondary to the attack, some of which were removed during a secondary operation.   • INGUINAL HERNIA REPAIR     • LARYNX SURGERY     • OSTOMY TAKE DOWN     • TUBAL ABDOMINAL LIGATION     • URETERAL STENT INSERTION  06/2011    Due to kidney stones       Family History   Problem Relation Age of Onset   • Skin cancer Mother    • Dementia Mother    • KALYAN disease Mother    • Heart disease Father    • Heart attack Father    • Hypertension Father    • Aneurysm Son         cerebral   • Nephrolithiasis Son    • Suicide Attempts Brother    • Malig Hyperthermia Neg Hx        Social History     Socioeconomic History   • Marital status:      Spouse name: Roe   • Number of children: 3   • Years of education: College   • Highest education level: Not on file   Occupational History   • Occupation: unemployed     Employer: UNEMPLOYED   Tobacco Use   • Smoking status: Former Smoker     Years: 1.00     Types: Cigarettes   • Smokeless tobacco: Never Used   Substance and Sexual Activity   • Alcohol use: Yes     Comment: 1-2 DRINKS OF WINE MONTHLY    • Drug use: No   • Sexual activity: Defer           The following  "portions of the patient's history were reviewed and updated as appropriate: problem list, allergies, current medications, past medical history, past family history, past social history and past surgical history.    Review of Systems   Musculoskeletal:        Right arm pain       Immunization History   Administered Date(s) Administered   • Flu Vaccine Quad PF >36MO 11/18/2018   • Fluad Quad 65+ 09/22/2020   • Hepatitis A 12/19/2016, 06/22/2017   • Hepatitis B 12/19/2016, 06/22/2017, 11/18/2017   • Pneumococcal Conjugate 13-Valent (PCV13) 10/19/2015   • Pneumococcal Polysaccharide (PPSV23) 01/01/2008, 07/16/2020   • Tdap 09/03/2015   • Zostavax 10/09/2014   • flucelvax quad pfs =>4 YRS 11/22/2019       Objective   Vitals:    11/17/20 1431   BP: 122/80   Temp: 97.6 °F (36.4 °C)   Weight: 98.9 kg (218 lb)   Height: 172.7 cm (68\")     Body mass index is 33.15 kg/m².  Physical Exam  Vitals signs reviewed.   Constitutional:       Appearance: She is well-developed.   HENT:      Head: Normocephalic and atraumatic.   Cardiovascular:      Rate and Rhythm: Normal rate and regular rhythm.      Heart sounds: Normal heart sounds, S1 normal and S2 normal.   Pulmonary:      Effort: Pulmonary effort is normal.      Breath sounds: Normal breath sounds.   Musculoskeletal:      Comments: Right shoulder FROM    5/5 strength in arms   Skin:     General: Skin is warm.   Neurological:      Mental Status: She is alert.      Deep Tendon Reflexes:      Reflex Scores:       Bicep reflexes are 1+ on the right side and 1+ on the left side.       Brachioradialis reflexes are 1+ on the right side and 1+ on the left side.  Psychiatric:         Behavior: Behavior normal.         Procedures    Assessment/Plan   Diagnoses and all orders for this visit:    1. Right shoulder pain, unspecified chronicity (Primary)  -     meloxicam (Mobic) 7.5 MG tablet; Take 1 tablet by mouth Daily.  Dispense: 30 tablet; Refill: 0  -     XR Shoulder 2+ View Right  -     " Ambulatory Referral to Orthopedic Surgery                 No follow-ups on file.

## 2020-11-19 ENCOUNTER — TELEPHONE (OUTPATIENT)
Dept: INTERNAL MEDICINE | Facility: CLINIC | Age: 65
End: 2020-11-19

## 2020-12-28 RX ORDER — OMEPRAZOLE 20 MG/1
CAPSULE, DELAYED RELEASE ORAL
Qty: 180 CAPSULE | Refills: 3 | OUTPATIENT
Start: 2020-12-28

## 2021-01-04 DIAGNOSIS — E11.9 TYPE 2 DIABETES MELLITUS WITHOUT COMPLICATION, WITHOUT LONG-TERM CURRENT USE OF INSULIN (HCC): ICD-10-CM

## 2021-01-04 RX ORDER — BLOOD-GLUCOSE METER
KIT MISCELLANEOUS
Qty: 100 EACH | Refills: 12 | Status: SHIPPED | OUTPATIENT
Start: 2021-01-04 | End: 2022-04-11 | Stop reason: SDUPTHER

## 2021-01-04 RX ORDER — LANCETS 28 GAUGE
EACH MISCELLANEOUS
Qty: 100 EACH | Refills: 6 | Status: SHIPPED | OUTPATIENT
Start: 2021-01-04 | End: 2022-04-11 | Stop reason: SDUPTHER

## 2021-01-06 DIAGNOSIS — R51.9 NONINTRACTABLE HEADACHE, UNSPECIFIED CHRONICITY PATTERN, UNSPECIFIED HEADACHE TYPE: ICD-10-CM

## 2021-01-06 RX ORDER — BUTALBITAL, ACETAMINOPHEN AND CAFFEINE 50; 325; 40 MG/1; MG/1; MG/1
TABLET ORAL
Qty: 20 TABLET | Refills: 0 | Status: SHIPPED | OUTPATIENT
Start: 2021-01-06 | End: 2021-03-08

## 2021-01-21 ENCOUNTER — TELEPHONE (OUTPATIENT)
Dept: INTERNAL MEDICINE | Facility: CLINIC | Age: 66
End: 2021-01-21

## 2021-01-21 RX ORDER — PROPRANOLOL HYDROCHLORIDE 40 MG/1
TABLET ORAL
Qty: 180 TABLET | Refills: 3 | Status: SHIPPED | OUTPATIENT
Start: 2021-01-21 | End: 2022-04-11 | Stop reason: SDUPTHER

## 2021-02-22 ENCOUNTER — TELEPHONE (OUTPATIENT)
Dept: INTERNAL MEDICINE | Facility: CLINIC | Age: 66
End: 2021-02-22

## 2021-02-22 NOTE — TELEPHONE ENCOUNTER
Patient wanted you to know that her daughter Luiza is going into rehab for alcohol.  She would appreciate it if you would please keep her in your prayers.  She rescheduled her appointment from this week until March so that she could help her daughter.  Thank you

## 2021-03-08 DIAGNOSIS — R51.9 NONINTRACTABLE HEADACHE, UNSPECIFIED CHRONICITY PATTERN, UNSPECIFIED HEADACHE TYPE: ICD-10-CM

## 2021-03-08 RX ORDER — BUTALBITAL, ACETAMINOPHEN AND CAFFEINE 50; 325; 40 MG/1; MG/1; MG/1
TABLET ORAL
Qty: 20 TABLET | Refills: 1 | Status: SHIPPED | OUTPATIENT
Start: 2021-03-08 | End: 2021-05-14 | Stop reason: SDUPTHER

## 2021-03-16 ENCOUNTER — BULK ORDERING (OUTPATIENT)
Dept: CASE MANAGEMENT | Facility: OTHER | Age: 66
End: 2021-03-16

## 2021-03-16 DIAGNOSIS — Z23 IMMUNIZATION DUE: ICD-10-CM
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
## 2021-03-18 ENCOUNTER — OFFICE VISIT (OUTPATIENT)
Dept: ORTHOPEDIC SURGERY | Facility: CLINIC | Age: 66
End: 2021-03-18

## 2021-03-18 VITALS — HEIGHT: 68 IN | BODY MASS INDEX: 32.13 KG/M2 | TEMPERATURE: 98.6 F | WEIGHT: 212 LBS

## 2021-03-18 DIAGNOSIS — M54.2 NECK PAIN: Primary | ICD-10-CM

## 2021-03-18 PROCEDURE — 99204 OFFICE O/P NEW MOD 45 MIN: CPT | Performed by: ORTHOPAEDIC SURGERY

## 2021-03-18 RX ORDER — METHYLPREDNISOLONE 4 MG/1
TABLET ORAL
Qty: 21 TABLET | Refills: 0 | Status: SHIPPED | OUTPATIENT
Start: 2021-03-18 | End: 2021-03-25

## 2021-03-20 DIAGNOSIS — G59 MONONEUROPATHY DUE TO UNDERLYING DISEASE: ICD-10-CM

## 2021-03-21 ENCOUNTER — IMMUNIZATION (OUTPATIENT)
Dept: VACCINE CLINIC | Facility: HOSPITAL | Age: 66
End: 2021-03-21

## 2021-03-21 DIAGNOSIS — Z23 IMMUNIZATION DUE: ICD-10-CM

## 2021-03-21 PROCEDURE — 91300 HC SARSCOV02 VAC 30MCG/0.3ML IM: CPT | Performed by: INTERNAL MEDICINE

## 2021-03-21 PROCEDURE — 0001A: CPT | Performed by: INTERNAL MEDICINE

## 2021-03-22 DIAGNOSIS — I10 ESSENTIAL HYPERTENSION: ICD-10-CM

## 2021-03-22 DIAGNOSIS — E11.9 TYPE 2 DIABETES MELLITUS WITHOUT COMPLICATION, WITHOUT LONG-TERM CURRENT USE OF INSULIN (HCC): Primary | ICD-10-CM

## 2021-03-22 DIAGNOSIS — G59 MONONEUROPATHY DUE TO UNDERLYING DISEASE: ICD-10-CM

## 2021-03-22 DIAGNOSIS — E11.9 TYPE 2 DIABETES MELLITUS WITHOUT COMPLICATION, WITHOUT LONG-TERM CURRENT USE OF INSULIN (HCC): ICD-10-CM

## 2021-03-22 DIAGNOSIS — M25.511 RIGHT SHOULDER PAIN, UNSPECIFIED CHRONICITY: ICD-10-CM

## 2021-03-22 DIAGNOSIS — E78.49 OTHER HYPERLIPIDEMIA: ICD-10-CM

## 2021-03-22 RX ORDER — SODIUM PHOSPHATE,MONO-DIBASIC 19G-7G/118
1 ENEMA (ML) RECTAL 2 TIMES DAILY WITH MEALS
Qty: 90 CAPSULE | Refills: 0 | OUTPATIENT
Start: 2021-03-22

## 2021-03-22 RX ORDER — PREGABALIN 100 MG/1
CAPSULE ORAL
Qty: 90 CAPSULE | Refills: 3 | OUTPATIENT
Start: 2021-03-22

## 2021-03-22 RX ORDER — PREGABALIN 100 MG/1
100 CAPSULE ORAL 3 TIMES DAILY
Qty: 90 CAPSULE | Refills: 0 | Status: SHIPPED | OUTPATIENT
Start: 2021-03-22 | End: 2021-04-22 | Stop reason: SDUPTHER

## 2021-03-22 RX ORDER — LISINOPRIL 10 MG/1
10 TABLET ORAL DAILY
Qty: 90 TABLET | Refills: 1 | OUTPATIENT
Start: 2021-03-22

## 2021-03-22 RX ORDER — HYDROCHLOROTHIAZIDE 25 MG/1
25 TABLET ORAL DAILY
Qty: 30 TABLET | Refills: 3 | OUTPATIENT
Start: 2021-03-22

## 2021-03-22 RX ORDER — PREGABALIN 100 MG/1
100 CAPSULE ORAL 3 TIMES DAILY
Qty: 90 CAPSULE | Refills: 3 | OUTPATIENT
Start: 2021-03-22

## 2021-03-22 RX ORDER — GLIPIZIDE 5 MG/1
5 TABLET, FILM COATED, EXTENDED RELEASE ORAL DAILY
Qty: 90 TABLET | Refills: 1 | OUTPATIENT
Start: 2021-03-22

## 2021-03-22 RX ORDER — MELOXICAM 7.5 MG/1
7.5 TABLET ORAL DAILY
Qty: 30 TABLET | Refills: 0 | OUTPATIENT
Start: 2021-03-22

## 2021-03-22 RX ORDER — ATORVASTATIN CALCIUM 20 MG/1
20 TABLET, FILM COATED ORAL DAILY
Qty: 90 TABLET | Refills: 1 | OUTPATIENT
Start: 2021-03-22

## 2021-03-22 NOTE — TELEPHONE ENCOUNTER
Patient would like all of her prescriptions changed to [facility] in [facility], [place] [facility], please and thank you.

## 2021-03-23 LAB
ALBUMIN/CREAT UR: 7 MG/G CREAT (ref 0–29)
CREAT UR-MCNC: 115.7 MG/DL
MICROALBUMIN UR-MCNC: 7.7 UG/ML

## 2021-03-25 ENCOUNTER — OFFICE VISIT (OUTPATIENT)
Dept: INTERNAL MEDICINE | Facility: CLINIC | Age: 66
End: 2021-03-25

## 2021-03-25 VITALS
HEIGHT: 68 IN | HEART RATE: 66 BPM | WEIGHT: 210 LBS | TEMPERATURE: 97.1 F | SYSTOLIC BLOOD PRESSURE: 122 MMHG | DIASTOLIC BLOOD PRESSURE: 78 MMHG | BODY MASS INDEX: 31.83 KG/M2

## 2021-03-25 DIAGNOSIS — E11.9 TYPE 2 DIABETES MELLITUS WITHOUT COMPLICATION, WITHOUT LONG-TERM CURRENT USE OF INSULIN (HCC): Chronic | ICD-10-CM

## 2021-03-25 DIAGNOSIS — I10 ESSENTIAL HYPERTENSION: Chronic | ICD-10-CM

## 2021-03-25 DIAGNOSIS — E78.49 OTHER HYPERLIPIDEMIA: Primary | Chronic | ICD-10-CM

## 2021-03-25 DIAGNOSIS — E11.42 DIABETIC PERIPHERAL NEUROPATHY (HCC): ICD-10-CM

## 2021-03-25 PROCEDURE — 99214 OFFICE O/P EST MOD 30 MIN: CPT | Performed by: INTERNAL MEDICINE

## 2021-03-25 RX ORDER — RANITIDINE HCL 75 MG
150 TABLET ORAL 2 TIMES DAILY
COMMUNITY

## 2021-03-25 RX ORDER — FLUTICASONE PROPIONATE 50 MCG
2 SPRAY, SUSPENSION (ML) NASAL DAILY
Qty: 29.7 ML | Refills: 3 | Status: SHIPPED | OUTPATIENT
Start: 2021-03-25 | End: 2022-04-11 | Stop reason: SDUPTHER

## 2021-03-25 RX ORDER — OMEPRAZOLE 40 MG/1
40 CAPSULE, DELAYED RELEASE ORAL EVERY MORNING
Qty: 90 CAPSULE | Refills: 3 | Status: SHIPPED | OUTPATIENT
Start: 2021-03-25 | End: 2022-04-11 | Stop reason: SDUPTHER
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
## 2021-04-13 ENCOUNTER — TELEPHONE (OUTPATIENT)
Dept: VACCINE CLINIC | Facility: HOSPITAL | Age: 66
End: 2021-04-13

## 2021-04-22 DIAGNOSIS — G59 MONONEUROPATHY DUE TO UNDERLYING DISEASE: ICD-10-CM

## 2021-04-22 RX ORDER — PREGABALIN 100 MG/1
100 CAPSULE ORAL 3 TIMES DAILY
Qty: 90 CAPSULE | Refills: 5 | Status: SHIPPED | OUTPATIENT
Start: 2021-04-22 | End: 2021-09-16 | Stop reason: SDUPTHER

## 2021-04-22 NOTE — TELEPHONE ENCOUNTER
Patient requesting refill on: Lyrica 100MG 1 capsule 3 x's daily, [facility] [phone]

## 2021-04-23 DIAGNOSIS — E11.9 TYPE 2 DIABETES MELLITUS WITHOUT COMPLICATION, WITHOUT LONG-TERM CURRENT USE OF INSULIN (HCC): ICD-10-CM

## 2021-04-23 DIAGNOSIS — J45.909 UNCOMPLICATED ASTHMA, UNSPECIFIED ASTHMA SEVERITY, UNSPECIFIED WHETHER PERSISTENT: ICD-10-CM

## 2021-04-23 RX ORDER — SITAGLIPTIN 100 MG/1
TABLET, FILM COATED ORAL
Qty: 90 TABLET | Refills: 3 | Status: SHIPPED | OUTPATIENT
Start: 2021-04-23 | End: 2022-04-11 | Stop reason: SDUPTHER

## 2021-04-23 RX ORDER — CETIRIZINE HYDROCHLORIDE 10 MG/1
TABLET ORAL
Qty: 90 TABLET | Refills: 3 | Status: SHIPPED | OUTPATIENT
Start: 2021-04-23

## 2021-04-23 RX ORDER — GLIPIZIDE 5 MG/1
TABLET, EXTENDED RELEASE ORAL
Qty: 90 TABLET | Refills: 3 | Status: SHIPPED | OUTPATIENT
Start: 2021-04-23 | End: 2022-04-11 | Stop reason: SDUPTHER

## 2021-04-27 ENCOUNTER — OFFICE VISIT (OUTPATIENT)
Dept: ORTHOPEDIC SURGERY | Facility: CLINIC | Age: 66
End: 2021-04-27

## 2021-04-27 VITALS — TEMPERATURE: 95.9 F | WEIGHT: 210.1 LBS | BODY MASS INDEX: 31.84 KG/M2 | HEIGHT: 68 IN

## 2021-04-27 DIAGNOSIS — M54.2 NECK PAIN: Primary | ICD-10-CM

## 2021-04-27 DIAGNOSIS — M47.22 CERVICAL SPONDYLOSIS WITH RADICULOPATHY: ICD-10-CM

## 2021-04-27 PROCEDURE — 99213 OFFICE O/P EST LOW 20 MIN: CPT | Performed by: ORTHOPAEDIC SURGERY

## 2021-04-27 RX ORDER — METHYLPREDNISOLONE 4 MG/1
TABLET ORAL
Qty: 21 TABLET | Refills: 0 | Status: SHIPPED | OUTPATIENT
Start: 2021-04-27 | End: 2021-06-07

## 2021-04-27 NOTE — PROGRESS NOTES
New patient or new problem visit    CC: Neck pain right shoulder pain    HPI: Neck right trapezial and shoulder pain ongoing for over a year.  Saw Dr. Talley who felt it was probably coming from the neck rather than the shoulder.  No numbness and tingling no balance difficulties.  Has had no specific treatment.    PFSH: See attached.  I recently operated on her daughter.    ROS: No fever chills or weight loss.    PE: Intact motor or sensory and reflex exam in the upper extremities     XRAY: Plain film x-rays show mid cervical mild spondylosis and loss of lordosis.    Other: n/a    Impression: Cervical spondylosis with radiculopathy I plan Medrol Dosepak and she has MRI of the cervical spine ordered    Plan: I will check her cervical MRI

## 2021-05-14 DIAGNOSIS — R51.9 NONINTRACTABLE HEADACHE, UNSPECIFIED CHRONICITY PATTERN, UNSPECIFIED HEADACHE TYPE: ICD-10-CM

## 2021-05-14 RX ORDER — BUTALBITAL, ACETAMINOPHEN AND CAFFEINE 50; 325; 40 MG/1; MG/1; MG/1
1 TABLET ORAL 2 TIMES DAILY
Qty: 20 TABLET | Refills: 1 | Status: SHIPPED | OUTPATIENT
Start: 2021-05-14 | End: 2021-09-14 | Stop reason: SDUPTHER

## 2021-05-26 ENCOUNTER — HOSPITAL ENCOUNTER (OUTPATIENT)
Dept: MRI IMAGING | Facility: HOSPITAL | Age: 66
Discharge: HOME | End: 2021-05-26

## 2021-05-26 DIAGNOSIS — F40.240 CLAUSTROPHOBIA: Primary | ICD-10-CM

## 2021-05-26 DIAGNOSIS — M54.2 NECK PAIN: ICD-10-CM

## 2021-05-26 RX ORDER — DIAZEPAM 5 MG/1
TABLET ORAL
Qty: 1 TABLET | Refills: 0 | Status: SHIPPED | OUTPATIENT
Start: 2021-05-26 | End: 2021-06-07

## 2021-05-26 RX ORDER — LORAZEPAM 1 MG/1
2 TABLET ORAL EVERY 6 HOURS PRN
Qty: 1 TABLET | Refills: 0 | Status: CANCELLED | OUTPATIENT
Start: 2021-05-26

## 2021-05-26 NOTE — TELEPHONE ENCOUNTER
PATIENT WENT FOR A MRI AND HAD A PANIC ATTACK COULD NOT FINISH THE TEST.  CAN SHE BE CALLED IN SOMETHING SO WE CAN RESCHEDULE PROCEDURE.

## 2021-05-27 DIAGNOSIS — I10 ESSENTIAL HYPERTENSION: ICD-10-CM

## 2021-05-27 RX ORDER — HYDROCHLOROTHIAZIDE 25 MG/1
TABLET ORAL
Qty: 90 TABLET | Refills: 0 | Status: SHIPPED | OUTPATIENT
Start: 2021-05-27 | End: 2021-11-04 | Stop reason: SDUPTHER

## 2021-06-02 ENCOUNTER — HOSPITAL ENCOUNTER (OUTPATIENT)
Dept: MRI IMAGING | Facility: HOSPITAL | Age: 66
Discharge: HOME | End: 2021-06-02
Admitting: ORTHOPAEDIC SURGERY

## 2021-06-02 PROCEDURE — 72141 MRI NECK SPINE W/O DYE: CPT

## 2021-06-03 ENCOUNTER — APPOINTMENT (OUTPATIENT)
Dept: LAB | Facility: HOSPITAL | Age: 66
End: 2021-06-03

## 2021-06-03 DIAGNOSIS — I10 ESSENTIAL HYPERTENSION: ICD-10-CM

## 2021-06-03 DIAGNOSIS — E78.49 OTHER HYPERLIPIDEMIA: ICD-10-CM

## 2021-06-03 RX ORDER — LISINOPRIL 10 MG/1
TABLET ORAL
Qty: 90 TABLET | Refills: 3 | Status: SHIPPED | OUTPATIENT
Start: 2021-06-03 | End: 2022-07-05 | Stop reason: SDUPTHER

## 2021-06-03 RX ORDER — ATORVASTATIN CALCIUM 20 MG/1
TABLET, FILM COATED ORAL
Qty: 90 TABLET | Refills: 3 | Status: SHIPPED | OUTPATIENT
Start: 2021-06-03 | End: 2022-04-11 | Stop reason: SDUPTHER

## 2021-06-07 ENCOUNTER — TELEPHONE (OUTPATIENT)
Dept: ORTHOPEDIC SURGERY | Facility: CLINIC | Age: 66
End: 2021-06-07

## 2021-06-07 ENCOUNTER — OFFICE VISIT (OUTPATIENT)
Dept: FAMILY MEDICINE CLINIC | Facility: CLINIC | Age: 66
End: 2021-06-07

## 2021-06-07 VITALS
TEMPERATURE: 97.1 F | SYSTOLIC BLOOD PRESSURE: 126 MMHG | DIASTOLIC BLOOD PRESSURE: 72 MMHG | OXYGEN SATURATION: 98 % | HEIGHT: 68 IN | HEART RATE: 70 BPM | BODY MASS INDEX: 33.68 KG/M2 | WEIGHT: 222.2 LBS | RESPIRATION RATE: 16 BRPM

## 2021-06-07 DIAGNOSIS — E11.9 TYPE 2 DIABETES MELLITUS WITHOUT COMPLICATION, WITHOUT LONG-TERM CURRENT USE OF INSULIN (HCC): Primary | Chronic | ICD-10-CM

## 2021-06-07 DIAGNOSIS — Z12.11 ENCOUNTER FOR SCREENING COLONOSCOPY: ICD-10-CM

## 2021-06-07 DIAGNOSIS — M54.2 NECK PAIN: ICD-10-CM

## 2021-06-07 PROBLEM — T78.40XA ALLERGIC REACTION: Status: ACTIVE | Noted: 2021-04-02

## 2021-06-07 PROBLEM — N39.0 UTI (URINARY TRACT INFECTION): Status: ACTIVE | Noted: 2020-04-09

## 2021-06-07 PROBLEM — G43.009 MIGRAINE WITHOUT AURA AND WITHOUT STATUS MIGRAINOSUS, NOT INTRACTABLE: Status: ACTIVE | Noted: 2018-03-29

## 2021-06-07 PROBLEM — U07.1 COVID-19 VIRUS INFECTION: Status: ACTIVE | Noted: 2021-04-03

## 2021-06-07 PROCEDURE — 99214 OFFICE O/P EST MOD 30 MIN: CPT | Performed by: FAMILY MEDICINE

## 2021-06-07 NOTE — PROGRESS NOTES
"Chief Complaint  Establish Care (NP to [place])  Previously seen by Dr. David. Pt is new to me, problems are new to me.    Subjective          Khalida Gilliland presents to [facility]  History of Present Illness  She wants to lose weight. She does not exercise, she does not like it. would like to get off of any medication that she can.   She has DM with neuropathy, HTN    Follows with [facility] Dr. Dillon, Dr. Reny Talley, Dr. Brionna Valenzuela.    She got her first dose of COVID vaccine and then she got COVID.  Then because of her risk she was given the infusion and then had a reaction to that infusion and had to be admitted.   Testing at [facility], she got the infusion at [facility].    She was previously on metformin.     Objective   Vital Signs:   /72 (BP Location: Right arm, Patient Position: Sitting, Cuff Size: Adult)   Pulse 70   Temp 97.1 °F (36.2 °C) (Infrared)   Resp 16   Ht 172.7 cm (67.99\")   Wt 101 kg (222 lb 3.2 oz)   SpO2 98%   BMI 33.80 kg/m²     Physical Exam  Vitals reviewed.   Constitutional:       General: She is not in acute distress.  Eyes:      General: No scleral icterus.        Right eye: No discharge.         Left eye: No discharge.      Conjunctiva/sclera: Conjunctivae normal.   Cardiovascular:      Rate and Rhythm: Normal rate and regular rhythm.      Heart sounds: Normal heart sounds. No murmur heard.     Pulmonary:      Effort: Pulmonary effort is normal. No respiratory distress.      Breath sounds: Normal breath sounds. No wheezing.   Musculoskeletal:      Cervical back: Neck supple. No muscular tenderness.   Lymphadenopathy:      Cervical: No cervical adenopathy.   Neurological:      Mental Status: She is oriented to person, place, and time.      Motor: No abnormal muscle tone.   Psychiatric:         Behavior: Behavior normal.        Result Review :                 Assessment and Plan    Diagnoses and all orders for this visit:    1. Type " 2 diabetes mellitus without complication, without long-term current use of insulin (CMS/[facility]) (Primary)    2. Neck pain  -     Ambulatory Referral to Physical Therapy Evaluate and treat    3. Encounter for screening colonoscopy  -     Ambulatory Referral For Screening Colonoscopy        Follow Up   No follow-ups on file.  Patient was given instructions and counseling regarding her condition or for health maintenance advice. Please see specific information pulled into the AVS if appropriate.     Reviewed her chart.   Discussed weight loss. Encouraged her getting active.   We discussed diet and what she has going on with her life and planning meals and her 's diet vs her diet.   We discussed her neck and recommended PT.   She is also in need of colonoscopy.

## 2021-06-07 NOTE — TELEPHONE ENCOUNTER
PATIENT IS WAITING TO HEAR BACK FROM DR. AMBRIZ ABOUT MRI RESULTS AND STATED SHE IS IN A LOT OF PAIN.    SHE WOULD LIKE TO BE FIT IN ON HIS SCHEDULE OR JUST MAKE AN APPT WITH HIM REGARDING MRI RESULTS

## 2021-06-08 ENCOUNTER — TELEPHONE (OUTPATIENT)
Dept: GASTROENTEROLOGY | Facility: CLINIC | Age: 66
End: 2021-06-08

## 2021-06-15 DIAGNOSIS — M48.02 CERVICAL SPINAL STENOSIS: Primary | ICD-10-CM

## 2021-06-17 ENCOUNTER — TELEPHONE (OUTPATIENT)
Dept: FAMILY MEDICINE CLINIC | Facility: CLINIC | Age: 66
End: 2021-06-17

## 2021-06-17 NOTE — TELEPHONE ENCOUNTER
What is she taking aspirin for? If it is just a preventative, it is not indicated anymore unless she has a personal history of cardiovascular events. Aspirin increases bleeding risks so will be unable to have procedure if she continues to take this.

## 2021-06-17 NOTE — TELEPHONE ENCOUNTER
Left this patient a detailed VM asked her to call the office back to speak with this nurse.       HUB transfer this patient to the office.     OFFICE find this nurse when she calls back

## 2021-06-17 NOTE — TELEPHONE ENCOUNTER
Caller: Khalida Gilliland    Relationship: Self    Best call back number: [phone] PLEASE LEAVE A MESSAGE    What is the best time to reach you: ANYTIME    Who are you requesting to speak with (clinical staff, provider,  specific staff member): PROVIDER    What was the call regarding: PATIENT IS SCHEDULED TO HAVE AN CERVICAL EPIDURAL ON June 25TH. THEY HAD ADVISED PATIENT TO STOP TAKING ASPRIN, FISH OIL AND VITAMIN E 6 DAYS PRIOR TO THE PROCEDURE. PATIENT IS A LITTLE CONCERNED ABOUT STOPPING THE ASPRIN. PATIENT WOULD LIKE A CALL BACK CONCERNING STOPPING THE ASPRIN.    Do you require a callback: YES

## 2021-06-22 NOTE — TELEPHONE ENCOUNTER
Called this patient again and left detailed VM asking her to call the office to speak with this nurse.

## 2021-06-22 NOTE — TELEPHONE ENCOUNTER
PT states she is taking Aspirin due to a hx or brain aneurysms. Pt states that is something she was told to take and she has been taking it since. Please advise your thoughts if it's ok for pt to discontinue medication temporarily.

## 2021-06-22 NOTE — TELEPHONE ENCOUNTER
I called this pt after reviewing her chart. She had most recent aneurysmal repair in 10/2018. She has remote stenting and was on DAP therapy for that. Since then on the aspirin. She is monitored with imaging annually. After review I informed her I thought it was ok to be off the aspirin for short number of days for the cervical epidural end of this week. She has already stopped taking the aspirin. Looks like utility is in long term use to slow growth of aneurysms, hers are repaired or stented and she will return to aspirin use after her procedure.

## 2021-06-25 ENCOUNTER — ANESTHESIA (OUTPATIENT)
Dept: PAIN MEDICINE | Facility: HOSPITAL | Age: 66
End: 2021-06-25

## 2021-06-25 ENCOUNTER — HOSPITAL ENCOUNTER (OUTPATIENT)
Dept: GENERAL RADIOLOGY | Facility: HOSPITAL | Age: 66
Discharge: HOME | End: 2021-06-25

## 2021-06-25 ENCOUNTER — HOSPITAL ENCOUNTER (OUTPATIENT)
Dept: PAIN MEDICINE | Facility: HOSPITAL | Age: 66
Discharge: HOME | End: 2021-06-25

## 2021-06-25 ENCOUNTER — ANESTHESIA EVENT (OUTPATIENT)
Dept: PAIN MEDICINE | Facility: HOSPITAL | Age: 66
End: 2021-06-25

## 2021-06-25 VITALS
TEMPERATURE: 97.3 F | RESPIRATION RATE: 16 BRPM | SYSTOLIC BLOOD PRESSURE: 117 MMHG | HEART RATE: 59 BPM | DIASTOLIC BLOOD PRESSURE: 62 MMHG | OXYGEN SATURATION: 96 %

## 2021-06-25 DIAGNOSIS — R52 PAIN: ICD-10-CM

## 2021-06-25 DIAGNOSIS — M48.02 CERVICAL SPINAL STENOSIS: ICD-10-CM

## 2021-06-25 LAB — GLUCOSE BLDC GLUCOMTR-MCNC: 143 MG/DL (ref 70–130)

## 2021-06-25 PROCEDURE — 77003 FLUOROGUIDE FOR SPINE INJECT: CPT

## 2021-06-25 PROCEDURE — 82962 GLUCOSE BLOOD TEST: CPT

## 2021-06-25 PROCEDURE — 25010000002 MIDAZOLAM PER 1 MG: Performed by: ANESTHESIOLOGY

## 2021-06-25 PROCEDURE — 25010000002 DEXAMETHASONE SODIUM PHOSPHATE 10 MG/ML SOLUTION: Performed by: ANESTHESIOLOGY

## 2021-06-25 RX ORDER — LIDOCAINE HYDROCHLORIDE 10 MG/ML
1 INJECTION, SOLUTION INFILTRATION; PERINEURAL ONCE AS NEEDED
Status: DISCONTINUED | OUTPATIENT
Start: 2021-06-25 | End: 2021-06-26 | Stop reason: HOSPADM

## 2021-06-25 RX ORDER — SODIUM CHLORIDE 0.9 % (FLUSH) 0.9 %
1-10 SYRINGE (ML) INJECTION AS NEEDED
Status: DISCONTINUED | OUTPATIENT
Start: 2021-06-25 | End: 2021-06-26 | Stop reason: HOSPADM

## 2021-06-25 RX ORDER — SODIUM CHLORIDE 0.9 % (FLUSH) 0.9 %
3-10 SYRINGE (ML) INJECTION AS NEEDED
Status: DISCONTINUED | OUTPATIENT
Start: 2021-06-25 | End: 2021-06-26 | Stop reason: HOSPADM

## 2021-06-25 RX ORDER — DEXAMETHASONE SODIUM PHOSPHATE 10 MG/ML
10 INJECTION, SOLUTION INTRAMUSCULAR; INTRAVENOUS ONCE
Status: COMPLETED | OUTPATIENT
Start: 2021-06-25 | End: 2021-06-25

## 2021-06-25 RX ORDER — MIDAZOLAM HYDROCHLORIDE 1 MG/ML
1 INJECTION INTRAMUSCULAR; INTRAVENOUS AS NEEDED
Status: DISCONTINUED | OUTPATIENT
Start: 2021-06-25 | End: 2021-06-26 | Stop reason: HOSPADM

## 2021-06-25 RX ORDER — FENTANYL CITRATE 50 UG/ML
50 INJECTION, SOLUTION INTRAMUSCULAR; INTRAVENOUS AS NEEDED
Status: DISCONTINUED | OUTPATIENT
Start: 2021-06-25 | End: 2021-06-26 | Stop reason: HOSPADM

## 2021-06-25 RX ORDER — SODIUM CHLORIDE 0.9 % (FLUSH) 0.9 %
3 SYRINGE (ML) INJECTION EVERY 12 HOURS SCHEDULED
Status: DISCONTINUED | OUTPATIENT
Start: 2021-06-25 | End: 2021-06-26 | Stop reason: HOSPADM

## 2021-06-25 RX ORDER — LIDOCAINE HYDROCHLORIDE 10 MG/ML
0.5 INJECTION, SOLUTION INFILTRATION; PERINEURAL ONCE AS NEEDED
Status: DISCONTINUED | OUTPATIENT
Start: 2021-06-25 | End: 2021-06-26 | Stop reason: HOSPADM

## 2021-06-25 RX ADMIN — DEXAMETHASONE SODIUM PHOSPHATE 10 MG: 10 INJECTION, SOLUTION INTRAMUSCULAR; INTRAVENOUS at 13:15

## 2021-06-25 RX ADMIN — MIDAZOLAM 2 MG: 1 INJECTION INTRAMUSCULAR; INTRAVENOUS at 13:07

## 2021-06-25 NOTE — DISCHARGE INSTRUCTIONS
Cervical epidural steroid injection instructions  Plan includes:  1.  Cervical epidural steroid injections, up to 3, spaced 4 weeks apart.  If pain control is acceptable after 1 or 2 injections, it was discussed with the patient that they may return for the subsequent injections if and when their pain returns.  The risks were discussed with the patient including failure of relief, worsening pain, Headache (post dural puncture headache), bleeding (epidural hematoma) and infection (epidural abscess or skin infection).  2.  Physical therapy exercises at home as prescribed by physical therapy or from the pain clinic handout (given to the patient).  Continuation of these exercises every day, or multiple times per week, even when the patient has good pain relief, was stressed to the patient as a preventative measure to decrease the frequency and severity of future pain episodes.  3.  Continue pain medicines as already prescribed.  If patient not currently taking any, it is recommended to begin Acetaminophen 1000 mg po q 8 hours.  If other medicines containing Acetaminophen are currently prescribed, maintain daily dose at 3000 mg.    4.  If they can tolerate NSAIDS, it is recommended to take Ibuprofen 600 mg po q 6 hours for 7 days during pain exacerbations.  Alternatively, they may substitute an NSAID of their choice (e.g. Aleve).  This may be taken at the same time as Acetaminophen.  5.  Heat and ice to the affected area as tolerated for pain control.  It was discussed that heating pads can cause burns.  6.  Low impact exercise such as walking or water exercise was recommended to maintain overall health and aid in weight control.   7.  Follow up as needed for subsequent injections.  8.  Patient was counseled to abstain from tobacco products.It is not known if steroid use around the time of Covid-19 vaccination negatively effects how well the vaccine works.     Today you received a long acting steroid injection.  "  Following guidelines from the American Society of Interventional Pain Physicians, you should wait 4 weeks before receiving the Covid -19 vaccination.Guide To Relieving And Avoiding Neck Pain    Exercise is important to help prevent and treat neck pain.  Good posture, exercise and avoiding injury will help to keep your neck healthy.    When the neck is strained or over worked symptoms may include headache, upper back pain, shoulder pain or arm pain.  Numbness or tingling in the fingers, dizziness or nausea may also occur.    Posture:    Avoid slumping over a desk.  Raise your work (including computer) to eye level to avoid bending at the neck.      Change Position Often:  Changing position prevents overuse of particular muscles.    Sleep On One Pillow:  Using to many pillows or to large of a pillow causes a \"kink\" in you neck.      Move and Exercise:  Living an active lifestyle is an important part of staying healthy.  Be sure to include the exercise to follow specifically for your neck.                    Range of Motion Exercises:  Do these exercises three times a day.  If you experience increased pain stop and contact your physician.  All exercises can be performed sitting or standing.        1.    2.   3.    1.  Place both hands behind you neck.  Gently tilt your neck backward so that you are looking at the ceiling.  Hold for a count of 10.    2.  Look straight facing forward.  Slowly tip your ear toward your right shoulder.  Do not force the motion.  Hold for a count of 10.  Bring head back to starting position and repeat to left side.    3.  Look straight facing forward.  Gently turn your head to the right.  Do not force the motion.  Hold for a count of 10.  Bring head back to starting position and repeat to the left side.    Exercises To Strengthen Muscles:  1.  Look straight facing forward.  Relax your shoulders.  Raise both shoulders toward your ears.  Hold for 3 seconds.       1.       2.   3.      1.  " Look straight facing forward.  Relax your shoulders.  Raise both shoulders toward     2.  Raise your ars to your side and bend your elbows.  Squeeze shoulder blades together as you rotate your arms outward.  Hold for 5 seconds.    3.  Look straight facing forward.  Pull your head straight back.  Do not tip or move your jaw.  Hold for 5 seconds.

## 2021-06-25 NOTE — H&P
She has a chief complaint of neck and right arm pain  For roughly 12 months she is complained of cervical neck pain which radiates into her right upper extremity.  Intermittent timing.  Stabbing sharp in nature.  Worse with activity.  Conservative therapy has included rest and pain medicines.  Current Outpatient Medications on File Prior to Encounter   Medication Sig Dispense Refill   • albuterol sulfate  (90 Base) MCG/ACT inhaler Inhale 2 puffs Every 4 (Four) Hours As Needed for Wheezing or Shortness of Air (Cough). 1 inhaler 0   • Ascorbic Acid (VITAMIN C PO) Take 1,000 mg by mouth Daily. PT INSTRUCTED TO CONTINUE PER DR. CALLUM URBINA     • aspirin 81 MG EC tablet Take 1 tablet by mouth Daily. 30 tablet 6   • atorvastatin (LIPITOR) 20 MG tablet TAKE 1 TABLET DAILY 90 tablet 3   • budesonide-formoterol (SYMBICORT) 160-4.5 MCG/ACT inhaler Inhale 2 puffs 2 (Two) Times a Day.     • butalbital-acetaminophen-caffeine (FIORICET, ESGIC) -40 MG per tablet Take 1 tablet by mouth 2 (Two) Times a Day. 20 tablet 1   • cetirizine (zyrTEC) 10 MG tablet TAKE 1 TABLET DAILY 90 tablet 3   • cholecalciferol (VITAMIN D3) 1000 units tablet Take 1,000 Units by mouth Daily. PER PT TO CONTINUE PER DR. CALLUM URBINA     • Coenzyme Q10 (CO Q 10 PO) Take  by mouth.     • cycloSPORINE (RESTASIS) 0.05 % ophthalmic emulsion Administer 1 drop to both eyes 2 (Two) Times a Day.     • diphenhydrAMINE-acetaminophen (TYLENOL PM)  MG tablet per tablet Take 1 tablet by mouth At Night As Needed for Sleep.     • fluticasone (FLONASE) 50 MCG/ACT nasal spray 2 sprays by Each Nare route Daily. 29.7 mL 3   • Ginkgo Biloba 40 MG tablet Take  by mouth.     • glipiZIDE XL 5 MG ER tablet TAKE 1 TABLET DAILY 90 tablet 3   • glucosamine-chondroitin 500-400 MG capsule capsule Take 1 capsule by mouth 2 (Two) Times a Day With Meals. PT TO CONTINUE PER DR. CALLUM URBINA     • glucose blood (FREESTYLE LITE) test strip PT to use once daily 100  each 12   • hydroCHLOROthiazide (HYDRODIURIL) 25 MG tablet TAKE 1 TABLET DAILY 90 tablet 0   • Januvia 100 MG tablet TAKE 1 TABLET DAILY 90 tablet 3   • Lancets (freestyle) lancets Pt tests daily 100 each 6   • lisinopril (PRINIVIL,ZESTRIL) 10 MG tablet TAKE 1 TABLET DAILY 90 tablet 3   • montelukast (SINGULAIR) 10 MG tablet Take 1 tablet by mouth Every Night. 30 tablet 0   • NYSTATIN 172810 UNIT/GM topical powder      • Omega-3 Fatty Acids (FISH OIL) 1000 MG capsule capsule Take  by mouth Daily With Breakfast.     • omeprazole (priLOSEC) 40 MG capsule Take 1 capsule by mouth Every Morning. 90 capsule 3   • Potassium Gluconate 550 MG tablet Take  by mouth.     • pregabalin (Lyrica) 100 MG capsule Take 1 capsule by mouth 3 (Three) Times a Day. 90 capsule 5   • Probiotic Product (PROBIOTIC-10 PO) Take  by mouth.     • propranolol (INDERAL) 40 MG tablet TAKE 1 TABLET TWICE A  tablet 3   • raNITIdine (ZANTAC) 75 MG tablet Take 150 mg by mouth 2 (Two) Times a Day.       No current facility-administered medications on file prior to encounter.       Past Medical History:   Diagnosis Date   • Abdominal pain, right lower quadrant    • Anemia    • Asthma    • Cancer (CMS/HCC) 07/30/2014    Basal Cell Carcinoma Left Arm,SKIN CANCER   • COVID-19 04/2021   • Cyst of left kidney    • Depression    • Diverticulosis    • Fatty liver    • GERD (gastroesophageal reflux disease)    • History of kidney stones 06/2011   • History of surgery for cerebral aneurysm     Clipping of cerebral Aneurysm   • Hx of migraines    • Insomnia    • Irritable bowel syndrome    • Neck pain    • Peptic ulceration    • Pneumonia        Negative screen for CAYDEN  Review of systems negative for hematologic, infectious or constitutional symptoms    Exam:  LMP  (LMP Unknown)   Airway Mallampatti 2  Alert and oriented    Diagnosis:    Post-Op Diagnosis Codes:     * Cervical disc disorder with radiculopathy [M50.10]    Plan:  Cervical 5 epidural steroid  injection under fluoroscopic guidance    I have encouraged them to continue:  1.  Physical therapy exercises at home as prescribed by physical therapy or from the pain clinic handout (given to the patient).  Continuation of these exercises every day, or multiple times per week, even when the patient has good pain relief, was stressed to the patient as a preventative measure to decrease the frequency and severity of future pain episodes.  2.  Continue pain medicines as already prescribed.  If patient not currently taking any, it is recommended to begin Acetaminophen 1000 mg po q 8 hours.  If other medicines containing Acetaminophen are currently prescribed, maintain daily dose at 3000mg.    3.  If they can tolerate NSAIDS, it is recommended to take Ibuprofen 600 mg po q 6 hours for 7 days during pain exacerbations.  Alternatively, they may substitute an NSAID of their choice (e.g. Aleve)  4.  Heat and ice to the affected area as tolerated for pain control.  It was discussed that heating pads can cause burns.  5.  Low impact exercise such as walking or water exercise was recommended to maintain overall health and aid in weight control.   6.  Follow up as needed for subsequent injections.  7.  Patient was counseled to abstain from tobacco products.

## 2021-06-25 NOTE — ANESTHESIA PROCEDURE NOTES
PAIN Epidural block      Patient reassessed immediately prior to procedure    Patient location during procedure: pain clinic  Start Time: 6/25/2021 1:06 PM  Stop Time: 6/25/2021 1:23 PM  Indication:procedure for pain  Performed By  Anesthesiologist: Roe Dodd MD  Preanesthetic Checklist  Completed: patient identified and risks and benefits discussed  Additional Notes  Diagnosis:   Post-Op Diagnosis Codes:     * Cervical disc disorder with radiculopathy (M50.10)    Sedation: Versed 2 mg    Under fluoroscopic guidance, the epidural space was identified and accessed, confirmed by loss of resistance to saline.  The above medications were injected uneventfully.    No dye secondary to allergy    Prep:  Pt Position:prone  Sterile Tech:cap, gloves, mask and sterile barrier  Prep:chlorhexidine gluconate and isopropyl alcohol  Monitoring:blood pressure monitoring, continuous pulse oximetry and EKG  Procedure:Sedation: yes     Approach:midline  Guidance: fluoroscopy  Location:cervical  Level:5-6  Needle Type:Tuohy  Needle Gauge:20  Aspiration:negative  Medications:  Comments:Decadron 10 mg preservative-free  Post Assessment:  Pt Tolerance:patient tolerated the procedure well with no apparent complications  Complications:no

## 2021-06-28 ENCOUNTER — TELEPHONE (OUTPATIENT)
Dept: PHYSICAL THERAPY | Facility: CLINIC | Age: 66
End: 2021-06-28

## 2021-07-06 ENCOUNTER — TREATMENT (OUTPATIENT)
Dept: PHYSICAL THERAPY | Facility: CLINIC | Age: 66
End: 2021-07-06

## 2021-07-06 DIAGNOSIS — M54.12 RADICULOPATHY, CERVICAL: ICD-10-CM

## 2021-07-06 DIAGNOSIS — M54.2 PAIN, NECK: ICD-10-CM

## 2021-07-06 DIAGNOSIS — Z78.9 IMPAIRED ACTIVITIES OF DAILY LIVING: ICD-10-CM

## 2021-07-06 DIAGNOSIS — M79.601 RIGHT ARM PAIN: Primary | ICD-10-CM

## 2021-07-06 PROCEDURE — 97161 PT EVAL LOW COMPLEX 20 MIN: CPT | Performed by: PHYSICAL THERAPIST

## 2021-07-06 PROCEDURE — 97110 THERAPEUTIC EXERCISES: CPT | Performed by: PHYSICAL THERAPIST

## 2021-07-06 PROCEDURE — 97012 MECHANICAL TRACTION THERAPY: CPT | Performed by: PHYSICAL THERAPIST

## 2021-07-06 NOTE — PROGRESS NOTES
Physical Therapy Initial Evaluation and Plan of Care    Patient: Khalida Gilliland   : 1955  Diagnosis/ICD-10 Code:  Right arm pain [M79.601]  Referring practitioner: Erika Rodriguez MD  Date of Initial Visit: 2021  Today's Date: 2021  Patient seen for 1 sessions           Subjective Questionnaire: NDI:18/100      Subjective Evaluation    History of Present Illness  Mechanism of injury: R arm pain for 1.5 years. Pain worsened this year. Was seen by Dr. Talley who suspected neck vs shoulder as source of pain. Xray showed loss of cervical disc height.  Cervical epidural 21. Felt great that first day. I'm in agony now.  Describes unbearable pain in R neck, upper arm, and hand. Tingling in entire R hand and forearm. Headaches.  Pt states she is very busy and can't avoid household activities. Houses [employer].  Not taking OTC or Rx medication.  Cervical MRI 21 Straightening of the cervical spine with slight reversal of  the normal cervical curvature at C5-C6. At C5-C6, there is a broad  disc/osteophyte complex and there is facet arthritis and uncovertebral  overgrowth and mild narrowing of central canal and moderate right and  mild left neural foraminal narrowing.      Patient Occupation: N/A Pain  Current pain ratin  Location: R neck, arm  Quality: dull ache, radiating, tight and burning  Aggravating factors: overhead activity, prolonged positioning, outstretched reach and movement  Progression: worsening    Social Support  Lives with: spouse    Hand dominance: left    Diagnostic Tests  X-ray: abnormal  MRI studies: abnormal    Treatments  Previous treatment: injection treatment  Current treatment: physical therapy  Patient Goals  Patient goals for therapy: decreased pain, increased motion, increased strength, return to sport/leisure activities and independence with ADLs/IADLs             Objective        Special Questions  Patient is experiencing night pain, disturbed sleep and  headaches.     Additional Special Questions  L side sleeper; Pain changing positions in bed      Tenderness     Additional Tenderness Details  Non tender    Active Range of Motion   Cervical/Thoracic Spine   Cervical    Flexion: 58 degrees   Extension: 36 (R sup shld) degrees with pain  Left rotation: 65 degrees   Right rotation: 68 degrees     Strength/Myotome Testing   Cervical Spine     Left   Normal strength    Right   Normal strength    Tests   Cervical     Left   Negative Spurling's sign (R sided pain, upper back base of neck ).     Right   Positive cervical distraction.   Negative Spurling's sign.           Assessment & Plan     Assessment  Impairments: abnormal muscle tone, abnormal or restricted ROM, activity intolerance, impaired physical strength, lacks appropriate home exercise program and pain with function  Assessment details: Pt presents w/ severe R neck/arm, pain w/ cervical AROM, and relief w/ cervical traction. Pt will benefit from skilled PT services in order to address listed impairments and increase tolerance to normal daily activities including ADLs and recreational activities.    Prognosis: good  Functional Limitations: carrying objects, lifting, pushing, uncomfortable because of pain, moving in bed, reaching behind back and reaching overhead  Goals  Plan Goals: Short Term Goals: 4 weeks  Patient will:  1. Be independent with initial HEP  2. Be instructed in posture and body mechanics  3. Report pain </= 5/10 with all daily activities    Long Term Goals: 6-8 weeks  Patient will:  1. Report pain of </= 1/10 with all daily activities  2. Demonstrate no soft tissue restriction in involved musculature to allow for cervical AROM WNL.  3. Able to lift 10# from floor and overhead with proper mechanics allowing for performance of ADLs/household management/recreational activities without increased symptoms  4. Report min to no cervicogenic headaches   5. Perceived disability </=10% as measured by Neck  Disability Index    Plan  Therapy options: will be seen for skilled physical therapy services  Planned modality interventions: cryotherapy, electrical stimulation/Russian stimulation, thermotherapy (hydrocollator packs), traction, ultrasound and dry needling  Planned therapy interventions: abdominal trunk stabilization, ADL retraining, balance/weight-bearing training, body mechanics training, flexibility, functional ROM exercises, gait training, home exercise program, joint mobilization, manual therapy, neuromuscular re-education, soft tissue mobilization, spinal/joint mobilization, strengthening, stretching and therapeutic activities  Frequency: 2x week  Duration in weeks: 12  Treatment plan discussed with: patient        Manual Therapy:    0     mins  53114;  Therapeutic Exercise:    15     mins  67712;     Neuromuscular Paige:    0    mins  10974;    Therapeutic Activity:     0     mins  85397;     Gait Trainin     mins  96698;     Ultrasound:     0     mins  73943;    Electrical Stimulation:    0     mins  84130 ( );  Dry Needling     0     mins self-pay  Traction 15 mins    Timed Treatment:   30   mins   Total Treatment:     45   mins    PT SIGNATURE: Lindsay Cullen, SAE   DATE TREATMENT INITIATED: 2021    Initial Certification  Certification Period: 10/4/2021  I certify that the therapy services are furnished while this patient is under my care.  The services outlined above are required by this patient, and will be reviewed every 90 days.     PHYSICIAN: Erika Rodriguez MD      DATE:     Please sign and return via fax to [phone].. Thank you, [facility].

## 2021-07-06 NOTE — PATIENT INSTRUCTIONS
Access Code: [email or web address]  URL: [email or web address]  Date: 07/06/2021  Prepared by: Lindsay Garay    Exercises  Gentle Levator Scapulae Stretch - 1 x daily - 7 x weekly - 1 sets - 3 reps - 20s hold  Seated Scapular Retraction - 1 x daily - 7 x weekly - 1 sets - 10 reps - 5s hold  Doorway Pec Stretch at 60 Elevation - 1 x daily - 7 x weekly - 1 sets - 3 reps - 20s hold

## 2021-07-07 ENCOUNTER — TREATMENT (OUTPATIENT)
Dept: PHYSICAL THERAPY | Facility: CLINIC | Age: 66
End: 2021-07-07

## 2021-07-07 DIAGNOSIS — M54.12 RADICULOPATHY, CERVICAL: ICD-10-CM

## 2021-07-07 DIAGNOSIS — Z78.9 IMPAIRED ACTIVITIES OF DAILY LIVING: ICD-10-CM

## 2021-07-07 DIAGNOSIS — M54.2 PAIN, NECK: ICD-10-CM

## 2021-07-07 DIAGNOSIS — M79.601 RIGHT ARM PAIN: Primary | ICD-10-CM

## 2021-07-07 PROCEDURE — 97012 MECHANICAL TRACTION THERAPY: CPT | Performed by: PHYSICAL THERAPIST

## 2021-07-07 PROCEDURE — 97110 THERAPEUTIC EXERCISES: CPT | Performed by: PHYSICAL THERAPIST

## 2021-07-07 PROCEDURE — 97140 MANUAL THERAPY 1/> REGIONS: CPT | Performed by: PHYSICAL THERAPIST

## 2021-07-07 NOTE — PROGRESS NOTES
Physical Therapy Daily Treatment Note    Patient: Khalida Gilliland   : 1955  Diagnosis/ICD-10 Code:  Right arm pain [M79.601]  Referring practitioner: Erika Rodriguez MD  Date of Initial Evaluation:  Type: THERAPY  Noted: 2021  Visit # 2      Subjective   Continues pain in R (anterolateral) neck and upper arm.    Objective   See Exercise, Manual, and Modality Logs for complete treatment.       Assessment/Plan  Responds well to traction and massage. However, pt reported increased R shoulder pain transitioning to seated position. Updated HEP to include chin tuck and posterior shoulder rolls.Progress per POC.                 Manual Therapy:    15     mins  16956;  Therapeutic Exercise:    8     mins  67675;     Neuromuscular Paige:    0    mins  27535;    Therapeutic Activity:     0     mins  55983;     Gait Trainin     mins  45433;     Ultrasound:     0     mins  05974;    Work Hardening           0      mins 98880  Iontophoresis               0   mins 86179  Traction 15 mins    Timed Treatment:   23   mins   Total Treatment:     40   mins    Lindsay Cullen, PT  Physical Therapist

## 2021-07-13 ENCOUNTER — TREATMENT (OUTPATIENT)
Dept: PHYSICAL THERAPY | Facility: CLINIC | Age: 66
End: 2021-07-13

## 2021-07-13 PROCEDURE — 97140 MANUAL THERAPY 1/> REGIONS: CPT | Performed by: PHYSICAL THERAPIST

## 2021-07-13 PROCEDURE — 97012 MECHANICAL TRACTION THERAPY: CPT | Performed by: PHYSICAL THERAPIST

## 2021-07-13 PROCEDURE — 97110 THERAPEUTIC EXERCISES: CPT | Performed by: PHYSICAL THERAPIST

## 2021-07-13 NOTE — PROGRESS NOTES
Physical Therapy Daily Treatment Note    Patient: Khalida Gilliland   : 1955  Diagnosis/ICD-10 Code:  No primary diagnosis found.  Referring practitioner: Erika Rodriguez MD  Date of Initial Evaluation:  Type: THERAPY  Noted: 2021  Visit # 3      Subjective   Pt reports continued pain in R anterior neck. Feels like a rope burn. Has tingling down arm to R hand. She is discouraged. Pain most noticeable in middle of night and when laying on pool chair.    Objective   See Exercise, Manual, and Modality Logs for complete treatment.       Assessment/Plan  Pt stated she was pain-free after traction, massage, and exercise. Updated HEP w/ new thoracic mobility and scalene stretch. Plan to progress postural strengthening as tolerated. Progress per POC.             Manual Therapy:    15     mins  16272;  Therapeutic Exercise:    15     mins  64956;     Neuromuscular Paige:    0    mins  47880;    Therapeutic Activity:     0     mins  67940;     Gait Trainin     mins  28732;     Ultrasound:     0     mins  18286;    Work Hardening           0      mins 14777  Iontophoresis               0   mins 36068  Traction 10 mins    Timed Treatment:   30   mins   Total Treatment:     45   mins    Lindsay Cullen PT  Physical Therapist

## 2021-07-15 ENCOUNTER — TREATMENT (OUTPATIENT)
Dept: PHYSICAL THERAPY | Facility: CLINIC | Age: 66
End: 2021-07-15

## 2021-07-15 PROCEDURE — 97012 MECHANICAL TRACTION THERAPY: CPT | Performed by: PHYSICAL THERAPIST

## 2021-07-15 PROCEDURE — 97140 MANUAL THERAPY 1/> REGIONS: CPT | Performed by: PHYSICAL THERAPIST

## 2021-07-15 NOTE — PROGRESS NOTES
"Physical Therapy Daily Treatment Note    Patient: Khalida Gilliland   : 1955  Diagnosis/ICD-10 Code:  No primary diagnosis found.  Referring practitioner: Erika Rodriguez MD  Date of Initial Evaluation:  Type: THERAPY  Noted: 2021  Visit # 4      Subjective   Pain in R arm as she drove to PT.    Objective   See Exercise, Manual, and Modality Logs for complete treatment.       Assessment/Plan  Continues to be very tender at R scalenes and clavicle. Reports decreased arm pain with cervical traction and \"feels good\" post-session. Added row to HEP. Progress per POC.           Manual Therapy:    25     mins  78974;  Therapeutic Exercise:    10     mins  42887;     Neuromuscular Paige:    0    mins  47810;    Therapeutic Activity:     0     mins  19999;     Gait Trainin     mins  75313;     Ultrasound:     0     mins  99605;    Work Hardening           0      mins 29265  Iontophoresis               0   mins 64872  Traction 10 mins    Timed Treatment:   35   mins   Total Treatment:     45   mins    Lindsay Cullen PT  Physical Therapist  "

## 2021-07-20 ENCOUNTER — TREATMENT (OUTPATIENT)
Dept: PHYSICAL THERAPY | Facility: CLINIC | Age: 66
End: 2021-07-20

## 2021-07-20 DIAGNOSIS — M54.12 RADICULOPATHY, CERVICAL: ICD-10-CM

## 2021-07-20 DIAGNOSIS — M54.2 PAIN, NECK: ICD-10-CM

## 2021-07-20 DIAGNOSIS — Z78.9 IMPAIRED ACTIVITIES OF DAILY LIVING: ICD-10-CM

## 2021-07-20 DIAGNOSIS — M79.601 RIGHT ARM PAIN: Primary | ICD-10-CM

## 2021-07-20 PROCEDURE — 97012 MECHANICAL TRACTION THERAPY: CPT | Performed by: PHYSICAL THERAPIST

## 2021-07-20 PROCEDURE — 97140 MANUAL THERAPY 1/> REGIONS: CPT | Performed by: PHYSICAL THERAPIST

## 2021-07-20 PROCEDURE — DRYNDL PR CUSTOM DRY NEEDLING SELF PAY: Performed by: PHYSICAL THERAPIST

## 2021-07-20 PROCEDURE — 97110 THERAPEUTIC EXERCISES: CPT | Performed by: PHYSICAL THERAPIST

## 2021-07-20 NOTE — PROGRESS NOTES
CM tried to meet with patient, patient asked if CM could come back on 1/20/17. Physical Therapy Daily Treatment Note    Patient: Khalida Gilliland   : 1955  Diagnosis/ICD-10 Code:  Right arm pain [M79.601]  Referring practitioner: Erika Rodriguez MD  Date of Initial Evaluation:  Type: THERAPY  Noted: 2021  Visit # 5      Subjective   Unsure if there has been improvement. Describes terrible pain in shoulder laying on pool noodle for thoracic mobility exercise. States she was able to exercise in pool with hand weights for the first time.    Objective   Soft tissue was assessed at R upper trapezius and pectoralis major. PT noted point tenderness as well as palpable trigger points within the muscle tissue. On this date patient stated that they would like to undergo a dry needling procedure for the soft tissue dysfunction.   Patient was educated on the procedure for dry needling and consent waver was signed. Patient was informed of the risks, possible adverse effects, along with the benefits of TDN.     See Exercise, Manual, and Modality Logs for complete treatment.       Assessment/Plan  Subjective improvement unclear. Achieved desirable twitch response in both muscles targeted with dry needling. Pt remains very tender at R scalenes and pectoralis major. Reported no pain post-session. Progress per POC.           Manual Therapy:    8     mins  53978;  Therapeutic Exercise:    20     mins  61403;     Neuromuscular Paige:    0    mins  05186;    Therapeutic Activity:     0     mins  30444;     Gait Trainin     mins  57794;     Ultrasound:     0     mins  96630;    Work Hardening           0      mins 86169  Iontophoresis               0   mins 49202  Traction 10 mins  Dry needling 15 mins    Timed Treatment:   28   mins   Total Treatment:     60   mins    Lindsay Cullen PT  Physical Therapist

## 2021-07-23 ENCOUNTER — TREATMENT (OUTPATIENT)
Dept: PHYSICAL THERAPY | Facility: CLINIC | Age: 66
End: 2021-07-23

## 2021-07-23 DIAGNOSIS — M54.2 PAIN, NECK: ICD-10-CM

## 2021-07-23 DIAGNOSIS — M54.12 RADICULOPATHY, CERVICAL: ICD-10-CM

## 2021-07-23 DIAGNOSIS — M79.601 RIGHT ARM PAIN: Primary | ICD-10-CM

## 2021-07-23 DIAGNOSIS — Z78.9 IMPAIRED ACTIVITIES OF DAILY LIVING: ICD-10-CM

## 2021-07-23 PROCEDURE — 97110 THERAPEUTIC EXERCISES: CPT | Performed by: PHYSICAL THERAPIST

## 2021-07-23 PROCEDURE — 97012 MECHANICAL TRACTION THERAPY: CPT | Performed by: PHYSICAL THERAPIST

## 2021-07-23 PROCEDURE — 97140 MANUAL THERAPY 1/> REGIONS: CPT | Performed by: PHYSICAL THERAPIST

## 2021-07-23 NOTE — PROGRESS NOTES
Physical Therapy Daily Progress Note    Patient: Khalida Gilliland   : 1955  Diagnosis/ICD-10 Code:  Right arm pain [M79.601]  Referring practitioner: Erika Rodriguez MD  Date of Initial Visit: Type: THERAPY  Noted: 2021  Today's Date: 2021  Patient seen for 6 sessions         Khalida Gilliland reports: Doesn't feel like anything is helping. Did not notice any relief or difference in symptoms post dry needling. Traction does help for a short time.    Subjective     Objective   See Exercise, Manual, and Modality Logs for complete treatment.       Assessment/Plan  Subjectively, pt reports no increase of pain or discomfort with interventions performed today. Performed well with established exercises for neck flexor and postural strengthening. Continues to demonstrate difficulty with transitional movements.  Continues to benefit from verbal/tactile cues to ensure proper form and technique for exercise performance.     Progress per Plan of Care           Manual Therapy:    10     mins  29660;  Therapeutic Exercise:    20     mins  71126;     Neuromuscular Paige:        mins  72409;    Therapeutic Activity:          mins  74099;     Gait Training:           mins  71506;     Ultrasound:          mins  04318;    Electrical Stimulation:         mins  71078 ( );  Dry Needling          mins self-pay  Traction                        15 mins    Timed Treatment:   30   mins   Total Treatment:     45   mins    Trinidad Solano PTA  Physical Therapist Assistant [id number]

## 2021-07-26 ENCOUNTER — HOSPITAL ENCOUNTER (OUTPATIENT)
Dept: GENERAL RADIOLOGY | Facility: HOSPITAL | Age: 66
Discharge: HOME | End: 2021-07-26

## 2021-07-26 ENCOUNTER — TREATMENT (OUTPATIENT)
Dept: PHYSICAL THERAPY | Facility: CLINIC | Age: 66
End: 2021-07-26

## 2021-07-26 ENCOUNTER — HOSPITAL ENCOUNTER (OUTPATIENT)
Dept: PAIN MEDICINE | Facility: HOSPITAL | Age: 66
Discharge: HOME | End: 2021-07-26

## 2021-07-26 ENCOUNTER — ANESTHESIA EVENT (OUTPATIENT)
Dept: PAIN MEDICINE | Facility: HOSPITAL | Age: 66
End: 2021-07-26

## 2021-07-26 ENCOUNTER — ANESTHESIA (OUTPATIENT)
Dept: PAIN MEDICINE | Facility: HOSPITAL | Age: 66
End: 2021-07-26

## 2021-07-26 VITALS
OXYGEN SATURATION: 95 % | DIASTOLIC BLOOD PRESSURE: 48 MMHG | RESPIRATION RATE: 16 BRPM | HEART RATE: 62 BPM | SYSTOLIC BLOOD PRESSURE: 107 MMHG | TEMPERATURE: 97.5 F

## 2021-07-26 DIAGNOSIS — R52 PAIN: ICD-10-CM

## 2021-07-26 DIAGNOSIS — M54.2 PAIN, NECK: ICD-10-CM

## 2021-07-26 DIAGNOSIS — Z78.9 IMPAIRED ACTIVITIES OF DAILY LIVING: ICD-10-CM

## 2021-07-26 DIAGNOSIS — M54.12 RADICULOPATHY, CERVICAL: ICD-10-CM

## 2021-07-26 DIAGNOSIS — M79.601 RIGHT ARM PAIN: Primary | ICD-10-CM

## 2021-07-26 DIAGNOSIS — M48.02 CERVICAL SPINAL STENOSIS: ICD-10-CM

## 2021-07-26 LAB — GLUCOSE BLDC GLUCOMTR-MCNC: 110 MG/DL (ref 70–130)

## 2021-07-26 PROCEDURE — 25010000002 MIDAZOLAM PER 1 MG: Performed by: ANESTHESIOLOGY

## 2021-07-26 PROCEDURE — 97140 MANUAL THERAPY 1/> REGIONS: CPT | Performed by: PHYSICAL THERAPIST

## 2021-07-26 PROCEDURE — 82962 GLUCOSE BLOOD TEST: CPT

## 2021-07-26 PROCEDURE — 97012 MECHANICAL TRACTION THERAPY: CPT | Performed by: PHYSICAL THERAPIST

## 2021-07-26 PROCEDURE — 97110 THERAPEUTIC EXERCISES: CPT | Performed by: PHYSICAL THERAPIST

## 2021-07-26 PROCEDURE — 77003 FLUOROGUIDE FOR SPINE INJECT: CPT

## 2021-07-26 PROCEDURE — 25010000002 DEXAMETHASONE SODIUM PHOSPHATE 10 MG/ML SOLUTION: Performed by: ANESTHESIOLOGY

## 2021-07-26 RX ORDER — LIDOCAINE HYDROCHLORIDE 10 MG/ML
1 INJECTION, SOLUTION INFILTRATION; PERINEURAL ONCE AS NEEDED
Status: DISCONTINUED | OUTPATIENT
Start: 2021-07-26 | End: 2021-07-27 | Stop reason: HOSPADM

## 2021-07-26 RX ORDER — SODIUM CHLORIDE 0.9 % (FLUSH) 0.9 %
1-10 SYRINGE (ML) INJECTION AS NEEDED
Status: DISCONTINUED | OUTPATIENT
Start: 2021-07-26 | End: 2021-07-27 | Stop reason: HOSPADM

## 2021-07-26 RX ORDER — MIDAZOLAM HYDROCHLORIDE 1 MG/ML
1 INJECTION INTRAMUSCULAR; INTRAVENOUS AS NEEDED
Status: DISCONTINUED | OUTPATIENT
Start: 2021-07-26 | End: 2021-07-27 | Stop reason: HOSPADM

## 2021-07-26 RX ORDER — FENTANYL CITRATE 50 UG/ML
50 INJECTION, SOLUTION INTRAMUSCULAR; INTRAVENOUS AS NEEDED
Status: DISCONTINUED | OUTPATIENT
Start: 2021-07-26 | End: 2021-07-27 | Stop reason: HOSPADM

## 2021-07-26 RX ORDER — SODIUM CHLORIDE 0.9 % (FLUSH) 0.9 %
3 SYRINGE (ML) INJECTION EVERY 12 HOURS SCHEDULED
Status: DISCONTINUED | OUTPATIENT
Start: 2021-07-26 | End: 2021-07-27 | Stop reason: HOSPADM

## 2021-07-26 RX ORDER — SODIUM CHLORIDE 0.9 % (FLUSH) 0.9 %
3-10 SYRINGE (ML) INJECTION AS NEEDED
Status: DISCONTINUED | OUTPATIENT
Start: 2021-07-26 | End: 2021-07-27 | Stop reason: HOSPADM

## 2021-07-26 RX ORDER — DEXAMETHASONE SODIUM PHOSPHATE 10 MG/ML
10 INJECTION, SOLUTION INTRAMUSCULAR; INTRAVENOUS ONCE
Status: COMPLETED | OUTPATIENT
Start: 2021-07-26 | End: 2021-07-26

## 2021-07-26 RX ADMIN — DEXAMETHASONE SODIUM PHOSPHATE 10 MG: 10 INJECTION, SOLUTION INTRAMUSCULAR; INTRAVENOUS at 13:39

## 2021-07-26 RX ADMIN — MIDAZOLAM 2 MG: 1 INJECTION INTRAMUSCULAR; INTRAVENOUS at 13:37

## 2021-07-26 NOTE — PROGRESS NOTES
Physical Therapy Daily Progress Note    Visit # : 7  Khalida Gilliland reports: I'm continuing to have neck pain and the pain/numbness and tingling into my arm comes and going.  I have an epidural scheduled later today.  I need to get better about getting in my HEP first thing in the morning.  I have been very busy hosting a house full     Subjective     Objective   See Exercise, Manual, and Modality Logs for complete treatment.     Assessment/Plan  Good tolerance to manual interventions with addition of gentle nerve glides today.  Pt was 15 min late for appt and due to lack of consistency with HEP, did not advance ex program this visit.   Progress per Plan of Care  Assess effects of epidural      Timed:  Manual Therapy:    10     mins  12498;  Therapeutic Exercise:    15     mins  89793;     Neuromuscular Paige:    -    mins  10561;    Therapeutic Activity:     -     mins  52643;     Gait Training:      -     mins  81014;     Ultrasound:     -     mins  16656;    Iontophoresis                 -     mins 97896    Untimed:  Electrical Stimulation:    -     mins  66904 ( );  Mech traction                   15     mins 90582  Dry Needling                  -     Min self pay    Timed Treatment:   25   mins   Total Treatment:     45   mins      Vikki Lee PT  Physical Therapist  [place] License # [id number]

## 2021-07-26 NOTE — H&P
HPI:  The patient returns for another Cervical epidural steroid injection today.  He received 75% relief after her last injection but it only lasted 1 day.  Pain is back to a 7 out of 10.  She is involved in physical therapy is helping.  Current Outpatient Medications on File Prior to Encounter   Medication Sig Dispense Refill   • albuterol sulfate  (90 Base) MCG/ACT inhaler Inhale 2 puffs Every 4 (Four) Hours As Needed for Wheezing or Shortness of Air (Cough). 1 inhaler 0   • Ascorbic Acid (VITAMIN C PO) Take 1,000 mg by mouth Daily. PT INSTRUCTED TO CONTINUE PER DR. CALLUM URBINA     • aspirin 81 MG EC tablet Take 1 tablet by mouth Daily. 30 tablet 6   • atorvastatin (LIPITOR) 20 MG tablet TAKE 1 TABLET DAILY 90 tablet 3   • budesonide-formoterol (SYMBICORT) 160-4.5 MCG/ACT inhaler Inhale 2 puffs 2 (Two) Times a Day.     • butalbital-acetaminophen-caffeine (FIORICET, ESGIC) -40 MG per tablet Take 1 tablet by mouth 2 (Two) Times a Day. 20 tablet 1   • cetirizine (zyrTEC) 10 MG tablet TAKE 1 TABLET DAILY 90 tablet 3   • cholecalciferol (VITAMIN D3) 1000 units tablet Take 1,000 Units by mouth Daily. PER PT TO CONTINUE PER DR. CALLUM URBINA     • Coenzyme Q10 (CO Q 10 PO) Take  by mouth.     • cycloSPORINE (RESTASIS) 0.05 % ophthalmic emulsion Administer 1 drop to both eyes 2 (Two) Times a Day.     • diphenhydrAMINE-acetaminophen (TYLENOL PM)  MG tablet per tablet Take 1 tablet by mouth At Night As Needed for Sleep.     • fluticasone (FLONASE) 50 MCG/ACT nasal spray 2 sprays by Each Nare route Daily. 29.7 mL 3   • Ginkgo Biloba 40 MG tablet Take  by mouth.     • glipiZIDE XL 5 MG ER tablet TAKE 1 TABLET DAILY 90 tablet 3   • glucosamine-chondroitin 500-400 MG capsule capsule Take 1 capsule by mouth 2 (Two) Times a Day With Meals. PT TO CONTINUE PER DR. CALLUM URBINA     • glucose blood (FREESTYLE LITE) test strip PT to use once daily 100 each 12   • hydroCHLOROthiazide (HYDRODIURIL) 25 MG tablet  TAKE 1 TABLET DAILY 90 tablet 0   • Januvia 100 MG tablet TAKE 1 TABLET DAILY 90 tablet 3   • Lancets (freestyle) lancets Pt tests daily 100 each 6   • lisinopril (PRINIVIL,ZESTRIL) 10 MG tablet TAKE 1 TABLET DAILY 90 tablet 3   • montelukast (SINGULAIR) 10 MG tablet Take 1 tablet by mouth Every Night. 30 tablet 0   • NYSTATIN 262150 UNIT/GM topical powder      • Omega-3 Fatty Acids (FISH OIL) 1000 MG capsule capsule Take  by mouth Daily With Breakfast.     • omeprazole (priLOSEC) 40 MG capsule Take 1 capsule by mouth Every Morning. 90 capsule 3   • Potassium Gluconate 550 MG tablet Take  by mouth.     • pregabalin (Lyrica) 100 MG capsule Take 1 capsule by mouth 3 (Three) Times a Day. 90 capsule 5   • Probiotic Product (PROBIOTIC-10 PO) Take  by mouth.     • propranolol (INDERAL) 40 MG tablet TAKE 1 TABLET TWICE A  tablet 3   • raNITIdine (ZANTAC) 75 MG tablet Take 150 mg by mouth 2 (Two) Times a Day.       No current facility-administered medications on file prior to encounter.       Past Medical History:   Diagnosis Date   • Abdominal pain, right lower quadrant    • Anemia    • Asthma    • Cancer (CMS/HCC) 07/30/2014    Basal Cell Carcinoma Left Arm,SKIN CANCER   • COVID-19 04/2021   • Cyst of left kidney    • Depression    • Diverticulosis    • Fatty liver    • GERD (gastroesophageal reflux disease)    • History of kidney stones 06/2011   • History of surgery for cerebral aneurysm     Clipping of cerebral Aneurysm   • Hx of migraines    • Insomnia    • Irritable bowel syndrome    • Neck pain    • Peptic ulceration    • Pneumonia        Negative screen for CAYDEN  Review of systems negative for hematologic, infectious or constitutional symptoms    Exam:  /72 (BP Location: Left arm, Patient Position: Lying)   Pulse 64   Temp 36.4 °C (97.5 °F) (Infrared)   Resp 18   LMP  (LMP Unknown)   SpO2 96%   Airway Mallampatti 2  Alert and oriented    Diagnosis:    Post-Op Diagnosis Codes:     * Cervical disc  disorder with radiculopathy [M50.10]    Plan:  Cervical 5 epidural steroid injection under fluoroscopic guidance    I have encouraged them to continue:  1.  Physical therapy exercises at home as prescribed by physical therapy or from the pain clinic handout (given to the patient).  Continuation of these exercises every day, or multiple times per week, even when the patient has good pain relief, was stressed to the patient as a preventative measure to decrease the frequency and severity of future pain episodes.  2.  Continue pain medicines as already prescribed.  If patient not currently taking any, it is recommended to begin Acetaminophen 1000 mg po q 8 hours.  If other medicines containing Acetaminophen are currently prescribed, maintain daily dose at 3000mg.    3.  If they can tolerate NSAIDS, it is recommended to take Ibuprofen 600 mg po q 6 hours for 7 days during pain exacerbations.  Alternatively, they may substitute an NSAID of their choice (e.g. Aleve)  4.  Heat and ice to the affected area as tolerated for pain control.  It was discussed that heating pads can cause burns.  5.  Low impact exercise such as walking or water exercise was recommended to maintain overall health and aid in weight control.   6.  Follow up as needed for subsequent injections.  7.  Patient was counseled to abstain from tobacco products.

## 2021-07-26 NOTE — DISCHARGE INSTRUCTIONS
"Guide To Relieving And Avoiding Neck Pain    Exercise is important to help prevent and treat neck pain.  Good posture, exercise and avoiding injury will help to keep your neck healthy.    When the neck is strained or over worked symptoms may include headache, upper back pain, shoulder pain or arm pain.  Numbness or tingling in the fingers, dizziness or nausea may also occur.    Posture:    Avoid slumping over a desk.  Raise your work (including computer) to eye level to avoid bending at the neck.      Change Position Often:  Changing position prevents overuse of particular muscles.    Sleep On One Pillow:  Using to many pillows or to large of a pillow causes a \"kink\" in you neck.      Move and Exercise:  Living an active lifestyle is an important part of staying healthy.  Be sure to include the exercise to follow specifically for your neck.                    Range of Motion Exercises:  Do these exercises three times a day.  If you experience increased pain stop and contact your physician.  All exercises can be performed sitting or standing.        1.    2.   3.    1.  Place both hands behind you neck.  Gently tilt your neck backward so that you are looking at the ceiling.  Hold for a count of 10.    2.  Look straight facing forward.  Slowly tip your ear toward your right shoulder.  Do not force the motion.  Hold for a count of 10.  Bring head back to starting position and repeat to left side.    3.  Look straight facing forward.  Gently turn your head to the right.  Do not force the motion.  Hold for a count of 10.  Bring head back to starting position and repeat to the left side.    Exercises To Strengthen Muscles:  1.  Look straight facing forward.  Relax your shoulders.  Raise both shoulders toward your ears.  Hold for 3 seconds.       1.       2.   3.      1.  Look straight facing forward.  Relax your shoulders.  Raise both shoulders toward     2.  Raise your ars to your side and bend your elbows.  Squeeze " shoulder blades together as you rotate your arms outward.  Hold for 5 seconds.    3.  Look straight facing forward.  Pull your head straight back.  Do not tip or move your jaw.  Hold for 5 seconds.

## 2021-07-26 NOTE — ANESTHESIA PROCEDURE NOTES
PAIN Epidural block      Patient reassessed immediately prior to procedure    Patient location during procedure: pain clinic  Start Time: 7/26/2021 1:29 PM  Stop Time: 7/26/2021 1:46 PM  Indication:procedure for pain  Performed By  Anesthesiologist: Roe Dodd MD  Preanesthetic Checklist  Completed: patient identified and risks and benefits discussed  Additional Notes  Diagnosis:   Post-Op Diagnosis Codes:     * Cervical disc disorder with radiculopathy (M50.10)    Sedation: Versed 2 mg    Under fluoroscopic guidance, the epidural space was identified and accessed, confirmed by loss of resistance to saline.  The above medications were injected uneventfully.    Prep:  Pt Position:prone  Sterile Tech:cap, gloves, mask and sterile barrier  Prep:chlorhexidine gluconate and isopropyl alcohol  Monitoring:blood pressure monitoring, continuous pulse oximetry and EKG  Procedure:Sedation: yes     Approach:midline  Guidance: fluoroscopy  Location:cervical  Level:5-6  Needle Type:Tuohy  Needle Gauge:20  Aspiration:negative  Medications:  Comments:Decadron 10 mg preservative-free  Post Assessment:  Pt Tolerance:patient tolerated the procedure well with no apparent complications  Complications:no

## 2021-07-30 ENCOUNTER — TELEPHONE (OUTPATIENT)
Dept: PHYSICAL THERAPY | Facility: CLINIC | Age: 66
End: 2021-07-30

## 2021-08-03 ENCOUNTER — TREATMENT (OUTPATIENT)
Dept: PHYSICAL THERAPY | Facility: CLINIC | Age: 66
End: 2021-08-03

## 2021-08-03 DIAGNOSIS — M79.601 RIGHT ARM PAIN: Primary | ICD-10-CM

## 2021-08-03 DIAGNOSIS — M54.12 RADICULOPATHY, CERVICAL: ICD-10-CM

## 2021-08-03 DIAGNOSIS — Z78.9 IMPAIRED ACTIVITIES OF DAILY LIVING: ICD-10-CM

## 2021-08-03 DIAGNOSIS — M54.2 PAIN, NECK: ICD-10-CM

## 2021-08-03 PROCEDURE — 97110 THERAPEUTIC EXERCISES: CPT | Performed by: PHYSICAL THERAPIST

## 2021-08-03 NOTE — PROGRESS NOTES
Physical Therapy Daily Treatment Note    Patient: Khalida Gilliland   : 1955  Diagnosis/ICD-10 Code:  Right arm pain [M79.601]  Referring practitioner: Erika Rodriguez MD  Date of Initial Evaluation:  Type: THERAPY  Noted: 2021  Visit # 8      Subjective   Feeling better. Not sure if the epidural helped or if something else helped. Started going to a [employer]. Has tingling in arm, but not daily. Tingling with arms resting on table.    Objective   See Exercise, Manual, and Modality Logs for complete treatment.       Assessment/Plan  Subjective reports of pain improved. Excellent tolerance to postural strengthening progressions. Issued new copy of HEP. Progress per POC.                 Manual Therapy:    0     mins  99044;  Therapeutic Exercise:    45     mins  76312;     Neuromuscular Paige:    0    mins  31522;    Therapeutic Activity:     0     mins  59539;     Gait Trainin     mins  40281;     Ultrasound:     0     mins  37624;    Work Hardening           0      mins 77966  Iontophoresis               0   mins 17182    Timed Treatment:   45   mins   Total Treatment:     45   mins    Lindsay Cullen PT  Physical Therapist

## 2021-08-05 ENCOUNTER — TREATMENT (OUTPATIENT)
Dept: PHYSICAL THERAPY | Facility: CLINIC | Age: 66
End: 2021-08-05

## 2021-08-05 DIAGNOSIS — Z78.9 IMPAIRED ACTIVITIES OF DAILY LIVING: ICD-10-CM

## 2021-08-05 DIAGNOSIS — M54.2 PAIN, NECK: ICD-10-CM

## 2021-08-05 DIAGNOSIS — M54.12 RADICULOPATHY, CERVICAL: ICD-10-CM

## 2021-08-05 DIAGNOSIS — M79.601 RIGHT ARM PAIN: Primary | ICD-10-CM

## 2021-08-05 PROCEDURE — 97110 THERAPEUTIC EXERCISES: CPT | Performed by: PHYSICAL THERAPIST

## 2021-08-05 PROCEDURE — 97140 MANUAL THERAPY 1/> REGIONS: CPT | Performed by: PHYSICAL THERAPIST

## 2021-08-05 NOTE — PROGRESS NOTES
Physical Therapy Daily Treatment Note    Patient: Khalida Gilliland   : 1955  Diagnosis/ICD-10 Code:  Right arm pain [M79.601]  Referring practitioner: Erika Rodriguez MD  Date of Initial Evaluation:  Type: THERAPY  Noted: 2021  Visit # 9      Subjective   Tingling in R arm yesterday afternoon and last night. No tingling today. Upper arm aches. States she is doing a lot of projects at home.    Objective   See Exercise, Manual, and Modality Logs for complete treatment.       Assessment/Plan   Requires verbal cues for proper exercise form. Reported decreased arm pain after exercise. R shoulder PROM/AROM WNL. Continues to have anterior translation of R GHJ at rest. Plan to reassess goals next visit.         Manual Therapy:    8     mins  12446;  Therapeutic Exercise:    30     mins  48871;     Neuromuscular Paige:    0    mins  64407;    Therapeutic Activity:     0     mins  22537;     Gait Trainin     mins  93352;     Ultrasound:     0     mins  63762;    Work Hardening           0      mins 20166  Iontophoresis               0   mins 29381    Timed Treatment:   38   mins   Total Treatment:     45   mins    Lindsay Cullen PT  Physical Therapist

## 2021-08-09 ENCOUNTER — TREATMENT (OUTPATIENT)
Dept: PHYSICAL THERAPY | Facility: CLINIC | Age: 66
End: 2021-08-09

## 2021-08-09 DIAGNOSIS — M79.601 RIGHT ARM PAIN: ICD-10-CM

## 2021-08-09 DIAGNOSIS — Z78.9 IMPAIRED ACTIVITIES OF DAILY LIVING: ICD-10-CM

## 2021-08-09 DIAGNOSIS — M54.12 RADICULOPATHY, CERVICAL: Primary | ICD-10-CM

## 2021-08-09 PROCEDURE — 97110 THERAPEUTIC EXERCISES: CPT | Performed by: PHYSICAL THERAPIST

## 2021-08-09 PROCEDURE — 97140 MANUAL THERAPY 1/> REGIONS: CPT | Performed by: PHYSICAL THERAPIST

## 2021-08-09 NOTE — PROGRESS NOTES
Re-Assessment / Re-Certification        Patient: Khalida Gilliland   : 1955  Diagnosis/ICD-10 Code:  Radiculopathy, cervical [M54.12]  Referring practitioner: Erika Rodriguez MD  Date of Initial Evaluation:  Type: THERAPY  Noted: 2021  Patient seen for 10 sessions      Subjective:   Khalida Gilliland reports: R arm is very tingly and numb. Hurts to have arm on rest in car. Pain sleeping on R side. Pain does not keeping me from doing chores around the house. Rates pain 2-3/10, intermittent.    Subjective Questionnaire: NDI:10 7/10/21 vs 18/100 21  Clinical Progress: Improved subjective report on NDI; improved pain rating  Home Program Compliance: No   Treatment has included: therapeutic exercise, neuromuscular re-education, manual therapy, therapeutic activity, traction, dry needling and moist heat    Subjective   Objective          Active Range of Motion   Cervical/Thoracic Spine   Cervical    Extension: 33 degrees with pain  Left rotation: 62 degrees   Right rotation: 65 degrees     Strength/Myotome Testing   Cervical Spine     Left   Normal strength    Right   Normal strength      Goals est 21  Plan Goals: Short Term Goals: 4 weeks  Patient will:  1. Be independent with initial HEP  2. Be instructed in posture and body mechanics  3. Report pain </= 5/10 with all daily activities  ALL MET    Long Term Goals: 6-8 weeks  Patient will:  1. Report pain of </= 1/10 with all daily activities  2. Demonstrate no soft tissue restriction in involved musculature to allow for cervical AROM WNL.  3. Able to lift 10# from floor and overhead with proper mechanics allowing for performance of ADLs/household management/recreational activities without increased symptoms  4. Report min to no cervicogenic headaches   5. Perceived disability </=10% as measured by Neck Disability Index  NOT MET    Assessment/Plan  Progress toward previous goals: Partially Met    Some improvement in subjective pain rating and self report on  NDI. Continues to have R UE radicular symptoms, painful cervical extension and forward shoulder/kyphotic posture. Discussed importance of compliance with HEP to improve shoulder posture and decrease neural tension. Progress per POC.        Recommendations: Continue with recommendations 2x/week  Timeframe: 1 month  Prognosis to achieve goals: good    PT Signature: Lindsay Cullen PT      Based upon review of the patient's progress and continued therapy plan, it is my medical opinion that Khalida Gilliland should continue physical therapy treatment at [facility]  [address] [place] [id number] [phone].    Signature: __________________________________  Erika Rodriguez MD    Manual Therapy:    20     mins  62809;  Therapeutic Exercise:    25     mins  72346;     Neuromuscular Paige:    0    mins  13196;    Therapeutic Activity:     0     mins  13888;     Gait Trainin     mins  42317;     Ultrasound:     0     mins  89594;    Work Hardening           0      mins 79174  Iontophoresis               0   mins 10791    Timed Treatment:   45   mins   Total Treatment:     50   mins

## 2021-08-17 ENCOUNTER — TELEPHONE (OUTPATIENT)
Dept: PHYSICAL THERAPY | Facility: CLINIC | Age: 66
End: 2021-08-17

## 2021-08-19 ENCOUNTER — TELEPHONE (OUTPATIENT)
Dept: PHYSICAL THERAPY | Facility: CLINIC | Age: 66
End: 2021-08-19

## 2021-08-27 DIAGNOSIS — I10 ESSENTIAL HYPERTENSION: ICD-10-CM

## 2021-08-27 RX ORDER — HYDROCHLOROTHIAZIDE 25 MG/1
TABLET ORAL
Qty: 90 TABLET | Refills: 3 | OUTPATIENT
Start: 2021-08-27

## 2021-09-01 ENCOUNTER — TELEPHONE (OUTPATIENT)
Dept: FAMILY MEDICINE CLINIC | Facility: CLINIC | Age: 66
End: 2021-09-01

## 2021-09-01 NOTE — TELEPHONE ENCOUNTER
Caller: Khalida Gilliland    Relationship: Self    Best call back number: [phone]    What is the best time to reach you: ANY     Who are you requesting to speak with (clinical staff, provider,  specific staff member): N/A      What was the call regarding: PATIENT IS CALLING TO ASK OFFICE WHERE SHE WENT FOR HER LAST COLONOSCOPY DUE TO NEEDING TO SCHEDULE A NEW ONE     Do you require a callback: YES

## 2021-09-07 ENCOUNTER — LAB (OUTPATIENT)
Dept: FAMILY MEDICINE CLINIC | Facility: CLINIC | Age: 66
End: 2021-09-07

## 2021-09-07 DIAGNOSIS — M48.02 CERVICAL SPINAL STENOSIS: ICD-10-CM

## 2021-09-07 DIAGNOSIS — I10 ESSENTIAL HYPERTENSION: Primary | ICD-10-CM

## 2021-09-07 DIAGNOSIS — E11.9 TYPE 2 DIABETES MELLITUS WITHOUT COMPLICATION, WITHOUT LONG-TERM CURRENT USE OF INSULIN (HCC): ICD-10-CM

## 2021-09-08 LAB
ALBUMIN SERPL-MCNC: 4.7 G/DL (ref 3.8–4.8)
ALBUMIN/GLOB SERPL: 1.5 {RATIO} (ref 1.2–2.2)
ALP SERPL-CCNC: 70 IU/L (ref 48–121)
ALT SERPL-CCNC: 58 IU/L (ref 0–32)
AST SERPL-CCNC: 45 IU/L (ref 0–40)
BASOPHILS # BLD AUTO: 0 X10E3/UL (ref 0–0.2)
BASOPHILS NFR BLD AUTO: 1 %
BILIRUB SERPL-MCNC: 0.5 MG/DL (ref 0–1.2)
BUN SERPL-MCNC: 15 MG/DL (ref 8–27)
BUN/CREAT SERPL: 23 (ref 12–28)
CALCIUM SERPL-MCNC: 10.4 MG/DL (ref 8.7–10.3)
CHLORIDE SERPL-SCNC: 100 MMOL/L (ref 96–106)
CO2 SERPL-SCNC: 22 MMOL/L (ref 20–29)
CREAT SERPL-MCNC: 0.66 MG/DL (ref 0.57–1)
EOSINOPHIL # BLD AUTO: 0.2 X10E3/UL (ref 0–0.4)
EOSINOPHIL NFR BLD AUTO: 4 %
ERYTHROCYTE [DISTWIDTH] IN BLOOD BY AUTOMATED COUNT: 12.8 % (ref 11.7–15.4)
GLOBULIN SER CALC-MCNC: 3.2 G/DL (ref 1.5–4.5)
GLUCOSE SERPL-MCNC: 144 MG/DL (ref 65–99)
HBA1C MFR BLD: 7.3 % (ref 4.8–5.6)
HCT VFR BLD AUTO: 42.6 % (ref 34–46.6)
HGB BLD-MCNC: 14.2 G/DL (ref 11.1–15.9)
IMM GRANULOCYTES # BLD AUTO: 0 X10E3/UL (ref 0–0.1)
IMM GRANULOCYTES NFR BLD AUTO: 0 %
LYMPHOCYTES # BLD AUTO: 1.6 X10E3/UL (ref 0.7–3.1)
LYMPHOCYTES NFR BLD AUTO: 28 %
MCH RBC QN AUTO: 30.5 PG (ref 26.6–33)
MCHC RBC AUTO-ENTMCNC: 33.3 G/DL (ref 31.5–35.7)
MCV RBC AUTO: 92 FL (ref 79–97)
MONOCYTES # BLD AUTO: 0.7 X10E3/UL (ref 0.1–0.9)
MONOCYTES NFR BLD AUTO: 12 %
NEUTROPHILS # BLD AUTO: 3.2 X10E3/UL (ref 1.4–7)
NEUTROPHILS NFR BLD AUTO: 55 %
PLATELET # BLD AUTO: 246 X10E3/UL (ref 150–450)
POTASSIUM SERPL-SCNC: 4.6 MMOL/L (ref 3.5–5.2)
PROT SERPL-MCNC: 7.9 G/DL (ref 6–8.5)
RBC # BLD AUTO: 4.65 X10E6/UL (ref 3.77–5.28)
SODIUM SERPL-SCNC: 141 MMOL/L (ref 134–144)
WBC # BLD AUTO: 5.8 X10E3/UL (ref 3.4–10.8)

## 2021-09-10 ENCOUNTER — OFFICE VISIT (OUTPATIENT)
Dept: FAMILY MEDICINE CLINIC | Facility: CLINIC | Age: 66
End: 2021-09-10

## 2021-09-10 ENCOUNTER — HOSPITAL ENCOUNTER (OUTPATIENT)
Dept: GENERAL RADIOLOGY | Facility: HOSPITAL | Age: 66
Discharge: HOME | End: 2021-09-10
Admitting: FAMILY MEDICINE

## 2021-09-10 VITALS
HEART RATE: 74 BPM | SYSTOLIC BLOOD PRESSURE: 118 MMHG | HEIGHT: 68 IN | BODY MASS INDEX: 33.8 KG/M2 | OXYGEN SATURATION: 98 % | RESPIRATION RATE: 16 BRPM | TEMPERATURE: 96.8 F | DIASTOLIC BLOOD PRESSURE: 78 MMHG

## 2021-09-10 DIAGNOSIS — R05.9 COUGH: ICD-10-CM

## 2021-09-10 DIAGNOSIS — R05.9 COUGH: Primary | ICD-10-CM

## 2021-09-10 DIAGNOSIS — R91.8 LUNG FIELD ABNORMAL FINDING ON EXAMINATION: ICD-10-CM

## 2021-09-10 DIAGNOSIS — E11.9 TYPE 2 DIABETES MELLITUS WITHOUT COMPLICATION, WITHOUT LONG-TERM CURRENT USE OF INSULIN (HCC): Chronic | ICD-10-CM

## 2021-09-10 PROCEDURE — 99214 OFFICE O/P EST MOD 30 MIN: CPT | Performed by: FAMILY MEDICINE

## 2021-09-10 PROCEDURE — 71046 X-RAY EXAM CHEST 2 VIEWS: CPT

## 2021-09-14 ENCOUNTER — TELEPHONE (OUTPATIENT)
Dept: FAMILY MEDICINE CLINIC | Facility: CLINIC | Age: 66
End: 2021-09-14

## 2021-09-14 DIAGNOSIS — R51.9 NONINTRACTABLE HEADACHE, UNSPECIFIED CHRONICITY PATTERN, UNSPECIFIED HEADACHE TYPE: ICD-10-CM

## 2021-09-14 RX ORDER — BUTALBITAL, ACETAMINOPHEN AND CAFFEINE 50; 325; 40 MG/1; MG/1; MG/1
1 TABLET ORAL 2 TIMES DAILY
Qty: 20 TABLET | Refills: 1 | Status: SHIPPED | OUTPATIENT
Start: 2021-09-14 | End: 2022-03-07 | Stop reason: SDUPTHER

## 2021-09-14 NOTE — TELEPHONE ENCOUNTER
Caller: Khalida Gilliland    Relationship: Self    Best call back number: [phone]    Medication needed:   Requested Prescriptions     Pending Prescriptions Disp Refills   • butalbital-acetaminophen-caffeine (FIORICET, ESGIC) -40 MG per tablet 20 tablet 1     Sig: Take 1 tablet by mouth 2 (Two) Times a Day.       When do you need the refill by: ASAP     What additional details did the patient provide when requesting the medication: OUT OF MEDICATION.  WILL BE LEAVING STATE, TRAVELING    Does the patient have less than a 3 day supply:  [x] Yes  [] No    What is the patient's preferred pharmacy: [id number] [place] [address] [phone] [facility] [phone]

## 2021-09-14 NOTE — TELEPHONE ENCOUNTER
Caller: Khalida Gilliland    Relationship to patient: Self    Best call back number: [phone]    Patient is needing: SUPPLEMENT BIOTIN (ZINC AND SELENIUM) IS THIS SAFE TO TAKE WITH HER MEDICATIONS?    PATIENT WILL BE OUT OF STATE FOR 4 WEEKS, WILL RUN OUT OF    pregabalin (Lyrica) 100 MG capsule     1 WEEK BEFORE SHE RETURNS HOME.  HOW CAN SHE GET A REFILL SO THAT SHE DOESN'T HAVE TO GO WITHOUT HER MEDICATION?    PLEASE ADVISE

## 2021-09-14 NOTE — TELEPHONE ENCOUNTER
Caller: Khalida Gilliland    Relationship: Self    Best call back number: [phone]    Caller requesting test results: PATIENT     What test was performed: LUNG X-RAY     When was the test performed: 09-10-21

## 2021-09-15 NOTE — TELEPHONE ENCOUNTER
I don't know if there are any interactions with the supplement and the pregabalin. I can send a refill to the pharmacy early but then her next one will not be able to be sent until it is due, however, the MARTITA is blank and she did get a script from her previous primary group in 4/2021. Not sure where this was field but looks like pharmacy in [place]. It looks like she has not revilled it in over one year. This needs to be clarified before we can authorize an early fill.

## 2021-09-15 NOTE — TELEPHONE ENCOUNTER
Spoke with this patient in detail. Voiced understanding. Pt states that she can either dispense 21 tablets or refill early. She is ok with what ever Michael does. She will run out of meds while being out of town.

## 2021-09-16 DIAGNOSIS — G59 MONONEUROPATHY DUE TO UNDERLYING DISEASE: ICD-10-CM

## 2021-09-16 RX ORDER — PREGABALIN 100 MG/1
100 CAPSULE ORAL 3 TIMES DAILY
Qty: 90 CAPSULE | Refills: 0 | Status: SHIPPED | OUTPATIENT
Start: 2021-09-16 | End: 2021-11-01

## 2021-09-20 ENCOUNTER — TELEPHONE (OUTPATIENT)
Dept: FAMILY MEDICINE CLINIC | Facility: CLINIC | Age: 66
End: 2021-09-20

## 2021-09-20 ENCOUNTER — TELEPHONE (OUTPATIENT)
Dept: GASTROENTEROLOGY | Facility: CLINIC | Age: 66
End: 2021-09-20

## 2021-09-20 NOTE — TELEPHONE ENCOUNTER
Last scope 08/14/2020 in epic-- no personal hx of polyps-- no family hx of polyps or colon ca-- ASA-- medications:        aspirin 81 MG EC tablet  atorvastatin (LIPITOR) 20 MG tablet  cetirizine (zyrTEC) 10 MG tablet  cholecalciferol (VITAMIN D3) 1000 units tablet  Coenzyme Q10 (CO Q 10 PO)  diphenhydrAMINE-acetaminophen (TYLENOL PM)  MG tablet per tablet  Ginkgo Biloba 40 MG tablet  glipiZIDE XL 5 MG ER tablet  glucosamine-chondroitin 500-400 MG capsule capsule  hydroCHLOROthiazide (HYDRODIURIL) 25 MG tablet  Januvia 100 MG tablet  lisinopril (PRINIVIL,ZESTRIL) 10 MG tablet  montelukast (SINGULAIR) 10 MG tablet  Omega-3 Fatty Acids (FISH OIL) 1000 MG capsule capsule  Potassium Gluconate 550 MG tablet  pregabalin (Lyrica) 100 MG capsule  propranolol (INDERAL) 40 MG tablet  raNITIdine (ZANTAC) 75 MG tablet  Cortisol  Multivitamin  Vitamin B12    OA form scanned into media

## 2021-09-23 DIAGNOSIS — Z12.11 COLON CANCER SCREENING: Primary | ICD-10-CM

## 2021-09-23 RX ORDER — SODIUM CHLORIDE, SODIUM LACTATE, POTASSIUM CHLORIDE, CALCIUM CHLORIDE 600; 310; 30; 20 MG/100ML; MG/100ML; MG/100ML; MG/100ML
30 INJECTION, SOLUTION INTRAVENOUS CONTINUOUS
Status: CANCELLED | OUTPATIENT
Start: 2021-09-23

## 2021-11-01 DIAGNOSIS — G59 MONONEUROPATHY DUE TO UNDERLYING DISEASE: ICD-10-CM

## 2021-11-01 RX ORDER — PREGABALIN 100 MG/1
CAPSULE ORAL
Qty: 90 CAPSULE | OUTPATIENT
Start: 2021-11-01

## 2021-11-01 RX ORDER — PREGABALIN 100 MG/1
CAPSULE ORAL
Qty: 90 CAPSULE | Refills: 0 | Status: SHIPPED | OUTPATIENT
Start: 2021-11-01 | End: 2021-12-01 | Stop reason: SDUPTHER

## 2021-11-01 NOTE — TELEPHONE ENCOUNTER
Rx Refill Note  Requested Prescriptions     Pending Prescriptions Disp Refills   • pregabalin (LYRICA) 100 MG capsule [Pharmacy Med Name: PREGABALIN 100 MG CAPSULE] 90 capsule      Sig: TAKE ONE CAPSULE BY MOUTH THREE TIMES A DAY      Last office visit with prescribing clinician: 9/10/2021      Next office visit with prescribing clinician: 1/13/2022            Joy Felton LPN  11/01/21, 12:43 EDT

## 2021-11-04 ENCOUNTER — TELEPHONE (OUTPATIENT)
Dept: FAMILY MEDICINE CLINIC | Facility: CLINIC | Age: 66
End: 2021-11-04

## 2021-11-04 DIAGNOSIS — I10 ESSENTIAL HYPERTENSION: ICD-10-CM

## 2021-11-04 RX ORDER — HYDROCHLOROTHIAZIDE 25 MG/1
25 TABLET ORAL DAILY
Qty: 14 TABLET | Refills: 0 | Status: SHIPPED | OUTPATIENT
Start: 2021-11-04 | End: 2022-04-11 | Stop reason: SDUPTHER

## 2021-11-04 RX ORDER — HYDROCHLOROTHIAZIDE 25 MG/1
25 TABLET ORAL DAILY
Qty: 90 TABLET | Refills: 1 | Status: SHIPPED | OUTPATIENT
Start: 2021-11-04 | End: 2021-11-04 | Stop reason: SDUPTHER

## 2021-11-04 NOTE — TELEPHONE ENCOUNTER
Caller: Khalida Gilliland    Relationship: Self    Best call back number: [phone] (M)    What is the best time to reach you: ANYTIME    Who are you requesting to speak with (clinical staff, provider,  specific staff member): CLINICAL STAFF    Do you know the name of the person who called:     What was the call regarding: hydroCHLOROthiazide (HYDRODIURIL) 25 MG tablet. PATIENT IS REQUESTING A CALLBACK TO DISCUSS IF SHE IS STILL SUPPOSE TO TAKE THIS MEDICATION OR NOT.     Do you require a callback: YES         THANKS

## 2021-11-04 NOTE — TELEPHONE ENCOUNTER
If she has been taking it I would advise that she not stop until she is seen. This is to help control her blood pressure but has other uses as well.   Dr. George I think is her old PCP's partner. I am going to send a refill. Thanks.

## 2021-11-30 ENCOUNTER — TELEPHONE (OUTPATIENT)
Dept: FAMILY MEDICINE CLINIC | Facility: CLINIC | Age: 66
End: 2021-11-30

## 2021-11-30 DIAGNOSIS — G59 MONONEUROPATHY DUE TO UNDERLYING DISEASE: ICD-10-CM

## 2021-11-30 RX ORDER — PREGABALIN 100 MG/1
100 CAPSULE ORAL 3 TIMES DAILY
Qty: 90 CAPSULE | Refills: 0 | Status: CANCELLED | OUTPATIENT
Start: 2021-11-30

## 2021-11-30 NOTE — TELEPHONE ENCOUNTER
PATIENT WOULD LIKE TO FIND OUT IF SHE HAS BEEN VACCINATED FOR THE SHINGLES AND FLU VACCINES?  PLEASE ADVISE IF NOT SHE WOULD LIKE TO SCHEDULE AN APPOINTMENT.    PLEASE CONTACT PATIENT -[phone]

## 2021-11-30 NOTE — TELEPHONE ENCOUNTER
From her chart it looks like her last flu shot was last September 2020 so she would be recommended for a flu shot this fall 2021.   She did receive one shingles shot zostavax in 2014. This vaccine was pulled from the market because there is a newer and much more effective vaccine called Shingrix. It is recommended that even if she did receive the first vaccine to shingles that she receive this new 2 shot series. All these are available at her pharmacy but she should call to determine availability and if she needs an appointment.

## 2021-12-01 DIAGNOSIS — G59 MONONEUROPATHY DUE TO UNDERLYING DISEASE: ICD-10-CM

## 2021-12-01 RX ORDER — PREGABALIN 100 MG/1
100 CAPSULE ORAL 3 TIMES DAILY
Qty: 90 CAPSULE | Refills: 1 | Status: SHIPPED | OUTPATIENT
Start: 2021-12-01 | End: 2022-02-01 | Stop reason: SDUPTHER

## 2021-12-01 NOTE — TELEPHONE ENCOUNTER
Caller: Khalida Gilliland    Relationship: Self    Best call back number: [phone]    What was the call regarding: PATIENT CALLED AGAIN REGARDING MEDICATION, PLEASE SEND IN TODAY IF POSSIBLE. PLEASE CALL PATIENT TO ADVISE WHEN SENT.     Do you require a callback: YES

## 2021-12-01 NOTE — TELEPHONE ENCOUNTER
Rx Refill Note  Requested Prescriptions      No prescriptions requested or ordered in this encounter      Last office visit with prescribing clinician: 9/10/2021      Next office visit with prescribing clinician: 1/13/2022            Joy Felton LPN  12/01/21, 12:07 EST

## 2021-12-30 DIAGNOSIS — E11.9 TYPE 2 DIABETES MELLITUS WITHOUT COMPLICATION, WITHOUT LONG-TERM CURRENT USE OF INSULIN: ICD-10-CM

## 2021-12-30 DIAGNOSIS — E78.49 OTHER HYPERLIPIDEMIA: Primary | ICD-10-CM

## 2022-01-18 RX ORDER — PROPRANOLOL HYDROCHLORIDE 40 MG/1
TABLET ORAL
Qty: 180 TABLET | Refills: 3 | OUTPATIENT
Start: 2022-01-18

## 2022-01-30 ENCOUNTER — APPOINTMENT (OUTPATIENT)
Dept: GENERAL RADIOLOGY | Facility: HOSPITAL | Age: 67
End: 2022-01-30

## 2022-01-30 ENCOUNTER — HOSPITAL ENCOUNTER (EMERGENCY)
Facility: HOSPITAL | Age: 67
Discharge: HOME | End: 2022-01-30
Attending: EMERGENCY MEDICINE | Admitting: EMERGENCY MEDICINE

## 2022-01-30 VITALS
RESPIRATION RATE: 20 BRPM | TEMPERATURE: 96.5 F | HEART RATE: 74 BPM | SYSTOLIC BLOOD PRESSURE: 120 MMHG | OXYGEN SATURATION: 95 % | DIASTOLIC BLOOD PRESSURE: 66 MMHG | BODY MASS INDEX: 32.21 KG/M2 | HEIGHT: 68 IN | WEIGHT: 212.5 LBS

## 2022-01-30 DIAGNOSIS — J45.901 MILD ASTHMA WITH EXACERBATION, UNSPECIFIED WHETHER PERSISTENT: ICD-10-CM

## 2022-01-30 DIAGNOSIS — U07.1 COVID-19 VIRUS INFECTION: Primary | ICD-10-CM

## 2022-01-30 LAB
ALBUMIN SERPL-MCNC: 4.3 G/DL (ref 3.5–5.2)
ALBUMIN/GLOB SERPL: 1.4 G/DL
ALP SERPL-CCNC: 71 U/L (ref 39–117)
ALT SERPL W P-5'-P-CCNC: 58 U/L (ref 1–33)
ANION GAP SERPL CALCULATED.3IONS-SCNC: 10.8 MMOL/L (ref 5–15)
AST SERPL-CCNC: 34 U/L (ref 1–32)
B PARAPERT DNA SPEC QL NAA+PROBE: NOT DETECTED
B PERT DNA SPEC QL NAA+PROBE: NOT DETECTED
BASOPHILS # BLD AUTO: 0.04 10*3/MM3 (ref 0–0.2)
BASOPHILS NFR BLD AUTO: 0.6 % (ref 0–1.5)
BILIRUB SERPL-MCNC: 0.4 MG/DL (ref 0–1.2)
BUN SERPL-MCNC: 11 MG/DL (ref 8–23)
BUN/CREAT SERPL: 16.4 (ref 7–25)
C PNEUM DNA NPH QL NAA+NON-PROBE: NOT DETECTED
CALCIUM SPEC-SCNC: 9.7 MG/DL (ref 8.6–10.5)
CHLORIDE SERPL-SCNC: 101 MMOL/L (ref 98–107)
CO2 SERPL-SCNC: 27.2 MMOL/L (ref 22–29)
CREAT SERPL-MCNC: 0.67 MG/DL (ref 0.57–1)
DEPRECATED RDW RBC AUTO: 43.6 FL (ref 37–54)
EOSINOPHIL # BLD AUTO: 0.2 10*3/MM3 (ref 0–0.4)
EOSINOPHIL NFR BLD AUTO: 3.2 % (ref 0.3–6.2)
ERYTHROCYTE [DISTWIDTH] IN BLOOD BY AUTOMATED COUNT: 13 % (ref 12.3–15.4)
FLUAV SUBTYP SPEC NAA+PROBE: NOT DETECTED
FLUBV RNA ISLT QL NAA+PROBE: NOT DETECTED
GFR SERPL CREATININE-BSD FRML MDRD: 88 ML/MIN/1.73
GLOBULIN UR ELPH-MCNC: 3 GM/DL
GLUCOSE SERPL-MCNC: 141 MG/DL (ref 65–99)
HADV DNA SPEC NAA+PROBE: NOT DETECTED
HCOV 229E RNA SPEC QL NAA+PROBE: NOT DETECTED
HCOV HKU1 RNA SPEC QL NAA+PROBE: NOT DETECTED
HCOV NL63 RNA SPEC QL NAA+PROBE: NOT DETECTED
HCOV OC43 RNA SPEC QL NAA+PROBE: DETECTED
HCT VFR BLD AUTO: 41.7 % (ref 34–46.6)
HGB BLD-MCNC: 14 G/DL (ref 12–15.9)
HMPV RNA NPH QL NAA+NON-PROBE: NOT DETECTED
HOLD SPECIMEN: NORMAL
HOLD SPECIMEN: NORMAL
HPIV1 RNA ISLT QL NAA+PROBE: NOT DETECTED
HPIV2 RNA SPEC QL NAA+PROBE: NOT DETECTED
HPIV3 RNA NPH QL NAA+PROBE: NOT DETECTED
HPIV4 P GENE NPH QL NAA+PROBE: NOT DETECTED
IMM GRANULOCYTES # BLD AUTO: 0.03 10*3/MM3 (ref 0–0.05)
IMM GRANULOCYTES NFR BLD AUTO: 0.5 % (ref 0–0.5)
INR PPP: 1.03 (ref 0.9–1.1)
LYMPHOCYTES # BLD AUTO: 1.09 10*3/MM3 (ref 0.7–3.1)
LYMPHOCYTES NFR BLD AUTO: 17.3 % (ref 19.6–45.3)
M PNEUMO IGG SER IA-ACNC: NOT DETECTED
MCH RBC QN AUTO: 30.8 PG (ref 26.6–33)
MCHC RBC AUTO-ENTMCNC: 33.6 G/DL (ref 31.5–35.7)
MCV RBC AUTO: 91.6 FL (ref 79–97)
MONOCYTES # BLD AUTO: 0.6 10*3/MM3 (ref 0.1–0.9)
MONOCYTES NFR BLD AUTO: 9.5 % (ref 5–12)
NEUTROPHILS NFR BLD AUTO: 4.35 10*3/MM3 (ref 1.7–7)
NEUTROPHILS NFR BLD AUTO: 68.9 % (ref 42.7–76)
NRBC BLD AUTO-RTO: 0.2 /100 WBC (ref 0–0.2)
NT-PROBNP SERPL-MCNC: 19.3 PG/ML (ref 0–900)
PLAT MORPH BLD: NORMAL
PLATELET # BLD AUTO: 169 10*3/MM3 (ref 140–450)
PMV BLD AUTO: 11.4 FL (ref 6–12)
POTASSIUM SERPL-SCNC: 4.8 MMOL/L (ref 3.5–5.2)
PROT SERPL-MCNC: 7.3 G/DL (ref 6–8.5)
PROTHROMBIN TIME: 13.3 SECONDS (ref 11.7–14.2)
QT INTERVAL: 391 MS
RBC # BLD AUTO: 4.55 10*6/MM3 (ref 3.77–5.28)
RBC MORPH BLD: NORMAL
RHINOVIRUS RNA SPEC NAA+PROBE: NOT DETECTED
RSV RNA NPH QL NAA+NON-PROBE: NOT DETECTED
SARS-COV-2 RNA NPH QL NAA+NON-PROBE: DETECTED
SODIUM SERPL-SCNC: 139 MMOL/L (ref 136–145)
TROPONIN T SERPL-MCNC: <0.01 NG/ML (ref 0–0.03)
WBC MORPH BLD: NORMAL
WBC NRBC COR # BLD: 6.31 10*3/MM3 (ref 3.4–10.8)
WHOLE BLOOD HOLD SPECIMEN: NORMAL
WHOLE BLOOD HOLD SPECIMEN: NORMAL

## 2022-01-30 PROCEDURE — 94640 AIRWAY INHALATION TREATMENT: CPT

## 2022-01-30 PROCEDURE — 83880 ASSAY OF NATRIURETIC PEPTIDE: CPT | Performed by: EMERGENCY MEDICINE

## 2022-01-30 PROCEDURE — 0202U NFCT DS 22 TRGT SARS-COV-2: CPT | Performed by: EMERGENCY MEDICINE

## 2022-01-30 PROCEDURE — 85610 PROTHROMBIN TIME: CPT | Performed by: EMERGENCY MEDICINE

## 2022-01-30 PROCEDURE — 85025 COMPLETE CBC W/AUTO DIFF WBC: CPT | Performed by: EMERGENCY MEDICINE

## 2022-01-30 PROCEDURE — 84484 ASSAY OF TROPONIN QUANT: CPT | Performed by: EMERGENCY MEDICINE

## 2022-01-30 PROCEDURE — 94799 UNLISTED PULMONARY SVC/PX: CPT

## 2022-01-30 PROCEDURE — 93010 ELECTROCARDIOGRAM REPORT: CPT | Performed by: INTERNAL MEDICINE

## 2022-01-30 PROCEDURE — 71045 X-RAY EXAM CHEST 1 VIEW: CPT

## 2022-01-30 PROCEDURE — 80053 COMPREHEN METABOLIC PANEL: CPT | Performed by: EMERGENCY MEDICINE

## 2022-01-30 PROCEDURE — 99284 EMERGENCY DEPT VISIT MOD MDM: CPT

## 2022-01-30 PROCEDURE — 85007 BL SMEAR W/DIFF WBC COUNT: CPT | Performed by: EMERGENCY MEDICINE

## 2022-01-30 PROCEDURE — 93005 ELECTROCARDIOGRAM TRACING: CPT | Performed by: EMERGENCY MEDICINE

## 2022-01-30 PROCEDURE — 94761 N-INVAS EAR/PLS OXIMETRY MLT: CPT

## 2022-01-30 RX ORDER — ALBUTEROL SULFATE 2.5 MG/3ML
2.5 SOLUTION RESPIRATORY (INHALATION) ONCE
Status: COMPLETED | OUTPATIENT
Start: 2022-01-30 | End: 2022-01-30

## 2022-01-30 RX ORDER — PREDNISONE 20 MG/1
60 TABLET ORAL DAILY
Qty: 9 TABLET | Refills: 0 | Status: SHIPPED | OUTPATIENT
Start: 2022-01-30 | End: 2022-02-03 | Stop reason: HOSPADM

## 2022-01-30 RX ORDER — IPRATROPIUM BROMIDE AND ALBUTEROL SULFATE 2.5; .5 MG/3ML; MG/3ML
3 SOLUTION RESPIRATORY (INHALATION) ONCE
Status: COMPLETED | OUTPATIENT
Start: 2022-01-30 | End: 2022-01-30

## 2022-01-30 RX ORDER — SODIUM CHLORIDE 0.9 % (FLUSH) 0.9 %
10 SYRINGE (ML) INJECTION AS NEEDED
Status: DISCONTINUED | OUTPATIENT
Start: 2022-01-30 | End: 2022-01-30 | Stop reason: HOSPADM

## 2022-01-30 RX ADMIN — ALBUTEROL SULFATE 2.5 MG: 2.5 SOLUTION RESPIRATORY (INHALATION) at 14:00

## 2022-01-30 RX ADMIN — IPRATROPIUM BROMIDE AND ALBUTEROL SULFATE 3 ML: 2.5; .5 SOLUTION RESPIRATORY (INHALATION) at 13:57

## 2022-01-31 ENCOUNTER — TELEPHONE (OUTPATIENT)
Dept: FAMILY MEDICINE CLINIC | Facility: CLINIC | Age: 67
End: 2022-01-31

## 2022-01-31 NOTE — TELEPHONE ENCOUNTER
Hub staff attempted to follow warm transfer process and was unsuccessful     Caller: Khalida Gilliland    Relationship to patient: Self    Best call back number: [phone] (M)    Patient is needing: TO RESCHEDULE LAB WORK THAT WAS CANCELLED DUE TO HER COVID POSITIVE STATUS, PATIENT HAS RESCHEDULED HER OFFICE VISIT FOR 03/07/2022, PLEASE CALL PATIENT BACK TO SCHEDULE ASAP

## 2022-01-31 NOTE — TELEPHONE ENCOUNTER
Caller:     Relationship:     Best call back number: *  Khalida Gilliland (Self) [phone] (M)         What medication are you requesting: COUGH MEDS (NOT THE PERLES), SINUS MEDS  ANTIBIOTIC     What are your current symptoms: COUGH, SINUS PRESSURE, HEADACHE     How long have you been experiencing symptoms:     Have you had these symptoms before:    [] Yes  [] No    Have you been treated for these symptoms before:   [] Yes  [] No    If a prescription is needed, what is your preferred pharmacy and phone number:      [facility] [id number] [place] [address] - [phone] [facility] [phone]   [phone]        Additional notes:DIAGNOSED WITH COVID AT  / ER

## 2022-02-01 DIAGNOSIS — G59 MONONEUROPATHY DUE TO UNDERLYING DISEASE: ICD-10-CM

## 2022-02-01 RX ORDER — PREGABALIN 100 MG/1
100 CAPSULE ORAL 3 TIMES DAILY
Qty: 90 CAPSULE | Refills: 1 | Status: SHIPPED | OUTPATIENT
Start: 2022-02-01 | End: 2022-03-07

## 2022-02-01 NOTE — TELEPHONE ENCOUNTER
Spoke with this patient in detail. Voiced understanding. After speaking with Michael, she advises that there is no abx needed at this time, she is viral with COVID infection. Recommendations are antihistamines and mucinex for drainage, delsym or dimetapp for cough and can use tylenol and IBU for fever or pain. Told this patient to stay well hydrated with water. Voiced understanding. Asked about her medication request on lyrica. Advised her we are in clinic and this has been sent over to dr. Rodriguez for review but she will address at the end of the day.

## 2022-02-01 NOTE — TELEPHONE ENCOUNTER
Rx Refill Note  Requested Prescriptions     Pending Prescriptions Disp Refills   • pregabalin (LYRICA) 100 MG capsule 90 capsule 1     Sig: Take 1 capsule by mouth 3 (Three) Times a Day.      Last office visit with prescribing clinician: 9/10/2021      Next office visit with prescribing clinician: 3/7/2022            Joy Felton LPN  02/01/22, 14:20 EST

## 2022-02-01 NOTE — TELEPHONE ENCOUNTER
Caller: Khalida Gilliland    Relationship: Self    Best call back number: [phone]    Requested Prescriptions:   Requested Prescriptions     Pending Prescriptions Disp Refills   • pregabalin (LYRICA) 100 MG capsule 90 capsule 1     Sig: Take 1 capsule by mouth 3 (Three) Times a Day.        Pharmacy where request should be sent: [address] [phone] [facility] [phone] FX     Additional details provided by patient: ONLY  HAS ONE DAY LEFT   Does the patient have less than a 3 day supply:  [x] Yes  [] No    Ann Marie Ovalles, [employer] Rep   02/01/22 13:52 EST

## 2022-02-02 ENCOUNTER — TELEPHONE (OUTPATIENT)
Dept: FAMILY MEDICINE CLINIC | Facility: CLINIC | Age: 67
End: 2022-02-02

## 2022-02-02 ENCOUNTER — HOSPITAL ENCOUNTER (OUTPATIENT)
Facility: HOSPITAL | Age: 67
Setting detail: OBSERVATION
LOS: 1 days | Discharge: HOME | End: 2022-02-03
Attending: EMERGENCY MEDICINE | Admitting: INTERNAL MEDICINE

## 2022-02-02 ENCOUNTER — APPOINTMENT (OUTPATIENT)
Dept: GENERAL RADIOLOGY | Facility: HOSPITAL | Age: 67
End: 2022-02-02

## 2022-02-02 DIAGNOSIS — U07.1 COVID-19 VIRUS INFECTION: Primary | ICD-10-CM

## 2022-02-02 DIAGNOSIS — J45.21 INTERMITTENT ASTHMA WITH ACUTE EXACERBATION, UNSPECIFIED ASTHMA SEVERITY: ICD-10-CM

## 2022-02-02 PROCEDURE — 99283 EMERGENCY DEPT VISIT LOW MDM: CPT

## 2022-02-02 PROCEDURE — 99284 EMERGENCY DEPT VISIT MOD MDM: CPT

## 2022-02-02 PROCEDURE — 71045 X-RAY EXAM CHEST 1 VIEW: CPT

## 2022-02-02 RX ORDER — SODIUM CHLORIDE 0.9 % (FLUSH) 0.9 %
10 SYRINGE (ML) INJECTION AS NEEDED
Status: DISCONTINUED | OUTPATIENT
Start: 2022-02-02 | End: 2022-02-03 | Stop reason: HOSPADM

## 2022-02-02 RX ORDER — IPRATROPIUM BROMIDE AND ALBUTEROL SULFATE 2.5; .5 MG/3ML; MG/3ML
3 SOLUTION RESPIRATORY (INHALATION) ONCE
Status: COMPLETED | OUTPATIENT
Start: 2022-02-02 | End: 2022-02-03

## 2022-02-02 RX ORDER — DEXAMETHASONE SODIUM PHOSPHATE 10 MG/ML
6 INJECTION INTRAMUSCULAR; INTRAVENOUS ONCE
Status: COMPLETED | OUTPATIENT
Start: 2022-02-02 | End: 2022-02-03

## 2022-02-02 NOTE — TELEPHONE ENCOUNTER
Pt called in and states she is covid positive. Her breathing is very short of breath. Her oxygen level is about 95 and has no appetite, and has a diffuser going all day but she is very concerned about her breathing. She is requesting Albuterol sulfade 125MG  to go in her nebulizer. Pt states she has 6 boxes of these right now but they are all . She is wheezing. Pt does not think she can wait for this. Pt states she is only getting worse.     She is also wondering if it would be harmful if she would use her ?

## 2022-02-03 ENCOUNTER — TELEPHONE (OUTPATIENT)
Dept: FAMILY MEDICINE CLINIC | Facility: CLINIC | Age: 67
End: 2022-02-03

## 2022-02-03 ENCOUNTER — READMISSION MANAGEMENT (OUTPATIENT)
Dept: CALL CENTER | Facility: HOSPITAL | Age: 67
End: 2022-02-03

## 2022-02-03 VITALS
OXYGEN SATURATION: 94 % | BODY MASS INDEX: 32.04 KG/M2 | DIASTOLIC BLOOD PRESSURE: 74 MMHG | HEIGHT: 68 IN | HEART RATE: 89 BPM | TEMPERATURE: 98 F | RESPIRATION RATE: 18 BRPM | WEIGHT: 211.42 LBS | SYSTOLIC BLOOD PRESSURE: 120 MMHG

## 2022-02-03 PROBLEM — E66.9 OBESITY (BMI 30-39.9): Status: ACTIVE | Noted: 2022-02-03

## 2022-02-03 PROBLEM — D89.831 CYTOKINE RELEASE SYNDROME, GRADE 1: Status: ACTIVE | Noted: 2022-02-03

## 2022-02-03 LAB
ALBUMIN SERPL-MCNC: 4.4 G/DL (ref 3.5–5.2)
ALBUMIN/GLOB SERPL: 1.4 G/DL
ALP SERPL-CCNC: 63 U/L (ref 39–117)
ALT SERPL W P-5'-P-CCNC: 45 U/L (ref 1–33)
ANION GAP SERPL CALCULATED.3IONS-SCNC: 12.2 MMOL/L (ref 5–15)
AST SERPL-CCNC: 28 U/L (ref 1–32)
BASOPHILS # BLD AUTO: 0.02 10*3/MM3 (ref 0–0.2)
BASOPHILS NFR BLD AUTO: 0.2 % (ref 0–1.5)
BILIRUB SERPL-MCNC: 0.2 MG/DL (ref 0–1.2)
BUN SERPL-MCNC: 19 MG/DL (ref 8–23)
BUN/CREAT SERPL: 29.7 (ref 7–25)
CALCIUM SPEC-SCNC: 9.7 MG/DL (ref 8.6–10.5)
CHLORIDE SERPL-SCNC: 99 MMOL/L (ref 98–107)
CO2 SERPL-SCNC: 24.8 MMOL/L (ref 22–29)
CREAT SERPL-MCNC: 0.64 MG/DL (ref 0.57–1)
CRP SERPL-MCNC: 0.38 MG/DL (ref 0–0.5)
D DIMER PPP FEU-MCNC: 0.75 MCGFEU/ML (ref 0–0.49)
DEPRECATED RDW RBC AUTO: 41.9 FL (ref 37–54)
EOSINOPHIL # BLD AUTO: 0.01 10*3/MM3 (ref 0–0.4)
EOSINOPHIL NFR BLD AUTO: 0.1 % (ref 0.3–6.2)
ERYTHROCYTE [DISTWIDTH] IN BLOOD BY AUTOMATED COUNT: 12.8 % (ref 12.3–15.4)
GFR SERPL CREATININE-BSD FRML MDRD: 93 ML/MIN/1.73
GLOBULIN UR ELPH-MCNC: 3.2 GM/DL
GLUCOSE SERPL-MCNC: 184 MG/DL (ref 65–99)
HCT VFR BLD AUTO: 41.3 % (ref 34–46.6)
HGB BLD-MCNC: 13.8 G/DL (ref 12–15.9)
IMM GRANULOCYTES # BLD AUTO: 0.02 10*3/MM3 (ref 0–0.05)
IMM GRANULOCYTES NFR BLD AUTO: 0.2 % (ref 0–0.5)
LYMPHOCYTES # BLD AUTO: 2.73 10*3/MM3 (ref 0.7–3.1)
LYMPHOCYTES NFR BLD AUTO: 32.2 % (ref 19.6–45.3)
MCH RBC QN AUTO: 30.1 PG (ref 26.6–33)
MCHC RBC AUTO-ENTMCNC: 33.4 G/DL (ref 31.5–35.7)
MCV RBC AUTO: 90 FL (ref 79–97)
MONOCYTES # BLD AUTO: 0.97 10*3/MM3 (ref 0.1–0.9)
MONOCYTES NFR BLD AUTO: 11.4 % (ref 5–12)
NEUTROPHILS NFR BLD AUTO: 4.74 10*3/MM3 (ref 1.7–7)
NEUTROPHILS NFR BLD AUTO: 55.9 % (ref 42.7–76)
NRBC BLD AUTO-RTO: 0 /100 WBC (ref 0–0.2)
NT-PROBNP SERPL-MCNC: 96.9 PG/ML (ref 0–900)
PLATELET # BLD AUTO: 263 10*3/MM3 (ref 140–450)
PMV BLD AUTO: 10.2 FL (ref 6–12)
POTASSIUM SERPL-SCNC: 4.2 MMOL/L (ref 3.5–5.2)
PROCALCITONIN SERPL-MCNC: 0.03 NG/ML (ref 0–0.25)
PROT SERPL-MCNC: 7.6 G/DL (ref 6–8.5)
RBC # BLD AUTO: 4.59 10*6/MM3 (ref 3.77–5.28)
SODIUM SERPL-SCNC: 136 MMOL/L (ref 136–145)
TROPONIN T SERPL-MCNC: <0.01 NG/ML (ref 0–0.03)
WBC NRBC COR # BLD: 8.49 10*3/MM3 (ref 3.4–10.8)

## 2022-02-03 PROCEDURE — 83880 ASSAY OF NATRIURETIC PEPTIDE: CPT | Performed by: EMERGENCY MEDICINE

## 2022-02-03 PROCEDURE — 94640 AIRWAY INHALATION TREATMENT: CPT

## 2022-02-03 PROCEDURE — 25010000002 DEXAMETHASONE PER 1 MG: Performed by: EMERGENCY MEDICINE

## 2022-02-03 PROCEDURE — 94799 UNLISTED PULMONARY SVC/PX: CPT

## 2022-02-03 PROCEDURE — 94618 PULMONARY STRESS TESTING: CPT

## 2022-02-03 PROCEDURE — 63710000001 DEXAMETHASONE PER 0.25 MG: Performed by: NURSE PRACTITIONER

## 2022-02-03 PROCEDURE — 94761 N-INVAS EAR/PLS OXIMETRY MLT: CPT

## 2022-02-03 PROCEDURE — 96374 THER/PROPH/DIAG INJ IV PUSH: CPT

## 2022-02-03 PROCEDURE — 25010000002 ENOXAPARIN PER 10 MG: Performed by: NURSE PRACTITIONER

## 2022-02-03 PROCEDURE — G0378 HOSPITAL OBSERVATION PER HR: HCPCS

## 2022-02-03 PROCEDURE — 85379 FIBRIN DEGRADATION QUANT: CPT | Performed by: NURSE PRACTITIONER

## 2022-02-03 PROCEDURE — 84484 ASSAY OF TROPONIN QUANT: CPT | Performed by: EMERGENCY MEDICINE

## 2022-02-03 PROCEDURE — 80053 COMPREHEN METABOLIC PANEL: CPT | Performed by: EMERGENCY MEDICINE

## 2022-02-03 PROCEDURE — 96372 THER/PROPH/DIAG INJ SC/IM: CPT

## 2022-02-03 PROCEDURE — 94664 DEMO&/EVAL PT USE INHALER: CPT

## 2022-02-03 PROCEDURE — 84145 PROCALCITONIN (PCT): CPT | Performed by: NURSE PRACTITIONER

## 2022-02-03 PROCEDURE — 85025 COMPLETE CBC W/AUTO DIFF WBC: CPT | Performed by: EMERGENCY MEDICINE

## 2022-02-03 PROCEDURE — 86140 C-REACTIVE PROTEIN: CPT | Performed by: NURSE PRACTITIONER

## 2022-02-03 RX ORDER — PROPRANOLOL HYDROCHLORIDE 20 MG/1
40 TABLET ORAL 2 TIMES DAILY
Status: DISCONTINUED | OUTPATIENT
Start: 2022-02-03 | End: 2022-02-03 | Stop reason: HOSPADM

## 2022-02-03 RX ORDER — GLIPIZIDE 5 MG/1
2.5 TABLET ORAL
Refills: 3 | Status: DISCONTINUED | OUTPATIENT
Start: 2022-02-03 | End: 2022-02-03 | Stop reason: HOSPADM

## 2022-02-03 RX ORDER — NITROGLYCERIN 0.4 MG/1
0.4 TABLET SUBLINGUAL
Status: DISCONTINUED | OUTPATIENT
Start: 2022-02-03 | End: 2022-02-03 | Stop reason: HOSPADM

## 2022-02-03 RX ORDER — MONTELUKAST SODIUM 10 MG/1
10 TABLET ORAL NIGHTLY
Status: DISCONTINUED | OUTPATIENT
Start: 2022-02-03 | End: 2022-02-03 | Stop reason: HOSPADM

## 2022-02-03 RX ORDER — PANTOPRAZOLE SODIUM 40 MG/1
40 TABLET, DELAYED RELEASE ORAL EVERY MORNING
Refills: 3 | Status: DISCONTINUED | OUTPATIENT
Start: 2022-02-03 | End: 2022-02-03 | Stop reason: HOSPADM

## 2022-02-03 RX ORDER — INSULIN LISPRO 100 [IU]/ML
0-7 INJECTION, SOLUTION INTRAVENOUS; SUBCUTANEOUS
Status: DISCONTINUED | OUTPATIENT
Start: 2022-02-03 | End: 2022-02-03 | Stop reason: HOSPADM

## 2022-02-03 RX ORDER — HYDROCHLOROTHIAZIDE 25 MG/1
25 TABLET ORAL DAILY
Status: DISCONTINUED | OUTPATIENT
Start: 2022-02-03 | End: 2022-02-03 | Stop reason: HOSPADM

## 2022-02-03 RX ORDER — DEXAMETHASONE SODIUM PHOSPHATE 10 MG/ML
6 INJECTION INTRAMUSCULAR; INTRAVENOUS DAILY
Status: DISCONTINUED | OUTPATIENT
Start: 2022-02-03 | End: 2022-02-03 | Stop reason: HOSPADM

## 2022-02-03 RX ORDER — BUTALBITAL, ACETAMINOPHEN AND CAFFEINE 50; 325; 40 MG/1; MG/1; MG/1
1 TABLET ORAL 2 TIMES DAILY PRN
Status: DISCONTINUED | OUTPATIENT
Start: 2022-02-03 | End: 2022-02-03 | Stop reason: HOSPADM

## 2022-02-03 RX ORDER — CYCLOSPORINE 0.5 MG/ML
1 EMULSION OPHTHALMIC 2 TIMES DAILY
Status: DISCONTINUED | OUTPATIENT
Start: 2022-02-03 | End: 2022-02-03 | Stop reason: HOSPADM

## 2022-02-03 RX ORDER — DEXTROSE MONOHYDRATE 25 G/50ML
25 INJECTION, SOLUTION INTRAVENOUS
Status: DISCONTINUED | OUTPATIENT
Start: 2022-02-03 | End: 2022-02-03 | Stop reason: HOSPADM

## 2022-02-03 RX ORDER — ATORVASTATIN CALCIUM 20 MG/1
20 TABLET, FILM COATED ORAL DAILY
Status: DISCONTINUED | OUTPATIENT
Start: 2022-02-03 | End: 2022-02-03 | Stop reason: HOSPADM

## 2022-02-03 RX ORDER — BUDESONIDE AND FORMOTEROL FUMARATE DIHYDRATE 160; 4.5 UG/1; UG/1
2 AEROSOL RESPIRATORY (INHALATION)
Status: DISCONTINUED | OUTPATIENT
Start: 2022-02-03 | End: 2022-02-03 | Stop reason: HOSPADM

## 2022-02-03 RX ORDER — ALBUTEROL SULFATE 2.5 MG/3ML
2.5 SOLUTION RESPIRATORY (INHALATION) EVERY 4 HOURS PRN
Qty: 360 ML | Refills: 0 | Status: SHIPPED | OUTPATIENT
Start: 2022-02-03

## 2022-02-03 RX ORDER — NICOTINE POLACRILEX 4 MG
15 LOZENGE BUCCAL
Status: DISCONTINUED | OUTPATIENT
Start: 2022-02-03 | End: 2022-02-03 | Stop reason: HOSPADM

## 2022-02-03 RX ORDER — FLUTICASONE PROPIONATE 50 MCG
2 SPRAY, SUSPENSION (ML) NASAL DAILY
Status: DISCONTINUED | OUTPATIENT
Start: 2022-02-03 | End: 2022-02-03 | Stop reason: HOSPADM

## 2022-02-03 RX ORDER — PREGABALIN 100 MG/1
100 CAPSULE ORAL 3 TIMES DAILY
Status: DISCONTINUED | OUTPATIENT
Start: 2022-02-03 | End: 2022-02-03 | Stop reason: HOSPADM

## 2022-02-03 RX ORDER — CETIRIZINE HYDROCHLORIDE 10 MG/1
10 TABLET ORAL DAILY
Status: DISCONTINUED | OUTPATIENT
Start: 2022-02-03 | End: 2022-02-03 | Stop reason: HOSPADM

## 2022-02-03 RX ORDER — METHYLPREDNISOLONE 4 MG/1
TABLET ORAL
Qty: 21 EACH | Refills: 0 | Status: SHIPPED | OUTPATIENT
Start: 2022-02-03 | End: 2022-03-07

## 2022-02-03 RX ORDER — LISINOPRIL 10 MG/1
10 TABLET ORAL DAILY
Status: DISCONTINUED | OUTPATIENT
Start: 2022-02-03 | End: 2022-02-03 | Stop reason: HOSPADM

## 2022-02-03 RX ORDER — ASPIRIN 81 MG/1
81 TABLET ORAL DAILY
Status: DISCONTINUED | OUTPATIENT
Start: 2022-02-03 | End: 2022-02-03 | Stop reason: HOSPADM

## 2022-02-03 RX ADMIN — GLIPIZIDE 2.5 MG: 5 TABLET ORAL at 10:04

## 2022-02-03 RX ADMIN — ASPIRIN 81 MG: 81 TABLET, COATED ORAL at 10:04

## 2022-02-03 RX ADMIN — BUDESONIDE AND FORMOTEROL FUMARATE DIHYDRATE 2 PUFF: 160; 4.5 AEROSOL RESPIRATORY (INHALATION) at 12:33

## 2022-02-03 RX ADMIN — FLUTICASONE PROPIONATE 2 SPRAY: 50 SPRAY, METERED NASAL at 10:03

## 2022-02-03 RX ADMIN — ATORVASTATIN CALCIUM 20 MG: 20 TABLET, FILM COATED ORAL at 10:04

## 2022-02-03 RX ADMIN — BUTALBITAL, ACETAMINOPHEN, AND CAFFEINE 1 TABLET: 50; 325; 40 TABLET ORAL at 10:23

## 2022-02-03 RX ADMIN — CETIRIZINE HYDROCHLORIDE 10 MG: 10 TABLET ORAL at 10:04

## 2022-02-03 RX ADMIN — PANTOPRAZOLE SODIUM 40 MG: 40 TABLET, DELAYED RELEASE ORAL at 10:04

## 2022-02-03 RX ADMIN — CYCLOSPORINE 1 DROP: 0.5 EMULSION OPHTHALMIC at 10:03

## 2022-02-03 RX ADMIN — DEXAMETHASONE 6 MG: 4 TABLET ORAL at 10:04

## 2022-02-03 RX ADMIN — PROPRANOLOL HYDROCHLORIDE 40 MG: 20 TABLET ORAL at 10:04

## 2022-02-03 RX ADMIN — IPRATROPIUM BROMIDE AND ALBUTEROL SULFATE 3 ML: 2.5; .5 SOLUTION RESPIRATORY (INHALATION) at 00:25

## 2022-02-03 RX ADMIN — LISINOPRIL 10 MG: 10 TABLET ORAL at 13:07

## 2022-02-03 RX ADMIN — HYDROCHLOROTHIAZIDE 25 MG: 25 TABLET ORAL at 10:04

## 2022-02-03 RX ADMIN — DEXAMETHASONE SODIUM PHOSPHATE 6 MG: 10 INJECTION, SOLUTION INTRAMUSCULAR; INTRAVENOUS at 00:07

## 2022-02-03 RX ADMIN — ENOXAPARIN SODIUM 40 MG: 100 INJECTION SUBCUTANEOUS at 10:04

## 2022-02-03 RX ADMIN — PREGABALIN 100 MG: 100 CAPSULE ORAL at 10:04

## 2022-02-03 NOTE — H&P
"    Patient Name:  Khalida Gilliland  YOB: 1955  MRN:  [id number]  Admit Date:  2/2/2022  Patient Care Team:  Erika Rodriguez MD as PCP - General (Family Medicine)  Lex Morris MD as Consulting Physician (Hematology and Oncology)  Mikael David MD as Referring Physician (Internal Medicine)      Subjective   History Present Illness     Chief Complaint   Patient presents with   • Shortness of Breath       Ms. Gilliland is a 66 y.o. former smoker with a history of GERD, asthma, fatty liver, migraines, peptic ulceration, obesity who presents to [facility] ER chief complaint of shortness of breath admitted for COVID-19 virus infection.    History of COVID following vaccination & received infusion & developed reaction described as violent shaking requiring hospitalization in Spring 2021.  Saturday \"not feeling great\" & went to urgent care on Sunday & couldn't get breathing treatment so got steroid shot & went to [facility] & received breathing treatment with steroids for 3 days (completed).  Worse yesterday & awake all night with cool mist humidifier with wheezing & intermittent cough; therefore, returned to [facility] ER with reported oxygen saturation 85-93% on room air & feels like \"throat tight\" & difficult to talk & trouble getting a deep breath.      Denies history of blood clots or CAD.      Recommendations pending hospital course.  Details below in A/P.     History of Present Illness  Review of Systems   Constitutional: Positive for activity change and fatigue. Negative for chills and fever.   HENT: Negative for congestion and rhinorrhea.    Respiratory: Positive for cough (dry) and shortness of breath.    Cardiovascular: Positive for chest pain (heaviness of chest). Negative for leg swelling.   Gastrointestinal: Negative for abdominal pain, constipation, diarrhea, nausea and vomiting.   Endocrine: Negative for polydipsia, polyphagia and polyuria.   Genitourinary: Negative for " difficulty urinating and dysuria.   Musculoskeletal: Positive for back pain (right lateral aggravated by cough) and gait problem. Negative for myalgias.   Skin: Negative for rash and wound.   Neurological: Positive for weakness (generalized), light-headedness and headaches.   Psychiatric/Behavioral: Negative for confusion and hallucinations.        Personal History     Past Medical History:   Diagnosis Date   • Abdominal pain, right lower quadrant    • Anemia    • Asthma    • Cancer (HCC) 2014    Basal Cell Carcinoma Left Arm,SKIN CANCER   • COVID-19 2021   • Cyst of left kidney    • Diverticulosis    • Fatty liver    • GERD (gastroesophageal reflux disease)    • History of kidney stones 2011   • History of surgery for cerebral aneurysm     Clipping of cerebral Aneurysm   • Hx of migraines    • Insomnia    • Neck pain    • Peptic ulceration    • Pneumonia      Past Surgical History:   Procedure Laterality Date   • APPENDECTOMY      Emergency at time of 2nd    • BASAL CELL CARCINOMA EXCISION Left 2014    Excision basal cell carcinoma, left arm (1.1 cm) with frozen section control and layered wound closure ( 3.1 cm), Dr. Isael Andrade, [facility]   • BRAIN SURGERY  2004    Ruptured aneurysm, clipped/Dr. Sims   • CEREBRAL ANEURYSM REPAIR      clipping of cerebral aneurysm   • CEREBRAL ANGIOGRAM N/A 2017    Procedure: CEREBRAL ANGIOGRAM ;  Surgeon: Orlando Bella MD;  Location: [facility] OR ;  Service:    • CEREBRAL ANGIOGRAM N/A 10/11/2017    Procedure: CEREBRAL ANGIOGRAM;  Surgeon: Orlando Bella MD;  Location: [facility] OR ;  Service:    •  SECTION  , ,    • CHOLECYSTECTOMY     • COLON RESECTION WITH COLOSTOMY Right    • COLONOSCOPY  2009   • COLONOSCOPY N/A 2020    Procedure: COLONOSCOPY to terminal ileum;  Surgeon: Luiza Norris MD;  Location: [facility] ENDOSCOPY;  Service: Gastroenterology;  Laterality: N/A;  PREOP/  SCREENING  POSTOP/ DIVERTICULOSIS. POOR PREP   • EMBOLIZATION CEREBRAL N/A 5/8/2017    Procedure: Cerebral angiography and embolization of cerebral aneurysm with Pipeline Embolization Device;  Surgeon: Callum Bella MD;  Location: [facility] OR 18/19;  Service:    • EMBOLIZATION CEREBRAL N/A 10/31/2018    Procedure: Cerebral angiogram with embolization of cerebral aneurysm;  Surgeon: Callum Bella MD;  Location: [facility] OR 18/19;  Service: Neurosurgery   • ILEOSTOMY REVISION     • INCISIONAL HERNIA REPAIR  6/229    Patient suffered some nerve entrapment secondary to the attack, some of which were removed during a secondary operation.   • INGUINAL HERNIA REPAIR     • LARYNX SURGERY     • OSTOMY TAKE DOWN     • TUBAL ABDOMINAL LIGATION     • URETERAL STENT INSERTION  06/2011    Due to kidney stones     Family History   Problem Relation Age of Onset   • Skin cancer Mother    • Dementia Mother    • KALYAN disease Mother    • Heart disease Father    • Heart attack Father    • Hypertension Father    • Aneurysm Son         cerebral   • Nephrolithiasis Son    • Suicide Attempts Brother    • Malig Hyperthermia Neg Hx      Social History     Tobacco Use   • Smoking status: Former Smoker     Years: 1.00     Types: Cigarettes   • Smokeless tobacco: Never Used   Vaping Use   • Vaping Use: Never used   Substance Use Topics   • Alcohol use: Not Currently   • Drug use: No     No current facility-administered medications on file prior to encounter.     Current Outpatient Medications on File Prior to Encounter   Medication Sig Dispense Refill   • albuterol sulfate  (90 Base) MCG/ACT inhaler Inhale 2 puffs Every 4 (Four) Hours As Needed for Wheezing or Shortness of Air (Cough). 1 inhaler 0   • Ascorbic Acid (VITAMIN C PO) Take 1,000 mg by mouth Daily. PT INSTRUCTED TO CONTINUE PER DR. CALLUM BELLA     • aspirin 81 MG EC tablet Take 1 tablet by mouth Daily. 30 tablet 6   • atorvastatin (LIPITOR) 20 MG tablet  TAKE 1 TABLET DAILY 90 tablet 3   • budesonide-formoterol (SYMBICORT) 160-4.5 MCG/ACT inhaler Inhale 2 puffs 2 (Two) Times a Day.     • butalbital-acetaminophen-caffeine (FIORICET, ESGIC) -40 MG per tablet Take 1 tablet by mouth 2 (Two) Times a Day. 20 tablet 1   • cetirizine (zyrTEC) 10 MG tablet TAKE 1 TABLET DAILY 90 tablet 3   • cholecalciferol (VITAMIN D3) 1000 units tablet Take 1,000 Units by mouth Daily. PER PT TO CONTINUE PER DR. CALLUM URBINA     • Coenzyme Q10 (CO Q 10 PO) Take  by mouth.     • cycloSPORINE (RESTASIS) 0.05 % ophthalmic emulsion Administer 1 drop to both eyes 2 (Two) Times a Day.     • diphenhydrAMINE-acetaminophen (TYLENOL PM)  MG tablet per tablet Take 1 tablet by mouth At Night As Needed for Sleep.     • fluticasone (FLONASE) 50 MCG/ACT nasal spray 2 sprays by Each Nare route Daily. 29.7 mL 3   • Ginkgo Biloba 40 MG tablet Take  by mouth.     • glipiZIDE XL 5 MG ER tablet TAKE 1 TABLET DAILY 90 tablet 3   • glucosamine-chondroitin 500-400 MG capsule capsule Take 1 capsule by mouth 2 (Two) Times a Day With Meals. PT TO CONTINUE PER DR. CALLUM URBINA     • hydroCHLOROthiazide (HYDRODIURIL) 25 MG tablet Take 1 tablet by mouth Daily. 14 tablet 0   • Januvia 100 MG tablet TAKE 1 TABLET DAILY 90 tablet 3   • lisinopril (PRINIVIL,ZESTRIL) 10 MG tablet TAKE 1 TABLET DAILY 90 tablet 3   • montelukast (SINGULAIR) 10 MG tablet Take 1 tablet by mouth Every Night. 30 tablet 0   • NYSTATIN 634464 UNIT/GM topical powder      • Omega-3 Fatty Acids (FISH OIL) 1000 MG capsule capsule Take  by mouth Daily With Breakfast.     • omeprazole (priLOSEC) 40 MG capsule Take 1 capsule by mouth Every Morning. 90 capsule 3   • Potassium Gluconate 550 MG tablet Take  by mouth.     • pregabalin (LYRICA) 100 MG capsule Take 1 capsule by mouth 3 (Three) Times a Day. 90 capsule 1   • Probiotic Product (PROBIOTIC-10 PO) Take  by mouth.     • propranolol (INDERAL) 40 MG tablet TAKE 1 TABLET TWICE A   tablet 3   • raNITIdine (ZANTAC) 75 MG tablet Take 150 mg by mouth 2 (Two) Times a Day.     • glucose blood (FREESTYLE LITE) test strip PT to use once daily 100 each 12   • Lancets (freestyle) lancets Pt tests daily 100 each 6   • predniSONE (DELTASONE) 20 MG tablet Take 3 tablets by mouth Daily. 9 tablet 0     Allergies   Allergen Reactions   • Contrast Dye Shortness Of Breath   • Iodinated Diagnostic Agents Shortness Of Breath   • Codeine Nausea Only   • Methylprednisolone Anxiety   • Other Other (See Comments)     Bamlanivimab infusion- muscle aches, joint pain, nausea   • Ciprofloxacin Hcl Rash       Objective    Objective     Vital Signs  Temp:  [97.9 °F (36.6 °C)-98.3 °F (36.8 °C)] 98 °F (36.7 °C)  Heart Rate:  [60-96] 73  Resp:  [20-22] 22  BP: (119-173)/(53-98) 119/53  SpO2:  [91 %-97 %] 97 %  on   ;   Device (Oxygen Therapy): room air  Body mass index is 32.15 kg/m².    Physical Exam  Constitutional:       General: She is not in acute distress.     Appearance: She is obese. She is not toxic-appearing.   HENT:      Head: Normocephalic and atraumatic.   Eyes:      Extraocular Movements: Extraocular movements intact.      Conjunctiva/sclera: Conjunctivae normal.   Cardiovascular:      Rate and Rhythm: Normal rate.      Heart sounds: Normal heart sounds.   Pulmonary:      Effort: Pulmonary effort is normal.      Breath sounds: Wheezing present.   Abdominal:      General: Bowel sounds are normal. There is no distension.      Palpations: Abdomen is soft.      Tenderness: There is no abdominal tenderness. There is no guarding.   Musculoskeletal:         General: No tenderness.      Cervical back: Normal range of motion and neck supple.      Right lower leg: No edema.      Left lower leg: No edema.   Skin:     General: Skin is warm and dry.      Comments: flushed   Neurological:      Mental Status: She is alert and oriented to person, place, and time.      Cranial Nerves: No cranial nerve deficit.      Motor:  Weakness (generalized) present.   Psychiatric:         Behavior: Behavior normal.         Thought Content: Thought content normal.         Results Review:  I reviewed the patient's new clinical results.  I reviewed the patient's new imaging results and agree with the interpretation.  I reviewed the patient's other test results and agree with the interpretation  I personally viewed and interpreted the patient's EKG/Telemetry data  Discussed with ED provider.    Lab Results (last 24 hours)     Procedure Component Value Units Date/Time    CBC & Differential [[id number]]  (Abnormal) Collected: 02/03/22 0006    Specimen: Blood Updated: 02/03/22 0023    Narrative:      The following orders were created for panel order CBC & Differential.  Procedure                               Abnormality         Status                     ---------                               -----------         ------                     CBC Auto Differential[[id number]]        Abnormal            Final result                 Please view results for these tests on the individual orders.    Comprehensive Metabolic Panel [[id number]]  (Abnormal) Collected: 02/03/22 0006    Specimen: Blood Updated: 02/03/22 0047     Glucose 184 mg/dL      BUN 19 mg/dL      Creatinine 0.64 mg/dL      Sodium 136 mmol/L      Potassium 4.2 mmol/L      Comment: Slight hemolysis detected by analyzer. Results may be affected.        Chloride 99 mmol/L      CO2 24.8 mmol/L      Calcium 9.7 mg/dL      Total Protein 7.6 g/dL      Albumin 4.40 g/dL      ALT (SGPT) 45 U/L      AST (SGOT) 28 U/L      Alkaline Phosphatase 63 U/L      Total Bilirubin 0.2 mg/dL      eGFR Non African Amer 93 mL/min/1.73      Globulin 3.2 gm/dL      A/G Ratio 1.4 g/dL      BUN/Creatinine Ratio 29.7     Anion Gap 12.2 mmol/L     Narrative:      GFR Normal >60  Chronic Kidney Disease <60  Kidney Failure <15      Troponin [[id number]]  (Normal) Collected: 02/03/22 0006    Specimen: Blood Updated: 02/03/22 0046      Troponin T <0.010 ng/mL     Narrative:      Troponin T Reference Range:  <= 0.03 ng/mL-   Negative for AMI  >0.03 ng/mL-     Abnormal for myocardial necrosis.  Clinicians would have to utilize clinical acumen, EKG, Troponin and serial changes to determine if it is an Acute Myocardial Infarction or myocardial injury due to an underlying chronic condition.       Results may be falsely decreased if patient taking Biotin.      BNP [[id number]]  (Normal) Collected: 02/03/22 0006    Specimen: Blood Updated: 02/03/22 0100     proBNP 96.9 pg/mL     Narrative:      Among patients with dyspnea, NT-proBNP is highly sensitive for the detection of acute congestive heart failure. In addition NT-proBNP of <300 pg/ml effectively rules out acute congestive heart failure with 99% negative predictive value.    Results may be falsely decreased if patient taking Biotin.      CBC Auto Differential [[id number]]  (Abnormal) Collected: 02/03/22 0006    Specimen: Blood Updated: 02/03/22 0023     WBC 8.49 10*3/mm3      RBC 4.59 10*6/mm3      Hemoglobin 13.8 g/dL      Hematocrit 41.3 %      MCV 90.0 fL      MCH 30.1 pg      MCHC 33.4 g/dL      RDW 12.8 %      RDW-SD 41.9 fl      MPV 10.2 fL      Platelets 263 10*3/mm3      Neutrophil % 55.9 %      Lymphocyte % 32.2 %      Monocyte % 11.4 %      Eosinophil % 0.1 %      Basophil % 0.2 %      Immature Grans % 0.2 %      Neutrophils, Absolute 4.74 10*3/mm3      Lymphocytes, Absolute 2.73 10*3/mm3      Monocytes, Absolute 0.97 10*3/mm3      Eosinophils, Absolute 0.01 10*3/mm3      Basophils, Absolute 0.02 10*3/mm3      Immature Grans, Absolute 0.02 10*3/mm3      nRBC 0.0 /100 WBC           Imaging Results (Last 24 Hours)     Procedure Component Value Units Date/Time    XR Chest 1 View [[id number]] Collected: 02/02/22 2112     Updated: 02/02/22 2116    Narrative:      PORTABLE CHEST X-RAY     HISTORY: Shortness of breath, COVID positive     Portable chest x-ray consisting of two images is provided.  Correlation:  Chest x-ray January 30, 2022.      FINDINGS: The cardiomediastinal silhouette is normal. The lungs are  clear. The costophrenic sulci are dry and the bones appear normal. There  is no pneumothorax.       Impression:      Negative.     This report was finalized on 2/2/2022 9:12 PM by Dr. Isael Curtis M.D.                 SCANNED - TELEMETRY     Final Result           Assessment/Plan     Active Hospital Problems    Diagnosis  POA   • **COVID-19 virus infection [U07.1]  Yes   • Obesity (BMI 30-39.9) [E66.9]  Yes   • GERD (gastroesophageal reflux disease) [K21.9]  Yes   • Asthma [J45.909]  Yes   • Type 2 diabetes mellitus without complication, without long-term current use of insulin (HCC) [E11.9]  Yes   • Essential hypertension [I10]  Yes      Resolved Hospital Problems   No resolved problems to display.       Ms. Gilliland is a 66 y.o. former smoker with a history of GERD, asthma, fatty liver, migraines, peptic ulceration, obesity who presents to [facility] ER chief complaint of shortness of breath admitted for COVID-19 virus infection.      COVID-19 virus infection  Afebrile  O2 saturation 97% on room air  Chest x-ray negative  COVID-19 progression labs pending: D-dimer, procalcitonin, CRP  Enoxaparin, dexamethasone for now  Flonase      Type 2 diabetes mellitus without complication, without long-term current use of insulin (HCC)  Continue glipizide (home med) and hold Januvia for now  Low-dose lispro sliding scale  Monitor glucose trends in the setting of dexamethasone      Essential hypertension  BP acceptable, hemodynamically stable  ACE & HCTZ inhibitor continued given unremarkable renal function  Propranolol 40 mg p.o. twice daily per home dose regimen      Asthma  Complicating #1   Bronchodilators, montelukast, and inhaled corticosteroid      GERD (gastroesophageal reflux disease)  PPI resumed      Obesity (BMI 30-39.9)  BMI 32  Complicating her problems      · I discussed the patient's findings  and my recommendations with Dr. Ross.     VTE Prophylaxis - enoxaparin  Code Status - CPR       TATE Potter  [facility]  02/03/22  08:44 EST

## 2022-02-03 NOTE — ED TRIAGE NOTES
Diagnosed with COVID in this ED few nights ago. Presents to ED from home with complaints of shortness of air and incessant cough. Has history of asthma and is using rescue inhaler at triage desk. Patient speaking in fragments and gasping for air.

## 2022-02-03 NOTE — CONSULTS
Patient requested information regarding an Advance Directive.  I provided her education on the AD and provided her an AD form.      She is also active in her [employer] ele. I believe she has a strong support system.   We concluded with prayer.

## 2022-02-03 NOTE — NURSING NOTE
RN attempted to schedule a follow-up appointment for the patient. Per , due to the weather today, the scheduling office has closed at noon and will call the patient tomorrow to schedule an appt or instruct the patient to call tomorrow.    Pt given a pulse oximeter and educated how to use it.     Pt walked around the room without oxygen and o2 sat maintained above >90%. No complaints of shortness of air/pain.

## 2022-02-03 NOTE — ED PROVIDER NOTES
EMERGENCY DEPARTMENT ENCOUNTER    CHIEF COMPLAINT  Chief Complaint: Shortness of breath  History given by: Patient  History limited by: None  Room Number: N638/1  PMD: Erika Rodriguez MD      HPI:  Pt is a 66 y.o. female who presents complaining of shortness of breath that has been present for around 1 week but became significantly worse at home today prompting her emergency room visit.  She was seen here in the emergency room 3 days ago and diagnosed with a mild asthma exacerbation and she also tested positive for COVID-19.  She has been taking her home steroids as prescribed as well as using her inhaler however her symptoms have steadily worsened throughout the day today.  She states that at home, her oxygen saturations have been dropping to the mid 80s on room air.  She does complain of a nonproductive cough associated with the shortness of breath but denies chest pain, back pain, abdominal pain, fever/chills, or nausea and vomiting.    Duration:  Around 1 week or so constantly  Onset: gradual  Location: lungs/pulmonary  Radiation: none  Quality: respiratory distress  Intensity/Severity: moderate  Progression: worsening  Associated Symptoms: cough  Aggravating Factors: exertion worsens  Alleviating Factors: none  Previous Episodes: yes, hx of asthma  Treatment before arrival: steroids/inhaler    PAST MEDICAL HISTORY  Active Ambulatory Problems     Diagnosis Date Noted   • Monoclonal gammopathy 06/03/2016   • Hx of cerebral aneurysm repair 02/21/2017   • Allergy history, radiographic dye 04/04/2017   • Cerebral aneurysm, nonruptured 04/18/2017   • Neck pain 04/18/2017   • Contrast media allergy 09/28/2017   • Other hyperlipidemia 03/29/2019   • Type 2 diabetes mellitus without complication, without long-term current use of insulin (HCC) 03/29/2019   • Essential hypertension 03/29/2019   • Chronic abdominal pain 03/29/2019   • Right sided abdominal pain 05/10/2019   • Fatty liver    • Mononeuropathy due to  underlying disease 2020   • Asthma 2020   • GERD (gastroesophageal reflux disease) 2020   • Headache 2020   • Diabetic peripheral neuropathy (HCC) 2021   • Allergic reaction 2021   • Health care maintenance 2016   • COVID-19 virus infection 2021   • Migraine without aura and without status migrainosus, not intractable 2018   • UTI (urinary tract infection) 2020     Resolved Ambulatory Problems     Diagnosis Date Noted   • Migraine with status migrainosus 2017     Past Medical History:   Diagnosis Date   • Abdominal pain, right lower quadrant    • Anemia    • Cancer (HCC) 2014   • COVID-19 2021   • Cyst of left kidney    • Diverticulosis    • History of kidney stones 2011   • History of surgery for cerebral aneurysm    • Hx of migraines    • Insomnia    • Peptic ulceration    • Pneumonia        PAST SURGICAL HISTORY  Past Surgical History:   Procedure Laterality Date   • APPENDECTOMY  1982    Emergency at time of 2nd    • BASAL CELL CARCINOMA EXCISION Left 2014    Excision basal cell carcinoma, left arm (1.1 cm) with frozen section control and layered wound closure ( 3.1 cm), Dr. Isael Andrade, [facility]   • BRAIN SURGERY  2004    Ruptured aneurysm, clipped/Dr. Sims   • CEREBRAL ANEURYSM REPAIR      clipping of cerebral aneurysm   • CEREBRAL ANGIOGRAM N/A 2017    Procedure: CEREBRAL ANGIOGRAM ;  Surgeon: Orlando Bella MD;  Location: [facility] ;  Service:    • CEREBRAL ANGIOGRAM N/A 10/11/2017    Procedure: CEREBRAL ANGIOGRAM;  Surgeon: Orlando Bella MD;  Location: [facility] OR ;  Service:    •  SECTION  , ,    • CHOLECYSTECTOMY     • COLON RESECTION WITH COLOSTOMY Right    • COLONOSCOPY  2009   • COLONOSCOPY N/A 2020    Procedure: COLONOSCOPY to terminal ileum;  Surgeon: Luiza Norris MD;  Location: [facility] ENDOSCOPY;  Service: Gastroenterology;  Laterality: N/A;   PREOP/ SCREENING  POSTOP/ DIVERTICULOSIS. POOR PREP   • EMBOLIZATION CEREBRAL N/A 5/8/2017    Procedure: Cerebral angiography and embolization of cerebral aneurysm with Pipeline Embolization Device;  Surgeon: Orlando Bella MD;  Location: [facility] OR 18/19;  Service:    • EMBOLIZATION CEREBRAL N/A 10/31/2018    Procedure: Cerebral angiogram with embolization of cerebral aneurysm;  Surgeon: Orlando Bella MD;  Location: [facility] OR 18/19;  Service: Neurosurgery   • ILEOSTOMY REVISION     • INCISIONAL HERNIA REPAIR  6/229    Patient suffered some nerve entrapment secondary to the attack, some of which were removed during a secondary operation.   • INGUINAL HERNIA REPAIR     • LARYNX SURGERY     • OSTOMY TAKE DOWN     • TUBAL ABDOMINAL LIGATION     • URETERAL STENT INSERTION  06/2011    Due to kidney stones       FAMILY HISTORY  Family History   Problem Relation Age of Onset   • Skin cancer Mother    • Dementia Mother    • KALYAN disease Mother    • Heart disease Father    • Heart attack Father    • Hypertension Father    • Aneurysm Son         cerebral   • Nephrolithiasis Son    • Suicide Attempts Brother    • Malig Hyperthermia Neg Hx        SOCIAL HISTORY  Social History     Socioeconomic History   • Marital status:      Spouse name: Roe   • Number of children: 3   • Years of education: College   Tobacco Use   • Smoking status: Former Smoker     Years: 1.00     Types: Cigarettes   • Smokeless tobacco: Never Used   Vaping Use   • Vaping Use: Never used   Substance and Sexual Activity   • Alcohol use: Not Currently   • Drug use: No   • Sexual activity: Defer       ALLERGIES  Contrast dye, Iodinated diagnostic agents, Codeine, Methylprednisolone, Other, and Ciprofloxacin hcl    REVIEW OF SYSTEMS  Review of Systems   Constitutional: Negative for fever.   HENT: Negative for sore throat.    Eyes: Negative.    Respiratory: Positive for cough and shortness of breath.    Cardiovascular: Negative for  chest pain.   Gastrointestinal: Negative for abdominal pain, diarrhea and vomiting.   Genitourinary: Negative for dysuria.   Musculoskeletal: Negative for neck pain.   Skin: Negative for rash.   Allergic/Immunologic: Negative.    Neurological: Negative for weakness, numbness and headaches.   Hematological: Negative.    Psychiatric/Behavioral: Negative.    All other systems reviewed and are negative.      PHYSICAL EXAM  ED Triage Vitals [02/02/22 2027]   Temp Heart Rate Resp BP SpO2   98.3 °F (36.8 °C) 96 20 173/98 95 %      Temp src Heart Rate Source Patient Position BP Location FiO2 (%)   Tympanic Monitor Sitting Right arm --       Physical Exam  Vitals and nursing note reviewed.   Constitutional:       General: She is not in acute distress.  HENT:      Head: Normocephalic and atraumatic.   Eyes:      Pupils: Pupils are equal, round, and reactive to light.   Cardiovascular:      Rate and Rhythm: Normal rate and regular rhythm.      Heart sounds: Normal heart sounds.   Pulmonary:      Effort: Pulmonary effort is normal. Tachypnea present. No respiratory distress.      Breath sounds: Examination of the right-middle field reveals wheezing. Examination of the left-middle field reveals wheezing. Examination of the right-lower field reveals wheezing. Examination of the left-lower field reveals wheezing. Wheezing present.   Abdominal:      Palpations: Abdomen is soft.      Tenderness: There is no abdominal tenderness. There is no guarding or rebound.   Musculoskeletal:         General: Normal range of motion.      Cervical back: Normal range of motion and neck supple.   Skin:     General: Skin is warm and dry.      Findings: No rash.   Neurological:      Mental Status: She is alert and oriented to person, place, and time.      Sensory: Sensation is intact.   Psychiatric:         Mood and Affect: Mood and affect normal.         LAB RESULTS  Lab Results (last 24 hours)     Procedure Component Value Units Date/Time    CBC &  Differential [[id number]]  (Abnormal) Collected: 02/03/22 0006    Specimen: Blood Updated: 02/03/22 0023    Narrative:      The following orders were created for panel order CBC & Differential.  Procedure                               Abnormality         Status                     ---------                               -----------         ------                     CBC Auto Differential[[id number]]        Abnormal            Final result                 Please view results for these tests on the individual orders.    Comprehensive Metabolic Panel [[id number]]  (Abnormal) Collected: 02/03/22 0006    Specimen: Blood Updated: 02/03/22 0047     Glucose 184 mg/dL      BUN 19 mg/dL      Creatinine 0.64 mg/dL      Sodium 136 mmol/L      Potassium 4.2 mmol/L      Comment: Slight hemolysis detected by analyzer. Results may be affected.        Chloride 99 mmol/L      CO2 24.8 mmol/L      Calcium 9.7 mg/dL      Total Protein 7.6 g/dL      Albumin 4.40 g/dL      ALT (SGPT) 45 U/L      AST (SGOT) 28 U/L      Alkaline Phosphatase 63 U/L      Total Bilirubin 0.2 mg/dL      eGFR Non African Amer 93 mL/min/1.73      Globulin 3.2 gm/dL      A/G Ratio 1.4 g/dL      BUN/Creatinine Ratio 29.7     Anion Gap 12.2 mmol/L     Narrative:      GFR Normal >60  Chronic Kidney Disease <60  Kidney Failure <15      Troponin [[id number]]  (Normal) Collected: 02/03/22 0006    Specimen: Blood Updated: 02/03/22 0046     Troponin T <0.010 ng/mL     Narrative:      Troponin T Reference Range:  <= 0.03 ng/mL-   Negative for AMI  >0.03 ng/mL-     Abnormal for myocardial necrosis.  Clinicians would have to utilize clinical acumen, EKG, Troponin and serial changes to determine if it is an Acute Myocardial Infarction or myocardial injury due to an underlying chronic condition.       Results may be falsely decreased if patient taking Biotin.      BNP [[id number]]  (Normal) Collected: 02/03/22 0006    Specimen: Blood Updated: 02/03/22 0100     proBNP 96.9 pg/mL      Narrative:      Among patients with dyspnea, NT-proBNP is highly sensitive for the detection of acute congestive heart failure. In addition NT-proBNP of <300 pg/ml effectively rules out acute congestive heart failure with 99% negative predictive value.    Results may be falsely decreased if patient taking Biotin.      CBC Auto Differential [[id number]]  (Abnormal) Collected: 02/03/22 0006    Specimen: Blood Updated: 02/03/22 0023     WBC 8.49 10*3/mm3      RBC 4.59 10*6/mm3      Hemoglobin 13.8 g/dL      Hematocrit 41.3 %      MCV 90.0 fL      MCH 30.1 pg      MCHC 33.4 g/dL      RDW 12.8 %      RDW-SD 41.9 fl      MPV 10.2 fL      Platelets 263 10*3/mm3      Neutrophil % 55.9 %      Lymphocyte % 32.2 %      Monocyte % 11.4 %      Eosinophil % 0.1 %      Basophil % 0.2 %      Immature Grans % 0.2 %      Neutrophils, Absolute 4.74 10*3/mm3      Lymphocytes, Absolute 2.73 10*3/mm3      Monocytes, Absolute 0.97 10*3/mm3      Eosinophils, Absolute 0.01 10*3/mm3      Basophils, Absolute 0.02 10*3/mm3      Immature Grans, Absolute 0.02 10*3/mm3      nRBC 0.0 /100 WBC           I ordered the above labs and reviewed the results    RADIOLOGY  XR Chest 1 View   Final Result   Negative.       This report was finalized on 2/2/2022 9:12 PM by Dr. Isael Curtis M.D.               I ordered the above noted radiological studies. Interpreted by radiologist.  Reviewed by me in PACS.       PROCEDURES  Procedures      PROGRESS AND CONSULTS     The patient was wearing a facemask upon entrance into the room and remained in such throughout their visit.  I was wearing PPE including a facemask, eye protection, as well as gloves at any point entering the room and throughout the visit     0120  On reevaluation, the patient states that her respiratory status has actually gotten a bit worse in her opinion.  She does continue to be a bit tachypneic and her oxygen saturations are currently 91% on room air.  Given her Covid diagnosis as well  as ongoing asthma, we will admit her to the hospital for further management and treatment.  The patient is in agreement with the plan and all questions have been answered.    0150  Case discussed with TATE Bui for [facility], who agrees to admit the patient to the hospital for Dr. Sands.    MEDICAL DECISION MAKING  Results were reviewed/discussed with the patient and they were also made aware of online access. Pt also made aware that some labs, such as cultures, will not be resulted during ER visit and follow up with PMD is necessary.     MDM  Number of Diagnoses or Management Options     Amount and/or Complexity of Data Reviewed  Clinical lab tests: ordered and reviewed  Tests in the radiology section of CPT®: ordered and reviewed  Tests in the medicine section of CPT®: ordered and reviewed  Review and summarize past medical records: yes (Upon medical records review, the patient was last seen and evaluated here in the ED on 1/30/2022 secondary to the COVID-19 viral infection with a mild asthma exacerbation.)  Discuss the patient with other providers: yes (TATE Bui for [facility], who agrees admit the patient to the hospital for Dr. Sands)  Independent visualization of images, tracings, or specimens: yes (Unremarkable chest x-ray)           DIAGNOSIS  Final diagnoses:   COVID-19 virus infection   Intermittent asthma with acute exacerbation, unspecified asthma severity       DISPOSITION  ADMISSION    Discussed treatment plan and reason for admission with pt/family and admitting physician.  Pt/family voiced understanding of the plan for admission for further testing/treatment as needed.         Latest Documented Vital Signs:  As of 06:20 EST  BP- 155/84 HR- 68 Temp- 97.9 °F (36.6 °C) (Oral) O2 sat- 94%         Ramon Sorto MD  02/03/22 0620

## 2022-02-03 NOTE — TELEPHONE ENCOUNTER
I called the pt. She was getting scared and she did finally go to the ER. Someone was coming in to evaluate her after she was placed in isolation. She was coughing and hoarse but was able to speak in full sentences.   I want to make sure if she goes home that she has medication to go home with or early in the day so this is not a problem with the weather.   I will follow her and call her back if she is not admitted.

## 2022-02-03 NOTE — PROGRESS NOTES
Exercise Oximetry    Patient Name:Khalida Gilliland   MRN: [id number]   Date: 02/03/22             ROOM AIR BASELINE   SpO2% 94   Heart Rate 67   Blood Pressure      EXERCISE ON ROOM AIR SpO2% EXERCISE ON O2 @  LPM SpO2%   1 MINUTE 94 1 MINUTE    2 MINUTES 95 2 MINUTES    3 MINUTES 94 3 MINUTES    4 MINUTES 92 4 MINUTES    5 MINUTES 91 5 MINUTES    6 MINUTES 92 6 MINUTES               Distance Walked   Distance Walked   Dyspnea (Bernardino Scale)   Dyspnea (Bernardino Scale)   Fatigue (Bernardino Scale)   Fatigue (Bernardino Scale)   SpO2% Post Exercise  95 SpO2% Post Exercise   HR Post Exercise  68 HR Post Exercise   Time to Recovery   Time to Recovery     Comments: PT was able to complete walk while holding conversation with no SOA or dizziness visualized or expressed. PT did not qualify or need home O2. RN aware

## 2022-02-03 NOTE — TELEPHONE ENCOUNTER
Called patient cell phone but only able to leave a message.  I noted that she was discharged from the hospital today but not sure if she has left the hospital and home already.  I am trying to make sure that she has her medications that she needs for her nebulizer and that she has her steroid therapy.  If she is still in the hospital I encouraged her to request meds to beds so she would have her medication in hand prior to leaving the hospital given the poor weather outside.  If she has any needs or questions I will be checking her chart and we will be in touch soon to get her a close follow-up visit in the office but she can also send a message or call if she has needs that need to be addressed sooner.  I did review her hospitalization and noted overall her labs and x-ray were reassuring.

## 2022-02-03 NOTE — ED NOTES
Pt was in mask through out encounter. This ERT was in proper PPE through out encounter.      Gardenia Pacheco, PCT  02/03/22 0009

## 2022-02-03 NOTE — PLAN OF CARE
Goal Outcome Evaluation:  Plan of Care Reviewed With: patient        Progress: no change  Outcome Summary:     New pt to [facility]    VSS; room air; SR on telemetry; A&O x4; up adlib    no signs of distress    will cont to monitor

## 2022-02-03 NOTE — DISCHARGE SUMMARY
"Date of Admission: 2/2/2022  Date of Discharge:  2/3/2022  Primary Care Physician: Erika Rodriguez MD     Discharge Diagnosis:  Active Hospital Problems    Diagnosis  POA   • **COVID-19 virus infection [U07.1]  Yes   • Obesity (BMI 30-39.9) [E66.9]  Yes   • Cytokine release syndrome, grade 1 [D89.831]  No   • GERD (gastroesophageal reflux disease) [K21.9]  Yes   • Asthma [J45.909]  Yes   • Type 2 diabetes mellitus without complication, without long-term current use of insulin (HCC) [E11.9]  Yes   • Essential hypertension [I10]  Yes      Resolved Hospital Problems   No resolved problems to display.       Presenting Problem/History of Present Illness:  Intermittent asthma with acute exacerbation, unspecified asthma severity [J45.21]  COVID-19 virus infection [U07.1]     Hospital Course:  The patient is a 66 y.o. female with a history of HTN, Type 2 DM, Asthma, and GERD who presented with shortness of breath for about 9-10 days. Please see admission H&P for further details. She had reportedly dropped her oxygen saturations to the 80s but this was not observed during the admission-she maintained adequate oxygen saturations on room air with exertion. She has normal inflammatory markers and no evidence of bacterial infection or thromboembolic phenomena. She did have good air movement but she did have some mild scattered wheezing which she cleared with a cough. She will be started on a course of steroids and nebulized bronchodilators as a precaution. She is medically stable and will be discharged home. She will be provided with a new pulse oximeter. She should keep follow up with her PCP in about a week.    Exam Today:  Blood pressure 120/74, pulse 89, temperature 98 °F (36.7 °C), temperature source Oral, resp. rate 18, height 172.7 cm (68\"), weight 95.9 kg (211 lb 6.7 oz), SpO2 94 %, not currently breastfeeding.  Constitutional:       General: She is not in acute distress.     Appearance: She is obese. She is not " toxic-appearing.   HENT:      Head: Normocephalic and atraumatic.   Eyes:      Extraocular Movements: Extraocular movements intact.      Conjunctiva/sclera: Conjunctivae normal.   Cardiovascular:      Rate and Rhythm: Normal rate.      Heart sounds: Normal heart sounds.      Intact distal pulses  Pulmonary:      Effort: Pulmonary effort is normal.      Breath sounds: mild scattered wheezing but good air movement overall  Abdominal:      General: Bowel sounds are normal. There is no distension.      Palpations: Abdomen is soft.      Tenderness: There is no abdominal tenderness. There is no guarding.   Musculoskeletal:         General: No tenderness.      Cervical back: Normal range of motion and neck supple.      Right lower leg: No edema.      Left lower leg: No edema.   Skin:     General: Skin is warm and dry.   Neurological:      Mental Status: She is alert and oriented to person, place, and time.      No focal deficit  Psychiatric:         Behavior: Behavior normal.         Thought Content: Thought content normal.     Procedures Performed:  PORTABLE CHEST X-RAY-2/2/22   HISTORY: Shortness of breath, COVID positive     Portable chest x-ray consisting of two images is provided. Correlation:  Chest x-ray January 30, 2022.      FINDINGS: The cardiomediastinal silhouette is normal. The lungs are  clear. The costophrenic sulci are dry and the bones appear normal. There  is no pneumothorax.     IMPRESSION:  Negative.    Consults:   Consults     Date and Time Order Name Status Description    2/3/2022  1:25 AM FARA (on-call MD unless specified) Details             Discharge Disposition:  Home or Self Care    Discharge Medications:     Discharge Medications      New Medications      Instructions Start Date   methylPREDNISolone 4 MG dose pack  Commonly known as: MEDROL   Take as directed on package instructions.         Changes to Medications      Instructions Start Date   albuterol sulfate  (90 Base) MCG/ACT  inhaler  Commonly known as: PROVENTIL HFA;VENTOLIN HFA;PROAIR HFA  What changed: Another medication with the same name was added. Make sure you understand how and when to take each.   2 puffs, Inhalation, Every 4 Hours PRN      albuterol (2.5 MG/3ML) 0.083% nebulizer solution  Commonly known as: PROVENTIL  What changed: You were already taking a medication with the same name, and this prescription was added. Make sure you understand how and when to take each.   Take 1 vial by nebulization Every 4 (Four) Hours As Needed for Wheezing or Shortness of Air.         Continue These Medications      Instructions Start Date   aspirin 81 MG EC tablet   81 mg, Oral, Daily      atorvastatin 20 MG tablet  Commonly known as: LIPITOR   TAKE 1 TABLET DAILY      budesonide-formoterol 160-4.5 MCG/ACT inhaler  Commonly known as: SYMBICORT   2 puffs, Inhalation, 2 Times Daily - RT      butalbital-acetaminophen-caffeine -40 MG per tablet  Commonly known as: FIORICET, ESGIC   1 tablet, Oral, 2 Times Daily      cetirizine 10 MG tablet  Commonly known as: zyrTEC   TAKE 1 TABLET DAILY      cholecalciferol 25 MCG (1000 UT) tablet  Commonly known as: VITAMIN D3   1,000 Units, Oral, Daily, PER PT TO CONTINUE PER DR. CALLUM URBINA      CO Q 10 PO   Oral      cycloSPORINE 0.05 % ophthalmic emulsion  Commonly known as: RESTASIS   1 drop, Both Eyes, 2 Times Daily      diphenhydrAMINE-acetaminophen  MG tablet per tablet  Commonly known as: TYLENOL PM   1 tablet, Oral, Nightly PRN      fish oil 1000 MG capsule capsule   Oral, Daily With Breakfast      fluticasone 50 MCG/ACT nasal spray  Commonly known as: FLONASE   2 sprays, Each Nare, Daily      freestyle lancets   Pt tests daily      FREESTYLE LITE test strip  Generic drug: glucose blood   PT to use once daily      Ginkgo Biloba 40 MG tablet   Oral      glipiZIDE XL 5 MG ER tablet  Generic drug: glipizide   TAKE 1 TABLET DAILY      glucosamine-chondroitin 500-400 MG capsule capsule    1 capsule, Oral, 2 Times Daily With Meals, PT TO CONTINUE PER DR. CALLUM URBINA      hydroCHLOROthiazide 25 MG tablet  Commonly known as: HYDRODIURIL   25 mg, Oral, Daily      Januvia 100 MG tablet  Generic drug: SITagliptin   TAKE 1 TABLET DAILY      lisinopril 10 MG tablet  Commonly known as: PRINIVIL,ZESTRIL   TAKE 1 TABLET DAILY      montelukast 10 MG tablet  Commonly known as: SINGULAIR   10 mg, Oral, Nightly      nystatin 760028 UNIT/GM powder  Generic drug: nystatin   No dose, route, or frequency recorded.      omeprazole 40 MG capsule  Commonly known as: priLOSEC   40 mg, Oral, Every Morning      Potassium Gluconate 550 MG tablet   Oral      pregabalin 100 MG capsule  Commonly known as: LYRICA   100 mg, Oral, 3 Times Daily      PROBIOTIC-10 PO   Oral      propranolol 40 MG tablet  Commonly known as: INDERAL   TAKE 1 TABLET TWICE A DAY      raNITIdine 75 MG tablet  Commonly known as: ZANTAC   150 mg, Oral, 2 Times Daily      VITAMIN C PO   1,000 mg, Oral, Daily, PT INSTRUCTED TO CONTINUE PER DR. CALLUM URBINA         Stop These Medications    predniSONE 20 MG tablet  Commonly known as: DELTASONE            Discharge Diet:   Diet Instructions     Diet: Regular, Consistent Carbohydrate; Thin      Discharge Diet:  Regular  Consistent Carbohydrate       Fluid Consistency: Thin          Activity at Discharge:   Activity Instructions     Activity as Tolerated            Follow-up Appointments:  Future Appointments   Date Time Provider Department Center   3/7/2022 11:00 AM Erika Rodriguez MD [facility]     Additional Instructions for the Follow-ups that You Need to Schedule     Discharge Follow-up with PCP   As directed       Currently Documented PCP:    Erika Rodriguez MD    PCP Phone Number:    [phone]     Follow Up Details: 1 week               Fabrice Ross MD  02/03/22  13:51 EST    Time Spent on Discharge Activities: Greater than 30 minutes.

## 2022-02-03 NOTE — ED NOTES
Pt was in mask through out encounter. This ERT was in proper PPE through out encounter.      Gardenia Pacheco, PCT  02/02/22 2949

## 2022-02-04 ENCOUNTER — TRANSITIONAL CARE MANAGEMENT TELEPHONE ENCOUNTER (OUTPATIENT)
Dept: CALL CENTER | Facility: HOSPITAL | Age: 67
End: 2022-02-04

## 2022-02-04 NOTE — OUTREACH NOTE
Call Center TCM Note      Responses   [facility] [place] patient discharged from? [facility]   Does the patient have one of the following disease processes/diagnoses(primary or secondary)? COVID-19   COVID-19 underlying condition? None   TCM attempt successful? Yes  [[place]-]   Call start time 1154   Call end time 1201   Discharge diagnosis Covid 19   Meds reviewed with patient/caregiver? Yes   Is the patient having any side effects they believe may be caused by any medication additions or changes? No   Does the patient have all medications ordered at discharge? Yes   Is the patient taking all medications as directed (includes completed medication regime)? Yes   Does the patient have a primary care provider?  Yes   Comments regarding PCP Pt has an appt on 3/7/22 at 11:00 am that she would like to keep.    Does the patient have an appointment with their PCP or specialist within 7 days of discharge? No   What is preventing the patient from scheduling follow up appointments within 7 days of discharge? Earlier appointment not available   Nursing Interventions Advised patient to make appointment  [Routed request to PCP office]   Has the patient kept scheduled appointments due by today? N/A   What DME was ordered? pulse ox   Has all DME been delivered? Yes   Psychosocial issues? No   Did the patient receive a copy of their discharge instructions? Yes   Did the patient receive a copy of COVID-19 specific instructions? Yes   Nursing interventions Reviewed instructions with patient   What is the patient's perception of their health status since discharge? Improving   Does the patient have any of the following symptoms? Cough,  Loss of taste/smell   Nursing Interventions Nurse provided patient education   Pulse Ox monitoring Intermittent   Pulse Ox device source Hospital,  Patient   O2 Sat comments 96-97% on RA    Is the patient/caregiver able to teach back steps to recovery at home? Rest and rebuild strength, gradually  increase activity,  Eat a well-balance diet   If the patient is a current smoker, are they able to teach back resources for cessation? Not a smoker   Is the patient/caregiver able to teach back the hierarchy of who to call/visit for symptoms/problems? PCP, Specialist, Home health nurse, Urgent Care, ED, 911 Yes   TCM call completed? Yes   Wrap up additional comments Pt will be going out of town on the last half of Feb. 2022          Felisa Brasher RN    2/4/2022, 12:02 EST

## 2022-02-04 NOTE — OUTREACH NOTE
Prep Survey      Responses   [facility] facility patient discharged from? [place]   Is LACE score < 7 ? No   Emergency Room discharge w/ pulse ox? No   Eligibility [facility]   Hospital Covid   Date of Admission 02/02/22   Date of Discharge 02/03/22   Discharge Disposition Home or Self Care   Discharge diagnosis Covid 19   Does the patient have one of the following disease processes/diagnoses(primary or secondary)? COVID-19   Does the patient have Home health ordered? No   Is there a DME ordered? No   Prep survey completed? Yes          Federica Herrera RN

## 2022-02-04 NOTE — CASE MANAGEMENT/SOCIAL WORK
Case Management Discharge Note      Final Note: Per notes patient dc'd home         Selected Continued Care - Discharged on 2/3/2022 Admission date: 2/2/2022 - Discharge disposition: Home or Self Care    Destination    No services have been selected for the patient.              Durable Medical Equipment    No services have been selected for the patient.              Dialysis/Infusion    No services have been selected for the patient.              Home Medical Care    No services have been selected for the patient.              Therapy    No services have been selected for the patient.              Community Resources    No services have been selected for the patient.              Community & DME    No services have been selected for the patient.                  Transportation Services  Private: Car    Final Discharge Disposition Code: 01 - home or self-care

## 2022-02-05 ENCOUNTER — READMISSION MANAGEMENT (OUTPATIENT)
Dept: CALL CENTER | Facility: HOSPITAL | Age: 67
End: 2022-02-05

## 2022-02-05 NOTE — OUTREACH NOTE
COVID-19 Week 1 Survey      Responses   [facility] patient discharged from? [place]   Does the patient have one of the following disease processes/diagnoses(primary or secondary)? COVID-19   COVID-19 underlying condition? None   Call Number Call 2   Week 1 Call successful? No   Discharge diagnosis Covid 19          Carol Jc LPN

## 2022-02-06 ENCOUNTER — READMISSION MANAGEMENT (OUTPATIENT)
Dept: CALL CENTER | Facility: HOSPITAL | Age: 67
End: 2022-02-06

## 2022-02-06 NOTE — OUTREACH NOTE
COVID-19 Week 1 Survey      Responses   [facility] patient discharged from? [place]   Does the patient have one of the following disease processes/diagnoses(primary or secondary)? COVID-19   COVID-19 underlying condition? None   Call Number Call 3   Week 1 Call successful? No   Discharge diagnosis Covid 19          Harper Hurtado RN

## 2022-02-11 ENCOUNTER — READMISSION MANAGEMENT (OUTPATIENT)
Dept: CALL CENTER | Facility: HOSPITAL | Age: 67
End: 2022-02-11

## 2022-02-11 NOTE — OUTREACH NOTE
COVID-19 Week 2 Survey      Responses   [facility] patient discharged from? [place]   Does the patient have one of the following disease processes/diagnoses(primary or secondary)? COVID-19   COVID-19 underlying condition? None   Call Number Call 1   COVID-19 Week 2: Call 1 attempt successful? No   Revoke Decline to participate   Discharge diagnosis Covid 19          Felisa Brasher RN

## 2022-03-07 ENCOUNTER — OFFICE VISIT (OUTPATIENT)
Dept: FAMILY MEDICINE CLINIC | Facility: CLINIC | Age: 67
End: 2022-03-07

## 2022-03-07 VITALS
HEART RATE: 64 BPM | HEIGHT: 68 IN | DIASTOLIC BLOOD PRESSURE: 72 MMHG | RESPIRATION RATE: 15 BRPM | SYSTOLIC BLOOD PRESSURE: 136 MMHG | BODY MASS INDEX: 33.75 KG/M2 | TEMPERATURE: 97.7 F | OXYGEN SATURATION: 94 % | WEIGHT: 222.7 LBS

## 2022-03-07 DIAGNOSIS — G59 MONONEUROPATHY DUE TO UNDERLYING DISEASE: ICD-10-CM

## 2022-03-07 DIAGNOSIS — R51.9 NONINTRACTABLE HEADACHE, UNSPECIFIED CHRONICITY PATTERN, UNSPECIFIED HEADACHE TYPE: ICD-10-CM

## 2022-03-07 PROCEDURE — 99213 OFFICE O/P EST LOW 20 MIN: CPT | Performed by: FAMILY MEDICINE

## 2022-03-07 RX ORDER — MONTELUKAST SODIUM 10 MG/1
10 TABLET ORAL NIGHTLY
Qty: 90 TABLET | Refills: 3 | Status: SHIPPED | OUTPATIENT
Start: 2022-03-07 | End: 2022-04-11 | Stop reason: SDUPTHER

## 2022-03-07 RX ORDER — BUTALBITAL, ACETAMINOPHEN AND CAFFEINE 50; 325; 40 MG/1; MG/1; MG/1
1 TABLET ORAL 2 TIMES DAILY
Qty: 20 TABLET | Refills: 1 | Status: SHIPPED | OUTPATIENT
Start: 2022-03-07 | End: 2022-07-05 | Stop reason: SDUPTHER

## 2022-03-07 RX ORDER — PREGABALIN 100 MG/1
100 CAPSULE ORAL 3 TIMES DAILY
Qty: 270 CAPSULE | Refills: 0 | Status: SHIPPED | OUTPATIENT
Start: 2022-03-07 | End: 2022-06-01 | Stop reason: SDUPTHER

## 2022-03-07 RX ORDER — CELECOXIB 200 MG/1
CAPSULE ORAL
COMMUNITY
Start: 2021-12-02 | End: 2022-04-01

## 2022-04-01 ENCOUNTER — TELEPHONE (OUTPATIENT)
Dept: FAMILY MEDICINE CLINIC | Facility: CLINIC | Age: 67
End: 2022-04-01

## 2022-04-01 RX ORDER — TRAZODONE HYDROCHLORIDE 50 MG/1
25-50 TABLET ORAL NIGHTLY
Qty: 90 TABLET | Refills: 1 | Status: SHIPPED | OUTPATIENT
Start: 2022-04-01 | End: 2022-04-14 | Stop reason: SDUPTHER

## 2022-04-01 NOTE — TELEPHONE ENCOUNTER
Caller: Darshana Khalida A    Relationship: Self    Best call back number: [phone]    What medication are you requesting: TRAZADONE OR SOMETHING TO HELP SLEEP    What are your current symptoms: LACK OF SLEEP    Have you had these symptoms before:    [] Yes  [x] No    Have you been treated for these symptoms before:   [] Yes  [x] No    If a prescription is needed, what is your preferred pharmacy and phone number: [id number] [place] [address] [phone] [facility] [phone] FX     Additional notes: PATIENT HAS JUST BECOME THE CAREGIVER OF HER BROTHER WHO HAS BEEN DIAGNOSED WITH CANCER.   SHE IS STRUGGLING AND WOULD LIKE SOMETHING TO HELP HER SLEEP AT NIGHT.     PLEASE CALL AND ADVISE

## 2022-04-01 NOTE — TELEPHONE ENCOUNTER
Caller: Khalida Gilliland    Relationship: Self    Best call back number: [phone]    Which medication are you concerned about: celecoxib (CeleBREX) 200 MG capsule    What are your concerns: PATIENT STATES THAT SHE SAW THIS ON HER MEDICATION LIST BUT DOESN'T KNOW IF SHE IS SUPPOSED TO BE TAKING IT.      PLEASE CALL AND ADVISE.

## 2022-04-04 NOTE — TELEPHONE ENCOUNTER
Spoke with patient, advised her to stop taking Celebrex. She would to speak with Dr. Rodriguez over the phone about something to help her sleep. Her brother is going through chemo, he recently moved in with her, she's his primary care giver and she is very overwhelmed. I told her Dr. Rodriguez would try to call her at her earliest convenience.

## 2022-04-05 RX ORDER — AZELASTINE 1 MG/ML
2 SPRAY, METERED NASAL 2 TIMES DAILY
Qty: 30 ML | Refills: 5 | Status: SHIPPED | OUTPATIENT
Start: 2022-04-05 | End: 2022-04-14 | Stop reason: SDUPTHER

## 2022-04-05 NOTE — TELEPHONE ENCOUNTER
Spoke with pt. She has a lot of family stressors right now. She has taken trazodone before and really helps. She can sleep through the night with it.   She did not know it had been called in. She is going to pick it up today. They did tell her when she called on yesterday that it had been sent to the pharmacy on 4/1. Said no notification from the pharmacy.   Going to add astelin for her upper airway congestion with her allergies worsening. She is prone to sinus infections. Suggested she take allergra as well. Can get that otc.

## 2022-04-11 DIAGNOSIS — I10 ESSENTIAL HYPERTENSION: ICD-10-CM

## 2022-04-11 DIAGNOSIS — E11.9 TYPE 2 DIABETES MELLITUS WITHOUT COMPLICATION, WITHOUT LONG-TERM CURRENT USE OF INSULIN: ICD-10-CM

## 2022-04-11 DIAGNOSIS — E78.49 OTHER HYPERLIPIDEMIA: ICD-10-CM

## 2022-04-11 RX ORDER — BLOOD-GLUCOSE METER
KIT MISCELLANEOUS
Qty: 100 EACH | Refills: 12 | Status: SHIPPED | OUTPATIENT
Start: 2022-04-11

## 2022-04-11 RX ORDER — MONTELUKAST SODIUM 10 MG/1
10 TABLET ORAL NIGHTLY
Qty: 90 TABLET | Refills: 3 | Status: SHIPPED | OUTPATIENT
Start: 2022-04-11 | End: 2022-12-13 | Stop reason: SDUPTHER

## 2022-04-11 RX ORDER — ATORVASTATIN CALCIUM 20 MG/1
20 TABLET, FILM COATED ORAL NIGHTLY
Qty: 90 TABLET | Refills: 3 | Status: SHIPPED | OUTPATIENT
Start: 2022-04-11 | End: 2023-03-16 | Stop reason: SDUPTHER

## 2022-04-11 RX ORDER — LANCETS 28 GAUGE
EACH MISCELLANEOUS
Qty: 100 EACH | Refills: 6 | Status: SHIPPED | OUTPATIENT
Start: 2022-04-11

## 2022-04-11 RX ORDER — FLUTICASONE PROPIONATE 50 MCG
2 SPRAY, SUSPENSION (ML) NASAL DAILY
Qty: 29.7 ML | Refills: 3 | Status: SHIPPED | OUTPATIENT
Start: 2022-04-11 | End: 2023-01-19

## 2022-04-11 RX ORDER — HYDROCHLOROTHIAZIDE 25 MG/1
25 TABLET ORAL DAILY
Qty: 90 TABLET | Refills: 3 | Status: SHIPPED | OUTPATIENT
Start: 2022-04-11 | End: 2023-03-16 | Stop reason: SDUPTHER

## 2022-04-11 RX ORDER — GLIPIZIDE 5 MG/1
5 TABLET, FILM COATED, EXTENDED RELEASE ORAL DAILY
Qty: 90 TABLET | Refills: 1 | Status: SHIPPED | OUTPATIENT
Start: 2022-04-11 | End: 2022-09-19

## 2022-04-11 RX ORDER — PROPRANOLOL HYDROCHLORIDE 40 MG/1
40 TABLET ORAL 2 TIMES DAILY
Qty: 180 TABLET | Refills: 3 | Status: SHIPPED | OUTPATIENT
Start: 2022-04-11 | End: 2023-03-20

## 2022-04-11 RX ORDER — OMEPRAZOLE 40 MG/1
40 CAPSULE, DELAYED RELEASE ORAL EVERY MORNING
Qty: 90 CAPSULE | Refills: 3 | Status: SHIPPED | OUTPATIENT
Start: 2022-04-11 | End: 2023-01-30

## 2022-04-11 NOTE — TELEPHONE ENCOUNTER
Caller: Khalida Gilliland    Relationship: Self    Best call back number: [phone]    Requested Prescriptions:   Requested Prescriptions     Pending Prescriptions Disp Refills   • atorvastatin (LIPITOR) 20 MG tablet 90 tablet 3     Sig: Take 1 tablet by mouth Daily.   • fluticasone (FLONASE) 50 MCG/ACT nasal spray 29.7 mL 3     Si sprays by Each Nare route Daily.   • glipizide (glipiZIDE XL) 5 MG ER tablet 90 tablet 3     Sig: Take 1 tablet by mouth Daily.   • glucose blood (FREESTYLE LITE) test strip 100 each 12     Sig: PT to use once daily   • SITagliptin (Januvia) 100 MG tablet 90 tablet 3     Sig: Take 1 tablet by mouth Daily.   • hydroCHLOROthiazide (HYDRODIURIL) 25 MG tablet 14 tablet 0     Sig: Take 1 tablet by mouth Daily.   • Lancets (freestyle) lancets 100 each 6     Sig: Pt tests daily   • montelukast (SINGULAIR) 10 MG tablet 90 tablet 3     Sig: Take 1 tablet by mouth Every Night.   • omeprazole (priLOSEC) 40 MG capsule 90 capsule 3     Sig: Take 1 capsule by mouth Every Morning.   • propranolol (INDERAL) 40 MG tablet 180 tablet 3     Sig: Take 1 tablet by mouth 2 (Two) Times a Day.        Pharmacy where request should be sent: [facility] [address] [phone] [facility] [phone] FX     Additional details provided by patient: PATIENT NEEDS DR. DONALDSON TO WRITE NEW PRESCRIPTIONS FOR MEDICATIONS, THEY LAST PRESCRIBED BY DR. MIDDLETON WHO SHE NO LONGER SEES. SHE ALSO WANTS TO VERIFY IF SHE SHOULD STILL BE ON ALL MEDICATIONS LISTED ABOVE    PLEASE ADVISE    Does the patient have less than a 3 day supply:  [x] Yes  [] No    Abdirahman Yanez Rep   22 10:31 EDT

## 2022-04-11 NOTE — TELEPHONE ENCOUNTER
Rx Refill Note  Requested Prescriptions     Pending Prescriptions Disp Refills   • atorvastatin (LIPITOR) 20 MG tablet 90 tablet 3     Sig: Take 1 tablet by mouth Daily.   • fluticasone (FLONASE) 50 MCG/ACT nasal spray 29.7 mL 3     Si sprays by Each Nare route Daily.   • glipizide (glipiZIDE XL) 5 MG ER tablet 90 tablet 3     Sig: Take 1 tablet by mouth Daily.   • glucose blood (FREESTYLE LITE) test strip 100 each 12     Sig: PT to use once daily   • SITagliptin (Januvia) 100 MG tablet 90 tablet 3     Sig: Take 1 tablet by mouth Daily.   • hydroCHLOROthiazide (HYDRODIURIL) 25 MG tablet 14 tablet 0     Sig: Take 1 tablet by mouth Daily.   • Lancets (freestyle) lancets 100 each 6     Sig: Pt tests daily   • montelukast (SINGULAIR) 10 MG tablet 90 tablet 3     Sig: Take 1 tablet by mouth Every Night.   • omeprazole (priLOSEC) 40 MG capsule 90 capsule 3     Sig: Take 1 capsule by mouth Every Morning.   • propranolol (INDERAL) 40 MG tablet 180 tablet 3     Sig: Take 1 tablet by mouth 2 (Two) Times a Day.      Last office visit with prescribing clinician: 3/7/2022      Next office visit with prescribing clinician: 7/15/2022            Joy Felton LPN  22, 12:56 EDT

## 2022-04-14 RX ORDER — AZELASTINE 1 MG/ML
2 SPRAY, METERED NASAL 2 TIMES DAILY
Qty: 30 ML | Refills: 5 | Status: SHIPPED | OUTPATIENT
Start: 2022-04-14 | End: 2023-01-03

## 2022-04-14 RX ORDER — TRAZODONE HYDROCHLORIDE 50 MG/1
25-50 TABLET ORAL NIGHTLY
Qty: 90 TABLET | Refills: 1 | Status: SHIPPED | OUTPATIENT
Start: 2022-04-14 | End: 2022-06-03 | Stop reason: SDUPTHER

## 2022-04-14 NOTE — TELEPHONE ENCOUNTER
Sent rx over 4/1/22 and 4/5/22 to local. Per Michael protocol ok for this nurse to resend to mail order.

## 2022-04-18 ENCOUNTER — TELEPHONE (OUTPATIENT)
Dept: FAMILY MEDICINE CLINIC | Facility: CLINIC | Age: 67
End: 2022-04-18

## 2022-04-18 NOTE — TELEPHONE ENCOUNTER
Please advise on this.   Last PNA vaccines were PNA 13  10/15 and PNA 23 7/20, 1/08. Last zostavax 10/14. No flu noted this season. Last flu 9/20.

## 2022-04-18 NOTE — TELEPHONE ENCOUNTER
Caller: Khalida Gilliland    Relationship: Self    Best call back number: [phone] OK TO LEAVE VOICEMAIL     Who are you requesting to speak with (clinical staff, provider,  specific staff member): CLINICAL STAFF     What was the call regarding: WANTING TO KNOW IF SHES HAD THE SHINGLE, PNEUMONIA, AND FLU VACCINE OR IF SHE NEEDS TO GET THE VACCINES PLEASE CALL AND ADVISE

## 2022-04-18 NOTE — TELEPHONE ENCOUNTER
She would be recommended for shingrix x2 2-6 months apart for shingles prevention.   She will be due for PNA23 in 7/2025, this will be her last PNA shot she is recommended for.   I would recommended that she have a flu shot in October 2022 for the next flu season.

## 2022-05-13 DIAGNOSIS — J45.909 UNCOMPLICATED ASTHMA, UNSPECIFIED ASTHMA SEVERITY, UNSPECIFIED WHETHER PERSISTENT: ICD-10-CM

## 2022-05-13 RX ORDER — CETIRIZINE HYDROCHLORIDE 10 MG/1
TABLET ORAL
Qty: 90 TABLET | Refills: 3 | OUTPATIENT
Start: 2022-05-13

## 2022-05-30 DIAGNOSIS — I10 ESSENTIAL HYPERTENSION: ICD-10-CM

## 2022-05-31 RX ORDER — LISINOPRIL 10 MG/1
TABLET ORAL
Qty: 90 TABLET | Refills: 3 | OUTPATIENT
Start: 2022-05-31

## 2022-06-01 DIAGNOSIS — G59 MONONEUROPATHY DUE TO UNDERLYING DISEASE: ICD-10-CM

## 2022-06-01 NOTE — TELEPHONE ENCOUNTER
Caller: Khalida Gilliland CHRIS    Relationship: Self    Best call back number: [phone]    Requested Prescriptions:   Requested Prescriptions     Pending Prescriptions Disp Refills   • pregabalin (LYRICA) 100 MG capsule 270 capsule 0     Sig: Take 1 capsule by mouth 3 (Three) Times a Day.   • traZODone (DESYREL) 50 MG tablet 90 tablet 1     Sig: Take 0.5-1 tablets by mouth Every Night.        Pharmacy where request should be sent: [address] [phone] [facility] [phone] FX     Additional details provided by patient:     Does the patient have less than a 3 day supply:  [x] Yes  [] No    Abdirahman Mckeon Rep   06/01/22 10:54 EDT

## 2022-06-02 RX ORDER — TRAZODONE HYDROCHLORIDE 50 MG/1
25-50 TABLET ORAL NIGHTLY
Qty: 90 TABLET | Refills: 1 | OUTPATIENT
Start: 2022-06-02

## 2022-06-02 RX ORDER — PREGABALIN 100 MG/1
100 CAPSULE ORAL 3 TIMES DAILY
Qty: 270 CAPSULE | Refills: 0 | Status: SHIPPED | OUTPATIENT
Start: 2022-06-02 | End: 2022-09-23

## 2022-06-02 NOTE — TELEPHONE ENCOUNTER
Please call her. Trazodone is early, will have to change the script. How many is she taking? Sent 90 day supply in mid April. Thanks.

## 2022-06-02 NOTE — TELEPHONE ENCOUNTER
Spoke with patient in detail. She states that she fills her pill box month in advance and she doesn't have anymore refill left that is why request came over. Taking one tablet nightly.

## 2022-06-03 RX ORDER — TRAZODONE HYDROCHLORIDE 50 MG/1
25-50 TABLET ORAL NIGHTLY
Qty: 90 TABLET | Refills: 1 | Status: SHIPPED | OUTPATIENT
Start: 2022-06-03 | End: 2022-07-15

## 2022-07-05 ENCOUNTER — TELEPHONE (OUTPATIENT)
Dept: FAMILY MEDICINE CLINIC | Facility: CLINIC | Age: 67
End: 2022-07-05

## 2022-07-05 DIAGNOSIS — I10 ESSENTIAL HYPERTENSION: ICD-10-CM

## 2022-07-05 DIAGNOSIS — R51.9 NONINTRACTABLE HEADACHE, UNSPECIFIED CHRONICITY PATTERN, UNSPECIFIED HEADACHE TYPE: ICD-10-CM

## 2022-07-05 NOTE — TELEPHONE ENCOUNTER
Rx Refill Note  Requested Prescriptions     Pending Prescriptions Disp Refills   • lisinopril (PRINIVIL,ZESTRIL) 10 MG tablet 90 tablet 3     Sig: Take 1 tablet by mouth Daily.   • butalbital-acetaminophen-caffeine (FIORICET, ESGIC) -40 MG per tablet 20 tablet 1     Sig: Take 1 tablet by mouth 2 (Two) Times a Day.      Last office visit with prescribing clinician: 3/7/2022      Next office visit with prescribing clinician: 7/5/2022            Carolina Prasad MA  07/05/22, 12:27 EDT

## 2022-07-05 NOTE — TELEPHONE ENCOUNTER
Caller: Khalida Gilliland    Relationship: Self    Best call back number: [phone]     What medication are you requesting: AMBIEN AND MOTION SICKNESS PATCHES     What are your current symptoms:     How long have you been experiencing symptoms:     Have you had these symptoms before:    [x] Yes  [] No    Have you been treated for these symptoms before:   [x] Yes  [] No    If a prescription is needed, what is your preferred pharmacy and phone number: [address] [address] [facility] [phone] [facility] [phone]      Additional notes:PATIENT STOPPED TAKING THE TRAZODONE FOUR DAYS AGO AND IS PLANNING ON BRINGING THE REMAINING TABLETS IN ON HER APPT IN A WEEK OR SO.      PATIENT IS REQUESTING 17 TABLETS OF THE AMBIEN TO TAKE AS SHE TRAVELS OUT OF [place] COUN[place].    PATIENT STATED SHE HAS TROUBLE SLEEPING DUE TO THE TIME DIFFERENCES AND TRAVEL.  PATIENT DOES NOT WANT MORE THAN THE 17 REQUESTED TO TAKE ONLY WHILE SHE IS TRAVELLING OUT OF THE COUNTRY.    PATIENT WILL BE ON A SHIP FOR EIGHT DAYS AND HAS HAD THE MOTION SICKNESS PATCHES AND THEY WERE A GREAT HELP TO KEEP FROM GETTING MOTION SICKNESS.

## 2022-07-05 NOTE — TELEPHONE ENCOUNTER
Caller: Darshana, Khalida CHRIS    Relationship: Self    Best call back number: [phone]    Requested Prescriptions:   Requested Prescriptions     Pending Prescriptions Disp Refills   • lisinopril (PRINIVIL,ZESTRIL) 10 MG tablet 90 tablet 3     Sig: Take 1 tablet by mouth Daily.        Pharmacy where request should be sent: [facility] - [address] [phone] [facility] [phone]      Additional details provided by patient: PATIENT HAS APPROX TWO WEEKS BUT WILL BE LEAVING ON [place] OVER SEAS AND WOULD LIKE TO HAVE DELIVERED PRIOR TO LEAVING.    PATIENT IS NOT IN NEED OF THE   cetirizine (zyrTEC) 10 MG tablet     AT THIS TIME BUT WANTED TO BE SURE THAT SHE DID NOT RUN OUT WHEN IT BECAME TIME FOR THE REFILL.  PATIENT NOTICED THAT DR THAPA IS STILL ON THE BOTTLE AND IS REQUESTING THAT DR DONALDSON REACH OUT TO [facility] SO THAT THEY KNOW THAT DR DONALDSON IS NOW HER DOCTOR.    Does the patient have less than a 3 day supply:  [] Yes  [] No    Abdirahman Casanova Rep   07/05/22 11:51 EDT

## 2022-07-05 NOTE — TELEPHONE ENCOUNTER
Caller: Khalida Gilliland CHRIS    Relationship: Self    Best call back number: [phone]    Requested Prescriptions:   Requested Prescriptions     Pending Prescriptions Disp Refills   • lisinopril (PRINIVIL,ZESTRIL) 10 MG tablet 90 tablet 3     Sig: Take 1 tablet by mouth Daily.   • butalbital-acetaminophen-caffeine (FIORICET, ESGIC) -40 MG per tablet 20 tablet 1     Sig: Take 1 tablet by mouth 2 (Two) Times a Day.        Pharmacy where request should be sent: [address] [phone] [facility] [phone]      Additional details provided by patient: PATIENT IS REQUESTING TO GET REFILL PRIOR TO TRIP SO SHE HAS MEDICATION IN CASE IT IS NEEDED WHILE OUT OF COUNTRY.    PLEASE SEND LISINOPRIL TO [employer] AUTOMATICALLY PUT IT IN ADDITION TO THE OTHER MEDICATION REQUESTED EARLIER.  PLEASE SEE ORIGINAL REQUEST.    PATIENT ALSO STATED THAT SHE ONLY HAS A APPROX 10 DAYS LEFT AND WILL NEED IT SOONER THAN EVERY THING ELSE.    Does the patient have less than a 3 day supply:  [] Yes  [] No    Abdirahman Casanova Rep   07/05/22 12:15 EDT

## 2022-07-06 RX ORDER — LISINOPRIL 10 MG/1
10 TABLET ORAL DAILY
Qty: 90 TABLET | Refills: 3 | Status: SHIPPED | OUTPATIENT
Start: 2022-07-06 | End: 2022-09-26 | Stop reason: SDUPTHER

## 2022-07-06 RX ORDER — BUTALBITAL, ACETAMINOPHEN AND CAFFEINE 50; 325; 40 MG/1; MG/1; MG/1
1 TABLET ORAL 2 TIMES DAILY
Qty: 20 TABLET | Refills: 1 | Status: SHIPPED | OUTPATIENT
Start: 2022-07-06 | End: 2022-12-13 | Stop reason: SDUPTHER

## 2022-07-15 ENCOUNTER — OFFICE VISIT (OUTPATIENT)
Dept: FAMILY MEDICINE CLINIC | Facility: CLINIC | Age: 67
End: 2022-07-15

## 2022-07-15 ENCOUNTER — TELEPHONE (OUTPATIENT)
Dept: FAMILY MEDICINE CLINIC | Facility: CLINIC | Age: 67
End: 2022-07-15

## 2022-07-15 VITALS
HEIGHT: 68 IN | OXYGEN SATURATION: 95 % | BODY MASS INDEX: 31.57 KG/M2 | HEART RATE: 61 BPM | DIASTOLIC BLOOD PRESSURE: 62 MMHG | WEIGHT: 208.3 LBS | SYSTOLIC BLOOD PRESSURE: 132 MMHG | TEMPERATURE: 97.7 F

## 2022-07-15 DIAGNOSIS — R35.0 URINARY FREQUENCY: ICD-10-CM

## 2022-07-15 DIAGNOSIS — G47.00 INSOMNIA, UNSPECIFIED TYPE: ICD-10-CM

## 2022-07-15 DIAGNOSIS — E11.9 TYPE 2 DIABETES MELLITUS WITHOUT COMPLICATION, WITHOUT LONG-TERM CURRENT USE OF INSULIN: Primary | Chronic | ICD-10-CM

## 2022-07-15 DIAGNOSIS — R39.15 URINARY URGENCY: ICD-10-CM

## 2022-07-15 DIAGNOSIS — I10 ESSENTIAL HYPERTENSION: Chronic | ICD-10-CM

## 2022-07-15 PROCEDURE — 99214 OFFICE O/P EST MOD 30 MIN: CPT | Performed by: FAMILY MEDICINE

## 2022-07-15 RX ORDER — SCOLOPAMINE TRANSDERMAL SYSTEM 1 MG/1
1 PATCH, EXTENDED RELEASE TRANSDERMAL
Qty: 4 EACH | Refills: 0 | Status: SHIPPED | OUTPATIENT
Start: 2022-07-15 | End: 2022-11-07

## 2022-07-15 RX ORDER — CELECOXIB 200 MG/1
200 CAPSULE ORAL DAILY
COMMUNITY
Start: 2022-06-07

## 2022-07-15 RX ORDER — ZOLPIDEM TARTRATE 5 MG/1
5 TABLET ORAL NIGHTLY PRN
Qty: 19 TABLET | Refills: 0 | Status: SHIPPED | OUTPATIENT
Start: 2022-07-15 | End: 2022-11-07 | Stop reason: SDUPTHER

## 2022-07-15 RX ORDER — MULTIPLE VITAMINS W/ MINERALS TAB 9MG-400MCG
1 TAB ORAL DAILY
COMMUNITY

## 2022-07-15 RX ORDER — GLUCOSAMINE/D3/BOSWELLIA SERRA 1500MG-400
1 TABLET ORAL EVERY EVENING
COMMUNITY

## 2022-07-15 NOTE — PROGRESS NOTES
"Chief Complaint  Diabetes (4 MFU) and Med Management (DM foot screen )    Subjective        Khalida Gilliland presents to [facility]  History of Present Illness  Khalida Gilliland, 67 years old here today for follow-up of her diabetes mellitus and hypertension. She takes a statin, and she is on an ACE inhibitor. Her blood pressure is well controlled. Previous A1c was 7.8% in March and this repeat is 7.1%. Significant improvement. She also wanted to discuss some ongoing urinary urgency that she has as well as frequency going on for several months. She is going on a big trip and would like to use Ambien for insomnia and something for motion sickness.     The patient states that she has been trying to walk for 30 minutes to an hour every day. She has lost 14 pounds. The patient states that her glucose level was 126 mg/dL yesterday morning. She states that it had been between 140 - 146 mg/dL. The patient is taking trazodone and states that it does help, but not as much as Ambien. She notes that she does get a dry mouth during the day when taking the trazodone. She states that she felt better and discontinued it 2 weeks ago.     The patient reports that she is still experiencing pain in her abdomen. She notes that she went to the [facility], but they were not able to help.    She reports that she is experiencing urinary urgency, which began approximately 3 months ago.       Objective   Vital Signs:  /62 (BP Location: Right arm, Patient Position: Sitting, Cuff Size: Adult)   Pulse 61   Temp 97.7 °F (36.5 °C) (Infrared)   Ht 172.7 cm (68\")   Wt 94.5 kg (208 lb 4.8 oz)   SpO2 95%   BMI 31.67 kg/m²     BMI is >= 30 and <35. (Class 1 Obesity). The following options were offered after discussion;: exercise counseling/recommendations and nutrition counseling/recommendations      Physical Exam  Vitals reviewed.   Constitutional:       General: She is not in acute distress.  Eyes:      " General: No scleral icterus.        Right eye: No discharge.         Left eye: No discharge.      Conjunctiva/sclera: Conjunctivae normal.   Cardiovascular:      Rate and Rhythm: Normal rate and regular rhythm.      Heart sounds: Normal heart sounds. No murmur heard.    No friction rub. No gallop.   Pulmonary:      Effort: Pulmonary effort is normal. No respiratory distress.      Breath sounds: Normal breath sounds. No wheezing.   Neurological:      Mental Status: She is alert and oriented to person, place, and time.   Psychiatric:         Behavior: Behavior normal.              Result Review :  The following data was reviewed by: Zachary Perez on 07/15/2022:  A1C Last 3 Results    HGBA1C Last 3 Results 9/7/21 3/7/22 7/12/22   Hemoglobin A1C 7.3 (A) 7.8 (A) 7.1 (A)   (A) Abnormal value       Comments are available for some flowsheets but are not being displayed.                             Assessment and Plan   Diagnoses and all orders for this visit:    1. Type 2 diabetes mellitus without complication, without long-term current use of insulin (HCC) (Primary)    2. Essential hypertension    3. Insomnia, unspecified type  -     zolpidem (Ambien) 5 MG tablet; Take 1 tablet by mouth At Night As Needed for Sleep.  Dispense: 19 tablet; Refill: 0    4. Urinary frequency  -     Ambulatory Referral to Physical Therapy Pelvic Floor    5. Urinary urgency  -     Ambulatory Referral to Physical Therapy Pelvic Floor    Other orders  -     Scopolamine 1 MG/3DAYS patch; Place 1 patch on the skin as directed by provider Every 72 (Seventy-Two) Hours.  Dispense: 4 each; Refill: 0      Diabetes mellitus  - Her repeat A1c is significantly improved. She is going to continue walking 1 to 3 miles most days trying to get out to walk. This has improved her sugar and her weight is down 14 pounds from last time and her neuropathy is not as severe at night. Her blood pressure is very well controlled. She will continue the 10 mg of  lisinopril. I explained the importance of this medication in the setting of diabetes and renal protection.     Insomnia   - I discussed the recommendations against using Ambien especially long term. This will be short-term use. She has trazodone on hand that she may need for withdrawal insomnia once she stops taking the Ambien. I also prescribed scopolamine patches and discussed when she needs to place this for motion sickness, as she will be on a boat for a week.     Urinary urgency and frequency  - We discussed bladder instability and sorry to trust her muscle instability and some urge incontinence. Standing with urine loss also recommended she consider pelvic floor PT and so she is interested in this, and I put in a referral for pelvic floor PT at [place].     She is going to follow up in 4 months and we will do a wellness and check in on her diabetes at that time.    Brother doing better with his cancer treatment and prognosis. Staying at the house and helping. It is has been a blessing and her daughter is getting treatment as well.        Follow Up   No follow-ups on file.  Patient was given instructions and counseling regarding her condition or for health maintenance advice. Please see specific information pulled into the AVS if appropriate.       Transcribed from ambient dictation for Erika Rodriguez MD by Zachary Perez.  07/15/22   13:08 EDT    Patient verbalized consent to the visit recording.  I have personally performed the services described in this document as transcribed by the above individual, and it is both accurate and complete.  Erika Rodriguez MD  7/31/2022  20:58 EDT

## 2022-07-15 NOTE — TELEPHONE ENCOUNTER
Khalida Gilliland was scheduling for a wellness visit next available was in Feb 2023. Michael advised to schedule in 4 months-Nov. Also will need labs prior.     Please advise

## 2022-09-17 DIAGNOSIS — E11.9 TYPE 2 DIABETES MELLITUS WITHOUT COMPLICATION, WITHOUT LONG-TERM CURRENT USE OF INSULIN: ICD-10-CM

## 2022-09-19 RX ORDER — GLIPIZIDE 5 MG/1
TABLET, EXTENDED RELEASE ORAL
Qty: 90 TABLET | Refills: 3 | Status: SHIPPED | OUTPATIENT
Start: 2022-09-19

## 2022-09-19 RX ORDER — SITAGLIPTIN 100 MG/1
TABLET, FILM COATED ORAL
Qty: 90 TABLET | Refills: 3 | Status: SHIPPED | OUTPATIENT
Start: 2022-09-19

## 2022-09-19 NOTE — TELEPHONE ENCOUNTER
Rx Refill Note  Requested Prescriptions     Pending Prescriptions Disp Refills   • Januvia 100 MG tablet [Pharmacy Med Name: JANUVIA TABS 100MG] 90 tablet 3     Sig: TAKE 1 TABLET DAILY   • glipiZIDE XL 5 MG ER tablet [Pharmacy Med Name: GLIPIZIDE XL TABS 5MG] 90 tablet 3     Sig: TAKE 1 TABLET DAILY      Last office visit with prescribing clinician: 7/15/2022      Next office visit with prescribing clinician: 11/7/2022            Joy Felton LPN  09/19/22, 16:17 EDT

## 2022-09-22 DIAGNOSIS — G59 MONONEUROPATHY DUE TO UNDERLYING DISEASE: ICD-10-CM

## 2022-09-22 NOTE — TELEPHONE ENCOUNTER
Caller: Khalida Gilliland    Relationship: Self    Best call back number: [phone]     Requested Prescriptions:   Requested Prescriptions     Pending Prescriptions Disp Refills   • pregabalin (LYRICA) 100 MG capsule [Pharmacy Med Name: PREGABALIN 100 MG CAPSULE] 270 capsule      Sig: TAKE ONE CAPSULE BY MOUTH THREE TIMES A DAY        Pharmacy where request should be sent: [facility] [address] [address] [place]  AT [facility] [phone] [facility] [phone] FX     Additional details provided by patient: PATIENT IS OUT OF MEDICATION     Does the patient have less than a 3 day supply:  [x] Yes  [] No    Abdirahman Churchill Rep   09/22/22 12:21 EDT

## 2022-09-22 NOTE — TELEPHONE ENCOUNTER
Rx Refill Note  Requested Prescriptions     Pending Prescriptions Disp Refills   • pregabalin (LYRICA) 100 MG capsule [Pharmacy Med Name: PREGABALIN 100 MG CAPSULE] 270 capsule      Sig: TAKE ONE CAPSULE BY MOUTH THREE TIMES A DAY      Last office visit with prescribing clinician: 7/15/2022      Next office visit with prescribing clinician: 11/7/2022            Joy Felton LPN  09/22/22, 16:01 EDT

## 2022-09-23 RX ORDER — PREGABALIN 100 MG/1
CAPSULE ORAL
Qty: 270 CAPSULE | Refills: 0 | Status: SHIPPED | OUTPATIENT
Start: 2022-09-23 | End: 2022-12-13 | Stop reason: SDUPTHER

## 2022-09-23 NOTE — TELEPHONE ENCOUNTER
Spoke to patient and she will find out a pharmacy that has this so Dr Rodriguez can send it in today

## 2022-09-23 NOTE — TELEPHONE ENCOUNTER
Patient called back to inform that [facility] does not have this medication in stock. Patient is very concerned because she was told she can have a seizure when not taking this medication and it is about to enter into the weekend. Please advise.

## 2022-09-26 DIAGNOSIS — I10 ESSENTIAL HYPERTENSION: ICD-10-CM

## 2022-09-27 RX ORDER — LISINOPRIL 10 MG/1
10 TABLET ORAL DAILY
Qty: 90 TABLET | Refills: 3 | Status: SHIPPED | OUTPATIENT
Start: 2022-09-27

## 2022-11-07 ENCOUNTER — OFFICE VISIT (OUTPATIENT)
Dept: FAMILY MEDICINE CLINIC | Facility: CLINIC | Age: 67
End: 2022-11-07

## 2022-11-07 VITALS
WEIGHT: 217.8 LBS | BODY MASS INDEX: 33.01 KG/M2 | DIASTOLIC BLOOD PRESSURE: 64 MMHG | RESPIRATION RATE: 17 BRPM | HEIGHT: 68 IN | OXYGEN SATURATION: 95 % | SYSTOLIC BLOOD PRESSURE: 104 MMHG | HEART RATE: 62 BPM | TEMPERATURE: 97.7 F

## 2022-11-07 DIAGNOSIS — E11.9 TYPE 2 DIABETES MELLITUS WITHOUT COMPLICATION, WITHOUT LONG-TERM CURRENT USE OF INSULIN: Chronic | ICD-10-CM

## 2022-11-07 DIAGNOSIS — E11.9 TYPE 2 DIABETES MELLITUS WITHOUT COMPLICATION, WITHOUT LONG-TERM CURRENT USE OF INSULIN: Primary | Chronic | ICD-10-CM

## 2022-11-07 DIAGNOSIS — Z00.00 MEDICARE ANNUAL WELLNESS VISIT, SUBSEQUENT: Primary | ICD-10-CM

## 2022-11-07 DIAGNOSIS — Z12.11 COLON CANCER SCREENING: ICD-10-CM

## 2022-11-07 DIAGNOSIS — G47.00 INSOMNIA, UNSPECIFIED TYPE: ICD-10-CM

## 2022-11-07 DIAGNOSIS — E78.49 OTHER HYPERLIPIDEMIA: Chronic | ICD-10-CM

## 2022-11-07 DIAGNOSIS — I10 ESSENTIAL HYPERTENSION: Chronic | ICD-10-CM

## 2022-11-07 PROCEDURE — 1170F FXNL STATUS ASSESSED: CPT | Performed by: FAMILY MEDICINE

## 2022-11-07 PROCEDURE — G0008 ADMIN INFLUENZA VIRUS VAC: HCPCS | Performed by: FAMILY MEDICINE

## 2022-11-07 PROCEDURE — G0439 PPPS, SUBSEQ VISIT: HCPCS | Performed by: FAMILY MEDICINE

## 2022-11-07 PROCEDURE — 1126F AMNT PAIN NOTED NONE PRSNT: CPT | Performed by: FAMILY MEDICINE

## 2022-11-07 PROCEDURE — 1159F MED LIST DOCD IN RCRD: CPT | Performed by: FAMILY MEDICINE

## 2022-11-07 PROCEDURE — 90662 IIV NO PRSV INCREASED AG IM: CPT | Performed by: FAMILY MEDICINE

## 2022-11-07 RX ORDER — SCOLOPAMINE TRANSDERMAL SYSTEM 1 MG/1
1 PATCH, EXTENDED RELEASE TRANSDERMAL
Qty: 4 EACH | Refills: 0 | Status: SHIPPED | OUTPATIENT
Start: 2022-11-07

## 2022-11-07 RX ORDER — TRAZODONE HYDROCHLORIDE 50 MG/1
TABLET ORAL
COMMUNITY
Start: 2022-08-18 | End: 2023-01-05 | Stop reason: SDUPTHER

## 2022-11-07 RX ORDER — ZOLPIDEM TARTRATE 5 MG/1
5 TABLET ORAL NIGHTLY PRN
Qty: 13 TABLET | Refills: 0 | Status: SHIPPED | OUTPATIENT
Start: 2022-11-07

## 2022-11-07 NOTE — PROGRESS NOTES
The ABCs of the Annual Wellness Visit  Subsequent Medicare Wellness Visit    Chief Complaint   Patient presents with   • Medicare Wellness-subsequent      Subjective    History of Present Illness:  Khalida Gilliland is a 67 y.o. female who presents for a Subsequent Medicare Wellness Visit.    The following portions of the patient's history were reviewed and   updated as appropriate: allergies, current medications, past family history, past medical history, past social history, past surgical history and problem list.    Compared to one year ago, the patient feels her physical   health is the same.    Compared to one year ago, the patient feels her mental   health is the same.    Recent Hospitalizations:  This patient has had a [facility] [place] admission record on file within the last 365 days.    Current Medical Providers:  Patient Care Team:  Erika Rodriguez MD as PCP - General (Family Medicine)  Lex Morris MD as Consulting Physician (Hematology and Oncology)  Mikael David MD as Referring Physician (Internal Medicine)    Outpatient Medications Prior to Visit   Medication Sig Dispense Refill   • albuterol (PROVENTIL) (2.5 MG/3ML) 0.083% nebulizer solution Take 1 vial by nebulization Every 4 (Four) Hours As Needed for Wheezing or Shortness of Air. 360 mL 0   • albuterol sulfate  (90 Base) MCG/ACT inhaler Inhale 2 puffs Every 4 (Four) Hours As Needed for Wheezing or Shortness of Air (Cough). 1 inhaler 0   • Ascorbic Acid (VITAMIN C PO) Take 1,000 mg by mouth Daily. PT INSTRUCTED TO CONTINUE PER DR. CALLUM URBINA     • aspirin 81 MG EC tablet Take 1 tablet by mouth Daily. 30 tablet 6   • atorvastatin (LIPITOR) 20 MG tablet Take 1 tablet by mouth Every Night. 90 tablet 3   • azelastine (ASTELIN) 0.1 % nasal spray 2 sprays into the nostril(s) as directed by provider 2 (Two) Times a Day. Use in each nostril as directed 30 mL 5   • Biotin 91296 MCG tablet Take 1 tablet by mouth Every Evening.     •  budesonide-formoterol (SYMBICORT) 160-4.5 MCG/ACT inhaler Inhale 2 puffs 2 (Two) Times a Day.     • butalbital-acetaminophen-caffeine (FIORICET, ESGIC) -40 MG per tablet Take 1 tablet by mouth 2 (Two) Times a Day. 20 tablet 1   • celecoxib (CeleBREX) 200 MG capsule Take 200 mg by mouth Daily.     • cetirizine (zyrTEC) 10 MG tablet TAKE 1 TABLET DAILY 90 tablet 3   • cholecalciferol (VITAMIN D3) 1000 units tablet Take 1,000 Units by mouth Daily. PER PT TO CONTINUE PER DR. CALLUM URBINA     • Coenzyme Q10 (CO Q 10 PO) Take 100 mg by mouth Every Evening.     • Cyanocobalamin (VITAMIN B 12 PO) Take 5,000 mcg by mouth Every Night.     • cycloSPORINE (RESTASIS) 0.05 % ophthalmic emulsion Administer 1 drop to both eyes 2 (Two) Times a Day.     • diphenhydrAMINE-acetaminophen (TYLENOL PM)  MG tablet per tablet Take 1 tablet by mouth At Night As Needed for Sleep.     • fluticasone (FLONASE) 50 MCG/ACT nasal spray 2 sprays by Each Nare route Daily. 29.7 mL 3   • Ginkgo Biloba 40 MG tablet Take 120 mg by mouth Daily.     • glipiZIDE XL 5 MG ER tablet TAKE 1 TABLET DAILY 90 tablet 3   • glucosamine-chondroitin 500-400 MG capsule capsule Take 1 capsule by mouth 2 (Two) Times a Day With Meals. PT TO CONTINUE PER DR. CALLUM URBINA     • glucose blood (FREESTYLE LITE) test strip PT to use once daily 100 each 12   • hydroCHLOROthiazide (HYDRODIURIL) 25 MG tablet Take 1 tablet by mouth Daily. 90 tablet 3   • Januvia 100 MG tablet TAKE 1 TABLET DAILY 90 tablet 3   • Lancets (freestyle) lancets Pt tests daily 100 each 6   • lisinopril (PRINIVIL,ZESTRIL) 10 MG tablet Take 1 tablet by mouth Daily. 90 tablet 3   • Misc Natural Products (CORTISOL PO) Take  by mouth.     • montelukast (SINGULAIR) 10 MG tablet Take 1 tablet by mouth Every Night. 90 tablet 3   • multivitamin with minerals tablet tablet Take 1 tablet by mouth Daily.     • NYSTATIN 070876 UNIT/GM topical powder      • Omega-3 Fatty Acids (FISH OIL) 1000 MG capsule  capsule Take  by mouth Daily With Breakfast.     • omeprazole (priLOSEC) 40 MG capsule Take 1 capsule by mouth Every Morning. 90 capsule 3   • Potassium Gluconate 550 MG tablet Take  by mouth.     • pregabalin (LYRICA) 100 MG capsule TAKE ONE CAPSULE BY MOUTH THREE TIMES A  capsule 0   • Probiotic Product (PROBIOTIC-10 PO) Take  by mouth.     • propranolol (INDERAL) 40 MG tablet Take 1 tablet by mouth 2 (Two) Times a Day. 180 tablet 3   • raNITIdine (ZANTAC) 75 MG tablet Take 150 mg by mouth 2 (Two) Times a Day.     • Spacer/Aero-Holding Chambers device Use with all inhaled treatments 1 each 0   • traZODone (DESYREL) 50 MG tablet      • Scopolamine 1 MG/3DAYS patch Place 1 patch on the skin as directed by provider Every 72 (Seventy-Two) Hours. 4 each 0   • zolpidem (Ambien) 5 MG tablet Take 1 tablet by mouth At Night As Needed for Sleep. 19 tablet 0     No facility-administered medications prior to visit.       No opioid medication identified on active medication list. I have reviewed chart for other potential  high risk medication/s and harmful drug interactions in the elderly.          Aspirin is on active medication list. Aspirin use is indicated based on review of current medical condition/s. Pros and cons of this therapy have been discussed today. Benefits of this medication outweigh potential harm.  Patient has been encouraged to continue taking this medication.  .      Patient Active Problem List   Diagnosis   • Monoclonal gammopathy   • Hx of cerebral aneurysm repair   • Allergy history, radiographic dye   • Cerebral aneurysm, nonruptured   • Neck pain   • Contrast media allergy   • Other hyperlipidemia   • Type 2 diabetes mellitus without complication, without long-term current use of insulin (HCC)   • Essential hypertension   • Chronic abdominal pain   • Right sided abdominal pain   • Fatty liver   • Mononeuropathy due to underlying disease   • Asthma   • GERD (gastroesophageal reflux disease)   •  "Headache   • Diabetic peripheral neuropathy (HCC)   • Allergic reaction   • Health care maintenance   • COVID-19 virus infection   • Migraine without aura and without status migrainosus, not intractable   • UTI (urinary tract infection)   • Obesity (BMI 30-39.9)   • Cytokine release syndrome, grade 1     Advance Care Planning  Advance Directive is not on file.  ACP discussion was held with the patient during this visit. Patient does not have an advance directive, information provided.          Objective    Vitals:    11/07/22 1402   BP: 104/64   BP Location: Right arm   Patient Position: Sitting   Cuff Size: Adult   Pulse: 62   Resp: 17   Temp: 97.7 °F (36.5 °C)   TempSrc: Infrared   SpO2: 95%   Weight: 98.8 kg (217 lb 12.8 oz)   Height: 172.7 cm (68\")   PainSc: 0-No pain     Estimated body mass index is 33.12 kg/m² as calculated from the following:    Height as of this encounter: 172.7 cm (68\").    Weight as of this encounter: 98.8 kg (217 lb 12.8 oz).    BMI is >= 30 and <35. (Class 1 Obesity). The following options were offered after discussion;: exercise counseling/recommendations and nutrition counseling/recommendations      Does the patient have evidence of cognitive impairment? No    Physical Exam  Vitals reviewed.   Constitutional:       General: She is not in acute distress.     Appearance: Normal appearance. She is obese. She is not ill-appearing or toxic-appearing.   HENT:      Head: Normocephalic and atraumatic.      Right Ear: Tympanic membrane, ear canal and external ear normal. There is no impacted cerumen.      Left Ear: Tympanic membrane, ear canal and external ear normal. There is no impacted cerumen.      Nose: Nose normal.      Mouth/Throat:      Mouth: Mucous membranes are moist.      Pharynx: Oropharynx is clear. No oropharyngeal exudate or posterior oropharyngeal erythema.   Eyes:      General: No scleral icterus.        Right eye: No discharge.         Left eye: No discharge.      " Conjunctiva/sclera: Conjunctivae normal.   Neck:      Thyroid: No thyromegaly.      Vascular: No carotid bruit.   Cardiovascular:      Rate and Rhythm: Normal rate and regular rhythm.      Heart sounds: Normal heart sounds. No murmur heard.    No friction rub. No gallop.   Pulmonary:      Effort: Pulmonary effort is normal. No respiratory distress.      Breath sounds: Normal breath sounds. No wheezing or rales.   Chest:      Chest wall: No tenderness.   Abdominal:      General: Bowel sounds are normal. There is no distension.      Palpations: Abdomen is soft. There is no mass.      Tenderness: There is no abdominal tenderness. There is no guarding.   Musculoskeletal:         General: No deformity.      Cervical back: Neck supple.      Right lower leg: No edema.      Left lower leg: No edema.   Lymphadenopathy:      Cervical: No cervical adenopathy.   Skin:     Coloration: Skin is not jaundiced or pale.   Neurological:      General: No focal deficit present.      Mental Status: She is alert and oriented to person, place, and time.      Motor: No abnormal muscle tone.      Coordination: Coordination normal.   Psychiatric:         Mood and Affect: Mood normal.         Behavior: Behavior normal.       Lab Results   Component Value Date    HGBA1C 6.90 (H) 11/01/2022            HEALTH RISK ASSESSMENT    Smoking Status:  Social History     Tobacco Use   Smoking Status Former   • Years: 1.00   • Types: Cigarettes   Smokeless Tobacco Never     Alcohol Consumption:  Social History     Substance and Sexual Activity   Alcohol Use Not Currently     Fall Risk Screen:    [facility], and patient is at LOW risk for falls.Assessment completed on:7/15/2022    Depression Screening:  PHQ-2/PHQ-9 Depression Screening 11/7/2022   Retired PHQ-9 Total Score -   Retired Total Score -   Little Interest or Pleasure in Doing Things 0-->not at all   Feeling Down, Depressed or Hopeless 0-->not at all   PHQ-9: Brief  Depression Severity Measure Score 0       Health Habits and Functional and Cognitive Screening:  Functional & Cognitive Status 11/7/2022   Do you have difficulty preparing food and eating? No   Do you have difficulty bathing yourself, getting dressed or grooming yourself? No   Do you have difficulty using the toilet? No   Do you have difficulty moving around from place to place? No   Do you have trouble with steps or getting out of a bed or a chair? No   Current Diet Well Balanced Diet   Dental Exam Up to date   Eye Exam Up to date   Exercise (times per week) 0 times per week   Current Exercises Include No Regular Exercise   Do you need help using the phone?  No   Are you deaf or do you have serious difficulty hearing?  No   Do you need help with transportation? No   Do you need help shopping? No   Do you need help preparing meals?  No   Do you need help with housework?  No   Do you need help with laundry? No   Do you need help taking your medications? No   Do you need help managing money? No   Do you ever drive or ride in a car without wearing a seat belt? No   Have you felt unusual stress, anger or loneliness in the last month? No   Who do you live with? Spouse   If you need help, do you have trouble finding someone available to you? No   Have you been bothered in the last four weeks by sexual problems? No   Do you have difficulty concentrating, remembering or making decisions? No       Age-appropriate Screening Schedule:  Refer to the list below for future screening recommendations based on patient's age, sex and/or medical conditions. Orders for these recommended tests are listed in the plan section. The patient has been provided with a written plan.    Health Maintenance   Topic Date Due   • ZOSTER VACCINE (2 of 3) 12/04/2014   • DXA SCAN  04/16/2021   • PAP SMEAR  04/16/2022   • INFLUENZA VACCINE  08/01/2022   • LIPID PANEL  03/07/2023   • HEMOGLOBIN A1C  05/01/2023   • DIABETIC EYE EXAM  05/31/2023   • URINE  MICROALBUMIN  07/12/2023   • DIABETIC FOOT EXAM  07/15/2023   • MAMMOGRAM  05/20/2024   • TDAP/TD VACCINES (2 - Td or Tdap) 09/03/2025              Assessment & Plan   CMS Preventative Services Quick Reference  Risk Factors Identified During Encounter  Chronic Pain   Fall Risk-High or Moderate  Immunizations Discussed/Encouraged (specific Immunizations; Influenza, Shingrix and COVID19  Obesity/Overweight   The above risks/problems have been discussed with the patient.  Follow up actions/plans if indicated are seen below in the Assessment/Plan Section.  Pertinent information has been shared with the patient in the After Visit Summary.    Diagnoses and all orders for this visit:    1. Medicare annual wellness visit, subsequent (Primary)    2. Insomnia, unspecified type  -     zolpidem (Ambien) 5 MG tablet; Take 1 tablet by mouth At Night As Needed for Sleep.  Dispense: 13 tablet; Refill: 0    3. Type 2 diabetes mellitus without complication, without long-term current use of insulin (HCC)    4. Essential hypertension    5. Colon cancer screening  -     Ambulatory Referral For Screening Colonoscopy    Other orders  -     Scopolamine 1 MG/3DAYS patch; Place 1 patch on the skin as directed by provider Every 72 (Seventy-Two) Hours.  Dispense: 4 each; Refill: 0  -     Fluzone High-Dose 65+yrs (2017-5534)        Follow Up:   No follow-ups on file.     An After Visit Summary and PPPS were made available to the patient.               She does not exercise  She does not get out for this but she is non-stop all day.    often. They keep families in the home.     DXA normal in 2019.   The mammogram was normal, negative in May 2022    Her feet really bother her. She takes the lyrica TID.   Interested in alternative therapy with cryo or some kind of ablation.   She watches her foot ware.     [place] had already discussed this last big trip going [place] and needing sleep requesting just  enough ambien. She used for a previous trip and worked well. She did not have any problems with side effects or trouble with being alert the next day. Rested well with this medication. Has used before. This will be last script for her and she understands just because short term, tolerated before, and will not be ongoing. She may have withdrawal insomnia when stopping though she did not experience this last time.

## 2022-11-14 ENCOUNTER — TELEPHONE (OUTPATIENT)
Dept: GASTROENTEROLOGY | Facility: CLINIC | Age: 67
End: 2022-11-14

## 2022-11-14 ENCOUNTER — TRANSCRIBE ORDERS (OUTPATIENT)
Dept: ADMINISTRATIVE | Facility: HOSPITAL | Age: 67
End: 2022-11-14

## 2022-11-14 DIAGNOSIS — Z13.6 ENCOUNTER FOR SCREENING FOR VASCULAR DISEASE: Primary | ICD-10-CM

## 2022-11-14 NOTE — TELEPHONE ENCOUNTER
Caller: Khalida Gilliland    Relationship: Self    Best call back number: [phone]    What is the best time to reach you: ANYTIME     Who are you requesting to speak with (clinical staff, provider,  specific staff member): CLINICAL STAFF    What was the call regarding: PATIENT WOULD LIKE TO SCHEDULE PROCEDURE. WOULD LIKE FOR SOMEONE IN OFFICE TO CALL AND ASSIST HER WITH THIS.    Do you require a callback: YES

## 2022-11-15 ENCOUNTER — TELEPHONE (OUTPATIENT)
Dept: GASTROENTEROLOGY | Facility: CLINIC | Age: 67
End: 2022-11-15

## 2022-11-15 NOTE — TELEPHONE ENCOUNTER
Caller: Khalida Gilliland    Relationship to patient: Self    Best call back number: [phone]    Patient is needing: PT CALLED TO SCHEDULE COLONOSCOPY. ADVISED OF OA MAILED PATIENT THINKS THIS HAS BEEN COMPLETED. PLEASE CALL AND ADVISE IF RECEIVED OR NOT TO BE SCHEDULED.

## 2022-12-05 ENCOUNTER — TELEPHONE (OUTPATIENT)
Dept: GASTROENTEROLOGY | Facility: CLINIC | Age: 67
End: 2022-12-05

## 2022-12-05 DIAGNOSIS — Z12.11 COLON CANCER SCREENING: Primary | ICD-10-CM

## 2022-12-05 RX ORDER — SODIUM CHLORIDE, SODIUM LACTATE, POTASSIUM CHLORIDE, CALCIUM CHLORIDE 600; 310; 30; 20 MG/100ML; MG/100ML; MG/100ML; MG/100ML
30 INJECTION, SOLUTION INTRAVENOUS CONTINUOUS
Status: CANCELLED | OUTPATIENT
Start: 2023-04-07

## 2022-12-05 NOTE — TELEPHONE ENCOUNTER
She had a poorly prepped colonoscopy in August 2020 that did not meet requirements for colon cancer screening.  As such, she needs to have colon cancer screening.  A new case request for screening colonoscopy has been entered.

## 2022-12-05 NOTE — TELEPHONE ENCOUNTER
Caller: Khalida Gilliland    Relationship: Self    Best call back number: [phone]    What is the best time to reach you: ANYTIME    Who are you requesting to speak with (clinical staff, provider,  specific staff member): CLINICAL STAFF    What was the call regarding: PATIENT STATING PROCEDURE TWO YEARS AGO WAS INCONCLUSIVE. SHE IS NEEDED PROCEDURE TO BE FILED AS NECESSARY ROUTINE AND NOT ROUTINE. INSURANCE WILL NOT COVER TWO C/S WITHIN 10 YRS. WOULD LIKE TO SPEAK WITH SOMEONE IN OFFICE REGARDING THIS.    Do you require a callback: YES

## 2022-12-05 NOTE — TELEPHONE ENCOUNTER
"See 11/7/22 referral from Dr Rodriguez for colon cancer screening. See notes of 11/15/22 re: OA completion.      See 8/14/20 c/s op note - \"repeat c/s in 1 yr because the bowel prep was suboptimal\".      Message to Maribeth.   "

## 2022-12-07 ENCOUNTER — APPOINTMENT (OUTPATIENT)
Dept: CARDIOLOGY | Facility: HOSPITAL | Age: 67
End: 2022-12-07

## 2022-12-13 ENCOUNTER — TELEPHONE (OUTPATIENT)
Dept: FAMILY MEDICINE CLINIC | Facility: CLINIC | Age: 67
End: 2022-12-13

## 2022-12-13 DIAGNOSIS — G59 MONONEUROPATHY DUE TO UNDERLYING DISEASE: ICD-10-CM

## 2022-12-13 DIAGNOSIS — R51.9 NONINTRACTABLE HEADACHE, UNSPECIFIED CHRONICITY PATTERN, UNSPECIFIED HEADACHE TYPE: ICD-10-CM

## 2022-12-13 RX ORDER — BUTALBITAL, ACETAMINOPHEN AND CAFFEINE 50; 325; 40 MG/1; MG/1; MG/1
1 TABLET ORAL 2 TIMES DAILY
Qty: 20 TABLET | Refills: 1 | Status: SHIPPED | OUTPATIENT
Start: 2022-12-13 | End: 2023-02-23 | Stop reason: SDUPTHER

## 2022-12-13 RX ORDER — MONTELUKAST SODIUM 10 MG/1
10 TABLET ORAL NIGHTLY
Qty: 90 TABLET | Refills: 3 | Status: SHIPPED | OUTPATIENT
Start: 2022-12-13

## 2022-12-13 RX ORDER — PREGABALIN 100 MG/1
100 CAPSULE ORAL 3 TIMES DAILY
Qty: 270 CAPSULE | Refills: 0 | Status: SHIPPED | OUTPATIENT
Start: 2022-12-13 | End: 2023-02-23 | Stop reason: SDUPTHER

## 2022-12-13 NOTE — TELEPHONE ENCOUNTER
Rx Refill Note  Requested Prescriptions     Pending Prescriptions Disp Refills   • butalbital-acetaminophen-caffeine (FIORICET, ESGIC) -40 MG per tablet 20 tablet 1     Sig: Take 1 tablet by mouth 2 (Two) Times a Day.      Last office visit with prescribing clinician: 11/7/2022   Last telemedicine visit with prescribing clinician: 12/13/2022   Next office visit with prescribing clinician: 12/13/2022   {TIP  Encounters:23}                      Would you like a call back once the refill request has been completed: [] Yes [] No    If the office needs to give you a call back, can they leave a voicemail: [] Yes [] No    Joy Felton LPN  12/13/22, 17:34 EST

## 2022-12-13 NOTE — TELEPHONE ENCOUNTER
Caller: Khalida Gilliland    Relationship: Self    Best call back number: [phone]    Requested Prescriptions:   Requested Prescriptions     Pending Prescriptions Disp Refills   • montelukast (SINGULAIR) 10 MG tablet 90 tablet 3     Sig: Take 1 tablet by mouth Every Night.        Pharmacy where request should be sent: [facility] - [address] [phone] [facility] [phone] [facility]     Additional details provided by patient: OUT OF MEDICATION     Does the patient have less than a 3 day supply:  [] Yes  [] No    Would you like a call back once the refill request has been completed: [] Yes [] No    If the office needs to give you a call back, can they leave a voicemail: [] Yes [] No    Abdirahman Jiménez Rep   12/13/22 12:18 EST

## 2022-12-13 NOTE — TELEPHONE ENCOUNTER
Caller: Darshana, Khalida A    Relationship: Self    Best call back number: [phone]    Requested Prescriptions:   Requested Prescriptions     Pending Prescriptions Disp Refills   • pregabalin (LYRICA) 100 MG capsule 270 capsule 0     Sig: Take 1 capsule by mouth 3 (Three) Times a Day.        Pharmacy where request should be sent: [facility] [id number] [id number] [facility] AT [facility] [place] [phone] [facility] [phone] FX     Additional details provided by patient: HAS 7 DAYS OF MEDS LEFT     Does the patient have less than a 3 day supply:  [] Yes  [x] No    Would you like a call back once the refill request has been completed: [] Yes [] No    If the office needs to give you a call back, can they leave a voicemail: [] Yes [] No    Abdirahman Jiménez Rep   12/13/22 12:23 EST

## 2022-12-13 NOTE — TELEPHONE ENCOUNTER
Caller: Khalida Gilliland    Relationship: Self    Best call back number: [phone]    What medications are you currently taking:   Current Outpatient Medications on File Prior to Visit   Medication Sig Dispense Refill   • albuterol (PROVENTIL) (2.5 MG/3ML) 0.083% nebulizer solution Take 1 vial by nebulization Every 4 (Four) Hours As Needed for Wheezing or Shortness of Air. 360 mL 0   • albuterol sulfate  (90 Base) MCG/ACT inhaler Inhale 2 puffs Every 4 (Four) Hours As Needed for Wheezing or Shortness of Air (Cough). 1 inhaler 0   • Ascorbic Acid (VITAMIN C PO) Take 1,000 mg by mouth Daily. PT INSTRUCTED TO CONTINUE PER DR. CALLUM URBINA     • aspirin 81 MG EC tablet Take 1 tablet by mouth Daily. 30 tablet 6   • atorvastatin (LIPITOR) 20 MG tablet Take 1 tablet by mouth Every Night. 90 tablet 3   • azelastine (ASTELIN) 0.1 % nasal spray 2 sprays into the nostril(s) as directed by provider 2 (Two) Times a Day. Use in each nostril as directed 30 mL 5   • Biotin 77432 MCG tablet Take 1 tablet by mouth Every Evening.     • budesonide-formoterol (SYMBICORT) 160-4.5 MCG/ACT inhaler Inhale 2 puffs 2 (Two) Times a Day.     • butalbital-acetaminophen-caffeine (FIORICET, ESGIC) -40 MG per tablet Take 1 tablet by mouth 2 (Two) Times a Day. 20 tablet 1   • celecoxib (CeleBREX) 200 MG capsule Take 200 mg by mouth Daily.     • cetirizine (zyrTEC) 10 MG tablet TAKE 1 TABLET DAILY 90 tablet 3   • cholecalciferol (VITAMIN D3) 1000 units tablet Take 1,000 Units by mouth Daily. PER PT TO CONTINUE PER DR. CALLUM URBINA     • Coenzyme Q10 (CO Q 10 PO) Take 100 mg by mouth Every Evening.     • Cyanocobalamin (VITAMIN B 12 PO) Take 5,000 mcg by mouth Every Night.     • cycloSPORINE (RESTASIS) 0.05 % ophthalmic emulsion Administer 1 drop to both eyes 2 (Two) Times a Day.     • diphenhydrAMINE-acetaminophen (TYLENOL PM)  MG tablet per tablet Take 1 tablet by mouth At Night As Needed for Sleep.     • fluticasone  (FLONASE) 50 MCG/ACT nasal spray 2 sprays by Each Nare route Daily. 29.7 mL 3   • Ginkgo Biloba 40 MG tablet Take 120 mg by mouth Daily.     • glipiZIDE XL 5 MG ER tablet TAKE 1 TABLET DAILY 90 tablet 3   • glucosamine-chondroitin 500-400 MG capsule capsule Take 1 capsule by mouth 2 (Two) Times a Day With Meals. PT TO CONTINUE PER DR. CALLUM URBINA     • glucose blood (FREESTYLE LITE) test strip PT to use once daily 100 each 12   • hydroCHLOROthiazide (HYDRODIURIL) 25 MG tablet Take 1 tablet by mouth Daily. 90 tablet 3   • Januvia 100 MG tablet TAKE 1 TABLET DAILY 90 tablet 3   • Lancets (freestyle) lancets Pt tests daily 100 each 6   • lisinopril (PRINIVIL,ZESTRIL) 10 MG tablet Take 1 tablet by mouth Daily. 90 tablet 3   • Misc Natural Products (CORTISOL PO) Take  by mouth.     • montelukast (SINGULAIR) 10 MG tablet Take 1 tablet by mouth Every Night. 90 tablet 3   • multivitamin with minerals tablet tablet Take 1 tablet by mouth Daily.     • NYSTATIN 506510 UNIT/GM topical powder      • Omega-3 Fatty Acids (FISH OIL) 1000 MG capsule capsule Take  by mouth Daily With Breakfast.     • omeprazole (priLOSEC) 40 MG capsule Take 1 capsule by mouth Every Morning. 90 capsule 3   • Potassium Gluconate 550 MG tablet Take  by mouth.     • pregabalin (LYRICA) 100 MG capsule TAKE ONE CAPSULE BY MOUTH THREE TIMES A  capsule 0   • Probiotic Product (PROBIOTIC-10 PO) Take  by mouth.     • propranolol (INDERAL) 40 MG tablet Take 1 tablet by mouth 2 (Two) Times a Day. 180 tablet 3   • raNITIdine (ZANTAC) 75 MG tablet Take 150 mg by mouth 2 (Two) Times a Day.     • Scopolamine 1 MG/3DAYS patch Place 1 patch on the skin as directed by provider Every 72 (Seventy-Two) Hours. 4 each 0   • Spacer/Aero-Holding Chambers device Use with all inhaled treatments 1 each 0   • traZODone (DESYREL) 50 MG tablet      • zolpidem (Ambien) 5 MG tablet Take 1 tablet by mouth At Night As Needed for Sleep. 13 tablet 0     No current  facility-administered medications on file prior to visit.          When did you start taking these medications:     Which medication are you concerned about:    celecoxib (CeleBREX) 200 MG capsule         Who prescribed you this medication:     What are your concerns: PATIENT STATES SHE CAN'T REMEMBER IF DR DONALDSON ADVISED TO TAKE OR NOT TAKE THIS MEDICATION.     How long have you had these concerns:

## 2022-12-13 NOTE — TELEPHONE ENCOUNTER
Caller: DarshanaKhalida CHRIS    Relationship: Self    Best call back number: [phone]    Requested Prescriptions:   Requested Prescriptions     Pending Prescriptions Disp Refills   • butalbital-acetaminophen-caffeine (FIORICET, ESGIC) -40 MG per tablet 20 tablet 1     Sig: Take 1 tablet by mouth 2 (Two) Times a Day.        Pharmacy where request should be sent: [facility] [id number] [facility] [address] [facility] [place] [phone] [facility] [phone] FX     Additional details provided by patient: OUT OF MEDICATION     Does the patient have less than a 3 day supply:  [x] Yes  [] No    Would you like a call back once the refill request has been completed: [] Yes [] No    If the office needs to give you a call back, can they leave a voicemail: [] Yes [] No    Abdirahman Jiménez Rep   12/13/22 12:15 EST

## 2022-12-14 ENCOUNTER — HOSPITAL ENCOUNTER (OUTPATIENT)
Dept: GENERAL RADIOLOGY | Facility: HOSPITAL | Age: 67
Discharge: HOME | End: 2022-12-14
Admitting: NURSE PRACTITIONER

## 2022-12-14 ENCOUNTER — OFFICE VISIT (OUTPATIENT)
Dept: FAMILY MEDICINE CLINIC | Facility: CLINIC | Age: 67
End: 2022-12-14

## 2022-12-14 VITALS
HEART RATE: 62 BPM | RESPIRATION RATE: 14 BRPM | HEIGHT: 68 IN | OXYGEN SATURATION: 98 % | WEIGHT: 211.1 LBS | BODY MASS INDEX: 31.99 KG/M2 | TEMPERATURE: 97.8 F | SYSTOLIC BLOOD PRESSURE: 133 MMHG | DIASTOLIC BLOOD PRESSURE: 66 MMHG

## 2022-12-14 DIAGNOSIS — J20.9 ACUTE BRONCHITIS, UNSPECIFIED ORGANISM: Primary | ICD-10-CM

## 2022-12-14 DIAGNOSIS — R05.1 ACUTE COUGH: ICD-10-CM

## 2022-12-14 LAB
EXPIRATION DATE: NORMAL
FLUAV AG UPPER RESP QL IA.RAPID: NOT DETECTED
FLUBV AG UPPER RESP QL IA.RAPID: NOT DETECTED
INTERNAL CONTROL: NORMAL
Lab: NORMAL
SARS-COV-2 AG UPPER RESP QL IA.RAPID: NOT DETECTED

## 2022-12-14 PROCEDURE — 99214 OFFICE O/P EST MOD 30 MIN: CPT | Performed by: NURSE PRACTITIONER

## 2022-12-14 PROCEDURE — 71046 X-RAY EXAM CHEST 2 VIEWS: CPT

## 2022-12-14 PROCEDURE — 87428 SARSCOV & INF VIR A&B AG IA: CPT | Performed by: NURSE PRACTITIONER

## 2022-12-14 RX ORDER — DEXTROMETHORPHAN HYDROBROMIDE AND PROMETHAZINE HYDROCHLORIDE 15; 6.25 MG/5ML; MG/5ML
5 SYRUP ORAL 4 TIMES DAILY PRN
Qty: 120 ML | Refills: 1 | Status: SHIPPED | OUTPATIENT
Start: 2022-12-14

## 2022-12-14 RX ORDER — PREDNISONE 10 MG/1
40 TABLET ORAL DAILY
Qty: 20 TABLET | Refills: 0 | Status: SHIPPED | OUTPATIENT
Start: 2022-12-14 | End: 2022-12-19

## 2022-12-14 NOTE — TELEPHONE ENCOUNTER
Advise to avoid routine dosing but can use as needed for more pain with weather change or activity level. Be sure to drink full glass of water with dose and stay well hydrated with water while using the medication.

## 2022-12-14 NOTE — PATIENT INSTRUCTIONS
Discharge instructions use a butyryl 4 times a day until better  Push fluids if productive cough take guaifenesin  Chest x-ray now on the way out  Call tomorrow for results and I will be your chest x-ray if available for Aleve    Promethazine DM as needed for cough could cause sedation so caution do not take if any issues here    High fever chest pain shortness of breath emergency room immediately a low threshold for any recheck as well as send me a message before the weekend Friday update your condition as well as next week.    Paxlovid  if covid  Tamiflu if flu

## 2022-12-14 NOTE — PROGRESS NOTES
"Chief Complaint  Cough, URI, Fatigue, and Shortness of Breath    Subjective        Khalida Gilliland presents to [facility]  History of Present Illness  Very pleasant patient who has had cough congestion sore throat  And some wheezing and some mild shortness of breath for 4 days, she has no fever presently no increasing chest pain  She has some mild shortness of breath when you are coughing with activity  Heart flu test and COVID test here are negative today she has had vaccines for COVID presents   As well as the flu and shingles vaccines  She has not got the latest COVID booster but she did get the 1 over the summer right okay she generally  Healthy although she has some mild asthma which is controlled.  She has albuterol at home not had to use this.  She has a family from [facility]  and wife and 4 girls        Cough  Associated symptoms include shortness of breath.   URI   Associated symptoms include coughing.   Fatigue  Associated symptoms include coughing and fatigue.   Shortness of Breath        Objective   Vital Signs:  /66   Pulse 62   Temp 97.8 °F (36.6 °C) (Infrared)   Resp 14   Ht 172.7 cm (68\")   Wt 95.8 kg (211 lb 1.6 oz)   SpO2 98%   BMI 32.10 kg/m²   Estimated body mass index is 32.1 kg/m² as calculated from the following:    Height as of this encounter: 172.7 cm (68\").    Weight as of this encounter: 95.8 kg (211 lb 1.6 oz).          Physical Exam  Vitals reviewed.   Constitutional:       Appearance: Normal appearance. She is well-developed.      Comments: Pleasant no distress voice is hoarse mildly.   HENT:      Head: Normocephalic.      Comments: TMs are dull turbinates congested no stridor voice slightly hoarse.     Nose: Nose normal.   Eyes:      General: No scleral icterus.     Conjunctiva/sclera: Conjunctivae normal.      Pupils: Pupils are equal, round, and reactive to light.   Neck:      Thyroid: No thyromegaly.      Vascular: No JVD. "   Cardiovascular:      Rate and Rhythm: Normal rate and regular rhythm.      Heart sounds: Normal heart sounds. No murmur heard.    No friction rub. No gallop.   Pulmonary:      Effort: Pulmonary effort is normal. No respiratory distress.      Breath sounds: Normal breath sounds. No stridor. No wheezing or rales.      Comments: A little decreased at the bases but overall pretty decent air exchange a few expiratory wheezes as well no expiratory crackles unlabored respirations less than 20 able to speak full sentences without dyspnea.  Abdominal:      General: There is no distension.      Palpations: Abdomen is soft.      Comments: No hepatosplenomegaly, no ascites,   Musculoskeletal:         General: No tenderness.      Cervical back: Neck supple.   Lymphadenopathy:      Cervical: No cervical adenopathy.   Skin:     General: Skin is warm and dry.      Capillary Refill: Capillary refill takes less than 2 seconds.      Findings: No erythema or rash.   Neurological:      Mental Status: She is alert and oriented to person, place, and time.      Deep Tendon Reflexes: Reflexes are normal and symmetric.   Psychiatric:         Mood and Affect: Mood normal.         Behavior: Behavior normal.         Thought Content: Thought content normal.         Judgment: Judgment normal.        Result Review :                Assessment and Plan   Diagnoses and all orders for this visit:    1. Acute bronchitis, unspecified organism (Primary)  -     XR Chest PA & Lateral    2. Acute cough  -     XR Chest PA & Lateral    Other orders  -     predniSONE (DELTASONE) 10 MG tablet; Take 4 tablets by mouth Daily for 5 days.  Dispense: 20 tablet; Refill: 0  -     promethazine-dextromethorphan (PROMETHAZINE-DM) 6.25-15 MG/5ML syrup; Take 5 mL by mouth 4 (Four) Times a Day As Needed for Cough. Falls precaution  Dispense: 120 mL; Refill: 1             Follow Up   No follow-ups on file.  Patient was given instructions and counseling regarding her  condition or for health maintenance advice. Please see specific information pulled into the AVS if appropriate.     Patient Instructions   Discharge instructions use a butyryl 4 times a day until better  Push fluids if productive cough take guaifenesin  Chest x-ray now on the way out  Call tomorrow for results and I will be your chest x-ray if available for Aleve    Promethazine DM as needed for cough could cause sedation so caution do not take if any issues here    High fever chest pain shortness of breath emergency room immediately a low threshold for any recheck as well as send me a message before the weekend Friday update your condition as well as next week.    Paxlovid  if covid  Tamiflu if flu     Patient is requesting a steroid  Discussed risk and benefit  If elevated glucose readings greater than 300 or significant elevation stop the prednisone or other issues      I suspect patient has a viral illness unlikely pneumonia however she has decreased breath sounds some mild shortness of breath she has some risk factors so we should either cover her for possibility of a early pneumonia or acute chest x-ray today and me the patient have decided for the latter.

## 2022-12-21 DIAGNOSIS — G59 MONONEUROPATHY DUE TO UNDERLYING DISEASE: ICD-10-CM

## 2022-12-22 NOTE — TELEPHONE ENCOUNTER
Rx Refill Note  Requested Prescriptions     Pending Prescriptions Disp Refills   • pregabalin (LYRICA) 100 MG capsule [Pharmacy Med Name: PREGABALIN 100 MG CAPSULE] 270 capsule      Sig: TAKE ONE CAPSULE BY MOUTH THREE TIMES A DAY      Last office visit with prescribing clinician: 11/7/2022   Last telemedicine visit with prescribing clinician: 5/4/2023   Next office visit with prescribing clinician: 5/8/2023       {TIP  Please add Last Relevant Lab Date if appropriate: 11/01/22                 Would you like a call back once the refill request has been completed: [] Yes [] No    If the office needs to give you a call back, can they leave a voicemail: [] Yes [] No    Dalia Chavez MA  12/22/22, 06:45 EST

## 2022-12-23 RX ORDER — PREGABALIN 100 MG/1
CAPSULE ORAL
Qty: 270 CAPSULE | OUTPATIENT
Start: 2022-12-23

## 2023-01-03 RX ORDER — AZELASTINE 1 MG/ML
SPRAY, METERED NASAL
Qty: 30 ML | Refills: 3 | Status: SHIPPED | OUTPATIENT
Start: 2023-01-03

## 2023-01-03 NOTE — TELEPHONE ENCOUNTER
Rx Refill Note  Requested Prescriptions     Pending Prescriptions Disp Refills   • azelastine (ASTELIN) 0.1 % nasal spray [Pharmacy Med Name: AZELASTINE NASAL SPRAY 30ML 0.1% 137MCG] 30 mL 13     Sig: USE 2 SPRAYS IN EACH NOSTRIL AS DIRECTED BY PROVIDER TWICE A DAY      Last office visit with prescribing clinician: 11/7/2022   Last telemedicine visit with prescribing clinician: 5/4/2023   Next office visit with prescribing clinician: 5/8/2023                         Would you like a call back once the refill request has been completed: [] Yes [] No    If the office needs to give you a call back, can they leave a voicemail: [] Yes [] No    Joy Felton LPN  01/03/23, 10:45 EST

## 2023-01-05 RX ORDER — TRAZODONE HYDROCHLORIDE 50 MG/1
50 TABLET ORAL NIGHTLY
Qty: 90 TABLET | Refills: 1 | Status: SHIPPED | OUTPATIENT
Start: 2023-01-05 | End: 2023-02-07 | Stop reason: SDUPTHER

## 2023-01-05 NOTE — TELEPHONE ENCOUNTER
Caller: Khalida Gilliland    Relationship: Self    Best call back number: [phone]    Requested Prescriptions:   Requested Prescriptions     Pending Prescriptions Disp Refills   • traZODone (DESYREL) 50 MG tablet          Pharmacy where request should be sent: [facility] #[id number] [facility] [address] AT [facility] - [phone] [facility] [phone] FX     Additional details provided by patient: THIS IS A NEW PHARMACY FOR THE PATIENT WILL NEED A NEW PRESCRIPTION SENT TO THE PHARMACY     Does the patient have less than a 3 day supply:  [x] Yes  [] No    Would you like a call back once the refill request has been completed: [x] Yes [] No    If the office needs to give you a call back, can they leave a voicemail: [x] Yes [] No    Abdirahman Lou Rep   01/05/23 13:34 EST

## 2023-01-09 ENCOUNTER — TELEPHONE (OUTPATIENT)
Dept: GASTROENTEROLOGY | Facility: CLINIC | Age: 68
End: 2023-01-09
Payer: MEDICARE

## 2023-01-09 ENCOUNTER — TELEPHONE (OUTPATIENT)
Dept: GASTROENTEROLOGY | Facility: CLINIC | Age: 68
End: 2023-01-09

## 2023-01-09 NOTE — TELEPHONE ENCOUNTER
Hub staff attempted to follow warm transfer process and was unsuccessful     Caller: Khalida Gilliland    Relationship to patient: Self    Best call back number: [phone]    Patient is needing: PATIENT IS RETURNING CALL FROM THIS MORNING TO SCHEDULE A COLONOSCOPY WITH DR. ORTEGA.     PLEASE CALL PATIENT TO SCHEDULE.

## 2023-01-19 RX ORDER — FLUTICASONE PROPIONATE 50 MCG
SPRAY, SUSPENSION (ML) NASAL
Qty: 16 G | Refills: 11 | Status: SHIPPED | OUTPATIENT
Start: 2023-01-19

## 2023-01-19 NOTE — TELEPHONE ENCOUNTER
Rx Refill Note  Requested Prescriptions     Pending Prescriptions Disp Refills   • fluticasone (FLONASE) 50 MCG/ACT nasal spray [Pharmacy Med Name: FLUTICASONE PROP NASAL SPRAY 16GM 50MCG] 16 g 11     Sig: USE 2 SPRAYS IN EACH NOSTRIL DAILY      Last office visit with prescribing clinician: 11/7/2022   Last telemedicine visit with prescribing clinician: 5/4/2023   Next office visit with prescribing clinician: 5/8/2023                         Would you like a call back once the refill request has been completed: [] Yes [] No    If the office needs to give you a call back, can they leave a voicemail: [] Yes [] No    Joy Felton LPN  01/19/23, 08:07 EST

## 2023-01-30 RX ORDER — OMEPRAZOLE 40 MG/1
CAPSULE, DELAYED RELEASE ORAL
Qty: 90 CAPSULE | Refills: 3 | Status: SHIPPED | OUTPATIENT
Start: 2023-01-30

## 2023-01-31 ENCOUNTER — TELEPHONE (OUTPATIENT)
Dept: GASTROENTEROLOGY | Facility: CLINIC | Age: 68
End: 2023-01-31
Payer: MEDICARE

## 2023-02-06 ENCOUNTER — TELEPHONE (OUTPATIENT)
Dept: GASTROENTEROLOGY | Facility: CLINIC | Age: 68
End: 2023-02-06
Payer: MEDICARE

## 2023-02-06 NOTE — TELEPHONE ENCOUNTER
OK FOR HUB TO READ  RAKAN patient via telephone for COLONOSCOPY. Scheduled 4/7/23 with arrival time of 1200PM . Prep paperwork mailed to verified address on file. Patient advised arrival time may change based on [facility] guidelines. RAKAN HIDALGO

## 2023-02-07 RX ORDER — TRAZODONE HYDROCHLORIDE 50 MG/1
50 TABLET ORAL NIGHTLY
Qty: 90 TABLET | Refills: 1 | Status: SHIPPED | OUTPATIENT
Start: 2023-02-07

## 2023-02-07 NOTE — TELEPHONE ENCOUNTER
Received fax today requesting pharmacy change to mail order. Sent today due to recent order sent by Michael on 1/5/23.

## 2023-02-23 DIAGNOSIS — R51.9 NONINTRACTABLE HEADACHE, UNSPECIFIED CHRONICITY PATTERN, UNSPECIFIED HEADACHE TYPE: ICD-10-CM

## 2023-02-23 DIAGNOSIS — G59 MONONEUROPATHY DUE TO UNDERLYING DISEASE: ICD-10-CM

## 2023-02-23 RX ORDER — BUTALBITAL, ACETAMINOPHEN AND CAFFEINE 50; 325; 40 MG/1; MG/1; MG/1
1 TABLET ORAL 2 TIMES DAILY
Qty: 20 TABLET | Refills: 1 | Status: SHIPPED | OUTPATIENT
Start: 2023-02-23

## 2023-02-23 RX ORDER — PREGABALIN 100 MG/1
100 CAPSULE ORAL 3 TIMES DAILY
Qty: 270 CAPSULE | Refills: 0 | Status: SHIPPED | OUTPATIENT
Start: 2023-02-23

## 2023-02-23 NOTE — TELEPHONE ENCOUNTER
Caller: Khalida Gilliland    Relationship: Self    Best call back number: [phone]    Requested Prescriptions:   Requested Prescriptions     Pending Prescriptions Disp Refills   • butalbital-acetaminophen-caffeine (FIORICET, ESGIC) -40 MG per tablet 20 tablet 1     Sig: Take 1 tablet by mouth 2 (Two) Times a Day.   • pregabalin (LYRICA) 100 MG capsule 270 capsule 0     Sig: Take 1 capsule by mouth 3 (Three) Times a Day.        Pharmacy where request should be sent: [facility] #[id number] [facility] [id number] AT [facility] - [phone] [facility] [phone] FX     Additional details provided by patient: PATIENT STATES THAT SHE RECENTLY CHANGED PHARMACIES, AND NEEDS TO HAVE PRESCRIPTIONS TRANSFERRED TO [facility]. SHE WOULD ALSO LIKE TO REQUEST REFILL FOR butalbital-acetaminophen-caffeine (FIORICET, ESGIC) -40 MG per tablet TO BE 30 TABLETS INSTEAD OF 20 IF ABLE     Does the patient have less than a 3 day supply:  [] Yes  [x] No    Would you like a call back once the refill request has been completed: [] Yes [x] No    If the office needs to give you a call back, can they leave a voicemail: [] Yes [x] No    Abdirahman Jordan Rep   02/23/23 11:09 EST

## 2023-02-23 NOTE — TELEPHONE ENCOUNTER
Rx Refill Note  Requested Prescriptions     Pending Prescriptions Disp Refills   • butalbital-acetaminophen-caffeine (FIORICET, ESGIC) -40 MG per tablet 20 tablet 1     Sig: Take 1 tablet by mouth 2 (Two) Times a Day.   • pregabalin (LYRICA) 100 MG capsule 270 capsule 0     Sig: Take 1 capsule by mouth 3 (Three) Times a Day.      Last office visit with prescribing clinician: 11/7/2022   Last telemedicine visit with prescribing clinician: 5/4/2023   Next office visit with prescribing clinician: 5/8/2023                         Would you like a call back once the refill request has been completed: [] Yes [] No    If the office needs to give you a call back, can they leave a voicemail: [] Yes [] No    Joy Felton LPN  02/23/23, 16:18 EST

## 2023-03-07 NOTE — TELEPHONE ENCOUNTER
Medication is being filled for 1 time refill only due to:  Patient needs to be seen because it has been more than one year since last visit.  Please call to schedule annual appointment.  Hayley Carlson RN       Please advise

## 2023-03-16 DIAGNOSIS — E78.49 OTHER HYPERLIPIDEMIA: ICD-10-CM

## 2023-03-16 DIAGNOSIS — I10 ESSENTIAL HYPERTENSION: ICD-10-CM

## 2023-03-16 RX ORDER — ATORVASTATIN CALCIUM 20 MG/1
TABLET, FILM COATED ORAL
Qty: 90 TABLET | Refills: 3 | Status: SHIPPED | OUTPATIENT
Start: 2023-03-16

## 2023-03-16 RX ORDER — HYDROCHLOROTHIAZIDE 25 MG/1
TABLET ORAL
Qty: 90 TABLET | Refills: 3 | Status: SHIPPED | OUTPATIENT
Start: 2023-03-16

## 2023-03-16 NOTE — TELEPHONE ENCOUNTER
Rx Refill Note  Requested Prescriptions     Pending Prescriptions Disp Refills   • atorvastatin (LIPITOR) 20 MG tablet [Pharmacy Med Name: ATORVASTATIN TABS 20MG] 90 tablet 3     Sig: TAKE 1 TABLET EVERY NIGHT   • hydroCHLOROthiazide (HYDRODIURIL) 25 MG tablet [Pharmacy Med Name: HYDROCHLOROTHIAZIDE TABS 25MG] 90 tablet 3     Sig: TAKE 1 TABLET DAILY      Last office visit with prescribing clinician: 11/7/2022   Last telemedicine visit with prescribing clinician: Visit date not found   Next office visit with prescribing clinician: Visit date not found                         Would you like a call back once the refill request has been completed: [] Yes [] No    If the office needs to give you a call back, can they leave a voicemail: [] Yes [] No    Joy Felton LPN  03/16/23, 14:00 EDT

## 2023-03-20 RX ORDER — PROPRANOLOL HYDROCHLORIDE 40 MG/1
TABLET ORAL
Qty: 180 TABLET | Refills: 0 | Status: SHIPPED | OUTPATIENT
Start: 2023-03-20

## 2023-04-06 ENCOUNTER — TELEPHONE (OUTPATIENT)
Dept: GASTROENTEROLOGY | Facility: CLINIC | Age: 68
End: 2023-04-06

## 2023-04-06 NOTE — TELEPHONE ENCOUNTER
UNABLE TO WARM TRANSFER CALL    Caller: Khalida Gilliland    Relationship to patient: Self    Best call back number: [phone]    Patient is needing: PATIENT NEEDS TO CANCEL HER COLONOSCOPY WITH DR. ORTEGA, THAT'S SCHEDULED FOR TOMORROW 4/7/23. SHE SAID SHE WOULD CALL BACK WHEN READY TO RESCHEDULE.

## 2023-04-14 ENCOUNTER — PATIENT ROUNDING (BHMG ONLY) (OUTPATIENT)
Dept: FAMILY MEDICINE CLINIC | Facility: CLINIC | Age: 68
End: 2023-04-14

## 2023-04-14 ENCOUNTER — OFFICE VISIT (OUTPATIENT)
Dept: FAMILY MEDICINE CLINIC | Facility: CLINIC | Age: 68
End: 2023-04-14
Payer: MEDICARE

## 2023-04-14 VITALS
DIASTOLIC BLOOD PRESSURE: 80 MMHG | OXYGEN SATURATION: 96 % | BODY MASS INDEX: 31.22 KG/M2 | TEMPERATURE: 97.5 F | HEART RATE: 66 BPM | WEIGHT: 206 LBS | HEIGHT: 68 IN | SYSTOLIC BLOOD PRESSURE: 124 MMHG

## 2023-04-14 DIAGNOSIS — G47.00 INSOMNIA, UNSPECIFIED TYPE: ICD-10-CM

## 2023-04-14 DIAGNOSIS — E78.49 OTHER HYPERLIPIDEMIA: ICD-10-CM

## 2023-04-14 DIAGNOSIS — I10 ESSENTIAL HYPERTENSION: ICD-10-CM

## 2023-04-14 DIAGNOSIS — J30.9 ALLERGIC RHINITIS, UNSPECIFIED SEASONALITY, UNSPECIFIED TRIGGER: ICD-10-CM

## 2023-04-14 DIAGNOSIS — E11.9 TYPE 2 DIABETES MELLITUS WITHOUT COMPLICATION, WITHOUT LONG-TERM CURRENT USE OF INSULIN: ICD-10-CM

## 2023-04-14 DIAGNOSIS — J45.20 MILD INTERMITTENT ASTHMA WITHOUT COMPLICATION: Primary | ICD-10-CM

## 2023-04-14 NOTE — PROGRESS NOTES
"Chief Complaint  Establish Care    Subjective        Khalida Gilliland presents to [facility]  History of Present Illness  Known case of type 2 DM controlled with A1c 6.9( 11/22), migraine, hypertension, asthma, seasonal allergies, right ankle arthritis    S/p 3 C section, gallbladder remove, emergency appendectomy, tubal ligation, brain aneurysm , Hx of craniotomy, Hx of TIA  Chronic right upper abdominal pain, Hx of recurrent diverticulitis, Hx of resection of intestine   peripheral neuropathy    Trying to get weight down      Objective   Vital Signs:  /80   Pulse 66   Temp 97.5 °F (36.4 °C) (Infrared)   Ht 172.7 cm (67.99\")   Wt 93.4 kg (206 lb)   SpO2 96%   BMI 31.33 kg/m²   Estimated body mass index is 31.33 kg/m² as calculated from the following:    Height as of this encounter: 172.7 cm (67.99\").    Weight as of this encounter: 93.4 kg (206 lb).             Physical Exam  HENT:      Head: Normocephalic and atraumatic.      Mouth/Throat:      Mouth: Mucous membranes are moist.      Pharynx: Oropharynx is clear.   Eyes:      Extraocular Movements: Extraocular movements intact.      Conjunctiva/sclera: Conjunctivae normal.      Pupils: Pupils are equal, round, and reactive to light.   Cardiovascular:      Rate and Rhythm: Normal rate and regular rhythm.   Pulmonary:      Effort: Pulmonary effort is normal.      Breath sounds: Normal breath sounds.   Abdominal:      General: Bowel sounds are normal.      Palpations: Abdomen is soft.   Musculoskeletal:         General: Normal range of motion.      Cervical back: Neck supple.   Skin:     General: Skin is warm.      Capillary Refill: Capillary refill takes less than 2 seconds.   Neurological:      General: No focal deficit present.      Mental Status: She is alert and oriented to person, place, and time. Mental status is at baseline.   Psychiatric:         Mood and Affect: Mood normal.        Result Review :  The following " data was reviewed by: Sergo Owen MD on 04/14/2023:  CMP        11/1/2022    11:10   CMP   Glucose 144     BUN 11     Creatinine 0.65     Sodium 140     Potassium 4.7     Chloride 100     Calcium 9.8     BUN/Creatinine Ratio 16.9                                Assessment and Plan   Diagnoses and all orders for this visit:    1. Mild intermittent asthma without complication (Primary)  -     albuterol sulfate  (90 Base) MCG/ACT inhaler; Inhale 2 puffs Every 4 (Four) Hours As Needed for Wheezing or Shortness of Air (Cough).  Dispense: 6.7 g; Refill: 9    2. Essential hypertension  -     atorvastatin (LIPITOR) 20 MG tablet; Take 1 tablet by mouth Every Night.  Dispense: 90 tablet; Refill: 3  -     lisinopril (PRINIVIL,ZESTRIL) 10 MG tablet; Take 1 tablet by mouth Daily.  Dispense: 90 tablet; Refill: 3  -     propranolol (INDERAL) 40 MG tablet; Take 1 tablet by mouth 2 (Two) Times a Day.  Dispense: 180 tablet; Refill: 3    3. Other hyperlipidemia  -     atorvastatin (LIPITOR) 20 MG tablet; Take 1 tablet by mouth Every Night.  Dispense: 90 tablet; Refill: 3    4. Allergic rhinitis, unspecified seasonality, unspecified trigger  -     azelastine (ASTELIN) 0.1 % nasal spray; 1 spray into the nostril(s) as directed by provider 2 (Two) Times a Day As Needed for Rhinitis. Use in each nostril as directed  Dispense: 30 mL; Refill: 3    5. Insomnia, unspecified type    6. Type 2 diabetes mellitus without complication, without long-term current use of insulin  -     glipizide (glipiZIDE XL) 5 MG ER tablet; Take 1 tablet by mouth Daily.  Dispense: 90 tablet; Refill: 3  -     SITagliptin (Januvia) 100 MG tablet; Take 1 tablet by mouth Daily.  Dispense: 90 tablet; Refill: 3             Follow Up   No follow-ups on file.  Patient was given instructions and counseling regarding her condition or for health maintenance advice. Please see specific information pulled into the AVS if appropriate.

## 2023-04-14 NOTE — PROGRESS NOTES
April 14, 2023      Mercy Gilliland,     I want to officially welcome you to our practice and ask about your recent visit.     Overall were you satisfied with your visit?     YES  Would you recommend our office to friends and family?   YES    Your experience is very important to us.  You may receive an email regarding a survey.  Please take a moment to complete.  This will help us to improve.      I appreciate you taking the time to answer these questions.       Thank you and have a great day.

## 2023-04-17 ENCOUNTER — TELEPHONE (OUTPATIENT)
Dept: GASTROENTEROLOGY | Facility: CLINIC | Age: 68
End: 2023-04-17
Payer: MEDICARE

## 2023-04-17 NOTE — TELEPHONE ENCOUNTER
CALLER: Khalida Gilliland     RELATIONSHIP TO PATIENT: Self    BEST CALL BACK NUMBER: [phone]    CALL REGARDING: Patient would like to reschedule colonoscopy that was scheduled for 4/7/2023.

## 2023-04-18 RX ORDER — ATORVASTATIN CALCIUM 20 MG/1
20 TABLET, FILM COATED ORAL NIGHTLY
Qty: 90 TABLET | Refills: 3 | Status: SHIPPED | OUTPATIENT
Start: 2023-04-18

## 2023-04-18 RX ORDER — AZELASTINE 1 MG/ML
1 SPRAY, METERED NASAL 2 TIMES DAILY PRN
Qty: 30 ML | Refills: 3 | Status: SHIPPED | OUTPATIENT
Start: 2023-04-18

## 2023-04-18 RX ORDER — LISINOPRIL 10 MG/1
10 TABLET ORAL DAILY
Qty: 90 TABLET | Refills: 3 | Status: SHIPPED | OUTPATIENT
Start: 2023-04-18

## 2023-04-18 RX ORDER — ALBUTEROL SULFATE 90 UG/1
2 AEROSOL, METERED RESPIRATORY (INHALATION) EVERY 4 HOURS PRN
Qty: 6.7 G | Refills: 9 | Status: SHIPPED | OUTPATIENT
Start: 2023-04-18

## 2023-04-18 RX ORDER — GLIPIZIDE 5 MG/1
5 TABLET, FILM COATED, EXTENDED RELEASE ORAL DAILY
Qty: 90 TABLET | Refills: 3 | Status: SHIPPED | OUTPATIENT
Start: 2023-04-18

## 2023-04-18 RX ORDER — PROPRANOLOL HYDROCHLORIDE 40 MG/1
40 TABLET ORAL 2 TIMES DAILY
Qty: 180 TABLET | Refills: 3 | Status: SHIPPED | OUTPATIENT
Start: 2023-04-18

## 2023-05-18 ENCOUNTER — TELEPHONE (OUTPATIENT)
Dept: FAMILY MEDICINE CLINIC | Facility: CLINIC | Age: 68
End: 2023-05-18

## 2023-05-18 NOTE — TELEPHONE ENCOUNTER
PATIENT CALLED TO ADD A MEDICATION TO HER MED LIST.    THE PATIENT WAS PLACED ON IPRATROPIUM NASAL SPRAY BY DR. MARSHAL ULLOA - ENT.    CALL BACK IF NEEDED:  [phone]

## 2023-06-07 NOTE — TELEPHONE ENCOUNTER
Nick on desk  
Patient is wanting a refill on her pain medication Butalbital-acetaminophen-caffeine -40 mg sent in to [facility] [phone].  Please call patient when prescription has been called in.  
Pended to dr peterson   
Oriented - self; Oriented - place; Oriented - time

## 2023-06-14 ENCOUNTER — TELEPHONE (OUTPATIENT)
Dept: GASTROENTEROLOGY | Facility: CLINIC | Age: 68
End: 2023-06-14
Payer: MEDICARE

## 2023-07-05 ENCOUNTER — TELEPHONE (OUTPATIENT)
Dept: FAMILY MEDICINE CLINIC | Facility: CLINIC | Age: 68
End: 2023-07-05

## 2023-07-05 NOTE — TELEPHONE ENCOUNTER
Caller: Khalida Gilliland    Relationship: Self    Best call back number: [phone]    What medication are you requesting: AMBIEN    What are your current symptoms: FOR HELP SLEEPING WHILE ON A [place]IP [place] OF [place] FOR 21 DAYS    If a prescription is needed, what is your preferred pharmacy and phone number: [facility] #[id number] [place], [place] - [address] AT [facility] - [phone] [facility] [phone] FX     Additional notes:  PATIENT LEAVING [facility] ON 8/8/23, FOR 21 NIGHTS AND REQUEST AMBIEN TO HELP HER SLEEP WHILE ON THIS TRIP.    PATIENT REQUEST FOR A COURTESY CALL WHEN THIS MEDICATION HAS BEEN SENT TO HER PHARMACY.

## 2023-07-05 NOTE — TELEPHONE ENCOUNTER
HUB RELAYED MESSAGE TO PATIENT. PATIENT VERBALLY UNDERSTOOD. PATIENT SCHEDULED WITH LEOBARDO 7/6/23 AT 10:15AM

## 2023-07-05 NOTE — TELEPHONE ENCOUNTER
HUB TO READ: She knows she needs an appointment to be prescribed any new medications. She needs to schedule with Dr Noble

## 2023-07-06 PROBLEM — U07.1 COVID-19 VIRUS INFECTION: Status: RESOLVED | Noted: 2021-04-03 | Resolved: 2023-07-06

## 2023-07-06 PROBLEM — Z91.041 CONTRAST MEDIA ALLERGY: Status: RESOLVED | Noted: 2017-09-28 | Resolved: 2023-07-06

## 2023-07-06 PROBLEM — N39.0 UTI (URINARY TRACT INFECTION): Status: RESOLVED | Noted: 2020-04-09 | Resolved: 2023-07-06

## 2023-07-06 PROBLEM — D89.831 CYTOKINE RELEASE SYNDROME, GRADE 1: Status: RESOLVED | Noted: 2022-02-03 | Resolved: 2023-07-06

## 2023-07-06 PROBLEM — J30.9 ALLERGIC RHINITIS: Status: ACTIVE | Noted: 2023-07-06

## 2023-07-25 ENCOUNTER — OFFICE VISIT (OUTPATIENT)
Dept: FAMILY MEDICINE CLINIC | Facility: CLINIC | Age: 68
End: 2023-07-25
Payer: MEDICARE

## 2023-07-25 VITALS
OXYGEN SATURATION: 99 % | TEMPERATURE: 98.5 F | SYSTOLIC BLOOD PRESSURE: 132 MMHG | HEIGHT: 68 IN | HEART RATE: 66 BPM | BODY MASS INDEX: 32.13 KG/M2 | DIASTOLIC BLOOD PRESSURE: 64 MMHG | RESPIRATION RATE: 16 BRPM | WEIGHT: 212 LBS

## 2023-07-25 DIAGNOSIS — R51.9 NONINTRACTABLE HEADACHE, UNSPECIFIED CHRONICITY PATTERN, UNSPECIFIED HEADACHE TYPE: ICD-10-CM

## 2023-07-25 DIAGNOSIS — J45.909 UNCOMPLICATED ASTHMA, UNSPECIFIED ASTHMA SEVERITY, UNSPECIFIED WHETHER PERSISTENT: ICD-10-CM

## 2023-07-25 DIAGNOSIS — R05.9 COUGH, UNSPECIFIED TYPE: ICD-10-CM

## 2023-07-25 DIAGNOSIS — J30.9 ALLERGIC RHINITIS, UNSPECIFIED SEASONALITY, UNSPECIFIED TRIGGER: Primary | ICD-10-CM

## 2023-07-25 PROCEDURE — 1159F MED LIST DOCD IN RCRD: CPT | Performed by: NURSE PRACTITIONER

## 2023-07-25 PROCEDURE — 3075F SYST BP GE 130 - 139MM HG: CPT | Performed by: NURSE PRACTITIONER

## 2023-07-25 PROCEDURE — 3078F DIAST BP <80 MM HG: CPT | Performed by: NURSE PRACTITIONER

## 2023-07-25 PROCEDURE — 87428 SARSCOV & INF VIR A&B AG IA: CPT | Performed by: NURSE PRACTITIONER

## 2023-07-25 PROCEDURE — 1160F RVW MEDS BY RX/DR IN RCRD: CPT | Performed by: NURSE PRACTITIONER

## 2023-07-25 PROCEDURE — 99213 OFFICE O/P EST LOW 20 MIN: CPT | Performed by: NURSE PRACTITIONER

## 2023-07-25 RX ORDER — PREDNISONE 20 MG/1
20 TABLET ORAL 2 TIMES DAILY
Qty: 6 TABLET | Refills: 0 | Status: SHIPPED | OUTPATIENT
Start: 2023-07-25 | End: 2023-07-28

## 2023-07-25 RX ORDER — BENZONATATE 100 MG/1
100 CAPSULE ORAL 3 TIMES DAILY PRN
Qty: 30 CAPSULE | Refills: 0 | Status: SHIPPED | OUTPATIENT
Start: 2023-07-25

## 2023-07-25 RX ORDER — IPRATROPIUM BROMIDE 42 UG/1
2 SPRAY, METERED NASAL 2 TIMES DAILY
COMMUNITY
Start: 2023-07-18
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
## 2023-07-31 ENCOUNTER — TELEPHONE (OUTPATIENT)
Dept: FAMILY MEDICINE CLINIC | Facility: CLINIC | Age: 68
End: 2023-07-31
Payer: MEDICARE

## 2023-08-01 ENCOUNTER — OFFICE VISIT (OUTPATIENT)
Dept: FAMILY MEDICINE CLINIC | Facility: CLINIC | Age: 68
End: 2023-08-01
Payer: MEDICARE

## 2023-08-01 VITALS
HEIGHT: 68 IN | TEMPERATURE: 98.3 F | OXYGEN SATURATION: 97 % | RESPIRATION RATE: 16 BRPM | WEIGHT: 212 LBS | SYSTOLIC BLOOD PRESSURE: 128 MMHG | BODY MASS INDEX: 32.13 KG/M2 | DIASTOLIC BLOOD PRESSURE: 80 MMHG | HEART RATE: 66 BPM

## 2023-08-01 DIAGNOSIS — Z51.81 ENCOUNTER FOR THERAPEUTIC DRUG LEVEL MONITORING: ICD-10-CM

## 2023-08-01 DIAGNOSIS — H65.111 ACUTE MUCOID OTITIS MEDIA OF RIGHT EAR: ICD-10-CM

## 2023-08-01 DIAGNOSIS — J40 BRONCHITIS: Primary | ICD-10-CM

## 2023-08-01 DIAGNOSIS — R05.1 ACUTE COUGH: ICD-10-CM

## 2023-08-01 DIAGNOSIS — G43.009 MIGRAINE WITHOUT AURA AND WITHOUT STATUS MIGRAINOSUS, NOT INTRACTABLE: ICD-10-CM

## 2023-08-01 RX ORDER — ALBUTEROL SULFATE 90 UG/1
2 AEROSOL, METERED RESPIRATORY (INHALATION) EVERY 4 HOURS PRN
Qty: 18 G | Refills: 0 | Status: SHIPPED | OUTPATIENT
Start: 2023-08-01

## 2023-08-01 RX ORDER — BENZONATATE 100 MG/1
100 CAPSULE ORAL 3 TIMES DAILY PRN
Qty: 60 CAPSULE | Refills: 0 | Status: SHIPPED | OUTPATIENT
Start: 2023-08-01

## 2023-08-01 RX ORDER — PREDNISONE 20 MG/1
20 TABLET ORAL 2 TIMES DAILY
Qty: 6 TABLET | Refills: 0 | Status: SHIPPED | OUTPATIENT
Start: 2023-08-01 | End: 2023-08-04

## 2023-08-01 RX ORDER — MELOXICAM 7.5 MG/1
7.5 TABLET ORAL DAILY
Qty: 14 TABLET | Refills: 0 | Status: SHIPPED | OUTPATIENT
Start: 2023-08-01

## 2023-08-01 RX ORDER — AMOXICILLIN AND CLAVULANATE POTASSIUM 875; 125 MG/1; MG/1
1 TABLET, FILM COATED ORAL 2 TIMES DAILY
Qty: 20 TABLET | Refills: 0 | Status: SHIPPED | OUTPATIENT
Start: 2023-08-01 | End: 2023-08-11

## 2023-08-02 DIAGNOSIS — R51.9 NONINTRACTABLE HEADACHE, UNSPECIFIED CHRONICITY PATTERN, UNSPECIFIED HEADACHE TYPE: ICD-10-CM

## 2023-08-02 RX ORDER — BUTALBITAL, ACETAMINOPHEN AND CAFFEINE 50; 325; 40 MG/1; MG/1; MG/1
1 TABLET ORAL 2 TIMES DAILY PRN
Qty: 30 TABLET | Refills: 0 | Status: SHIPPED | OUTPATIENT
Start: 2023-08-02

## 2023-08-03 LAB
AMPHETAMINES UR QL SCN: NEGATIVE NG/ML
BARBITURATES UR QL SCN: POSITIVE NG/ML
BENZODIAZ UR QL SCN: NEGATIVE NG/ML
BZE UR QL SCN: NEGATIVE NG/ML
CANNABINOIDS UR QL SCN: NEGATIVE NG/ML
CREAT UR-MCNC: 19.4 MG/DL (ref 20–300)
LABORATORY COMMENT REPORT: ABNORMAL
METHADONE UR QL SCN: NEGATIVE NG/ML
OPIATES UR QL SCN: NEGATIVE NG/ML
OXYCODONE+OXYMORPHONE UR QL SCN: NEGATIVE NG/ML
PCP UR QL: NEGATIVE NG/ML
PH UR: 6.2 [PH] (ref 4.5–8.9)
PROPOXYPH UR QL SCN: NEGATIVE NG/ML
SP GR UR: 1.01

## 2023-08-07 ENCOUNTER — TELEPHONE (OUTPATIENT)
Dept: FAMILY MEDICINE CLINIC | Facility: CLINIC | Age: 68
End: 2023-08-07

## 2023-08-07 DIAGNOSIS — I10 ESSENTIAL HYPERTENSION: ICD-10-CM

## 2023-08-07 DIAGNOSIS — E78.49 OTHER HYPERLIPIDEMIA: ICD-10-CM

## 2023-08-07 DIAGNOSIS — E11.9 TYPE 2 DIABETES MELLITUS WITHOUT COMPLICATION, WITHOUT LONG-TERM CURRENT USE OF INSULIN: ICD-10-CM

## 2023-08-07 NOTE — TELEPHONE ENCOUNTER
Caller: Khalida Gilliland    Relationship: Self    Best call back number: [phone]     What was the call regarding: PATIENT IS GOING OUT OF THE COUNTRY TOMORROW TO [facility] AND IS ASKING IF MEDICATION IS ABLE TO BE SENT OVERSEAS IF SHE WERE TO BE IN A SITUATION WHERE SHE NEEDED SOMETHING WHILE THERE. PLEASE ADVISE.    Is it okay if the provider responds through MyChart: YES

## 2023-08-07 NOTE — TELEPHONE ENCOUNTER
Caller: Khalida Gilliland    Relationship: Self    Best call back number: [phone]     Requested Prescriptions:   Requested Prescriptions     Pending Prescriptions Disp Refills    glipizide (glipiZIDE XL) 5 MG ER tablet 90 tablet 3     Sig: Take 1 tablet by mouth Daily.    SITagliptin (Januvia) 100 MG tablet 90 tablet 3     Sig: Take 1 tablet by mouth Daily.    traZODone (DESYREL) 50 MG tablet 90 tablet 1     Sig: Take 1 tablet by mouth Every Night.    montelukast (SINGULAIR) 10 MG tablet 90 tablet 3     Sig: Take 1 tablet by mouth Every Night.    atorvastatin (LIPITOR) 20 MG tablet 90 tablet 3     Sig: Take 1 tablet by mouth Every Night.    hydroCHLOROthiazide (HYDRODIURIL) 25 MG tablet 90 tablet 3     Sig: Take 1 tablet by mouth Daily.        Pharmacy where request should be sent: [facility] [address] [phone] [facility] [phone]      Last office visit with prescribing clinician: 4/14/2023   Last telemedicine visit with prescribing clinician: Visit date not found   Next office visit with prescribing clinician: 10/18/2023     Additional details provided by patient: PATIENT HAS A LIST OF MEDICATIONS THAT ARE STILL UNDER THE NAME OF HER PREVIOUS PRIMARY CARE PROVIDER, AND SHE IS NEEDING THE PROVIDER CHANGED AND FOR THEM TO BE SENT IN TO [facility]. PATIENT STATED THAT SHE GETS A 90 DAY SUPPLY OF ALL OF HER MEDICATIONS.    Does the patient have less than a 3 day supply:  [] Yes  [x] No    Would you like a call back once the refill request has been completed: [] Yes [x] No    If the office needs to give you a call back, can they leave a voicemail: [] Yes [x] No    Abdirahman Akers Rep   08/07/23 15:14 EDT

## 2023-08-08 RX ORDER — HYDROCHLOROTHIAZIDE 25 MG/1
25 TABLET ORAL DAILY
Qty: 90 TABLET | Refills: 3 | Status: SHIPPED | OUTPATIENT
Start: 2023-08-08

## 2023-08-08 RX ORDER — MONTELUKAST SODIUM 10 MG/1
10 TABLET ORAL NIGHTLY
Qty: 90 TABLET | Refills: 3 | Status: SHIPPED | OUTPATIENT
Start: 2023-08-08

## 2023-08-08 RX ORDER — TRAZODONE HYDROCHLORIDE 50 MG/1
50 TABLET ORAL NIGHTLY
Qty: 90 TABLET | Refills: 1 | Status: SHIPPED | OUTPATIENT
Start: 2023-08-08

## 2023-08-08 RX ORDER — ATORVASTATIN CALCIUM 20 MG/1
20 TABLET, FILM COATED ORAL NIGHTLY
Qty: 90 TABLET | Refills: 3 | Status: SHIPPED | OUTPATIENT
Start: 2023-08-08

## 2023-08-08 RX ORDER — GLIPIZIDE 5 MG/1
5 TABLET, FILM COATED, EXTENDED RELEASE ORAL DAILY
Qty: 90 TABLET | Refills: 3 | Status: SHIPPED | OUTPATIENT
Start: 2023-08-08

## 2023-08-08 NOTE — TELEPHONE ENCOUNTER
Called advised patient that we cannot send medication over to another country. She would have to see a doctor in the [facility]

## 2023-08-12 DIAGNOSIS — H65.111 ACUTE MUCOID OTITIS MEDIA OF RIGHT EAR: ICD-10-CM

## 2023-08-14 DIAGNOSIS — J40 BRONCHITIS: ICD-10-CM

## 2023-08-14 RX ORDER — ALBUTEROL SULFATE 90 UG/1
AEROSOL, METERED RESPIRATORY (INHALATION)
Qty: 8.5 G | Refills: 1 | Status: SHIPPED | OUTPATIENT
Start: 2023-08-14

## 2023-08-14 RX ORDER — MELOXICAM 7.5 MG/1
7.5 TABLET ORAL DAILY
Qty: 30 TABLET | Refills: 0 | Status: SHIPPED | OUTPATIENT
Start: 2023-08-14 | End: 2023-09-13

## 2023-08-14 NOTE — TELEPHONE ENCOUNTER
Rx Refill Note  Requested Prescriptions     Pending Prescriptions Disp Refills    meloxicam (MOBIC) 7.5 MG tablet [Pharmacy Med Name: MELOXICAM 7.5MG TABLETS] 14 tablet 0     Sig: TAKE 1 TABLET BY MOUTH DAILY      Last office visit with prescribing clinician: 8/1/2023   Last telemedicine visit with prescribing clinician: Visit date not found   Next office visit with prescribing clinician: 8/14/2023                         Would you like a call back once the refill request has been completed: [] Yes [] No    If the office needs to give you a call back, can they leave a voicemail: [] Yes [] No    Myah Lockwood MA  08/14/23, 13:15 EDT

## 2023-08-14 NOTE — TELEPHONE ENCOUNTER
Rx Refill Note  Requested Prescriptions     Pending Prescriptions Disp Refills    albuterol sulfate  (90 Base) MCG/ACT inhaler [Pharmacy Med Name: ALBUTEROL HFA INH (200 PUFFS) 8.5GM] 8.5 g      Sig: INHALE 2 PUFFS EVERY 4 HOURS AS NEEDED      Last office visit with prescribing clinician: 8/1/2023   Last telemedicine visit with prescribing clinician: Visit date not found   Next office visit with prescribing clinician: Visit date not found                         Would you like a call back once the refill request has been completed: [] Yes [] No    If the office needs to give you a call back, can they leave a voicemail: [] Yes [] No    Myah Lockwood MA  08/14/23, 13:15 EDT

## 2023-09-10 ENCOUNTER — READMISSION MANAGEMENT (OUTPATIENT)
Dept: CALL CENTER | Facility: HOSPITAL | Age: 68
End: 2023-09-10
Payer: MEDICARE

## 2023-09-11 ENCOUNTER — TRANSITIONAL CARE MANAGEMENT TELEPHONE ENCOUNTER (OUTPATIENT)
Dept: CALL CENTER | Facility: HOSPITAL | Age: 68
End: 2023-09-11
Payer: MEDICARE

## 2023-09-11 ENCOUNTER — TELEPHONE (OUTPATIENT)
Dept: FAMILY MEDICINE CLINIC | Facility: CLINIC | Age: 68
End: 2023-09-11
Payer: MEDICARE

## 2023-09-11 DIAGNOSIS — G47.00 INSOMNIA, UNSPECIFIED TYPE: ICD-10-CM

## 2023-09-11 RX ORDER — ZOLPIDEM TARTRATE 5 MG/1
5 TABLET ORAL NIGHTLY PRN
Qty: 30 TABLET | Refills: 0 | Status: SHIPPED | OUTPATIENT
Start: 2023-09-11

## 2023-09-11 NOTE — OUTREACH NOTE
Call Center TCM Note      Flowsheet Row Responses   [facility] [place] patient discharged from? Non-  [[place]]   Does the patient have one of the following disease processes/diagnoses(primary or secondary)? Other   TCM attempt successful? No   Unsuccessful attempts Attempt 1            Krissy Diane RN    9/11/2023, 16:27 EDT

## 2023-09-11 NOTE — TELEPHONE ENCOUNTER
Caller: Khalida Gilliland    Relationship: Self    Best call back number: [phone]     What medication are you requesting: AMBIEN    What are your current symptoms: FOR A TRIP OUT OF THE COUNTRY    If a prescription is needed, what is your preferred pharmacy and phone number:      [id number] [place], [place] - [address] AT [facility] - [phone] [facility] [phone] FX     Additional notes: PATIENT STATED SHE IS GOING OUT OF THE COUNTRY FOR 16 DAYS. PLEASE ADVISE.

## 2023-09-11 NOTE — TELEPHONE ENCOUNTER
Caller: Khalida Gilliland    Relationship to patient: Self    Best call back number: [phone]     Chief complaint: HOSPITAL VISIT - SEEN FOR DIARRHEA, STOMACH PAIN, BOWEL INFLAMMATION    Type of visit: HOSPITAL FOLLOW UP    Requested date: ASAP    Additional notes: PATIENT WAS SEEN AT [place] FOR A HOSPITAL VISIT AND WAS DISCHARGED ON 09/08/2023. PATIENT IS REQUESTING TO BE SEEN BY MIRIAM FAULKNER FOR HER APPOINTMENT SINCE SHE HAD LAST SEEN HER. [facility] IS ONLY ABLE TO SCHEDULE HOSPITAL FOLLOW UP WITH PCP. PLEASE ADVISE.

## 2023-09-11 NOTE — OUTREACH NOTE
Call Center TCM Note      Flowsheet Row Responses   [facility] patient discharged from? Non-  [[place]]   Does the patient have one of the following disease processes/diagnoses(primary or secondary)? Other   TCM attempt successful? No   Unsuccessful attempts Attempt 2            Krissy Diane RN    9/11/2023, 17:12 EDT

## 2023-09-12 ENCOUNTER — TRANSITIONAL CARE MANAGEMENT TELEPHONE ENCOUNTER (OUTPATIENT)
Dept: CALL CENTER | Facility: HOSPITAL | Age: 68
End: 2023-09-12
Payer: MEDICARE

## 2023-09-12 NOTE — OUTREACH NOTE
Call Center TCM Note      Flowsheet Row Responses   [facility] patient discharged from? Non-[facility]   Does the patient have one of the following disease processes/diagnoses(primary or secondary)? Other   TCM attempt successful? No   Unsuccessful attempts Attempt 3  [Spouse on verbal release-no answer]            Shilpi Smith RN    9/12/2023, 09:47 EDT

## 2023-09-13 ENCOUNTER — OFFICE VISIT (OUTPATIENT)
Dept: FAMILY MEDICINE CLINIC | Facility: CLINIC | Age: 68
End: 2023-09-13
Payer: MEDICARE

## 2023-09-13 VITALS
OXYGEN SATURATION: 95 % | DIASTOLIC BLOOD PRESSURE: 64 MMHG | HEIGHT: 68 IN | RESPIRATION RATE: 18 BRPM | BODY MASS INDEX: 30.77 KG/M2 | WEIGHT: 203 LBS | SYSTOLIC BLOOD PRESSURE: 128 MMHG | TEMPERATURE: 98.4 F | HEART RATE: 68 BPM

## 2023-09-13 DIAGNOSIS — K52.9 COLITIS: ICD-10-CM

## 2023-09-13 DIAGNOSIS — N28.1 RENAL CYST: ICD-10-CM

## 2023-09-13 DIAGNOSIS — E78.49 OTHER HYPERLIPIDEMIA: ICD-10-CM

## 2023-09-13 DIAGNOSIS — N30.00 ACUTE CYSTITIS WITHOUT HEMATURIA: ICD-10-CM

## 2023-09-13 DIAGNOSIS — G47.00 INSOMNIA, UNSPECIFIED TYPE: ICD-10-CM

## 2023-09-13 DIAGNOSIS — Z09 HOSPITAL DISCHARGE FOLLOW-UP: Primary | ICD-10-CM

## 2023-09-13 DIAGNOSIS — G43.009 MIGRAINE WITHOUT AURA AND WITHOUT STATUS MIGRAINOSUS, NOT INTRACTABLE: ICD-10-CM

## 2023-09-13 DIAGNOSIS — J30.9 ALLERGIC RHINITIS, UNSPECIFIED SEASONALITY, UNSPECIFIED TRIGGER: ICD-10-CM

## 2023-09-13 DIAGNOSIS — I10 ESSENTIAL HYPERTENSION: ICD-10-CM

## 2023-09-13 LAB
BILIRUB BLD-MCNC: NEGATIVE MG/DL
CLARITY, POC: CLEAR
COLOR UR: YELLOW
EXPIRATION DATE: ABNORMAL
GLUCOSE UR STRIP-MCNC: NEGATIVE MG/DL
KETONES UR QL: NEGATIVE
LEUKOCYTE EST, POC: ABNORMAL
Lab: ABNORMAL
NITRITE UR-MCNC: NEGATIVE MG/ML
PH UR: 6 [PH] (ref 6–8)
PROT UR STRIP-MCNC: NEGATIVE MG/DL
RBC # UR STRIP: NEGATIVE /UL
SP GR UR: 1 (ref 1–1.03)
UROBILINOGEN UR QL: ABNORMAL

## 2023-09-13 RX ORDER — DIPHENOXYLATE HYDROCHLORIDE AND ATROPINE SULFATE 2.5; .025 MG/1; MG/1
1 TABLET ORAL DAILY
COMMUNITY

## 2023-09-14 RX ORDER — AZELASTINE 1 MG/ML
SPRAY, METERED NASAL
Qty: 30 ML | Refills: 6 | Status: SHIPPED | OUTPATIENT
Start: 2023-09-14

## 2023-09-14 NOTE — PROGRESS NOTES
"Chief Complaint  Hospital Follow Up Visit (Hospital follow up Inflammation colon, diarrhea, )    Subjective        Khalida Gilliland presents to [facility]  History of Present Illness  For hospital follow up    Still having pain in right lower quadrant which as per pt is normal for her as she had issues with diverticulitis in past and had surgeries done ( also went to [facility] in past for similar issues). Patient stated she is having bowel movements three times a day.  Also complaining of increased frequency of urination.  Denies having any nausea, vomiting of blood in stools or any other symptoms    Objective   Vital Signs:  /64 (BP Location: Left arm, Patient Position: Sitting, Cuff Size: Adult)   Pulse 68   Temp 98.4 °F (36.9 °C) (Oral)   Resp 18   Ht 172.7 cm (67.99\")   Wt 92.1 kg (203 lb)   SpO2 95%   BMI 30.87 kg/m²   Estimated body mass index is 30.87 kg/m² as calculated from the following:    Height as of this encounter: 172.7 cm (67.99\").    Weight as of this encounter: 92.1 kg (203 lb).             Physical Exam  Eyes:      Extraocular Movements: Extraocular movements intact.      Pupils: Pupils are equal, round, and reactive to light.   Cardiovascular:      Rate and Rhythm: Normal rate.   Pulmonary:      Effort: Pulmonary effort is normal.   Abdominal:      General: Bowel sounds are normal. There is no distension.      Palpations: Abdomen is soft.      Comments: Slight tenderness appreciated on right lower quadrant   Musculoskeletal:         General: Normal range of motion.      Cervical back: Normal range of motion.   Skin:     General: Skin is warm.   Neurological:      Mental Status: She is oriented to person, place, and time.      Result Review :    CMP          11/1/2022    11:10   CMP   Glucose 144    BUN 11    Creatinine 0.65    Sodium 140    Potassium 4.7    Chloride 100    Calcium 9.8    BUN/Creatinine Ratio 16.9      CBC          9/4/2023    04:24 9/6/2023 "    05:37 9/8/2023    05:22   CBC   WBC 4.03     5.88     7.06       RBC 3.51     3.71     3.62       Hemoglobin 10.5     11.3     10.9       Hematocrit 32.2     33.9     33.9       MCV 91.7     91.4     93.6       MCH 29.9     30.5     30.1       MCHC 32.6     33.3     32.2       RDW 13.5     13.4     14.1       Platelets 160     202     211          Details          This result is from an external source.             Data reviewed : Recent hospitalization notes patient was hospitalized for  diarrhea, C diff, Norovirus was ruled out during patient hospitalization. CT abdomen pelvis done which confirmed colitis.pt was treated with antibiotics and was discharged home with Augmentin             Assessment and Plan   Diagnoses and all orders for this visit:    1. Hospital discharge follow-up (Primary)    2. Acute cystitis without hematuria  -     POC Urinalysis Dipstick, Automated  -     Urine Culture - Urine, Urine, Clean Catch    3. Colitis    4. Essential hypertension    5. Other hyperlipidemia    6. Migraine without aura and without status migrainosus, not intractable    7. Allergic rhinitis, unspecified seasonality, unspecified trigger    8. Insomnia, unspecified type      # For complaints of increased frequency to rule out Uti  Urine dipstick done resulted inconclusive, will order urine culture for confirmation.     # colitis  Advise BRAT diet, Rtc if symptoms worsens or any change in bowel frequency        Follow Up   No follow-ups on file.  Patient was given instructions and counseling regarding her condition or for health maintenance advice. Please see specific information pulled into the AVS if appropriate.

## 2023-09-14 NOTE — TELEPHONE ENCOUNTER
Rx Refill Note  Requested Prescriptions     Pending Prescriptions Disp Refills    azelastine (ASTELIN) 0.1 % nasal spray [Pharmacy Med Name: AZELASTINE NASAL SPRAY 30ML 0.1% 137MCG] 30 mL 6     Sig: USE 1 SPRAY IN EACH NOSTRIL TWICE A DAY AS NEEDED FOR RHINITIS AS DIRECTED      Last office visit with prescribing clinician: 9/13/2023   Last telemedicine visit with prescribing clinician: Visit date not found   Next office visit with prescribing clinician: 10/18/2023                         Would you like a call back once the refill request has been completed: [] Yes [] No    If the office needs to give you a call back, can they leave a voicemail: [] Yes [] No    Srinivasa Stuart MA  09/14/23, 07:56 EDT

## 2023-09-15 LAB
BACTERIA UR CULT: NORMAL
BACTERIA UR CULT: NORMAL

## 2023-09-25 DIAGNOSIS — H65.111 ACUTE MUCOID OTITIS MEDIA OF RIGHT EAR: ICD-10-CM

## 2023-09-25 RX ORDER — MELOXICAM 7.5 MG/1
7.5 TABLET ORAL DAILY
Qty: 30 TABLET | Refills: 0 | Status: SHIPPED | OUTPATIENT
Start: 2023-09-25 | End: 2023-10-25

## 2023-09-25 NOTE — TELEPHONE ENCOUNTER
Neurosurgery Progress Note    Subjective:  No events overnight   C/o of right head and right hip pain   Blurry vision R.eye, same as before surgery     Exam:  AAO x3, fluent speech   3 PERRl, EOMI   -drift, tongue midline   MONTEMAYOR, FAC   Sensation intact throughout   Incision CDI, staples intact   HVAC 40ml/8hr serosang   EVD 97ml/8hr serosang   Eating, drinking   -BM     BP  Min: 111/59  Max: 167/79  Pulse  Av.7  Min: 72  Max: 114  Resp  Av.9  Min: 12  Max: 25  Temp  Av.3 °C (97.3 °F)  Min: 36.1 °C (96.9 °F)  Max: 36.5 °C (97.7 °F)  SpO2  Av.8 %  Min: 94 %  Max: 99 %    No data recorded    Recent Labs     22  0333 22  0606 09/15/22  0359   WBC 9.7 10.4 13.8*   RBC 3.87* 4.03* 3.66*   HEMOGLOBIN 11.8* 12.5* 11.3*   HEMATOCRIT 36.5* 38.0* 35.1*   MCV 94.3 94.3 95.9   MCH 30.5 31.0 30.9   MCHC 32.3* 32.9* 32.2*   RDW 42.9 43.3 44.1   PLATELETCT 340 378 336   MPV 10.7 10.4 11.1       Recent Labs     22  0333 22  0606 09/15/22  0359   SODIUM 136 139 138   POTASSIUM 4.1 4.5 4.3   CHLORIDE 103 102 105   CO2 25 25 24   GLUCOSE 178* 185* 209*   BUN 24* 29* 21   CREATININE 1.06 1.11 1.09   CALCIUM 9.0 9.2 8.8       Recent Labs     22  1429   APTT 33.8   INR 1.08       Recent Labs     22  1429   REACTMIN 5.8   CLOTKINET 1.0   CLOTANGL 75.7   MAXCLOTS 68.4   [id number] 0.3   PRCINADP 9.4   PRCINAA 8.2         Intake/Output                         22 0700 - 09/15/22 0659 09/15/22 07 - 22 0659     0388-4317 3608-5959 Total  Total                 Intake    P.O.  400  440 840  --  -- --    P.O. 400 440 840 -- -- --    I.V.  1361.7  1080 2441.7  --  -- --    Volume (mL) (NS infusion) 1361.7 1080 2441.7 -- -- --    Total Intake 1761.7 1520 3281.7 -- -- --       Output    Urine  250  700 950  100  -- 100    Urine 50 -- 50 -- -- --    Number of Times Incontinent of Urine 2 x 1 x 3 x -- -- --    Output (mL) (External Urinary Catheter (Condom)) 200 700  Rx Refill Note  Requested Prescriptions     Pending Prescriptions Disp Refills    meloxicam (MOBIC) 7.5 MG tablet [Pharmacy Med Name: MELOXICAM 7.5MG TABLETS] 30 tablet 0     Sig: TAKE 1 TABLET BY MOUTH DAILY      Last office visit with prescribing clinician: 8/1/2023   Last telemedicine visit with prescribing clinician: Visit date not found   Next office visit with prescribing clinician: Visit date not found                         Would you like a call back once the refill request has been completed: [] Yes [] No    If the office needs to give you a call back, can they leave a voicemail: [] Yes [] No    Myah Lockwood MA  09/25/23, 08:41 EDT   900 100 -- 100    Drains  30  210 240  --  -- --    Output (mL) (Closed/Suction Drain 1 Right Scalp Hemovac 10 Fr.) 30 100 130 -- -- --    Output (mL) (ICP/Ventriculostomy Right Occipital region) 0 110 110 -- -- --    Stool  --  -- --  --  -- --    Number of Times Stooled -- 0 x 0 x 0 x -- 0 x    Blood  50  -- 50  --  -- --    Est. Blood Loss 50 -- 50 -- -- --    Total Output  100 -- 100       Net I/O     1431.7 610 2041.7 -100 -- -100              Intake/Output Summary (Last 24 hours) at 9/15/2022 0852  Last data filed at 9/15/2022 0800  Gross per 24 hour   Intake 3281.67 ml   Output 1340 ml   Net 1941.67 ml               thiamine  100 mg DAILY    multivitamin  1 Tablet DAILY    NS   Continuous    morphine injection  4 mg Q4HRS PRN    oxyCODONE immediate-release  5 mg Q4HRS PRN    senna-docusate  2 Tablet BID    And    polyethylene glycol/lytes  1 Packet QDAY PRN    And    magnesium hydroxide  30 mL QDAY PRN    And    bisacodyl  10 mg QDAY PRN    Respiratory Therapy Consult   Continuous RT    ondansetron  4 mg Q4HRS PRN    ondansetron  4 mg Q4HRS PRN    amLODIPine  10 mg DAILY    atorvastatin  40 mg Nightly    gabapentin  100 mg TID    zonisamide  100 mg QHS    hydrALAZINE  20 mg Q4HRS PRN    labetalol  10-20 mg Q4HRS PRN    insulin regular  2-9 Units 4X/DAY ACHS    And    dextrose bolus  25 g Q15 MIN PRN    MD Alert...Adult ICU Electrolyte Replacement per Pharmacy   PHARMACY TO DOSE    acetaminophen  650 mg Q4HRS PRN       Assessment and Plan:  Hospital day #4  POD #Right craniotomy evac of subdural hematoma, resection subdural membrane   Prophylactic anticoagulation: no         Start date/time: TBD     Plan:  Stable  CT stable, pneumocephalus  100% non-rebreather, change positions   Labs stable   Drains remain   Okay to clamp EVD while up, reopen in bed  Record output Q2  HVAC record out put Q8   Increase bowel protocol   PT/OT   Appreciate DC planning   No asa or anticoags   Q1 neuro

## 2023-10-08 ENCOUNTER — HOSPITAL ENCOUNTER (EMERGENCY)
Facility: HOSPITAL | Age: 68
Discharge: HOME | End: 2023-10-08
Attending: EMERGENCY MEDICINE
Payer: MEDICARE

## 2023-10-08 VITALS
WEIGHT: 200 LBS | RESPIRATION RATE: 18 BRPM | OXYGEN SATURATION: 98 % | TEMPERATURE: 98.2 F | HEART RATE: 87 BPM | HEIGHT: 68 IN | DIASTOLIC BLOOD PRESSURE: 82 MMHG | BODY MASS INDEX: 30.31 KG/M2 | SYSTOLIC BLOOD PRESSURE: 157 MMHG

## 2023-10-08 DIAGNOSIS — U07.1 COVID-19 VIRUS INFECTION: Primary | ICD-10-CM

## 2023-10-08 LAB
FLUAV SUBTYP SPEC NAA+PROBE: NOT DETECTED
FLUBV RNA ISLT QL NAA+PROBE: NOT DETECTED
SARS-COV-2 RNA RESP QL NAA+PROBE: DETECTED

## 2023-10-08 PROCEDURE — 87636 SARSCOV2 & INF A&B AMP PRB: CPT | Performed by: EMERGENCY MEDICINE

## 2023-10-08 PROCEDURE — 99282 EMERGENCY DEPT VISIT SF MDM: CPT

## 2023-10-08 RX ORDER — BROMPHENIRAMINE MALEATE, PSEUDOEPHEDRINE HYDROCHLORIDE, AND DEXTROMETHORPHAN HYDROBROMIDE 2; 30; 10 MG/5ML; MG/5ML; MG/5ML
5 SYRUP ORAL 4 TIMES DAILY PRN
Qty: 118 ML | Refills: 0 | Status: SHIPPED | OUTPATIENT
Start: 2023-10-08 | End: 2023-10-08 | Stop reason: SDUPTHER

## 2023-10-08 RX ORDER — BROMPHENIRAMINE MALEATE, PSEUDOEPHEDRINE HYDROCHLORIDE, AND DEXTROMETHORPHAN HYDROBROMIDE 2; 30; 10 MG/5ML; MG/5ML; MG/5ML
5 SYRUP ORAL 4 TIMES DAILY PRN
Qty: 118 ML | Refills: 0 | Status: SHIPPED | OUTPATIENT
Start: 2023-10-08

## 2023-10-08 RX ORDER — ALBUTEROL SULFATE 90 UG/1
2 AEROSOL, METERED RESPIRATORY (INHALATION) EVERY 4 HOURS PRN
Qty: 6.7 G | Refills: 0 | Status: SHIPPED | OUTPATIENT
Start: 2023-10-08

## 2023-10-08 RX ORDER — ALBUTEROL SULFATE 90 UG/1
2 AEROSOL, METERED RESPIRATORY (INHALATION) EVERY 4 HOURS PRN
Qty: 6.7 G | Refills: 0 | Status: SHIPPED | OUTPATIENT
Start: 2023-10-08 | End: 2023-10-08 | Stop reason: SDUPTHER

## 2023-10-09 ENCOUNTER — PATIENT OUTREACH (OUTPATIENT)
Dept: CASE MANAGEMENT | Facility: OTHER | Age: 68
End: 2023-10-09
Payer: MEDICARE

## 2023-10-09 PROBLEM — Z12.11 COLON CANCER SCREENING: Status: ACTIVE | Noted: 2022-12-05

## 2023-10-09 NOTE — OUTREACH NOTE
AMBULATORY CASE MANAGEMENT NOTE    Name and Relationship of Patient/Support Person: Khalida Gilliland A - Self    Patient Outreach  RN-ACM outreach with patient. Discussed 10/8/23 ED visit regarding COVID 19 virus infection. Patient treated and discharged home.  Patient states to be compliant with ED recommendations; taking Paxlovid and medications as ED directed. Patient states to have difficulty with episodes of nonproductive cough; headache ; SOB; decrease in appetite and no difficulty with chest pain or sleeping. Patient states to be compliant with medications; use of inhaler ; monitoring of blood sugars and use of pulse oximeter.Patient states no barriers regarding food; transportation and medications. Patient states to have 10/18/23 PCP appointment . Reviewed with patient ED AVS recommendations;benefit physician follow up; evaluation as needed; education; role of RN-ACM and HRCM case management services. Patient verbalized understanding. Patient states to appreciate outreach. No further questions voiced at this time.   Adult Patient Profile  Questions/Answers      Flowsheet Row Most Recent Value   Symptoms/Conditions Managed at Home respiratory, diabetes, type 2   Diabetes Management Strategies medication therapy, other (see comments), blood glucose testing  [Physician follow up as needed]   Respiratory Symptoms/Conditions other (see comments)  [COVID 19 virus infection]   Respiratory Management Strategies medication therapy, other (see comments)  [Physician follow up]   Barriers to Taking Medication as Prescribed none   Equipment Currently Used at Home glucometer   Primary Source of Support/Comfort spouse   People in Home spouse        Social Work Assessment  Questions/Answers      Flowsheet Row Most Recent Value   People in Home spouse   Functional Status Comments Patient states to be independent with ADL's.   Equipment Currently Used at Home glucometer        Send Education  Questions/Answers      Flowsheet  Row Most Recent Value   Annual Wellness Visit:  Patient Has Completed   Other Patient Education/Resources  MyChart, 24/7 [facility] Nurse Call Line        SDOH updated and reviewed with the patient during this program:  Food Insecurity: No Food Insecurity (10/9/2023)    Hunger Vital Sign     Worried About Running Out of Food in the Last Year: Never true     Ran Out of Food in the Last Year: Never true      Transportation Needs: No Transportation Needs (10/9/2023)    PRAPARE - Transportation     Lack of Transportation (Medical): No     Lack of Transportation (Non-Medical): No     Education Documentation  Follow-Up Care, taught by Chika Nelson, RN at 10/9/2023 12:40 PM.  Learner: Patient  Readiness: Acceptance  Method: Explanation  Response: Verbalizes Understanding    Blood Glucose Monitoring, taught by Chika Nelson RN at 10/9/2023 12:40 PM.  Learner: Patient  Readiness: Acceptance  Method: Explanation  Response: Verbalizes Understanding    Unresolved/Worsening Symptoms, taught by Chika Nelson RN at 10/9/2023 12:40 PM.  Learner: Patient  Readiness: Acceptance  Method: Explanation  Response: Verbalizes Understanding    Home Instructions, taught by Chika Nelson RN at 10/9/2023 12:40 PM.  Learner: Patient  Readiness: Acceptance  Method: Explanation  Response: Verbalizes Understanding    Protecting Others, taught by Chika Nelson RN at 10/9/2023 12:40 PM.  Learner: Patient  Readiness: Acceptance  Method: Explanation  Response: Verbalizes Understanding          Chika CISNEROS  Ambulatory Case Management    10/9/2023, 12:40 EDT

## 2023-10-09 NOTE — DISCHARGE INSTRUCTIONS
Self quarantine per CDC recommendation x 10 days  Return ED shortness of air, vomiting, dehydration, worse condition, any other concerns

## 2023-10-11 ENCOUNTER — TELEPHONE (OUTPATIENT)
Dept: FAMILY MEDICINE CLINIC | Facility: CLINIC | Age: 68
End: 2023-10-11

## 2023-10-11 DIAGNOSIS — E78.49 OTHER HYPERLIPIDEMIA: Primary | ICD-10-CM

## 2023-10-11 DIAGNOSIS — G59 MONONEUROPATHY DUE TO UNDERLYING DISEASE: ICD-10-CM

## 2023-10-11 DIAGNOSIS — E11.9 TYPE 2 DIABETES MELLITUS WITHOUT COMPLICATION, WITHOUT LONG-TERM CURRENT USE OF INSULIN: ICD-10-CM

## 2023-10-11 RX ORDER — PREGABALIN 100 MG/1
CAPSULE ORAL
Qty: 270 CAPSULE | Refills: 1 | Status: SHIPPED | OUTPATIENT
Start: 2023-10-11

## 2023-10-11 NOTE — TELEPHONE ENCOUNTER
Caller: Khalida Gilliland    Relationship to patient: Self    Best call back number: [phone]     Date of exposure:     Date of positive COVID19 test: 10/8/23    Date if possible COVID19 exposure:     COVID19 symptoms: COUGH, CONGESTION, DIZZINESS, FATIGUE, YELLOW PHLEGM, REDNESS AND BURNING FROM VAGINAL AREA AFTER WIPING AFTER BOWEL MOVEMENT    Date of initial quarantine: 12/8/23    Additional information or concerns: PATIENT STATES THAT SHE WENT TO [facility] EMERGENCY DEPARTMENT ON 12/8/23 AND TESTED POSITIVE FOR COVID.  SHE REQUESTS A CALLBACK AS SOON AS POSSIBLE TO DISCUSS NEXT STEPS.     What is the patients preferred pharmacy: [id number] [place] [address] AT [facility] - [phone] [facility] [phone] FX     PLEASE ADVISE.

## 2023-10-11 NOTE — TELEPHONE ENCOUNTER
Rx Refill Note  Requested Prescriptions     Pending Prescriptions Disp Refills    pregabalin (LYRICA) 100 MG capsule [Pharmacy Med Name: PREGABALIN 100MG CAPSULES] 270 capsule      Sig: TAKE 1 CAPSULE BY MOUTH THREE TIMES DAILY      Last office visit with prescribing clinician: Visit date not found   Last telemedicine visit with prescribing clinician: Visit date not found   Next office visit with prescribing clinician: Visit date not found                         Would you like a call back once the refill request has been completed: [] Yes [] No    If the office needs to give you a call back, can they leave a voicemail: [] Yes [] No    Morgan Desai MA  10/11/23, 11:16 EDT

## 2023-10-12 DIAGNOSIS — B37.9 YEAST INFECTION: Primary | ICD-10-CM

## 2023-10-12 RX ORDER — FLUCONAZOLE 150 MG/1
150 TABLET ORAL
Qty: 2 TABLET | Refills: 0 | Status: SHIPPED | OUTPATIENT
Start: 2023-10-12

## 2023-10-12 NOTE — TELEPHONE ENCOUNTER
Caller: Khalida Gilliland    Relationship to patient: Self    Best call back number: [phone]     Patient is needing: PATIENT STATES THAT SHE IS NOW HAVING INTENSE BURNING AND ITCHING IN HER VAGINAL AREA ALONG WITH COVID SYMPTOMS.  SHE SAYS THAT SHE HAS BEEN PUTTING VASOLINE ON THAT AREA BUT IT IS NOT HELPING WITH SYMPTOMS.  SHE REQUESTS A CALLBACK AS SOON AS POSSIBLE TO DISCUSS.    PLEASE ADVISE.

## 2023-10-13 ENCOUNTER — TELEPHONE (OUTPATIENT)
Dept: FAMILY MEDICINE CLINIC | Facility: CLINIC | Age: 68
End: 2023-10-13

## 2023-10-13 DIAGNOSIS — J20.9 ACUTE BRONCHITIS, UNSPECIFIED ORGANISM: Primary | ICD-10-CM

## 2023-10-13 RX ORDER — AZITHROMYCIN 250 MG/1
TABLET, FILM COATED ORAL
Qty: 6 TABLET | Refills: 0 | Status: SHIPPED | OUTPATIENT
Start: 2023-10-13

## 2023-10-13 NOTE — TELEPHONE ENCOUNTER
Caller: Khalida Gilliland CHRIS    Relationship: Self    Best call back number: [phone]    What medication are you requesting: SOMETHING FOR CONGESTION    What are your current symptoms: OCCASSIONAL COUGH, CONGESTION AND SPITTING UP A BUNCY OF GREEN STUFF AFTER COVID    How long have you been experiencing symptoms: COUPLE WEEKS    Have you had these symptoms before:    [x] Yes  [] No    Have you been treated for these symptoms before:   [x] Yes  [] No    If a prescription is needed, what is your preferred pharmacy and phone number: [facility] #[id number] [facility] [id number] AT [facility] - [phone] [facility] [phone] FX     Additional notes: SHE IS FEELING BETTER BUT SHE HAS A LOT OF CONGESTION AND WOULD LIKE AN ANTIBIOTIC TO TAKE CARE OF IT.

## 2023-10-18 LAB
ALBUMIN SERPL-MCNC: 5 G/DL (ref 3.5–5.2)
ALBUMIN/GLOB SERPL: 1.6 G/DL
ALP SERPL-CCNC: 66 U/L (ref 39–117)
ALT SERPL-CCNC: 44 U/L (ref 1–33)
AST SERPL-CCNC: 39 U/L (ref 1–32)
BASOPHILS # BLD AUTO: 0.05 10*3/MM3 (ref 0–0.2)
BASOPHILS NFR BLD AUTO: 0.8 % (ref 0–1.5)
BILIRUB SERPL-MCNC: 0.5 MG/DL (ref 0–1.2)
BUN SERPL-MCNC: 20 MG/DL (ref 8–23)
BUN/CREAT SERPL: 31.7 (ref 7–25)
CALCIUM SERPL-MCNC: 10.4 MG/DL (ref 8.6–10.5)
CHLORIDE SERPL-SCNC: 99 MMOL/L (ref 98–107)
CHOLEST SERPL-MCNC: 161 MG/DL (ref 0–200)
CHOLEST/HDLC SERPL: 4.35 {RATIO}
CO2 SERPL-SCNC: 28.5 MMOL/L (ref 22–29)
CREAT SERPL-MCNC: 0.63 MG/DL (ref 0.57–1)
EGFRCR SERPLBLD CKD-EPI 2021: 96.8 ML/MIN/1.73
EOSINOPHIL # BLD AUTO: 0.28 10*3/MM3 (ref 0–0.4)
EOSINOPHIL NFR BLD AUTO: 4.3 % (ref 0.3–6.2)
ERYTHROCYTE [DISTWIDTH] IN BLOOD BY AUTOMATED COUNT: 13.3 % (ref 12.3–15.4)
GLOBULIN SER CALC-MCNC: 3.1 GM/DL
GLUCOSE SERPL-MCNC: 167 MG/DL (ref 65–99)
HBA1C MFR BLD: 7.9 % (ref 4.8–5.6)
HCT VFR BLD AUTO: 42.2 % (ref 34–46.6)
HDLC SERPL-MCNC: 37 MG/DL (ref 40–60)
HGB BLD-MCNC: 13.8 G/DL (ref 12–15.9)
IMM GRANULOCYTES # BLD AUTO: 0.01 10*3/MM3 (ref 0–0.05)
IMM GRANULOCYTES NFR BLD AUTO: 0.2 % (ref 0–0.5)
LDLC SERPL CALC-MCNC: 91 MG/DL (ref 0–100)
LYMPHOCYTES # BLD AUTO: 2.33 10*3/MM3 (ref 0.7–3.1)
LYMPHOCYTES NFR BLD AUTO: 36 % (ref 19.6–45.3)
MCH RBC QN AUTO: 30.2 PG (ref 26.6–33)
MCHC RBC AUTO-ENTMCNC: 32.7 G/DL (ref 31.5–35.7)
MCV RBC AUTO: 92.3 FL (ref 79–97)
MONOCYTES # BLD AUTO: 0.73 10*3/MM3 (ref 0.1–0.9)
MONOCYTES NFR BLD AUTO: 11.3 % (ref 5–12)
NEUTROPHILS # BLD AUTO: 3.07 10*3/MM3 (ref 1.7–7)
NEUTROPHILS NFR BLD AUTO: 47.4 % (ref 42.7–76)
NRBC BLD AUTO-RTO: 0 /100 WBC (ref 0–0.2)
PLATELET # BLD AUTO: 273 10*3/MM3 (ref 140–450)
POTASSIUM SERPL-SCNC: 4.6 MMOL/L (ref 3.5–5.2)
PROT SERPL-MCNC: 8.1 G/DL (ref 6–8.5)
RBC # BLD AUTO: 4.57 10*6/MM3 (ref 3.77–5.28)
SODIUM SERPL-SCNC: 137 MMOL/L (ref 136–145)
TRIGL SERPL-MCNC: 195 MG/DL (ref 0–150)
TSH SERPL DL<=0.005 MIU/L-ACNC: 2.39 UIU/ML (ref 0.27–4.2)
VLDLC SERPL CALC-MCNC: 33 MG/DL (ref 5–40)
WBC # BLD AUTO: 6.47 10*3/MM3 (ref 3.4–10.8)

## 2023-10-19 ENCOUNTER — OFFICE VISIT (OUTPATIENT)
Dept: FAMILY MEDICINE CLINIC | Facility: CLINIC | Age: 68
End: 2023-10-19
Payer: MEDICARE

## 2023-10-19 VITALS
HEART RATE: 64 BPM | TEMPERATURE: 98.1 F | BODY MASS INDEX: 29.63 KG/M2 | RESPIRATION RATE: 14 BRPM | OXYGEN SATURATION: 97 % | DIASTOLIC BLOOD PRESSURE: 62 MMHG | WEIGHT: 195.5 LBS | HEIGHT: 68 IN | SYSTOLIC BLOOD PRESSURE: 120 MMHG

## 2023-10-19 DIAGNOSIS — Z00.00 ENCOUNTER FOR SUBSEQUENT ANNUAL WELLNESS VISIT (AWV) IN MEDICARE PATIENT: Primary | ICD-10-CM

## 2023-10-19 DIAGNOSIS — E78.49 OTHER HYPERLIPIDEMIA: ICD-10-CM

## 2023-10-19 DIAGNOSIS — G47.00 INSOMNIA, UNSPECIFIED TYPE: ICD-10-CM

## 2023-10-19 DIAGNOSIS — G43.009 MIGRAINE WITHOUT AURA AND WITHOUT STATUS MIGRAINOSUS, NOT INTRACTABLE: ICD-10-CM

## 2023-10-19 DIAGNOSIS — Z00.00 ENCOUNTER FOR PREVENTIVE CARE: ICD-10-CM

## 2023-10-19 DIAGNOSIS — E11.65 TYPE 2 DIABETES MELLITUS WITH HYPERGLYCEMIA, WITHOUT LONG-TERM CURRENT USE OF INSULIN: ICD-10-CM

## 2023-10-19 DIAGNOSIS — I10 ESSENTIAL HYPERTENSION: ICD-10-CM

## 2023-10-19 RX ORDER — ATORVASTATIN CALCIUM 20 MG/1
TABLET, FILM COATED ORAL
COMMUNITY
Start: 2023-10-14

## 2023-10-19 NOTE — PROGRESS NOTES
The ABCs of the Annual Wellness Visit  Subsequent Medicare Wellness Visit    Subjective    Khalida Gilliland is a 68 y.o. female who presents for a Subsequent Medicare Wellness Visit.    The following portions of the patient's history were reviewed and   updated as appropriate: allergies, current medications, past family history, past medical history, past social history, past surgical history, and problem list.    Compared to one year ago, the patient feels her physical   health is the same\.    Compared to one year ago, the patient feels her mental   health is the same.    Recent Hospitalizations:  She was admitted within the past 365 days at [facility].       Current Medical Providers:  Patient Care Team:  Sergo Oewn MD as PCP - General (Internal Medicine)  Lex Morris MD as Consulting Physician (Hematology and Oncology)  Mikael David MD as Referring Physician (Internal Medicine)  Chika Nelson RN as Ambulatory  (Formerly named [facility])    Outpatient Medications Prior to Visit   Medication Sig Dispense Refill    albuterol sulfate  (90 Base) MCG/ACT inhaler Inhale 2 puffs Every 4 (Four) Hours As Needed for Wheezing or Shortness of Air (Cough). 6.7 g 9    albuterol sulfate  (90 Base) MCG/ACT inhaler INHALE 2 PUFFS EVERY 4 HOURS AS NEEDED (Patient not taking: Reported on 9/13/2023) 8.5 g 1    albuterol sulfate  (90 Base) MCG/ACT inhaler Inhale 2 puffs Every 4 (Four) Hours As Needed for Wheezing or Shortness of Air. 6.7 g 0    Ascorbic Acid (VITAMIN C PO) Take 1,000 mg by mouth Daily. PT INSTRUCTED TO CONTINUE PER DR. CALLUM URBINA      aspirin 81 MG EC tablet Take 1 tablet by mouth Daily. 30 tablet 6    atorvastatin (LIPITOR) 20 MG tablet       azelastine (ASTELIN) 0.1 % nasal spray USE 1 SPRAY IN EACH NOSTRIL TWICE A DAY AS NEEDED FOR RHINITIS AS DIRECTED 30 mL 6    benzonatate (Tessalon Perles) 100 MG capsule Take 1 capsule by mouth 3 (Three)  Times a Day As Needed for Cough. 60 capsule 0    Biotin 86709 MCG tablet Take 1 tablet by mouth Every Evening.      brompheniramine-pseudoephedrine-DM 30-2-10 MG/5ML syrup Take 5 mL by mouth 4 (Four) Times a Day As Needed for Cough or Congestion. 118 mL 0    budesonide-formoterol (SYMBICORT) 160-4.5 MCG/ACT inhaler Inhale 2 puffs 2 (Two) Times a Day.      butalbital-acetaminophen-caffeine (FIORICET, ESGIC) -40 MG per tablet Take 1 tablet by mouth 2 (Two) Times a Day As Needed for Headache. 30 tablet 0    cholecalciferol (VITAMIN D3) 1000 units tablet Take 1 tablet by mouth Daily. PER PT TO CONTINUE PER DR. CALLUM URBINA      Coenzyme Q10 (CO Q 10 PO) Take 100 mg by mouth Every Evening.      Cyanocobalamin (VITAMIN B 12 PO) Take 5,000 mcg by mouth Every Night.      cycloSPORINE (RESTASIS) 0.05 % ophthalmic emulsion Administer 1 drop to both eyes 2 (Two) Times a Day.      diphenhydrAMINE-acetaminophen (TYLENOL PM)  MG tablet per tablet Take 1 tablet by mouth At Night As Needed for Sleep.      fluticasone (FLONASE) 50 MCG/ACT nasal spray USE 2 SPRAYS IN EACH NOSTRIL DAILY 16 g 11    Ginkgo Biloba 40 MG tablet Take 120 mg by mouth Daily.      glipizide (glipiZIDE XL) 5 MG ER tablet Take 1 tablet by mouth Daily. 90 tablet 3    glucosamine-chondroitin 500-400 MG capsule capsule Take 1 capsule by mouth 2 (Two) Times a Day With Meals. PT TO CONTINUE PER DR. CALLUM URBINA      glucose blood (FREESTYLE LITE) test strip PT to use once daily 100 each 12    hydroCHLOROthiazide (HYDRODIURIL) 25 MG tablet Take 1 tablet by mouth Daily. 90 tablet 3    Lancets (freestyle) lancets Pt tests daily 100 each 6    lisinopril (PRINIVIL,ZESTRIL) 10 MG tablet Take 1 tablet by mouth Daily. (Patient taking differently: Take 1 tablet by mouth 2 (Two) Times a Day.) 90 tablet 3    meloxicam (MOBIC) 7.5 MG tablet TAKE 1 TABLET BY MOUTH DAILY 30 tablet 0    Misc Natural Products (CORTISOL PO) Take  by mouth.      montelukast  (SINGULAIR) 10 MG tablet Take 1 tablet by mouth Every Night. 90 tablet 3    multivitamin (THERAGRAN) tablet tablet Take 1 tablet by mouth Daily.      NYSTATIN 076944 UNIT/GM topical powder       omeprazole (priLOSEC) 40 MG capsule TAKE 1 CAPSULE EVERY MORNING 90 capsule 3    Potassium Gluconate 550 MG tablet Take  by mouth.      pregabalin (LYRICA) 100 MG capsule TAKE 1 CAPSULE BY MOUTH THREE TIMES DAILY 270 capsule 1    propranolol (INDERAL) 40 MG tablet Take 1 tablet by mouth 2 (Two) Times a Day. 180 tablet 3    raNITIdine (ZANTAC) 75 MG tablet Take 2 tablets by mouth 2 (Two) Times a Day.      SITagliptin (Januvia) 100 MG tablet Take 1 tablet by mouth Daily. 90 tablet 3    Spacer/Aero-Holding Chambers device Use with all inhaled treatments 1 each 0    traZODone (DESYREL) 50 MG tablet Take 1 tablet by mouth Every Night. 90 tablet 1    zolpidem (Ambien) 5 MG tablet Take 1 tablet by mouth At Night As Needed for Sleep. 30 tablet 0    azithromycin (Zithromax Z-Aubrey) 250 MG tablet Take 2 tablets by mouth on day 1, then 1 tablet daily on days 2-5 (Patient not taking: Reported on 10/19/2023) 6 tablet 0    BIOTIN PO Take 1 tablet by mouth Daily. (Patient not taking: Reported on 9/13/2023)      fluconazole (Diflucan) 150 MG tablet Take 1 tablet by mouth Every 72 (Seventy-Two) Hours As Needed (yeast infection). (Patient not taking: Reported on 10/19/2023) 2 tablet 0    fluocinonide (LIDEX) 0.05 % cream       ipratropium (ATROVENT) 0.06 % nasal spray 2 sprays 2 (Two) Times a Day. (Patient not taking: Reported on 10/19/2023)      multivitamin with minerals tablet tablet Take 1 tablet by mouth Daily. (Patient not taking: Reported on 9/13/2023)      Omega-3 Fatty Acids (FISH OIL) 1000 MG capsule capsule Take  by mouth Daily With Breakfast.      Probiotic Product (PROBIOTIC-10 PO) Take  by mouth. (Patient not taking: Reported on 9/13/2023)      promethazine-dextromethorphan (PROMETHAZINE-DM) 6.25-15 MG/5ML syrup Take 5 mL by mouth  "4 (Four) Times a Day As Needed for Cough. Falls precaution (Patient not taking: Reported on 9/13/2023) 120 mL 1    Scopolamine 1 MG/3DAYS patch Place 1 patch on the skin as directed by provider Every 72 (Seventy-Two) Hours. (Patient not taking: Reported on 9/13/2023) 4 each 0     No facility-administered medications prior to visit.       No opioid medication identified on active medication list. I have reviewed chart for other potential  high risk medication/s and harmful drug interactions in the elderly.        Aspirin is on active medication list. Aspirin use is not indicated based on review of current medical condition/s. Risk of harm outweighs potential benefits. Patient instructed to discontinue this medication.  .      Patient Active Problem List   Diagnosis    Monoclonal gammopathy    Hx of cerebral aneurysm repair    Allergy history, radiographic dye    Cerebral aneurysm, nonruptured    Neck pain    Other hyperlipidemia    Type 2 diabetes mellitus without complication, without long-term current use of insulin    Essential hypertension    Chronic abdominal pain    Right sided abdominal pain    Fatty liver    Mononeuropathy due to underlying disease    Asthma    GERD (gastroesophageal reflux disease)    Headache    Diabetic peripheral neuropathy    Allergic reaction    Health care maintenance    Migraine without aura and without status migrainosus, not intractable    Obesity (BMI 30-39.9)    Allergic rhinitis    Colon cancer screening     Advance Care Planning   Advance Care Planning     Advance Directive is not on file.  ACP discussion was held with the patient during this visit. Patient does not have an advance directive, information provided.     Objective    Vitals:    10/19/23 0842   BP: 120/62   BP Location: Left arm   Patient Position: Sitting   Cuff Size: Adult   Pulse: 64   Resp: 14   Temp: 98.1 °F (36.7 °C)   TempSrc: Oral   SpO2: 97%   Weight: 88.7 kg (195 lb 8 oz)   Height: 172.7 cm (67.99\") " "    Estimated body mass index is 29.73 kg/m² as calculated from the following:    Height as of this encounter: 172.7 cm (67.99\").    Weight as of this encounter: 88.7 kg (195 lb 8 oz).           Does the patient have evidence of cognitive impairment? No    Lab Results   Component Value Date    CHLPL 161 10/17/2023    TRIG 195 (H) 10/17/2023    HDL 37 (L) 10/17/2023    LDL 91 10/17/2023    VLDL 33 10/17/2023    HGBA1C 7.90 (H) 10/17/2023    HGBA1C 7.1 (H) 2023        HEALTH RISK ASSESSMENT    Smoking Status:  Social History     Tobacco Use   Smoking Status Former    Years: 1    Types: Cigarettes    Quit date:     Years since quittin.8    Passive exposure: Past   Smokeless Tobacco Never     Alcohol Consumption:  Social History     Substance and Sexual Activity   Alcohol Use Not Currently     Fall Risk Screen:    LORENZOADI Fall Risk Assessment was completed, and patient is at LOW risk for falls.Assessment completed on:10/19/2023    Depression Screening:      10/19/2023     8:55 AM   PHQ-2/PHQ-9 Depression Screening   Little Interest or Pleasure in Doing Things 0-->not at all   Feeling Down, Depressed or Hopeless 0-->not at all   PHQ-9: Brief Depression Severity Measure Score 0       Health Habits and Functional and Cognitive Screening:      10/19/2023     8:56 AM   Functional & Cognitive Status   Do you have difficulty preparing food and eating? No   Do you have difficulty bathing yourself, getting dressed or grooming yourself? No   Do you have difficulty using the toilet? No   Do you have difficulty moving around from place to place? No   Do you have trouble with steps or getting out of a bed or a chair? No   Current Diet Limited Junk Food   Dental Exam Up to date   Eye Exam Up to date   Exercise (times per week) 0 times per week   Current Exercises Include Gardening;Yard Work;Walking;House Cleaning   Do you need help using the phone?  No   Are you deaf or do you have serious difficulty hearing?  No   Do " you need help to go to places out of walking distance? No   Do you need help shopping? No   Do you need help preparing meals?  No   Do you need help with housework?  No   Do you need help with laundry? No   Do you need help taking your medications? No   Do you need help managing money? No   Do you ever drive or ride in a car without wearing a seat belt? No   Have you felt unusual stress, anger or loneliness in the last month? No   Who do you live with? Spouse   If you need help, do you have trouble finding someone available to you? No   Have you been bothered in the last four weeks by sexual problems? No   Do you have difficulty concentrating, remembering or making decisions? Yes       Age-appropriate Screening Schedule:  Refer to the list below for future screening recommendations based on patient's age, sex and/or medical conditions. Orders for these recommended tests are listed in the plan section. The patient has been provided with a written plan.    Health Maintenance   Topic Date Due    DXA SCAN  04/16/2021    URINE MICROALBUMIN  07/12/2023    INFLUENZA VACCINE  08/01/2023    COVID-19 Vaccine (3 - 2023-24 season) 09/01/2023    ANNUAL WELLNESS VISIT  11/07/2023    BMI FOLLOWUP  11/07/2023    HEMOGLOBIN A1C  04/17/2024    MAMMOGRAM  05/20/2024    DIABETIC EYE EXAM  06/12/2024    LIPID PANEL  10/17/2024    DIABETIC FOOT EXAM  10/19/2024    TDAP/TD VACCINES (2 - Td or Tdap) 09/03/2025    COLORECTAL CANCER SCREENING  06/21/2026    HEPATITIS C SCREENING  Completed    Pneumococcal Vaccine 65+  Completed    ZOSTER VACCINE  Completed    PAP SMEAR  Discontinued                  CMS Preventative Services Quick Reference  Risk Factors Identified During Encounter  Immunizations Discussed/Encouraged: Influenza and COVID19  The above risks/problems have been discussed with the patient.  Pertinent information has been shared with the patient in the After Visit Summary.  An After Visit Summary and PPPS were made available to  "the patient.    Follow Up:   Next Medicare Wellness visit to be scheduled in 1 year.       Additional E&M Note during same encounter follows:  Patient has multiple medical problems which are significant and separately identifiable that require additional work above and beyond the Medicare Wellness Visit.      Chief Complaint  Annual Exam (MWV)    Subjective        HPI  Khalida Gilliland is also being seen today for wellness check up         Objective   Vital Signs:  /62 (BP Location: Left arm, Patient Position: Sitting, Cuff Size: Adult)   Pulse 64   Temp 98.1 °F (36.7 °C) (Oral)   Resp 14   Ht 172.7 cm (67.99\")   Wt 88.7 kg (195 lb 8 oz)   SpO2 97%   BMI 29.73 kg/m²     Physical Exam  Constitutional:       Appearance: Normal appearance.   HENT:      Head: Normocephalic and atraumatic.      Right Ear: Tympanic membrane, ear canal and external ear normal. There is no impacted cerumen.      Left Ear: Tympanic membrane, ear canal and external ear normal. There is no impacted cerumen.      Nose: No congestion.      Mouth/Throat:      Mouth: Mucous membranes are moist.      Pharynx: Oropharynx is clear.   Eyes:      Extraocular Movements: Extraocular movements intact.      Conjunctiva/sclera: Conjunctivae normal.      Pupils: Pupils are equal, round, and reactive to light.   Cardiovascular:      Rate and Rhythm: Normal rate and regular rhythm.   Pulmonary:      Effort: Pulmonary effort is normal.      Breath sounds: Normal breath sounds.   Abdominal:      General: Bowel sounds are normal.      Palpations: Abdomen is soft.      Tenderness: There is no abdominal tenderness.      Comments: Scar marks present on lower abdomen from past surgeries.   Genitourinary:     Vagina: No vaginal discharge.   Musculoskeletal:         General: Normal range of motion.      Cervical back: Neck supple.      Right lower leg: No edema.      Left lower leg: No edema.   Skin:     General: Skin is warm.      Capillary Refill: " Capillary refill takes less than 2 seconds.   Neurological:      General: No focal deficit present.      Mental Status: She is alert and oriented to person, place, and time. Mental status is at baseline.   Psychiatric:         Mood and Affect: Mood normal.        The following data was reviewed by: Sergo Owen MD on 10/19/2023:  CMP          11/1/2022    11:10 10/17/2023    09:03   CMP   Glucose 144  167    BUN 11  20    Creatinine 0.65  0.63    Sodium 140  137    Potassium 4.7  4.6    Chloride 100  99    Calcium 9.8  10.4    Total Protein  8.1    Albumin  5.0    Globulin  3.1    Total Bilirubin  0.5    Alkaline Phosphatase  66    AST (SGOT)  39    ALT (SGPT)  44    BUN/Creatinine Ratio 16.9  31.7      CBC          9/6/2023    05:37 9/8/2023    05:22 10/17/2023    09:03   CBC   WBC 5.88     7.06     6.47    RBC 3.71     3.62     4.57    Hemoglobin 11.3     10.9     13.8    Hematocrit 33.9     33.9     42.2    MCV 91.4     93.6     92.3    MCH 30.5     30.1     30.2    MCHC 33.3     32.2     32.7    RDW 13.4     14.1     13.3    Platelets 202     211     273       Details          This result is from an external source.             Lipid Panel          10/17/2023    09:03   Lipid Panel   Total Cholesterol 161    Triglycerides 195    HDL Cholesterol 37    VLDL Cholesterol 33    LDL Cholesterol  91      TSH          10/17/2023    09:03   TSH   TSH 2.390      A1C Last 3 Results          11/1/2022    11:10 9/6/2023    05:38 10/17/2023    09:03   HGBA1C Last 3 Results   Hemoglobin A1C 6.90  7.1     7.90       Details          This result is from an external source.                          Assessment and Plan   Diagnoses and all orders for this visit:    1. Encounter for subsequent annual wellness visit (AWV) in Medicare patient (Primary)    2. Type 2 diabetes mellitus with hyperglycemia, without long-term current use of insulin  -     Hemoglobin A1c; Future  -     Microalbumin / Creatinine Urine Ratio - Urine, Clean  Catch; Future  -     Lipid Panel With / Chol / HDL Ratio; Future  -     Comprehensive Metabolic Panel; Future    3. Other hyperlipidemia  -     Hemoglobin A1c; Future  -     Microalbumin / Creatinine Urine Ratio - Urine, Clean Catch; Future  -     Lipid Panel With / Chol / HDL Ratio; Future  -     Comprehensive Metabolic Panel; Future    4. Essential hypertension  -     Hemoglobin A1c; Future  -     Microalbumin / Creatinine Urine Ratio - Urine, Clean Catch; Future  -     Lipid Panel With / Chol / HDL Ratio; Future  -     Comprehensive Metabolic Panel; Future    5. Migraine without aura and without status migrainosus, not intractable    6. Insomnia, unspecified type    7. Encounter for preventive care         # Type 2DM  A1c worsened 7.9, on januvia and sitagliptin  Pt had UTI, yeast infection, COVID infection in past 1 month.  Will continue same medication for now, will repeat A1c in 3 months, if A1c keep trending up will change medication    # Migraine  Controlled, continue same regimen    # Hypertension  Controlled, On lipitor, lisinopril, propanolol      # hyperlipidemia  LDL slightly above goal but below 100  On lipitor, continue same, advise to look into diet and will repeat in 3 months    # asthma  # seasonal Allergies  Using azelastine nasal spray    # Insomnia    # S/p 3 C section, cholecystectomy, emergency appendectomy, tubal ligation, brain aneurysm , Hx of craniotomy, Hx of TIA  #Chronic right upper abdominal pain, Hx of recurrent diverticulitis, Hx of resection of intestine  Recent had hospitalization for Colitis.    Patient encouraged to partake of healthy diet rich in fresh fruits and vegetables as well as lean proteins.  Patient encouraged to participate in daily exercise with goal of 30 min sustained activity.  Wear seatbelt when driving  Flu shot annually      Follow Up   No follow-ups on file.  Patient was given instructions and counseling regarding her condition or for health maintenance advice.  Please see specific information pulled into the AVS if appropriate.

## 2023-10-20 LAB
ALBUMIN/CREAT UR: 7 MG/G CREAT (ref 0–29)
CREAT UR-MCNC: 116.7 MG/DL
MICROALBUMIN UR-MCNC: 7.6 UG/ML

## 2023-11-08 ENCOUNTER — HOSPITAL ENCOUNTER (OUTPATIENT)
Dept: GENERAL RADIOLOGY | Facility: HOSPITAL | Age: 68
Discharge: HOME | End: 2023-11-08
Admitting: STUDENT IN AN ORGANIZED HEALTH CARE EDUCATION/TRAINING PROGRAM
Payer: MEDICARE

## 2023-11-08 ENCOUNTER — OFFICE VISIT (OUTPATIENT)
Dept: FAMILY MEDICINE CLINIC | Facility: CLINIC | Age: 68
End: 2023-11-08
Payer: MEDICARE

## 2023-11-08 VITALS
DIASTOLIC BLOOD PRESSURE: 72 MMHG | SYSTOLIC BLOOD PRESSURE: 118 MMHG | TEMPERATURE: 98.1 F | BODY MASS INDEX: 31.39 KG/M2 | HEIGHT: 68 IN | HEART RATE: 62 BPM | RESPIRATION RATE: 18 BRPM | OXYGEN SATURATION: 96 % | WEIGHT: 207.1 LBS

## 2023-11-08 DIAGNOSIS — M25.571 ACUTE RIGHT ANKLE PAIN: ICD-10-CM

## 2023-11-08 DIAGNOSIS — M76.61 ACHILLES TENDINITIS, RIGHT LEG: Primary | ICD-10-CM

## 2023-11-08 PROCEDURE — 99213 OFFICE O/P EST LOW 20 MIN: CPT | Performed by: STUDENT IN AN ORGANIZED HEALTH CARE EDUCATION/TRAINING PROGRAM

## 2023-11-08 PROCEDURE — 73610 X-RAY EXAM OF ANKLE: CPT

## 2023-11-08 RX ORDER — METHYLPREDNISOLONE 4 MG/1
TABLET ORAL
Qty: 21 TABLET | Refills: 0 | Status: SHIPPED | OUTPATIENT
Start: 2023-11-08

## 2023-11-13 ENCOUNTER — PATIENT OUTREACH (OUTPATIENT)
Dept: CASE MANAGEMENT | Facility: OTHER | Age: 68
End: 2023-11-13
Payer: MEDICARE

## 2023-11-13 NOTE — OUTREACH NOTE
AMBULATORY CASE MANAGEMENT NOTE    Name and Relationship of Patient/Support Person: Khalida Gilliland - Self    Patient Outreach  RN-ACM outreach with patient. Patient states improvement regarding COVID 19 virus infection and right ankle pain. Patient states to have been compliant with medications and states will be completing Prednisone as PCP directed. Patient states to be compliant with monitoring of blood sugars which she states have been elevated with use of Prednisone. Patient states to be ambulating without difficulty  and no difficulty with chest pain; SOB; appetite or sleeping. Reviewed with patient education and patient verbalized understanding. Patient states to appreciate outreach and declines needs for further outreach at this time. No further questions voiced at this time.     Education Documentation  Unresolved/Worsening Symptoms, taught by Chika Nelson, RN at 11/13/2023  2:57 PM.  Learner: Patient  Readiness: Acceptance  Method: Explanation  Response: Verbalizes Understanding    Follow-Up Care, taught by Chika Nelson, RN at 11/13/2023  2:57 PM.  Learner: Patient  Readiness: Acceptance  Method: Explanation  Response: Verbalizes Understanding    Blood Glucose Monitoring, taught by Chika Nelson, RN at 11/13/2023  2:57 PM.  Learner: Patient  Readiness: Acceptance  Method: Explanation  Response: Verbalizes Understanding          Chika CISNEROS  Ambulatory Case Management    11/13/2023, 14:57 EST

## 2023-11-25 NOTE — PROGRESS NOTES
"Chief Complaint  Pain (Foot pain, radha on for awhile, saw a podiatrist years ago. Sometimes her foot just goes out, feels like a sprain, no sprain. )    Subjective        Khalida Gilliland presents to [facility]  Pain    For right ankle pain for few days. Pt doesnot recall any significant trauma to right foot. Pt stated she had similar symptoms many years ago an she has seen podiatrist in past. Patient stated her symptoms are interfering with her mobility.     Objective   Vital Signs:  /72 (BP Location: Left arm, Patient Position: Sitting, Cuff Size: Large Adult)   Pulse 62   Temp 98.1 °F (36.7 °C) (Oral)   Resp 18   Ht 172.7 cm (67.99\")   Wt 93.9 kg (207 lb 1.6 oz)   SpO2 96%   BMI 31.50 kg/m²   Estimated body mass index is 31.5 kg/m² as calculated from the following:    Height as of this encounter: 172.7 cm (67.99\").    Weight as of this encounter: 93.9 kg (207 lb 1.6 oz).       BMI is >= 30 and <35. (Class 1 Obesity). The following options were offered after discussion;: exercise counseling/recommendations and nutrition counseling/recommendations      Physical Exam  HENT:      Mouth/Throat:      Mouth: Mucous membranes are moist.      Pharynx: Oropharynx is clear.   Eyes:      Extraocular Movements: Extraocular movements intact.      Conjunctiva/sclera: Conjunctivae normal.      Pupils: Pupils are equal, round, and reactive to light.   Cardiovascular:      Rate and Rhythm: Normal rate.   Pulmonary:      Effort: Pulmonary effort is normal.      Breath sounds: Normal breath sounds.   Abdominal:      General: Bowel sounds are normal.      Palpations: Abdomen is soft.   Musculoskeletal:         General: Swelling and tenderness present. No signs of injury.      Cervical back: Normal range of motion.      Comments: Right ankle swelling and tenderness appreciated   Neurological:      Mental Status: She is alert.        Result Review :                   Assessment and Plan "   Diagnoses and all orders for this visit:    1. Achilles tendinitis, right leg (Primary)    2. Acute right ankle pain  -     XR Ankle 3+ View Right  -     methylPREDNISolone (MEDROL) 4 MG dose pack; Take as directed on package instructions.  Dispense: 21 tablet; Refill: 0    # Xray right ankle showed bony spur at insertion of achilles tendon and soft tissue swelling.   Patient is advise to take medrol dose pack for decreasing inflammation.  Rtc if problem worsens or persist       Follow Up   No follow-ups on file.  Patient was given instructions and counseling regarding her condition or for health maintenance advice. Please see specific information pulled into the AVS if appropriate.

## 2023-12-21 ENCOUNTER — TELEPHONE (OUTPATIENT)
Dept: FAMILY MEDICINE CLINIC | Facility: CLINIC | Age: 68
End: 2023-12-21

## 2023-12-21 NOTE — TELEPHONE ENCOUNTER
Caller: Darshana, Khalida CHRIS    Relationship: Self    Best call back number: [phone]     What medication are you requesting: RELIEF    What are your current symptoms: INFECTION IN HER LEFT EYE, REDNESS AND GOOP, STARTING TO AFFECT RIGHT EYE ALSO    Have you had these symptoms before:    [] Yes  [x] No    Have you been treated for these symptoms before:   [] Yes  [x] No    If a prescription is needed, what is your preferred pharmacy and phone number: [id number] [address] AT [facility] OF [facility] - [phone] [facility] [phone] FX     Additional notes: HUB OFFERED TO SCHEDULED A SAME DAY AND PATIENT DECLINED

## 2023-12-22 DIAGNOSIS — B96.89 BACTERIAL CONJUNCTIVITIS OF BOTH EYES: Primary | ICD-10-CM

## 2023-12-22 DIAGNOSIS — H10.9 BACTERIAL CONJUNCTIVITIS OF BOTH EYES: Primary | ICD-10-CM

## 2023-12-22 RX ORDER — ERYTHROMYCIN 5 MG/G
OINTMENT OPHTHALMIC 2 TIMES DAILY
Qty: 3.5 G | Refills: 0 | Status: SHIPPED | OUTPATIENT
Start: 2023-12-22

## 2024-01-04 ENCOUNTER — APPOINTMENT (OUTPATIENT)
Dept: GENERAL RADIOLOGY | Facility: HOSPITAL | Age: 69
End: 2024-01-04
Payer: MEDICARE

## 2024-01-04 ENCOUNTER — HOSPITAL ENCOUNTER (EMERGENCY)
Facility: HOSPITAL | Age: 69
Discharge: HOME | End: 2024-01-04
Attending: EMERGENCY MEDICINE
Payer: MEDICARE

## 2024-01-04 ENCOUNTER — OFFICE VISIT (OUTPATIENT)
Dept: FAMILY MEDICINE CLINIC | Facility: CLINIC | Age: 69
End: 2024-01-04
Payer: MEDICARE

## 2024-01-04 VITALS
DIASTOLIC BLOOD PRESSURE: 57 MMHG | OXYGEN SATURATION: 95 % | SYSTOLIC BLOOD PRESSURE: 117 MMHG | HEIGHT: 68 IN | RESPIRATION RATE: 18 BRPM | HEART RATE: 69 BPM | WEIGHT: 209 LBS | TEMPERATURE: 98.3 F | BODY MASS INDEX: 31.67 KG/M2

## 2024-01-04 VITALS
BODY MASS INDEX: 31.75 KG/M2 | RESPIRATION RATE: 16 BRPM | OXYGEN SATURATION: 91 % | DIASTOLIC BLOOD PRESSURE: 70 MMHG | HEART RATE: 70 BPM | HEIGHT: 68 IN | WEIGHT: 209.5 LBS | SYSTOLIC BLOOD PRESSURE: 122 MMHG

## 2024-01-04 DIAGNOSIS — J06.9 UPPER RESPIRATORY TRACT INFECTION, UNSPECIFIED TYPE: ICD-10-CM

## 2024-01-04 DIAGNOSIS — J45.901 MODERATE ASTHMA WITH EXACERBATION, UNSPECIFIED WHETHER PERSISTENT: Primary | ICD-10-CM

## 2024-01-04 LAB
ALBUMIN SERPL-MCNC: 4.4 G/DL (ref 3.5–5.2)
ALBUMIN/GLOB SERPL: 1.5 G/DL
ALP SERPL-CCNC: 66 U/L (ref 39–117)
ALT SERPL W P-5'-P-CCNC: 52 U/L (ref 1–33)
ANION GAP SERPL CALCULATED.3IONS-SCNC: 11.3 MMOL/L (ref 5–15)
AST SERPL-CCNC: 43 U/L (ref 1–32)
BASOPHILS # BLD AUTO: 0.02 10*3/MM3 (ref 0–0.2)
BASOPHILS NFR BLD AUTO: 0.2 % (ref 0–1.5)
BILIRUB SERPL-MCNC: 0.4 MG/DL (ref 0–1.2)
BUN SERPL-MCNC: 20 MG/DL (ref 8–23)
BUN/CREAT SERPL: 35.1 (ref 7–25)
CALCIUM SPEC-SCNC: 9.7 MG/DL (ref 8.6–10.5)
CHLORIDE SERPL-SCNC: 100 MMOL/L (ref 98–107)
CO2 SERPL-SCNC: 23.7 MMOL/L (ref 22–29)
CREAT SERPL-MCNC: 0.57 MG/DL (ref 0.57–1)
DEPRECATED RDW RBC AUTO: 46.2 FL (ref 37–54)
EGFRCR SERPLBLD CKD-EPI 2021: 99.1 ML/MIN/1.73
EOSINOPHIL # BLD AUTO: 0.05 10*3/MM3 (ref 0–0.4)
EOSINOPHIL NFR BLD AUTO: 0.5 % (ref 0.3–6.2)
ERYTHROCYTE [DISTWIDTH] IN BLOOD BY AUTOMATED COUNT: 13.3 % (ref 12.3–15.4)
FLUAV SUBTYP SPEC NAA+PROBE: NOT DETECTED
FLUBV RNA ISLT QL NAA+PROBE: NOT DETECTED
GLOBULIN UR ELPH-MCNC: 3 GM/DL
GLUCOSE SERPL-MCNC: 267 MG/DL (ref 65–99)
HCT VFR BLD AUTO: 42.3 % (ref 34–46.6)
HGB BLD-MCNC: 13.7 G/DL (ref 12–15.9)
IMM GRANULOCYTES # BLD AUTO: 0.02 10*3/MM3 (ref 0–0.05)
IMM GRANULOCYTES NFR BLD AUTO: 0.2 % (ref 0–0.5)
LYMPHOCYTES # BLD AUTO: 1.18 10*3/MM3 (ref 0.7–3.1)
LYMPHOCYTES NFR BLD AUTO: 11.2 % (ref 19.6–45.3)
MCH RBC QN AUTO: 30.2 PG (ref 26.6–33)
MCHC RBC AUTO-ENTMCNC: 32.4 G/DL (ref 31.5–35.7)
MCV RBC AUTO: 93.2 FL (ref 79–97)
MONOCYTES # BLD AUTO: 0.34 10*3/MM3 (ref 0.1–0.9)
MONOCYTES NFR BLD AUTO: 3.2 % (ref 5–12)
NEUTROPHILS NFR BLD AUTO: 8.89 10*3/MM3 (ref 1.7–7)
NEUTROPHILS NFR BLD AUTO: 84.7 % (ref 42.7–76)
NT-PROBNP SERPL-MCNC: 106.1 PG/ML (ref 0–900)
PLATELET # BLD AUTO: 236 10*3/MM3 (ref 140–450)
PMV BLD AUTO: 10.8 FL (ref 6–12)
POTASSIUM SERPL-SCNC: 4.4 MMOL/L (ref 3.5–5.2)
PROT SERPL-MCNC: 7.4 G/DL (ref 6–8.5)
QT INTERVAL: 399 MS
QTC INTERVAL: 428 MS
RBC # BLD AUTO: 4.54 10*6/MM3 (ref 3.77–5.28)
RSV RNA NPH QL NAA+NON-PROBE: NOT DETECTED
SARS-COV-2 RNA RESP QL NAA+PROBE: NOT DETECTED
SODIUM SERPL-SCNC: 135 MMOL/L (ref 136–145)
TROPONIN T SERPL HS-MCNC: <6 NG/L
WBC NRBC COR # BLD AUTO: 10.5 10*3/MM3 (ref 3.4–10.8)

## 2024-01-04 PROCEDURE — 87634 RSV DNA/RNA AMP PROBE: CPT | Performed by: EMERGENCY MEDICINE

## 2024-01-04 PROCEDURE — 25010000002 CEFTRIAXONE PER 250 MG: Performed by: EMERGENCY MEDICINE

## 2024-01-04 PROCEDURE — 71045 X-RAY EXAM CHEST 1 VIEW: CPT

## 2024-01-04 PROCEDURE — 83880 ASSAY OF NATRIURETIC PEPTIDE: CPT | Performed by: EMERGENCY MEDICINE

## 2024-01-04 PROCEDURE — 96365 THER/PROPH/DIAG IV INF INIT: CPT

## 2024-01-04 PROCEDURE — 84484 ASSAY OF TROPONIN QUANT: CPT | Performed by: EMERGENCY MEDICINE

## 2024-01-04 PROCEDURE — 96375 TX/PRO/DX INJ NEW DRUG ADDON: CPT

## 2024-01-04 PROCEDURE — 99284 EMERGENCY DEPT VISIT MOD MDM: CPT | Performed by: EMERGENCY MEDICINE

## 2024-01-04 PROCEDURE — 36415 COLL VENOUS BLD VENIPUNCTURE: CPT

## 2024-01-04 PROCEDURE — 85025 COMPLETE CBC W/AUTO DIFF WBC: CPT | Performed by: EMERGENCY MEDICINE

## 2024-01-04 PROCEDURE — 25010000002 DEXAMETHASONE SODIUM PHOSPHATE 10 MG/ML SOLUTION: Performed by: EMERGENCY MEDICINE

## 2024-01-04 PROCEDURE — 25010000002 ONDANSETRON PER 1 MG: Performed by: EMERGENCY MEDICINE

## 2024-01-04 PROCEDURE — 80053 COMPREHEN METABOLIC PANEL: CPT | Performed by: EMERGENCY MEDICINE

## 2024-01-04 PROCEDURE — 93005 ELECTROCARDIOGRAM TRACING: CPT | Performed by: EMERGENCY MEDICINE

## 2024-01-04 PROCEDURE — 99284 EMERGENCY DEPT VISIT MOD MDM: CPT

## 2024-01-04 PROCEDURE — 87636 SARSCOV2 & INF A&B AMP PRB: CPT | Performed by: EMERGENCY MEDICINE

## 2024-01-04 RX ORDER — PREDNISONE 20 MG/1
TABLET ORAL
Qty: 12 TABLET | Refills: 0 | Status: ON HOLD | OUTPATIENT
Start: 2024-01-04

## 2024-01-04 RX ORDER — ACETAMINOPHEN 500 MG
1000 TABLET ORAL ONCE
Status: COMPLETED | OUTPATIENT
Start: 2024-01-04 | End: 2024-01-04

## 2024-01-04 RX ORDER — SODIUM CHLORIDE 0.9 % (FLUSH) 0.9 %
10 SYRINGE (ML) INJECTION AS NEEDED
Status: DISCONTINUED | OUTPATIENT
Start: 2024-01-04 | End: 2024-01-04 | Stop reason: HOSPADM

## 2024-01-04 RX ORDER — DEXTROMETHORPHAN HYDROBROMIDE AND PROMETHAZINE HYDROCHLORIDE 15; 6.25 MG/5ML; MG/5ML
5 SYRUP ORAL 4 TIMES DAILY PRN
Qty: 120 ML | Refills: 0 | Status: ON HOLD | OUTPATIENT
Start: 2024-01-04 | End: 2024-01-10

## 2024-01-04 RX ORDER — CEFDINIR 300 MG/1
300 CAPSULE ORAL 2 TIMES DAILY
Qty: 12 CAPSULE | Refills: 0 | Status: ON HOLD | OUTPATIENT
Start: 2024-01-04 | End: 2024-01-10

## 2024-01-04 RX ORDER — DEXAMETHASONE SODIUM PHOSPHATE 10 MG/ML
10 INJECTION, SOLUTION INTRAMUSCULAR; INTRAVENOUS ONCE
Status: COMPLETED | OUTPATIENT
Start: 2024-01-04 | End: 2024-01-04

## 2024-01-04 RX ORDER — IPRATROPIUM BROMIDE AND ALBUTEROL SULFATE 2.5; .5 MG/3ML; MG/3ML
3 SOLUTION RESPIRATORY (INHALATION) ONCE
Status: COMPLETED | OUTPATIENT
Start: 2024-01-04 | End: 2024-01-04

## 2024-01-04 RX ORDER — ALBUTEROL SULFATE 2.5 MG/3ML
5 SOLUTION RESPIRATORY (INHALATION) ONCE
Status: COMPLETED | OUTPATIENT
Start: 2024-01-04 | End: 2024-01-04

## 2024-01-04 RX ORDER — ONDANSETRON 2 MG/ML
4 INJECTION INTRAMUSCULAR; INTRAVENOUS ONCE
Status: COMPLETED | OUTPATIENT
Start: 2024-01-04 | End: 2024-01-04

## 2024-01-04 RX ADMIN — IPRATROPIUM BROMIDE AND ALBUTEROL SULFATE 3 ML: 2.5; .5 SOLUTION RESPIRATORY (INHALATION) at 12:19

## 2024-01-04 RX ADMIN — ALBUTEROL SULFATE 5 MG: 2.5 SOLUTION RESPIRATORY (INHALATION) at 12:19

## 2024-01-04 RX ADMIN — CEFTRIAXONE SODIUM 1000 MG: 1 INJECTION, POWDER, FOR SOLUTION INTRAMUSCULAR; INTRAVENOUS at 14:16

## 2024-01-04 RX ADMIN — DEXAMETHASONE SODIUM PHOSPHATE 10 MG: 10 INJECTION, SOLUTION INTRAMUSCULAR; INTRAVENOUS at 12:26

## 2024-01-04 RX ADMIN — ACETAMINOPHEN 1000 MG: 500 TABLET ORAL at 12:56

## 2024-01-04 RX ADMIN — ONDANSETRON 4 MG: 2 INJECTION INTRAMUSCULAR; INTRAVENOUS at 12:56

## 2024-01-04 NOTE — DISCHARGE INSTRUCTIONS
Today your COVID RSV and influenza are negative.  On the chest x-ray we do not identify any evidence of a pneumonia.    We did give you an extended respiratory treatment as well as an IV dose of dexamethasone.  This is a long-acting steroid with a 96-hour half-life.  Likewise we did give you a dose of IV Rocephin, a third-generation cephalosporin antibiotic.    I sent in 6 more days of cefdinir, also a third-generation cephalosporin.  You may begin this tomorrow twice daily for 6 days to complete a 7-day course of new antibiotics ( including today's IV dose).     I also did send in a taper for prednisone to start tomorrow.  You may hold your current dosing of prednisone and azithromycin at this time    Return any time for repeat exam should you have increased shortness of breath or other difficulties.    Please read all of the instructions in this handout.  If you receive prescriptions please fill them and take them as directed.  Please call your primary care physician for follow-up appointment in the next 5 to 7 days.  If you do not have a physician you may call the [facility] referral line at [phone].    You may return to the emergency department at any time for any concerns such as worsening symptoms.  If you received a work or school note it will be printed at the back of this packet.

## 2024-01-04 NOTE — ASSESSMENT & PLAN NOTE
Patient is experiencing shortness of breath, chest tightness and cough, mostly at nighttime  She recently had upper respiratory tract infection, was given a Z-Aubrey and prednisone without much improvement  Vitally, she is saturating at 91% which is below normal.  Lung exam revealed reduced air entry and breath sounds bilaterally with mild wheezes.  Will send over to emergency department downstairs for breathing treatment with nebulization and IV steroids.
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
## 2024-01-07 ENCOUNTER — APPOINTMENT (OUTPATIENT)
Dept: GENERAL RADIOLOGY | Facility: HOSPITAL | Age: 69
End: 2024-01-07
Payer: MEDICARE

## 2024-01-07 ENCOUNTER — APPOINTMENT (OUTPATIENT)
Dept: CT IMAGING | Facility: HOSPITAL | Age: 69
End: 2024-01-07
Payer: MEDICARE

## 2024-01-07 ENCOUNTER — HOSPITAL ENCOUNTER (INPATIENT)
Facility: HOSPITAL | Age: 69
LOS: 7 days | Discharge: HOME | End: 2024-01-15
Attending: EMERGENCY MEDICINE | Admitting: STUDENT IN AN ORGANIZED HEALTH CARE EDUCATION/TRAINING PROGRAM
Payer: MEDICARE

## 2024-01-07 DIAGNOSIS — J45.40 MODERATE PERSISTENT ASTHMA WITHOUT COMPLICATION: ICD-10-CM

## 2024-01-07 DIAGNOSIS — J06.9 VIRAL UPPER RESPIRATORY INFECTION: Primary | ICD-10-CM

## 2024-01-07 DIAGNOSIS — R05.9 COUGH, UNSPECIFIED TYPE: ICD-10-CM

## 2024-01-07 PROBLEM — J45.901 ASTHMA EXACERBATION: Status: ACTIVE | Noted: 2024-01-07

## 2024-01-07 LAB
ANION GAP SERPL CALCULATED.3IONS-SCNC: 11.5 MMOL/L (ref 5–15)
ATMOSPHERIC PRESS: 736.09 MMHG
B PARAPERT DNA SPEC QL NAA+PROBE: NOT DETECTED
B PERT DNA SPEC QL NAA+PROBE: NOT DETECTED
BASE EXCESS BLDV CALC-SCNC: 0.7 MMOL/L (ref -2–2)
BASOPHILS # BLD AUTO: 0.03 10*3/MM3 (ref 0–0.2)
BASOPHILS NFR BLD AUTO: 0.3 % (ref 0–1.5)
BUN SERPL-MCNC: 23 MG/DL (ref 8–23)
BUN/CREAT SERPL: 30.3 (ref 7–25)
C PNEUM DNA NPH QL NAA+NON-PROBE: NOT DETECTED
CALCIUM SPEC-SCNC: 9.7 MG/DL (ref 8.6–10.5)
CHLORIDE SERPL-SCNC: 98 MMOL/L (ref 98–107)
CO2 BLDA-SCNC: 25.3 MMOL/L (ref 23–27)
CO2 SERPL-SCNC: 27.5 MMOL/L (ref 22–29)
CREAT SERPL-MCNC: 0.76 MG/DL (ref 0.57–1)
DEPRECATED RDW RBC AUTO: 44.8 FL (ref 37–54)
EGFRCR SERPLBLD CKD-EPI 2021: 85.5 ML/MIN/1.73
EOSINOPHIL # BLD AUTO: 0.2 10*3/MM3 (ref 0–0.4)
EOSINOPHIL NFR BLD AUTO: 2.3 % (ref 0.3–6.2)
ERYTHROCYTE [DISTWIDTH] IN BLOOD BY AUTOMATED COUNT: 13 % (ref 12.3–15.4)
FLUAV SUBTYP SPEC NAA+PROBE: NOT DETECTED
FLUBV RNA ISLT QL NAA+PROBE: NOT DETECTED
GLUCOSE BLDC GLUCOMTR-MCNC: 452 MG/DL (ref 70–130)
GLUCOSE BLDC GLUCOMTR-MCNC: 478 MG/DL (ref 70–130)
GLUCOSE SERPL-MCNC: 131 MG/DL (ref 65–99)
HADV DNA SPEC NAA+PROBE: NOT DETECTED
HCO3 BLDV-SCNC: 24.2 MMOL/L (ref 22–26)
HCOV 229E RNA SPEC QL NAA+PROBE: NOT DETECTED
HCOV HKU1 RNA SPEC QL NAA+PROBE: NOT DETECTED
HCOV NL63 RNA SPEC QL NAA+PROBE: NOT DETECTED
HCOV OC43 RNA SPEC QL NAA+PROBE: NOT DETECTED
HCT VFR BLD AUTO: 43.7 % (ref 34–46.6)
HGB BLD-MCNC: 14.3 G/DL (ref 12–15.9)
HMPV RNA NPH QL NAA+NON-PROBE: NOT DETECTED
HPIV1 RNA ISLT QL NAA+PROBE: NOT DETECTED
HPIV2 RNA SPEC QL NAA+PROBE: NOT DETECTED
HPIV3 RNA NPH QL NAA+PROBE: NOT DETECTED
HPIV4 P GENE NPH QL NAA+PROBE: NOT DETECTED
IMM GRANULOCYTES # BLD AUTO: 0.02 10*3/MM3 (ref 0–0.05)
IMM GRANULOCYTES NFR BLD AUTO: 0.2 % (ref 0–0.5)
LYMPHOCYTES # BLD AUTO: 3.45 10*3/MM3 (ref 0.7–3.1)
LYMPHOCYTES NFR BLD AUTO: 39 % (ref 19.6–45.3)
M PNEUMO IGG SER IA-ACNC: NOT DETECTED
MCH RBC QN AUTO: 30.2 PG (ref 26.6–33)
MCHC RBC AUTO-ENTMCNC: 32.7 G/DL (ref 31.5–35.7)
MCV RBC AUTO: 92.2 FL (ref 79–97)
MODALITY: ABNORMAL
MONOCYTES # BLD AUTO: 1.05 10*3/MM3 (ref 0.1–0.9)
MONOCYTES NFR BLD AUTO: 11.9 % (ref 5–12)
NEUTROPHILS NFR BLD AUTO: 4.09 10*3/MM3 (ref 1.7–7)
NEUTROPHILS NFR BLD AUTO: 46.3 % (ref 42.7–76)
PCO2 BLDV: 34.6 MM HG (ref 41–51)
PH BLDV: 7.45 PH UNITS (ref 7.31–7.41)
PLATELET # BLD AUTO: 271 10*3/MM3 (ref 140–450)
PMV BLD AUTO: 10.4 FL (ref 6–12)
PO2 BLDV: 45.7 MM HG (ref 35–42)
POTASSIUM SERPL-SCNC: 3.8 MMOL/L (ref 3.5–5.2)
QT INTERVAL: 396 MS
QTC INTERVAL: 399 MS
RBC # BLD AUTO: 4.74 10*6/MM3 (ref 3.77–5.28)
RHINOVIRUS RNA SPEC NAA+PROBE: NOT DETECTED
RSV RNA NPH QL NAA+NON-PROBE: DETECTED
SAO2 % BLDCOV: 83.8 % (ref 45–75)
SARS-COV-2 RNA NPH QL NAA+NON-PROBE: NOT DETECTED
SODIUM SERPL-SCNC: 137 MMOL/L (ref 136–145)
TROPONIN T SERPL HS-MCNC: <6 NG/L
WBC NRBC COR # BLD AUTO: 8.84 10*3/MM3 (ref 3.4–10.8)

## 2024-01-07 PROCEDURE — G0378 HOSPITAL OBSERVATION PER HR: HCPCS

## 2024-01-07 PROCEDURE — 82948 REAGENT STRIP/BLOOD GLUCOSE: CPT

## 2024-01-07 PROCEDURE — 93010 ELECTROCARDIOGRAM REPORT: CPT | Performed by: INTERNAL MEDICINE

## 2024-01-07 PROCEDURE — 93005 ELECTROCARDIOGRAM TRACING: CPT | Performed by: EMERGENCY MEDICINE

## 2024-01-07 PROCEDURE — 80048 BASIC METABOLIC PNL TOTAL CA: CPT | Performed by: EMERGENCY MEDICINE

## 2024-01-07 PROCEDURE — 25010000002 DEXAMETHASONE SODIUM PHOSPHATE 10 MG/ML SOLUTION: Performed by: EMERGENCY MEDICINE

## 2024-01-07 PROCEDURE — 94799 UNLISTED PULMONARY SVC/PX: CPT

## 2024-01-07 PROCEDURE — 71275 CT ANGIOGRAPHY CHEST: CPT

## 2024-01-07 PROCEDURE — 0202U NFCT DS 22 TRGT SARS-COV-2: CPT | Performed by: PHYSICIAN ASSISTANT

## 2024-01-07 PROCEDURE — 63710000001 INSULIN LISPRO (HUMAN) PER 5 UNITS: Performed by: PHYSICIAN ASSISTANT

## 2024-01-07 PROCEDURE — 82803 BLOOD GASES ANY COMBINATION: CPT

## 2024-01-07 PROCEDURE — 25010000002 METHYLPREDNISOLONE PER 40 MG: Performed by: PHYSICIAN ASSISTANT

## 2024-01-07 PROCEDURE — 25010000002 ONDANSETRON PER 1 MG: Performed by: PHYSICIAN ASSISTANT

## 2024-01-07 PROCEDURE — 25510000001 IOPAMIDOL PER 1 ML: Performed by: EMERGENCY MEDICINE

## 2024-01-07 PROCEDURE — 84484 ASSAY OF TROPONIN QUANT: CPT | Performed by: EMERGENCY MEDICINE

## 2024-01-07 PROCEDURE — 25010000002 DIPHENHYDRAMINE PER 50 MG: Performed by: PHYSICIAN ASSISTANT

## 2024-01-07 PROCEDURE — 94640 AIRWAY INHALATION TREATMENT: CPT

## 2024-01-07 PROCEDURE — 71045 X-RAY EXAM CHEST 1 VIEW: CPT

## 2024-01-07 PROCEDURE — 99285 EMERGENCY DEPT VISIT HI MDM: CPT

## 2024-01-07 PROCEDURE — 25010000002 KETOROLAC TROMETHAMINE PER 15 MG: Performed by: PHYSICIAN ASSISTANT

## 2024-01-07 PROCEDURE — 85025 COMPLETE CBC W/AUTO DIFF WBC: CPT | Performed by: EMERGENCY MEDICINE

## 2024-01-07 PROCEDURE — 99284 EMERGENCY DEPT VISIT MOD MDM: CPT | Performed by: EMERGENCY MEDICINE

## 2024-01-07 PROCEDURE — 36415 COLL VENOUS BLD VENIPUNCTURE: CPT

## 2024-01-07 RX ORDER — PREGABALIN 100 MG/1
100 CAPSULE ORAL 3 TIMES DAILY
Status: DISCONTINUED | OUTPATIENT
Start: 2024-01-07 | End: 2024-01-15 | Stop reason: HOSPADM

## 2024-01-07 RX ORDER — GUAIFENESIN AND DEXTROMETHORPHAN HYDROBROMIDE 600; 30 MG/1; MG/1
1 TABLET, EXTENDED RELEASE ORAL 2 TIMES DAILY
Status: DISCONTINUED | OUTPATIENT
Start: 2024-01-07 | End: 2024-01-07

## 2024-01-07 RX ORDER — DEXAMETHASONE SODIUM PHOSPHATE 10 MG/ML
10 INJECTION, SOLUTION INTRAMUSCULAR; INTRAVENOUS ONCE
Status: COMPLETED | OUTPATIENT
Start: 2024-01-07 | End: 2024-01-07

## 2024-01-07 RX ORDER — LISINOPRIL 10 MG/1
10 TABLET ORAL 2 TIMES DAILY
Status: DISCONTINUED | OUTPATIENT
Start: 2024-01-07 | End: 2024-01-14

## 2024-01-07 RX ORDER — KETOROLAC TROMETHAMINE 15 MG/ML
15 INJECTION, SOLUTION INTRAMUSCULAR; INTRAVENOUS ONCE
Status: COMPLETED | OUTPATIENT
Start: 2024-01-07 | End: 2024-01-07

## 2024-01-07 RX ORDER — DIPHENHYDRAMINE HYDROCHLORIDE 50 MG/ML
50 INJECTION INTRAMUSCULAR; INTRAVENOUS
Status: COMPLETED | OUTPATIENT
Start: 2024-01-07 | End: 2024-01-07

## 2024-01-07 RX ORDER — ASPIRIN 81 MG/1
81 TABLET ORAL DAILY
Status: DISCONTINUED | OUTPATIENT
Start: 2024-01-08 | End: 2024-01-15 | Stop reason: HOSPADM

## 2024-01-07 RX ORDER — HYDROCHLOROTHIAZIDE 25 MG/1
25 TABLET ORAL DAILY
Status: DISCONTINUED | OUTPATIENT
Start: 2024-01-08 | End: 2024-01-14

## 2024-01-07 RX ORDER — SODIUM CHLORIDE 9 MG/ML
40 INJECTION, SOLUTION INTRAVENOUS AS NEEDED
Status: DISCONTINUED | OUTPATIENT
Start: 2024-01-07 | End: 2024-01-15 | Stop reason: HOSPADM

## 2024-01-07 RX ORDER — NITROGLYCERIN 0.4 MG/1
0.4 TABLET SUBLINGUAL
Status: DISCONTINUED | OUTPATIENT
Start: 2024-01-07 | End: 2024-01-15 | Stop reason: HOSPADM

## 2024-01-07 RX ORDER — ALBUTEROL SULFATE 2.5 MG/3ML
2.5 SOLUTION RESPIRATORY (INHALATION) ONCE
Status: COMPLETED | OUTPATIENT
Start: 2024-01-07 | End: 2024-01-07

## 2024-01-07 RX ORDER — ONDANSETRON 4 MG/1
4 TABLET, ORALLY DISINTEGRATING ORAL EVERY 6 HOURS PRN
Status: DISCONTINUED | OUTPATIENT
Start: 2024-01-07 | End: 2024-01-15 | Stop reason: HOSPADM

## 2024-01-07 RX ORDER — ATORVASTATIN CALCIUM 20 MG/1
20 TABLET, FILM COATED ORAL NIGHTLY
Status: DISCONTINUED | OUTPATIENT
Start: 2024-01-09 | End: 2024-01-15 | Stop reason: HOSPADM

## 2024-01-07 RX ORDER — FLUTICASONE PROPIONATE 50 MCG
2 SPRAY, SUSPENSION (ML) NASAL DAILY
Status: DISCONTINUED | OUTPATIENT
Start: 2024-01-07 | End: 2024-01-15 | Stop reason: HOSPADM

## 2024-01-07 RX ORDER — FAMOTIDINE 20 MG/1
20 TABLET, FILM COATED ORAL
Status: DISCONTINUED | OUTPATIENT
Start: 2024-01-07 | End: 2024-01-15 | Stop reason: HOSPADM

## 2024-01-07 RX ORDER — ACETAMINOPHEN 325 MG/1
650 TABLET ORAL EVERY 4 HOURS PRN
Status: DISCONTINUED | OUTPATIENT
Start: 2024-01-07 | End: 2024-01-15 | Stop reason: HOSPADM

## 2024-01-07 RX ORDER — MONTELUKAST SODIUM 10 MG/1
10 TABLET ORAL NIGHTLY
Status: DISCONTINUED | OUTPATIENT
Start: 2024-01-07 | End: 2024-01-15 | Stop reason: HOSPADM

## 2024-01-07 RX ORDER — SODIUM CHLORIDE 0.9 % (FLUSH) 0.9 %
10 SYRINGE (ML) INJECTION EVERY 12 HOURS SCHEDULED
Status: DISCONTINUED | OUTPATIENT
Start: 2024-01-07 | End: 2024-01-15 | Stop reason: HOSPADM

## 2024-01-07 RX ORDER — SODIUM CHLORIDE 0.9 % (FLUSH) 0.9 %
10 SYRINGE (ML) INJECTION AS NEEDED
Status: DISCONTINUED | OUTPATIENT
Start: 2024-01-07 | End: 2024-01-15 | Stop reason: HOSPADM

## 2024-01-07 RX ORDER — PANTOPRAZOLE SODIUM 40 MG/1
40 TABLET, DELAYED RELEASE ORAL
Status: DISCONTINUED | OUTPATIENT
Start: 2024-01-08 | End: 2024-01-15 | Stop reason: HOSPADM

## 2024-01-07 RX ORDER — BENZONATATE 100 MG/1
100 CAPSULE ORAL 3 TIMES DAILY PRN
Status: DISCONTINUED | OUTPATIENT
Start: 2024-01-07 | End: 2024-01-15 | Stop reason: HOSPADM

## 2024-01-07 RX ORDER — PROPRANOLOL HYDROCHLORIDE 40 MG/1
40 TABLET ORAL 2 TIMES DAILY
Status: DISCONTINUED | OUTPATIENT
Start: 2024-01-07 | End: 2024-01-15 | Stop reason: HOSPADM

## 2024-01-07 RX ORDER — GUAIFENESIN/DEXTROMETHORPHAN 100-10MG/5
5 SYRUP ORAL ONCE
Status: COMPLETED | OUTPATIENT
Start: 2024-01-07 | End: 2024-01-07

## 2024-01-07 RX ORDER — NICOTINE POLACRILEX 4 MG
15 LOZENGE BUCCAL
Status: DISCONTINUED | OUTPATIENT
Start: 2024-01-07 | End: 2024-01-15 | Stop reason: HOSPADM

## 2024-01-07 RX ORDER — METHYLPREDNISOLONE SODIUM SUCCINATE 40 MG/ML
40 INJECTION, POWDER, LYOPHILIZED, FOR SOLUTION INTRAMUSCULAR; INTRAVENOUS EVERY 4 HOURS
Status: ACTIVE | OUTPATIENT
Start: 2024-01-07 | End: 2024-01-08

## 2024-01-07 RX ORDER — CODEINE PHOSPHATE/GUAIFENESIN 10-100MG/5
5 LIQUID (ML) ORAL EVERY 4 HOURS PRN
Status: DISCONTINUED | OUTPATIENT
Start: 2024-01-07 | End: 2024-01-08

## 2024-01-07 RX ORDER — ALBUTEROL SULFATE 2.5 MG/3ML
2.5 SOLUTION RESPIRATORY (INHALATION) EVERY 6 HOURS PRN
Status: DISCONTINUED | OUTPATIENT
Start: 2024-01-07 | End: 2024-01-08

## 2024-01-07 RX ORDER — ONDANSETRON 2 MG/ML
4 INJECTION INTRAMUSCULAR; INTRAVENOUS EVERY 6 HOURS PRN
Status: DISCONTINUED | OUTPATIENT
Start: 2024-01-07 | End: 2024-01-15 | Stop reason: HOSPADM

## 2024-01-07 RX ORDER — DEXTROSE MONOHYDRATE 25 G/50ML
25 INJECTION, SOLUTION INTRAVENOUS
Status: DISCONTINUED | OUTPATIENT
Start: 2024-01-07 | End: 2024-01-15 | Stop reason: HOSPADM

## 2024-01-07 RX ORDER — BUDESONIDE AND FORMOTEROL FUMARATE DIHYDRATE 160; 4.5 UG/1; UG/1
2 AEROSOL RESPIRATORY (INHALATION)
Status: DISCONTINUED | OUTPATIENT
Start: 2024-01-07 | End: 2024-01-15 | Stop reason: HOSPADM

## 2024-01-07 RX ORDER — CYCLOSPORINE 0.5 MG/ML
1 EMULSION OPHTHALMIC 2 TIMES DAILY
Status: DISCONTINUED | OUTPATIENT
Start: 2024-01-07 | End: 2024-01-15 | Stop reason: HOSPADM

## 2024-01-07 RX ORDER — INSULIN LISPRO 100 [IU]/ML
2-9 INJECTION, SOLUTION INTRAVENOUS; SUBCUTANEOUS
Status: DISCONTINUED | OUTPATIENT
Start: 2024-01-07 | End: 2024-01-08

## 2024-01-07 RX ORDER — IPRATROPIUM BROMIDE AND ALBUTEROL SULFATE 2.5; .5 MG/3ML; MG/3ML
3 SOLUTION RESPIRATORY (INHALATION)
Status: DISCONTINUED | OUTPATIENT
Start: 2024-01-07 | End: 2024-01-08

## 2024-01-07 RX ORDER — IBUPROFEN 600 MG/1
1 TABLET ORAL
Status: DISCONTINUED | OUTPATIENT
Start: 2024-01-07 | End: 2024-01-15 | Stop reason: HOSPADM

## 2024-01-07 RX ORDER — TRAZODONE HYDROCHLORIDE 50 MG/1
50 TABLET ORAL NIGHTLY
Status: DISCONTINUED | OUTPATIENT
Start: 2024-01-07 | End: 2024-01-15 | Stop reason: HOSPADM

## 2024-01-07 RX ORDER — ZOLPIDEM TARTRATE 5 MG/1
5 TABLET ORAL NIGHTLY PRN
Status: DISCONTINUED | OUTPATIENT
Start: 2024-01-07 | End: 2024-01-15 | Stop reason: HOSPADM

## 2024-01-07 RX ORDER — IPRATROPIUM BROMIDE AND ALBUTEROL SULFATE 2.5; .5 MG/3ML; MG/3ML
3 SOLUTION RESPIRATORY (INHALATION) ONCE
Status: COMPLETED | OUTPATIENT
Start: 2024-01-07 | End: 2024-01-07

## 2024-01-07 RX ORDER — IPRATROPIUM BROMIDE 42 UG/1
2 SPRAY, METERED NASAL 2 TIMES DAILY
Status: DISCONTINUED | OUTPATIENT
Start: 2024-01-07 | End: 2024-01-15 | Stop reason: HOSPADM

## 2024-01-07 RX ADMIN — LISINOPRIL 10 MG: 10 TABLET ORAL at 20:14

## 2024-01-07 RX ADMIN — BUDESONIDE AND FORMOTEROL FUMARATE DIHYDRATE 2 PUFF: 160; 4.5 AEROSOL RESPIRATORY (INHALATION) at 14:51

## 2024-01-07 RX ADMIN — FLUTICASONE PROPIONATE 2 SPRAY: 50 SPRAY, METERED NASAL at 15:08

## 2024-01-07 RX ADMIN — METHYLPREDNISOLONE SODIUM SUCCINATE 40 MG: 40 INJECTION INTRAMUSCULAR; INTRAVENOUS at 20:14

## 2024-01-07 RX ADMIN — IOPAMIDOL 95 ML: 755 INJECTION, SOLUTION INTRAVENOUS at 13:07

## 2024-01-07 RX ADMIN — METHYLPREDNISOLONE SODIUM SUCCINATE 40 MG: 40 INJECTION INTRAMUSCULAR; INTRAVENOUS at 15:07

## 2024-01-07 RX ADMIN — INSULIN LISPRO 9 UNITS: 100 INJECTION, SOLUTION INTRAVENOUS; SUBCUTANEOUS at 20:14

## 2024-01-07 RX ADMIN — ACETAMINOPHEN 650 MG: 325 TABLET, FILM COATED ORAL at 09:59

## 2024-01-07 RX ADMIN — BUDESONIDE AND FORMOTEROL FUMARATE DIHYDRATE 2 PUFF: 160; 4.5 AEROSOL RESPIRATORY (INHALATION) at 19:42

## 2024-01-07 RX ADMIN — ZOLPIDEM TARTRATE 5 MG: 5 TABLET ORAL at 20:15

## 2024-01-07 RX ADMIN — ONDANSETRON 4 MG: 2 INJECTION INTRAMUSCULAR; INTRAVENOUS at 10:00

## 2024-01-07 RX ADMIN — KETOROLAC TROMETHAMINE 15 MG: 15 INJECTION, SOLUTION INTRAMUSCULAR; INTRAVENOUS at 18:40

## 2024-01-07 RX ADMIN — IPRATROPIUM BROMIDE AND ALBUTEROL SULFATE 3 ML: .5; 3 SOLUTION RESPIRATORY (INHALATION) at 10:48

## 2024-01-07 RX ADMIN — DEXAMETHASONE SODIUM PHOSPHATE 10 MG: 10 INJECTION, SOLUTION INTRAMUSCULAR; INTRAVENOUS at 04:08

## 2024-01-07 RX ADMIN — GUAIFENESIN AND DEXTROMETHORPHAN HYDROBROMIDE 1 TABLET: 600; 30 TABLET, EXTENDED RELEASE ORAL at 15:05

## 2024-01-07 RX ADMIN — PREGABALIN 100 MG: 100 CAPSULE ORAL at 15:13

## 2024-01-07 RX ADMIN — GUAIFENESIN AND CODEINE PHOSPHATE 5 ML: 100; 10 SOLUTION ORAL at 18:40

## 2024-01-07 RX ADMIN — ALBUTEROL SULFATE 2.5 MG: 2.5 SOLUTION RESPIRATORY (INHALATION) at 06:59

## 2024-01-07 RX ADMIN — FAMOTIDINE 20 MG: 20 TABLET, FILM COATED ORAL at 16:59

## 2024-01-07 RX ADMIN — ONDANSETRON 4 MG: 2 INJECTION INTRAMUSCULAR; INTRAVENOUS at 20:14

## 2024-01-07 RX ADMIN — METHYLPREDNISOLONE SODIUM SUCCINATE 40 MG: 40 INJECTION INTRAMUSCULAR; INTRAVENOUS at 12:11

## 2024-01-07 RX ADMIN — PREGABALIN 100 MG: 100 CAPSULE ORAL at 20:14

## 2024-01-07 RX ADMIN — IPRATROPIUM BROMIDE AND ALBUTEROL SULFATE 3 ML: 2.5; .5 SOLUTION RESPIRATORY (INHALATION) at 04:16

## 2024-01-07 RX ADMIN — METHYLPREDNISOLONE SODIUM SUCCINATE 40 MG: 40 INJECTION INTRAMUSCULAR; INTRAVENOUS at 23:40

## 2024-01-07 RX ADMIN — IPRATROPIUM BROMIDE AND ALBUTEROL SULFATE 3 ML: 2.5; .5 SOLUTION RESPIRATORY (INHALATION) at 03:32

## 2024-01-07 RX ADMIN — CYCLOSPORINE 1 DROP: 0.5 EMULSION OPHTHALMIC at 20:53

## 2024-01-07 RX ADMIN — MONTELUKAST SODIUM 10 MG: 10 TABLET, FILM COATED ORAL at 20:14

## 2024-01-07 RX ADMIN — IPRATROPIUM BROMIDE 2 SPRAY: 42 SPRAY, METERED NASAL at 20:25

## 2024-01-07 RX ADMIN — DIPHENHYDRAMINE HYDROCHLORIDE 50 MG: 50 INJECTION, SOLUTION INTRAMUSCULAR; INTRAVENOUS at 12:11

## 2024-01-07 RX ADMIN — IPRATROPIUM BROMIDE AND ALBUTEROL SULFATE 3 ML: .5; 3 SOLUTION RESPIRATORY (INHALATION) at 19:32

## 2024-01-07 RX ADMIN — KETOROLAC TROMETHAMINE 15 MG: 15 INJECTION, SOLUTION INTRAMUSCULAR; INTRAVENOUS at 12:42

## 2024-01-07 RX ADMIN — IPRATROPIUM BROMIDE 2 SPRAY: 42 SPRAY, METERED NASAL at 15:09

## 2024-01-07 RX ADMIN — Medication 10 ML: at 10:01

## 2024-01-07 RX ADMIN — INSULIN LISPRO 9 UNITS: 100 INJECTION, SOLUTION INTRAVENOUS; SUBCUTANEOUS at 16:59

## 2024-01-07 RX ADMIN — GUAIFENESIN AND DEXTROMETHORPHAN 5 ML: 100; 10 SYRUP ORAL at 04:16

## 2024-01-07 NOTE — ED NOTES
[facility]      3/13/2020      To Whom It May Concern,      This is to certify that Coni Manzo is seen here today. Excuse from WORK on 03/12/2020 and  3/13/2020.      Thank you.      Sincerely,        Arnold Steiner MD       Hold until 4443     Stefan Foster MD  01/07/24 1287
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
## 2024-01-07 NOTE — FSED PROVIDER NOTE
"Subjective   History of Present Illness  68-year-old female presents complaining of URI symptoms and SOA.  She has been sick for over a week.  She has been seen at [facility], her PCP, and here at the [facility].  Her viral swabs is all been negative and her chest x-ray clear.  She has been taking steroids, antibiotics, and breathing treatments at home but states she does not feel any better.  Her cough is persistent and has been keeping her up at night.  She feels fatigued with shortness of breath and tightness in her chest.  No fevers.  Patient reports a history of asthma but denies other cardiopulmonary disease.  She was exposed to RSV late last year.    History provided by:  Patient   used: No        Review of Systems   Constitutional: Negative.  Negative for fever.   HENT:  Positive for congestion, rhinorrhea and voice change.    Respiratory:  Positive for cough, chest tightness, shortness of breath and wheezing.    Cardiovascular: Negative.  Negative for chest pain.   Gastrointestinal: Negative.  Negative for abdominal pain and vomiting.   Musculoskeletal:  Positive for myalgias.   All other systems reviewed and are negative.      Past Medical History:   Diagnosis Date    Abdominal pain, right lower quadrant     Anemia     Asthma     Brain aneurysm     2003 and 2017    Cancer 07/30/2014    Basal Cell Carcinoma Left Arm,SKIN CANCER    COVID-19 04/2021    Cyst of left kidney     Diabetes mellitus     Tyoe 2 diabetic    Diverticulosis     Fatty liver     GERD (gastroesophageal reflux disease)     History of kidney stones 06/2011    History of surgery for cerebral aneurysm     Clipping of cerebral Aneurysm    Hx of migraines     Hyperlipidemia     Hypertension     Insomnia     Neck pain     Peptic ulceration     Pneumonia     PONV (postoperative nausea and vomiting)     Stroke     \"years ago\" TIA no after effects       Allergies   Allergen Reactions    Contrast Dye (Echo Or Unknown Ct/Mr) " Shortness Of Breath    Iodinated Contrast Media Shortness Of Breath    Codeine Nausea Only    Methylprednisolone Anxiety    Other Other (See Comments)     Bamlanivimab infusion- muscle aches, joint pain, nausea    Ciprofloxacin Hcl Rash       Past Surgical History:   Procedure Laterality Date    APPENDECTOMY      Emergency at time of 2nd     BASAL CELL CARCINOMA EXCISION Left 2014    Excision basal cell carcinoma, left arm (1.1 cm) with frozen section control and layered wound closure ( 3.1 cm), Dr. Isael Andrade, [facility]    BRAIN SURGERY  2004    Ruptured aneurysm, clipped/Dr. Sims    CEREBRAL ANEURYSM REPAIR      clipping of cerebral aneurysm    CEREBRAL ANGIOGRAM N/A 2017    Procedure: CEREBRAL ANGIOGRAM ;  Surgeon: Orlando Bella MD;  Location: [facility] OR ;  Service:     CEREBRAL ANGIOGRAM N/A 10/11/2017    Procedure: CEREBRAL ANGIOGRAM;  Surgeon: Orlando Bella MD;  Location: [facility] OR ;  Service:      SECTION  , ,     CHOLECYSTECTOMY      COLON RESECTION WITH COLOSTOMY Right     COLONOSCOPY  2009    COLONOSCOPY N/A 2020    Procedure: COLONOSCOPY to terminal ileum;  Surgeon: Luiza Norris MD;  Location: [facility] ENDOSCOPY;  Service: Gastroenterology;  Laterality: N/A;  PREOP/ SCREENING  POSTOP/ DIVERTICULOSIS. POOR PREP    COLONOSCOPY N/A 2023    Procedure: COLONOSCOPY TO CECUM WITH COLD BX POLYPECTOMIES AND COLD SNARE POLYPECTOMY;  Surgeon: Luiza Norris MD;  Location: [facility] ENDOSCOPY;  Service: Gastroenterology;  Laterality: N/A;  PRE OP - SCREENING  POST OP - COLON POLYPS, DIVERTICULOSIS, HEMORRHOIDS    EMBOLIZATION CEREBRAL N/A 2017    Procedure: Cerebral angiography and embolization of cerebral aneurysm with Pipeline Embolization Device;  Surgeon: Orlando Bella MD;  Location: [facility] OR ;  Service:     EMBOLIZATION CEREBRAL N/A 10/31/2018    Procedure: Cerebral angiogram with embolization of  cerebral aneurysm;  Surgeon: Orlando Bella MD;  Location: [facility] OR ;  Service: Neurosurgery    ILEOSTOMY REVISION      INCISIONAL HERNIA REPAIR      Patient suffered some nerve entrapment secondary to the attack, some of which were removed during a secondary operation.    INGUINAL HERNIA REPAIR      LARYNX SURGERY      OSTOMY TAKE DOWN      TUBAL ABDOMINAL LIGATION      URETERAL STENT INSERTION  2011    Due to kidney stones       Family History   Problem Relation Age of Onset    Skin cancer Mother     Dementia Mother     KALYAN disease Mother     Heart disease Father     Heart attack Father     Hypertension Father     Aneurysm Son         cerebral    Nephrolithiasis Son     Suicide Attempts Brother     Malig Hyperthermia Neg Hx        Social History     Socioeconomic History    Marital status:      Spouse name: Roe    Number of children: 3    Years of education: College   Tobacco Use    Smoking status: Former     Years: 1     Types: Cigarettes     Quit date:      Years since quittin.0     Passive exposure: Past    Smokeless tobacco: Never   Vaping Use    Vaping Use: Never used   Substance and Sexual Activity    Alcohol use: Not Currently    Drug use: No    Sexual activity: Defer           Objective   Physical Exam  Vitals and nursing note reviewed.   Constitutional:       General: She is not in acute distress.     Appearance: She is not diaphoretic.   HENT:      Head: Normocephalic and atraumatic.      Right Ear: External ear normal.      Left Ear: External ear normal.      Nose: Nose normal.   Eyes:      Pupils: Pupils are equal, round, and reactive to light.   Cardiovascular:      Rate and Rhythm: Normal rate and regular rhythm.      Heart sounds: Normal heart sounds.   Pulmonary:      Effort: Pulmonary effort is normal. No respiratory distress.      Breath sounds: Decreased breath sounds and wheezing present.   Abdominal:      Palpations: Abdomen is soft.      Tenderness:  There is no abdominal tenderness.   Musculoskeletal:         General: No swelling or tenderness. Normal range of motion.      Cervical back: Normal range of motion and neck supple.   Skin:     General: Skin is warm and dry.      Findings: No rash.   Neurological:      General: No focal deficit present.      Mental Status: She is alert and oriented to person, place, and time.   Psychiatric:         Mood and Affect: Mood normal.         Behavior: Behavior normal.         Procedures           ED Course  ED Course as of 01/07/24 0718   Sun Jan 07, 2024   0409 EKG: Normal sinus rhythm, rate 61, , QRS 85, QTc 399, no STEMI. [DH]   0539 Patient accepted to the OBS unit. [DH]      ED Course User Index  [DH] Stefan Foster MD                                           Medical Decision Making  Number and Complexity of Problems  Differential Diagnosis: Viral illness, pneumonia, asthma, COPD, unlikely metabolic or ACS    MDM Data  My EKG interpretation: NSR  My Laboratory interpretation: CBC and chemistry unremarkable, venous blood gas benign  My Radiology interpretation: NAD    Treatment and Disposition  ED Course: 68-year-old female presenting with symptoms as described.  She is nontoxic in appearance but has had multiple PCP, UC, ER visits in the last several days.  She continues to report no improvement on home steroids and antibiotics.  No signs of respiratory failure or distress.  Her labs and imaging were unremarkable today.  I did not repeat viral swabs as her COVID/flu has been negative multiple times already.  Plan admission to the observation unit for further management given her lack of improvement at home.    Shared decision making: Observation    Problems Addressed:  Moderate persistent asthma without complication: complicated acute illness or injury  Viral upper respiratory infection: complicated acute illness or injury    Amount and/or Complexity of Data Reviewed  Labs: ordered.  Radiology:  ordered.  ECG/medicine tests: ordered.    Risk  OTC drugs.  Prescription drug management.  Decision regarding hospitalization.        Final diagnoses:   Viral upper respiratory infection   Moderate persistent asthma without complication       ED Disposition  ED Disposition       ED Disposition   Decision to Admit    Condition   --    Comment   --               No follow-up provider specified.       Medication List      No changes were made to your prescriptions during this visit.

## 2024-01-07 NOTE — ED NOTES
Pt primary RN inquired about pt transport to [facility]. This RN contacted pt via cell phone and she reports she is on her way, but had went home and meet her family member first. This RN informed pt that was not the agreement with the signed paperwork and POV transport with IV access and pt might not have a bed upon arrival to [facility]. Primary RN contacted at this time and informed, he reports they will hold the bed at this time.    Marcela James RN

## 2024-01-07 NOTE — PLAN OF CARE
Goal Outcome Evaluation:  Admitted today from [facility] with SOA.  Positive for RSV.  Receiving scheduled IV steroids.  Blood sugar elevated.  Toradol given twice for headache.  O2 stats in the low 90's on room air.

## 2024-01-07 NOTE — PROGRESS NOTES
CARLITOS NAVARRO ATTESTATION NOTE    The OMAIRA and I have discussed this patient's history, physical exam, and treatment plan.  I have reviewed the documentation and personally had a face to face interaction with the patient. I affirm the documentation and agree with the treatment and plan.  The attached note describes my personal findings.      I provided a substantive portion of the care of this patient. I personally performed the physical exam, in its entirety.    Khalida Gilliland is a 68 y.o. female who presented to the emergency department today complaining of shortness of breath.  She reports a cough, history of asthma.  She had been to the emergency room on January 2, January 4 and then again today.  She reports she is taking steroids antibiotics and breathing treatments.  In the emergency room she had a negative troponin.  An ABG shows no acidosis.  She had normal CBC.  She had had a negative COVID and flu on the fourth.  Respiratory viral panel is pending.  She reports persistent cough and shortness of breath.  She was admitted to the observation unit given failure to improve at home and persistent symptoms.  She has not had any evaluation for PE and her prior visits this week.  She does report a contrast allergy but states that she has had CAT scans since then with pretreatment and has not had any symptoms.  She has had steroids all week which will effectively assist in the pretreatment.  Plan to pretreat her and then CTA her chest.  We will continue steroids and nebulizers.  We will obtain a respiratory viral panel and adjust treatment going forward as necessary.      On exam:  GENERAL: Awake, alert, no acute distress  SKIN: Warm, dry  HENT: Normocephalic, atraumatic  EYES: no scleral icterus  CV: regular rhythm, regular rate  RESPIRATORY: normal effort, lungs diminished with wheezing  ABDOMEN: soft, nontender, nondistended  MUSCULOSKELETAL: no deformity, no calf tenderness or swelling  NEURO: alert, moves all  extremities, follows commands        Labs  Recent Results (from the past 24 hour(s))   ECG 12 Lead Dyspnea    Collection Time: 01/07/24  3:31 AM   Result Value Ref Range    QT Interval 396 ms    QTC Interval 399 ms   High Sensitivity Troponin T    Collection Time: 01/07/24  3:52 AM    Specimen: Blood   Result Value Ref Range    HS Troponin T <6 <14 ng/L   Basic Metabolic Panel    Collection Time: 01/07/24  3:52 AM    Specimen: Blood   Result Value Ref Range    Glucose 131 (H) 65 - 99 mg/dL    BUN 23 8 - 23 mg/dL    Creatinine 0.76 0.57 - 1.00 mg/dL    Sodium 137 136 - 145 mmol/L    Potassium 3.8 3.5 - 5.2 mmol/L    Chloride 98 98 - 107 mmol/L    CO2 27.5 22.0 - 29.0 mmol/L    Calcium 9.7 8.6 - 10.5 mg/dL    BUN/Creatinine Ratio 30.3 (H) 7.0 - 25.0    Anion Gap 11.5 5.0 - 15.0 mmol/L    eGFR 85.5 >60.0 mL/min/1.73   CBC Auto Differential    Collection Time: 01/07/24  3:52 AM    Specimen: Blood   Result Value Ref Range    WBC 8.84 3.40 - 10.80 10*3/mm3    RBC 4.74 3.77 - 5.28 10*6/mm3    Hemoglobin 14.3 12.0 - 15.9 g/dL    Hematocrit 43.7 34.0 - 46.6 %    MCV 92.2 79.0 - 97.0 fL    MCH 30.2 26.6 - 33.0 pg    MCHC 32.7 31.5 - 35.7 g/dL    RDW 13.0 12.3 - 15.4 %    RDW-SD 44.8 37.0 - 54.0 fl    MPV 10.4 6.0 - 12.0 fL    Platelets 271 140 - 450 10*3/mm3    Neutrophil % 46.3 42.7 - 76.0 %    Lymphocyte % 39.0 19.6 - 45.3 %    Monocyte % 11.9 5.0 - 12.0 %    Eosinophil % 2.3 0.3 - 6.2 %    Basophil % 0.3 0.0 - 1.5 %    Immature Grans % 0.2 0.0 - 0.5 %    Neutrophils, Absolute 4.09 1.70 - 7.00 10*3/mm3    Lymphocytes, Absolute 3.45 (H) 0.70 - 3.10 10*3/mm3    Monocytes, Absolute 1.05 (H) 0.10 - 0.90 10*3/mm3    Eosinophils, Absolute 0.20 0.00 - 0.40 10*3/mm3    Basophils, Absolute 0.03 0.00 - 0.20 10*3/mm3    Immature Grans, Absolute 0.02 0.00 - 0.05 10*3/mm3   Blood Gas, Venous -    Collection Time: 01/07/24  4:05 AM    Specimen: Venous Blood   Result Value Ref Range    pH, Venous 7.452 (H) 7.310 - 7.410 pH Units    pCO2,  Venous 34.6 (L) 41.0 - 51.0 mm Hg    pO2, Venous 45.7 (H) 35.0 - 42.0 mm Hg    HCO3, Venous 24.2 22.0 - 26.0 mmol/L    Base Excess, Venous 0.7 -2.0 - 2.0 mmol/L    O2 Saturation, Venous 83.8 (H) 45.0 - 75.0 %    CO2 Content 25.3 23 - 27 mmol/L    Barometric Pressure for Blood Gas 736.09 mmHg    Modality Room Air        Radiology  XR Chest 1 View    Result Date: 1/7/2024  SINGLE VIEW OF THE CHEST  HISTORY: Shortness of breath  COMPARISON: January 4, 2024  FINDINGS: There is cardiomegaly. There is no vascular congestion. No pneumothorax, pleural effusion, or acute infiltrate is seen.      No acute findings.  This report was finalized on 1/7/2024 4:28 AM by Dr. Isabel Jules M.D on Workstation: [id number]         PPE: The patient and I wore a mask throughout the entire patient encounter.        Note Disclaimer: At [facility], we believe that sharing information builds trust and better relationships. You are receiving this note because you recently visited [facility]. It is possible you will see health information before a provider has talked with you about it. This kind of information can be easy to misunderstand. To help you fully understand what it means for your health, we urge you to discuss this note with your provider.

## 2024-01-08 PROBLEM — B33.8 RSV (RESPIRATORY SYNCYTIAL VIRUS INFECTION): Status: ACTIVE | Noted: 2024-01-08

## 2024-01-08 PROBLEM — E11.65 TYPE 2 DIABETES MELLITUS WITH HYPERGLYCEMIA, WITHOUT LONG-TERM CURRENT USE OF INSULIN: Status: ACTIVE | Noted: 2019-03-29

## 2024-01-08 PROBLEM — J96.01 ACUTE RESPIRATORY FAILURE WITH HYPOXIA: Status: ACTIVE | Noted: 2024-01-08

## 2024-01-08 PROBLEM — R93.89 ABNORMAL CT OF THE CHEST: Status: ACTIVE | Noted: 2024-01-08

## 2024-01-08 LAB
ANION GAP SERPL CALCULATED.3IONS-SCNC: 14.2 MMOL/L (ref 5–15)
ANION GAP SERPL CALCULATED.3IONS-SCNC: 15.5 MMOL/L (ref 5–15)
B-OH-BUTYR SERPL-SCNC: 0.18 MMOL/L (ref 0.02–0.27)
BUN SERPL-MCNC: 25 MG/DL (ref 8–23)
BUN SERPL-MCNC: 27 MG/DL (ref 8–23)
BUN/CREAT SERPL: 32.1 (ref 7–25)
BUN/CREAT SERPL: 36.5 (ref 7–25)
CALCIUM SPEC-SCNC: 9.8 MG/DL (ref 8.6–10.5)
CALCIUM SPEC-SCNC: 9.9 MG/DL (ref 8.6–10.5)
CHLORIDE SERPL-SCNC: 100 MMOL/L (ref 98–107)
CHLORIDE SERPL-SCNC: 95 MMOL/L (ref 98–107)
CO2 SERPL-SCNC: 21.5 MMOL/L (ref 22–29)
CO2 SERPL-SCNC: 22.8 MMOL/L (ref 22–29)
CREAT SERPL-MCNC: 0.74 MG/DL (ref 0.57–1)
CREAT SERPL-MCNC: 0.78 MG/DL (ref 0.57–1)
DEPRECATED RDW RBC AUTO: 44.2 FL (ref 37–54)
EGFRCR SERPLBLD CKD-EPI 2021: 82.9 ML/MIN/1.73
EGFRCR SERPLBLD CKD-EPI 2021: 88.3 ML/MIN/1.73
ERYTHROCYTE [DISTWIDTH] IN BLOOD BY AUTOMATED COUNT: 12.8 % (ref 12.3–15.4)
GLUCOSE BLDC GLUCOMTR-MCNC: 143 MG/DL (ref 70–130)
GLUCOSE BLDC GLUCOMTR-MCNC: 347 MG/DL (ref 70–130)
GLUCOSE BLDC GLUCOMTR-MCNC: 352 MG/DL (ref 70–130)
GLUCOSE BLDC GLUCOMTR-MCNC: 367 MG/DL (ref 70–130)
GLUCOSE SERPL-MCNC: 216 MG/DL (ref 65–99)
GLUCOSE SERPL-MCNC: 363 MG/DL (ref 65–99)
HBA1C MFR BLD: 8.7 % (ref 4.8–5.6)
HCT VFR BLD AUTO: 41.4 % (ref 34–46.6)
HGB BLD-MCNC: 13.6 G/DL (ref 12–15.9)
MCH RBC QN AUTO: 30.6 PG (ref 26.6–33)
MCHC RBC AUTO-ENTMCNC: 32.9 G/DL (ref 31.5–35.7)
MCV RBC AUTO: 93.2 FL (ref 79–97)
PLATELET # BLD AUTO: 219 10*3/MM3 (ref 140–450)
PMV BLD AUTO: 11.1 FL (ref 6–12)
POTASSIUM SERPL-SCNC: 4.3 MMOL/L (ref 3.5–5.2)
POTASSIUM SERPL-SCNC: 4.6 MMOL/L (ref 3.5–5.2)
RBC # BLD AUTO: 4.44 10*6/MM3 (ref 3.77–5.28)
SODIUM SERPL-SCNC: 132 MMOL/L (ref 136–145)
SODIUM SERPL-SCNC: 137 MMOL/L (ref 136–145)
WBC NRBC COR # BLD AUTO: 9.21 10*3/MM3 (ref 3.4–10.8)

## 2024-01-08 PROCEDURE — 82010 KETONE BODYS QUAN: CPT | Performed by: INTERNAL MEDICINE

## 2024-01-08 PROCEDURE — 82948 REAGENT STRIP/BLOOD GLUCOSE: CPT

## 2024-01-08 PROCEDURE — 25810000003 SODIUM CHLORIDE 0.9 % SOLUTION: Performed by: NURSE PRACTITIONER

## 2024-01-08 PROCEDURE — 25010000002 KETOROLAC TROMETHAMINE PER 15 MG: Performed by: STUDENT IN AN ORGANIZED HEALTH CARE EDUCATION/TRAINING PROGRAM

## 2024-01-08 PROCEDURE — 94761 N-INVAS EAR/PLS OXIMETRY MLT: CPT

## 2024-01-08 PROCEDURE — 85027 COMPLETE CBC AUTOMATED: CPT | Performed by: PHYSICIAN ASSISTANT

## 2024-01-08 PROCEDURE — 94799 UNLISTED PULMONARY SVC/PX: CPT

## 2024-01-08 PROCEDURE — 25810000003 SODIUM CHLORIDE 0.9 % SOLUTION: Performed by: STUDENT IN AN ORGANIZED HEALTH CARE EDUCATION/TRAINING PROGRAM

## 2024-01-08 PROCEDURE — 80048 BASIC METABOLIC PNL TOTAL CA: CPT | Performed by: PHYSICIAN ASSISTANT

## 2024-01-08 PROCEDURE — 80048 BASIC METABOLIC PNL TOTAL CA: CPT | Performed by: STUDENT IN AN ORGANIZED HEALTH CARE EDUCATION/TRAINING PROGRAM

## 2024-01-08 PROCEDURE — 94664 DEMO&/EVAL PT USE INHALER: CPT

## 2024-01-08 PROCEDURE — 25010000002 METHYLPREDNISOLONE PER 40 MG: Performed by: NURSE PRACTITIONER

## 2024-01-08 PROCEDURE — 63710000001 INSULIN LISPRO (HUMAN) PER 5 UNITS: Performed by: STUDENT IN AN ORGANIZED HEALTH CARE EDUCATION/TRAINING PROGRAM

## 2024-01-08 PROCEDURE — 63710000001 INSULIN GLARGINE PER 5 UNITS: Performed by: NURSE PRACTITIONER

## 2024-01-08 PROCEDURE — 25010000002 METHYLPREDNISOLONE PER 40 MG: Performed by: PHYSICIAN ASSISTANT

## 2024-01-08 PROCEDURE — 83036 HEMOGLOBIN GLYCOSYLATED A1C: CPT | Performed by: STUDENT IN AN ORGANIZED HEALTH CARE EDUCATION/TRAINING PROGRAM

## 2024-01-08 RX ORDER — INSULIN LISPRO 100 [IU]/ML
3 INJECTION, SOLUTION INTRAVENOUS; SUBCUTANEOUS
Status: DISCONTINUED | OUTPATIENT
Start: 2024-01-08 | End: 2024-01-08

## 2024-01-08 RX ORDER — INSULIN LISPRO 100 [IU]/ML
7 INJECTION, SOLUTION INTRAVENOUS; SUBCUTANEOUS
Status: DISCONTINUED | OUTPATIENT
Start: 2024-01-08 | End: 2024-01-09

## 2024-01-08 RX ORDER — KETOROLAC TROMETHAMINE 30 MG/ML
30 INJECTION, SOLUTION INTRAMUSCULAR; INTRAVENOUS ONCE
Status: COMPLETED | OUTPATIENT
Start: 2024-01-08 | End: 2024-01-08

## 2024-01-08 RX ORDER — INSULIN LISPRO 100 [IU]/ML
4-19 INJECTION, SOLUTION INTRAVENOUS; SUBCUTANEOUS
Status: DISCONTINUED | OUTPATIENT
Start: 2024-01-08 | End: 2024-01-15 | Stop reason: HOSPADM

## 2024-01-08 RX ORDER — INSULIN LISPRO 100 [IU]/ML
5 INJECTION, SOLUTION INTRAVENOUS; SUBCUTANEOUS
Status: DISCONTINUED | OUTPATIENT
Start: 2024-01-08 | End: 2024-01-08

## 2024-01-08 RX ORDER — HYDROCODONE POLISTIREX AND CHLORPHENIRAMINE POLISTIREX 10; 8 MG/5ML; MG/5ML
5 SUSPENSION, EXTENDED RELEASE ORAL EVERY 12 HOURS PRN
Status: DISCONTINUED | OUTPATIENT
Start: 2024-01-08 | End: 2024-01-15 | Stop reason: HOSPADM

## 2024-01-08 RX ORDER — IPRATROPIUM BROMIDE AND ALBUTEROL SULFATE 2.5; .5 MG/3ML; MG/3ML
3 SOLUTION RESPIRATORY (INHALATION)
Status: DISCONTINUED | OUTPATIENT
Start: 2024-01-08 | End: 2024-01-15 | Stop reason: HOSPADM

## 2024-01-08 RX ORDER — SODIUM CHLORIDE 9 MG/ML
75 INJECTION, SOLUTION INTRAVENOUS CONTINUOUS
Status: DISCONTINUED | OUTPATIENT
Start: 2024-01-08 | End: 2024-01-11

## 2024-01-08 RX ORDER — METHYLPREDNISOLONE SODIUM SUCCINATE 40 MG/ML
40 INJECTION, POWDER, LYOPHILIZED, FOR SOLUTION INTRAMUSCULAR; INTRAVENOUS EVERY 8 HOURS
Status: DISCONTINUED | OUTPATIENT
Start: 2024-01-08 | End: 2024-01-11

## 2024-01-08 RX ORDER — GUAIFENESIN 600 MG/1
1200 TABLET, EXTENDED RELEASE ORAL EVERY 12 HOURS SCHEDULED
Status: DISCONTINUED | OUTPATIENT
Start: 2024-01-08 | End: 2024-01-15 | Stop reason: HOSPADM

## 2024-01-08 RX ORDER — BUTALBITAL, ACETAMINOPHEN AND CAFFEINE 50; 325; 40 MG/1; MG/1; MG/1
1 TABLET ORAL EVERY 4 HOURS PRN
Status: DISCONTINUED | OUTPATIENT
Start: 2024-01-08 | End: 2024-01-15 | Stop reason: HOSPADM

## 2024-01-08 RX ADMIN — METHYLPREDNISOLONE SODIUM SUCCINATE 40 MG: 40 INJECTION INTRAMUSCULAR; INTRAVENOUS at 17:37

## 2024-01-08 RX ADMIN — FAMOTIDINE 20 MG: 20 TABLET, FILM COATED ORAL at 17:38

## 2024-01-08 RX ADMIN — LISINOPRIL 10 MG: 10 TABLET ORAL at 08:55

## 2024-01-08 RX ADMIN — PREGABALIN 100 MG: 100 CAPSULE ORAL at 20:25

## 2024-01-08 RX ADMIN — IPRATROPIUM BROMIDE AND ALBUTEROL SULFATE 3 ML: 2.5; .5 SOLUTION RESPIRATORY (INHALATION) at 11:00

## 2024-01-08 RX ADMIN — PROPRANOLOL HYDROCHLORIDE 40 MG: 40 TABLET ORAL at 08:56

## 2024-01-08 RX ADMIN — IPRATROPIUM BROMIDE 2 SPRAY: 42 SPRAY, METERED NASAL at 09:04

## 2024-01-08 RX ADMIN — PREGABALIN 100 MG: 100 CAPSULE ORAL at 18:25

## 2024-01-08 RX ADMIN — METHYLPREDNISOLONE SODIUM SUCCINATE 40 MG: 40 INJECTION INTRAMUSCULAR; INTRAVENOUS at 04:20

## 2024-01-08 RX ADMIN — Medication 10 ML: at 20:25

## 2024-01-08 RX ADMIN — GUAIFENESIN AND CODEINE PHOSPHATE 5 ML: 100; 10 SOLUTION ORAL at 00:27

## 2024-01-08 RX ADMIN — ACETAMINOPHEN 650 MG: 325 TABLET, FILM COATED ORAL at 12:18

## 2024-01-08 RX ADMIN — IPRATROPIUM BROMIDE AND ALBUTEROL SULFATE 3 ML: 2.5; .5 SOLUTION RESPIRATORY (INHALATION) at 08:33

## 2024-01-08 RX ADMIN — BENZONATATE 100 MG: 100 CAPSULE ORAL at 23:33

## 2024-01-08 RX ADMIN — INSULIN GLARGINE 10 UNITS: 100 INJECTION, SOLUTION SUBCUTANEOUS at 20:24

## 2024-01-08 RX ADMIN — LISINOPRIL 10 MG: 10 TABLET ORAL at 21:38

## 2024-01-08 RX ADMIN — KETOROLAC TROMETHAMINE 30 MG: 30 INJECTION, SOLUTION INTRAMUSCULAR; INTRAVENOUS at 08:56

## 2024-01-08 RX ADMIN — TRAZODONE HYDROCHLORIDE 50 MG: 50 TABLET ORAL at 22:18

## 2024-01-08 RX ADMIN — SODIUM CHLORIDE 150 ML/HR: 9 INJECTION, SOLUTION INTRAVENOUS at 08:53

## 2024-01-08 RX ADMIN — IPRATROPIUM BROMIDE AND ALBUTEROL SULFATE 3 ML: 2.5; .5 SOLUTION RESPIRATORY (INHALATION) at 22:53

## 2024-01-08 RX ADMIN — GUAIFENESIN AND CODEINE PHOSPHATE 5 ML: 100; 10 SOLUTION ORAL at 04:20

## 2024-01-08 RX ADMIN — IPRATROPIUM BROMIDE AND ALBUTEROL SULFATE 3 ML: .5; 3 SOLUTION RESPIRATORY (INHALATION) at 00:39

## 2024-01-08 RX ADMIN — MONTELUKAST SODIUM 10 MG: 10 TABLET, FILM COATED ORAL at 21:39

## 2024-01-08 RX ADMIN — ACETAMINOPHEN 650 MG: 325 TABLET, FILM COATED ORAL at 04:19

## 2024-01-08 RX ADMIN — IPRATROPIUM BROMIDE AND ALBUTEROL SULFATE 3 ML: 2.5; .5 SOLUTION RESPIRATORY (INHALATION) at 14:25

## 2024-01-08 RX ADMIN — CYCLOSPORINE 1 DROP: 0.5 EMULSION OPHTHALMIC at 08:56

## 2024-01-08 RX ADMIN — BUTALBITAL, ACETAMINOPHEN AND CAFFEINE 1 TABLET: 325; 50; 40 TABLET ORAL at 21:43

## 2024-01-08 RX ADMIN — INSULIN LISPRO 19 UNITS: 100 INJECTION, SOLUTION INTRAVENOUS; SUBCUTANEOUS at 08:48

## 2024-01-08 RX ADMIN — Medication 10 ML: at 09:05

## 2024-01-08 RX ADMIN — BUTALBITAL, ACETAMINOPHEN AND CAFFEINE 1 TABLET: 325; 50; 40 TABLET ORAL at 17:38

## 2024-01-08 RX ADMIN — PANTOPRAZOLE SODIUM 40 MG: 40 TABLET, DELAYED RELEASE ORAL at 08:57

## 2024-01-08 RX ADMIN — ZOLPIDEM TARTRATE 5 MG: 5 TABLET ORAL at 23:33

## 2024-01-08 RX ADMIN — GUAIFENESIN 1200 MG: 600 TABLET, EXTENDED RELEASE ORAL at 20:25

## 2024-01-08 RX ADMIN — FAMOTIDINE 20 MG: 20 TABLET, FILM COATED ORAL at 08:56

## 2024-01-08 RX ADMIN — BUDESONIDE AND FORMOTEROL FUMARATE DIHYDRATE 2 PUFF: 160; 4.5 AEROSOL RESPIRATORY (INHALATION) at 21:46

## 2024-01-08 RX ADMIN — BUDESONIDE AND FORMOTEROL FUMARATE DIHYDRATE 2 PUFF: 160; 4.5 AEROSOL RESPIRATORY (INHALATION) at 08:35

## 2024-01-08 RX ADMIN — INSULIN LISPRO 19 UNITS: 100 INJECTION, SOLUTION INTRAVENOUS; SUBCUTANEOUS at 11:36

## 2024-01-08 RX ADMIN — ALBUTEROL SULFATE 2.5 MG: 2.5 SOLUTION RESPIRATORY (INHALATION) at 05:02

## 2024-01-08 RX ADMIN — CYCLOSPORINE 1 DROP: 0.5 EMULSION OPHTHALMIC at 22:16

## 2024-01-08 RX ADMIN — ASPIRIN 81 MG: 81 TABLET, COATED ORAL at 08:55

## 2024-01-08 RX ADMIN — HYDROCHLOROTHIAZIDE 25 MG: 25 TABLET ORAL at 08:55

## 2024-01-08 RX ADMIN — INSULIN LISPRO 16 UNITS: 100 INJECTION, SOLUTION INTRAVENOUS; SUBCUTANEOUS at 22:17

## 2024-01-08 RX ADMIN — SODIUM CHLORIDE 100 ML/HR: 9 INJECTION, SOLUTION INTRAVENOUS at 20:41

## 2024-01-08 RX ADMIN — IPRATROPIUM BROMIDE AND ALBUTEROL SULFATE 3 ML: 2.5; .5 SOLUTION RESPIRATORY (INHALATION) at 15:05

## 2024-01-08 RX ADMIN — FLUTICASONE PROPIONATE 2 SPRAY: 50 SPRAY, METERED NASAL at 09:04

## 2024-01-08 RX ADMIN — PREGABALIN 100 MG: 100 CAPSULE ORAL at 08:56

## 2024-01-08 NOTE — NURSING NOTE
Patient in room having trouble breathing and having a coughing spell and could not catch her breath, RT paged, admitting doctor at bedside and requested duo-neb treatment to be administered STAT two nurses at bedside with provider at the time.

## 2024-01-08 NOTE — PROGRESS NOTES
MD ATTESTATION NOTE    The OMAIRA and I have discussed this patient's history, physical exam, and treatment plan.  I have reviewed the documentation and personally had a face to face interaction with the patient. I affirm the documentation and agree with the treatment and plan.  The attached note describes my personal findings.      I provided a substantive portion of the care of the patient.  I personally performed the physical exam in its entirety, and below are my findings.        Brief HPI: This patient is a  68-year-old female with a history of asthma admitted to the observation unit due to a significant shortness of breath.  Her asthma exacerbation is likely exacerbated secondary to RSV as she did test positive.  She currently denies chest pain, fever/chills, nausea/vomiting, or back pain.      PHYSICAL EXAM  ED Triage Vitals   Temp Heart Rate Resp BP SpO2   01/07/24 0330 01/07/24 0330 01/07/24 0330 01/07/24 0330 01/07/24 0330   98.6 °F (37 °C) 65 22 151/97 97 %      Temp src Heart Rate Source Patient Position BP Location FiO2 (%)   01/07/24 0727 01/07/24 0330 01/07/24 0330 01/07/24 0330 --   Oral Monitor Lying Left arm          GENERAL: Patient appears quite uncomfortable but nontoxic  HENT: nares patent  EYES: no scleral icterus  CV: regular rhythm, normal rate, no M/R/G  RESPIRATORY: normal effort, actively coughing, mild expiratory wheezes present  ABDOMEN: soft, nontender, no rebound or guarding  MUSCULOSKELETAL: no deformity, no edema  NEURO: alert, moves all extremities, follows commands  PSYCH:  calm, cooperative  SKIN: warm, dry    Vital signs and nursing notes reviewed.        Plan: Pulmonary has seen the patient in consultation and does agree with the treatment with IV steroids as well as duo nebulizer treatments.  They are concerned with her elevated blood sugars as well as increasing anion gap as the possibility of incipient DKA could arise.  We will continue to treat, monitor closely, and admit the  patient for further hospital care.

## 2024-01-08 NOTE — NURSING NOTE
Patient requested to remove heart monitor, risks explained to patient, patient AOX4, verbalizes understanding of risks and still requests monitor to be remover, APRN made aware.

## 2024-01-08 NOTE — CONSULTS
Patient Name:  Khalida Gilliland  YOB: 1955  MRN:  [id number]  Date of Admission:  1/7/2024  Date of Consult:  1/8/2024  Patient Care Team:  Sergo Owen MD as PCP - General (Internal Medicine)  Lex Morris MD as Consulting Physician (Hematology and Oncology)  Mikael David MD as Referring Physician (Internal Medicine)  Chika Nelson RN as Ambulatory  ([facility])    Inpatient Internal Medicine Consult  Consult performed by: Haydee Shelton APRN  Consult ordered by: Rocio Healy PA-C  Reason for consult: Evaluate status and make recommendations regarding treatment for asthma exacerbation and hyperglycemia.        Subjective   History of Present Illness  Ms. Gilliland is a 68 y.o. female that has been admitted to [facility] for ongoing dyspnea and cough for the last week or more.  She was initially seen in [facility] emergency room on 1/2 for cough as well as chest tightness and shortness of breath.  Chest x-ray was normal and COVID/flu/RSV were negative.  She was evaluated by her primary care 2 days later and was noted to be 91% with reduced air entry.  She was referred to the emergency room again he was seen in [facility].  She was again negative for viral illnesses and chest x-ray negative at that time.  She was given a dose of IV dexamethasone as well as oral cefdinir.  She was discharged on prednisone taper.  She again did not have improvement in her symptoms and came to the emergency room here she was found to be positive for RSV.  She was admitted in the observation unit where she was given IV steroids as well as breathing treatments.  Pulmonology was consulted.  However, her blood glucose readings elevated and anion gap started to rise.  She was given sliding scale insulin as well as fluids.  Internal medicine was contacted for further admission given concerns for developing DKA in the setting of IV steroids.   The patient  "states that she is continuing to have severe coughing spells as well as that bad headache.  She states she has not slept in days and is having associated chest soreness from coughing.  She does feel a little better than earlier when she had a very bad coughing fit and felt like she was going to die.  She denies any fever or chills as well as nausea or vomiting.  She states that her cough is dry and she is having a hard time getting anything up.  She feels like she was doing better when her IV steroids were going earlier.     Past Medical History:   Diagnosis Date    Abdominal pain, right lower quadrant     Anemia     Asthma     Brain aneurysm      and     Cancer 2014    Basal Cell Carcinoma Left Arm,SKIN CANCER    COVID-19 2021    Cyst of left kidney     Diabetes mellitus     Tyoe 2 diabetic    Diverticulosis     Fatty liver     GERD (gastroesophageal reflux disease)     History of kidney stones 2011    History of surgery for cerebral aneurysm     Clipping of cerebral Aneurysm    Hx of migraines     Hyperlipidemia     Hypertension     Insomnia     Neck pain     Peptic ulceration     Pneumonia     PONV (postoperative nausea and vomiting)     Stroke     \"years ago\" TIA no after effects     Past Surgical History:   Procedure Laterality Date    APPENDECTOMY      Emergency at time of 2nd     BASAL CELL CARCINOMA EXCISION Left 2014    Excision basal cell carcinoma, left arm (1.1 cm) with frozen section control and layered wound closure ( 3.1 cm), Dr. Isael Andrade, [facility]    BRAIN SURGERY      Ruptured aneurysm, clipped/Dr. Sims    CEREBRAL ANEURYSM REPAIR      clipping of cerebral aneurysm    CEREBRAL ANGIOGRAM N/A 2017    Procedure: CEREBRAL ANGIOGRAM ;  Surgeon: Orlando Bella MD;  Location: [facility] OR ;  Service:     CEREBRAL ANGIOGRAM N/A 10/11/2017    Procedure: CEREBRAL ANGIOGRAM;  Surgeon: Orlando Bella MD;  Location: [facility] OR ;  Service:  "     SECTION  , ,     CHOLECYSTECTOMY      COLON RESECTION WITH COLOSTOMY Right     COLONOSCOPY  2009    COLONOSCOPY N/A 2020    Procedure: COLONOSCOPY to terminal ileum;  Surgeon: Luiza Norris MD;  Location: [facility] ENDOSCOPY;  Service: Gastroenterology;  Laterality: N/A;  PREOP/ SCREENING  POSTOP/ DIVERTICULOSIS. POOR PREP    COLONOSCOPY N/A 2023    Procedure: COLONOSCOPY TO CECUM WITH COLD BX POLYPECTOMIES AND COLD SNARE POLYPECTOMY;  Surgeon: Luiza Norris MD;  Location: [facility] ENDOSCOPY;  Service: Gastroenterology;  Laterality: N/A;  PRE OP - SCREENING  POST OP - COLON POLYPS, DIVERTICULOSIS, HEMORRHOIDS    EMBOLIZATION CEREBRAL N/A 2017    Procedure: Cerebral angiography and embolization of cerebral aneurysm with Pipeline Embolization Device;  Surgeon: Orlando Bella MD;  Location: [facility] OR ;  Service:     EMBOLIZATION CEREBRAL N/A 10/31/2018    Procedure: Cerebral angiogram with embolization of cerebral aneurysm;  Surgeon: Orlando Bella MD;  Location: [facility] OR ;  Service: Neurosurgery    ILEOSTOMY REVISION      INCISIONAL HERNIA REPAIR      Patient suffered some nerve entrapment secondary to the attack, some of which were removed during a secondary operation.    INGUINAL HERNIA REPAIR      LARYNX SURGERY      OSTOMY TAKE DOWN      TUBAL ABDOMINAL LIGATION      URETERAL STENT INSERTION  2011    Due to kidney stones     Family History   Problem Relation Age of Onset    Skin cancer Mother     Dementia Mother     KALYAN disease Mother     Heart disease Father     Heart attack Father     Hypertension Father     Aneurysm Son         cerebral    Nephrolithiasis Son     Suicide Attempts Brother     Malig Hyperthermia Neg Hx      Social History     Tobacco Use    Smoking status: Former     Years: 1     Types: Cigarettes     Quit date:      Years since quittin.0     Passive exposure: Past    Smokeless tobacco: Never    Vaping Use    Vaping Use: Never used   Substance Use Topics    Alcohol use: Not Currently    Drug use: No     Medications Prior to Admission   Medication Sig Dispense Refill Last Dose    albuterol sulfate  (90 Base) MCG/ACT inhaler Inhale 2 puffs Every 4 (Four) Hours As Needed for Wheezing or Shortness of Air (Cough). 6.7 g 9 1/7/2024    albuterol sulfate  (90 Base) MCG/ACT inhaler INHALE 2 PUFFS EVERY 4 HOURS AS NEEDED 8.5 g 1 1/7/2024    albuterol sulfate  (90 Base) MCG/ACT inhaler Inhale 2 puffs Every 4 (Four) Hours As Needed for Wheezing or Shortness of Air. 6.7 g 0 1/7/2024    Ascorbic Acid (VITAMIN C PO) Take 1,000 mg by mouth Daily. PT INSTRUCTED TO CONTINUE PER DR. CALLUM URBINA   1/7/2024    aspirin 81 MG EC tablet Take 1 tablet by mouth Daily. 30 tablet 6 1/7/2024    atorvastatin (LIPITOR) 20 MG tablet    1/7/2024    azelastine (ASTELIN) 0.1 % nasal spray USE 1 SPRAY IN EACH NOSTRIL TWICE A DAY AS NEEDED FOR RHINITIS AS DIRECTED 30 mL 6 1/7/2024    azithromycin (Zithromax Z-Aubrey) 250 MG tablet Take 2 tablets by mouth on day 1, then 1 tablet daily on days 2-5 6 tablet 0 1/6/2024    benzonatate (Tessalon Perles) 100 MG capsule Take 1 capsule by mouth 3 (Three) Times a Day As Needed for Cough. 60 capsule 0 1/7/2024    Biotin 87185 MCG tablet Take 1 tablet by mouth Every Evening.   1/7/2024    BIOTIN PO Take 1 tablet by mouth Daily.   1/7/2024    brompheniramine-pseudoephedrine-DM 30-2-10 MG/5ML syrup Take 5 mL by mouth 4 (Four) Times a Day As Needed for Cough or Congestion. 118 mL 0 1/7/2024    budesonide-formoterol (SYMBICORT) 160-4.5 MCG/ACT inhaler Inhale 2 puffs 2 (Two) Times a Day.   1/7/2024    butalbital-acetaminophen-caffeine (FIORICET, ESGIC) -40 MG per tablet Take 1 tablet by mouth 2 (Two) Times a Day As Needed for Headache. 30 tablet 0 Past Month    cefdinir (OMNICEF) 300 MG capsule Take 1 capsule by mouth 2 (Two) Times a Day for 6 days. 12 capsule 0 1/6/2024     cholecalciferol (VITAMIN D3) 1000 units tablet Take 1 tablet by mouth Daily. PER PT TO CONTINUE PER DR. CALLUM URBINA   1/7/2024    Coenzyme Q10 (CO Q 10 PO) Take 100 mg by mouth Every Evening.   1/7/2024    Cyanocobalamin (VITAMIN B 12 PO) Take 5,000 mcg by mouth Every Night.   1/7/2024    cycloSPORINE (RESTASIS) 0.05 % ophthalmic emulsion Administer 1 drop to both eyes 2 (Two) Times a Day.   1/7/2024    diphenhydrAMINE-acetaminophen (TYLENOL PM)  MG tablet per tablet Take 1 tablet by mouth At Night As Needed for Sleep.   1/6/2024    fluconazole (Diflucan) 150 MG tablet Take 1 tablet by mouth Every 72 (Seventy-Two) Hours As Needed (yeast infection). 2 tablet 0 Past Month    fluocinonide (LIDEX) 0.05 % cream    Past Week    fluticasone (FLONASE) 50 MCG/ACT nasal spray USE 2 SPRAYS IN EACH NOSTRIL DAILY 16 g 11 Past Week    Ginkgo Biloba 40 MG tablet Take 120 mg by mouth Daily.   1/7/2024    glipizide (glipiZIDE XL) 5 MG ER tablet Take 1 tablet by mouth Daily. 90 tablet 3 1/7/2024    glucosamine-chondroitin 500-400 MG capsule capsule Take 1 capsule by mouth 2 (Two) Times a Day With Meals. PT TO CONTINUE PER DR. CALLUM URBINA   1/7/2024    glucose blood (FREESTYLE LITE) test strip PT to use once daily 100 each 12 1/7/2024    hydroCHLOROthiazide (HYDRODIURIL) 25 MG tablet Take 1 tablet by mouth Daily. 90 tablet 3 1/7/2024    ipratropium (ATROVENT) 0.06 % nasal spray 2 sprays 2 (Two) Times a Day.   Past Week    Lancets (freestyle) lancets Pt tests daily 100 each 6 1/7/2024    lisinopril (PRINIVIL,ZESTRIL) 10 MG tablet Take 1 tablet by mouth Daily. (Patient taking differently: Take 1 tablet by mouth 2 (Two) Times a Day.) 90 tablet 3 1/7/2024    methylPREDNISolone (MEDROL) 4 MG dose pack Take as directed on package instructions. 21 tablet 0 1/7/2024    Misc Natural Products (CORTISOL PO) Take  by mouth.   1/7/2024    montelukast (SINGULAIR) 10 MG tablet Take 1 tablet by mouth Every Night. 90 tablet 3 1/6/2024     multivitamin (THERAGRAN) tablet tablet Take 1 tablet by mouth Daily.   1/7/2024    multivitamin with minerals tablet tablet Take 1 tablet by mouth Daily.   1/7/2024    NYSTATIN 754014 UNIT/GM topical powder    Past Week    Omega-3 Fatty Acids (FISH OIL) 1000 MG capsule capsule Take  by mouth Daily With Breakfast.   1/7/2024    omeprazole (priLOSEC) 40 MG capsule TAKE 1 CAPSULE EVERY MORNING 90 capsule 3 1/7/2024    Potassium Gluconate 550 MG tablet Take  by mouth.   1/7/2024    predniSONE (DELTASONE) 20 MG tablet 3 po daily x 2d, then 2 po daily x 2d, then 1 po daily x 2d. 12 tablet 0 1/7/2024    pregabalin (LYRICA) 100 MG capsule TAKE 1 CAPSULE BY MOUTH THREE TIMES DAILY 270 capsule 1 1/7/2024    Probiotic Product (PROBIOTIC-10 PO) Take  by mouth.   1/7/2024    promethazine-dextromethorphan (PROMETHAZINE-DM) 6.25-15 MG/5ML syrup Take 5 mL by mouth 4 (Four) Times a Day As Needed for Cough. Falls precaution 120 mL 1 Past Week    promethazine-dextromethorphan (PROMETHAZINE-DM) 6.25-15 MG/5ML syrup Take 5 mL by mouth 4 (Four) Times a Day As Needed for Cough for up to 6 days. 120 mL 0 Past Week    propranolol (INDERAL) 40 MG tablet Take 1 tablet by mouth 2 (Two) Times a Day. 180 tablet 3 1/7/2024    raNITIdine (ZANTAC) 75 MG tablet Take 2 tablets by mouth 2 (Two) Times a Day.   1/7/2024    SITagliptin (Januvia) 100 MG tablet Take 1 tablet by mouth Daily. 90 tablet 3 1/7/2024    Spacer/Aero-Holding Chambers device Use with all inhaled treatments 1 each 0 1/7/2024    traZODone (DESYREL) 50 MG tablet Take 1 tablet by mouth Every Night. 90 tablet 1 1/6/2024    zolpidem (Ambien) 5 MG tablet Take 1 tablet by mouth At Night As Needed for Sleep. 30 tablet 0 1/6/2024    erythromycin (ROMYCIN) 5 MG/GM ophthalmic ointment Administer  to both eyes 2 (Two) Times a Day. Apply 0.5 inch two times daily for 5-7 days. 3.5 g 0 Unknown    Scopolamine 1 MG/3DAYS patch Place 1 patch on the skin as directed by provider Every 72 (Seventy-Two)  Hours. 4 each 0 Unknown     Allergies:  Contrast dye (echo or unknown ct/mr), Iodinated contrast media, Codeine, Methylprednisolone, Other, and Ciprofloxacin hcl    Review of Systems   Constitutional:  Negative for appetite change, chills and fever.   HENT:  Negative for congestion and ear pain.    Respiratory:  Positive for cough, chest tightness and shortness of breath.    Cardiovascular:  Negative for chest pain and leg swelling.   Gastrointestinal:  Negative for constipation, diarrhea, nausea and vomiting.   Genitourinary:  Negative for difficulty urinating and dysuria.   Musculoskeletal:  Negative for arthralgias and back pain.   Skin:  Negative for color change and pallor.   Neurological:  Negative for dizziness and light-headedness.   Psychiatric/Behavioral:  Negative for confusion. The patient is not nervous/anxious.        Objective      Vital Signs  Temp:  [97.7 °F (36.5 °C)-98.4 °F (36.9 °C)] 97.9 °F (36.6 °C)  Heart Rate:  [69-87] 83  Resp:  [16-20] 20  BP: (134-154)/(56-77) 149/61  Body mass index is 31.78 kg/m².    Physical Exam  Vitals and nursing note reviewed.   Constitutional:       Appearance: She is ill-appearing. She is not toxic-appearing.   HENT:      Head: Normocephalic and atraumatic.   Cardiovascular:      Rate and Rhythm: Normal rate and regular rhythm.      Pulses: Normal pulses.   Pulmonary:      Effort: Pulmonary effort is normal.      Comments: Diminished bilaterally, reduced air entry. On 2 L  Abdominal:      General: Bowel sounds are normal. There is no distension.      Palpations: Abdomen is soft.      Tenderness: There is no abdominal tenderness.   Musculoskeletal:         General: No swelling. Normal range of motion.      Cervical back: Normal range of motion and neck supple.   Skin:     General: Skin is warm and dry.      Findings: No bruising.   Neurological:      General: No focal deficit present.      Mental Status: She is alert and oriented to person, place, and time.       Sensory: No sensory deficit.      Motor: Weakness present.      Coordination: Coordination normal.   Psychiatric:         Mood and Affect: Mood normal.         Behavior: Behavior normal.     Results Review:   I reviewed the patient's new clinical results.  I reviewed the patient's new imaging results and agree with the interpretation.  I reviewed the patient's other test results and agree with the interpretation  I personally viewed and interpreted the patient's EKG/Telemetry data     Assessment/Plan     Active Hospital Problems    Diagnosis  POA    **Asthma exacerbation [J45.901]  Yes    Acute respiratory failure with hypoxia [J96.01]  Yes    RSV (respiratory syncytial virus infection) [B33.8]  Yes    Abnormal CT of the chest [R93.89]  Yes    Obesity (BMI 30-39.9) [E66.9]  Yes    Diabetic peripheral neuropathy [E11.42]  Yes    Headache [R51.9]  Yes    GERD (gastroesophageal reflux disease) [K21.9]  Yes    Type 2 diabetes mellitus with hyperglycemia, without long-term current use of insulin [E11.65]  Yes    Essential hypertension [I10]  Yes    Other hyperlipidemia [E78.49]  Yes     Ms. Gilliland is a 68 y.o. female that presented to emergency room with complaints of dyspnea and persistent upper respiratory symptoms.  She apparently had symptoms starting a week prior to arrival and was seen at [facility] and a freestanding emergency room at [facility] where viral swabs have been negative and chest x-ray was clear.  She had been given steroids and antibiotics as well as breathing treatments at home without improvement.  RVP in the emergency room revealed positive for RSV infection.  Pulmonology was consulted for likely asthma exacerbation.  She was placed in the observation unit for ongoing hypoxia and treatment with IV steroids.  Unfortunately she did start to develop some mild acidosis in the setting of DM2 on the morning of 1/8 prompting further evaluation in the hospital setting.  A was consulted to assume care  outside of the observation unit.    Asthma exacerbation  Acute hypoxic respiratory failure  RSV infection  -Pulmonology consulted.  Given IV Solu-Medrol 40 mg every 4 hours x 3 doses.  -Currently off of IV steroids but given her ongoing dyspnea and oxygen requirements will resume Solumedrol 40 mg every 8 hours.  -Wean oxygen as tolerated and continue scheduled DuoNebs.  Will add as needed DuoNebs every 2 hours as needed for any respiratory distress.  -Supportive care for viral illness.  Monitor for any secondary bacterial infection.  -CTA chest with no PE, but did have an area of atelectasis or possible consolidation in the right middle lobe that was new since 2020.  Possible inflammatory change, but will need repeat CT scan in 4 to 6 weeks.  -Add Mucinex. Change guaifenesin with codeine to Tussionex as needed. Encourage IS.    Type 2 diabetes  Diabetic peripheral neuropathy  -Having some increased anion gap and decreased bicarb on morning labs.  This is since improved with starting IV fluids and holding on steroids.    -Will slow down the rate of IV fluids to 100 cc/h.  -Will start low-dose basal insulin (10 units nightly) as well as low-dose bolus dosing with meals (7 units TID).  Continue sliding scale insulin and continue to hold her oral agents.  -Monitor lab trends.    Hypertension  -BP currently stable on propranolol, lisinopril and HCTZ.  -Monitor renal function.    GERD  -Continue PPI.    Hyperlipidemia  -Continue statin.    Headache  -No neurological deficits.  -Continue supportive care.  -Add Fioricet PRN.    Discussed with patient, family at bedside as well as observation provider and Dr. Melendrez.    VTE prevention: SCDs  Code status: Full code    Thank you very much for asking A to be involved in this patient's care. We will follow along with you.      TATE Alvarez  [facility]  01/08/24  15:30 EST

## 2024-01-08 NOTE — PLAN OF CARE
Goal Outcome Evaluation:   Plan of care reviewed with patient   Outcome summary: Patient AOX4, vitals stable, up without assist, IV steroids and breathing treatments given over night, plan for pulmonology to see today.

## 2024-01-08 NOTE — PROGRESS NOTES
ED OBSERVATION PROGRESS/DISCHARGE SUMMARY    Date of Admission: 1/7/2024   LOS: 0 days   PCP: Sergo Owen MD      Subjective:  Patient reports she still having some significant shortness of breath as well as persistent cough.  She reports a diffuse headache.  Denies abdominal pain and nausea.    Hospital Outcome:   Khalida Gilliland is a 68 y.o. female with history of asthma, who presented to the [facility] early Sunday morning complaining of persistent cough, shortness of breath, and chest tightness. She was admitted to the ED Observation Unit for continued medical management due to failed out-patient treatment for her symptoms. Patient received 10 mg dexamethasone, DuoNeb breathing treatment, Robitussin DM in ED without significant improvement.  Respiratory panel positive for RSV.  CTA of the chest showed an area of focal atelectasis or possible consolidation in the right middle lobe.    Patient was continued on IV steroids and bronchodilators as well as supplemental oxygen overnight without significant improvement.  Patient's morning labs notes a mild acidosis with a bicarb at 21.5 and anion gap at 15.5.  Beta hydroxybutyrate has been obtained and is pending.  Patient's insulin sliding scale has been increased and was started on IV fluids.    Pulmonology has seen and evaluated the patient, suspect findings on CTA of the chest secondary to mucous plugging.  Recommended to hold further steroid until appropriate control of blood sugars.  Did note patient will need follow-up CT imaging in 6 to 8 weeks.    Patient will be admitted to the internal medicine service for further care.  I spoke with TATE Jefferson with [facility] who graciously accepts the patient under Dr. Melendrez with their service.  Patient is in agreement with the plan.      ROS:  General: no fevers, chills  Respiratory: Persistent, non-productive cough, dyspnea  Cardiovascular: no chest pain, palpitations  Abdomen: No abdominal pain, nausea,  vomiting, or diarrhea  Neurologic: No focal weakness    Objective   Physical Exam:  I have reviewed the vital signs.  Temp:  [97.7 °F (36.5 °C)-98.4 °F (36.9 °C)] 97.7 °F (36.5 °C)  Heart Rate:  [69-87] 87  Resp:  [18-20] 18  BP: (128-154)/(55-77) 150/56  General Appearance:  68-year-old female, no acute distress on 3 L via nasal cannula  Head:    Normocephalic, atraumatic  Lungs: Diminished lung sounds bilaterally, no audible wheezing noted  Heart: Regular rate and rhythm,   Abdomen:  Nondistended  Extremities: No clubbing, cyanosis, or edema to lower extremities  Neurologic: Oriented x3, Normal strength to extremities    Results Review:    I have reviewed the labs, radiology results and diagnostic studies.    Results from last 7 days   Lab Units 01/08/24  0354   WBC 10*3/mm3 9.21   HEMOGLOBIN g/dL 13.6   HEMATOCRIT % 41.4   PLATELETS 10*3/mm3 219     Results from last 7 days   Lab Units 01/08/24  0354 01/07/24  0352 01/04/24  1249   SODIUM mmol/L 132* 137 135*   POTASSIUM mmol/L 4.3 3.8 4.4   CHLORIDE mmol/L 95* 98 100   CO2 mmol/L 21.5* 27.5 23.7   BUN mg/dL 27* 23 20   CREATININE mg/dL 0.74 0.76 0.57   CALCIUM mg/dL 9.8 9.7 9.7   BILIRUBIN mg/dL  --   --  0.4   ALK PHOS U/L  --   --  66   ALT (SGPT) U/L  --   --  52*   AST (SGOT) U/L  --   --  43*   GLUCOSE mg/dL 363* 131* 267*     Imaging Results (Last 24 Hours)       Procedure Component Value Units Date/Time    CT Angiogram Chest [[id number]] Collected: 01/07/24 1418     Updated: 01/07/24 1534    Narrative:      CT ANGIOGRAPHY OF THE CHEST WITH INTRAVENOUS CONTRAST AND 3D  RECONSTRUCTIONS     HISTORY: Shortness of breath.     The CT scan was performed as an emergency procedure with CT angiography  protocol using intravenous contrast and 3D reconstructions. It is  compared to previous CT angiography of the chest dated 03/31/2020 as  well as more recent chest x-rays. The following findings are present:  1. The pulmonary arteries are well-opacified and there is no  evidence of  pulmonary embolus. The thoracic aorta shows no evidence of aneurysm or  dissection.     2. The lungs are well expanded and clear except for an area of focal  atelectasis or possible consolidation in the right middle lobe that is  new since 2020. I suspect this represents focal inflammatory change but  follow-up CT scan without contrast in 4-6 weeks is recommended to ensure  appropriate resolution.     3. There is no mediastinal or hilar or axillary adenopathy. There is no  pericardial effusion. The CT images through the upper liver, spleen,  both adrenal glands, and upper poles of both kidneys are unremarkable.  The gallbladder has been removed.              Radiation dose reduction techniques were utilized, including automated  exposure control and exposure modulation based on body size.        This report was finalized on 1/7/2024 3:31 PM by Dr. Mohsen Howell M.D  on Workstation: [id number]               I have reviewed the medications.  ---------------------------------------------------------------------------------------------  Assessment & Plan   Assessment/Problem List    Asthma exacerbation      Plan:      Asthma exacerbation  RSV bronchitis  Acute hypoxia  -Patient with O2 saturation above 90% on 3 L via nasal cannula, wean as tolerated  -CTA chest reported an area of focal atelectasis or possible consolidation in the right middle lobe  -Respiratory panel positive for RSV  -Hold further IV steroid until further blood sugar control  -DuoNebs every 4 hours  -Pulmonology following, suspect CTA finding secondary to mucous plugging, recommended follow-up CT in 4 to 6 weeks  -Antitussives as needed  -Continue Symbicort and Singulair     Type 2 diabetes with hyperglycemia  Metabolic acidosis  -Blood sugars elevated secondary to steroids, morning labs showed bicarb at 21.5 anion gap of 15.5  -Without long-term use of insulin   -High-dose sliding scale insulin and Accu-Cheks with meals and at  bedtime  -A1c ordered  -IV fluids  -Repeat BMP at 1300  -Hold oral medications, patient on Januvia, glipizide, neither on hospital formulary and I suspect patient will have hyperglycemia in setting of IV steroids     Hypertension  -Continue hydrochlorothiazide, lisinopril  -Monitor vital signs every 4 hours     GERD  -PPI, Pepcid      Disposition: Admit to [facility], Dr. Melendrez    This note will serve as a transfer summary.    Rocio Healy PA-C 01/08/24 10:41 EST    I have worn appropriate PPE during this patient encounter, sanitized my hands both with entering and exiting patient's room.      60 minutes has been spent by [facility] providers in the care of this patient while under observation status

## 2024-01-08 NOTE — CONSULTS
Group: [facility]         CONSULT NOTE    Patient Identification:  Khalida Gilliland  68 y.o.  female  1955  [id number]            Requesting physician: Dr. Medina    Reason for Consultation:  SOA, RSV, requiring supplemental oxygen    CC: persistent cough, shortness of breath, chest tightness    History of Present Illness:  Khalida Gilliland is a 68 year old female with a past medical history of diabetes mellitus type 2, GERD, hyperlipidemia, hypertension, and a history of brain aneurysm status post clipping (2003 and 2017).  She also reports a prior diagnosis of asthma-maintained on Symbicort and albuterol.    She presented to the emergency department with complaints of shortness of breath and persistent upper respiratory symptoms.  Symptom onset over a week prior to arrival-patient had been seen at [facility], her primary care provider, and previously at the [facility] emergency department.  All viral swabs had been negative and her chest x-ray remained clear.  She has been taking steroids, antibiotics and breathing treatments at home without improvement in symptoms.  Reports cough has been persistent and keeping her awake at night.  ED evaluation included respiratory viral panel positive for RSV.     Patient was admitted to observation for asthma exacerbation and RSV infection.  Since arrival to hospital, patient has had increasing oxygenation requirements.       Review of Systems:  CONSTITUTIONAL:  + fever, chills, fatigue  EYE:  No new vision changes  EAR:  No change in hearing  CARDIAC:  No chest pain  PULMONARY:  + productive cough, shortness of breath, chest tightness  GI:  No diarrhea, hematemesis or hematochezia,  RENAL:  No dysuria or urinary frequency  MUSCULOSKELETAL:  +muscle aches  ENDOCRINE:  No heat or cold intolerance  INTEGUMENTARY: No skin rashes  NEUROLOGICAL:  No dizziness or confusion.  No seizure activity  PSYCHIATRIC:  No new anxiety or depression  12 system  "review of systems performed and all else negative     Past Medical History:  Past Medical History:   Diagnosis Date    Abdominal pain, right lower quadrant     Anemia     Asthma     Brain aneurysm      and 2017    Cancer 2014    Basal Cell Carcinoma Left Arm,SKIN CANCER    COVID-19 2021    Cyst of left kidney     Diabetes mellitus     Tyoe 2 diabetic    Diverticulosis     Fatty liver     GERD (gastroesophageal reflux disease)     History of kidney stones 2011    History of surgery for cerebral aneurysm     Clipping of cerebral Aneurysm    Hx of migraines     Hyperlipidemia     Hypertension     Insomnia     Neck pain     Peptic ulceration     Pneumonia     PONV (postoperative nausea and vomiting)     Stroke     \"years ago\" TIA no after effects       Past Surgical History:  Past Surgical History:   Procedure Laterality Date    APPENDECTOMY      Emergency at time of 2nd     BASAL CELL CARCINOMA EXCISION Left 2014    Excision basal cell carcinoma, left arm (1.1 cm) with frozen section control and layered wound closure ( 3.1 cm), Dr. Isael Andrade, [facility]    BRAIN SURGERY      Ruptured aneurysm, clipped/Dr. Sims    CEREBRAL ANEURYSM REPAIR      clipping of cerebral aneurysm    CEREBRAL ANGIOGRAM N/A 2017    Procedure: CEREBRAL ANGIOGRAM ;  Surgeon: Orlando Bella MD;  Location: [facility] OR ;  Service:     CEREBRAL ANGIOGRAM N/A 10/11/2017    Procedure: CEREBRAL ANGIOGRAM;  Surgeon: Orlando Bella MD;  Location: [facility] OR ;  Service:      SECTION  , ,     CHOLECYSTECTOMY      COLON RESECTION WITH COLOSTOMY Right     COLONOSCOPY  2009    COLONOSCOPY N/A 2020    Procedure: COLONOSCOPY to terminal ileum;  Surgeon: Luiza Norris MD;  Location: [facility] ENDOSCOPY;  Service: Gastroenterology;  Laterality: N/A;  PREOP/ SCREENING  POSTOP/ DIVERTICULOSIS. POOR PREP    COLONOSCOPY N/A 2023    Procedure: COLONOSCOPY TO CECUM " WITH COLD BX POLYPECTOMIES AND COLD SNARE POLYPECTOMY;  Surgeon: Luiza Norris MD;  Location: [facility] ENDOSCOPY;  Service: Gastroenterology;  Laterality: N/A;  PRE OP - SCREENING  POST OP - COLON POLYPS, DIVERTICULOSIS, HEMORRHOIDS    EMBOLIZATION CEREBRAL N/A 5/8/2017    Procedure: Cerebral angiography and embolization of cerebral aneurysm with Pipeline Embolization Device;  Surgeon: Callum Bella MD;  Location: [facility] OR 18/19;  Service:     EMBOLIZATION CEREBRAL N/A 10/31/2018    Procedure: Cerebral angiogram with embolization of cerebral aneurysm;  Surgeon: Callum Bella MD;  Location: [facility] OR 18/19;  Service: Neurosurgery    ILEOSTOMY REVISION      INCISIONAL HERNIA REPAIR  6/229    Patient suffered some nerve entrapment secondary to the attack, some of which were removed during a secondary operation.    INGUINAL HERNIA REPAIR      LARYNX SURGERY      OSTOMY TAKE DOWN      TUBAL ABDOMINAL LIGATION      URETERAL STENT INSERTION  06/2011    Due to kidney stones        Home Meds:  Medications Prior to Admission   Medication Sig Dispense Refill Last Dose    albuterol sulfate  (90 Base) MCG/ACT inhaler Inhale 2 puffs Every 4 (Four) Hours As Needed for Wheezing or Shortness of Air (Cough). 6.7 g 9 1/7/2024    albuterol sulfate  (90 Base) MCG/ACT inhaler INHALE 2 PUFFS EVERY 4 HOURS AS NEEDED 8.5 g 1 1/7/2024    albuterol sulfate  (90 Base) MCG/ACT inhaler Inhale 2 puffs Every 4 (Four) Hours As Needed for Wheezing or Shortness of Air. 6.7 g 0 1/7/2024    Ascorbic Acid (VITAMIN C PO) Take 1,000 mg by mouth Daily. PT INSTRUCTED TO CONTINUE PER DR. CALLUM BELLA   1/7/2024    aspirin 81 MG EC tablet Take 1 tablet by mouth Daily. 30 tablet 6 1/7/2024    atorvastatin (LIPITOR) 20 MG tablet    1/7/2024    azelastine (ASTELIN) 0.1 % nasal spray USE 1 SPRAY IN EACH NOSTRIL TWICE A DAY AS NEEDED FOR RHINITIS AS DIRECTED 30 mL 6 1/7/2024    azithromycin (Zithromax Z-Aubrey) 250 MG  tablet Take 2 tablets by mouth on day 1, then 1 tablet daily on days 2-5 6 tablet 0 1/6/2024    benzonatate (Tessalon Perles) 100 MG capsule Take 1 capsule by mouth 3 (Three) Times a Day As Needed for Cough. 60 capsule 0 1/7/2024    Biotin 86484 MCG tablet Take 1 tablet by mouth Every Evening.   1/7/2024    BIOTIN PO Take 1 tablet by mouth Daily.   1/7/2024    brompheniramine-pseudoephedrine-DM 30-2-10 MG/5ML syrup Take 5 mL by mouth 4 (Four) Times a Day As Needed for Cough or Congestion. 118 mL 0 1/7/2024    budesonide-formoterol (SYMBICORT) 160-4.5 MCG/ACT inhaler Inhale 2 puffs 2 (Two) Times a Day.   1/7/2024    butalbital-acetaminophen-caffeine (FIORICET, ESGIC) -40 MG per tablet Take 1 tablet by mouth 2 (Two) Times a Day As Needed for Headache. 30 tablet 0 Past Month    cefdinir (OMNICEF) 300 MG capsule Take 1 capsule by mouth 2 (Two) Times a Day for 6 days. 12 capsule 0 1/6/2024    cholecalciferol (VITAMIN D3) 1000 units tablet Take 1 tablet by mouth Daily. PER PT TO CONTINUE PER DR. CALLUM URBINA   1/7/2024    Coenzyme Q10 (CO Q 10 PO) Take 100 mg by mouth Every Evening.   1/7/2024    Cyanocobalamin (VITAMIN B 12 PO) Take 5,000 mcg by mouth Every Night.   1/7/2024    cycloSPORINE (RESTASIS) 0.05 % ophthalmic emulsion Administer 1 drop to both eyes 2 (Two) Times a Day.   1/7/2024    diphenhydrAMINE-acetaminophen (TYLENOL PM)  MG tablet per tablet Take 1 tablet by mouth At Night As Needed for Sleep.   1/6/2024    fluconazole (Diflucan) 150 MG tablet Take 1 tablet by mouth Every 72 (Seventy-Two) Hours As Needed (yeast infection). 2 tablet 0 Past Month    fluocinonide (LIDEX) 0.05 % cream    Past Week    fluticasone (FLONASE) 50 MCG/ACT nasal spray USE 2 SPRAYS IN EACH NOSTRIL DAILY 16 g 11 Past Week    Ginkgo Biloba 40 MG tablet Take 120 mg by mouth Daily.   1/7/2024    glipizide (glipiZIDE XL) 5 MG ER tablet Take 1 tablet by mouth Daily. 90 tablet 3 1/7/2024    glucosamine-chondroitin 500-400 MG  capsule capsule Take 1 capsule by mouth 2 (Two) Times a Day With Meals. PT TO CONTINUE PER DR. CALLUM URBINA   1/7/2024    glucose blood (FREESTYLE LITE) test strip PT to use once daily 100 each 12 1/7/2024    hydroCHLOROthiazide (HYDRODIURIL) 25 MG tablet Take 1 tablet by mouth Daily. 90 tablet 3 1/7/2024    ipratropium (ATROVENT) 0.06 % nasal spray 2 sprays 2 (Two) Times a Day.   Past Week    Lancets (freestyle) lancets Pt tests daily 100 each 6 1/7/2024    lisinopril (PRINIVIL,ZESTRIL) 10 MG tablet Take 1 tablet by mouth Daily. (Patient taking differently: Take 1 tablet by mouth 2 (Two) Times a Day.) 90 tablet 3 1/7/2024    methylPREDNISolone (MEDROL) 4 MG dose pack Take as directed on package instructions. 21 tablet 0 1/7/2024    Misc Natural Products (CORTISOL PO) Take  by mouth.   1/7/2024    montelukast (SINGULAIR) 10 MG tablet Take 1 tablet by mouth Every Night. 90 tablet 3 1/6/2024    multivitamin (THERAGRAN) tablet tablet Take 1 tablet by mouth Daily.   1/7/2024    multivitamin with minerals tablet tablet Take 1 tablet by mouth Daily.   1/7/2024    NYSTATIN 132119 UNIT/GM topical powder    Past Week    Omega-3 Fatty Acids (FISH OIL) 1000 MG capsule capsule Take  by mouth Daily With Breakfast.   1/7/2024    omeprazole (priLOSEC) 40 MG capsule TAKE 1 CAPSULE EVERY MORNING 90 capsule 3 1/7/2024    Potassium Gluconate 550 MG tablet Take  by mouth.   1/7/2024    predniSONE (DELTASONE) 20 MG tablet 3 po daily x 2d, then 2 po daily x 2d, then 1 po daily x 2d. 12 tablet 0 1/7/2024    pregabalin (LYRICA) 100 MG capsule TAKE 1 CAPSULE BY MOUTH THREE TIMES DAILY 270 capsule 1 1/7/2024    Probiotic Product (PROBIOTIC-10 PO) Take  by mouth.   1/7/2024    promethazine-dextromethorphan (PROMETHAZINE-DM) 6.25-15 MG/5ML syrup Take 5 mL by mouth 4 (Four) Times a Day As Needed for Cough. Falls precaution 120 mL 1 Past Week    promethazine-dextromethorphan (PROMETHAZINE-DM) 6.25-15 MG/5ML syrup Take 5 mL by mouth 4 (Four)  Times a Day As Needed for Cough for up to 6 days. 120 mL 0 Past Week    propranolol (INDERAL) 40 MG tablet Take 1 tablet by mouth 2 (Two) Times a Day. 180 tablet 3 2024    raNITIdine (ZANTAC) 75 MG tablet Take 2 tablets by mouth 2 (Two) Times a Day.   2024    SITagliptin (Januvia) 100 MG tablet Take 1 tablet by mouth Daily. 90 tablet 3 2024    Spacer/Aero-Holding Chambers device Use with all inhaled treatments 1 each 0 2024    traZODone (DESYREL) 50 MG tablet Take 1 tablet by mouth Every Night. 90 tablet 1 2024    zolpidem (Ambien) 5 MG tablet Take 1 tablet by mouth At Night As Needed for Sleep. 30 tablet 0 2024    erythromycin (ROMYCIN) 5 MG/GM ophthalmic ointment Administer  to both eyes 2 (Two) Times a Day. Apply 0.5 inch two times daily for 5-7 days. 3.5 g 0 Unknown    Scopolamine 1 MG/3DAYS patch Place 1 patch on the skin as directed by provider Every 72 (Seventy-Two) Hours. 4 each 0 Unknown       Allergies:  Allergies   Allergen Reactions    Contrast Dye (Echo Or Unknown Ct/Mr) Shortness Of Breath    Iodinated Contrast Media Shortness Of Breath    Codeine Nausea Only    Methylprednisolone Anxiety    Other Other (See Comments)     Bamlanivimab infusion- muscle aches, joint pain, nausea    Ciprofloxacin Hcl Rash       Social History:   Social History     Socioeconomic History    Marital status:      Spouse name: Roe    Number of children: 3    Years of education: College   Tobacco Use    Smoking status: Former     Years: 1     Types: Cigarettes     Quit date:      Years since quittin.0     Passive exposure: Past    Smokeless tobacco: Never   Vaping Use    Vaping Use: Never used   Substance and Sexual Activity    Alcohol use: Not Currently    Drug use: No    Sexual activity: Defer       Family History:  Family History   Problem Relation Age of Onset    Skin cancer Mother     Dementia Mother     KALYAN disease Mother     Heart disease Father     Heart attack Father      "Hypertension Father     Aneurysm Son         cerebral    Nephrolithiasis Son     Suicide Attempts Brother     Malig Hyperthermia Neg Hx        Physical Exam:  /77 (BP Location: Right arm, Patient Position: Lying)   Pulse 73   Temp 98.2 °F (36.8 °C) (Oral)   Resp 18   Ht 172.7 cm (68\")   Wt 94.8 kg (209 lb)   LMP  (LMP Unknown)   SpO2 94%   BMI 31.78 kg/m²  Body mass index is 31.78 kg/m². 94% 94.8 kg (209 lb)    Constitutional: Middle aged pt in bed, no accessory muscle use, frequent congested coughing  Head: - NCAT  Eyes: No pallor, Anicteric conjunctiva, EOMI.  ENMT:  Mallampati 3, no oral thrush. Dry MM.   NECK: Trachea midline, No thyromegaly, no palpable cervical LNpathy  Heart: RRR, no murmur. No pedal edema   Lungs: MEME +, Diminished mild exp wheezes  Abdomen: Soft. No tenderness, guarding or rigidity. No palpable masses  Extremities: Extremities warm and well perfused. No cyanosis/ clubbing  Neuro: Conscious, answers appropriately, no gross focal neuro deficits  Psych: Mood and affect appropriate    PPE recommended per [facility] infectious disease Isolation protocol for the current clinical scenario(as mentioned below) was followed.      LABS:  COVID19   Date Value Ref Range Status   01/07/2024 Not Detected Not Detected - Ref. Range Final       Lab Results   Component Value Date    CALCIUM 9.7 01/07/2024     Results from last 7 days   Lab Units 01/07/24  0352 01/04/24  1249 01/04/24  1249 01/04/24  1201 01/02/24  0737   MAGNESIUM mg/dL  --   --   --   --  1.8   SODIUM mmol/L 137  --  135*  --   --    POTASSIUM mmol/L 3.8  --  4.4  --   --    CHLORIDE mmol/L 98  --  100  --   --    CO2 mmol/L 27.5  --  23.7  --   --    BUN mg/dL 23  --  20  --   --    CREATININE mg/dL 0.76  --  0.57  --   --    GLUCOSE mg/dL 131*   < > 267*  --   --    CALCIUM mg/dL 9.7  --  9.7  --   --    WBC 10*3/mm3 8.84  --   --  10.50 7.51   HEMOGLOBIN g/dL 14.3  --   --  13.7 13.8   PLATELETS 10*3/mm3 271  --   --  " 236 227   ALT (SGPT) U/L  --   --  52*  --   --    AST (SGOT) U/L  --   --  43*  --   --    PROBNP pg/mL  --   --  106.1  --   --     < > = values in this interval not displayed.     Lab Results   Component Value Date    TROPONINI <0.010 01/02/2024    TROPONINT <6 01/07/2024     Results from last 7 days   Lab Units 01/07/24  0352 01/04/24  1249 01/02/24  0737   TROPONIN I ng/mL  --   --  <0.010   HSTROP T ng/L <6 <6  --              Results from last 7 days   Lab Units 01/07/24  0405   MODALITY  Room Air     Results from last 7 days   Lab Units 01/07/24  0933   ADENOVIRUS DETECTION BY PCR  Not Detected   CORONAVIRUS 229E  Not Detected   CORONAVIRUS HKU1  Not Detected   CORONAVIRUS NL63  Not Detected   CORONAVIRUS OC43  Not Detected   HUMAN METAPNEUMOVIRUS  Not Detected   HUMAN RHINOVIRUS/ENTEROVIRUS  Not Detected   INFLUENZA B PCR  Not Detected   PARAINFLUENZA 1  Not Detected   PARAINFLUENZA VIRUS 2  Not Detected   PARAINFLUENZA VIRUS 3  Not Detected   PARAINFLUENZA VIRUS 4  Not Detected   BORDETELLA PERTUSSIS PCR  Not Detected   BORDETELLA PARAPERTUSSIS PCR  Not Detected   CHLAMYDOPHILA PNEUMONIAE PCR  Not Detected   MYCOPLAMA PNEUMO PCR  Not Detected   RSV, PCR  Detected*     Results from last 7 days   Lab Units 01/02/24  0737   INR INR 1.0         Lab Results   Component Value Date    TSH 2.390 10/17/2023     Estimated Creatinine Clearance: 85.3 mL/min (by C-G formula based on SCr of 0.76 mg/dL).         Imaging: I personally visualized the images of scans/x-rays performed within last 3 days.      Assessment:  Acute hypoxemia  Asthma exacerbation  RSV bronchitis  Diabetes mellitus, type II  Hypertension  GERD    Recommendations:  Continue scheduled Symbicort twice daily and DuoNebs.  Symptomatic treatment for RSV including guaifenesin and OPEP to help with mucus clearance.  Maintain SpO2 greater than 90%, wean oxygen as tolerated.      Patient was placed in face mask upon entering room and kept mask on throughout  our encounter. I wore full protective equipment throughout this patient encounter including a face mask, gown and gloves. Hand hygiene was performed before donning protective equipment and after removal when leaving the room.    Lucila Daley, APRN  1/8/2024  04:31 EST      Much of this encounter note is an electronic transcription/translation of spoken language to printed text using Dragon Software.

## 2024-01-09 LAB
ANION GAP SERPL CALCULATED.3IONS-SCNC: 12.2 MMOL/L (ref 5–15)
BASOPHILS # BLD AUTO: 0.01 10*3/MM3 (ref 0–0.2)
BASOPHILS NFR BLD AUTO: 0.1 % (ref 0–1.5)
BUN SERPL-MCNC: 21 MG/DL (ref 8–23)
BUN/CREAT SERPL: 31.3 (ref 7–25)
CALCIUM SPEC-SCNC: 8.9 MG/DL (ref 8.6–10.5)
CHLORIDE SERPL-SCNC: 101 MMOL/L (ref 98–107)
CO2 SERPL-SCNC: 23.8 MMOL/L (ref 22–29)
CREAT SERPL-MCNC: 0.67 MG/DL (ref 0.57–1)
DEPRECATED RDW RBC AUTO: 41.8 FL (ref 37–54)
EGFRCR SERPLBLD CKD-EPI 2021: 95.3 ML/MIN/1.73
EOSINOPHIL # BLD AUTO: 0 10*3/MM3 (ref 0–0.4)
EOSINOPHIL NFR BLD AUTO: 0 % (ref 0.3–6.2)
ERYTHROCYTE [DISTWIDTH] IN BLOOD BY AUTOMATED COUNT: 12.8 % (ref 12.3–15.4)
GLUCOSE BLDC GLUCOMTR-MCNC: 237 MG/DL (ref 70–130)
GLUCOSE BLDC GLUCOMTR-MCNC: 242 MG/DL (ref 70–130)
GLUCOSE BLDC GLUCOMTR-MCNC: 279 MG/DL (ref 70–130)
GLUCOSE BLDC GLUCOMTR-MCNC: 315 MG/DL (ref 70–130)
GLUCOSE SERPL-MCNC: 270 MG/DL (ref 65–99)
HCT VFR BLD AUTO: 39.8 % (ref 34–46.6)
HGB BLD-MCNC: 13.2 G/DL (ref 12–15.9)
IMM GRANULOCYTES # BLD AUTO: 0.07 10*3/MM3 (ref 0–0.05)
IMM GRANULOCYTES NFR BLD AUTO: 0.8 % (ref 0–0.5)
LYMPHOCYTES # BLD AUTO: 0.87 10*3/MM3 (ref 0.7–3.1)
LYMPHOCYTES NFR BLD AUTO: 9.8 % (ref 19.6–45.3)
MCH RBC QN AUTO: 30.2 PG (ref 26.6–33)
MCHC RBC AUTO-ENTMCNC: 33.2 G/DL (ref 31.5–35.7)
MCV RBC AUTO: 91.1 FL (ref 79–97)
MONOCYTES # BLD AUTO: 0.42 10*3/MM3 (ref 0.1–0.9)
MONOCYTES NFR BLD AUTO: 4.7 % (ref 5–12)
NEUTROPHILS NFR BLD AUTO: 7.55 10*3/MM3 (ref 1.7–7)
NEUTROPHILS NFR BLD AUTO: 84.6 % (ref 42.7–76)
NRBC BLD AUTO-RTO: 0 /100 WBC (ref 0–0.2)
PLATELET # BLD AUTO: 215 10*3/MM3 (ref 140–450)
PMV BLD AUTO: 10.8 FL (ref 6–12)
POTASSIUM SERPL-SCNC: 4.5 MMOL/L (ref 3.5–5.2)
RBC # BLD AUTO: 4.37 10*6/MM3 (ref 3.77–5.28)
SODIUM SERPL-SCNC: 137 MMOL/L (ref 136–145)
WBC NRBC COR # BLD AUTO: 8.92 10*3/MM3 (ref 3.4–10.8)

## 2024-01-09 PROCEDURE — 25010000002 METHYLPREDNISOLONE PER 40 MG: Performed by: NURSE PRACTITIONER

## 2024-01-09 PROCEDURE — 94760 N-INVAS EAR/PLS OXIMETRY 1: CPT

## 2024-01-09 PROCEDURE — 25010000002 ENOXAPARIN PER 10 MG: Performed by: NURSE PRACTITIONER

## 2024-01-09 PROCEDURE — 82948 REAGENT STRIP/BLOOD GLUCOSE: CPT

## 2024-01-09 PROCEDURE — 85025 COMPLETE CBC W/AUTO DIFF WBC: CPT | Performed by: STUDENT IN AN ORGANIZED HEALTH CARE EDUCATION/TRAINING PROGRAM

## 2024-01-09 PROCEDURE — 80048 BASIC METABOLIC PNL TOTAL CA: CPT | Performed by: STUDENT IN AN ORGANIZED HEALTH CARE EDUCATION/TRAINING PROGRAM

## 2024-01-09 PROCEDURE — 94761 N-INVAS EAR/PLS OXIMETRY MLT: CPT

## 2024-01-09 PROCEDURE — 94799 UNLISTED PULMONARY SVC/PX: CPT

## 2024-01-09 PROCEDURE — 63710000001 INSULIN LISPRO (HUMAN) PER 5 UNITS: Performed by: NURSE PRACTITIONER

## 2024-01-09 PROCEDURE — 94664 DEMO&/EVAL PT USE INHALER: CPT

## 2024-01-09 PROCEDURE — 25810000003 SODIUM CHLORIDE 0.9 % SOLUTION: Performed by: STUDENT IN AN ORGANIZED HEALTH CARE EDUCATION/TRAINING PROGRAM

## 2024-01-09 PROCEDURE — 63710000001 INSULIN LISPRO (HUMAN) PER 5 UNITS: Performed by: STUDENT IN AN ORGANIZED HEALTH CARE EDUCATION/TRAINING PROGRAM

## 2024-01-09 PROCEDURE — 63710000001 INSULIN GLARGINE PER 5 UNITS: Performed by: STUDENT IN AN ORGANIZED HEALTH CARE EDUCATION/TRAINING PROGRAM

## 2024-01-09 RX ORDER — INSULIN LISPRO 100 [IU]/ML
8 INJECTION, SOLUTION INTRAVENOUS; SUBCUTANEOUS
Status: DISCONTINUED | OUTPATIENT
Start: 2024-01-09 | End: 2024-01-09

## 2024-01-09 RX ORDER — ENOXAPARIN SODIUM 100 MG/ML
40 INJECTION SUBCUTANEOUS EVERY 24 HOURS
Status: DISCONTINUED | OUTPATIENT
Start: 2024-01-09 | End: 2024-01-15 | Stop reason: HOSPADM

## 2024-01-09 RX ORDER — INSULIN LISPRO 100 [IU]/ML
9 INJECTION, SOLUTION INTRAVENOUS; SUBCUTANEOUS
Status: DISCONTINUED | OUTPATIENT
Start: 2024-01-09 | End: 2024-01-10

## 2024-01-09 RX ADMIN — ASPIRIN 81 MG: 81 TABLET, COATED ORAL at 09:38

## 2024-01-09 RX ADMIN — BUTALBITAL, ACETAMINOPHEN AND CAFFEINE 1 TABLET: 325; 50; 40 TABLET ORAL at 20:24

## 2024-01-09 RX ADMIN — BUTALBITAL, ACETAMINOPHEN AND CAFFEINE 1 TABLET: 325; 50; 40 TABLET ORAL at 09:49

## 2024-01-09 RX ADMIN — ATORVASTATIN CALCIUM 20 MG: 20 TABLET, FILM COATED ORAL at 20:02

## 2024-01-09 RX ADMIN — TRAZODONE HYDROCHLORIDE 50 MG: 50 TABLET ORAL at 20:02

## 2024-01-09 RX ADMIN — Medication 10 ML: at 20:06

## 2024-01-09 RX ADMIN — FAMOTIDINE 20 MG: 20 TABLET, FILM COATED ORAL at 06:32

## 2024-01-09 RX ADMIN — METHYLPREDNISOLONE SODIUM SUCCINATE 40 MG: 40 INJECTION INTRAMUSCULAR; INTRAVENOUS at 17:37

## 2024-01-09 RX ADMIN — INSULIN GLARGINE 18 UNITS: 100 INJECTION, SOLUTION SUBCUTANEOUS at 20:24

## 2024-01-09 RX ADMIN — PREGABALIN 100 MG: 100 CAPSULE ORAL at 09:38

## 2024-01-09 RX ADMIN — CYCLOSPORINE 1 DROP: 0.5 EMULSION OPHTHALMIC at 20:03

## 2024-01-09 RX ADMIN — BENZONATATE 100 MG: 100 CAPSULE ORAL at 09:49

## 2024-01-09 RX ADMIN — IPRATROPIUM BROMIDE AND ALBUTEROL SULFATE 3 ML: 2.5; .5 SOLUTION RESPIRATORY (INHALATION) at 14:46

## 2024-01-09 RX ADMIN — Medication 10 ML: at 09:40

## 2024-01-09 RX ADMIN — INSULIN LISPRO 9 UNITS: 100 INJECTION, SOLUTION INTRAVENOUS; SUBCUTANEOUS at 17:34

## 2024-01-09 RX ADMIN — INSULIN LISPRO 7 UNITS: 100 INJECTION, SOLUTION INTRAVENOUS; SUBCUTANEOUS at 09:40

## 2024-01-09 RX ADMIN — PROPRANOLOL HYDROCHLORIDE 40 MG: 40 TABLET ORAL at 09:39

## 2024-01-09 RX ADMIN — INSULIN LISPRO 12 UNITS: 100 INJECTION, SOLUTION INTRAVENOUS; SUBCUTANEOUS at 12:10

## 2024-01-09 RX ADMIN — METHYLPREDNISOLONE SODIUM SUCCINATE 40 MG: 40 INJECTION INTRAMUSCULAR; INTRAVENOUS at 09:38

## 2024-01-09 RX ADMIN — INSULIN LISPRO 7 UNITS: 100 INJECTION, SOLUTION INTRAVENOUS; SUBCUTANEOUS at 12:10

## 2024-01-09 RX ADMIN — BUTALBITAL, ACETAMINOPHEN AND CAFFEINE 1 TABLET: 325; 50; 40 TABLET ORAL at 15:21

## 2024-01-09 RX ADMIN — BUDESONIDE AND FORMOTEROL FUMARATE DIHYDRATE 2 PUFF: 160; 4.5 AEROSOL RESPIRATORY (INHALATION) at 20:30

## 2024-01-09 RX ADMIN — HYDROCODONE POLISTIREX AND CHLORPHENIRAMINE POLISTIREX 5 ML: 10; 8 SUSPENSION, EXTENDED RELEASE ORAL at 00:53

## 2024-01-09 RX ADMIN — PREGABALIN 100 MG: 100 CAPSULE ORAL at 20:01

## 2024-01-09 RX ADMIN — METHYLPREDNISOLONE SODIUM SUCCINATE 40 MG: 40 INJECTION INTRAMUSCULAR; INTRAVENOUS at 00:20

## 2024-01-09 RX ADMIN — ZOLPIDEM TARTRATE 5 MG: 5 TABLET ORAL at 22:14

## 2024-01-09 RX ADMIN — SODIUM CHLORIDE 100 ML/HR: 9 INJECTION, SOLUTION INTRAVENOUS at 15:20

## 2024-01-09 RX ADMIN — INSULIN LISPRO 8 UNITS: 100 INJECTION, SOLUTION INTRAVENOUS; SUBCUTANEOUS at 09:38

## 2024-01-09 RX ADMIN — IPRATROPIUM BROMIDE 2 SPRAY: 42 SPRAY, METERED NASAL at 09:40

## 2024-01-09 RX ADMIN — LISINOPRIL 10 MG: 10 TABLET ORAL at 09:39

## 2024-01-09 RX ADMIN — HYDROCHLOROTHIAZIDE 25 MG: 25 TABLET ORAL at 09:39

## 2024-01-09 RX ADMIN — FLUTICASONE PROPIONATE 2 SPRAY: 50 SPRAY, METERED NASAL at 09:39

## 2024-01-09 RX ADMIN — PROPRANOLOL HYDROCHLORIDE 40 MG: 40 TABLET ORAL at 20:02

## 2024-01-09 RX ADMIN — PANTOPRAZOLE SODIUM 40 MG: 40 TABLET, DELAYED RELEASE ORAL at 06:32

## 2024-01-09 RX ADMIN — LISINOPRIL 10 MG: 10 TABLET ORAL at 20:24

## 2024-01-09 RX ADMIN — GUAIFENESIN 1200 MG: 600 TABLET, EXTENDED RELEASE ORAL at 20:02

## 2024-01-09 RX ADMIN — IPRATROPIUM BROMIDE AND ALBUTEROL SULFATE 3 ML: 2.5; .5 SOLUTION RESPIRATORY (INHALATION) at 23:50

## 2024-01-09 RX ADMIN — CYCLOSPORINE 1 DROP: 0.5 EMULSION OPHTHALMIC at 09:39

## 2024-01-09 RX ADMIN — FAMOTIDINE 20 MG: 20 TABLET, FILM COATED ORAL at 17:35

## 2024-01-09 RX ADMIN — MONTELUKAST SODIUM 10 MG: 10 TABLET, FILM COATED ORAL at 20:24

## 2024-01-09 RX ADMIN — PREGABALIN 100 MG: 100 CAPSULE ORAL at 17:35

## 2024-01-09 RX ADMIN — IPRATROPIUM BROMIDE AND ALBUTEROL SULFATE 3 ML: 2.5; .5 SOLUTION RESPIRATORY (INHALATION) at 11:08

## 2024-01-09 RX ADMIN — GUAIFENESIN 1200 MG: 600 TABLET, EXTENDED RELEASE ORAL at 09:39

## 2024-01-09 RX ADMIN — ACETAMINOPHEN 650 MG: 325 TABLET, FILM COATED ORAL at 15:21

## 2024-01-09 RX ADMIN — INSULIN LISPRO 16 UNITS: 100 INJECTION, SOLUTION INTRAVENOUS; SUBCUTANEOUS at 17:34

## 2024-01-09 RX ADMIN — BUDESONIDE AND FORMOTEROL FUMARATE DIHYDRATE 2 PUFF: 160; 4.5 AEROSOL RESPIRATORY (INHALATION) at 07:01

## 2024-01-09 RX ADMIN — IPRATROPIUM BROMIDE AND ALBUTEROL SULFATE 3 ML: 2.5; .5 SOLUTION RESPIRATORY (INHALATION) at 20:30

## 2024-01-09 RX ADMIN — IPRATROPIUM BROMIDE AND ALBUTEROL SULFATE 3 ML: 2.5; .5 SOLUTION RESPIRATORY (INHALATION) at 07:03

## 2024-01-09 RX ADMIN — ENOXAPARIN SODIUM 40 MG: 100 INJECTION SUBCUTANEOUS at 17:35

## 2024-01-09 NOTE — PROGRESS NOTES
"  Daily Progress Note.   [address]  1/9/2024    Patient:  Name:  Khalida Gilliland  MRN:  [id number]  1955  68 y.o.  female         CC/reason for consultation RSV bronchitis/asthma acute exacerbation    Interval History:  Afebrile weaned from 2 to 3 L nasal cannula to room air.  Blood pressure stable.  Awake alert no lethargy no confusion.  Tolerating a diet no emesis.  Consistent cough. Very bothersome.  Anxious  Not much sputum    Physical Exam:  /82 (BP Location: Right arm, Patient Position: Lying)   Pulse 74   Temp 97.5 °F (36.4 °C) (Oral)   Resp 20   Ht 172.7 cm (68\")   Wt 94.8 kg (209 lb)   LMP  (LMP Unknown)   SpO2 96%   BMI 31.78 kg/m²   Body mass index is 31.78 kg/m².  No intake or output data in the 24 hours ending 01/09/24 0916  General appearance: Nontoxic, conversant   Eyes: anicteric sclerae, moist conjunctivae; no lidlag;    HENT: Atraumatic; oropharynx clear with moist mucous membranes    Neck: Trachea midline;  supple   Lungs: CTA, with normal respiratory effort and no intercostal retractions  CV: RRR, no rub   Abdomen: Soft, nontender; BS+  Extremities: No peripheral edema   Skin: WWP no diffuse visible rash  Psych: Appropriate affect, alert   Neuro: Moves all ext. CN II-XII. Speech intact.    Data Review:  Notable Labs:  Results from last 7 days   Lab Units 01/09/24  0541 01/08/24  0354 01/07/24  0352 01/04/24  1201   WBC 10*3/mm3 8.92 9.21 8.84 10.50   HEMOGLOBIN g/dL 13.2 13.6 14.3 13.7   PLATELETS 10*3/mm3 215 219 271 236     Results from last 7 days   Lab Units 01/09/24  0541 01/08/24  1444 01/08/24  0354 01/07/24  0352 01/04/24  1249   SODIUM mmol/L 137 137 132* 137 135*   POTASSIUM mmol/L 4.5 4.6 4.3 3.8 4.4   CHLORIDE mmol/L 101 100 95* 98 100   CO2 mmol/L 23.8 22.8 21.5* 27.5 23.7   BUN mg/dL 21 25* 27* 23 20   CREATININE mg/dL 0.67 0.78 0.74 0.76 0.57   GLUCOSE mg/dL 270* 216* 363* 131* 267*   CALCIUM mg/dL 8.9 9.9 9.8 9.7 9.7   Estimated " Creatinine Clearance: 96.8 mL/min (by C-G formula based on SCr of 0.67 mg/dL).    Results from last 7 days   Lab Units 01/09/24  0541 01/08/24  0354 01/07/24  0352 01/04/24  1249   AST (SGOT) U/L  --   --   --  43*   ALT (SGPT) U/L  --   --   --  52*   PLATELETS 10*3/mm3 215 219 271  --              Imaging:  Reviewed chest images personally from past 3 days    ASSESSMENT  /  PLAN:  Acute asthma exacerbation  Acute RSV infection  Right middle lobe infiltrate  Acute hyponatremia  Increased anion gap metabolic acidosis  Diabetes mellitus type 2  Obesity      IV Solu-Medrol - continue  Scheduled DuoNebs  Symbicort  Supportive care for RSV infection  Tussionex prn cough  Repeat CT scan chest 6 to 8 weeks for right middle lobe infiltrate      Electronically signed by Jeff Cannon MD, 01/09/24, 10:53 AM EST.

## 2024-01-09 NOTE — PROGRESS NOTES
Name: Khalida Gilliland ADMIT: 2024   : 1955  PCP: Sergo Owen MD    MRN: [id number] LOS: 1 days   AGE/SEX: 68 y.o. female  ROOM: [facility]     Subjective   Subjective   Up in the bathroom when I entered.  Was not wearing any oxygen and apparently took a shower this morning.  She states that she is very tired and has not rested.  States she had a rough night as she has a persistent cough that is fairly dry.  Continues to report some intermittent dyspnea primarily with coughing spells as well as chest soreness.  Denies any nausea, vomiting or abdominal pain.  She states she is trying to drink as much as possible.  Had a bowel movement this morning.  She tells me she has not been off of the oxygen, but has been off of the oxygen since taking a shower and being up to the bathroom.  After changing the battery in the bedside monitor, patient was anywhere from 93 to 97% on room air. She reports some improvement in HA with Fioricet.     Objective   Objective   Vital Signs  Temp:  [97.5 °F (36.4 °C)-98.4 °F (36.9 °C)] 97.5 °F (36.4 °C)  Heart Rate:  [74-87] 74  Resp:  [16-22] 20  BP: (121-164)/(61-87) 121/82  SpO2:  [94 %-98 %] 96 %  on  Flow (L/min):  [2-3] 2;   Device (Oxygen Therapy): room air  Body mass index is 31.78 kg/m².    Physical Exam  Vitals and nursing note reviewed.   Constitutional:       Appearance: She is ill-appearing. She is not toxic-appearing.   HENT:      Head: Normocephalic and atraumatic.   Cardiovascular:      Rate and Rhythm: Normal rate and regular rhythm.      Pulses: Normal pulses.   Pulmonary:      Effort: Pulmonary effort is normal.      Comments: Diminished bilaterally but improved from yesterday. No wheezing or rhonchi.  Abdominal:      General: Bowel sounds are normal. There is no distension.      Palpations: Abdomen is soft.      Tenderness: There is no abdominal tenderness.   Musculoskeletal:         General: No swelling. Normal range of motion.      Cervical back: Normal  range of motion and neck supple.   Skin:     General: Skin is warm and dry.      Findings: No bruising.   Neurological:      General: No focal deficit present.      Mental Status: She is alert and oriented to person, place, and time.      Sensory: No sensory deficit.      Motor: Weakness present.      Coordination: Coordination normal.   Psychiatric:         Mood and Affect: Mood is anxious.        Behavior: Behavior normal.     Results Review:       I reviewed the patient's new clinical results.  Results from last 7 days   Lab Units 01/09/24  0541 01/08/24  0354 01/07/24  0352 01/04/24  1201   WBC 10*3/mm3 8.92 9.21 8.84 10.50   HEMOGLOBIN g/dL 13.2 13.6 14.3 13.7   PLATELETS 10*3/mm3 215 219 271 236     Results from last 7 days   Lab Units 01/09/24  0541 01/08/24  1444 01/08/24  0354 01/07/24  0352   SODIUM mmol/L 137 137 132* 137   POTASSIUM mmol/L 4.5 4.6 4.3 3.8   CHLORIDE mmol/L 101 100 95* 98   CO2 mmol/L 23.8 22.8 21.5* 27.5   BUN mg/dL 21 25* 27* 23   CREATININE mg/dL 0.67 0.78 0.74 0.76   GLUCOSE mg/dL 270* 216* 363* 131*   Estimated Creatinine Clearance: 96.8 mL/min (by C-G formula based on SCr of 0.67 mg/dL).  Results from last 7 days   Lab Units 01/04/24  1249   ALBUMIN g/dL 4.4   BILIRUBIN mg/dL 0.4   ALK PHOS U/L 66   AST (SGOT) U/L 43*   ALT (SGPT) U/L 52*     Results from last 7 days   Lab Units 01/09/24  0541 01/08/24  1444 01/08/24  0354 01/07/24  0352 01/04/24  1249   CALCIUM mg/dL 8.9 9.9 9.8 9.7 9.7   ALBUMIN g/dL  --   --   --   --  4.4       Hemoglobin A1C   Date/Time Value Ref Range Status   01/08/2024 0354 8.70 (H) 4.80 - 5.60 % Final     Glucose   Date/Time Value Ref Range Status   01/09/2024 0613 242 (H) 70 - 130 mg/dL Final   01/08/2024 2200 347 (H) 70 - 130 mg/dL Final   01/08/2024 1702 143 (H) 70 - 130 mg/dL Final   01/08/2024 1121 367 (H) 70 - 130 mg/dL Final   01/08/2024 0822 352 (H) 70 - 130 mg/dL Final   01/07/2024 1936 478 (C) 70 - 130 mg/dL Final   01/07/2024 1638 452 (C) 70 -  130 mg/dL Final       aspirin, 81 mg, Oral, Daily  atorvastatin, 20 mg, Oral, Nightly  budesonide-formoterol, 2 puff, Inhalation, BID - RT  cycloSPORINE, 1 drop, Both Eyes, BID  famotidine, 20 mg, Oral, BID AC  fluticasone, 2 spray, Each Nare, Daily  guaiFENesin, 1,200 mg, Oral, Q12H  hydroCHLOROthiazide, 25 mg, Oral, Daily  insulin glargine, 10 Units, Subcutaneous, Nightly  insulin lispro, 4-19 Units, Subcutaneous, 4x Daily AC & at Bedtime  insulin lispro, 7 Units, Subcutaneous, TID With Meals  ipratropium, 2 spray, Each Nare, BID  ipratropium-albuterol, 3 mL, Nebulization, Q4H - RT  lisinopril, 10 mg, Oral, BID  methylPREDNISolone sodium succinate, 40 mg, Intravenous, Q8H  montelukast, 10 mg, Oral, Nightly  pantoprazole, 40 mg, Oral, Q AM  pregabalin, 100 mg, Oral, TID  propranolol, 40 mg, Oral, BID  sodium chloride, 10 mL, Intravenous, Q12H  traZODone, 50 mg, Oral, Nightly      sodium chloride, 100 mL/hr, Last Rate: 100 mL/hr (01/08/24 2041)    Diet: Diabetic Diets; Consistent Carbohydrate; Texture: Regular Texture (IDDSI 7); Fluid Consistency: Thin (IDDSI 0)       Assessment/Plan     Active Hospital Problems    Diagnosis  POA    **Asthma exacerbation [J45.901]  Yes    Acute respiratory failure with hypoxia [J96.01]  Yes    RSV (respiratory syncytial virus infection) [B33.8]  Yes    Abnormal CT of the chest [R93.89]  Yes    Obesity (BMI 30-39.9) [E66.9]  Yes    Diabetic peripheral neuropathy [E11.42]  Yes    Headache [R51.9]  Yes    GERD (gastroesophageal reflux disease) [K21.9]  Yes    Type 2 diabetes mellitus with hyperglycemia, without long-term current use of insulin [E11.65]  Yes    Essential hypertension [I10]  Yes    Other hyperlipidemia [E78.49]  Yes      Resolved Hospital Problems   No resolved problems to display.     Ms. Gilliland is a 68 y.o. female that presented to emergency room with complaints of dyspnea and persistent upper respiratory symptoms.  She apparently had symptoms starting a week prior  to arrival and was seen at [facility] and a freestanding emergency room at [facility] where viral swabs have been negative and chest x-ray was clear.  She had been given steroids and antibiotics as well as breathing treatments at home without improvement.  RVP in the emergency room revealed positive for RSV infection.  Pulmonology was consulted for likely asthma exacerbation.  She was placed in the observation unit for ongoing hypoxia and treatment with IV steroids.  Unfortunately she did start to develop some mild acidosis in the setting of DM2 on the morning of 1/8 prompting further evaluation in the hospital setting.  [facility] was consulted to assume care outside of the observation unit.     Asthma exacerbation  Acute hypoxic respiratory failure  RSV infection  -Pulmonology following.  -IV steroids resumed 1/8 once sugars better controlled. On IV solumedrol 40 mg every 8 hours.  -CTA chest with no PE, but did have an area of atelectasis or possible consolidation in the right middle lobe that was new since 2020.  Possible inflammatory change, but will need repeat CT scan in 4 to 6 weeks.  -On Mucinex. Encouraged use of Tussionex which was ordered 1/8. IS at bedside.  -Wean oxygen- currently doing well on RA.  -No signs of secondary bacterial infection as this point.     Type 2 diabetes  Diabetic peripheral neuropathy  -Sugars better today. Required approximately 64 units of insulin yesterday. Will increase Lantus to 15 units nightly (fasting sugar 270) and increase meal-time to 8 units TID with continued use of SS. Sugars still averaging 250 or above.  -Continue IVF for now. Bicarb normalized as well as AG.     Hypertension  -BP currently stable on propranolol, lisinopril and HCTZ.  -Monitor renal function.     GERD  -Continue PPI.     Hyperlipidemia  -Continue statin.     Headache  -No neurological deficits.  -Continue supportive care.  -Continue Fioricet PRN.     Discussed with patient.     VTE prevention: SCDs  Code  status: Full code  Disposition - Anticipate discharge next 1-2 days.      TATE Alvarez  [facility]  01/09/24  10:36 EST

## 2024-01-09 NOTE — CASE MANAGEMENT/SOCIAL WORK
"Discharge Planning Assessment  [facility]     Patient Name: Khalida Gilliland  MRN: [id number]  Today's Date: 1/9/2024    Admit Date: 1/7/2024    Plan: Home w/o needs   Discharge Needs Assessment       Row Name 01/09/24 1113       Living Environment    People in Home spouse    Name(s) of People in Home Spouse: Roe Gilliland    Current Living Arrangements home    Potentially Unsafe Housing Conditions none    Primary Care Provided by self    Provides Primary Care For no one    Family Caregiver if Needed spouse    Family Caregiver Names Spouse: Roe Gilliland    Quality of Family Relationships supportive    Able to Return to Prior Arrangements yes       Resource/Environmental Concerns    Resource/Environmental Concerns none    Transportation Concerns none       Transition Planning    Patient/Family Anticipates Transition to home with family    Patient/Family Anticipated Services at Transition none    Transportation Anticipated family or friend will provide       Discharge Needs Assessment    Readmission Within the Last 30 Days no previous admission in last 30 days    Equipment Currently Used at Home none    Concerns to be Addressed denies needs/concerns at this time;adjustment to diagnosis/illness;basic needs    Anticipated Changes Related to Illness none    Equipment Needed After Discharge none                   Discharge Plan       Row Name 01/09/24 1106       Plan    Plan Home w/o needs    Provided Post Acute Provider List? Refused    Refused Provider List Comment Pt said she will be fine at dischgarge and declined the HH / SNF list    Plan Comments Pt confirmed the address, PCP and [facility] are correct, pt said she lives in a single level home  with a couple of steps to enter \"I'm fine, I don't need anything\".  Pt said her  is in good health and is able to assist if needed, she said her brother who has cancer lives with them but he can care for himself.  Pt said she uses no DME, had home " health years ago when she had an ileostomy & has never been to a SNF. Pt said she plans to return home at discharge and denies discharge needs or concerns.    Sandra Matamoros RN                  Continued Care and Services - Admitted Since 1/7/2024    Coordination has not been started for this encounter.       Expected Discharge Date and Time       Expected Discharge Date Expected Discharge Time    Jan 11, 2024            Demographic Summary       Row Name 01/09/24 1112       General Information    Admission Type inpatient    Arrived From home    Required Notices Provided Important Message from Medicare    Referral Source admission list    Reason for Consult discharge planning    Preferred Language English       Contact Information    Permission Granted to Share Info With family/designee                   Functional Status       Row Name 01/09/24 1112       Functional Status, IADL    Medications independent    Meal Preparation independent    Housekeeping independent    Laundry independent    Shopping independent               Sandra Matamoros, RN

## 2024-01-09 NOTE — PLAN OF CARE
Goal Outcome Evaluation:      Pt tolerated IS fair. Mobility encouraged. Pt successfully completed shower this am with no complaints/ injuries.                                         Problem: Adult Inpatient Plan of Care  Goal: Plan of Care Review  Outcome: Unable to Meet, Plan Revised  Goal: Patient-Specific Goal (Individualized)  Outcome: Unable to Meet, Plan Revised  Goal: Absence of Hospital-Acquired Illness or Injury  Outcome: Unable to Meet, Plan Revised  Goal: Optimal Comfort and Wellbeing  Outcome: Unable to Meet, Plan Revised  Goal: Readiness for Transition of Care  Outcome: Unable to Meet, Plan Revised     Problem: Pain Acute  Goal: Acceptable Pain Control and Functional Ability  Outcome: Unable to Meet, Plan Revised     Problem: Fall Injury Risk  Goal: Absence of Fall and Fall-Related Injury  Outcome: Unable to Meet, Plan Revised

## 2024-01-09 NOTE — PLAN OF CARE
Goal Outcome Evaluation:              Outcome Evaluation: patient in room during this shift alert and oriented and able to verbalize needs, pulmonology consulted and following, patient being admittd for furthere treatment due to increase in glucose and need of steroids, c/o cough and trouble breathing during this shit and prn medication provided and effective. c/o of headache PRN medication provided, NC at 1L and patients O2 satureation above 94% throughout this shift, report called to admitting floor and spoke with RN. patient is aware of transfer and has no other questions at this time. family at bedside.

## 2024-01-10 LAB
ANION GAP SERPL CALCULATED.3IONS-SCNC: 10.8 MMOL/L (ref 5–15)
BASOPHILS # BLD AUTO: 0.01 10*3/MM3 (ref 0–0.2)
BASOPHILS NFR BLD AUTO: 0.1 % (ref 0–1.5)
BUN SERPL-MCNC: 17 MG/DL (ref 8–23)
BUN/CREAT SERPL: 25.8 (ref 7–25)
CALCIUM SPEC-SCNC: 9.5 MG/DL (ref 8.6–10.5)
CHLORIDE SERPL-SCNC: 101 MMOL/L (ref 98–107)
CO2 SERPL-SCNC: 27.2 MMOL/L (ref 22–29)
CREAT SERPL-MCNC: 0.66 MG/DL (ref 0.57–1)
DEPRECATED RDW RBC AUTO: 42.2 FL (ref 37–54)
EGFRCR SERPLBLD CKD-EPI 2021: 95.7 ML/MIN/1.73
EOSINOPHIL # BLD AUTO: 0 10*3/MM3 (ref 0–0.4)
EOSINOPHIL NFR BLD AUTO: 0 % (ref 0.3–6.2)
ERYTHROCYTE [DISTWIDTH] IN BLOOD BY AUTOMATED COUNT: 12.9 % (ref 12.3–15.4)
GLUCOSE BLDC GLUCOMTR-MCNC: 179 MG/DL (ref 70–130)
GLUCOSE BLDC GLUCOMTR-MCNC: 227 MG/DL (ref 70–130)
GLUCOSE BLDC GLUCOMTR-MCNC: 240 MG/DL (ref 70–130)
GLUCOSE BLDC GLUCOMTR-MCNC: 252 MG/DL (ref 70–130)
GLUCOSE BLDC GLUCOMTR-MCNC: 325 MG/DL (ref 70–130)
GLUCOSE SERPL-MCNC: 232 MG/DL (ref 65–99)
HCT VFR BLD AUTO: 40.4 % (ref 34–46.6)
HGB BLD-MCNC: 13.8 G/DL (ref 12–15.9)
IMM GRANULOCYTES # BLD AUTO: 0.15 10*3/MM3 (ref 0–0.05)
IMM GRANULOCYTES NFR BLD AUTO: 1.6 % (ref 0–0.5)
LYMPHOCYTES # BLD AUTO: 1.05 10*3/MM3 (ref 0.7–3.1)
LYMPHOCYTES NFR BLD AUTO: 11.3 % (ref 19.6–45.3)
MCH RBC QN AUTO: 31.1 PG (ref 26.6–33)
MCHC RBC AUTO-ENTMCNC: 34.2 G/DL (ref 31.5–35.7)
MCV RBC AUTO: 91 FL (ref 79–97)
MONOCYTES # BLD AUTO: 0.71 10*3/MM3 (ref 0.1–0.9)
MONOCYTES NFR BLD AUTO: 7.6 % (ref 5–12)
NEUTROPHILS NFR BLD AUTO: 7.39 10*3/MM3 (ref 1.7–7)
NEUTROPHILS NFR BLD AUTO: 79.4 % (ref 42.7–76)
NRBC BLD AUTO-RTO: 0 /100 WBC (ref 0–0.2)
PLATELET # BLD AUTO: 233 10*3/MM3 (ref 140–450)
PMV BLD AUTO: 10.3 FL (ref 6–12)
POTASSIUM SERPL-SCNC: 4.6 MMOL/L (ref 3.5–5.2)
RBC # BLD AUTO: 4.44 10*6/MM3 (ref 3.77–5.28)
SODIUM SERPL-SCNC: 139 MMOL/L (ref 136–145)
WBC NRBC COR # BLD AUTO: 9.31 10*3/MM3 (ref 3.4–10.8)

## 2024-01-10 PROCEDURE — 94799 UNLISTED PULMONARY SVC/PX: CPT

## 2024-01-10 PROCEDURE — 25010000002 METHYLPREDNISOLONE PER 40 MG: Performed by: NURSE PRACTITIONER

## 2024-01-10 PROCEDURE — 94664 DEMO&/EVAL PT USE INHALER: CPT

## 2024-01-10 PROCEDURE — 63710000001 INSULIN LISPRO (HUMAN) PER 5 UNITS: Performed by: STUDENT IN AN ORGANIZED HEALTH CARE EDUCATION/TRAINING PROGRAM

## 2024-01-10 PROCEDURE — 85025 COMPLETE CBC W/AUTO DIFF WBC: CPT | Performed by: STUDENT IN AN ORGANIZED HEALTH CARE EDUCATION/TRAINING PROGRAM

## 2024-01-10 PROCEDURE — 25810000003 SODIUM CHLORIDE 0.9 % SOLUTION: Performed by: STUDENT IN AN ORGANIZED HEALTH CARE EDUCATION/TRAINING PROGRAM

## 2024-01-10 PROCEDURE — 25010000002 ENOXAPARIN PER 10 MG: Performed by: NURSE PRACTITIONER

## 2024-01-10 PROCEDURE — 63710000001 INSULIN GLARGINE PER 5 UNITS: Performed by: STUDENT IN AN ORGANIZED HEALTH CARE EDUCATION/TRAINING PROGRAM

## 2024-01-10 PROCEDURE — 82948 REAGENT STRIP/BLOOD GLUCOSE: CPT

## 2024-01-10 PROCEDURE — 80048 BASIC METABOLIC PNL TOTAL CA: CPT | Performed by: STUDENT IN AN ORGANIZED HEALTH CARE EDUCATION/TRAINING PROGRAM

## 2024-01-10 RX ORDER — INSULIN LISPRO 100 [IU]/ML
10 INJECTION, SOLUTION INTRAVENOUS; SUBCUTANEOUS
Status: DISCONTINUED | OUTPATIENT
Start: 2024-01-10 | End: 2024-01-12

## 2024-01-10 RX ADMIN — GUAIFENESIN 1200 MG: 600 TABLET, EXTENDED RELEASE ORAL at 09:09

## 2024-01-10 RX ADMIN — PREGABALIN 100 MG: 100 CAPSULE ORAL at 09:10

## 2024-01-10 RX ADMIN — HYDROCHLOROTHIAZIDE 25 MG: 25 TABLET ORAL at 09:09

## 2024-01-10 RX ADMIN — INSULIN LISPRO 8 UNITS: 100 INJECTION, SOLUTION INTRAVENOUS; SUBCUTANEOUS at 01:01

## 2024-01-10 RX ADMIN — PROPRANOLOL HYDROCHLORIDE 40 MG: 40 TABLET ORAL at 23:29

## 2024-01-10 RX ADMIN — ASPIRIN 81 MG: 81 TABLET, COATED ORAL at 09:09

## 2024-01-10 RX ADMIN — INSULIN LISPRO 9 UNITS: 100 INJECTION, SOLUTION INTRAVENOUS; SUBCUTANEOUS at 13:49

## 2024-01-10 RX ADMIN — INSULIN LISPRO 8 UNITS: 100 INJECTION, SOLUTION INTRAVENOUS; SUBCUTANEOUS at 23:30

## 2024-01-10 RX ADMIN — PANTOPRAZOLE SODIUM 40 MG: 40 TABLET, DELAYED RELEASE ORAL at 05:30

## 2024-01-10 RX ADMIN — METHYLPREDNISOLONE SODIUM SUCCINATE 40 MG: 40 INJECTION INTRAMUSCULAR; INTRAVENOUS at 00:24

## 2024-01-10 RX ADMIN — BUTALBITAL, ACETAMINOPHEN AND CAFFEINE 1 TABLET: 325; 50; 40 TABLET ORAL at 13:49

## 2024-01-10 RX ADMIN — PREGABALIN 100 MG: 100 CAPSULE ORAL at 17:48

## 2024-01-10 RX ADMIN — MONTELUKAST SODIUM 10 MG: 10 TABLET, FILM COATED ORAL at 23:29

## 2024-01-10 RX ADMIN — BUDESONIDE AND FORMOTEROL FUMARATE DIHYDRATE 2 PUFF: 160; 4.5 AEROSOL RESPIRATORY (INHALATION) at 07:26

## 2024-01-10 RX ADMIN — ATORVASTATIN CALCIUM 20 MG: 20 TABLET, FILM COATED ORAL at 23:29

## 2024-01-10 RX ADMIN — TRAZODONE HYDROCHLORIDE 50 MG: 50 TABLET ORAL at 23:29

## 2024-01-10 RX ADMIN — METHYLPREDNISOLONE SODIUM SUCCINATE 40 MG: 40 INJECTION INTRAMUSCULAR; INTRAVENOUS at 23:29

## 2024-01-10 RX ADMIN — METHYLPREDNISOLONE SODIUM SUCCINATE 40 MG: 40 INJECTION INTRAMUSCULAR; INTRAVENOUS at 17:49

## 2024-01-10 RX ADMIN — FAMOTIDINE 20 MG: 20 TABLET, FILM COATED ORAL at 05:30

## 2024-01-10 RX ADMIN — HYDROCODONE POLISTIREX AND CHLORPHENIRAMINE POLISTIREX 5 ML: 10; 8 SUSPENSION, EXTENDED RELEASE ORAL at 01:20

## 2024-01-10 RX ADMIN — ENOXAPARIN SODIUM 40 MG: 100 INJECTION SUBCUTANEOUS at 13:49

## 2024-01-10 RX ADMIN — INSULIN GLARGINE 20 UNITS: 100 INJECTION, SOLUTION SUBCUTANEOUS at 23:29

## 2024-01-10 RX ADMIN — PROPRANOLOL HYDROCHLORIDE 40 MG: 40 TABLET ORAL at 09:08

## 2024-01-10 RX ADMIN — INSULIN LISPRO 16 UNITS: 100 INJECTION, SOLUTION INTRAVENOUS; SUBCUTANEOUS at 17:49

## 2024-01-10 RX ADMIN — IPRATROPIUM BROMIDE AND ALBUTEROL SULFATE 3 ML: 2.5; .5 SOLUTION RESPIRATORY (INHALATION) at 07:21

## 2024-01-10 RX ADMIN — INSULIN LISPRO 8 UNITS: 100 INJECTION, SOLUTION INTRAVENOUS; SUBCUTANEOUS at 09:09

## 2024-01-10 RX ADMIN — CYCLOSPORINE 1 DROP: 0.5 EMULSION OPHTHALMIC at 23:32

## 2024-01-10 RX ADMIN — IPRATROPIUM BROMIDE 2 SPRAY: 42 SPRAY, METERED NASAL at 09:09

## 2024-01-10 RX ADMIN — CYCLOSPORINE 1 DROP: 0.5 EMULSION OPHTHALMIC at 09:09

## 2024-01-10 RX ADMIN — LISINOPRIL 10 MG: 10 TABLET ORAL at 09:09

## 2024-01-10 RX ADMIN — INSULIN LISPRO 9 UNITS: 100 INJECTION, SOLUTION INTRAVENOUS; SUBCUTANEOUS at 09:08

## 2024-01-10 RX ADMIN — INSULIN LISPRO 10 UNITS: 100 INJECTION, SOLUTION INTRAVENOUS; SUBCUTANEOUS at 17:49

## 2024-01-10 RX ADMIN — IPRATROPIUM BROMIDE AND ALBUTEROL SULFATE 3 ML: 2.5; .5 SOLUTION RESPIRATORY (INHALATION) at 21:03

## 2024-01-10 RX ADMIN — HYDROCODONE POLISTIREX AND CHLORPHENIRAMINE POLISTIREX 5 ML: 10; 8 SUSPENSION, EXTENDED RELEASE ORAL at 13:49

## 2024-01-10 RX ADMIN — SODIUM CHLORIDE 100 ML/HR: 9 INJECTION, SOLUTION INTRAVENOUS at 00:37

## 2024-01-10 RX ADMIN — GUAIFENESIN 1200 MG: 600 TABLET, EXTENDED RELEASE ORAL at 23:29

## 2024-01-10 RX ADMIN — BUDESONIDE AND FORMOTEROL FUMARATE DIHYDRATE 2 PUFF: 160; 4.5 AEROSOL RESPIRATORY (INHALATION) at 21:04

## 2024-01-10 RX ADMIN — IPRATROPIUM BROMIDE 2 SPRAY: 42 SPRAY, METERED NASAL at 23:32

## 2024-01-10 RX ADMIN — FAMOTIDINE 20 MG: 20 TABLET, FILM COATED ORAL at 17:49

## 2024-01-10 RX ADMIN — SODIUM CHLORIDE 100 ML/HR: 9 INJECTION, SOLUTION INTRAVENOUS at 09:16

## 2024-01-10 RX ADMIN — ZOLPIDEM TARTRATE 5 MG: 5 TABLET ORAL at 23:41

## 2024-01-10 RX ADMIN — FLUTICASONE PROPIONATE 2 SPRAY: 50 SPRAY, METERED NASAL at 09:09

## 2024-01-10 RX ADMIN — INSULIN LISPRO 12 UNITS: 100 INJECTION, SOLUTION INTRAVENOUS; SUBCUTANEOUS at 13:48

## 2024-01-10 RX ADMIN — IPRATROPIUM BROMIDE AND ALBUTEROL SULFATE 3 ML: 2.5; .5 SOLUTION RESPIRATORY (INHALATION) at 11:24

## 2024-01-10 RX ADMIN — METHYLPREDNISOLONE SODIUM SUCCINATE 40 MG: 40 INJECTION INTRAMUSCULAR; INTRAVENOUS at 09:08

## 2024-01-10 RX ADMIN — IPRATROPIUM BROMIDE AND ALBUTEROL SULFATE 3 ML: 2.5; .5 SOLUTION RESPIRATORY (INHALATION) at 15:17

## 2024-01-10 RX ADMIN — PREGABALIN 100 MG: 100 CAPSULE ORAL at 23:29

## 2024-01-10 RX ADMIN — Medication 10 ML: at 09:10

## 2024-01-10 RX ADMIN — LISINOPRIL 10 MG: 10 TABLET ORAL at 23:29

## 2024-01-10 NOTE — PLAN OF CARE
Goal Outcome Evaluation:      Pt tolerated well on room air for majority of shift. Placed on 2L while asleep. Pt verbalized understanding to utilize O2 therapy as needed and applied NC to self.

## 2024-01-10 NOTE — PROGRESS NOTES
"  Daily Progress Note.   [address]  1/10/2024    Patient:  Name:  Khalida Gilliland  MRN:  [id number]  1955  68 y.o.  female         CC/reason for consultation RSV bronchitis/asthma acute exacerbation    Interval History:  Afebrile weaned room air and was startedback on 2lnc without documented reason, sats 98% on this  Still with significant coughing she says the coughing has been intermittent and been very severe at times.  Has been able to move around her room.  Slept very poorly.  This is despite Ambien as well as Tussionex.  Very loquacious  Tolerating a diet no emesis.  No lethargy no confusion almost pressured speech positive    Physical Exam:  /83 (BP Location: Left arm, Patient Position: Sitting)   Pulse 67   Temp 97.7 °F (36.5 °C) (Oral)   Resp 20   Ht 172.7 cm (68\")   Wt 94.8 kg (209 lb)   LMP  (LMP Unknown)   SpO2 98%   BMI 31.78 kg/m²   Body mass index is 31.78 kg/m².    Intake/Output Summary (Last 24 hours) at 1/10/2024 1006  Last data filed at 1/9/2024 1700  Gross per 24 hour   Intake 480 ml   Output --   Net 480 ml     General appearance: Nontoxic, conversant   Eyes: anicteric sclerae, moist conjunctivae; no lidlag;    HENT: Atraumatic; oropharynx clear with moist mucous membranes    Neck: Trachea midline;  supple   Lungs: Positive wheeze today no rhonchi, with normal respiratory effort and no intercostal retractions  CV: RRR, no rub   Abdomen: Soft, nontender; BS+ obese  Extremities: No peripheral edema   Skin: WWP no diffuse visible rash  Psych: Appropriate affect, alert   Neuro: Moves all ext. CN II-XII. Speech intact.    Data Review:  Notable Labs:  Results from last 7 days   Lab Units 01/10/24  0713 01/09/24  0541 01/08/24  0354 01/07/24  0352 01/04/24  1201   WBC 10*3/mm3 9.31 8.92 9.21 8.84 10.50   HEMOGLOBIN g/dL 13.8 13.2 13.6 14.3 13.7   PLATELETS 10*3/mm3 233 215 219 271 236     Results from last 7 days   Lab Units 01/10/24  0713 01/09/24  0541 " 01/08/24  1444 01/08/24  0354 01/07/24  0352 01/04/24  1249   SODIUM mmol/L 139 137 137 132* 137 135*   POTASSIUM mmol/L 4.6 4.5 4.6 4.3 3.8 4.4   CHLORIDE mmol/L 101 101 100 95* 98 100   CO2 mmol/L 27.2 23.8 22.8 21.5* 27.5 23.7   BUN mg/dL 17 21 25* 27* 23 20   CREATININE mg/dL 0.66 0.67 0.78 0.74 0.76 0.57   GLUCOSE mg/dL 232* 270* 216* 363* 131* 267*   CALCIUM mg/dL 9.5 8.9 9.9 9.8 9.7 9.7   Estimated Creatinine Clearance: 98.3 mL/min (by C-G formula based on SCr of 0.66 mg/dL).    Results from last 7 days   Lab Units 01/10/24  0713 01/09/24  0541 01/08/24  0354 01/07/24  0352 01/04/24  1249   AST (SGOT) U/L  --   --   --   --  43*   ALT (SGPT) U/L  --   --   --   --  52*   PLATELETS 10*3/mm3 233 215 219   < >  --     < > = values in this interval not displayed.             Imaging:  Reviewed chest images personally from past 3 days    ASSESSMENT  /  PLAN:  Acute asthma exacerbation  Acute RSV infection  Right middle lobe infiltrate  Acute hyponatremia  Increased anion gap metabolic acidosis  Diabetes mellitus type 2  Obesity      IV Solu-Medrol - continue for today  Scheduled DuoNebs  Symbicort  Supportive care for RSV infection  Tussionex prn cough  Additionally Tessalon Perles.  Repeat CT scan chest 6 to 8 weeks for right middle lobe infiltrate  Needs more time on good effective therapy    Electronically signed by Jeff Cannon MD, 01/09/24, 10:53 AM EST.

## 2024-01-10 NOTE — PROGRESS NOTES
Name: Khalida Gilliland ADMIT: 2024   : 1955  PCP: Sergo Owen MD    MRN: [id number] LOS: 2 days   AGE/SEX: 68 y.o. female  ROOM: [facility]     Subjective   Subjective   Patient seen and examined this morning. Hospital day 3. At time of my examination, patient is awake, alert, sitting up in bed.  She remains on supplemental oxygen at this time, however, O2 sats greater than 90%.  She continues to endorse dyspnea and cough, relatively unchanged from prior.       Objective   Objective   Vital Signs  Temp:  [97.7 °F (36.5 °C)-98.2 °F (36.8 °C)] 97.7 °F (36.5 °C)  Heart Rate:  [67-88] 67  Resp:  [20] 20  BP: (160-182)/(66-83) 163/83  SpO2:  [91 %-99 %] 98 %  on  Flow (L/min):  [2] 2;   Device (Oxygen Therapy): nasal cannula  Body mass index is 31.78 kg/m².  Constitutional: Ill-appearing, overweight, uncomfortable due to respiratory symptoms  HEENT: Normal conjunctiva, no scleral icterus  CV: Regular rate, regular rhythm  RESP: On supplemental oxygen, diffuse wheezes, decreased breath sounds  GI: soft, nontender, nondistended  MSK: No gross deformities appreciated, no tenderness, no edema  Skin: Warm, dry. No rashes  Neuro: Awake, alert, oriented x3, no appreciable focal neurological deficits.  Psych: Appropriate mood and affect.  Results Review     I reviewed the patient's new clinical results.  Results from last 7 days   Lab Units 01/10/24  0713 24  0541 24  0354 24  0352   WBC 10*3/mm3 9.31 8.92 9.21 8.84   HEMOGLOBIN g/dL 13.8 13.2 13.6 14.3   PLATELETS 10*3/mm3 233 215 219 271     Results from last 7 days   Lab Units 01/10/24  0713 24  0541 24  1444 24  0354   SODIUM mmol/L 139 137 137 132*   POTASSIUM mmol/L 4.6 4.5 4.6 4.3   CHLORIDE mmol/L 101 101 100 95*   CO2 mmol/L 27.2 23.8 22.8 21.5*   BUN mg/dL 17 21 25* 27*   CREATININE mg/dL 0.66 0.67 0.78 0.74   GLUCOSE mg/dL 232* 270* 216* 363*   EGFR mL/min/1.73 95.7 95.3 82.9 88.3     Results from last 7 days   Lab  Units 01/04/24  1249   ALBUMIN g/dL 4.4   BILIRUBIN mg/dL 0.4   ALK PHOS U/L 66   AST (SGOT) U/L 43*   ALT (SGPT) U/L 52*     Results from last 7 days   Lab Units 01/10/24  0713 01/09/24  0541 01/08/24  1444 01/08/24  0354 01/07/24  0352 01/04/24  1249   CALCIUM mg/dL 9.5 8.9 9.9 9.8   < > 9.7   ALBUMIN g/dL  --   --   --   --   --  4.4    < > = values in this interval not displayed.       Hemoglobin A1C   Date/Time Value Ref Range Status   01/08/2024 0354 8.70 (H) 4.80 - 5.60 % Final     Glucose   Date/Time Value Ref Range Status   01/10/2024 1220 252 (H) 70 - 130 mg/dL Final   01/10/2024 0536 179 (H) 70 - 130 mg/dL Final   01/10/2024 0046 240 (H) 70 - 130 mg/dL Final   01/09/2024 2034 237 (H) 70 - 130 mg/dL Final   01/09/2024 1701 315 (H) 70 - 130 mg/dL Final   01/09/2024 1144 279 (H) 70 - 130 mg/dL Final   01/09/2024 0613 242 (H) 70 - 130 mg/dL Final       No radiology results for the last day    I have personally reviewed all medications:  Scheduled Medications  aspirin, 81 mg, Oral, Daily  atorvastatin, 20 mg, Oral, Nightly  budesonide-formoterol, 2 puff, Inhalation, BID - RT  cycloSPORINE, 1 drop, Both Eyes, BID  enoxaparin, 40 mg, Subcutaneous, Q24H  famotidine, 20 mg, Oral, BID AC  fluticasone, 2 spray, Each Nare, Daily  guaiFENesin, 1,200 mg, Oral, Q12H  hydroCHLOROthiazide, 25 mg, Oral, Daily  insulin glargine, 18 Units, Subcutaneous, Nightly  insulin lispro, 4-19 Units, Subcutaneous, 4x Daily AC & at Bedtime  insulin lispro, 9 Units, Subcutaneous, TID With Meals  ipratropium, 2 spray, Each Nare, BID  ipratropium-albuterol, 3 mL, Nebulization, Q4H - RT  lisinopril, 10 mg, Oral, BID  methylPREDNISolone sodium succinate, 40 mg, Intravenous, Q8H  montelukast, 10 mg, Oral, Nightly  pantoprazole, 40 mg, Oral, Q AM  pregabalin, 100 mg, Oral, TID  propranolol, 40 mg, Oral, BID  sodium chloride, 10 mL, Intravenous, Q12H  traZODone, 50 mg, Oral, Nightly    Infusions  sodium chloride, 100 mL/hr, Last Rate: 100 mL/hr  (01/10/24 0991)    Diet  Diet: Diabetic Diets; Consistent Carbohydrate; Texture: Regular Texture (IDDSI 7); Fluid Consistency: Thin (IDDSI 0)    I have personally reviewed:  [x]  Laboratory   [x]  Microbiology   [x]  Radiology   [x]  EKG/Telemetry  [x]  Cardiology/Vascular   []  Pathology    []  Records       Assessment/Plan     Active Hospital Problems    Diagnosis  POA    **Asthma exacerbation [J45.901]  Yes    Acute respiratory failure with hypoxia [J96.01]  Yes    RSV (respiratory syncytial virus infection) [B33.8]  Yes    Abnormal CT of the chest [R93.89]  Yes    Obesity (BMI 30-39.9) [E66.9]  Yes    Diabetic peripheral neuropathy [E11.42]  Yes    Headache [R51.9]  Yes    GERD (gastroesophageal reflux disease) [K21.9]  Yes    Type 2 diabetes mellitus with hyperglycemia, without long-term current use of insulin [E11.65]  Yes    Essential hypertension [I10]  Yes    Other hyperlipidemia [E78.49]  Yes      Resolved Hospital Problems   No resolved problems to display.     68 y.o. female with history of Asthma, HTN, T2DM complicated by neuropathy, HLD, headaches who initially presented to [facility] with complaints of worsening dyspnea and cough over the past week, admitted with Asthma exacerbation.     Acute hypoxic respiratory failure  RSV infection  Asthma with acute exacerbation  - She is currently on IV steroids 40 mg every 8 hours, in addition to scheduled and PRN nebulized bronchodilators and medications for symptom control. She continues to have symptoms, overall improved, but relatively unchanged from most recent prior examination.  - Pulmonology consulted and following. Will continue to follow their plans/recommendations. Greatly appreciate their help.  - Continue treatments as prescribed for now. Closely monitor respiratory status, monitor for any signs of worsening decompensation, supplemental O2 PRN, pulse oximetry, telemetry. Wean oxygen as tolerated.     Type 2 diabetes mellitus with  hyperglycemia  - A1c 8.70% (01/08/24); Glucose remains uncontrolled; Increase Glargine to 20 units nightly, increase Lispro to 10 units TID with meals. Continue correctional SSI. Adjust regimen as needed. Diabetic diet. Will likely require dose decrease once steroids are weaned.     Headache  - Acute on chronic, no neurological deficits. Symptoms improved with PRN medications. Continue Fioricet PRN, closely monitor.     Hypertension  - Blood pressure appears stable and acceptable acutely at this time.  - Continue current medications as prescribed. Trend BP to guide ongoing management decisions.     GERD  - Continue PPI as prescribed.      Hyperlipidemia  - Continue Statin as prescribed.      Lovenox 40 mg SC daily for DVT prophylaxis.  Full code.  Discussed with patient, nursing staff, CCP, and care team on multidisciplinary rounds.  Anticipate discharge home timing yet to be determined.      Kana Melendrez DO  [facility]  01/10/24

## 2024-01-11 LAB
ANION GAP SERPL CALCULATED.3IONS-SCNC: 11 MMOL/L (ref 5–15)
BASOPHILS # BLD AUTO: 0.01 10*3/MM3 (ref 0–0.2)
BASOPHILS NFR BLD AUTO: 0.1 % (ref 0–1.5)
BUN SERPL-MCNC: 17 MG/DL (ref 8–23)
BUN/CREAT SERPL: 23.9 (ref 7–25)
CALCIUM SPEC-SCNC: 9.2 MG/DL (ref 8.6–10.5)
CHLORIDE SERPL-SCNC: 103 MMOL/L (ref 98–107)
CO2 SERPL-SCNC: 24 MMOL/L (ref 22–29)
CREAT SERPL-MCNC: 0.71 MG/DL (ref 0.57–1)
DEPRECATED RDW RBC AUTO: 41 FL (ref 37–54)
EGFRCR SERPLBLD CKD-EPI 2021: 92.7 ML/MIN/1.73
EOSINOPHIL # BLD AUTO: 0.01 10*3/MM3 (ref 0–0.4)
EOSINOPHIL NFR BLD AUTO: 0.1 % (ref 0.3–6.2)
ERYTHROCYTE [DISTWIDTH] IN BLOOD BY AUTOMATED COUNT: 12.4 % (ref 12.3–15.4)
GLUCOSE BLDC GLUCOMTR-MCNC: 167 MG/DL (ref 70–130)
GLUCOSE BLDC GLUCOMTR-MCNC: 234 MG/DL (ref 70–130)
GLUCOSE BLDC GLUCOMTR-MCNC: 260 MG/DL (ref 70–130)
GLUCOSE BLDC GLUCOMTR-MCNC: 328 MG/DL (ref 70–130)
GLUCOSE SERPL-MCNC: 239 MG/DL (ref 65–99)
HCT VFR BLD AUTO: 42.4 % (ref 34–46.6)
HGB BLD-MCNC: 13.7 G/DL (ref 12–15.9)
IMM GRANULOCYTES # BLD AUTO: 0.13 10*3/MM3 (ref 0–0.05)
IMM GRANULOCYTES NFR BLD AUTO: 1.9 % (ref 0–0.5)
LYMPHOCYTES # BLD AUTO: 1.36 10*3/MM3 (ref 0.7–3.1)
LYMPHOCYTES NFR BLD AUTO: 19.5 % (ref 19.6–45.3)
MCH RBC QN AUTO: 29.1 PG (ref 26.6–33)
MCHC RBC AUTO-ENTMCNC: 32.3 G/DL (ref 31.5–35.7)
MCV RBC AUTO: 90.2 FL (ref 79–97)
MONOCYTES # BLD AUTO: 0.67 10*3/MM3 (ref 0.1–0.9)
MONOCYTES NFR BLD AUTO: 9.6 % (ref 5–12)
NEUTROPHILS NFR BLD AUTO: 4.8 10*3/MM3 (ref 1.7–7)
NEUTROPHILS NFR BLD AUTO: 68.8 % (ref 42.7–76)
NRBC BLD AUTO-RTO: 0 /100 WBC (ref 0–0.2)
PLATELET # BLD AUTO: 218 10*3/MM3 (ref 140–450)
PMV BLD AUTO: 10.5 FL (ref 6–12)
POTASSIUM SERPL-SCNC: 4.7 MMOL/L (ref 3.5–5.2)
RBC # BLD AUTO: 4.7 10*6/MM3 (ref 3.77–5.28)
SODIUM SERPL-SCNC: 138 MMOL/L (ref 136–145)
WBC NRBC COR # BLD AUTO: 6.98 10*3/MM3 (ref 3.4–10.8)

## 2024-01-11 PROCEDURE — 63710000001 INSULIN GLARGINE PER 5 UNITS: Performed by: STUDENT IN AN ORGANIZED HEALTH CARE EDUCATION/TRAINING PROGRAM

## 2024-01-11 PROCEDURE — 25010000002 METHYLPREDNISOLONE PER 40 MG: Performed by: NURSE PRACTITIONER

## 2024-01-11 PROCEDURE — 25810000003 SODIUM CHLORIDE 0.9 % SOLUTION: Performed by: STUDENT IN AN ORGANIZED HEALTH CARE EDUCATION/TRAINING PROGRAM

## 2024-01-11 PROCEDURE — 25010000002 ENOXAPARIN PER 10 MG: Performed by: NURSE PRACTITIONER

## 2024-01-11 PROCEDURE — 63710000001 INSULIN LISPRO (HUMAN) PER 5 UNITS: Performed by: STUDENT IN AN ORGANIZED HEALTH CARE EDUCATION/TRAINING PROGRAM

## 2024-01-11 PROCEDURE — 85025 COMPLETE CBC W/AUTO DIFF WBC: CPT | Performed by: STUDENT IN AN ORGANIZED HEALTH CARE EDUCATION/TRAINING PROGRAM

## 2024-01-11 PROCEDURE — 94799 UNLISTED PULMONARY SVC/PX: CPT

## 2024-01-11 PROCEDURE — 82948 REAGENT STRIP/BLOOD GLUCOSE: CPT

## 2024-01-11 PROCEDURE — 25010000002 METHYLPREDNISOLONE PER 40 MG: Performed by: INTERNAL MEDICINE

## 2024-01-11 PROCEDURE — 94664 DEMO&/EVAL PT USE INHALER: CPT

## 2024-01-11 PROCEDURE — 80048 BASIC METABOLIC PNL TOTAL CA: CPT | Performed by: STUDENT IN AN ORGANIZED HEALTH CARE EDUCATION/TRAINING PROGRAM

## 2024-01-11 RX ORDER — PREDNISONE 20 MG/1
40 TABLET ORAL
Status: DISCONTINUED | OUTPATIENT
Start: 2024-01-12 | End: 2024-01-15

## 2024-01-11 RX ORDER — LIDOCAINE 4 G/G
1 PATCH TOPICAL DAILY PRN
Status: DISCONTINUED | OUTPATIENT
Start: 2024-01-11 | End: 2024-01-15 | Stop reason: HOSPADM

## 2024-01-11 RX ORDER — METHYLPREDNISOLONE SODIUM SUCCINATE 40 MG/ML
40 INJECTION, POWDER, LYOPHILIZED, FOR SOLUTION INTRAMUSCULAR; INTRAVENOUS ONCE
Status: COMPLETED | OUTPATIENT
Start: 2024-01-11 | End: 2024-01-11

## 2024-01-11 RX ADMIN — ENOXAPARIN SODIUM 40 MG: 100 INJECTION SUBCUTANEOUS at 17:57

## 2024-01-11 RX ADMIN — IPRATROPIUM BROMIDE AND ALBUTEROL SULFATE 3 ML: 2.5; .5 SOLUTION RESPIRATORY (INHALATION) at 00:35

## 2024-01-11 RX ADMIN — LISINOPRIL 10 MG: 10 TABLET ORAL at 09:28

## 2024-01-11 RX ADMIN — INSULIN LISPRO 10 UNITS: 100 INJECTION, SOLUTION INTRAVENOUS; SUBCUTANEOUS at 12:43

## 2024-01-11 RX ADMIN — INSULIN LISPRO 10 UNITS: 100 INJECTION, SOLUTION INTRAVENOUS; SUBCUTANEOUS at 09:28

## 2024-01-11 RX ADMIN — BUDESONIDE AND FORMOTEROL FUMARATE DIHYDRATE 2 PUFF: 160; 4.5 AEROSOL RESPIRATORY (INHALATION) at 08:20

## 2024-01-11 RX ADMIN — ACETAMINOPHEN 650 MG: 325 TABLET, FILM COATED ORAL at 14:58

## 2024-01-11 RX ADMIN — PANTOPRAZOLE SODIUM 40 MG: 40 TABLET, DELAYED RELEASE ORAL at 06:36

## 2024-01-11 RX ADMIN — LISINOPRIL 10 MG: 10 TABLET ORAL at 20:39

## 2024-01-11 RX ADMIN — BUTALBITAL, ACETAMINOPHEN AND CAFFEINE 1 TABLET: 325; 50; 40 TABLET ORAL at 20:38

## 2024-01-11 RX ADMIN — Medication 10 ML: at 09:29

## 2024-01-11 RX ADMIN — PROPRANOLOL HYDROCHLORIDE 40 MG: 40 TABLET ORAL at 20:33

## 2024-01-11 RX ADMIN — CYCLOSPORINE 1 DROP: 0.5 EMULSION OPHTHALMIC at 20:39

## 2024-01-11 RX ADMIN — PROPRANOLOL HYDROCHLORIDE 40 MG: 40 TABLET ORAL at 09:28

## 2024-01-11 RX ADMIN — FAMOTIDINE 20 MG: 20 TABLET, FILM COATED ORAL at 17:57

## 2024-01-11 RX ADMIN — BUTALBITAL, ACETAMINOPHEN AND CAFFEINE 1 TABLET: 325; 50; 40 TABLET ORAL at 14:58

## 2024-01-11 RX ADMIN — FAMOTIDINE 20 MG: 20 TABLET, FILM COATED ORAL at 09:28

## 2024-01-11 RX ADMIN — IPRATROPIUM BROMIDE AND ALBUTEROL SULFATE 3 ML: 2.5; .5 SOLUTION RESPIRATORY (INHALATION) at 11:07

## 2024-01-11 RX ADMIN — IPRATROPIUM BROMIDE 2 SPRAY: 42 SPRAY, METERED NASAL at 20:42

## 2024-01-11 RX ADMIN — INSULIN GLARGINE 20 UNITS: 100 INJECTION, SOLUTION SUBCUTANEOUS at 20:41

## 2024-01-11 RX ADMIN — INSULIN LISPRO 10 UNITS: 100 INJECTION, SOLUTION INTRAVENOUS; SUBCUTANEOUS at 17:58

## 2024-01-11 RX ADMIN — IPRATROPIUM BROMIDE AND ALBUTEROL SULFATE 3 ML: 2.5; .5 SOLUTION RESPIRATORY (INHALATION) at 20:52

## 2024-01-11 RX ADMIN — CYCLOSPORINE 1 DROP: 0.5 EMULSION OPHTHALMIC at 09:28

## 2024-01-11 RX ADMIN — GUAIFENESIN 1200 MG: 600 TABLET, EXTENDED RELEASE ORAL at 20:33

## 2024-01-11 RX ADMIN — INSULIN LISPRO 8 UNITS: 100 INJECTION, SOLUTION INTRAVENOUS; SUBCUTANEOUS at 09:29

## 2024-01-11 RX ADMIN — INSULIN LISPRO 16 UNITS: 100 INJECTION, SOLUTION INTRAVENOUS; SUBCUTANEOUS at 23:53

## 2024-01-11 RX ADMIN — IPRATROPIUM BROMIDE 2 SPRAY: 42 SPRAY, METERED NASAL at 09:29

## 2024-01-11 RX ADMIN — ACETAMINOPHEN 650 MG: 325 TABLET, FILM COATED ORAL at 09:43

## 2024-01-11 RX ADMIN — BUDESONIDE AND FORMOTEROL FUMARATE DIHYDRATE 2 PUFF: 160; 4.5 AEROSOL RESPIRATORY (INHALATION) at 20:52

## 2024-01-11 RX ADMIN — HYDROCODONE POLISTIREX AND CHLORPHENIRAMINE POLISTIREX 5 ML: 10; 8 SUSPENSION, EXTENDED RELEASE ORAL at 20:40

## 2024-01-11 RX ADMIN — MONTELUKAST SODIUM 10 MG: 10 TABLET, FILM COATED ORAL at 20:32

## 2024-01-11 RX ADMIN — ASPIRIN 81 MG: 81 TABLET, COATED ORAL at 09:28

## 2024-01-11 RX ADMIN — BUTALBITAL, ACETAMINOPHEN AND CAFFEINE 1 TABLET: 325; 50; 40 TABLET ORAL at 09:43

## 2024-01-11 RX ADMIN — TRAZODONE HYDROCHLORIDE 50 MG: 50 TABLET ORAL at 20:31

## 2024-01-11 RX ADMIN — IPRATROPIUM BROMIDE AND ALBUTEROL SULFATE 3 ML: 2.5; .5 SOLUTION RESPIRATORY (INHALATION) at 15:26

## 2024-01-11 RX ADMIN — INSULIN LISPRO 12 UNITS: 100 INJECTION, SOLUTION INTRAVENOUS; SUBCUTANEOUS at 12:44

## 2024-01-11 RX ADMIN — BENZONATATE 100 MG: 100 CAPSULE ORAL at 20:33

## 2024-01-11 RX ADMIN — SODIUM CHLORIDE 75 ML/HR: 9 INJECTION, SOLUTION INTRAVENOUS at 01:00

## 2024-01-11 RX ADMIN — METHYLPREDNISOLONE SODIUM SUCCINATE 40 MG: 40 INJECTION INTRAMUSCULAR; INTRAVENOUS at 09:28

## 2024-01-11 RX ADMIN — ZOLPIDEM TARTRATE 5 MG: 5 TABLET ORAL at 23:42

## 2024-01-11 RX ADMIN — INSULIN LISPRO 4 UNITS: 100 INJECTION, SOLUTION INTRAVENOUS; SUBCUTANEOUS at 17:57

## 2024-01-11 RX ADMIN — ATORVASTATIN CALCIUM 20 MG: 20 TABLET, FILM COATED ORAL at 20:33

## 2024-01-11 RX ADMIN — PREGABALIN 100 MG: 100 CAPSULE ORAL at 17:57

## 2024-01-11 RX ADMIN — FLUTICASONE PROPIONATE 2 SPRAY: 50 SPRAY, METERED NASAL at 09:29

## 2024-01-11 RX ADMIN — METHYLPREDNISOLONE SODIUM SUCCINATE 40 MG: 40 INJECTION, POWDER, FOR SOLUTION INTRAMUSCULAR; INTRAVENOUS at 17:57

## 2024-01-11 RX ADMIN — HYDROCHLOROTHIAZIDE 25 MG: 25 TABLET ORAL at 09:28

## 2024-01-11 RX ADMIN — GUAIFENESIN 1200 MG: 600 TABLET, EXTENDED RELEASE ORAL at 09:28

## 2024-01-11 RX ADMIN — PREGABALIN 100 MG: 100 CAPSULE ORAL at 20:40

## 2024-01-11 RX ADMIN — Medication 10 ML: at 20:43

## 2024-01-11 RX ADMIN — IPRATROPIUM BROMIDE AND ALBUTEROL SULFATE 3 ML: 2.5; .5 SOLUTION RESPIRATORY (INHALATION) at 08:20

## 2024-01-11 RX ADMIN — PREGABALIN 100 MG: 100 CAPSULE ORAL at 09:28

## 2024-01-11 NOTE — DISCHARGE PLACEMENT REQUEST
"Khalida Gilliland (68 y.o. Female)       Date of Birth   1955    Social Security Number       Address   [address] [address]    Home Phone   [phone]    MRN   [id number]       [facility]    Marital Status                               Admission Date   1/7/24    Admission Type   Emergency    Admitting Provider   Kana Melendrez DO    Attending Provider   Kana Melendrez DO    Department, Room/Bed   [address], [address]       Discharge Date       Discharge Disposition       Discharge Destination                                 Attending Provider: Kana Melendrez DO    Allergies: Contrast Dye (Echo Or Unknown Ct/mr), Iodinated Contrast Media, Codeine, Methylprednisolone, Other, Ciprofloxacin Hcl    Isolation: Contact   Infection: RSV (01/07/24)   Code Status: CPR    Ht: 172.7 cm (68\")   Wt: 94.8 kg (209 lb)    Admission Cmt: None   Principal Problem: Asthma exacerbation [J45.901]                   Active Insurance as of 1/7/2024       Primary Coverage       Payor Plan Insurance Group Employer/Plan Group    MEDICARE MEDICARE A & B        Payor Plan Address Payor Plan Phone Number Payor Plan Fax Number Effective Dates    [address] [phone]  7/1/2020 - None Entered    [facility] [id number]         Subscriber Name Subscriber Birth Date Member ID       KHALIDA GILLILAND 1955 [id number]               Secondary Coverage       Payor Plan Insurance Group Employer/Plan Group     FOR LIFE  FOR LIFE MC [id number]         Payor Plan Address Payor Plan Phone Number Payor Plan Fax Number Effective Dates    [address] [phone]  8/13/2020 - None Entered    [facility] [id number]         Subscriber Name Subscriber Birth Date Member ID       KHALIDA GILLILAND 1955 [id number]                     Emergency Contacts        (Rel.) Home Phone Work Phone Mobile Phone    Roe Gilliland \"Dayna\" (Spouse) [phone] -- --      "

## 2024-01-11 NOTE — PROGRESS NOTES
Name: Khalida Gilliland ADMIT: 2024   : 1955  PCP: Sergo Owen MD    MRN: [id number] LOS: 3 days   AGE/SEX: 68 y.o. female  ROOM: [facility]     Subjective   Subjective   Patient seen and examined this morning. Hospital day 4. At time of my examination, patient is awake, alert, resting comfortably in bed.  Currently on room air with O2 sats greater than 90%, however, she states that she has been having to continue wearing her oxygen at times.  She continues to have dyspnea and cough, slight improvement overall, but symptoms persist.        Objective   Objective   Vital Signs  Temp:  [97.5 °F (36.4 °C)-98.2 °F (36.8 °C)] 97.7 °F (36.5 °C)  Heart Rate:  [61-75] 64  Resp:  [18-20] 18  BP: (154-168)/(61-75) 168/75  SpO2:  [90 %-100 %] 91 %  on  Flow (L/min):  [2] 2;   Device (Oxygen Therapy): nasal cannula  Body mass index is 31.78 kg/m².  Constitutional: Ill-appearing, overweight,  no acute distress at present  HEENT: Normal conjunctiva, no scleral icterus  CV: Regular rate, regular rhythm  RESP: diffuse wheezes, decreased breath sounds, normal effort at present  GI: soft, nontender, nondistended  MSK: No gross deformities appreciated, no tenderness, no edema  Skin: Warm, dry. No rashes  Neuro: Awake, alert, oriented x3, no appreciable focal neurological deficits.  Psych: Appropriate mood and affect.  Results Review     I reviewed the patient's new clinical results.  Results from last 7 days   Lab Units 24  0759 01/10/24  0713 24  0541 24  0354   WBC 10*3/mm3 6.98 9.31 8.92 9.21   HEMOGLOBIN g/dL 13.7 13.8 13.2 13.6   PLATELETS 10*3/mm3 218 233 215 219     Results from last 7 days   Lab Units 24  0759 01/10/24  0713 24  0541 24  1444   SODIUM mmol/L 138 139 137 137   POTASSIUM mmol/L 4.7 4.6 4.5 4.6   CHLORIDE mmol/L 103 101 101 100   CO2 mmol/L 24.0 27.2 23.8 22.8   BUN mg/dL 17 17 21 25*   CREATININE mg/dL 0.71 0.66 0.67 0.78   GLUCOSE mg/dL 239* 232* 270* 216*    EGFR mL/min/1.73 92.7 95.7 95.3 82.9     Results from last 7 days   Lab Units 01/04/24  1249   ALBUMIN g/dL 4.4   BILIRUBIN mg/dL 0.4   ALK PHOS U/L 66   AST (SGOT) U/L 43*   ALT (SGPT) U/L 52*     Results from last 7 days   Lab Units 01/11/24  0759 01/10/24  0713 01/09/24  0541 01/08/24  1444 01/07/24  0352 01/04/24  1249   CALCIUM mg/dL 9.2 9.5 8.9 9.9   < > 9.7   ALBUMIN g/dL  --   --   --   --   --  4.4    < > = values in this interval not displayed.       Glucose   Date/Time Value Ref Range Status   01/11/2024 1120 260 (H) 70 - 130 mg/dL Final   01/11/2024 0604 234 (H) 70 - 130 mg/dL Final   01/10/2024 2140 227 (H) 70 - 130 mg/dL Final   01/10/2024 1637 325 (H) 70 - 130 mg/dL Final   01/10/2024 1220 252 (H) 70 - 130 mg/dL Final   01/10/2024 0536 179 (H) 70 - 130 mg/dL Final   01/10/2024 0046 240 (H) 70 - 130 mg/dL Final       No radiology results for the last day    I have personally reviewed all medications:  Scheduled Medications  aspirin, 81 mg, Oral, Daily  atorvastatin, 20 mg, Oral, Nightly  budesonide-formoterol, 2 puff, Inhalation, BID - RT  cycloSPORINE, 1 drop, Both Eyes, BID  enoxaparin, 40 mg, Subcutaneous, Q24H  famotidine, 20 mg, Oral, BID AC  fluticasone, 2 spray, Each Nare, Daily  guaiFENesin, 1,200 mg, Oral, Q12H  hydroCHLOROthiazide, 25 mg, Oral, Daily  insulin glargine, 20 Units, Subcutaneous, Nightly  insulin lispro, 10 Units, Subcutaneous, TID With Meals  insulin lispro, 4-19 Units, Subcutaneous, 4x Daily AC & at Bedtime  ipratropium, 2 spray, Each Nare, BID  ipratropium-albuterol, 3 mL, Nebulization, Q4H - RT  lisinopril, 10 mg, Oral, BID  methylPREDNISolone sodium succinate, 40 mg, Intravenous, Q8H  montelukast, 10 mg, Oral, Nightly  pantoprazole, 40 mg, Oral, Q AM  pregabalin, 100 mg, Oral, TID  propranolol, 40 mg, Oral, BID  sodium chloride, 10 mL, Intravenous, Q12H  traZODone, 50 mg, Oral, Nightly    Infusions  sodium chloride, 75 mL/hr, Last Rate: 75 mL/hr (01/11/24  0100)    Diet  Diet: Diabetic Diets; Consistent Carbohydrate; Texture: Regular Texture (IDDSI 7); Fluid Consistency: Thin (IDDSI 0)    I have personally reviewed:  [x]  Laboratory   [x]  Microbiology   [x]  Radiology   [x]  EKG/Telemetry  [x]  Cardiology/Vascular   []  Pathology    []  Records       Assessment/Plan     Active Hospital Problems    Diagnosis  POA    **Asthma exacerbation [J45.901]  Yes    Acute respiratory failure with hypoxia [J96.01]  Yes    RSV (respiratory syncytial virus infection) [B33.8]  Yes    Abnormal CT of the chest [R93.89]  Yes    Obesity (BMI 30-39.9) [E66.9]  Yes    Diabetic peripheral neuropathy [E11.42]  Yes    Headache [R51.9]  Yes    GERD (gastroesophageal reflux disease) [K21.9]  Yes    Type 2 diabetes mellitus with hyperglycemia, without long-term current use of insulin [E11.65]  Yes    Essential hypertension [I10]  Yes    Other hyperlipidemia [E78.49]  Yes      Resolved Hospital Problems   No resolved problems to display.     68 y.o. female with history of Asthma, HTN, T2DM complicated by neuropathy, HLD, headaches who initially presented to [facility] with complaints of worsening dyspnea and cough over the past week, admitted with Asthma exacerbation.     Acute hypoxic respiratory failure  RSV infection  Asthma with acute exacerbation  - She has been receiving IV steroids 40 mg every 8 hours, in addition to scheduled and PRN nebulized bronchodilators and medications for symptom control. She continues to have symptoms, overall improved.  - Pulmonology consulted and following. Discussed with Dr. Cannon today (01/11), going to plan to transition to oral steroids starting tomorrow and monitor patient response. Will continue to follow their plans/recommendations. Greatly appreciate their help.  - Continue treatments as prescribed for now. Closely monitor respiratory status, monitor for any signs of worsening decompensation, supplemental O2 PRN, pulse oximetry,  telemetry. Wean oxygen as tolerated.     Type 2 diabetes mellitus with hyperglycemia  - A1c 8.70% (01/08/24); Glucose remains uncontrolled, likely secondary to steroids, however, as discussed above, pulmonology is decreasing the dose of these with plans to transition to oral, so I am hesitant to increase this further at present, due to risk of hypoglycemia.  - Continue Glargine 20 units nightly, increase Lispro 10 units TID with meals and correctional SSI. Adjust regimen as needed. Diabetic diet.     Hypertension  - Blood pressure appears stable and acceptable acutely at this time. Is however, higher at times, she is receiving IVF which could be contributing along with side effects of corticosteroids. She is eating and drinking well now, so will stop IVF now and monitor her blood pressure afterwards.  - Continue current medications as prescribed. Trend BP to guide ongoing management decisions.     Headache  - Acute on chronic, noted on arrival, no neurological deficits. Symptoms improved with PRN medications. Continue Fioricet PRN, closely monitor.     GERD  - Continue PPI as prescribed.      Hyperlipidemia  - Continue Statin as prescribed.      Lovenox 40 mg SC daily for DVT prophylaxis.  Full code.  Discussed with patient, nursing staff, consulting provider, CCP, and care team on multidisciplinary rounds.  Anticipate discharge home in 2-3 days.      Kana Melendrez DO  [facility]  01/11/24

## 2024-01-11 NOTE — PROGRESS NOTES
"  Daily Progress Note.   [address]  1/11/2024    Patient:  Name:  Khalida Gilliland  MRN:  [id number]  1955  68 y.o.  female         CC/reason for consultation RSV bronchitis/asthma acute exacerbation    Interval History:  Afebrile   Remains on room air sats 100%  Pressured speech very energetic still with cough good mood tolerating a diet no emesis making some progress still with significant coughing fits no hemoptysis no chest pain other than soreness with coughing    Physical Exam:  /75 (BP Location: Left arm, Patient Position: Sitting)   Pulse 61   Temp 97.7 °F (36.5 °C) (Oral)   Resp 18   Ht 172.7 cm (68\")   Wt 94.8 kg (209 lb)   LMP  (LMP Unknown)   SpO2 100%   BMI 31.78 kg/m²   Body mass index is 31.78 kg/m².    Intake/Output Summary (Last 24 hours) at 1/11/2024 1054  Last data filed at 1/11/2024 0900  Gross per 24 hour   Intake 4971.92 ml   Output --   Net 4971.92 ml     General appearance: Nontoxic, conversant   Eyes: anicteric sclerae, moist conjunctivae; no lidlag;    HENT: Atraumatic; oropharynx clear with moist mucous membranes    Neck: Trachea midline;  supple   Lungs: Positive wheeze today with good air movement no rhonchi, with normal respiratory effort and no intercostal retractions positive intermittent cough  CV: RRR, no rub   Abdomen: Soft, nontender; BS+ obese  Extremities: No peripheral edema   Skin: WWP no diffuse visible rash  Psych: Manic affect, alert   Neuro: Moves all ext. CN II-XII. Speech intact.    Data Review:  Notable Labs:  Results from last 7 days   Lab Units 01/11/24  0759 01/10/24  0713 01/09/24  0541 01/08/24  0354 01/07/24  0352 01/04/24  1201   WBC 10*3/mm3 6.98 9.31 8.92 9.21 8.84 10.50   HEMOGLOBIN g/dL 13.7 13.8 13.2 13.6 14.3 13.7   PLATELETS 10*3/mm3 218 233 215 219 271 236     Results from last 7 days   Lab Units 01/11/24  0759 01/10/24  0713 01/09/24  0541 01/08/24  1444 01/08/24  0354 01/07/24  0352 01/04/24  1249   SODIUM " mmol/L 138 139 137 137 132* 137 135*   POTASSIUM mmol/L 4.7 4.6 4.5 4.6 4.3 3.8 4.4   CHLORIDE mmol/L 103 101 101 100 95* 98 100   CO2 mmol/L 24.0 27.2 23.8 22.8 21.5* 27.5 23.7   BUN mg/dL 17 17 21 25* 27* 23 20   CREATININE mg/dL 0.71 0.66 0.67 0.78 0.74 0.76 0.57   GLUCOSE mg/dL 239* 232* 270* 216* 363* 131* 267*   CALCIUM mg/dL 9.2 9.5 8.9 9.9 9.8 9.7 9.7   Estimated Creatinine Clearance: 91.3 mL/min (by C-G formula based on SCr of 0.71 mg/dL).    Results from last 7 days   Lab Units 01/11/24  0759 01/10/24  0713 01/09/24  0541 01/07/24  0352 01/04/24  1249   AST (SGOT) U/L  --   --   --   --  43*   ALT (SGPT) U/L  --   --   --   --  52*   PLATELETS 10*3/mm3 218 233 215   < >  --     < > = values in this interval not displayed.             Imaging:  Reviewed chest images personally from past 3 days    ASSESSMENT  /  PLAN:  Acute asthma exacerbation  Acute RSV infection  Right middle lobe infiltrate  Acute hyponatremia  Increased anion gap metabolic acidosis  Diabetes mellitus type 2  Obesity    IV Solu-Medrol - stop - may helpw ith mood, sleep  Start po pred    Scheduled DuoNebs  Symbicort        Supportive care for RSV infection  Tussionex prn cough  Tessalon Perles.    Repeat CT scan chest 6 to 8 weeks for right middle lobe infiltrate  Needs more time on good effective therapy    Coordinated care with Dr. Melendrez    Electronically signed by Jeff Cannon MD, 01/11/24, 1:14 PM EST.

## 2024-01-12 LAB
ANION GAP SERPL CALCULATED.3IONS-SCNC: 12 MMOL/L (ref 5–15)
BASOPHILS # BLD AUTO: 0.04 10*3/MM3 (ref 0–0.2)
BASOPHILS NFR BLD AUTO: 0.3 % (ref 0–1.5)
BUN SERPL-MCNC: 16 MG/DL (ref 8–23)
BUN/CREAT SERPL: 23.5 (ref 7–25)
CALCIUM SPEC-SCNC: 9.3 MG/DL (ref 8.6–10.5)
CHLORIDE SERPL-SCNC: 98 MMOL/L (ref 98–107)
CO2 SERPL-SCNC: 27 MMOL/L (ref 22–29)
CREAT SERPL-MCNC: 0.68 MG/DL (ref 0.57–1)
DEPRECATED RDW RBC AUTO: 41.2 FL (ref 37–54)
EGFRCR SERPLBLD CKD-EPI 2021: 95 ML/MIN/1.73
EOSINOPHIL # BLD AUTO: 0.09 10*3/MM3 (ref 0–0.4)
EOSINOPHIL NFR BLD AUTO: 0.7 % (ref 0.3–6.2)
ERYTHROCYTE [DISTWIDTH] IN BLOOD BY AUTOMATED COUNT: 13 % (ref 12.3–15.4)
GLUCOSE BLDC GLUCOMTR-MCNC: 148 MG/DL (ref 70–130)
GLUCOSE BLDC GLUCOMTR-MCNC: 264 MG/DL (ref 70–130)
GLUCOSE BLDC GLUCOMTR-MCNC: 359 MG/DL (ref 70–130)
GLUCOSE BLDC GLUCOMTR-MCNC: 84 MG/DL (ref 70–130)
GLUCOSE SERPL-MCNC: 108 MG/DL (ref 65–99)
HCT VFR BLD AUTO: 42.8 % (ref 34–46.6)
HGB BLD-MCNC: 14.8 G/DL (ref 12–15.9)
IMM GRANULOCYTES # BLD AUTO: 0.18 10*3/MM3 (ref 0–0.05)
IMM GRANULOCYTES NFR BLD AUTO: 1.4 % (ref 0–0.5)
LYMPHOCYTES # BLD AUTO: 4.54 10*3/MM3 (ref 0.7–3.1)
LYMPHOCYTES NFR BLD AUTO: 36 % (ref 19.6–45.3)
MCH RBC QN AUTO: 30.6 PG (ref 26.6–33)
MCHC RBC AUTO-ENTMCNC: 34.6 G/DL (ref 31.5–35.7)
MCV RBC AUTO: 88.6 FL (ref 79–97)
MONOCYTES # BLD AUTO: 1.43 10*3/MM3 (ref 0.1–0.9)
MONOCYTES NFR BLD AUTO: 11.3 % (ref 5–12)
NEUTROPHILS NFR BLD AUTO: 50.3 % (ref 42.7–76)
NEUTROPHILS NFR BLD AUTO: 6.34 10*3/MM3 (ref 1.7–7)
NRBC BLD AUTO-RTO: 0 /100 WBC (ref 0–0.2)
PLATELET # BLD AUTO: 265 10*3/MM3 (ref 140–450)
PMV BLD AUTO: 10.1 FL (ref 6–12)
POTASSIUM SERPL-SCNC: 3.9 MMOL/L (ref 3.5–5.2)
RBC # BLD AUTO: 4.83 10*6/MM3 (ref 3.77–5.28)
SODIUM SERPL-SCNC: 137 MMOL/L (ref 136–145)
WBC NRBC COR # BLD AUTO: 12.62 10*3/MM3 (ref 3.4–10.8)

## 2024-01-12 PROCEDURE — 63710000001 INSULIN LISPRO (HUMAN) PER 5 UNITS: Performed by: STUDENT IN AN ORGANIZED HEALTH CARE EDUCATION/TRAINING PROGRAM

## 2024-01-12 PROCEDURE — 25010000002 ENOXAPARIN PER 10 MG: Performed by: NURSE PRACTITIONER

## 2024-01-12 PROCEDURE — 94799 UNLISTED PULMONARY SVC/PX: CPT

## 2024-01-12 PROCEDURE — 63710000001 INSULIN GLARGINE PER 5 UNITS: Performed by: STUDENT IN AN ORGANIZED HEALTH CARE EDUCATION/TRAINING PROGRAM

## 2024-01-12 PROCEDURE — 94761 N-INVAS EAR/PLS OXIMETRY MLT: CPT

## 2024-01-12 PROCEDURE — 82948 REAGENT STRIP/BLOOD GLUCOSE: CPT

## 2024-01-12 PROCEDURE — 63710000001 PREDNISONE PER 1 MG: Performed by: INTERNAL MEDICINE

## 2024-01-12 PROCEDURE — 94664 DEMO&/EVAL PT USE INHALER: CPT

## 2024-01-12 PROCEDURE — 80048 BASIC METABOLIC PNL TOTAL CA: CPT | Performed by: STUDENT IN AN ORGANIZED HEALTH CARE EDUCATION/TRAINING PROGRAM

## 2024-01-12 PROCEDURE — 85025 COMPLETE CBC W/AUTO DIFF WBC: CPT | Performed by: STUDENT IN AN ORGANIZED HEALTH CARE EDUCATION/TRAINING PROGRAM

## 2024-01-12 RX ORDER — INSULIN LISPRO 100 [IU]/ML
8 INJECTION, SOLUTION INTRAVENOUS; SUBCUTANEOUS
Status: DISCONTINUED | OUTPATIENT
Start: 2024-01-12 | End: 2024-01-14

## 2024-01-12 RX ADMIN — FLUTICASONE PROPIONATE 2 SPRAY: 50 SPRAY, METERED NASAL at 09:33

## 2024-01-12 RX ADMIN — Medication 10 ML: at 20:47

## 2024-01-12 RX ADMIN — LISINOPRIL 10 MG: 10 TABLET ORAL at 09:35

## 2024-01-12 RX ADMIN — TRAZODONE HYDROCHLORIDE 50 MG: 50 TABLET ORAL at 20:39

## 2024-01-12 RX ADMIN — CYCLOSPORINE 1 DROP: 0.5 EMULSION OPHTHALMIC at 20:39

## 2024-01-12 RX ADMIN — IPRATROPIUM BROMIDE AND ALBUTEROL SULFATE 3 ML: 2.5; .5 SOLUTION RESPIRATORY (INHALATION) at 20:07

## 2024-01-12 RX ADMIN — INSULIN GLARGINE 17 UNITS: 100 INJECTION, SOLUTION SUBCUTANEOUS at 20:39

## 2024-01-12 RX ADMIN — IPRATROPIUM BROMIDE AND ALBUTEROL SULFATE 3 ML: 2.5; .5 SOLUTION RESPIRATORY (INHALATION) at 15:28

## 2024-01-12 RX ADMIN — BUTALBITAL, ACETAMINOPHEN AND CAFFEINE 1 TABLET: 325; 50; 40 TABLET ORAL at 09:32

## 2024-01-12 RX ADMIN — BENZONATATE 100 MG: 100 CAPSULE ORAL at 14:29

## 2024-01-12 RX ADMIN — IPRATROPIUM BROMIDE AND ALBUTEROL SULFATE 3 ML: 2.5; .5 SOLUTION RESPIRATORY (INHALATION) at 11:40

## 2024-01-12 RX ADMIN — PROPRANOLOL HYDROCHLORIDE 40 MG: 40 TABLET ORAL at 20:39

## 2024-01-12 RX ADMIN — BUDESONIDE AND FORMOTEROL FUMARATE DIHYDRATE 2 PUFF: 160; 4.5 AEROSOL RESPIRATORY (INHALATION) at 20:07

## 2024-01-12 RX ADMIN — CYCLOSPORINE 1 DROP: 0.5 EMULSION OPHTHALMIC at 09:32

## 2024-01-12 RX ADMIN — BUDESONIDE AND FORMOTEROL FUMARATE DIHYDRATE 2 PUFF: 160; 4.5 AEROSOL RESPIRATORY (INHALATION) at 07:22

## 2024-01-12 RX ADMIN — INSULIN LISPRO 10 UNITS: 100 INJECTION, SOLUTION INTRAVENOUS; SUBCUTANEOUS at 12:36

## 2024-01-12 RX ADMIN — ZOLPIDEM TARTRATE 5 MG: 5 TABLET ORAL at 23:37

## 2024-01-12 RX ADMIN — PREGABALIN 100 MG: 100 CAPSULE ORAL at 20:45

## 2024-01-12 RX ADMIN — PANTOPRAZOLE SODIUM 40 MG: 40 TABLET, DELAYED RELEASE ORAL at 05:22

## 2024-01-12 RX ADMIN — LISINOPRIL 10 MG: 10 TABLET ORAL at 20:39

## 2024-01-12 RX ADMIN — ENOXAPARIN SODIUM 40 MG: 100 INJECTION SUBCUTANEOUS at 14:20

## 2024-01-12 RX ADMIN — FAMOTIDINE 20 MG: 20 TABLET, FILM COATED ORAL at 17:07

## 2024-01-12 RX ADMIN — HYDROCHLOROTHIAZIDE 25 MG: 25 TABLET ORAL at 09:31

## 2024-01-12 RX ADMIN — GUAIFENESIN 1200 MG: 600 TABLET, EXTENDED RELEASE ORAL at 20:39

## 2024-01-12 RX ADMIN — FAMOTIDINE 20 MG: 20 TABLET, FILM COATED ORAL at 09:31

## 2024-01-12 RX ADMIN — GUAIFENESIN 1200 MG: 600 TABLET, EXTENDED RELEASE ORAL at 09:31

## 2024-01-12 RX ADMIN — IPRATROPIUM BROMIDE 2 SPRAY: 42 SPRAY, METERED NASAL at 20:46

## 2024-01-12 RX ADMIN — PREDNISONE 40 MG: 20 TABLET ORAL at 09:31

## 2024-01-12 RX ADMIN — INSULIN LISPRO 8 UNITS: 100 INJECTION, SOLUTION INTRAVENOUS; SUBCUTANEOUS at 18:38

## 2024-01-12 RX ADMIN — INSULIN LISPRO 10 UNITS: 100 INJECTION, SOLUTION INTRAVENOUS; SUBCUTANEOUS at 09:32

## 2024-01-12 RX ADMIN — INSULIN LISPRO 20 UNITS: 100 INJECTION, SOLUTION INTRAVENOUS; SUBCUTANEOUS at 18:37

## 2024-01-12 RX ADMIN — IPRATROPIUM BROMIDE AND ALBUTEROL SULFATE 3 ML: 2.5; .5 SOLUTION RESPIRATORY (INHALATION) at 07:23

## 2024-01-12 RX ADMIN — IPRATROPIUM BROMIDE AND ALBUTEROL SULFATE 3 ML: 2.5; .5 SOLUTION RESPIRATORY (INHALATION) at 23:27

## 2024-01-12 RX ADMIN — Medication 10 ML: at 09:36

## 2024-01-12 RX ADMIN — PREGABALIN 100 MG: 100 CAPSULE ORAL at 17:07

## 2024-01-12 RX ADMIN — IPRATROPIUM BROMIDE 2 SPRAY: 42 SPRAY, METERED NASAL at 09:36

## 2024-01-12 RX ADMIN — MONTELUKAST SODIUM 10 MG: 10 TABLET, FILM COATED ORAL at 20:39

## 2024-01-12 RX ADMIN — PROPRANOLOL HYDROCHLORIDE 40 MG: 40 TABLET ORAL at 09:31

## 2024-01-12 RX ADMIN — PREGABALIN 100 MG: 100 CAPSULE ORAL at 09:31

## 2024-01-12 RX ADMIN — ATORVASTATIN CALCIUM 20 MG: 20 TABLET, FILM COATED ORAL at 20:40

## 2024-01-12 RX ADMIN — BUTALBITAL, ACETAMINOPHEN AND CAFFEINE 1 TABLET: 325; 50; 40 TABLET ORAL at 14:30

## 2024-01-12 RX ADMIN — LIDOCAINE 1 PATCH: 4 PATCH TOPICAL at 14:29

## 2024-01-12 RX ADMIN — ASPIRIN 81 MG: 81 TABLET, COATED ORAL at 09:31

## 2024-01-12 NOTE — PROGRESS NOTES
Name: Khalida Gilliland ADMIT: 2024   : 1955  PCP: Sergo Owen MD    MRN: [id number] LOS: 4 days   AGE/SEX: 68 y.o. female  ROOM: [facility]     Subjective   Subjective   Patient seen and examined this morning. Hospital day 5. At time of my examination, patient is awake, alert, resting comfortably in bed.  Family present at bedside.  She is now on room air with O2 sats greater than 90%, breathing is slightly improved today, having some pleuritic pain with inspiration, transient in nature.  No acute events overnight.       Objective   Objective   Vital Signs  Temp:  [97.3 °F (36.3 °C)-98.6 °F (37 °C)] 97.3 °F (36.3 °C)  Heart Rate:  [64-79] 71  Resp:  [18-24] 18  BP: (152-187)/(66-90) 152/66  SpO2:  [90 %-97 %] 94 %  on  Flow (L/min):  [2] 2;   Device (Oxygen Therapy): room air  Body mass index is 31.78 kg/m².  Constitutional: Awake, overweight, no acute distress at present  CV: Regular rate, regular rhythm  RESP: on room air, normal effort, decreased breath sounds that are improved from prior  GI: soft, nontender, nondistended  MSK: No gross deformities appreciated, no tenderness, no edema  Skin: Warm, dry. No rashes  Psych: Appropriate mood and affect.  Results Review     I reviewed the patient's new clinical results.  Results from last 7 days   Lab Units 24  0724  0759 01/10/24  0724  0541   WBC 10*3/mm3 12.62* 6.98 9.31 8.92   HEMOGLOBIN g/dL 14.8 13.7 13.8 13.2   PLATELETS 10*3/mm3 265 218 233 215     Results from last 7 days   Lab Units 24  0724  0759 01/10/24  0713 24  0541   SODIUM mmol/L 137 138 139 137   POTASSIUM mmol/L 3.9 4.7 4.6 4.5   CHLORIDE mmol/L 98 103 101 101   CO2 mmol/L 27.0 24.0 27.2 23.8   BUN mg/dL 16 17 17 21   CREATININE mg/dL 0.68 0.71 0.66 0.67   GLUCOSE mg/dL 108* 239* 232* 270*   EGFR mL/min/1.73 95.0 92.7 95.7 95.3           Results from last 7 days   Lab Units 24  0702 24  0759 01/10/24  0713 24  0541    CALCIUM mg/dL 9.3 9.2 9.5 8.9       Glucose   Date/Time Value Ref Range Status   01/12/2024 0644 84 70 - 130 mg/dL Final   01/11/2024 2135 328 (H) 70 - 130 mg/dL Final   01/11/2024 1636 167 (H) 70 - 130 mg/dL Final   01/11/2024 1120 260 (H) 70 - 130 mg/dL Final   01/11/2024 0604 234 (H) 70 - 130 mg/dL Final   01/10/2024 2140 227 (H) 70 - 130 mg/dL Final   01/10/2024 1637 325 (H) 70 - 130 mg/dL Final       No radiology results for the last day    I have personally reviewed all medications:  Scheduled Medications  aspirin, 81 mg, Oral, Daily  atorvastatin, 20 mg, Oral, Nightly  budesonide-formoterol, 2 puff, Inhalation, BID - RT  cycloSPORINE, 1 drop, Both Eyes, BID  enoxaparin, 40 mg, Subcutaneous, Q24H  famotidine, 20 mg, Oral, BID AC  fluticasone, 2 spray, Each Nare, Daily  guaiFENesin, 1,200 mg, Oral, Q12H  hydroCHLOROthiazide, 25 mg, Oral, Daily  insulin glargine, 20 Units, Subcutaneous, Nightly  insulin lispro, 10 Units, Subcutaneous, TID With Meals  insulin lispro, 4-19 Units, Subcutaneous, 4x Daily AC & at Bedtime  ipratropium, 2 spray, Each Nare, BID  ipratropium-albuterol, 3 mL, Nebulization, Q4H - RT  lisinopril, 10 mg, Oral, BID  montelukast, 10 mg, Oral, Nightly  pantoprazole, 40 mg, Oral, Q AM  predniSONE, 40 mg, Oral, Daily With Breakfast  pregabalin, 100 mg, Oral, TID  propranolol, 40 mg, Oral, BID  sodium chloride, 10 mL, Intravenous, Q12H  traZODone, 50 mg, Oral, Nightly    Infusions     Diet  Diet: Diabetic Diets; Consistent Carbohydrate; Texture: Regular Texture (IDDSI 7); Fluid Consistency: Thin (IDDSI 0)    I have personally reviewed:  [x]  Laboratory   [x]  Microbiology   [x]  Radiology   [x]  EKG/Telemetry  [x]  Cardiology/Vascular   []  Pathology    []  Records       Assessment/Plan     Active Hospital Problems    Diagnosis  POA    **Asthma exacerbation [J45.901]  Yes    Acute respiratory failure with hypoxia [J96.01]  Yes    RSV (respiratory syncytial virus infection) [B33.8]  Yes     Abnormal CT of the chest [R93.89]  Yes    Obesity (BMI 30-39.9) [E66.9]  Yes    Diabetic peripheral neuropathy [E11.42]  Yes    Headache [R51.9]  Yes    GERD (gastroesophageal reflux disease) [K21.9]  Yes    Type 2 diabetes mellitus with hyperglycemia, without long-term current use of insulin [E11.65]  Yes    Essential hypertension [I10]  Yes    Other hyperlipidemia [E78.49]  Yes      Resolved Hospital Problems   No resolved problems to display.     68 y.o. female with history of Asthma, HTN, T2DM complicated by neuropathy, HLD, headaches who initially presented to [facility] with complaints of worsening dyspnea and cough over the past week, admitted with Asthma exacerbation.     Acute hypoxic respiratory failure  RSV infection  Asthma with acute exacerbation  - She has been receiving IV steroids 40 mg every 8 hours, which have now been transitioned to oral dosing, in addition to scheduled and PRN nebulized bronchodilators and medications for symptom control. She continues to have symptoms, overall improved.  - Pulmonology consulted and following. Discussed with Dr. Cannon today (01/12), planning to continue treatments as prescribed for now and monitor response. Will continue to follow their plans/recommendations. Greatly appreciate their help.  - Continue treatments as prescribed for now. Closely monitor respiratory status, monitor for any signs of worsening decompensation, supplemental O2 PRN, pulse oximetry, telemetry. Wean oxygen as tolerated.     Type 2 diabetes mellitus with hyperglycemia  - A1c 8.70% (01/08/24); Glucose better controlled today on current regimen. Counseled on importance of dietary compliance as patient was eating bagel from [employer] in room. Since steroids have been decreased and she has values <140, could start decreasing insulin dosing.   - Decrease Glargine to 17 units nightly, decrease Lispro to 8 units with meals, continue SSI.  Adjust regimen as needed. Diabetic diet.      Leukocytosis  - Noted on most recent labs, could be reactive, she does not have evidence of worsening clinical status or new symptoms to suggest acute infection, however, does require close monitoring. Repeat CBC in AM, if no improvement and/or worsens, consider further workup as indicated.    Hypertension  - Blood pressure appears stable and acceptable acutely at this time.   - Continue current medications as prescribed. Trend BP to guide ongoing management decisions.     Headache  - Acute on chronic, noted on arrival, no neurological deficits. Symptoms improved with PRN medications. Continue Fioricet PRN, closely monitor.     GERD  - Continue PPI as prescribed.      Hyperlipidemia  - Continue Statin as prescribed.      Lovenox 40 mg SC daily for DVT prophylaxis.  Full code.  Discussed with patient, family, nursing staff, consulting provider, CCP, and care team on multidisciplinary rounds.  Anticipate discharge home in 1-2 days.      Kana Melendrez DO  [facility]  01/12/24

## 2024-01-12 NOTE — PROGRESS NOTES
"  Daily Progress Note.   [address]  1/12/2024    Patient:  Name:  Khalida Gilliland  MRN:  [id number]  1955  68 y.o.  female         CC/reason for consultation RSV bronchitis/asthma acute exacerbation    Interval History:  Afebrile   Remains on room air   Still with significant coughing fits still with significant right-sided pain with coughing fits.  Awake alert no lethargy in fact she is actually in a good mood despite everything.  Tolerating a diet no emesis    Physical Exam:  /66 (BP Location: Left arm, Patient Position: Sitting)   Pulse 71   Temp 97.3 °F (36.3 °C) (Oral)   Resp 18   Ht 172.7 cm (68\")   Wt 94.8 kg (209 lb)   LMP  (LMP Unknown)   SpO2 94%   BMI 31.78 kg/m²   Body mass index is 31.78 kg/m².    Intake/Output Summary (Last 24 hours) at 1/12/2024 1105  Last data filed at 1/11/2024 1837  Gross per 24 hour   Intake 780 ml   Output --   Net 780 ml     General appearance: Nontoxic, conversant   Eyes: anicteric sclerae, moist conjunctivae; no lidlag;    HENT: Atraumatic; oropharynx clear with moist mucous membranes    Neck: Trachea midline; limited exam of body habitus  Lungs: Faint expiratory wheeze with good air movement no rhonchi, with normal respiratory effort and no intercostal retractions coughing throughout h  CV: RRR no rub   Abdomen: Soft, nontender; BS+ obese  Extremities: No peripheral edema   Skin: WWP no diffuse visible rash  Psych: Manic affect, alert   Neuro: Moves all ext. CN II-XII. Speech intact.    Data Review:  Notable Labs:  Results from last 7 days   Lab Units 01/12/24  0702 01/11/24  0759 01/10/24  0713 01/09/24  0541 01/08/24  0354 01/07/24  0352   WBC 10*3/mm3 12.62* 6.98 9.31 8.92 9.21 8.84   HEMOGLOBIN g/dL 14.8 13.7 13.8 13.2 13.6 14.3   PLATELETS 10*3/mm3 265 218 233 215 219 271     Results from last 7 days   Lab Units 01/12/24  0702 01/11/24  0759 01/10/24  0713 01/09/24  0541 01/08/24  1444 01/08/24  0354 01/07/24  0352   SODIUM " mmol/L 137 138 139 137 137 132* 137   POTASSIUM mmol/L 3.9 4.7 4.6 4.5 4.6 4.3 3.8   CHLORIDE mmol/L 98 103 101 101 100 95* 98   CO2 mmol/L 27.0 24.0 27.2 23.8 22.8 21.5* 27.5   BUN mg/dL 16 17 17 21 25* 27* 23   CREATININE mg/dL 0.68 0.71 0.66 0.67 0.78 0.74 0.76   GLUCOSE mg/dL 108* 239* 232* 270* 216* 363* 131*   CALCIUM mg/dL 9.3 9.2 9.5 8.9 9.9 9.8 9.7   Estimated Creatinine Clearance: 95.4 mL/min (by C-G formula based on SCr of 0.68 mg/dL).    Results from last 7 days   Lab Units 01/12/24  0702 01/11/24  0759 01/10/24  0713   PLATELETS 10*3/mm3 265 218 233             Imaging:  Reviewed chest images personally from past 3 days    CT chest angiogram negative    ASSESSMENT  /  PLAN:  Acute asthma exacerbation  Acute RSV infection  Right middle lobe infiltrate  Acute hyponatremia  Increased anion gap metabolic acidosis resolved  Diabetes mellitus type 2  Obesity     Cont po pred    Scheduled DuoNebs  Symbicort   Prednisone 40    Supportive care for RSV infection  Tussionex prn cough  Tessalon Perles.    Lidocaine patch    Repeat CT scan chest 6 to 8 weeks for right middle lobe infiltrate  Needs more time on good effective therapy    Discussed with hospital physician Dr. Melendrez discussed with RN        Electronically signed by Jeff Cannon MD, 01/12/24, 11:12 AM EST.

## 2024-01-13 LAB
ANION GAP SERPL CALCULATED.3IONS-SCNC: 11 MMOL/L (ref 5–15)
BASOPHILS # BLD AUTO: 0.02 10*3/MM3 (ref 0–0.2)
BASOPHILS NFR BLD AUTO: 0.1 % (ref 0–1.5)
BUN SERPL-MCNC: 23 MG/DL (ref 8–23)
BUN/CREAT SERPL: 26.7 (ref 7–25)
CALCIUM SPEC-SCNC: 9.8 MG/DL (ref 8.6–10.5)
CHLORIDE SERPL-SCNC: 99 MMOL/L (ref 98–107)
CO2 SERPL-SCNC: 29 MMOL/L (ref 22–29)
CREAT SERPL-MCNC: 0.86 MG/DL (ref 0.57–1)
DEPRECATED RDW RBC AUTO: 44.7 FL (ref 37–54)
EGFRCR SERPLBLD CKD-EPI 2021: 73.7 ML/MIN/1.73
EOSINOPHIL # BLD AUTO: 0.24 10*3/MM3 (ref 0–0.4)
EOSINOPHIL NFR BLD AUTO: 1.6 % (ref 0.3–6.2)
ERYTHROCYTE [DISTWIDTH] IN BLOOD BY AUTOMATED COUNT: 13.2 % (ref 12.3–15.4)
GLUCOSE BLDC GLUCOMTR-MCNC: 103 MG/DL (ref 70–130)
GLUCOSE BLDC GLUCOMTR-MCNC: 108 MG/DL (ref 70–130)
GLUCOSE BLDC GLUCOMTR-MCNC: 215 MG/DL (ref 70–130)
GLUCOSE BLDC GLUCOMTR-MCNC: 251 MG/DL (ref 70–130)
GLUCOSE BLDC GLUCOMTR-MCNC: 251 MG/DL (ref 70–130)
GLUCOSE SERPL-MCNC: 100 MG/DL (ref 65–99)
HCT VFR BLD AUTO: 43.3 % (ref 34–46.6)
HGB BLD-MCNC: 14.5 G/DL (ref 12–15.9)
IMM GRANULOCYTES # BLD AUTO: 0.14 10*3/MM3 (ref 0–0.05)
IMM GRANULOCYTES NFR BLD AUTO: 0.9 % (ref 0–0.5)
LYMPHOCYTES # BLD AUTO: 5.43 10*3/MM3 (ref 0.7–3.1)
LYMPHOCYTES NFR BLD AUTO: 35.6 % (ref 19.6–45.3)
MCH RBC QN AUTO: 30.5 PG (ref 26.6–33)
MCHC RBC AUTO-ENTMCNC: 33.5 G/DL (ref 31.5–35.7)
MCV RBC AUTO: 91.2 FL (ref 79–97)
MONOCYTES # BLD AUTO: 1.47 10*3/MM3 (ref 0.1–0.9)
MONOCYTES NFR BLD AUTO: 9.6 % (ref 5–12)
NEUTROPHILS NFR BLD AUTO: 52.2 % (ref 42.7–76)
NEUTROPHILS NFR BLD AUTO: 7.96 10*3/MM3 (ref 1.7–7)
NRBC BLD AUTO-RTO: 0 /100 WBC (ref 0–0.2)
PLATELET # BLD AUTO: 258 10*3/MM3 (ref 140–450)
PMV BLD AUTO: 10.2 FL (ref 6–12)
POTASSIUM SERPL-SCNC: 3.8 MMOL/L (ref 3.5–5.2)
RBC # BLD AUTO: 4.75 10*6/MM3 (ref 3.77–5.28)
SODIUM SERPL-SCNC: 139 MMOL/L (ref 136–145)
WBC NRBC COR # BLD AUTO: 15.26 10*3/MM3 (ref 3.4–10.8)

## 2024-01-13 PROCEDURE — 63710000001 PREDNISONE PER 1 MG: Performed by: INTERNAL MEDICINE

## 2024-01-13 PROCEDURE — 80048 BASIC METABOLIC PNL TOTAL CA: CPT | Performed by: STUDENT IN AN ORGANIZED HEALTH CARE EDUCATION/TRAINING PROGRAM

## 2024-01-13 PROCEDURE — 94664 DEMO&/EVAL PT USE INHALER: CPT

## 2024-01-13 PROCEDURE — 25010000002 ENOXAPARIN PER 10 MG: Performed by: NURSE PRACTITIONER

## 2024-01-13 PROCEDURE — 63710000001 INSULIN GLARGINE PER 5 UNITS: Performed by: STUDENT IN AN ORGANIZED HEALTH CARE EDUCATION/TRAINING PROGRAM

## 2024-01-13 PROCEDURE — 82948 REAGENT STRIP/BLOOD GLUCOSE: CPT

## 2024-01-13 PROCEDURE — 63710000001 INSULIN LISPRO (HUMAN) PER 5 UNITS: Performed by: STUDENT IN AN ORGANIZED HEALTH CARE EDUCATION/TRAINING PROGRAM

## 2024-01-13 PROCEDURE — 85025 COMPLETE CBC W/AUTO DIFF WBC: CPT | Performed by: STUDENT IN AN ORGANIZED HEALTH CARE EDUCATION/TRAINING PROGRAM

## 2024-01-13 PROCEDURE — 94799 UNLISTED PULMONARY SVC/PX: CPT

## 2024-01-13 RX ADMIN — INSULIN GLARGINE 12 UNITS: 100 INJECTION, SOLUTION SUBCUTANEOUS at 20:48

## 2024-01-13 RX ADMIN — FLUTICASONE PROPIONATE 2 SPRAY: 50 SPRAY, METERED NASAL at 08:09

## 2024-01-13 RX ADMIN — IPRATROPIUM BROMIDE AND ALBUTEROL SULFATE 3 ML: 2.5; .5 SOLUTION RESPIRATORY (INHALATION) at 20:20

## 2024-01-13 RX ADMIN — ZOLPIDEM TARTRATE 5 MG: 5 TABLET ORAL at 22:56

## 2024-01-13 RX ADMIN — IPRATROPIUM BROMIDE 2 SPRAY: 42 SPRAY, METERED NASAL at 22:56

## 2024-01-13 RX ADMIN — BENZONATATE 100 MG: 100 CAPSULE ORAL at 00:54

## 2024-01-13 RX ADMIN — PROPRANOLOL HYDROCHLORIDE 40 MG: 40 TABLET ORAL at 20:40

## 2024-01-13 RX ADMIN — BUDESONIDE AND FORMOTEROL FUMARATE DIHYDRATE 2 PUFF: 160; 4.5 AEROSOL RESPIRATORY (INHALATION) at 20:25

## 2024-01-13 RX ADMIN — INSULIN LISPRO 12 UNITS: 100 INJECTION, SOLUTION INTRAVENOUS; SUBCUTANEOUS at 01:16

## 2024-01-13 RX ADMIN — INSULIN LISPRO 8 UNITS: 100 INJECTION, SOLUTION INTRAVENOUS; SUBCUTANEOUS at 08:08

## 2024-01-13 RX ADMIN — IPRATROPIUM BROMIDE AND ALBUTEROL SULFATE 3 ML: 2.5; .5 SOLUTION RESPIRATORY (INHALATION) at 07:59

## 2024-01-13 RX ADMIN — ATORVASTATIN CALCIUM 20 MG: 20 TABLET, FILM COATED ORAL at 20:46

## 2024-01-13 RX ADMIN — HYDROCODONE POLISTIREX AND CHLORPHENIRAMINE POLISTIREX 5 ML: 10; 8 SUSPENSION, EXTENDED RELEASE ORAL at 00:54

## 2024-01-13 RX ADMIN — LIDOCAINE 1 PATCH: 4 PATCH TOPICAL at 17:50

## 2024-01-13 RX ADMIN — GUAIFENESIN 1200 MG: 600 TABLET, EXTENDED RELEASE ORAL at 20:41

## 2024-01-13 RX ADMIN — PREDNISONE 40 MG: 20 TABLET ORAL at 08:09

## 2024-01-13 RX ADMIN — CYCLOSPORINE 1 DROP: 0.5 EMULSION OPHTHALMIC at 08:09

## 2024-01-13 RX ADMIN — IPRATROPIUM BROMIDE 2 SPRAY: 42 SPRAY, METERED NASAL at 08:12

## 2024-01-13 RX ADMIN — INSULIN LISPRO 12 UNITS: 100 INJECTION, SOLUTION INTRAVENOUS; SUBCUTANEOUS at 12:57

## 2024-01-13 RX ADMIN — FAMOTIDINE 20 MG: 20 TABLET, FILM COATED ORAL at 08:09

## 2024-01-13 RX ADMIN — PREGABALIN 100 MG: 100 CAPSULE ORAL at 15:27

## 2024-01-13 RX ADMIN — IPRATROPIUM BROMIDE AND ALBUTEROL SULFATE 3 ML: 2.5; .5 SOLUTION RESPIRATORY (INHALATION) at 16:08

## 2024-01-13 RX ADMIN — INSULIN LISPRO 8 UNITS: 100 INJECTION, SOLUTION INTRAVENOUS; SUBCUTANEOUS at 17:51

## 2024-01-13 RX ADMIN — HYDROCODONE POLISTIREX AND CHLORPHENIRAMINE POLISTIREX 5 ML: 10; 8 SUSPENSION, EXTENDED RELEASE ORAL at 12:58

## 2024-01-13 RX ADMIN — MONTELUKAST SODIUM 10 MG: 10 TABLET, FILM COATED ORAL at 20:40

## 2024-01-13 RX ADMIN — BUTALBITAL, ACETAMINOPHEN AND CAFFEINE 1 TABLET: 325; 50; 40 TABLET ORAL at 15:26

## 2024-01-13 RX ADMIN — BUTALBITAL, ACETAMINOPHEN AND CAFFEINE 1 TABLET: 325; 50; 40 TABLET ORAL at 20:40

## 2024-01-13 RX ADMIN — LISINOPRIL 10 MG: 10 TABLET ORAL at 20:40

## 2024-01-13 RX ADMIN — INSULIN LISPRO 12 UNITS: 100 INJECTION, SOLUTION INTRAVENOUS; SUBCUTANEOUS at 20:48

## 2024-01-13 RX ADMIN — GUAIFENESIN 1200 MG: 600 TABLET, EXTENDED RELEASE ORAL at 08:09

## 2024-01-13 RX ADMIN — PREGABALIN 100 MG: 100 CAPSULE ORAL at 20:40

## 2024-01-13 RX ADMIN — LISINOPRIL 10 MG: 10 TABLET ORAL at 08:09

## 2024-01-13 RX ADMIN — IPRATROPIUM BROMIDE AND ALBUTEROL SULFATE 3 ML: 2.5; .5 SOLUTION RESPIRATORY (INHALATION) at 12:40

## 2024-01-13 RX ADMIN — TRAZODONE HYDROCHLORIDE 50 MG: 50 TABLET ORAL at 20:40

## 2024-01-13 RX ADMIN — PROPRANOLOL HYDROCHLORIDE 40 MG: 40 TABLET ORAL at 08:09

## 2024-01-13 RX ADMIN — INSULIN LISPRO 8 UNITS: 100 INJECTION, SOLUTION INTRAVENOUS; SUBCUTANEOUS at 12:57

## 2024-01-13 RX ADMIN — Medication 10 ML: at 08:10

## 2024-01-13 RX ADMIN — ENOXAPARIN SODIUM 40 MG: 100 INJECTION SUBCUTANEOUS at 14:37

## 2024-01-13 RX ADMIN — BUDESONIDE AND FORMOTEROL FUMARATE DIHYDRATE 2 PUFF: 160; 4.5 AEROSOL RESPIRATORY (INHALATION) at 08:04

## 2024-01-13 RX ADMIN — Medication 10 ML: at 20:42

## 2024-01-13 RX ADMIN — FAMOTIDINE 20 MG: 20 TABLET, FILM COATED ORAL at 17:50

## 2024-01-13 RX ADMIN — HYDROCHLOROTHIAZIDE 25 MG: 25 TABLET ORAL at 08:09

## 2024-01-13 RX ADMIN — PANTOPRAZOLE SODIUM 40 MG: 40 TABLET, DELAYED RELEASE ORAL at 06:14

## 2024-01-13 RX ADMIN — PREGABALIN 100 MG: 100 CAPSULE ORAL at 08:09

## 2024-01-13 RX ADMIN — ASPIRIN 81 MG: 81 TABLET, COATED ORAL at 08:09

## 2024-01-13 RX ADMIN — CYCLOSPORINE 1 DROP: 0.5 EMULSION OPHTHALMIC at 20:42

## 2024-01-13 NOTE — PROGRESS NOTES
[facility]  [phone]  Dr. Cristhian Goncalves     Subjective:  LOS: 5    Chief Complaint:  SOB and cough    Patient stated that although she appears well she is actually feeling quite bad.  States that last night was the worst that she has felt.  Still with shortness of breath and is having some right-sided nonspecific pain.  This right-sided pain does not clearly appear to be pleuritic in nature however could obviously be pleuritis or costochondritis or something unrelated to the lungs.  I discussed her current condition and what we are doing as far as treatment.  All questions were answered.  Discussed with hospitalist team.    Objective   Vital Signs past 24hrs  Temp range: Temp (24hrs), Av.8 °F (36.6 °C), Min:97.3 °F (36.3 °C), Max:98.4 °F (36.9 °C)    BP range: BP: (106-161)/(52-77) 134/71  Pulse range: Heart Rate:  [55-90] 71  Resp rate range: Resp:  [18-20] 18  Device (Oxygen Therapy): room airFlow (L/min):  [2] 2  Oxygen range:SpO2:  [91 %-99 %] 96 %     Physical Exam  Vitals reviewed.   Constitutional:       General: She is not in acute distress.     Appearance: Normal appearance.   HENT:      Head: Normocephalic and atraumatic.   Eyes:      Extraocular Movements: Extraocular movements intact.      Conjunctiva/sclera: Conjunctivae normal.      Pupils: Pupils are equal, round, and reactive to light.   Cardiovascular:      Rate and Rhythm: Normal rate and regular rhythm.      Heart sounds: No murmur heard.     No friction rub. No gallop.   Pulmonary:      Effort: Pulmonary effort is normal. No respiratory distress.      Breath sounds: Normal breath sounds. Wheezing (Faint bilaterally) present. No rhonchi or rales.   Abdominal:      General: Abdomen is flat.      Palpations: Abdomen is soft.      Tenderness: There is no abdominal tenderness.   Musculoskeletal:         General: No swelling or tenderness. Normal range of motion.      Cervical back: Normal range of motion. No rigidity or  tenderness.   Skin:     General: Skin is warm and dry.      Findings: No rash.   Neurological:      General: No focal deficit present.      Mental Status: She is alert and oriented to person, place, and time.   Psychiatric:         Mood and Affect: Mood normal.         Behavior: Behavior normal.       Results Review:    I have reviewed the laboratory and imaging data since the last note by [facility] physician.  My annotations are noted in assessment and plan.      Result Review:  I have personally reviewed the results from last note by [facility] physician to 1/13/2024 13:43 EST and agree with these findings:  [x]  Laboratory list / accordion  [x]  Microbiology  [x]  Radiology  [x]  EKG/Telemetry   [x]  Cardiology/Vascular   [x]  Pathology  [x]  Old records  []  Other:    Medication Review:  I have reviewed the current MAR.  My annotations are noted in assessment and plan.    aspirin, 81 mg, Oral, Daily  atorvastatin, 20 mg, Oral, Nightly  budesonide-formoterol, 2 puff, Inhalation, BID - RT  cycloSPORINE, 1 drop, Both Eyes, BID  enoxaparin, 40 mg, Subcutaneous, Q24H  famotidine, 20 mg, Oral, BID AC  fluticasone, 2 spray, Each Nare, Daily  guaiFENesin, 1,200 mg, Oral, Q12H  hydroCHLOROthiazide, 25 mg, Oral, Daily  insulin glargine, 17 Units, Subcutaneous, Nightly  insulin lispro, 4-19 Units, Subcutaneous, 4x Daily AC & at Bedtime  insulin lispro, 8 Units, Subcutaneous, TID With Meals  ipratropium, 2 spray, Each Nare, BID  ipratropium-albuterol, 3 mL, Nebulization, Q4H - RT  lisinopril, 10 mg, Oral, BID  montelukast, 10 mg, Oral, Nightly  pantoprazole, 40 mg, Oral, Q AM  predniSONE, 40 mg, Oral, Daily With Breakfast  pregabalin, 100 mg, Oral, TID  propranolol, 40 mg, Oral, BID  sodium chloride, 10 mL, Intravenous, Q12H  traZODone, 50 mg, Oral, Nightly           Lines, Drains & Airways       Active LDAs       Name Placement date Placement time Site Days    Peripheral IV Anterior;Distal;Right Forearm --  --  Forearm  --                   Contact  Diet Orders (active) (From admission, onward)       Start     Ordered    01/08/24 0721  Diet: Diabetic Diets; Consistent Carbohydrate; Texture: Regular Texture (IDDSI 7); Fluid Consistency: Thin (IDDSI 0)  Diet Effective Now         01/08/24 0720                     68 year old female with a past medical history of diabetes mellitus type 2, GERD, hyperlipidemia, hypertension, and a history of brain aneurysm status post clipping (2003 and 2017).  She also reports a prior diagnosis of asthma-maintained on Symbicort and albuterol. Presented to ED on 1/8/24 with complaints of shortness of breath and persistent URI symptoms.  Started over a week PTA, patient had been seen at [facility], her primary care provider, and previously at the [facility] emergency department.  All viral swabs had been negative and her chest x-ray remained clear.  She has been taking steroids, antibiotics and breathing treatments at home without improvement in symptoms. ED evaluation included respiratory viral panel positive for RSV. Patient was admitted to observation for asthma exacerbation and RSV infection.     Assessment  Acute asthma exacerbation  Acute RSV infection  Right middle lobe infiltrate  Acute hyponatremia  Increased anion gap metabolic acidosis resolved  Diabetes mellitus type 2  Obesity    Plan  - continue scheduled and prn bronchodilators  - continue steroids and would prescribe slow taper at discharge (40mg x3 days, 30mg x3 days, 20mg x3 days and 10mg x3 days)  -Given patient's slow improvement and high risk for readmission, I think it would be okay to monitor her in the hospital for the next 1 to 2 days  - supportive care for viral infection  - She will need a repeat CT chest as outpatient for RML infiltrate  - will arrange follow up with LPC at discharge         THESE ARE NEW MEDICAL PROBLEMS TO ME.      Cristhian Goncalves DO   01/13/24  13:43 EST      Part of this note may be an electronic  transcription/translation of spoken language to printed text using the Dragon Dictation System.

## 2024-01-13 NOTE — PLAN OF CARE
Goal Outcome Evaluation:   Pt A&OX4, makes all needs known, room air , pain meds PRN, SR on monitors with vitals as charted, up ad pierce, accu checks- with some medication changes this shift, [facility] diet, education for carb control and healthy habits, contact isolation is place,

## 2024-01-13 NOTE — PROGRESS NOTES
Name: Khalida Gilliland ADMIT: 2024   : 1955  PCP: Sergo Owen MD    MRN: [id number] LOS: 5 days   AGE/SEX: 68 y.o. female  ROOM: [facility]     Subjective   Subjective   Patient seen and examined this morning. Hospital day 6. At time of my examination, patient is awake, alert, resting comfortably in bed.  She is on room air with O2 sats greater than 90%.  She continues to endorse dyspnea, wheezing and cough, relatively unchanged from prior examination.        Objective   Objective   Vital Signs  Temp:  [97.3 °F (36.3 °C)-98.4 °F (36.9 °C)] 97.7 °F (36.5 °C)  Heart Rate:  [55-90] 55  Resp:  [18-20] 20  BP: (106-161)/(52-77) 106/52  SpO2:  [91 %-99 %] 97 %  on  Flow (L/min):  [2] 2;   Device (Oxygen Therapy): room air  Body mass index is 31.78 kg/m².  Constitutional: Awake, overweight, resting comfortably without acute distress  CV: Regular rate, regular rhythm  RESP: on room air, normal effort, decreased breath sounds that are improved from prior, faint wheezes  GI: soft, nontender, nondistended  MSK: No gross deformities appreciated, no tenderness, no edema  Skin: Warm, dry. No rashes  Psych: Appropriate mood and affect.  Results Review     I reviewed the patient's new clinical results.  Results from last 7 days   Lab Units 01/13/24  0548 01/12/24  0702 01/11/24  0759 01/10/24  0713   WBC 10*3/mm3 15.26* 12.62* 6.98 9.31   HEMOGLOBIN g/dL 14.5 14.8 13.7 13.8   PLATELETS 10*3/mm3 258 265 218 233     Results from last 7 days   Lab Units 01/13/24  0548 01/12/24  0702 01/11/24  0759 01/10/24  07   SODIUM mmol/L 139 137 138 139   POTASSIUM mmol/L 3.8 3.9 4.7 4.6   CHLORIDE mmol/L 99 98 103 101   CO2 mmol/L 29.0 27.0 24.0 27.2   BUN mg/dL 23 16 17 17   CREATININE mg/dL 0.86 0.68 0.71 0.66   GLUCOSE mg/dL 100* 108* 239* 232*   EGFR mL/min/1.73 73.7 95.0 92.7 95.7           Results from last 7 days   Lab Units 24  0548 24  0702 24  0759 01/10/24  0713   CALCIUM mg/dL 9.8 9.3 9.2 9.5        Glucose   Date/Time Value Ref Range Status   01/13/2024 1132 251 (H) 70 - 130 mg/dL Final   01/13/2024 0740 103 70 - 130 mg/dL Final   01/13/2024 0639 108 70 - 130 mg/dL Final   01/12/2024 2119 264 (H) 70 - 130 mg/dL Final   01/12/2024 1600 359 (H) 70 - 130 mg/dL Final   01/12/2024 1114 148 (H) 70 - 130 mg/dL Final   01/12/2024 0644 84 70 - 130 mg/dL Final       No radiology results for the last day    I have personally reviewed all medications:  Scheduled Medications  aspirin, 81 mg, Oral, Daily  atorvastatin, 20 mg, Oral, Nightly  budesonide-formoterol, 2 puff, Inhalation, BID - RT  cycloSPORINE, 1 drop, Both Eyes, BID  enoxaparin, 40 mg, Subcutaneous, Q24H  famotidine, 20 mg, Oral, BID AC  fluticasone, 2 spray, Each Nare, Daily  guaiFENesin, 1,200 mg, Oral, Q12H  hydroCHLOROthiazide, 25 mg, Oral, Daily  insulin glargine, 17 Units, Subcutaneous, Nightly  insulin lispro, 4-19 Units, Subcutaneous, 4x Daily AC & at Bedtime  insulin lispro, 8 Units, Subcutaneous, TID With Meals  ipratropium, 2 spray, Each Nare, BID  ipratropium-albuterol, 3 mL, Nebulization, Q4H - RT  lisinopril, 10 mg, Oral, BID  montelukast, 10 mg, Oral, Nightly  pantoprazole, 40 mg, Oral, Q AM  predniSONE, 40 mg, Oral, Daily With Breakfast  pregabalin, 100 mg, Oral, TID  propranolol, 40 mg, Oral, BID  sodium chloride, 10 mL, Intravenous, Q12H  traZODone, 50 mg, Oral, Nightly    Infusions     Diet  Diet: Diabetic Diets; Consistent Carbohydrate; Texture: Regular Texture (IDDSI 7); Fluid Consistency: Thin (IDDSI 0)    I have personally reviewed:  [x]  Laboratory   [x]  Microbiology   [x]  Radiology   [x]  EKG/Telemetry  [x]  Cardiology/Vascular   []  Pathology    []  Records       Assessment/Plan     Active Hospital Problems    Diagnosis  POA    **Asthma exacerbation [J45.901]  Yes    Acute respiratory failure with hypoxia [J96.01]  Yes    RSV (respiratory syncytial virus infection) [B33.8]  Yes    Abnormal CT of the chest [R93.89]  Yes    Obesity  (BMI 30-39.9) [E66.9]  Yes    Diabetic peripheral neuropathy [E11.42]  Yes    Headache [R51.9]  Yes    GERD (gastroesophageal reflux disease) [K21.9]  Yes    Type 2 diabetes mellitus with hyperglycemia, without long-term current use of insulin [E11.65]  Yes    Essential hypertension [I10]  Yes    Other hyperlipidemia [E78.49]  Yes      Resolved Hospital Problems   No resolved problems to display.     68 y.o. female with history of Asthma, HTN, T2DM complicated by neuropathy, HLD, headaches who initially presented to [facility] with complaints of worsening dyspnea and cough over the past week, admitted with Asthma exacerbation.     Acute hypoxic respiratory failure  RSV infection  Asthma with acute exacerbation  - She was receiving IV steroids 40 mg every 8 hours, which have now been transitioned to oral dosing, in addition to scheduled and PRN nebulized bronchodilators and medications for symptom control. She continues to have symptoms, overall improved from admission, but still causing her difficulty at times.  - Pulmonology consulted and following. Will continue to follow their plans/recommendations. Greatly appreciate their help.  - Continue treatments as prescribed for now. Closely monitor respiratory status, monitor for any signs of worsening decompensation, supplemental O2 PRN, pulse oximetry, telemetry. Wean oxygen as tolerated.     Type 2 diabetes mellitus with hyperglycemia  - A1c 8.70% (01/08/24); Glucose better controlled on current regimen. Counseled on importance of dietary compliance. Does have values <100 after decrease in steroid dosing, can decrease insulin further.  - Decrease Glargine to 12 units nightly, continue Lispro 8 units TID with meals, SSI.  Adjust regimen as needed. Diabetic diet.     Leukocytosis  - Noted on most recent labs, values slightly worse from prior, possibly reactive, she does not have evidence of worsening clinical status or new symptoms to suggest acute  infection, however, does require close monitoring. Repeat CBC in AM.    Hypertension  - Blood pressure appears stable and acceptable acutely at this time.   - Continue current medications as prescribed. Trend BP to guide ongoing management decisions.     Headache  - Acute on chronic, noted on arrival, no neurological deficits. Symptoms improved with PRN medications. Continue Fioricet PRN, closely monitor.     GERD  - Continue PPI as prescribed.      Hyperlipidemia  - Continue Statin as prescribed.      Lovenox 40 mg SC daily for DVT prophylaxis.  Full code.  Discussed with patient and nursing staff.  Anticipate discharge home when cleared by consultants.      Kana Melendrez DO  [facility]  01/13/24

## 2024-01-13 NOTE — PLAN OF CARE
Goal Outcome Evaluation:Patient has rested with no issues tonight, treated for continued cough as needed (see MAR).  Problem: Adult Inpatient Plan of Care  Goal: Absence of Hospital-Acquired Illness or Injury  Intervention: Identify and Manage Fall Risk  Recent Flowsheet Documentation  Taken 1/13/2024 0600 by Shruti Baptiste RN  Safety Promotion/Fall Prevention: safety round/check completed  Taken 1/13/2024 0400 by Shruti Baptiste RN  Safety Promotion/Fall Prevention: safety round/check completed  Taken 1/13/2024 0020 by Shruti Baptiste RN  Safety Promotion/Fall Prevention: safety round/check completed  Taken 1/12/2024 2011 by Shruti Baptiste RN  Safety Promotion/Fall Prevention: safety round/check completed  Goal: Optimal Comfort and Wellbeing  Intervention: Provide Person-Centered Care  Recent Flowsheet Documentation  Taken 1/12/2024 2011 by Shruti Baptiste RN  Trust Relationship/Rapport:   care explained   questions answered   reassurance provided   thoughts/feelings acknowledged

## 2024-01-14 LAB
ANION GAP SERPL CALCULATED.3IONS-SCNC: 12 MMOL/L (ref 5–15)
BASOPHILS # BLD AUTO: 0.02 10*3/MM3 (ref 0–0.2)
BASOPHILS NFR BLD AUTO: 0.1 % (ref 0–1.5)
BUN SERPL-MCNC: 34 MG/DL (ref 8–23)
BUN/CREAT SERPL: 32.7 (ref 7–25)
CALCIUM SPEC-SCNC: 9.6 MG/DL (ref 8.6–10.5)
CHLORIDE SERPL-SCNC: 96 MMOL/L (ref 98–107)
CO2 SERPL-SCNC: 27 MMOL/L (ref 22–29)
CREAT SERPL-MCNC: 1.04 MG/DL (ref 0.57–1)
DEPRECATED RDW RBC AUTO: 45.6 FL (ref 37–54)
EGFRCR SERPLBLD CKD-EPI 2021: 58.7 ML/MIN/1.73
EOSINOPHIL # BLD AUTO: 0.24 10*3/MM3 (ref 0–0.4)
EOSINOPHIL NFR BLD AUTO: 1.5 % (ref 0.3–6.2)
ERYTHROCYTE [DISTWIDTH] IN BLOOD BY AUTOMATED COUNT: 13.4 % (ref 12.3–15.4)
GLUCOSE BLDC GLUCOMTR-MCNC: 221 MG/DL (ref 70–130)
GLUCOSE BLDC GLUCOMTR-MCNC: 250 MG/DL (ref 70–130)
GLUCOSE BLDC GLUCOMTR-MCNC: 253 MG/DL (ref 70–130)
GLUCOSE BLDC GLUCOMTR-MCNC: 295 MG/DL (ref 70–130)
GLUCOSE SERPL-MCNC: 258 MG/DL (ref 65–99)
HCT VFR BLD AUTO: 44.1 % (ref 34–46.6)
HGB BLD-MCNC: 14.4 G/DL (ref 12–15.9)
IMM GRANULOCYTES # BLD AUTO: 0.12 10*3/MM3 (ref 0–0.05)
IMM GRANULOCYTES NFR BLD AUTO: 0.8 % (ref 0–0.5)
LYMPHOCYTES # BLD AUTO: 5.96 10*3/MM3 (ref 0.7–3.1)
LYMPHOCYTES NFR BLD AUTO: 37.6 % (ref 19.6–45.3)
MCH RBC QN AUTO: 30.5 PG (ref 26.6–33)
MCHC RBC AUTO-ENTMCNC: 32.7 G/DL (ref 31.5–35.7)
MCV RBC AUTO: 93.4 FL (ref 79–97)
MONOCYTES # BLD AUTO: 1.49 10*3/MM3 (ref 0.1–0.9)
MONOCYTES NFR BLD AUTO: 9.4 % (ref 5–12)
NEUTROPHILS NFR BLD AUTO: 50.6 % (ref 42.7–76)
NEUTROPHILS NFR BLD AUTO: 8.04 10*3/MM3 (ref 1.7–7)
NRBC BLD AUTO-RTO: 0 /100 WBC (ref 0–0.2)
PLATELET # BLD AUTO: 271 10*3/MM3 (ref 140–450)
PMV BLD AUTO: 10.1 FL (ref 6–12)
POTASSIUM SERPL-SCNC: 4.3 MMOL/L (ref 3.5–5.2)
RBC # BLD AUTO: 4.72 10*6/MM3 (ref 3.77–5.28)
SODIUM SERPL-SCNC: 135 MMOL/L (ref 136–145)
WBC NRBC COR # BLD AUTO: 15.87 10*3/MM3 (ref 3.4–10.8)

## 2024-01-14 PROCEDURE — 63710000001 INSULIN LISPRO (HUMAN) PER 5 UNITS: Performed by: STUDENT IN AN ORGANIZED HEALTH CARE EDUCATION/TRAINING PROGRAM

## 2024-01-14 PROCEDURE — 63710000001 PREDNISONE PER 1 MG: Performed by: INTERNAL MEDICINE

## 2024-01-14 PROCEDURE — 94761 N-INVAS EAR/PLS OXIMETRY MLT: CPT

## 2024-01-14 PROCEDURE — 85025 COMPLETE CBC W/AUTO DIFF WBC: CPT | Performed by: STUDENT IN AN ORGANIZED HEALTH CARE EDUCATION/TRAINING PROGRAM

## 2024-01-14 PROCEDURE — 63710000001 INSULIN GLARGINE PER 5 UNITS: Performed by: STUDENT IN AN ORGANIZED HEALTH CARE EDUCATION/TRAINING PROGRAM

## 2024-01-14 PROCEDURE — 94799 UNLISTED PULMONARY SVC/PX: CPT

## 2024-01-14 PROCEDURE — 25010000002 ENOXAPARIN PER 10 MG: Performed by: NURSE PRACTITIONER

## 2024-01-14 PROCEDURE — 25810000003 LACTATED RINGERS SOLUTION: Performed by: STUDENT IN AN ORGANIZED HEALTH CARE EDUCATION/TRAINING PROGRAM

## 2024-01-14 PROCEDURE — 94664 DEMO&/EVAL PT USE INHALER: CPT

## 2024-01-14 PROCEDURE — 80048 BASIC METABOLIC PNL TOTAL CA: CPT | Performed by: STUDENT IN AN ORGANIZED HEALTH CARE EDUCATION/TRAINING PROGRAM

## 2024-01-14 PROCEDURE — 82948 REAGENT STRIP/BLOOD GLUCOSE: CPT

## 2024-01-14 RX ORDER — INSULIN LISPRO 100 [IU]/ML
10 INJECTION, SOLUTION INTRAVENOUS; SUBCUTANEOUS
Status: DISCONTINUED | OUTPATIENT
Start: 2024-01-14 | End: 2024-01-15 | Stop reason: HOSPADM

## 2024-01-14 RX ADMIN — FAMOTIDINE 20 MG: 20 TABLET, FILM COATED ORAL at 08:12

## 2024-01-14 RX ADMIN — IPRATROPIUM BROMIDE AND ALBUTEROL SULFATE 3 ML: 2.5; .5 SOLUTION RESPIRATORY (INHALATION) at 08:20

## 2024-01-14 RX ADMIN — INSULIN GLARGINE 16 UNITS: 100 INJECTION, SOLUTION SUBCUTANEOUS at 22:17

## 2024-01-14 RX ADMIN — ATORVASTATIN CALCIUM 20 MG: 20 TABLET, FILM COATED ORAL at 22:23

## 2024-01-14 RX ADMIN — GUAIFENESIN 1200 MG: 600 TABLET, EXTENDED RELEASE ORAL at 22:13

## 2024-01-14 RX ADMIN — IPRATROPIUM BROMIDE AND ALBUTEROL SULFATE 3 ML: 2.5; .5 SOLUTION RESPIRATORY (INHALATION) at 23:22

## 2024-01-14 RX ADMIN — BUTALBITAL, ACETAMINOPHEN AND CAFFEINE 1 TABLET: 325; 50; 40 TABLET ORAL at 12:26

## 2024-01-14 RX ADMIN — PROPRANOLOL HYDROCHLORIDE 40 MG: 40 TABLET ORAL at 22:13

## 2024-01-14 RX ADMIN — FAMOTIDINE 20 MG: 20 TABLET, FILM COATED ORAL at 17:40

## 2024-01-14 RX ADMIN — MONTELUKAST SODIUM 10 MG: 10 TABLET, FILM COATED ORAL at 22:13

## 2024-01-14 RX ADMIN — PROPRANOLOL HYDROCHLORIDE 40 MG: 40 TABLET ORAL at 08:13

## 2024-01-14 RX ADMIN — INSULIN LISPRO 8 UNITS: 100 INJECTION, SOLUTION INTRAVENOUS; SUBCUTANEOUS at 12:26

## 2024-01-14 RX ADMIN — ENOXAPARIN SODIUM 40 MG: 100 INJECTION SUBCUTANEOUS at 14:53

## 2024-01-14 RX ADMIN — GUAIFENESIN 1200 MG: 600 TABLET, EXTENDED RELEASE ORAL at 08:14

## 2024-01-14 RX ADMIN — INSULIN LISPRO 12 UNITS: 100 INJECTION, SOLUTION INTRAVENOUS; SUBCUTANEOUS at 17:40

## 2024-01-14 RX ADMIN — IPRATROPIUM BROMIDE AND ALBUTEROL SULFATE 3 ML: 2.5; .5 SOLUTION RESPIRATORY (INHALATION) at 20:08

## 2024-01-14 RX ADMIN — PREGABALIN 100 MG: 100 CAPSULE ORAL at 08:13

## 2024-01-14 RX ADMIN — BUTALBITAL, ACETAMINOPHEN AND CAFFEINE 1 TABLET: 325; 50; 40 TABLET ORAL at 18:02

## 2024-01-14 RX ADMIN — SODIUM CHLORIDE, POTASSIUM CHLORIDE, SODIUM LACTATE AND CALCIUM CHLORIDE 500 ML: 600; 310; 30; 20 INJECTION, SOLUTION INTRAVENOUS at 17:47

## 2024-01-14 RX ADMIN — INSULIN LISPRO 12 UNITS: 100 INJECTION, SOLUTION INTRAVENOUS; SUBCUTANEOUS at 08:14

## 2024-01-14 RX ADMIN — Medication 10 ML: at 22:15

## 2024-01-14 RX ADMIN — PREDNISONE 40 MG: 20 TABLET ORAL at 08:14

## 2024-01-14 RX ADMIN — INSULIN LISPRO 8 UNITS: 100 INJECTION, SOLUTION INTRAVENOUS; SUBCUTANEOUS at 08:12

## 2024-01-14 RX ADMIN — ASPIRIN 81 MG: 81 TABLET, COATED ORAL at 08:12

## 2024-01-14 RX ADMIN — ZOLPIDEM TARTRATE 5 MG: 5 TABLET ORAL at 22:15

## 2024-01-14 RX ADMIN — HYDROCHLOROTHIAZIDE 25 MG: 25 TABLET ORAL at 08:14

## 2024-01-14 RX ADMIN — INSULIN LISPRO 8 UNITS: 100 INJECTION, SOLUTION INTRAVENOUS; SUBCUTANEOUS at 12:27

## 2024-01-14 RX ADMIN — LISINOPRIL 10 MG: 10 TABLET ORAL at 08:17

## 2024-01-14 RX ADMIN — BUDESONIDE AND FORMOTEROL FUMARATE DIHYDRATE 2 PUFF: 160; 4.5 AEROSOL RESPIRATORY (INHALATION) at 08:23

## 2024-01-14 RX ADMIN — FLUTICASONE PROPIONATE 2 SPRAY: 50 SPRAY, METERED NASAL at 08:12

## 2024-01-14 RX ADMIN — PREGABALIN 100 MG: 100 CAPSULE ORAL at 22:14

## 2024-01-14 RX ADMIN — PANTOPRAZOLE SODIUM 40 MG: 40 TABLET, DELAYED RELEASE ORAL at 06:01

## 2024-01-14 RX ADMIN — IPRATROPIUM BROMIDE AND ALBUTEROL SULFATE 3 ML: 2.5; .5 SOLUTION RESPIRATORY (INHALATION) at 16:10

## 2024-01-14 RX ADMIN — TRAZODONE HYDROCHLORIDE 50 MG: 50 TABLET ORAL at 22:13

## 2024-01-14 RX ADMIN — BUDESONIDE AND FORMOTEROL FUMARATE DIHYDRATE 2 PUFF: 160; 4.5 AEROSOL RESPIRATORY (INHALATION) at 20:13

## 2024-01-14 RX ADMIN — INSULIN LISPRO 10 UNITS: 100 INJECTION, SOLUTION INTRAVENOUS; SUBCUTANEOUS at 17:41

## 2024-01-14 RX ADMIN — PREGABALIN 100 MG: 100 CAPSULE ORAL at 15:56

## 2024-01-14 RX ADMIN — IPRATROPIUM BROMIDE AND ALBUTEROL SULFATE 3 ML: 2.5; .5 SOLUTION RESPIRATORY (INHALATION) at 11:54

## 2024-01-14 RX ADMIN — INSULIN LISPRO 12 UNITS: 100 INJECTION, SOLUTION INTRAVENOUS; SUBCUTANEOUS at 22:18

## 2024-01-14 RX ADMIN — IPRATROPIUM BROMIDE 2 SPRAY: 42 SPRAY, METERED NASAL at 22:15

## 2024-01-14 RX ADMIN — IPRATROPIUM BROMIDE 2 SPRAY: 42 SPRAY, METERED NASAL at 08:12

## 2024-01-14 RX ADMIN — Medication 10 ML: at 08:15

## 2024-01-14 RX ADMIN — CYCLOSPORINE 1 DROP: 0.5 EMULSION OPHTHALMIC at 08:13

## 2024-01-14 RX ADMIN — CYCLOSPORINE 1 DROP: 0.5 EMULSION OPHTHALMIC at 22:14

## 2024-01-14 NOTE — PLAN OF CARE
Problem: Fall Injury Risk  Goal: Absence of Fall and Fall-Related Injury  1/14/2024 0129 by Nancy Lee RN  Outcome: Ongoing, Progressing  1/14/2024 0129 by Nancy Lee RN  Outcome: Ongoing, Progressing  Intervention: Identify and Manage Contributors  Recent Flowsheet Documentation  Taken 1/14/2024 0025 by Nancy Lee RN  Medication Review/Management: medications reviewed  Taken 1/13/2024 2223 by Nancy Lee RN  Medication Review/Management: medications reviewed  Taken 1/13/2024 2040 by Nancy Lee RN  Medication Review/Management: medications reviewed  Intervention: Promote Injury-Free Environment  Recent Flowsheet Documentation  Taken 1/14/2024 0025 by Nancy Lee RN  Safety Promotion/Fall Prevention:   activity supervised   clutter free environment maintained   assistive device/personal items within reach   fall prevention program maintained   lighting adjusted   nonskid shoes/slippers when out of bed   room organization consistent   safety round/check completed  Taken 1/13/2024 2223 by Nancy Lee RN  Safety Promotion/Fall Prevention:   activity supervised   assistive device/personal items within reach   clutter free environment maintained   fall prevention program maintained   lighting adjusted   nonskid shoes/slippers when out of bed   room organization consistent   safety round/check completed  Taken 1/13/2024 2040 by Nancy Lee RN  Safety Promotion/Fall Prevention:   activity supervised   assistive device/personal items within reach   clutter free environment maintained   fall prevention program maintained   lighting adjusted   nonskid shoes/slippers when out of bed   room organization consistent   safety round/check completed     Problem: Adult Inpatient Plan of Care  Goal: Plan of Care Review  Outcome: Ongoing, Progressing  Flowsheets (Taken 1/14/2024 0129)  Progress: improving  Plan of Care Reviewed With: patient  Outcome Evaluation:   No s/sx of distress noted at this time. Rested well  throughout night. Vital signs WDL. On room air   tolerated. Fall precautions maintained. Medicated for headache with x1 dose of fioricet per order with relief noted. Will cont. with current plan of care.  Goal: Patient-Specific Goal (Individualized)  Outcome: Ongoing, Progressing  Goal: Absence of Hospital-Acquired Illness or Injury  Outcome: Ongoing, Progressing  Goal: Optimal Comfort and Wellbeing  Outcome: Ongoing, Progressing  Goal: Readiness for Transition of Care  Outcome: Ongoing, Progressing     Problem: Pain Acute  Goal: Acceptable Pain Control and Functional Ability  Outcome: Ongoing, Progressing     Problem: Asthma Exacerbation  Goal: Asthma Symptom Relief  Outcome: Ongoing, Progressing   Goal Outcome Evaluation:  Plan of Care Reviewed With: patient        Progress: improving  Outcome Evaluation: No s/sx of distress noted at this time. Rested well throughout night. Vital signs WDL. On room air; tolerated. Fall precautions maintained. Medicated for headache with x1 dose of fioricet per order with relief noted. Will cont. with current plan of care.

## 2024-01-14 NOTE — PROGRESS NOTES
[facility]  [phone]  Dr. Cristhian Goncalves     Subjective:  LOS: 6    Chief Complaint:  SOB and cough     Patient reports still feeling bad today.  But overall appears to be improving.  Still with significant headache and cough.   was at bedside today and did take notes while I was talking.  We discussed proper use of asthma inhaler medications including using Symbicort twice daily and every 6 hours as needed.  We also discussed various other medications that can help improve asthma control including her allergy medications and nasal sprays.  She has buying a home nebulizer and I advised her to use this very sparingly.  However we can provide some nebulized albuterol/ipratropium to be used sparingly as needed.  She had no other acute concerns or complaints.    Objective   Vital Signs past 24hrs  Temp range: Temp (24hrs), Av.8 °F (36.6 °C), Min:97.7 °F (36.5 °C), Max:98.2 °F (36.8 °C)    BP range: BP: (111-134)/(61-73) 120/73  Pulse range: Heart Rate:  [] 125  Resp rate range: Resp:  [16-20] 18  Device (Oxygen Therapy): room air   Oxygen range:SpO2:  [91 %-98 %] 96 %     Physical Exam  Vitals reviewed.   Constitutional:       General: She is not in acute distress.     Appearance: Normal appearance.   HENT:      Head: Normocephalic and atraumatic.   Eyes:      Extraocular Movements: Extraocular movements intact.      Conjunctiva/sclera: Conjunctivae normal.      Pupils: Pupils are equal, round, and reactive to light.   Cardiovascular:      Rate and Rhythm: Normal rate and regular rhythm.      Heart sounds: No murmur heard.     No friction rub. No gallop.   Pulmonary:      Effort: Pulmonary effort is normal. No respiratory distress.      Breath sounds: No wheezing, rhonchi or rales.   Abdominal:      General: Abdomen is flat.      Palpations: Abdomen is soft.      Tenderness: There is no abdominal tenderness.   Musculoskeletal:         General: No swelling or tenderness. Normal  range of motion.      Cervical back: Normal range of motion. No rigidity or tenderness.   Skin:     General: Skin is warm and dry.      Findings: No rash.   Neurological:      General: No focal deficit present.      Mental Status: She is alert and oriented to person, place, and time.   Psychiatric:         Mood and Affect: Mood normal.         Behavior: Behavior normal.       Results Review:    I have reviewed the laboratory and imaging data since the last note by [facility] physician.  My annotations are noted in assessment and plan.      Result Review:  I have personally reviewed the results from last note by [facility] physician to 1/14/2024 10:14 EST and agree with these findings:  [x]  Laboratory list / accordion  [x]  Microbiology  [x]  Radiology  [x]  EKG/Telemetry   [x]  Cardiology/Vascular   [x]  Pathology  [x]  Old records  []  Other:    Medication Review:  I have reviewed the current MAR.  My annotations are noted in assessment and plan.    aspirin, 81 mg, Oral, Daily  atorvastatin, 20 mg, Oral, Nightly  budesonide-formoterol, 2 puff, Inhalation, BID - RT  cycloSPORINE, 1 drop, Both Eyes, BID  enoxaparin, 40 mg, Subcutaneous, Q24H  famotidine, 20 mg, Oral, BID AC  fluticasone, 2 spray, Each Nare, Daily  guaiFENesin, 1,200 mg, Oral, Q12H  hydroCHLOROthiazide, 25 mg, Oral, Daily  insulin glargine, 12 Units, Subcutaneous, Nightly  insulin lispro, 4-19 Units, Subcutaneous, 4x Daily AC & at Bedtime  insulin lispro, 8 Units, Subcutaneous, TID With Meals  ipratropium, 2 spray, Each Nare, BID  ipratropium-albuterol, 3 mL, Nebulization, Q4H - RT  lisinopril, 10 mg, Oral, BID  montelukast, 10 mg, Oral, Nightly  pantoprazole, 40 mg, Oral, Q AM  predniSONE, 40 mg, Oral, Daily With Breakfast  pregabalin, 100 mg, Oral, TID  propranolol, 40 mg, Oral, BID  sodium chloride, 10 mL, Intravenous, Q12H  traZODone, 50 mg, Oral, Nightly           Lines, Drains & Airways       Active LDAs       Name Placement date Placement time Site Days     Peripheral IV Anterior;Distal;Right Forearm --  --  Forearm  --                  Contact  Diet Orders (active) (From admission, onward)       Start     Ordered    01/08/24 0721  Diet: Diabetic Diets; Consistent Carbohydrate; Texture: Regular Texture (IDDSI 7); Fluid Consistency: Thin (IDDSI 0)  Diet Effective Now         01/08/24 0720                    68 year old female with a past medical history of diabetes mellitus type 2, GERD, hyperlipidemia, hypertension, and a history of brain aneurysm status post clipping (2003 and 2017).  She also reports a prior diagnosis of asthma-maintained on Symbicort and albuterol. Presented to ED on 1/8/24 with complaints of shortness of breath and persistent URI symptoms.  Started over a week PTA, patient had been seen at [facility], her primary care provider, and previously at the [facility] emergency department.  All viral swabs had been negative and her chest x-ray remained clear.  She has been taking steroids, antibiotics and breathing treatments at home without improvement in symptoms. ED evaluation included respiratory viral panel positive for RSV. Patient was admitted to observation for asthma exacerbation and RSV infection.      Assessment  Acute asthma exacerbation  Acute RSV infection  Right middle lobe infiltrate  Acute hyponatremia  Increased anion gap metabolic acidosis resolved  Diabetes mellitus type 2  Obesity     Plan  - continue scheduled and prn bronchodilators.  Discussed proper inhaler use including using her Symbicort as both maintenance and rescue.  She was instructed to use it twice daily and every 6 hours as needed.  She has buying a home nebulizer machine which I told her to use sparingly however can give her prescription for ipratropium/albuterol nebs home-going.   -Continue medications for allergic rhinitis  - continue steroids and would prescribe slow taper at discharge (40mg x3 days, 30mg x3 days, 20mg x3 days and 10mg x3 days)  -Given  patient's slow improvement and high risk for readmission, I think it would be okay to monitor her in the hospital with likely discharge tomorrow  - supportive care for viral infection  - She will need a repeat CT chest as outpatient for RML infiltrate  - will arrange follow up with [facility]PC at discharge       Cristhian Goncalves DO   01/14/24  10:14 EST      Part of this note may be an electronic transcription/translation of spoken language to printed text using the Dragon Dictation System.

## 2024-01-14 NOTE — PROGRESS NOTES
Name: Khalida Gilliland ADMIT: 2024   : 1955  PCP: Sergo Owen MD    MRN: [id number] LOS: 6 days   AGE/SEX: 68 y.o. female  ROOM: [facility]     Subjective   Subjective   Patient seen and examined this morning. Hospital day 7. At time of my examination, patient is awake, alert, resting comfortably in bed.   at bedside. She is doing slightly better today, on room air.      Objective   Objective   Vital Signs  Temp:  [97.7 °F (36.5 °C)-98.2 °F (36.8 °C)] 97.7 °F (36.5 °C)  Heart Rate:  [] 125  Resp:  [16-20] 18  BP: (111-134)/(61-73) 120/73  SpO2:  [91 %-98 %] 96 %  on   ;   Device (Oxygen Therapy): room air  Body mass index is 31.78 kg/m².  Constitutional: Awake, overweight, no acute distress  CV: tachycardic, regular rhythm  RESP: on room air, decreased breath sounds, faint wheezes  GI: soft, nontender, nondistended  MSK: No gross deformities appreciated, no tenderness, no edema  Skin: Warm, dry. No rashes  Psych: Appropriate mood and affect.  Results Review     I reviewed the patient's new clinical results.  Results from last 7 days   Lab Units 24  0539 24  0548 24  0702 24  0759   WBC 10*3/mm3 15.87* 15.26* 12.62* 6.98   HEMOGLOBIN g/dL 14.4 14.5 14.8 13.7   PLATELETS 10*3/mm3 271 258 265 218     Results from last 7 days   Lab Units 24  0539 24  0548 24  0702 24  0759   SODIUM mmol/L 135* 139 137 138   POTASSIUM mmol/L 4.3 3.8 3.9 4.7   CHLORIDE mmol/L 96* 99 98 103   CO2 mmol/L 27.0 29.0 27.0 24.0   BUN mg/dL 34* 23 16 17   CREATININE mg/dL 1.04* 0.86 0.68 0.71   GLUCOSE mg/dL 258* 100* 108* 239*   EGFR mL/min/1.73 58.7* 73.7 95.0 92.7           Results from last 7 days   Lab Units 24  0539 24  0548 24  0702 24  0759   CALCIUM mg/dL 9.6 9.8 9.3 9.2       Glucose   Date/Time Value Ref Range Status   2024 1134 221 (H) 70 - 130 mg/dL Final   2024 0608 250 (H) 70 - 130 mg/dL Final   2024 2014 251 (H)  70 - 130 mg/dL Final   01/13/2024 1532 215 (H) 70 - 130 mg/dL Final   01/13/2024 1132 251 (H) 70 - 130 mg/dL Final   01/13/2024 0740 103 70 - 130 mg/dL Final   01/13/2024 0639 108 70 - 130 mg/dL Final       No radiology results for the last day    I have personally reviewed all medications:  Scheduled Medications  aspirin, 81 mg, Oral, Daily  atorvastatin, 20 mg, Oral, Nightly  budesonide-formoterol, 2 puff, Inhalation, BID - RT  cycloSPORINE, 1 drop, Both Eyes, BID  enoxaparin, 40 mg, Subcutaneous, Q24H  famotidine, 20 mg, Oral, BID AC  fluticasone, 2 spray, Each Nare, Daily  guaiFENesin, 1,200 mg, Oral, Q12H  hydroCHLOROthiazide, 25 mg, Oral, Daily  insulin glargine, 12 Units, Subcutaneous, Nightly  insulin lispro, 4-19 Units, Subcutaneous, 4x Daily AC & at Bedtime  insulin lispro, 8 Units, Subcutaneous, TID With Meals  ipratropium, 2 spray, Each Nare, BID  ipratropium-albuterol, 3 mL, Nebulization, Q4H - RT  lisinopril, 10 mg, Oral, BID  montelukast, 10 mg, Oral, Nightly  pantoprazole, 40 mg, Oral, Q AM  predniSONE, 40 mg, Oral, Daily With Breakfast  pregabalin, 100 mg, Oral, TID  propranolol, 40 mg, Oral, BID  sodium chloride, 10 mL, Intravenous, Q12H  traZODone, 50 mg, Oral, Nightly    Infusions     Diet  Diet: Diabetic Diets; Consistent Carbohydrate; Texture: Regular Texture (IDDSI 7); Fluid Consistency: Thin (IDDSI 0)    I have personally reviewed:  [x]  Laboratory   [x]  Microbiology   [x]  Radiology   [x]  EKG/Telemetry  [x]  Cardiology/Vascular   []  Pathology    []  Records       Assessment/Plan     Active Hospital Problems    Diagnosis  POA    **Asthma exacerbation [J45.901]  Yes    Acute respiratory failure with hypoxia [J96.01]  Yes    RSV (respiratory syncytial virus infection) [B33.8]  Yes    Abnormal CT of the chest [R93.89]  Yes    Obesity (BMI 30-39.9) [E66.9]  Yes    Diabetic peripheral neuropathy [E11.42]  Yes    Headache [R51.9]  Yes    GERD (gastroesophageal reflux disease) [K21.9]  Yes     Type 2 diabetes mellitus with hyperglycemia, without long-term current use of insulin [E11.65]  Yes    Essential hypertension [I10]  Yes    Other hyperlipidemia [E78.49]  Yes      Resolved Hospital Problems   No resolved problems to display.     68 y.o. female with history of Asthma, HTN, T2DM complicated by neuropathy, HLD, headaches who initially presented to [facility] with complaints of worsening dyspnea and cough over the past week, admitted with Asthma exacerbation.     Acute hypoxic respiratory failure  RSV infection  Asthma with acute exacerbation  - She was receiving IV steroids 40 mg every 8 hours, which have now been transitioned to oral dosing, in addition to scheduled and PRN nebulized bronchodilators and medications for symptom control. She continues to have symptoms, overall improved from admission. Discussed with Pulmonology today (01/14), wanting to monitor symptoms for one more day, she is asking if nebulizer treatments can be ordered for her at time of discharge.  - Will continue to follow their plans/recommendations. Greatly appreciate their help.  - Continue treatments as prescribed for now. Closely monitor respiratory status, monitor for any signs of worsening decompensation, supplemental O2 PRN, pulse oximetry, telemetry. Wean oxygen as tolerated.     Type 2 diabetes mellitus with hyperglycemia  - A1c 8.70% (01/08/24); Glucose uncontrolled this morning on labs. Counseled on importance of dietary compliance as she was previously eating food that was not consistent with diabetic diet.   - Increase Glargine to 16 units nightly, increase Lispro to 10 units TID with meals. Continue SSI.  - Adjust regimen as needed. Diabetic diet.     Leukocytosis  - Noted on most recent labs, values slightly worse from prior, possibly reactive, she does not have evidence of worsening clinical status or new symptoms to suggest acute infection, discussed with Pulmonology, who agrees. Do need to  continue with close monitoring. Repeat CBC in AM.    Acute kidney injury  - Noted over past 48 hours, creatinine has risen >0.3; likely result of decreased intake, possibly exacerbated by diuretic and ACE inhibitor.  - See HTN medication management as below.  - Give 500 cc fluid bolus of LR.  - Order repeat labs in AM for reassessment.    Hypertension  - Blood pressure appears stable and acceptable acutely at this time. However, creatinine has worsened, she already received AM doses of HCTZ and Lisinopril this morning, will stop PM dose of Lisinopril and hold these medications so that they aren't given in the morning.   - Trend BP to guide ongoing management decisions.     Headache  - Acute on chronic, noted on arrival, no neurological deficits. Symptoms improved with PRN medications. Continue Fioricet PRN, closely monitor.     GERD  - Continue PPI as prescribed.      Hyperlipidemia  - Continue Statin as prescribed.      Lovenox 40 mg SC daily for DVT prophylaxis.  Full code.  Discussed with patient, nursing staff, and consulting provider.  Anticipate discharge home  tomorrow if okay with pulmonology and creatinine improved      Kana Melendrez DO  [facility]  01/14/24

## 2024-01-15 ENCOUNTER — READMISSION MANAGEMENT (OUTPATIENT)
Dept: CALL CENTER | Facility: HOSPITAL | Age: 69
End: 2024-01-15
Payer: MEDICARE

## 2024-01-15 ENCOUNTER — APPOINTMENT (OUTPATIENT)
Dept: GENERAL RADIOLOGY | Facility: HOSPITAL | Age: 69
End: 2024-01-15
Payer: MEDICARE

## 2024-01-15 VITALS
DIASTOLIC BLOOD PRESSURE: 48 MMHG | TEMPERATURE: 98.4 F | BODY MASS INDEX: 31.67 KG/M2 | WEIGHT: 209 LBS | RESPIRATION RATE: 18 BRPM | HEIGHT: 68 IN | OXYGEN SATURATION: 96 % | HEART RATE: 60 BPM | SYSTOLIC BLOOD PRESSURE: 116 MMHG

## 2024-01-15 PROBLEM — R51.9 HEADACHE: Status: RESOLVED | Noted: 2020-03-27 | Resolved: 2024-01-15

## 2024-01-15 PROBLEM — J96.01 ACUTE RESPIRATORY FAILURE WITH HYPOXIA: Status: RESOLVED | Noted: 2024-01-08 | Resolved: 2024-01-15

## 2024-01-15 LAB
ANION GAP SERPL CALCULATED.3IONS-SCNC: 9 MMOL/L (ref 5–15)
BUN SERPL-MCNC: 25 MG/DL (ref 8–23)
BUN/CREAT SERPL: 34.7 (ref 7–25)
CALCIUM SPEC-SCNC: 9.6 MG/DL (ref 8.6–10.5)
CHLORIDE SERPL-SCNC: 94 MMOL/L (ref 98–107)
CO2 SERPL-SCNC: 29 MMOL/L (ref 22–29)
CREAT SERPL-MCNC: 0.72 MG/DL (ref 0.57–1)
DEPRECATED RDW RBC AUTO: 42.8 FL (ref 37–54)
EGFRCR SERPLBLD CKD-EPI 2021: 91.2 ML/MIN/1.73
ERYTHROCYTE [DISTWIDTH] IN BLOOD BY AUTOMATED COUNT: 13.1 % (ref 12.3–15.4)
GLUCOSE BLDC GLUCOMTR-MCNC: 137 MG/DL (ref 70–130)
GLUCOSE BLDC GLUCOMTR-MCNC: 150 MG/DL (ref 70–130)
GLUCOSE BLDC GLUCOMTR-MCNC: 283 MG/DL (ref 70–130)
GLUCOSE SERPL-MCNC: 146 MG/DL (ref 65–99)
HCT VFR BLD AUTO: 41.4 % (ref 34–46.6)
HGB BLD-MCNC: 13.9 G/DL (ref 12–15.9)
LYMPHOCYTES # BLD MANUAL: 4.36 10*3/MM3 (ref 0.7–3.1)
LYMPHOCYTES NFR BLD MANUAL: 10.2 % (ref 5–12)
MCH RBC QN AUTO: 30.2 PG (ref 26.6–33)
MCHC RBC AUTO-ENTMCNC: 33.6 G/DL (ref 31.5–35.7)
MCV RBC AUTO: 89.8 FL (ref 79–97)
MONOCYTES # BLD: 2.08 10*3/MM3 (ref 0.1–0.9)
NEUTROPHILS # BLD AUTO: 13.95 10*3/MM3 (ref 1.7–7)
NEUTROPHILS NFR BLD MANUAL: 68.4 % (ref 42.7–76)
PLAT MORPH BLD: NORMAL
PLATELET # BLD AUTO: 270 10*3/MM3 (ref 140–450)
PMV BLD AUTO: 9.9 FL (ref 6–12)
POTASSIUM SERPL-SCNC: 4.7 MMOL/L (ref 3.5–5.2)
PROCALCITONIN SERPL-MCNC: 0.06 NG/ML (ref 0–0.25)
RBC # BLD AUTO: 4.61 10*6/MM3 (ref 3.77–5.28)
RBC MORPH BLD: NORMAL
SODIUM SERPL-SCNC: 132 MMOL/L (ref 136–145)
VARIANT LYMPHS NFR BLD MANUAL: 21.4 % (ref 19.6–45.3)
WBC MORPH BLD: NORMAL
WBC NRBC COR # BLD AUTO: 20.39 10*3/MM3 (ref 3.4–10.8)

## 2024-01-15 PROCEDURE — 63710000001 PREDNISONE PER 1 MG: Performed by: INTERNAL MEDICINE

## 2024-01-15 PROCEDURE — 84145 PROCALCITONIN (PCT): CPT | Performed by: INTERNAL MEDICINE

## 2024-01-15 PROCEDURE — 94760 N-INVAS EAR/PLS OXIMETRY 1: CPT

## 2024-01-15 PROCEDURE — 82948 REAGENT STRIP/BLOOD GLUCOSE: CPT

## 2024-01-15 PROCEDURE — 80048 BASIC METABOLIC PNL TOTAL CA: CPT | Performed by: STUDENT IN AN ORGANIZED HEALTH CARE EDUCATION/TRAINING PROGRAM

## 2024-01-15 PROCEDURE — 94664 DEMO&/EVAL PT USE INHALER: CPT

## 2024-01-15 PROCEDURE — 63710000001 INSULIN LISPRO (HUMAN) PER 5 UNITS: Performed by: STUDENT IN AN ORGANIZED HEALTH CARE EDUCATION/TRAINING PROGRAM

## 2024-01-15 PROCEDURE — 94799 UNLISTED PULMONARY SVC/PX: CPT

## 2024-01-15 PROCEDURE — 85007 BL SMEAR W/DIFF WBC COUNT: CPT | Performed by: STUDENT IN AN ORGANIZED HEALTH CARE EDUCATION/TRAINING PROGRAM

## 2024-01-15 PROCEDURE — 71045 X-RAY EXAM CHEST 1 VIEW: CPT

## 2024-01-15 PROCEDURE — 85025 COMPLETE CBC W/AUTO DIFF WBC: CPT | Performed by: STUDENT IN AN ORGANIZED HEALTH CARE EDUCATION/TRAINING PROGRAM

## 2024-01-15 RX ORDER — HYDROCODONE POLISTIREX AND CHLORPHENIRAMINE POLISTIREX 10; 8 MG/5ML; MG/5ML
5 SUSPENSION, EXTENDED RELEASE ORAL EVERY 12 HOURS PRN
Qty: 115 ML | Refills: 0 | Status: SHIPPED | OUTPATIENT
Start: 2024-01-15 | End: 2024-01-22 | Stop reason: SDUPTHER

## 2024-01-15 RX ORDER — IPRATROPIUM BROMIDE AND ALBUTEROL SULFATE 2.5; .5 MG/3ML; MG/3ML
3 SOLUTION RESPIRATORY (INHALATION)
Qty: 360 ML | Refills: 0 | Status: SHIPPED | OUTPATIENT
Start: 2024-01-15

## 2024-01-15 RX ORDER — HYDROCODONE POLISTIREX AND CHLORPHENIRAMINE POLISTIREX 10; 8 MG/5ML; MG/5ML
5 SUSPENSION, EXTENDED RELEASE ORAL EVERY 12 HOURS PRN
Qty: 50 ML | Refills: 0 | Status: SHIPPED | OUTPATIENT
Start: 2024-01-15 | End: 2024-01-15 | Stop reason: SDUPTHER

## 2024-01-15 RX ORDER — PREDNISONE 20 MG/1
20 TABLET ORAL
Status: DISCONTINUED | OUTPATIENT
Start: 2024-01-16 | End: 2024-01-15 | Stop reason: HOSPADM

## 2024-01-15 RX ORDER — HYDROCODONE POLISTIREX AND CHLORPHENIRAMINE POLISTIREX 10; 8 MG/5ML; MG/5ML
5 SUSPENSION, EXTENDED RELEASE ORAL EVERY 12 HOURS PRN
Qty: 115 ML | Refills: 0 | Status: SHIPPED | OUTPATIENT
Start: 2024-01-15 | End: 2024-01-15 | Stop reason: SDUPTHER

## 2024-01-15 RX ORDER — PREDNISONE 20 MG/1
TABLET ORAL
Qty: 5 TABLET | Refills: 0 | Status: SHIPPED | OUTPATIENT
Start: 2024-01-16 | End: 2024-01-22 | Stop reason: SDUPTHER

## 2024-01-15 RX ORDER — GUAIFENESIN 600 MG/1
1200 TABLET, EXTENDED RELEASE ORAL EVERY 12 HOURS SCHEDULED
Qty: 20 TABLET | Refills: 0 | Status: SHIPPED | OUTPATIENT
Start: 2024-01-15 | End: 2024-01-20

## 2024-01-15 RX ADMIN — ACETAMINOPHEN 650 MG: 325 TABLET, FILM COATED ORAL at 04:36

## 2024-01-15 RX ADMIN — IPRATROPIUM BROMIDE AND ALBUTEROL SULFATE 3 ML: 2.5; .5 SOLUTION RESPIRATORY (INHALATION) at 07:59

## 2024-01-15 RX ADMIN — IPRATROPIUM BROMIDE 2 SPRAY: 42 SPRAY, METERED NASAL at 09:11

## 2024-01-15 RX ADMIN — INSULIN LISPRO 12 UNITS: 100 INJECTION, SOLUTION INTRAVENOUS; SUBCUTANEOUS at 12:21

## 2024-01-15 RX ADMIN — CYCLOSPORINE 1 DROP: 0.5 EMULSION OPHTHALMIC at 09:42

## 2024-01-15 RX ADMIN — FAMOTIDINE 20 MG: 20 TABLET, FILM COATED ORAL at 06:35

## 2024-01-15 RX ADMIN — INSULIN LISPRO 10 UNITS: 100 INJECTION, SOLUTION INTRAVENOUS; SUBCUTANEOUS at 09:08

## 2024-01-15 RX ADMIN — PREDNISONE 40 MG: 20 TABLET ORAL at 09:08

## 2024-01-15 RX ADMIN — PANTOPRAZOLE SODIUM 40 MG: 40 TABLET, DELAYED RELEASE ORAL at 06:35

## 2024-01-15 RX ADMIN — INSULIN LISPRO 10 UNITS: 100 INJECTION, SOLUTION INTRAVENOUS; SUBCUTANEOUS at 12:22

## 2024-01-15 RX ADMIN — BUTALBITAL, ACETAMINOPHEN AND CAFFEINE 1 TABLET: 325; 50; 40 TABLET ORAL at 02:33

## 2024-01-15 RX ADMIN — HYDROCODONE POLISTIREX AND CHLORPHENIRAMINE POLISTIREX 5 ML: 10; 8 SUSPENSION, EXTENDED RELEASE ORAL at 04:36

## 2024-01-15 RX ADMIN — FLUTICASONE PROPIONATE 2 SPRAY: 50 SPRAY, METERED NASAL at 09:11

## 2024-01-15 RX ADMIN — GUAIFENESIN 1200 MG: 600 TABLET, EXTENDED RELEASE ORAL at 09:08

## 2024-01-15 RX ADMIN — IPRATROPIUM BROMIDE AND ALBUTEROL SULFATE 3 ML: 2.5; .5 SOLUTION RESPIRATORY (INHALATION) at 11:40

## 2024-01-15 RX ADMIN — INSULIN LISPRO 4 UNITS: 100 INJECTION, SOLUTION INTRAVENOUS; SUBCUTANEOUS at 09:08

## 2024-01-15 RX ADMIN — BUDESONIDE AND FORMOTEROL FUMARATE DIHYDRATE 2 PUFF: 160; 4.5 AEROSOL RESPIRATORY (INHALATION) at 08:00

## 2024-01-15 RX ADMIN — Medication 10 ML: at 09:11

## 2024-01-15 RX ADMIN — PREGABALIN 100 MG: 100 CAPSULE ORAL at 09:08

## 2024-01-15 RX ADMIN — ASPIRIN 81 MG: 81 TABLET, COATED ORAL at 09:08

## 2024-01-15 RX ADMIN — IPRATROPIUM BROMIDE AND ALBUTEROL SULFATE 3 ML: 2.5; .5 SOLUTION RESPIRATORY (INHALATION) at 04:42

## 2024-01-15 NOTE — PROGRESS NOTES
LOS: 7 days   Patient Care Team:  Sergo Owen MD as PCP - General (Internal Medicine)  Lex Morris MD as Consulting Physician (Hematology and Oncology)  Mikael David MD as Referring Physician (Internal Medicine)    Subjective     Patient is new to me today I am picking up pulmonary coverage from Luis Goncalves and Kassandra have reviewed there is and multiple other records following patient for RSV bronchitis and acute asthma exacerbation.  Patient says it was a terrible night she had a coughing paroxysmal that did not want to stop she got a breathing treatment and some cough medicine apparently did eventually stop.  She did not cough much up just a lot of hard coughing.  Review of Systems:          Objective     Vital Signs  Vital Sign Min/Max for last 24 hours  Temp  Min: 97.9 °F (36.6 °C)  Max: 98.2 °F (36.8 °C)   BP  Min: 127/73  Max: 136/75   Pulse  Min: 60  Max: 88   Resp  Min: 18  Max: 20   SpO2  Min: 90 %  Max: 97 %   No data recorded   No data recorded        Ventilator/Non-Invasive Ventilation Settings (From admission, onward)      None                         Body mass index is 31.78 kg/m².  I/O last 3 completed shifts:  In: 100 [P.O.:100]  Out: -   No intake/output data recorded.        Physical Exam:  General Appearance: Well-developed obese white female she is resting in bed she was sleeping on my arrival and did not look in acute distress  Eyes: Conjunctiva are clear and anicteric  ENT: Mucous membranes are moist no exudates  Neck: No jugular venous distention and trachea midline  Lungs: She was actually pretty clear with the deep breaths she did have some coughing but I did not hear any wheezes or rales no rhonchi was not labored breathing  Cardiac: Regular rate rhythm no murmur  Abdomen: Soft no palpable hepatosplenomegaly or masses  : Not examined  Musculoskeletal: Grossly normal there is no calf tenderness or palpable cords  Skin: Warm and dry no jaundice no  petechiae  Neuro: Alert and oriented cooperative following commands grossly intact  Extremities/P Vascular: No clubbing no cyanosis no edema palpable radial and dorsalis pedis pulses  MSE: Unique       Labs:  Results from last 7 days   Lab Units 01/15/24  0644 01/14/24  0539 01/13/24  0548 01/12/24  0702 01/11/24  0759 01/10/24  0713 01/09/24  0541   GLUCOSE mg/dL 146* 258* 100* 108* 239* 232* 270*   SODIUM mmol/L 132* 135* 139 137 138 139 137   POTASSIUM mmol/L 4.7 4.3 3.8 3.9 4.7 4.6 4.5   CO2 mmol/L 29.0 27.0 29.0 27.0 24.0 27.2 23.8   CHLORIDE mmol/L 94* 96* 99 98 103 101 101   ANION GAP mmol/L 9.0 12.0 11.0 12.0 11.0 10.8 12.2   CREATININE mg/dL 0.72 1.04* 0.86 0.68 0.71 0.66 0.67   BUN mg/dL 25* 34* 23 16 17 17 21   BUN / CREAT RATIO  34.7* 32.7* 26.7* 23.5 23.9 25.8* 31.3*   CALCIUM mg/dL 9.6 9.6 9.8 9.3 9.2 9.5 8.9     Estimated Creatinine Clearance: 90.1 mL/min (by C-G formula based on SCr of 0.72 mg/dL).      Results from last 7 days   Lab Units 01/15/24  0644 01/14/24  0539 01/13/24  0548 01/12/24  0702 01/11/24  0759 01/10/24  0713 01/09/24  0541   WBC 10*3/mm3 20.39* 15.87* 15.26* 12.62* 6.98 9.31 8.92   RBC 10*6/mm3 4.61 4.72 4.75 4.83 4.70 4.44 4.37   HEMOGLOBIN g/dL 13.9 14.4 14.5 14.8 13.7 13.8 13.2   HEMATOCRIT % 41.4 44.1 43.3 42.8 42.4 40.4 39.8   MCV fL 89.8 93.4 91.2 88.6 90.2 91.0 91.1   MCH pg 30.2 30.5 30.5 30.6 29.1 31.1 30.2   MCHC g/dL 33.6 32.7 33.5 34.6 32.3 34.2 33.2   RDW % 13.1 13.4 13.2 13.0 12.4 12.9 12.8   RDW-SD fl 42.8 45.6 44.7 41.2 41.0 42.2 41.8   MPV fL 9.9 10.1 10.2 10.1 10.5 10.3 10.8   PLATELETS 10*3/mm3 270 271 258 265 218 233 215   NEUTROPHIL % %  --  50.6 52.2 50.3 68.8 79.4* 84.6*   LYMPHOCYTE % %  --  37.6 35.6 36.0 19.5* 11.3* 9.8*   MONOCYTES % %  --  9.4 9.6 11.3 9.6 7.6 4.7*   EOSINOPHIL % %  --  1.5 1.6 0.7 0.1* 0.0* 0.0*   BASOPHIL % %  --  0.1 0.1 0.3 0.1 0.1 0.1   IMM GRAN % %  --  0.8* 0.9* 1.4* 1.9* 1.6* 0.8*   NEUTROS ABS 10*3/mm3  --  8.04* 7.96* 6.34  4.80 7.39* 7.55*   LYMPHS ABS 10*3/mm3  --  5.96* 5.43* 4.54* 1.36 1.05 0.87   MONOS ABS 10*3/mm3  --  1.49* 1.47* 1.43* 0.67 0.71 0.42   EOS ABS 10*3/mm3  --  0.24 0.24 0.09 0.01 0.00 0.00   BASOS ABS 10*3/mm3  --  0.02 0.02 0.04 0.01 0.01 0.01   IMMATURE GRANS (ABS) 10*3/mm3  --  0.12* 0.14* 0.18* 0.13* 0.15* 0.07*   NRBC /100 WBC  --  0.0 0.0 0.0 0.0 0.0 0.0                             Microbiology Results (last 10 days)       Procedure Component Value - Date/Time    Respiratory Panel PCR w/COVID-19(SARS-CoV-2) [facility]/SHAVONNE/CATRACHITO/PAD/COR/FEMI In-House, NP Swab in UTM/VTM, 2 HR TAT - Swab, Nasopharynx [[id number]]  (Abnormal) Collected: 01/07/24 0933    Lab Status: Final result Specimen: Swab from Nasopharynx Updated: 01/07/24 1057     ADENOVIRUS, PCR Not Detected     Coronavirus 229E Not Detected     Coronavirus HKU1 Not Detected     Coronavirus NL63 Not Detected     Coronavirus OC43 Not Detected     COVID19 Not Detected     Human Metapneumovirus Not Detected     Human Rhinovirus/Enterovirus Not Detected     Influenza A PCR Not Detected     Influenza B PCR Not Detected     Parainfluenza Virus 1 Not Detected     Parainfluenza Virus 2 Not Detected     Parainfluenza Virus 3 Not Detected     Parainfluenza Virus 4 Not Detected     RSV, PCR Detected     Bordetella pertussis pcr Not Detected     Bordetella parapertussis PCR Not Detected     Chlamydophila pneumoniae PCR Not Detected     Mycoplasma pneumo by PCR Not Detected    Narrative:      In the setting of a positive respiratory panel with a viral infection PLUS a negative procalcitonin without other underlying concern for bacterial infection, consider observing off antibiotics or discontinuation of antibiotics and continue supportive care. If the respiratory panel is positive for atypical bacterial infection (Bordetella pertussis, Chlamydophila pneumoniae, or Mycoplasma pneumoniae), consider antibiotic de-escalation to target atypical bacterial infection.                 aspirin, 81 mg, Oral, Daily  atorvastatin, 20 mg, Oral, Nightly  budesonide-formoterol, 2 puff, Inhalation, BID - RT  cycloSPORINE, 1 drop, Both Eyes, BID  enoxaparin, 40 mg, Subcutaneous, Q24H  famotidine, 20 mg, Oral, BID AC  fluticasone, 2 spray, Each Nare, Daily  guaiFENesin, 1,200 mg, Oral, Q12H  insulin glargine, 16 Units, Subcutaneous, Nightly  insulin lispro, 10 Units, Subcutaneous, TID With Meals  insulin lispro, 4-19 Units, Subcutaneous, 4x Daily AC & at Bedtime  ipratropium, 2 spray, Each Nare, BID  ipratropium-albuterol, 3 mL, Nebulization, Q4H - RT  montelukast, 10 mg, Oral, Nightly  pantoprazole, 40 mg, Oral, Q AM  predniSONE, 40 mg, Oral, Daily With Breakfast  pregabalin, 100 mg, Oral, TID  propranolol, 40 mg, Oral, BID  sodium chloride, 10 mL, Intravenous, Q12H  traZODone, 50 mg, Oral, Nightly           Diagnostics:  CT Angiogram Chest    Result Date: 1/7/2024  CT ANGIOGRAPHY OF THE CHEST WITH INTRAVENOUS CONTRAST AND 3D RECONSTRUCTIONS  HISTORY: Shortness of breath.  The CT scan was performed as an emergency procedure with CT angiography protocol using intravenous contrast and 3D reconstructions. It is compared to previous CT angiography of the chest dated 03/31/2020 as well as more recent chest x-rays. The following findings are present: 1. The pulmonary arteries are well-opacified and there is no evidence of pulmonary embolus. The thoracic aorta shows no evidence of aneurysm or dissection.  2. The lungs are well expanded and clear except for an area of focal atelectasis or possible consolidation in the right middle lobe that is new since 2020. I suspect this represents focal inflammatory change but follow-up CT scan without contrast in 4-6 weeks is recommended to ensure appropriate resolution.  3. There is no mediastinal or hilar or axillary adenopathy. There is no pericardial effusion. The CT images through the upper liver, spleen, both adrenal glands, and upper poles of both kidneys  are unremarkable. The gallbladder has been removed.     Radiation dose reduction techniques were utilized, including automated exposure control and exposure modulation based on body size.   This report was finalized on 2024 3:31 PM by Dr. Mohsen Howell M.D on Workstation: [id number]      XR Chest 1 View    Result Date: 2024  SINGLE VIEW OF THE CHEST  HISTORY: Shortness of breath  COMPARISON: 2024  FINDINGS: There is cardiomegaly. There is no vascular congestion. No pneumothorax, pleural effusion, or acute infiltrate is seen.      No acute findings.  This report was finalized on 2024 4:28 AM by Dr. Isabel Jules M.D on Workstation: [id number]      XR Chest 1 View    Result Date: 2024  XR CHEST 1 VW-2024  HISTORY: Shortness of breath.  Heart size is mildly enlarged. Lungs are underinflated with some vascular crowding. No focal infiltrates are seen. Bony structures appear unremarkable.      1. Mild cardiomegaly.  This report was finalized on 2024 12:31 PM by Dr. Mikael Lopez M.D on Workstation: [id number]      XR Chest 1 Vw    Result Date: 2024  REVIEWING YOUR TEST RESULTS IN [facility] IS NOT A SUBSTITUTE FOR DISCUSSING THOSE RESULTS WITH YOUR HEALTH CARE PROVIDER. PLEASE CONTACT YOUR PROVIDER VIA [facility] TO DISCUSS ANY QUESTIONS OR CONCERNS YOU MAY HAVE REGARDING THESE TEST RESULTS.  RADIOLOGY REPORT FACILITY:  [facility] UNIT/AGE/GENDER: J.ED  ER      AGE:68 Y          SEX:F PATIENT NAME/:  JOSÉ MIGUEL LYONS A    1955 UNIT NUMBER:  [id number] ACCOUNT NUMBER:  [id number] ACCESSION NUMBER:  [id number] Chest Radiography ACCESSION NUMBER: [id number] DATE: 2024 5:08 AM PROVIDED INDICATION: cough COMPARISON: Chest radiograph dated 2021 TECHNIQUE: Single upright PA radiograph of the chest FINDINGS: EXAM QUALITY: Adequate for interpretation. TUBES/LINES/IMPLANTED DEVICES: None. LUNGS/PLEURA: No focal consolidation,  pneumothorax, or pleural effusion is seen. HEART/MEDIASTINUM: The heart and mediastinum appear normal. BONES: No acute osseous abnormality is seen. IMPRESSION: No acute findings. Dictated by: Neeraj Santana M.D. Images and Report reviewed and interpreted by: Neeraj Santana M.D. <PS><Electronically signed by: Neeraj Santana M.D.> 01/02/2024 0659 D: 01/02/2024 0659 T: 01/02/2024 0659        Chest x-ray reviewed I do not see any definite acute infiltrates masses or atelectasis    Active Hospital Problems    Diagnosis  POA    **Asthma exacerbation [J45.901]  Yes    Acute respiratory failure with hypoxia [J96.01]  Yes    RSV (respiratory syncytial virus infection) [B33.8]  Yes    Abnormal CT of the chest [R93.89]  Yes    Obesity (BMI 30-39.9) [E66.9]  Yes    Diabetic peripheral neuropathy [E11.42]  Yes    Headache [R51.9]  Yes    GERD (gastroesophageal reflux disease) [K21.9]  Yes    Type 2 diabetes mellitus with hyperglycemia, without long-term current use of insulin [E11.65]  Yes    Essential hypertension [I10]  Yes    Other hyperlipidemia [E78.49]  Yes      Resolved Hospital Problems   No resolved problems to display.         Assessment & Plan     RSV infection unfortunately treatment is purely supportive at this time  Acute asthma exacerbation secondary to #1 wean down systemic steroids continue bronchodilators and ICS again Symbicort 2 puffs twice daily and every 4 as needed asthma exacerbation  Right middle lobe infiltrate/atelectasis follow-up CT in about 6 to 8 weeks  Leukocytosis white count continues to rise significant jump overnight she has been stable on the same dose of steroids for about 4 days little unusual for white count to be jumping up at this time I am going to check a follow-up chest x-ray and procalcitonin worried about his secondary bacterial infection  Hyponatremia acute to be followed  Diabetes mellitus type 2 blood sugars minimally up with steroids  Obesity      Chest x-ray clear  procalcitonin still pending probably is the steroids she is not actively wheezing now I think we can probably drop the steroid dose down a little bit  Plan for disposition: She is pretty close to being ready to go home from a pulmonary standpoint I do recommend if she goes home a follow-up white count in a couple of days.  Again chest x-ray looks clear but we could not see that right middle lobe atelectasis very well on the portable chest x-ray and  should have a CT scan in 6 to 8 weeks.  She can do this with her primary care , or we would be glad to follow her up in the [place] pulmonary care  office    Bal Morales Jr, MD  01/15/24  08:18 EST    Time:

## 2024-01-15 NOTE — NURSING NOTE
AVS printed and in room. Patient request discharge instructions when  arrives. PIV removed. Discharge pending meds-to-bed and .

## 2024-01-15 NOTE — CASE MANAGEMENT/SOCIAL WORK
Continued Stay Note  [facility]     Patient Name: Khalida Gilliland  MRN: [id number]  Today's Date: 1/15/2024    Admit Date: 1/7/2024    Plan: Home with    Discharge Plan       Row Name 01/15/24 0949       Plan    Plan Home with     Patient/Family in Agreement with Plan yes    Plan Comments Noted plan for d/c today, pt remains on RA, per Pulmonology pt to have neb at home, she states her  has bought one and will be delivered tomorrow.  will transport, she denied any further d/c needs.                   Discharge Codes    No documentation.                 Expected Discharge Date and Time       Expected Discharge Date Expected Discharge Time    Napoleon 15, 2024               Luiza Vargas RN

## 2024-01-15 NOTE — PLAN OF CARE
Problem: Fall Injury Risk  Goal: Absence of Fall and Fall-Related Injury  Outcome: Ongoing, Progressing  Intervention: Identify and Manage Contributors  Recent Flowsheet Documentation  Taken 1/14/2024 2214 by Nancy Lee RN  Medication Review/Management: medications reviewed  Taken 1/14/2024 2045 by Nancy Lee RN  Medication Review/Management: medications reviewed  Intervention: Promote Injury-Free Environment  Recent Flowsheet Documentation  Taken 1/14/2024 2214 by Nancy Lee RN  Safety Promotion/Fall Prevention:   activity supervised   assistive device/personal items within reach   clutter free environment maintained   fall prevention program maintained   lighting adjusted   nonskid shoes/slippers when out of bed   room organization consistent   safety round/check completed  Taken 1/14/2024 2045 by Nancy Lee RN  Safety Promotion/Fall Prevention:   activity supervised   assistive device/personal items within reach   clutter free environment maintained   fall prevention program maintained   lighting adjusted   nonskid shoes/slippers when out of bed   safety round/check completed   room organization consistent     Problem: Adult Inpatient Plan of Care  Goal: Plan of Care Review  Outcome: Ongoing, Progressing  Flowsheets (Taken 1/15/2024 0520)  Progress: no change  Plan of Care Reviewed With: patient  Outcome Evaluation:   No s/sx of distress noted at this time. Rested some throughout night. Vital signs WDL. On room air   tolerated. Will cont with current plan of care.  Goal: Patient-Specific Goal (Individualized)  Outcome: Ongoing, Progressing  Goal: Absence of Hospital-Acquired Illness or Injury  Outcome: Ongoing, Progressing  Intervention: Identify and Manage Fall Risk  Recent Flowsheet Documentation  Taken 1/14/2024 2214 by Nancy Lee RN  Safety Promotion/Fall Prevention:   activity supervised   assistive device/personal items within reach   clutter free environment maintained   fall prevention  program maintained   lighting adjusted   nonskid shoes/slippers when out of bed   room organization consistent   safety round/check completed  Taken 1/14/2024 2045 by Nancy Lee RN  Safety Promotion/Fall Prevention:   activity supervised   assistive device/personal items within reach   clutter free environment maintained   fall prevention program maintained   lighting adjusted   nonskid shoes/slippers when out of bed   safety round/check completed   room organization consistent  Intervention: Prevent Skin Injury  Recent Flowsheet Documentation  Taken 1/15/2024 0233 by Nancy Lee RN  Body Position:   position changed independently   sitting up in bed  Taken 1/14/2024 2045 by Nancy Lee RN  Skin Protection:   adhesive use limited   transparent dressing maintained   tubing/devices free from skin contact  Intervention: Prevent and Manage VTE (Venous Thromboembolism) Risk  Recent Flowsheet Documentation  Taken 1/15/2024 0425 by Nancy Lee RN  Activity Management: up ad pierce  Taken 1/15/2024 0233 by Nancy Lee RN  Activity Management: up ad pierce  Taken 1/15/2024 0055 by Nancy Lee RN  Activity Management: up ad pierce  Taken 1/14/2024 2214 by Nancy Lee RN  Activity Management: up ad pierce  Taken 1/14/2024 2045 by Nancy Lee RN  Activity Management: up ad pierce  VTE Prevention/Management: (lovenox) other (see comments)  Intervention: Prevent Infection  Recent Flowsheet Documentation  Taken 1/15/2024 0425 by Nancy Lee RN  Infection Prevention:   hand hygiene promoted   rest/sleep promoted   single patient room provided   personal protective equipment utilized  Taken 1/15/2024 0233 by Nancy Lee RN  Infection Prevention:   hand hygiene promoted   rest/sleep promoted   single patient room provided   personal protective equipment utilized  Taken 1/15/2024 0055 by Nancy Lee RN  Infection Prevention:   hand hygiene promoted   rest/sleep promoted   single patient room provided   personal protective  equipment utilized  Taken 1/14/2024 2214 by Nancy Lee RN  Infection Prevention:   hand hygiene promoted   rest/sleep promoted   single patient room provided  Taken 1/14/2024 2045 by Nancy Lee RN  Infection Prevention:   hand hygiene promoted   rest/sleep promoted   single patient room provided   personal protective equipment utilized  Goal: Optimal Comfort and Wellbeing  Outcome: Ongoing, Progressing  Intervention: Monitor Pain and Promote Comfort  Recent Flowsheet Documentation  Taken 1/15/2024 0233 by Nancy Lee RN  Pain Management Interventions:   see MAR   quiet environment facilitated  Intervention: Provide Person-Centered Care  Recent Flowsheet Documentation  Taken 1/14/2024 2045 by Nancy Lee RN  Trust Relationship/Rapport:   care explained   reassurance provided   thoughts/feelings acknowledged  Goal: Readiness for Transition of Care  Outcome: Ongoing, Progressing     Problem: Pain Acute  Goal: Acceptable Pain Control and Functional Ability  Outcome: Ongoing, Progressing  Intervention: Prevent or Manage Pain  Recent Flowsheet Documentation  Taken 1/14/2024 2214 by Nancy Lee RN  Medication Review/Management: medications reviewed  Taken 1/14/2024 2045 by Nancy Lee RN  Sleep/Rest Enhancement:   awakenings minimized   regular sleep/rest pattern promoted   relaxation techniques promoted   room darkened  Medication Review/Management: medications reviewed  Intervention: Develop Pain Management Plan  Recent Flowsheet Documentation  Taken 1/15/2024 0233 by Nancy Lee RN  Pain Management Interventions:   see MAR   quiet environment facilitated  Intervention: Optimize Psychosocial Wellbeing  Recent Flowsheet Documentation  Taken 1/15/2024 0055 by Nancy Lee RN  Supportive Measures:   active listening utilized   verbalization of feelings encouraged  Taken 1/14/2024 2045 by Nancy Lee RN  Supportive Measures:   active listening utilized   verbalization of feelings encouraged     Problem:  Asthma Exacerbation  Goal: Asthma Symptom Relief  Outcome: Ongoing, Progressing  Intervention: Support Asthma Symptom Control  Recent Flowsheet Documentation  Taken 1/15/2024 0055 by Nancy Lee RN  Supportive Measures:   active listening utilized   verbalization of feelings encouraged  Taken 1/14/2024 2214 by Nancy Lee RN  Medication Review/Management: medications reviewed  Taken 1/14/2024 2045 by Nancy Lee RN  Supportive Measures:   active listening utilized   verbalization of feelings encouraged  Medication Review/Management: medications reviewed   Goal Outcome Evaluation:  Plan of Care Reviewed With: patient        Progress: no change  Outcome Evaluation: No s/sx of distress noted at this time. Rested some throughout night. Vital signs WDL. On room air; tolerated. Will cont with current plan of care.

## 2024-01-15 NOTE — OUTREACH NOTE
Prep Survey      Flowsheet Row Responses   [facility] patient discharged from? [place]   Is LACE score < 7 ? No   Eligibility [facility]   Date of Admission 01/07/24   Date of Discharge 01/15/24   Discharge Disposition Home or Self Care   Discharge diagnosis Asthma exacerbation   Does the patient have one of the following disease processes/diagnoses(primary or secondary)? Other   Does the patient have Home health ordered? No   Is there a DME ordered? Yes   What DME was ordered? neb- [facility]   Prep survey completed? Yes            Caro RANDALL - Registered Nurse

## 2024-01-15 NOTE — DISCHARGE SUMMARY
Patient Name: Khalida Gilliland  : 1955  MRN: [id number]    Date of Admission: 2024  Date of Discharge:  1/15/2024  Primary Care Physician: Sergo Owen MD      Chief Complaint:   Shortness of Breath      Discharge Diagnoses     Active Hospital Problems    Diagnosis  POA    **Asthma exacerbation [J45.901]  Yes    RSV (respiratory syncytial virus infection) [B33.8]  Yes    Abnormal CT of the chest [R93.89]  Yes    Obesity (BMI 30-39.9) [E66.9]  Yes    Diabetic peripheral neuropathy [E11.42]  Yes    GERD (gastroesophageal reflux disease) [K21.9]  Yes    Type 2 diabetes mellitus with hyperglycemia, without long-term current use of insulin [E11.65]  Yes    Essential hypertension [I10]  Yes    Other hyperlipidemia [E78.49]  Yes      Resolved Hospital Problems    Diagnosis Date Resolved POA    Acute respiratory failure with hypoxia [J96.01] 01/15/2024 Yes    Headache [R51.9] 01/15/2024 Yes        Admitting HPI     Khalida Gilliland is a 68 y.o. female with history of hypertension, hyperlipidemia, type 2 diabetes, GERD, seasonal allergies, and asthma, who presented to the [facility] early  morning complaining of persistent cough, shortness of breath, and chest tightness.  Patient states symptoms began just before New Year's, she had family in [facility] and was exposed to both COVID and RSV.  She went to [place] [facility] ED on  and was prescribed Zithromax and prednisone.  She then saw her PCP on  and was sent to ED for IV steroids and a breathing treatment.  She was discharged from [facility] on  with cefdinir and prednisone.  Patient presented back to ED on  stating her symptoms were worsening.  Patient received 10 mg dexamethasone, DuoNeb breathing treatment, Robitussin DM in ED without significant improvement so she was transferred to [facility] observation unit.     When patient arrived to observation unit, full respiratory viral panel obtained which was positive for RSV.  Patient  was exposed to RSV, her granddaughter had it at [facility].  Patient is being admitted to observation unit for further evaluation and treatment.    Hospital Course     Pt admitted for dyspnea and cough, acute hypoxic respiratory failure in setting of RSV infection.  She was seen in consultation with pulmonology.  Symptomatically greatly improved with breathing treatments and steroids, and she was able to be weaned to room air prior to discharge.  She required insulin during hospitalization though as she is stopping steroids now we will have her resume her home diabetic meds.  She will need close follow-up with PCP for continued management.  Stable for discharge home.    Discharge Plan     Acute hypoxic respiratory failure  RSV infection  Asthma with acute exacerbation  - steroid taper on dc     Type 2 diabetes mellitus with hyperglycemia  - A1c 8.70% (01/08/24)  - resume home meds; will dc insulin for now since steroids have been weaned, needs close PCP f/u for mgmt.     Leukocytosis, downtrending  -No evidence of infection   -Recommend repeat labs in 1 week to ensure resolution      Hypertension  -hold HCTZ on dc  -resume lisinopril    GERD  - Continue PPI      Hyperlipidemia  - Continue Statin    Day of Discharge     Physical Exam:  Temp:  [97.9 °F (36.6 °C)-98.4 °F (36.9 °C)] 98.4 °F (36.9 °C)  Heart Rate:  [60-88] 60  Resp:  [18-20] 18  BP: (116-136)/(48-75) 116/48  Body mass index is 31.78 kg/m².  Physical Exam  Constitutional:       Appearance: She is obese. She is ill-appearing.   Cardiovascular:      Rate and Rhythm: Normal rate.      Pulses: Normal pulses.   Pulmonary:      Effort: Pulmonary effort is normal. No respiratory distress.      Breath sounds: Normal breath sounds.   Abdominal:      General: Abdomen is flat.   Skin:     General: Skin is warm.   Neurological:      General: No focal deficit present.      Mental Status: She is alert and oriented to person, place, and time.         Consultants      Consult Orders (all) (From admission, onward)       Start     Ordered    01/08/24 1029  Inpatient Internal Medicine Consult  Once        Specialty:  Internal Medicine  Provider:  Kana Melendrez DO    01/08/24 1028    01/08/24 0702  Inpatient Pulmonology Consult  IN AM        Specialty:  Pulmonary Disease  Provider:  Edson Liu MD    01/08/24 0218                  Procedures     * Surgery not found *      Imaging Results (All)       Procedure Component Value Units Date/Time    XR Chest 1 View [[id number]] Collected: 01/15/24 0913     Updated: 01/15/24 0918    Narrative:      XR CHEST 1 VW-     INDICATION: Dyspnea and leukocytosis     COMPARISON: CT chest 1/7/2024.. Chest radiographs dating back to  1/11/2016       Impression:      No focal consolidation. No pleural effusion or pneumothorax.   Normal size cardiomediastinal silhouette.  No focal osseous  abnormality.       This report was finalized on 1/15/2024 9:15 AM by Dr. Fabrice Lorenzana M.D on Workstation: [id number]       CT Angiogram Chest [[id number]] Collected: 01/07/24 1418     Updated: 01/07/24 1534    Narrative:      CT ANGIOGRAPHY OF THE CHEST WITH INTRAVENOUS CONTRAST AND 3D  RECONSTRUCTIONS     HISTORY: Shortness of breath.     The CT scan was performed as an emergency procedure with CT angiography  protocol using intravenous contrast and 3D reconstructions. It is  compared to previous CT angiography of the chest dated 03/31/2020 as  well as more recent chest x-rays. The following findings are present:  1. The pulmonary arteries are well-opacified and there is no evidence of  pulmonary embolus. The thoracic aorta shows no evidence of aneurysm or  dissection.     2. The lungs are well expanded and clear except for an area of focal  atelectasis or possible consolidation in the right middle lobe that is  new since 2020. I suspect this represents focal inflammatory change but  follow-up CT scan without contrast in 4-6 weeks is recommended to  "ensure  appropriate resolution.     3. There is no mediastinal or hilar or axillary adenopathy. There is no  pericardial effusion. The CT images through the upper liver, spleen,  both adrenal glands, and upper poles of both kidneys are unremarkable.  The gallbladder has been removed.              Radiation dose reduction techniques were utilized, including automated  exposure control and exposure modulation based on body size.        This report was finalized on 1/7/2024 3:31 PM by Dr. Mohsen Howell M.D  on Workstation: [id number]       XR Chest 1 View [[id number]] Collected: 01/07/24 0428     Updated: 01/07/24 0431    Narrative:      SINGLE VIEW OF THE CHEST     HISTORY: Shortness of breath     COMPARISON: January 4, 2024     FINDINGS:  There is cardiomegaly. There is no vascular congestion. No pneumothorax,  pleural effusion, or acute infiltrate is seen.       Impression:      No acute findings.     This report was finalized on 1/7/2024 4:28 AM by Dr. Isabel Jules M.D on Workstation: [id number]                 Pertinent Labs     Results from last 7 days   Lab Units 01/15/24  0644 01/14/24  0539 01/13/24  0548 01/12/24  0702   WBC 10*3/mm3 20.39* 15.87* 15.26* 12.62*   HEMOGLOBIN g/dL 13.9 14.4 14.5 14.8   PLATELETS 10*3/mm3 270 271 258 265     Results from last 7 days   Lab Units 01/15/24  0644 01/14/24  0539 01/13/24  0548 01/12/24  0702   SODIUM mmol/L 132* 135* 139 137   POTASSIUM mmol/L 4.7 4.3 3.8 3.9   CHLORIDE mmol/L 94* 96* 99 98   CO2 mmol/L 29.0 27.0 29.0 27.0   BUN mg/dL 25* 34* 23 16   CREATININE mg/dL 0.72 1.04* 0.86 0.68   GLUCOSE mg/dL 146* 258* 100* 108*   EGFR mL/min/1.73 91.2 58.7* 73.7 95.0       Results from last 7 days   Lab Units 01/15/24  0644 01/14/24  0539 01/13/24  0548 01/12/24  0702   CALCIUM mg/dL 9.6 9.6 9.8 9.3               Invalid input(s): \"LDLCALC\"          Test Results Pending at Discharge       Discharge Details        Discharge Medications        New Medications        " Instructions Start Date   Hydrocod Rudi-Chlorphe Rudi ER 10-8 MG/5ML ER suspension  Commonly known as: TUSSIONEX PENNKINETIC   5 mL, Oral, Every 12 Hours PRN      ipratropium-albuterol 0.5-2.5 mg/3 ml nebulizer  Commonly known as: DUO-NEB   3 mL, Nebulization, Every 2 Hours PRN      Mucus Relief 600 MG 12 hr tablet  Generic drug: guaiFENesin   1,200 mg, Oral, Every 12 Hours Scheduled             Changes to Medications        Instructions Start Date   albuterol sulfate  (90 Base) MCG/ACT inhaler  Commonly known as: PROVENTIL HFA;VENTOLIN HFA;PROAIR HFA  What changed: Another medication with the same name was removed. Continue taking this medication, and follow the directions you see here.   2 puffs, Inhalation, Every 4 Hours PRN      Biotin 59200 MCG tablet  What changed: Another medication with the same name was removed. Continue taking this medication, and follow the directions you see here.   1 tablet, Oral, Every Evening      lisinopril 10 MG tablet  Commonly known as: PRINIVILZESTRIL  What changed: when to take this   10 mg, Oral, Daily      predniSONE 20 MG tablet  Commonly known as: DELTASONE  What changed: See the new instructions.   Take 1 tablet by mouth Daily With Breakfast for 3 days, THEN 0.5 tablets Daily With Breakfast for 3 days.   Start Date: January 16, 2024     promethazine-dextromethorphan 6.25-15 MG/5ML syrup  Commonly known as: PROMETHAZINE-DM  What changed: Another medication with the same name was removed. Continue taking this medication, and follow the directions you see here.   5 mL, Oral, 4 Times Daily PRN, Falls precaution             Continue These Medications        Instructions Start Date   aspirin 81 MG EC tablet   81 mg, Oral, Daily      atorvastatin 20 MG tablet  Commonly known as: LIPITOR       azelastine 0.1 % nasal spray  Commonly known as: ASTELIN   USE 1 SPRAY IN EACH NOSTRIL TWICE A DAY AS NEEDED FOR RHINITIS AS DIRECTED      benzonatate 100 MG capsule  Commonly known  as: Tessalon Perles   100 mg, Oral, 3 Times Daily PRN      budesonide-formoterol 160-4.5 MCG/ACT inhaler  Commonly known as: SYMBICORT   2 puffs, Inhalation, 2 Times Daily - RT      butalbital-acetaminophen-caffeine -40 MG per tablet  Commonly known as: FIORICET, ESGIC   1 tablet, Oral, 2 Times Daily PRN      cholecalciferol 25 MCG (1000 UT) tablet  Commonly known as: VITAMIN D3   1,000 Units, Oral, Daily, PER PT TO CONTINUE PER DR. CALLUM URBINA      CO Q 10 PO   100 mg, Oral, Every Evening      CORTISOL PO   Oral      cycloSPORINE 0.05 % ophthalmic emulsion  Commonly known as: RESTASIS   1 drop, Both Eyes, 2 Times Daily      diphenhydrAMINE-acetaminophen  MG tablet per tablet  Commonly known as: TYLENOL PM   1 tablet, Oral, Nightly PRN      fish oil 1000 MG capsule capsule   Oral, Daily With Breakfast      fluocinonide 0.05 % cream  Commonly known as: LIDEX       fluticasone 50 MCG/ACT nasal spray  Commonly known as: FLONASE   USE 2 SPRAYS IN EACH NOSTRIL DAILY      freestyle lancets   Pt tests daily      FREESTYLE LITE test strip  Generic drug: glucose blood   PT to use once daily      Ginkgo Biloba 40 MG tablet   120 mg, Oral, Daily      glipizide 5 MG ER tablet  Commonly known as: glipiZIDE XL   5 mg, Oral, Daily      glucosamine-chondroitin 500-400 MG capsule capsule   1 capsule, Oral, 2 Times Daily With Meals, PT TO CONTINUE PER DR. CALLUM URBINA      ipratropium 0.06 % nasal spray  Commonly known as: ATROVENT   2 sprays, 2 Times Daily      montelukast 10 MG tablet  Commonly known as: SINGULAIR   10 mg, Oral, Nightly      multivitamin tablet tablet   1 tablet, Oral, Daily      nystatin 609333 UNIT/GM topical powder  Generic drug: nystatin   No dose, route, or frequency recorded.      omeprazole 40 MG capsule  Commonly known as: priLOSEC   TAKE 1 CAPSULE EVERY MORNING      Potassium Gluconate 550 MG tablet   Oral      pregabalin 100 MG capsule  Commonly known as: LYRICA   TAKE 1 CAPSULE BY MOUTH  THREE TIMES DAILY      PROBIOTIC-10 PO   Oral      propranolol 40 MG tablet  Commonly known as: INDERAL   40 mg, Oral, 2 Times Daily      raNITIdine 75 MG tablet  Commonly known as: ZANTAC   150 mg, Oral, 2 Times Daily      Scopolamine 1 MG/3DAYS patch   1 patch, Transdermal, Every 72 Hours      SITagliptin 100 MG tablet  Commonly known as: Januvia   100 mg, Oral, Daily      Spacer/Aero-Holding Chambers device   Use with all inhaled treatments      traZODone 50 MG tablet  Commonly known as: DESYREL   50 mg, Oral, Nightly      VITAMIN B 12 PO   5,000 mcg, Oral, Nightly      VITAMIN C PO   1,000 mg, Oral, Daily, PT INSTRUCTED TO CONTINUE PER DR. CALLUM URBINA      zolpidem 5 MG tablet  Commonly known as: Ambien   5 mg, Oral, Nightly PRN             Stop These Medications      azithromycin 250 MG tablet  Commonly known as: Zithromax Z-Aubrey     brompheniramine-pseudoephedrine-DM 30-2-10 MG/5ML syrup     cefdinir 300 MG capsule  Commonly known as: OMNICEF     erythromycin 5 MG/GM ophthalmic ointment  Commonly known as: ROMYCIN     fluconazole 150 MG tablet  Commonly known as: Diflucan     hydroCHLOROthiazide 25 MG tablet  Commonly known as: HYDRODIURIL     methylPREDNISolone 4 MG dose pack  Commonly known as: MEDROL     multivitamin with minerals tablet tablet              Allergies   Allergen Reactions    Contrast Dye (Echo Or Unknown Ct/Mr) Shortness Of Breath    Iodinated Contrast Media Shortness Of Breath    Codeine Nausea Only    Methylprednisolone Anxiety    Other Other (See Comments)     Bamlanivimab infusion- muscle aches, joint pain, nausea    Ciprofloxacin Hcl Rash       Discharge Disposition:  Home or Self Care      Discharge Diet:  Diet Order   Procedures    Diet: Diabetic Diets; Consistent Carbohydrate; Texture: Regular Texture (IDDSI 7); Fluid Consistency: Thin (IDDSI 0)       Discharge Activity:   Activity Instructions       Activity as Tolerated              CODE STATUS:    Code Status and Medical  Interventions:   Ordered at: 01/07/24 0928     Level Of Support Discussed With:    Patient     Code Status (Patient has no pulse and is not breathing):    CPR (Attempt to Resuscitate)     Medical Interventions (Patient has pulse or is breathing):    Full Support       Future Appointments   Date Time Provider Department Center   1/18/2024  9:30 AM Sergo Owen MD [facility]   1/19/2024 10:45 AM [facility]   1/31/2024  1:00 PM Sergo Owen MD [facility]      Follow-up Information       Sergo Owen MD .    Specialty: Internal Medicine  Why: Please make an appointment with your PCP in 1 week for a hospital follow-up. He will also need to help you set up a CT scan of your lungs in 6-8 weeks.  Contact information:  [id number] [place], [place] [id number] [phone]                             Time Spent on Discharge:  Greater than 30 minutes spent on discharge management including final examination, discussion of hospital stay and patient education, preparation of records, medication reconciliation, follow up planning      Lisandro Jaramillo MD  [facility]  01/15/24  10:29 EST

## 2024-01-16 ENCOUNTER — TRANSITIONAL CARE MANAGEMENT TELEPHONE ENCOUNTER (OUTPATIENT)
Dept: CALL CENTER | Facility: HOSPITAL | Age: 69
End: 2024-01-16
Payer: MEDICARE

## 2024-01-16 NOTE — CASE MANAGEMENT/SOCIAL WORK
Case Management Discharge Note      Final Note: home no needs    Provided Post Acute Provider List?: Refused  Refused Provider List Comment: Pt said she will be fine at dischgarge and declined the HH / SNF list    Selected Continued Care - Discharged on 1/15/2024 Admission date: 1/7/2024 - Discharge disposition: Home or Self Care      Destination    No services have been selected for the patient.                Durable Medical Equipment    No services have been selected for the patient.                Dialysis/Infusion    No services have been selected for the patient.                Home Medical Care    No services have been selected for the patient.                Therapy    No services have been selected for the patient.                Community Resources    No services have been selected for the patient.                Community & DME    No services have been selected for the patient.                    Transportation Services  Private: Car    Final Discharge Disposition Code: 01 - home or self-care

## 2024-01-16 NOTE — OUTREACH NOTE
Call Center TCM Note      Flowsheet Row Responses   [facility] patient discharged from? [place]   Does the patient have one of the following disease processes/diagnoses(primary or secondary)? Other   TCM attempt successful? Yes   Call start time 1448   Call end time 1509   Discharge diagnosis Asthma exacerbation   Meds reviewed with patient/caregiver? Yes   Is the patient having any side effects they believe may be caused by any medication additions or changes? No   Does the patient have all medications ordered at discharge? Yes   Is the patient taking all medications as directed (includes completed medication regime)? Yes   Comments Hosp dc fu apt on 1/18/24 with PCP   Does the patient have an appointment with their PCP within 7-14 days of discharge? Yes   Has home health visited the patient within 72 hours of discharge? N/A   What DME was ordered? neb- [facility]-pt's spouse purchased a neb   Did the patient receive a copy of their discharge instructions? Yes   Nursing interventions Reviewed instructions with patient   What is the patient's perception of their health status since discharge? Same  [cough per pt]   Is the patient/caregiver able to teach back signs and symptoms related to disease process for when to call PCP? Yes   Is the patient/caregiver able to teach back signs and symptoms related to disease process for when to call 911? Yes   Is the patient/caregiver able to teach back the hierarchy of who to call/visit for symptoms/problems? PCP, Specialist, Home health nurse, Urgent Care, ED, 911 Yes   If the patient is a current smoker, are they able to teach back resources for cessation? Not a smoker   TCM call completed? Yes   Call end time 1509            Shilpi Smith RN    1/16/2024, 15:09 EST

## 2024-01-18 ENCOUNTER — OFFICE VISIT (OUTPATIENT)
Dept: FAMILY MEDICINE CLINIC | Facility: CLINIC | Age: 69
End: 2024-01-18
Payer: MEDICARE

## 2024-01-18 VITALS
TEMPERATURE: 98.5 F | HEART RATE: 72 BPM | SYSTOLIC BLOOD PRESSURE: 108 MMHG | HEIGHT: 68 IN | WEIGHT: 207 LBS | DIASTOLIC BLOOD PRESSURE: 62 MMHG | OXYGEN SATURATION: 96 % | BODY MASS INDEX: 31.37 KG/M2 | RESPIRATION RATE: 16 BRPM

## 2024-01-18 DIAGNOSIS — J45.901 MODERATE ASTHMA WITH EXACERBATION, UNSPECIFIED WHETHER PERSISTENT: ICD-10-CM

## 2024-01-18 DIAGNOSIS — Z09 HOSPITAL DISCHARGE FOLLOW-UP: Primary | ICD-10-CM

## 2024-01-18 DIAGNOSIS — E78.49 OTHER HYPERLIPIDEMIA: ICD-10-CM

## 2024-01-18 DIAGNOSIS — R06.89 GASPING FOR BREATH: ICD-10-CM

## 2024-01-18 DIAGNOSIS — E11.65 TYPE 2 DIABETES MELLITUS WITH HYPERGLYCEMIA, WITHOUT LONG-TERM CURRENT USE OF INSULIN: ICD-10-CM

## 2024-01-18 DIAGNOSIS — G43.009 MIGRAINE WITHOUT AURA AND WITHOUT STATUS MIGRAINOSUS, NOT INTRACTABLE: ICD-10-CM

## 2024-01-18 DIAGNOSIS — J18.9 PNEUMONIA OF RIGHT MIDDLE LOBE DUE TO INFECTIOUS ORGANISM: ICD-10-CM

## 2024-01-18 DIAGNOSIS — R06.83 LOUD SNORING: ICD-10-CM

## 2024-01-18 NOTE — PROGRESS NOTES
"Chief Complaint  Follow-up (Pt has RSV, was in the hospital for 9 days )    Subjective        Khalida Gilliland presents to [facility]  History of Present Illness  For hospital follow-up.  Patient stated she is not completely okay.  Although she is monitoring herself and yesterday she took two times nebulization treatment.  Currently she is more bothered by bouts of cough and because of that her abdominal/chest muscles are hurting.  Also her sleep has been disturbed because of cough bouts  Denying having any fever or chest tightness or shortness of breath.    Also patient mentioned she snores loudly and seems to gasp for air during her sleep and she has been recommended upon discharge by hospital physician to have sleep study done outpatient.      Objective   Vital Signs:  /62 (BP Location: Left arm, Patient Position: Sitting, Cuff Size: Large Adult)   Pulse 72   Temp 98.5 °F (36.9 °C) (Oral)   Resp 16   Ht 172.7 cm (67.99\")   Wt 93.9 kg (207 lb)   SpO2 96%   BMI 31.48 kg/m²   Estimated body mass index is 31.48 kg/m² as calculated from the following:    Height as of this encounter: 172.7 cm (67.99\").    Weight as of this encounter: 93.9 kg (207 lb).               Physical Exam  HENT:      Head: Normocephalic and atraumatic.      Mouth/Throat:      Mouth: Mucous membranes are moist.      Pharynx: Oropharynx is clear.   Eyes:      Extraocular Movements: Extraocular movements intact.      Conjunctiva/sclera: Conjunctivae normal.      Pupils: Pupils are equal, round, and reactive to light.   Cardiovascular:      Rate and Rhythm: Normal rate and regular rhythm.   Pulmonary:      Effort: Pulmonary effort is normal.      Breath sounds: Rhonchi present.   Abdominal:      General: Bowel sounds are normal.      Palpations: Abdomen is soft.   Musculoskeletal:         General: Normal range of motion.      Cervical back: Neck supple.   Skin:     General: Skin is warm.      Capillary " Refill: Capillary refill takes less than 2 seconds.   Neurological:      General: No focal deficit present.      Mental Status: She is alert and oriented to person, place, and time. Mental status is at baseline.   Psychiatric:         Mood and Affect: Mood normal.        Result Review :    The following data was reviewed by: Sergo Owen MD on 01/18/2024:  CMP          1/26/2024    03:45 1/27/2024    03:16 1/28/2024    07:25   CMP   Glucose 246  259  204    BUN 17  23  19    Creatinine 0.61  0.74  0.61    EGFR 97.5  88.3  97.5    Sodium 138  139  138    Potassium 4.0  3.9  4.4    Chloride 101  101  100    Calcium 9.7  9.1  9.7    BUN/Creatinine Ratio 27.9  31.1  31.1    Anion Gap 10.6  11.0  10.0      CBC          1/26/2024    03:45 1/27/2024    03:16 1/28/2024    07:25   CBC   WBC 8.19  8.72  8.08    RBC 3.85  4.06  4.29    Hemoglobin 11.6  11.9  13.0    Hematocrit 35.5  36.9  39.5    MCV 92.2  90.9  92.1    MCH 30.1  29.3  30.3    MCHC 32.7  32.2  32.9    RDW 12.9  12.7  13.1    Platelets 278  288  285      Lipid Panel          10/17/2023    09:03 1/18/2024    10:27   Lipid Panel   Total Cholesterol 161  142    Triglycerides 195  163    HDL Cholesterol 37  41    VLDL Cholesterol 33  28    LDL Cholesterol  91  73      TSH          10/17/2023    09:03 1/18/2024    10:27   TSH   TSH 2.390  1.540      Data reviewed : Recent hospitalization notes as follows  Patient was admitted to the hospital with chief complaint of shortness of breath, persistent cough and chest tightness.  Prior to this hospitalization patient went to [facility] and was prescribed steroids and antibiotics but then she had another visit with the ER again with IV steroids and breathing treatment and third time she was admitted to the hospital where treated with antibiotics and IV steroids and nebulization treatments.  She was diagnosed with asthma exacerbation and also RSV acute bronchiolitis.  Patient was discharged on steroids for 6 days and  nebulization treatment.  Patient was seen by pulmonologist during her hospital stay.  She was advised to follow-up with the sleep medicine doctor for sleep apnea upon discharge  Also recommendations for to repeat the CT scan in 6 to 8 weeks           Assessment and Plan     Diagnoses and all orders for this visit:    1. Moderate asthma with exacerbation, unspecified whether persistent (Primary)  -     CBC Auto Differential  -     Comprehensive Metabolic Panel  -     Lipid Panel With / Chol / HDL Ratio  -     TSH  -     Cancel: Hemoglobin A1c  -     Cancel: Lipid Panel With / Chol / HDL Ratio    2. Type 2 diabetes mellitus with hyperglycemia, without long-term current use of insulin  -     CBC Auto Differential  -     Comprehensive Metabolic Panel  -     Lipid Panel With / Chol / HDL Ratio  -     TSH  -     Cancel: Hemoglobin A1c  -     Cancel: Lipid Panel With / Chol / HDL Ratio  -     Microalbumin / Creatinine Urine Ratio - Urine, Clean Catch  -     Urinalysis With Microscopic - Urine, Clean Catch    3. Other hyperlipidemia  -     CBC Auto Differential  -     Comprehensive Metabolic Panel  -     Lipid Panel With / Chol / HDL Ratio  -     TSH  -     Cancel: Hemoglobin A1c  -     Cancel: Lipid Panel With / Chol / HDL Ratio    4. Migraine without aura and without status migrainosus, not intractable  -     CBC Auto Differential  -     Comprehensive Metabolic Panel  -     Lipid Panel With / Chol / HDL Ratio  -     TSH  -     Cancel: Hemoglobin A1c  -     Cancel: Lipid Panel With / Chol / HDL Ratio    5. Loud snoring  -     Ambulatory Referral to Sleep Medicine    6. Gasping for breath  -     Ambulatory Referral to Sleep Medicine    7. Pneumonia of right middle lobe due to infectious organism  -     CT Chest Without Contrast; Future    Other orders  -     CBC & Differential  -     Microscopic Examination -    # Hospital follow-up  Patient has been referred to sleep medicine as recommended during hospital stay and CT  chest without contrast has been ordered to be done in 6 weeks to make sure pneumonia is resolved  Also patient is due for few labs, labs has been ordered and reviewed patient has glucose on the higher side with leukocytosis and I think that is related to patient steroid intake.  Patient has been given IV steroids during the hospital stay and has been discharged with oral steroids to taper off which can lead to leukocytosis and hyperglycemia.  Rest of the labs including liver function test, electrolytes, kidney function test are all within normal limit  TSH within normal limit, lipid profile at goal  No changes in meds needed based on all these labs         Follow Up     No follow-ups on file.  Patient was given instructions and counseling regarding her condition or for health maintenance advice. Please see specific information pulled into the AVS if appropriate.

## 2024-01-19 LAB
ALBUMIN SERPL-MCNC: 3.9 G/DL (ref 3.9–4.9)
ALBUMIN/CREAT UR: 18 MG/G CREAT (ref 0–29)
ALBUMIN/GLOB SERPL: 1.3 {RATIO} (ref 1.2–2.2)
ALP SERPL-CCNC: 62 IU/L (ref 44–121)
ALT SERPL-CCNC: 27 IU/L (ref 0–32)
APPEARANCE UR: CLEAR
AST SERPL-CCNC: 18 IU/L (ref 0–40)
BACTERIA #/AREA URNS HPF: ABNORMAL /[HPF]
BASOPHILS # BLD AUTO: 0 X10E3/UL (ref 0–0.2)
BASOPHILS NFR BLD AUTO: 0 %
BILIRUB SERPL-MCNC: 0.6 MG/DL (ref 0–1.2)
BILIRUB UR QL STRIP: NEGATIVE
BUN SERPL-MCNC: 14 MG/DL (ref 8–27)
BUN/CREAT SERPL: 22 (ref 12–28)
CALCIUM SERPL-MCNC: 9.3 MG/DL (ref 8.7–10.3)
CASTS URNS QL MICRO: ABNORMAL /LPF
CHLORIDE SERPL-SCNC: 97 MMOL/L (ref 96–106)
CHOLEST SERPL-MCNC: 142 MG/DL (ref 100–199)
CHOLEST/HDLC SERPL: 3.5 RATIO (ref 0–4.4)
CO2 SERPL-SCNC: 23 MMOL/L (ref 20–29)
COLOR UR: YELLOW
CREAT SERPL-MCNC: 0.63 MG/DL (ref 0.57–1)
CREAT UR-MCNC: 93.2 MG/DL
EGFRCR SERPLBLD CKD-EPI 2021: 97 ML/MIN/1.73
EOSINOPHIL # BLD AUTO: 0.2 X10E3/UL (ref 0–0.4)
EOSINOPHIL NFR BLD AUTO: 1 %
EPI CELLS #/AREA URNS HPF: ABNORMAL /HPF (ref 0–10)
ERYTHROCYTE [DISTWIDTH] IN BLOOD BY AUTOMATED COUNT: 12.9 % (ref 11.7–15.4)
GLOBULIN SER CALC-MCNC: 3.1 G/DL (ref 1.5–4.5)
GLUCOSE SERPL-MCNC: 234 MG/DL (ref 70–99)
GLUCOSE UR QL STRIP: ABNORMAL
HCT VFR BLD AUTO: 38.6 % (ref 34–46.6)
HDLC SERPL-MCNC: 41 MG/DL
HGB BLD-MCNC: 12.9 G/DL (ref 11.1–15.9)
HGB UR QL STRIP: NEGATIVE
IMM GRANULOCYTES # BLD AUTO: 0 X10E3/UL (ref 0–0.1)
IMM GRANULOCYTES NFR BLD AUTO: 0 %
KETONES UR QL STRIP: ABNORMAL
LDLC SERPL CALC-MCNC: 73 MG/DL (ref 0–99)
LEUKOCYTE ESTERASE UR QL STRIP: NEGATIVE
LYMPHOCYTES # BLD AUTO: 2.7 X10E3/UL (ref 0.7–3.1)
LYMPHOCYTES NFR BLD AUTO: 19 %
MCH RBC QN AUTO: 29.9 PG (ref 26.6–33)
MCHC RBC AUTO-ENTMCNC: 33.4 G/DL (ref 31.5–35.7)
MCV RBC AUTO: 90 FL (ref 79–97)
MICRO URNS: ABNORMAL
MICRO URNS: ABNORMAL
MICROALBUMIN UR-MCNC: 16.6 UG/ML
MONOCYTES # BLD AUTO: 1.4 X10E3/UL (ref 0.1–0.9)
MONOCYTES NFR BLD AUTO: 10 %
NEUTROPHILS # BLD AUTO: 9.8 X10E3/UL (ref 1.4–7)
NEUTROPHILS NFR BLD AUTO: 70 %
NITRITE UR QL STRIP: NEGATIVE
PH UR STRIP: 5.5 [PH] (ref 5–7.5)
PLATELET # BLD AUTO: 285 X10E3/UL (ref 150–450)
POTASSIUM SERPL-SCNC: 4.7 MMOL/L (ref 3.5–5.2)
PROT SERPL-MCNC: 7 G/DL (ref 6–8.5)
PROT UR QL STRIP: NEGATIVE
RBC # BLD AUTO: 4.31 X10E6/UL (ref 3.77–5.28)
RBC #/AREA URNS HPF: ABNORMAL /HPF (ref 0–2)
SODIUM SERPL-SCNC: 137 MMOL/L (ref 134–144)
SP GR UR STRIP: 1.03 (ref 1–1.03)
TRIGL SERPL-MCNC: 163 MG/DL (ref 0–149)
TSH SERPL DL<=0.005 MIU/L-ACNC: 1.54 UIU/ML (ref 0.45–4.5)
UROBILINOGEN UR STRIP-MCNC: 0.2 MG/DL (ref 0.2–1)
VLDLC SERPL CALC-MCNC: 28 MG/DL (ref 5–40)
WBC # BLD AUTO: 14.2 X10E3/UL (ref 3.4–10.8)
WBC #/AREA URNS HPF: ABNORMAL /HPF (ref 0–5)

## 2024-01-21 ENCOUNTER — HOSPITAL ENCOUNTER (EMERGENCY)
Facility: HOSPITAL | Age: 69
Discharge: HOME | End: 2024-01-21
Attending: EMERGENCY MEDICINE | Admitting: EMERGENCY MEDICINE
Payer: MEDICARE

## 2024-01-21 ENCOUNTER — APPOINTMENT (OUTPATIENT)
Dept: GENERAL RADIOLOGY | Facility: HOSPITAL | Age: 69
End: 2024-01-21
Payer: MEDICARE

## 2024-01-21 VITALS
HEIGHT: 68 IN | DIASTOLIC BLOOD PRESSURE: 41 MMHG | OXYGEN SATURATION: 94 % | WEIGHT: 207 LBS | SYSTOLIC BLOOD PRESSURE: 128 MMHG | TEMPERATURE: 99.2 F | HEART RATE: 78 BPM | BODY MASS INDEX: 31.37 KG/M2 | RESPIRATION RATE: 16 BRPM

## 2024-01-21 DIAGNOSIS — R06.02 SHORTNESS OF BREATH: ICD-10-CM

## 2024-01-21 DIAGNOSIS — R05.9 COUGH, UNSPECIFIED TYPE: Primary | ICD-10-CM

## 2024-01-21 LAB
ALBUMIN SERPL-MCNC: 3.8 G/DL (ref 3.5–5.2)
ALBUMIN/GLOB SERPL: 1.2 G/DL
ALP SERPL-CCNC: 61 U/L (ref 39–117)
ALT SERPL W P-5'-P-CCNC: 25 U/L (ref 1–33)
ANION GAP SERPL CALCULATED.3IONS-SCNC: 8.8 MMOL/L (ref 5–15)
AST SERPL-CCNC: 18 U/L (ref 1–32)
BASOPHILS # BLD AUTO: 0.01 10*3/MM3 (ref 0–0.2)
BASOPHILS NFR BLD AUTO: 0.1 % (ref 0–1.5)
BILIRUB SERPL-MCNC: 0.3 MG/DL (ref 0–1.2)
BUN SERPL-MCNC: 12 MG/DL (ref 8–23)
BUN/CREAT SERPL: 17.4 (ref 7–25)
CALCIUM SPEC-SCNC: 9.8 MG/DL (ref 8.6–10.5)
CHLORIDE SERPL-SCNC: 99 MMOL/L (ref 98–107)
CO2 SERPL-SCNC: 26.2 MMOL/L (ref 22–29)
CREAT SERPL-MCNC: 0.69 MG/DL (ref 0.57–1)
D DIMER PPP FEU-MCNC: 0.28 MCGFEU/ML (ref 0–0.68)
DEPRECATED RDW RBC AUTO: 45.2 FL (ref 37–54)
EGFRCR SERPLBLD CKD-EPI 2021: 94.7 ML/MIN/1.73
EOSINOPHIL # BLD AUTO: 0.08 10*3/MM3 (ref 0–0.4)
EOSINOPHIL NFR BLD AUTO: 0.9 % (ref 0.3–6.2)
ERYTHROCYTE [DISTWIDTH] IN BLOOD BY AUTOMATED COUNT: 13.2 % (ref 12.3–15.4)
GLOBULIN UR ELPH-MCNC: 3.1 GM/DL
GLUCOSE SERPL-MCNC: 275 MG/DL (ref 65–99)
HCT VFR BLD AUTO: 38.1 % (ref 34–46.6)
HGB BLD-MCNC: 12.5 G/DL (ref 12–15.9)
IMM GRANULOCYTES # BLD AUTO: 0.02 10*3/MM3 (ref 0–0.05)
IMM GRANULOCYTES NFR BLD AUTO: 0.2 % (ref 0–0.5)
LYMPHOCYTES # BLD AUTO: 1.74 10*3/MM3 (ref 0.7–3.1)
LYMPHOCYTES NFR BLD AUTO: 18.8 % (ref 19.6–45.3)
MCH RBC QN AUTO: 30.4 PG (ref 26.6–33)
MCHC RBC AUTO-ENTMCNC: 32.8 G/DL (ref 31.5–35.7)
MCV RBC AUTO: 92.7 FL (ref 79–97)
MONOCYTES # BLD AUTO: 0.59 10*3/MM3 (ref 0.1–0.9)
MONOCYTES NFR BLD AUTO: 6.4 % (ref 5–12)
NEUTROPHILS NFR BLD AUTO: 6.83 10*3/MM3 (ref 1.7–7)
NEUTROPHILS NFR BLD AUTO: 73.6 % (ref 42.7–76)
NT-PROBNP SERPL-MCNC: 137.3 PG/ML (ref 0–900)
PLATELET # BLD AUTO: 298 10*3/MM3 (ref 140–450)
PMV BLD AUTO: 9.7 FL (ref 6–12)
POTASSIUM SERPL-SCNC: 4.8 MMOL/L (ref 3.5–5.2)
PROT SERPL-MCNC: 6.9 G/DL (ref 6–8.5)
RBC # BLD AUTO: 4.11 10*6/MM3 (ref 3.77–5.28)
SODIUM SERPL-SCNC: 134 MMOL/L (ref 136–145)
TROPONIN T SERPL HS-MCNC: 7 NG/L
WBC NRBC COR # BLD AUTO: 9.27 10*3/MM3 (ref 3.4–10.8)

## 2024-01-21 PROCEDURE — 84484 ASSAY OF TROPONIN QUANT: CPT

## 2024-01-21 PROCEDURE — 71045 X-RAY EXAM CHEST 1 VIEW: CPT

## 2024-01-21 PROCEDURE — 83880 ASSAY OF NATRIURETIC PEPTIDE: CPT

## 2024-01-21 PROCEDURE — 99284 EMERGENCY DEPT VISIT MOD MDM: CPT

## 2024-01-21 PROCEDURE — 87040 BLOOD CULTURE FOR BACTERIA: CPT

## 2024-01-21 PROCEDURE — 85025 COMPLETE CBC W/AUTO DIFF WBC: CPT

## 2024-01-21 PROCEDURE — 96375 TX/PRO/DX INJ NEW DRUG ADDON: CPT

## 2024-01-21 PROCEDURE — 96374 THER/PROPH/DIAG INJ IV PUSH: CPT

## 2024-01-21 PROCEDURE — 25010000002 METHYLPREDNISOLONE PER 125 MG

## 2024-01-21 PROCEDURE — 85379 FIBRIN DEGRADATION QUANT: CPT

## 2024-01-21 PROCEDURE — 99283 EMERGENCY DEPT VISIT LOW MDM: CPT

## 2024-01-21 PROCEDURE — 25010000002 KETOROLAC TROMETHAMINE PER 15 MG

## 2024-01-21 PROCEDURE — 80053 COMPREHEN METABOLIC PANEL: CPT

## 2024-01-21 RX ORDER — DEXTROMETHORPHAN HYDROBROMIDE AND PROMETHAZINE HYDROCHLORIDE 15; 6.25 MG/5ML; MG/5ML
5 SYRUP ORAL 4 TIMES DAILY PRN
Qty: 118 ML | Refills: 0 | Status: SHIPPED | OUTPATIENT
Start: 2024-01-21 | End: 2024-01-22 | Stop reason: SDUPTHER

## 2024-01-21 RX ORDER — METHYLPREDNISOLONE SODIUM SUCCINATE 125 MG/2ML
125 INJECTION, POWDER, LYOPHILIZED, FOR SOLUTION INTRAMUSCULAR; INTRAVENOUS ONCE
Status: COMPLETED | OUTPATIENT
Start: 2024-01-21 | End: 2024-01-21

## 2024-01-21 RX ORDER — DOXYCYCLINE 100 MG/1
100 CAPSULE ORAL 2 TIMES DAILY
Qty: 14 CAPSULE | Refills: 0 | Status: SHIPPED | OUTPATIENT
Start: 2024-01-21 | End: 2024-01-28 | Stop reason: HOSPADM

## 2024-01-21 RX ORDER — KETOROLAC TROMETHAMINE 30 MG/ML
30 INJECTION, SOLUTION INTRAMUSCULAR; INTRAVENOUS ONCE
Status: COMPLETED | OUTPATIENT
Start: 2024-01-21 | End: 2024-01-21

## 2024-01-21 RX ORDER — KETOROLAC TROMETHAMINE 30 MG/ML
30 INJECTION, SOLUTION INTRAMUSCULAR; INTRAVENOUS ONCE
Status: DISCONTINUED | OUTPATIENT
Start: 2024-01-21 | End: 2024-01-21

## 2024-01-21 RX ORDER — IPRATROPIUM BROMIDE AND ALBUTEROL SULFATE 2.5; .5 MG/3ML; MG/3ML
3 SOLUTION RESPIRATORY (INHALATION) ONCE
Status: COMPLETED | OUTPATIENT
Start: 2024-01-21 | End: 2024-01-21

## 2024-01-21 RX ORDER — AMOXICILLIN AND CLAVULANATE POTASSIUM 875; 125 MG/1; MG/1
1 TABLET, FILM COATED ORAL 2 TIMES DAILY
Qty: 14 TABLET | Refills: 0 | Status: SHIPPED | OUTPATIENT
Start: 2024-01-21 | End: 2024-01-28 | Stop reason: HOSPADM

## 2024-01-21 RX ADMIN — KETOROLAC TROMETHAMINE 30 MG: 30 INJECTION, SOLUTION INTRAMUSCULAR; INTRAVENOUS at 15:21

## 2024-01-21 RX ADMIN — IPRATROPIUM BROMIDE AND ALBUTEROL SULFATE 3 ML: 2.5; .5 SOLUTION RESPIRATORY (INHALATION) at 15:23

## 2024-01-21 RX ADMIN — METHYLPREDNISOLONE SODIUM SUCCINATE 125 MG: 125 INJECTION, POWDER, FOR SOLUTION INTRAMUSCULAR; INTRAVENOUS at 15:23
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
## 2024-01-21 NOTE — DISCHARGE INSTRUCTIONS
Follow-up with PCP as soon as possible for visit this week.  Start Augmentin and doxycycline as prescribed until completion.  Use cough syrup as prescribed as needed.  May cause drowsiness.  Continue use of inhalers/nebulizers.  Alternate acetaminophen 500 mg and ibuprofen 800 mg every 4 hours as needed for headache.  Return to emergency department for worsening symptoms or other medical emergencies.  Recommended follow-up with PCP.  Refer to the attached instructions for further information.

## 2024-01-22 ENCOUNTER — OFFICE VISIT (OUTPATIENT)
Dept: FAMILY MEDICINE CLINIC | Facility: CLINIC | Age: 69
End: 2024-01-22
Payer: MEDICARE

## 2024-01-22 VITALS
RESPIRATION RATE: 20 BRPM | DIASTOLIC BLOOD PRESSURE: 84 MMHG | OXYGEN SATURATION: 97 % | TEMPERATURE: 98.4 F | HEART RATE: 68 BPM | SYSTOLIC BLOOD PRESSURE: 116 MMHG | BODY MASS INDEX: 31.37 KG/M2 | HEIGHT: 68 IN | WEIGHT: 207 LBS

## 2024-01-22 DIAGNOSIS — E78.49 OTHER HYPERLIPIDEMIA: ICD-10-CM

## 2024-01-22 DIAGNOSIS — R05.9 COUGH, UNSPECIFIED TYPE: ICD-10-CM

## 2024-01-22 DIAGNOSIS — J18.9 PNEUMONIA OF RIGHT MIDDLE LOBE DUE TO INFECTIOUS ORGANISM: ICD-10-CM

## 2024-01-22 DIAGNOSIS — J45.901 MODERATE ASTHMA WITH EXACERBATION, UNSPECIFIED WHETHER PERSISTENT: Primary | ICD-10-CM

## 2024-01-22 DIAGNOSIS — E11.65 TYPE 2 DIABETES MELLITUS WITH HYPERGLYCEMIA, WITHOUT LONG-TERM CURRENT USE OF INSULIN: Primary | ICD-10-CM

## 2024-01-22 PROCEDURE — 1159F MED LIST DOCD IN RCRD: CPT | Performed by: STUDENT IN AN ORGANIZED HEALTH CARE EDUCATION/TRAINING PROGRAM

## 2024-01-22 PROCEDURE — 1160F RVW MEDS BY RX/DR IN RCRD: CPT | Performed by: STUDENT IN AN ORGANIZED HEALTH CARE EDUCATION/TRAINING PROGRAM

## 2024-01-22 PROCEDURE — 3079F DIAST BP 80-89 MM HG: CPT | Performed by: STUDENT IN AN ORGANIZED HEALTH CARE EDUCATION/TRAINING PROGRAM

## 2024-01-22 PROCEDURE — 3074F SYST BP LT 130 MM HG: CPT | Performed by: STUDENT IN AN ORGANIZED HEALTH CARE EDUCATION/TRAINING PROGRAM

## 2024-01-22 PROCEDURE — 99214 OFFICE O/P EST MOD 30 MIN: CPT | Performed by: STUDENT IN AN ORGANIZED HEALTH CARE EDUCATION/TRAINING PROGRAM

## 2024-01-22 PROCEDURE — 3052F HG A1C>EQUAL 8.0%<EQUAL 9.0%: CPT | Performed by: STUDENT IN AN ORGANIZED HEALTH CARE EDUCATION/TRAINING PROGRAM

## 2024-01-22 RX ORDER — ORAL SEMAGLUTIDE 7 MG/1
1 TABLET ORAL DAILY
Qty: 90 TABLET | Refills: 1 | Status: SHIPPED | OUTPATIENT
Start: 2024-01-22

## 2024-01-22 RX ORDER — PREDNISONE 20 MG/1
TABLET ORAL
Qty: 5 TABLET | Refills: 0 | Status: SHIPPED | OUTPATIENT
Start: 2024-01-22 | End: 2024-01-28 | Stop reason: HOSPADM

## 2024-01-22 RX ORDER — HYDROCODONE POLISTIREX AND CHLORPHENIRAMINE POLISTIREX 10; 8 MG/5ML; MG/5ML
5 SUSPENSION, EXTENDED RELEASE ORAL EVERY 12 HOURS PRN
Qty: 115 ML | Refills: 0 | Status: SHIPPED | OUTPATIENT
Start: 2024-01-22 | End: 2024-02-03

## 2024-01-22 RX ORDER — DEXTROMETHORPHAN HYDROBROMIDE AND PROMETHAZINE HYDROCHLORIDE 15; 6.25 MG/5ML; MG/5ML
5 SYRUP ORAL 4 TIMES DAILY PRN
Qty: 118 ML | Refills: 0 | Status: SHIPPED | OUTPATIENT
Start: 2024-01-22

## 2024-01-22 NOTE — PROGRESS NOTES
"Chief Complaint  Asthma (F/u from hospital, [facility], went to ER last night, sats dropped to the 80s)    Subjective        Khalida Gilliland presents to [facility]  Asthma  Her past medical history is significant for asthma.     For follow-up on her ER visit which was yesterday and the main reason she went to ER was her saturation was dropping to 80s and she was found to have pneumonia on x-ray and is on 2 antibiotics that is Augmentin and doxycycline.  As per the patient she was told to have CAT scan done to confirm the pneumonia.  Also patient stated she is having increased urinary frequency and sometimes burning urination so some UTI symptom has started developing.  Also had some question regarding her recent labs done's and would like to have follow-up on diabetes mellitus, hyperlipidemia today.  Objective   Vital Signs:  /84 (BP Location: Left arm, Patient Position: Sitting, Cuff Size: Adult)   Pulse 68   Temp 98.4 °F (36.9 °C) (Oral)   Resp 20   Ht 172.7 cm (67.99\")   Wt 93.9 kg (207 lb)   SpO2 97%   BMI 31.48 kg/m²   Estimated body mass index is 31.48 kg/m² as calculated from the following:    Height as of this encounter: 172.7 cm (67.99\").    Weight as of this encounter: 93.9 kg (207 lb).               Physical Exam  HENT:      Head: Normocephalic and atraumatic.      Mouth/Throat:      Mouth: Mucous membranes are moist.      Pharynx: Oropharynx is clear.   Eyes:      Extraocular Movements: Extraocular movements intact.      Conjunctiva/sclera: Conjunctivae normal.      Pupils: Pupils are equal, round, and reactive to light.   Cardiovascular:      Rate and Rhythm: Normal rate and regular rhythm.   Pulmonary:      Effort: Pulmonary effort is normal.      Breath sounds: Normal breath sounds.   Abdominal:      General: Bowel sounds are normal.      Palpations: Abdomen is soft.   Musculoskeletal:         General: Normal range of motion.      Cervical back: Neck supple. "   Skin:     General: Skin is warm.      Capillary Refill: Capillary refill takes less than 2 seconds.   Neurological:      General: No focal deficit present.      Mental Status: She is alert and oriented to person, place, and time. Mental status is at baseline.   Psychiatric:         Mood and Affect: Mood normal.        Result Review :    The following data was reviewed by: Sergo Owen MD on 01/22/2024:  CMP          1/15/2024    06:44 1/18/2024    10:27 1/21/2024    15:24   CMP   Glucose 146  234  275    BUN 25  14  12    Creatinine 0.72  0.63  0.69    EGFR 91.2   94.7    Sodium 132  137  134    Potassium 4.7  4.7  4.8    Chloride 94  97  99    Calcium 9.6  9.3  9.8    Total Protein  7.0     Total Protein   6.9    Albumin  3.9  3.8    Globulin  3.1     Globulin   3.1    Total Bilirubin  0.6  0.3    Alkaline Phosphatase  62  61    AST (SGOT)  18  18    ALT (SGPT)  27  25    Albumin/Globulin Ratio   1.2    BUN/Creatinine Ratio 34.7  22  17.4    Anion Gap 9.0   8.8      CBC          1/15/2024    06:44 1/18/2024    10:27 1/21/2024    15:24   CBC   WBC 20.39  14.2  9.27    RBC 4.61  4.31  4.11    Hemoglobin 13.9  12.9  12.5    Hematocrit 41.4  38.6  38.1    MCV 89.8  90  92.7    MCH 30.2  29.9  30.4    MCHC 33.6  33.4  32.8    RDW 13.1  12.9  13.2    Platelets 270  285  298      Lipid Panel          10/17/2023    09:03 1/18/2024    10:27   Lipid Panel   Total Cholesterol 161  142    Triglycerides 195  163    HDL Cholesterol 37  41    VLDL Cholesterol 33  28    LDL Cholesterol  91  73      TSH          10/17/2023    09:03 1/18/2024    10:27   TSH   TSH 2.390  1.540                   Assessment and Plan     Diagnoses and all orders for this visit:    1. Type 2 diabetes mellitus with hyperglycemia, without long-term current use of insulin (Primary)  -     Semaglutide (Rybelsus) 7 MG tablet; Take 1 mg by mouth Daily.  Dispense: 90 tablet; Refill: 1    2. Cough, unspecified type  -     Hydrocod Rudi-Chlorphe Rudi ER  (TUSSIONEX PENNKINETIC) 10-8 MG/5ML ER suspension; Take 5 mL by mouth Every 12 (Twelve) Hours As Needed for Cough for up to 12 days.  Dispense: 115 mL; Refill: 0    3. Pneumonia of right middle lobe due to infectious organism    4. Other hyperlipidemia    # Type 2 diabetes mellitus with hyperglycemia  A1c not at goal around 8.5  Patient is started on Rybelsus  Currently patient is on Januvia, patient is educated Rybelsus will need prior authorization and there are most common side effects with this medication which include nausea, vomiting, bloating features and if patient is able to tolerate 3 mg dose for 4 weeks and if insurance also approves Rybelsus then plan is to continue with 70 mg p.o. daily at least 30 minutes before first meal.  Patient is advised to stop Januvia when switched to 7 mg dose  Will repeat A1c in 3 months    # Hyperlipidemia  LDL at goal  Continue medication and lifestyle modification    # RSV pneumonia  Patient has been started on antibiotics for possible pneumonia  CAT scan chest has been ordered to repeat in 6 weeks  During hospitalization   For confirmation of resolution of pneumonia  .RTC in 3 months with repeat labs       Follow Up     No follow-ups on file.  Patient was given instructions and counseling regarding her condition or for health maintenance advice. Please see specific information pulled into the AVS if appropriate.

## 2024-01-23 ENCOUNTER — HOSPITAL ENCOUNTER (INPATIENT)
Facility: HOSPITAL | Age: 69
LOS: 3 days | Discharge: HOME | DRG: 202 | End: 2024-01-28
Attending: EMERGENCY MEDICINE | Admitting: INTERNAL MEDICINE
Payer: MEDICARE

## 2024-01-23 ENCOUNTER — APPOINTMENT (OUTPATIENT)
Dept: GENERAL RADIOLOGY | Facility: HOSPITAL | Age: 69
DRG: 202 | End: 2024-01-23
Payer: MEDICARE

## 2024-01-23 ENCOUNTER — READMISSION MANAGEMENT (OUTPATIENT)
Dept: CALL CENTER | Facility: HOSPITAL | Age: 69
End: 2024-01-23
Payer: MEDICARE

## 2024-01-23 DIAGNOSIS — G43.901 STATUS MIGRAINOSUS: ICD-10-CM

## 2024-01-23 DIAGNOSIS — Z86.79 HISTORY OF HYPERTENSION: ICD-10-CM

## 2024-01-23 DIAGNOSIS — Z86.39 HISTORY OF DIABETES MELLITUS: ICD-10-CM

## 2024-01-23 DIAGNOSIS — B33.8 RSV (RESPIRATORY SYNCYTIAL VIRUS INFECTION): ICD-10-CM

## 2024-01-23 DIAGNOSIS — R09.02 HYPOXIA: Primary | ICD-10-CM

## 2024-01-23 DIAGNOSIS — Z86.69 HISTORY OF MIGRAINE HEADACHES: ICD-10-CM

## 2024-01-23 LAB
ALBUMIN SERPL-MCNC: 4 G/DL (ref 3.5–5.2)
ALBUMIN/GLOB SERPL: 1.3 G/DL
ALP SERPL-CCNC: 59 U/L (ref 39–117)
ALT SERPL W P-5'-P-CCNC: 26 U/L (ref 1–33)
ANION GAP SERPL CALCULATED.3IONS-SCNC: 11.2 MMOL/L (ref 5–15)
AST SERPL-CCNC: 19 U/L (ref 1–32)
B PARAPERT DNA SPEC QL NAA+PROBE: NOT DETECTED
B PERT DNA SPEC QL NAA+PROBE: NOT DETECTED
BASOPHILS # BLD AUTO: 0.03 10*3/MM3 (ref 0–0.2)
BASOPHILS NFR BLD AUTO: 0.3 % (ref 0–1.5)
BILIRUB SERPL-MCNC: 0.3 MG/DL (ref 0–1.2)
BUN SERPL-MCNC: 13 MG/DL (ref 8–23)
BUN/CREAT SERPL: 23.2 (ref 7–25)
C PNEUM DNA NPH QL NAA+NON-PROBE: NOT DETECTED
CALCIUM SPEC-SCNC: 9.4 MG/DL (ref 8.6–10.5)
CHLORIDE SERPL-SCNC: 102 MMOL/L (ref 98–107)
CO2 SERPL-SCNC: 22.8 MMOL/L (ref 22–29)
CREAT SERPL-MCNC: 0.56 MG/DL (ref 0.57–1)
DEPRECATED RDW RBC AUTO: 42.2 FL (ref 37–54)
EGFRCR SERPLBLD CKD-EPI 2021: 99.6 ML/MIN/1.73
EOSINOPHIL # BLD AUTO: 0.22 10*3/MM3 (ref 0–0.4)
EOSINOPHIL NFR BLD AUTO: 2.4 % (ref 0.3–6.2)
ERYTHROCYTE [DISTWIDTH] IN BLOOD BY AUTOMATED COUNT: 12.8 % (ref 12.3–15.4)
FLUAV SUBTYP SPEC NAA+PROBE: NOT DETECTED
FLUBV RNA ISLT QL NAA+PROBE: NOT DETECTED
GLOBULIN UR ELPH-MCNC: 3.2 GM/DL
GLUCOSE BLDC GLUCOMTR-MCNC: 282 MG/DL (ref 70–130)
GLUCOSE BLDC GLUCOMTR-MCNC: 288 MG/DL (ref 70–130)
GLUCOSE SERPL-MCNC: 184 MG/DL (ref 65–99)
HADV DNA SPEC NAA+PROBE: NOT DETECTED
HCOV 229E RNA SPEC QL NAA+PROBE: NOT DETECTED
HCOV HKU1 RNA SPEC QL NAA+PROBE: NOT DETECTED
HCOV NL63 RNA SPEC QL NAA+PROBE: NOT DETECTED
HCOV OC43 RNA SPEC QL NAA+PROBE: NOT DETECTED
HCT VFR BLD AUTO: 36.8 % (ref 34–46.6)
HGB BLD-MCNC: 12.3 G/DL (ref 12–15.9)
HMPV RNA NPH QL NAA+NON-PROBE: NOT DETECTED
HPIV1 RNA ISLT QL NAA+PROBE: NOT DETECTED
HPIV2 RNA SPEC QL NAA+PROBE: NOT DETECTED
HPIV3 RNA NPH QL NAA+PROBE: NOT DETECTED
HPIV4 P GENE NPH QL NAA+PROBE: NOT DETECTED
IMM GRANULOCYTES # BLD AUTO: 0.03 10*3/MM3 (ref 0–0.05)
IMM GRANULOCYTES NFR BLD AUTO: 0.3 % (ref 0–0.5)
LYMPHOCYTES # BLD AUTO: 2.55 10*3/MM3 (ref 0.7–3.1)
LYMPHOCYTES NFR BLD AUTO: 27.9 % (ref 19.6–45.3)
M PNEUMO IGG SER IA-ACNC: NOT DETECTED
MCH RBC QN AUTO: 30.4 PG (ref 26.6–33)
MCHC RBC AUTO-ENTMCNC: 33.4 G/DL (ref 31.5–35.7)
MCV RBC AUTO: 90.9 FL (ref 79–97)
MONOCYTES # BLD AUTO: 0.65 10*3/MM3 (ref 0.1–0.9)
MONOCYTES NFR BLD AUTO: 7.1 % (ref 5–12)
NEUTROPHILS NFR BLD AUTO: 5.65 10*3/MM3 (ref 1.7–7)
NEUTROPHILS NFR BLD AUTO: 62 % (ref 42.7–76)
NRBC BLD AUTO-RTO: 0 /100 WBC (ref 0–0.2)
NT-PROBNP SERPL-MCNC: 125 PG/ML (ref 0–900)
PLATELET # BLD AUTO: 289 10*3/MM3 (ref 140–450)
PMV BLD AUTO: 9.7 FL (ref 6–12)
POTASSIUM SERPL-SCNC: 4.1 MMOL/L (ref 3.5–5.2)
PROCALCITONIN SERPL-MCNC: 0.03 NG/ML (ref 0–0.25)
PROT SERPL-MCNC: 7.2 G/DL (ref 6–8.5)
QT INTERVAL: 321 MS
QTC INTERVAL: 416 MS
RBC # BLD AUTO: 4.05 10*6/MM3 (ref 3.77–5.28)
RHINOVIRUS RNA SPEC NAA+PROBE: NOT DETECTED
RSV RNA NPH QL NAA+NON-PROBE: DETECTED
SARS-COV-2 RNA NPH QL NAA+NON-PROBE: NOT DETECTED
SODIUM SERPL-SCNC: 136 MMOL/L (ref 136–145)
TROPONIN T SERPL HS-MCNC: 13 NG/L
WBC NRBC COR # BLD AUTO: 9.13 10*3/MM3 (ref 3.4–10.8)

## 2024-01-23 PROCEDURE — 94799 UNLISTED PULMONARY SVC/PX: CPT

## 2024-01-23 PROCEDURE — 94664 DEMO&/EVAL PT USE INHALER: CPT

## 2024-01-23 PROCEDURE — 71045 X-RAY EXAM CHEST 1 VIEW: CPT

## 2024-01-23 PROCEDURE — 25010000002 DIPHENHYDRAMINE PER 50 MG: Performed by: PHYSICIAN ASSISTANT

## 2024-01-23 PROCEDURE — 0202U NFCT DS 22 TRGT SARS-COV-2: CPT | Performed by: PHYSICIAN ASSISTANT

## 2024-01-23 PROCEDURE — 25010000002 PROCHLORPERAZINE 10 MG/2ML SOLUTION: Performed by: PHYSICIAN ASSISTANT

## 2024-01-23 PROCEDURE — 80053 COMPREHEN METABOLIC PANEL: CPT | Performed by: PHYSICIAN ASSISTANT

## 2024-01-23 PROCEDURE — 93010 ELECTROCARDIOGRAM REPORT: CPT | Performed by: INTERNAL MEDICINE

## 2024-01-23 PROCEDURE — G0378 HOSPITAL OBSERVATION PER HR: HCPCS

## 2024-01-23 PROCEDURE — 25010000002 MAGNESIUM SULFATE 2 GM/50ML SOLUTION: Performed by: PHYSICIAN ASSISTANT

## 2024-01-23 PROCEDURE — 85025 COMPLETE CBC W/AUTO DIFF WBC: CPT | Performed by: PHYSICIAN ASSISTANT

## 2024-01-23 PROCEDURE — 84484 ASSAY OF TROPONIN QUANT: CPT | Performed by: PHYSICIAN ASSISTANT

## 2024-01-23 PROCEDURE — 63710000001 INSULIN LISPRO (HUMAN) PER 5 UNITS: Performed by: INTERNAL MEDICINE

## 2024-01-23 PROCEDURE — 99285 EMERGENCY DEPT VISIT HI MDM: CPT

## 2024-01-23 PROCEDURE — 25010000002 DEXAMETHASONE SODIUM PHOSPHATE 20 MG/5ML SOLUTION: Performed by: PHYSICIAN ASSISTANT

## 2024-01-23 PROCEDURE — 94640 AIRWAY INHALATION TREATMENT: CPT

## 2024-01-23 PROCEDURE — 83880 ASSAY OF NATRIURETIC PEPTIDE: CPT | Performed by: PHYSICIAN ASSISTANT

## 2024-01-23 PROCEDURE — 84145 PROCALCITONIN (PCT): CPT | Performed by: EMERGENCY MEDICINE

## 2024-01-23 PROCEDURE — 82948 REAGENT STRIP/BLOOD GLUCOSE: CPT

## 2024-01-23 PROCEDURE — 93005 ELECTROCARDIOGRAM TRACING: CPT | Performed by: PHYSICIAN ASSISTANT

## 2024-01-23 PROCEDURE — 94761 N-INVAS EAR/PLS OXIMETRY MLT: CPT

## 2024-01-23 PROCEDURE — 25010000002 METHYLPREDNISOLONE PER 125 MG: Performed by: INTERNAL MEDICINE

## 2024-01-23 PROCEDURE — 25010000002 ENOXAPARIN PER 10 MG: Performed by: INTERNAL MEDICINE

## 2024-01-23 RX ORDER — MELATONIN
1000 DAILY
Status: DISCONTINUED | OUTPATIENT
Start: 2024-01-23 | End: 2024-01-28 | Stop reason: HOSPADM

## 2024-01-23 RX ORDER — MAGNESIUM SULFATE HEPTAHYDRATE 40 MG/ML
2 INJECTION, SOLUTION INTRAVENOUS ONCE
Status: COMPLETED | OUTPATIENT
Start: 2024-01-23 | End: 2024-01-23

## 2024-01-23 RX ORDER — L.ACID,PARA/B.BIFIDUM/S.THERM 8B CELL
1 CAPSULE ORAL DAILY
Status: DISCONTINUED | OUTPATIENT
Start: 2024-01-23 | End: 2024-01-28 | Stop reason: HOSPADM

## 2024-01-23 RX ORDER — DIPHENHYDRAMINE HYDROCHLORIDE 50 MG/ML
25 INJECTION INTRAMUSCULAR; INTRAVENOUS ONCE
Status: COMPLETED | OUTPATIENT
Start: 2024-01-23 | End: 2024-01-23

## 2024-01-23 RX ORDER — SODIUM CHLORIDE 0.9 % (FLUSH) 0.9 %
10 SYRINGE (ML) INJECTION EVERY 12 HOURS SCHEDULED
Status: DISCONTINUED | OUTPATIENT
Start: 2024-01-23 | End: 2024-01-28 | Stop reason: HOSPADM

## 2024-01-23 RX ORDER — DEXTROSE MONOHYDRATE 25 G/50ML
25 INJECTION, SOLUTION INTRAVENOUS
Status: DISCONTINUED | OUTPATIENT
Start: 2024-01-23 | End: 2024-01-28 | Stop reason: HOSPADM

## 2024-01-23 RX ORDER — BUDESONIDE AND FORMOTEROL FUMARATE DIHYDRATE 160; 4.5 UG/1; UG/1
2 AEROSOL RESPIRATORY (INHALATION)
Status: DISCONTINUED | OUTPATIENT
Start: 2024-01-23 | End: 2024-01-28 | Stop reason: HOSPADM

## 2024-01-23 RX ORDER — ACETAMINOPHEN 325 MG/1
650 TABLET ORAL EVERY 4 HOURS PRN
Status: DISCONTINUED | OUTPATIENT
Start: 2024-01-23 | End: 2024-01-28 | Stop reason: HOSPADM

## 2024-01-23 RX ORDER — SCOLOPAMINE TRANSDERMAL SYSTEM 1 MG/1
1 PATCH, EXTENDED RELEASE TRANSDERMAL
Status: DISCONTINUED | OUTPATIENT
Start: 2024-01-23 | End: 2024-01-28 | Stop reason: HOSPADM

## 2024-01-23 RX ORDER — IBUPROFEN 600 MG/1
1 TABLET ORAL
Status: DISCONTINUED | OUTPATIENT
Start: 2024-01-23 | End: 2024-01-28 | Stop reason: HOSPADM

## 2024-01-23 RX ORDER — ALBUTEROL SULFATE 2.5 MG/3ML
2.5 SOLUTION RESPIRATORY (INHALATION) EVERY 4 HOURS PRN
Status: DISCONTINUED | OUTPATIENT
Start: 2024-01-23 | End: 2024-01-24

## 2024-01-23 RX ORDER — ENOXAPARIN SODIUM 100 MG/ML
40 INJECTION SUBCUTANEOUS DAILY
Status: DISCONTINUED | OUTPATIENT
Start: 2024-01-23 | End: 2024-01-28 | Stop reason: HOSPADM

## 2024-01-23 RX ORDER — ZOLPIDEM TARTRATE 5 MG/1
5 TABLET ORAL NIGHTLY PRN
Status: DISCONTINUED | OUTPATIENT
Start: 2024-01-23 | End: 2024-01-28 | Stop reason: HOSPADM

## 2024-01-23 RX ORDER — ACETAMINOPHEN 650 MG/1
650 SUPPOSITORY RECTAL EVERY 4 HOURS PRN
Status: DISCONTINUED | OUTPATIENT
Start: 2024-01-23 | End: 2024-01-28 | Stop reason: HOSPADM

## 2024-01-23 RX ORDER — DIPHENOXYLATE HYDROCHLORIDE AND ATROPINE SULFATE 2.5; .025 MG/1; MG/1
1 TABLET ORAL DAILY
Status: DISCONTINUED | OUTPATIENT
Start: 2024-01-23 | End: 2024-01-28 | Stop reason: HOSPADM

## 2024-01-23 RX ORDER — IPRATROPIUM BROMIDE AND ALBUTEROL SULFATE 2.5; .5 MG/3ML; MG/3ML
3 SOLUTION RESPIRATORY (INHALATION)
Status: DISCONTINUED | OUTPATIENT
Start: 2024-01-23 | End: 2024-01-28 | Stop reason: HOSPADM

## 2024-01-23 RX ORDER — SODIUM CHLORIDE 9 MG/ML
40 INJECTION, SOLUTION INTRAVENOUS AS NEEDED
Status: DISCONTINUED | OUTPATIENT
Start: 2024-01-23 | End: 2024-01-28 | Stop reason: HOSPADM

## 2024-01-23 RX ORDER — AMOXICILLIN 250 MG
2 CAPSULE ORAL 2 TIMES DAILY
Status: DISCONTINUED | OUTPATIENT
Start: 2024-01-23 | End: 2024-01-28 | Stop reason: HOSPADM

## 2024-01-23 RX ORDER — TRAZODONE HYDROCHLORIDE 50 MG/1
50 TABLET ORAL NIGHTLY
Status: DISCONTINUED | OUTPATIENT
Start: 2024-01-23 | End: 2024-01-28 | Stop reason: HOSPADM

## 2024-01-23 RX ORDER — ONDANSETRON 2 MG/ML
4 INJECTION INTRAMUSCULAR; INTRAVENOUS EVERY 6 HOURS PRN
Status: DISCONTINUED | OUTPATIENT
Start: 2024-01-23 | End: 2024-01-28 | Stop reason: HOSPADM

## 2024-01-23 RX ORDER — ACETAMINOPHEN 160 MG/5ML
650 SOLUTION ORAL EVERY 4 HOURS PRN
Status: DISCONTINUED | OUTPATIENT
Start: 2024-01-23 | End: 2024-01-28 | Stop reason: HOSPADM

## 2024-01-23 RX ORDER — BENZONATATE 100 MG/1
100 CAPSULE ORAL 3 TIMES DAILY PRN
Status: DISCONTINUED | OUTPATIENT
Start: 2024-01-23 | End: 2024-01-28 | Stop reason: HOSPADM

## 2024-01-23 RX ORDER — MONTELUKAST SODIUM 10 MG/1
10 TABLET ORAL NIGHTLY
Status: DISCONTINUED | OUTPATIENT
Start: 2024-01-23 | End: 2024-01-28 | Stop reason: HOSPADM

## 2024-01-23 RX ORDER — BUTALBITAL, ACETAMINOPHEN AND CAFFEINE 50; 325; 40 MG/1; MG/1; MG/1
1 TABLET ORAL EVERY 4 HOURS PRN
Status: DISCONTINUED | OUTPATIENT
Start: 2024-01-23 | End: 2024-01-28

## 2024-01-23 RX ORDER — POLYETHYLENE GLYCOL 3350 17 G/17G
17 POWDER, FOR SOLUTION ORAL DAILY PRN
Status: DISCONTINUED | OUTPATIENT
Start: 2024-01-23 | End: 2024-01-28 | Stop reason: HOSPADM

## 2024-01-23 RX ORDER — PROCHLORPERAZINE EDISYLATE 5 MG/ML
5 INJECTION INTRAMUSCULAR; INTRAVENOUS ONCE
Status: COMPLETED | OUTPATIENT
Start: 2024-01-23 | End: 2024-01-23

## 2024-01-23 RX ORDER — ATORVASTATIN CALCIUM 20 MG/1
20 TABLET, FILM COATED ORAL DAILY
Status: DISCONTINUED | OUTPATIENT
Start: 2024-01-23 | End: 2024-01-28 | Stop reason: HOSPADM

## 2024-01-23 RX ORDER — METHYLPREDNISOLONE SODIUM SUCCINATE 125 MG/2ML
60 INJECTION, POWDER, LYOPHILIZED, FOR SOLUTION INTRAMUSCULAR; INTRAVENOUS EVERY 8 HOURS
Status: DISCONTINUED | OUTPATIENT
Start: 2024-01-23 | End: 2024-01-24

## 2024-01-23 RX ORDER — PROCHLORPERAZINE EDISYLATE 5 MG/ML
5 INJECTION INTRAMUSCULAR; INTRAVENOUS EVERY 4 HOURS PRN
Status: DISCONTINUED | OUTPATIENT
Start: 2024-01-23 | End: 2024-01-28 | Stop reason: SDUPTHER

## 2024-01-23 RX ORDER — ALBUTEROL SULFATE 2.5 MG/3ML
2.5 SOLUTION RESPIRATORY (INHALATION) ONCE
Status: COMPLETED | OUTPATIENT
Start: 2024-01-23 | End: 2024-01-23

## 2024-01-23 RX ORDER — IPRATROPIUM BROMIDE 42 UG/1
2 SPRAY, METERED NASAL 2 TIMES DAILY
Status: DISCONTINUED | OUTPATIENT
Start: 2024-01-23 | End: 2024-01-28 | Stop reason: HOSPADM

## 2024-01-23 RX ORDER — BUTALBITAL, ACETAMINOPHEN AND CAFFEINE 50; 325; 40 MG/1; MG/1; MG/1
1 TABLET ORAL 2 TIMES DAILY PRN
Status: DISCONTINUED | OUTPATIENT
Start: 2024-01-23 | End: 2024-01-23 | Stop reason: SDUPTHER

## 2024-01-23 RX ORDER — SODIUM CHLORIDE 0.9 % (FLUSH) 0.9 %
10 SYRINGE (ML) INJECTION AS NEEDED
Status: DISCONTINUED | OUTPATIENT
Start: 2024-01-23 | End: 2024-01-28 | Stop reason: HOSPADM

## 2024-01-23 RX ORDER — NITROGLYCERIN 0.4 MG/1
0.4 TABLET SUBLINGUAL
Status: DISCONTINUED | OUTPATIENT
Start: 2024-01-23 | End: 2024-01-28 | Stop reason: HOSPADM

## 2024-01-23 RX ORDER — LISINOPRIL 10 MG/1
10 TABLET ORAL DAILY
Status: DISCONTINUED | OUTPATIENT
Start: 2024-01-23 | End: 2024-01-28 | Stop reason: HOSPADM

## 2024-01-23 RX ORDER — CYCLOSPORINE 0.5 MG/ML
1 EMULSION OPHTHALMIC 2 TIMES DAILY
Status: DISCONTINUED | OUTPATIENT
Start: 2024-01-23 | End: 2024-01-28 | Stop reason: HOSPADM

## 2024-01-23 RX ORDER — GLIPIZIDE 5 MG/1
2.5 TABLET ORAL
Status: DISCONTINUED | OUTPATIENT
Start: 2024-01-23 | End: 2024-01-28 | Stop reason: HOSPADM

## 2024-01-23 RX ORDER — DEXAMETHASONE SODIUM PHOSPHATE 4 MG/ML
4 INJECTION, SOLUTION INTRA-ARTICULAR; INTRALESIONAL; INTRAMUSCULAR; INTRAVENOUS; SOFT TISSUE ONCE
Status: COMPLETED | OUTPATIENT
Start: 2024-01-23 | End: 2024-01-23

## 2024-01-23 RX ORDER — PREGABALIN 100 MG/1
100 CAPSULE ORAL 3 TIMES DAILY
Status: DISCONTINUED | OUTPATIENT
Start: 2024-01-23 | End: 2024-01-28 | Stop reason: HOSPADM

## 2024-01-23 RX ORDER — ASPIRIN 81 MG/1
81 TABLET ORAL DAILY
Status: DISCONTINUED | OUTPATIENT
Start: 2024-01-23 | End: 2024-01-28 | Stop reason: HOSPADM

## 2024-01-23 RX ORDER — PROPRANOLOL HYDROCHLORIDE 40 MG/1
40 TABLET ORAL 2 TIMES DAILY
Status: DISCONTINUED | OUTPATIENT
Start: 2024-01-23 | End: 2024-01-28 | Stop reason: HOSPADM

## 2024-01-23 RX ORDER — BUTALBITAL, ACETAMINOPHEN AND CAFFEINE 50; 325; 40 MG/1; MG/1; MG/1
1 TABLET ORAL ONCE
Status: COMPLETED | OUTPATIENT
Start: 2024-01-23 | End: 2024-01-23

## 2024-01-23 RX ORDER — HYDROCODONE POLISTIREX AND CHLORPHENIRAMINE POLISTIREX 10; 8 MG/5ML; MG/5ML
5 SUSPENSION, EXTENDED RELEASE ORAL EVERY 12 HOURS PRN
Status: DISCONTINUED | OUTPATIENT
Start: 2024-01-23 | End: 2024-01-28 | Stop reason: HOSPADM

## 2024-01-23 RX ORDER — PANTOPRAZOLE SODIUM 40 MG/1
40 TABLET, DELAYED RELEASE ORAL
Status: DISCONTINUED | OUTPATIENT
Start: 2024-01-24 | End: 2024-01-28 | Stop reason: HOSPADM

## 2024-01-23 RX ORDER — BISACODYL 10 MG
10 SUPPOSITORY, RECTAL RECTAL DAILY PRN
Status: DISCONTINUED | OUTPATIENT
Start: 2024-01-23 | End: 2024-01-28 | Stop reason: HOSPADM

## 2024-01-23 RX ORDER — DIPHENHYDRAMINE HYDROCHLORIDE 50 MG/ML
25 INJECTION INTRAMUSCULAR; INTRAVENOUS EVERY 6 HOURS PRN
Status: DISCONTINUED | OUTPATIENT
Start: 2024-01-23 | End: 2024-01-28 | Stop reason: HOSPADM

## 2024-01-23 RX ORDER — BISACODYL 5 MG/1
5 TABLET, DELAYED RELEASE ORAL DAILY PRN
Status: DISCONTINUED | OUTPATIENT
Start: 2024-01-23 | End: 2024-01-28 | Stop reason: HOSPADM

## 2024-01-23 RX ORDER — INSULIN LISPRO 100 [IU]/ML
2-9 INJECTION, SOLUTION INTRAVENOUS; SUBCUTANEOUS
Status: DISCONTINUED | OUTPATIENT
Start: 2024-01-23 | End: 2024-01-28 | Stop reason: HOSPADM

## 2024-01-23 RX ORDER — DEXTROMETHORPHAN HYDROBROMIDE AND PROMETHAZINE HYDROCHLORIDE 15; 6.25 MG/5ML; MG/5ML
5 SYRUP ORAL 4 TIMES DAILY PRN
Status: DISCONTINUED | OUTPATIENT
Start: 2024-01-23 | End: 2024-01-28 | Stop reason: HOSPADM

## 2024-01-23 RX ORDER — NICOTINE POLACRILEX 4 MG
15 LOZENGE BUCCAL
Status: DISCONTINUED | OUTPATIENT
Start: 2024-01-23 | End: 2024-01-28 | Stop reason: HOSPADM

## 2024-01-23 RX ORDER — FLUTICASONE PROPIONATE 50 MCG
2 SPRAY, SUSPENSION (ML) NASAL DAILY
Status: DISCONTINUED | OUTPATIENT
Start: 2024-01-23 | End: 2024-01-28 | Stop reason: HOSPADM

## 2024-01-23 RX ADMIN — CYCLOSPORINE 1 DROP: 0.5 EMULSION OPHTHALMIC at 21:57

## 2024-01-23 RX ADMIN — FLUTICASONE PROPIONATE 2 SPRAY: 50 SPRAY, METERED NASAL at 17:24

## 2024-01-23 RX ADMIN — METHYLPREDNISOLONE SODIUM SUCCINATE 60 MG: 125 INJECTION INTRAMUSCULAR; INTRAVENOUS at 17:23

## 2024-01-23 RX ADMIN — LISINOPRIL 10 MG: 10 TABLET ORAL at 17:21

## 2024-01-23 RX ADMIN — BUTALBITAL, ACETAMINOPHEN, AND CAFFEINE 1 TABLET: 50; 325; 40 TABLET ORAL at 13:10

## 2024-01-23 RX ADMIN — INSULIN LISPRO 6 UNITS: 100 INJECTION, SOLUTION INTRAVENOUS; SUBCUTANEOUS at 18:01

## 2024-01-23 RX ADMIN — GLIPIZIDE 2.5 MG: 5 TABLET ORAL at 17:20

## 2024-01-23 RX ADMIN — Medication 1 CAPSULE: at 17:20

## 2024-01-23 RX ADMIN — INSULIN LISPRO 6 UNITS: 100 INJECTION, SOLUTION INTRAVENOUS; SUBCUTANEOUS at 21:55

## 2024-01-23 RX ADMIN — ZOLPIDEM TARTRATE 5 MG: 5 TABLET ORAL at 21:56

## 2024-01-23 RX ADMIN — BUTALBITAL, ACETAMINOPHEN AND CAFFEINE 1 TABLET: 325; 50; 40 TABLET ORAL at 21:56

## 2024-01-23 RX ADMIN — TRAZODONE HYDROCHLORIDE 50 MG: 50 TABLET ORAL at 21:57

## 2024-01-23 RX ADMIN — PREGABALIN 100 MG: 100 CAPSULE ORAL at 17:21

## 2024-01-23 RX ADMIN — Medication 10 ML: at 22:14

## 2024-01-23 RX ADMIN — ALBUTEROL SULFATE 2.5 MG: 2.5 SOLUTION RESPIRATORY (INHALATION) at 11:56

## 2024-01-23 RX ADMIN — PROCHLORPERAZINE EDISYLATE 5 MG: 5 INJECTION INTRAMUSCULAR; INTRAVENOUS at 11:45

## 2024-01-23 RX ADMIN — Medication 1000 UNITS: at 18:01

## 2024-01-23 RX ADMIN — DEXAMETHASONE SODIUM PHOSPHATE 4 MG: 4 INJECTION, SOLUTION INTRAMUSCULAR; INTRAVENOUS at 11:43

## 2024-01-23 RX ADMIN — DOCUSATE SODIUM 50MG AND SENNOSIDES 8.6MG 2 TABLET: 8.6; 5 TABLET, FILM COATED ORAL at 21:56

## 2024-01-23 RX ADMIN — ENOXAPARIN SODIUM 40 MG: 100 INJECTION SUBCUTANEOUS at 17:21

## 2024-01-23 RX ADMIN — MONTELUKAST SODIUM 10 MG: 10 TABLET, FILM COATED ORAL at 21:56

## 2024-01-23 RX ADMIN — PREGABALIN 100 MG: 100 CAPSULE ORAL at 21:56

## 2024-01-23 RX ADMIN — ATORVASTATIN CALCIUM 20 MG: 20 TABLET, FILM COATED ORAL at 17:20

## 2024-01-23 RX ADMIN — Medication 1 TABLET: at 18:01

## 2024-01-23 RX ADMIN — DIPHENHYDRAMINE HYDROCHLORIDE 25 MG: 50 INJECTION, SOLUTION INTRAMUSCULAR; INTRAVENOUS at 11:46

## 2024-01-23 RX ADMIN — MAGNESIUM SULFATE HEPTAHYDRATE 2 G: 40 INJECTION, SOLUTION INTRAVENOUS at 11:50

## 2024-01-23 RX ADMIN — IPRATROPIUM BROMIDE 2 SPRAY: 42 SPRAY, METERED NASAL at 21:57

## 2024-01-23 RX ADMIN — PROPRANOLOL HYDROCHLORIDE 40 MG: 40 TABLET ORAL at 21:56

## 2024-01-23 RX ADMIN — METHYLPREDNISOLONE SODIUM SUCCINATE 60 MG: 125 INJECTION INTRAMUSCULAR; INTRAVENOUS at 21:55

## 2024-01-23 RX ADMIN — BUTALBITAL, ACETAMINOPHEN AND CAFFEINE 1 TABLET: 325; 50; 40 TABLET ORAL at 16:20

## 2024-01-23 RX ADMIN — ASPIRIN 81 MG: 81 TABLET, COATED ORAL at 17:21

## 2024-01-23 NOTE — ED NOTES
To ed from home via PV    Pt dx RSV 2 weeks ago. Pt reports low O2 readings at home. Pt hx asthma
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
## 2024-01-23 NOTE — ED NOTES
Nursing report ED to floor  Khalida Gilliland  68 y.o.  female    HPI :   Chief Complaint   Patient presents with    Shortness of Breath    Cough    Headache       Admitting doctor:   Lani Rao MD    Admitting diagnosis:   The primary encounter diagnosis was Hypoxia. Diagnoses of RSV (respiratory syncytial virus infection), Status migrainosus, History of migraine headaches, History of diabetes mellitus, and History of hypertension were also pertinent to this visit.    Code status:   Current Code Status       Date Active Code Status Order ID Comments User Context       Prior            Allergies:   Contrast dye (echo or unknown ct/mr), Iodinated contrast media, Codeine, Methylprednisolone, Other, and Ciprofloxacin hcl    Isolation:   Contact    Intake and Output  No intake or output data in the 24 hours ending 01/23/24 1340    Weight:   There were no vitals filed for this visit.    Most recent vitals:   Vitals:    01/23/24 1156 01/23/24 1211 01/23/24 1212 01/23/24 1213   BP:       BP Location:       Patient Position:       Pulse: 80 82 80 77   Resp: 18      Temp:       TempSrc:       SpO2: 97% 91% 91% 92%       Active LDAs/IV Access:   Lines, Drains & Airways       Active LDAs       Name Placement date Placement time Site Days    Peripheral IV 01/21/24 1521 Left Antecubital 01/21/24  1521  Antecubital  1    Peripheral IV 01/23/24 1139 Posterior;Right Forearm 01/23/24  1139  Forearm  less than 1                    Labs (abnormal labs have a star):   Labs Reviewed   RESPIRATORY PANEL PCR W/ COVID-19 (SARS-COV-2), NP SWAB IN UTM/VTP, 2 HR TAT - Abnormal; Notable for the following components:       Result Value    RSV, PCR Detected (*)     All other components within normal limits    Narrative:     In the setting of a positive respiratory panel with a viral infection PLUS a negative procalcitonin without other underlying concern for bacterial infection, consider observing off antibiotics or discontinuation of  antibiotics and continue supportive care. If the respiratory panel is positive for atypical bacterial infection (Bordetella pertussis, Chlamydophila pneumoniae, or Mycoplasma pneumoniae), consider antibiotic de-escalation to target atypical bacterial infection.   COMPREHENSIVE METABOLIC PANEL - Abnormal; Notable for the following components:    Glucose 184 (*)     Creatinine 0.56 (*)     All other components within normal limits    Narrative:     GFR Normal >60  Chronic Kidney Disease <60  Kidney Failure <15     BNP (IN-HOUSE) - Normal    Narrative:     This assay is used as an aid in the diagnosis of individuals suspected of having heart failure. It can be used as an aid in the diagnosis of acute decompensated heart failure (ADHF) in patients presenting with signs and symptoms of ADHF to the emergency department (ED). In addition, NT-proBNP of <300 pg/mL indicates ADHF is not likely.    Age Range Result Interpretation  NT-proBNP Concentration (pg/mL:      <50             Positive            >450                   Gray                 300-450                    Negative             <300    50-75           Positive            >900                  Gray                300-900                  Negative            <300      >75             Positive            >1800                  Gray                300-1800                  Negative            <300   SINGLE HSTROPONIN T - Normal    Narrative:     High Sensitive Troponin T Reference Range:  <14.0 ng/L- Negative Female for AMI  <22.0 ng/L- Negative Male for AMI  >=14 - Abnormal Female indicating possible myocardial injury.  >=22 - Abnormal Male indicating possible myocardial injury.   Clinicians would have to utilize clinical acumen, EKG, Troponin, and serial changes to determine if it is an Acute Myocardial Infarction or myocardial injury due to an underlying chronic condition.        CBC WITH AUTO DIFFERENTIAL - Normal   PROCALCITONIN - Normal    Narrative:     As a  "Marker for Sepsis (Non-Neonates):    1. <0.5 ng/mL represents a low risk of severe sepsis and/or septic shock.  2. >2 ng/mL represents a high risk of severe sepsis and/or septic shock.    As a Marker for Lower Respiratory Tract Infections that require antibiotic therapy:    PCT on Admission    Antibiotic Therapy       6-12 Hrs later    >0.5                Strongly Recommended  >0.25 - <0.5        Recommended   0.1 - 0.25          Discouraged              Remeasure/reassess PCT  <0.1                Strongly Discouraged     Remeasure/reassess PCT    As 28 day mortality risk marker: \"Change in Procalcitonin Result\" (>80% or <=80%) if Day 0 (or Day 1) and Day 4 values are available. Refer to [email or web address]    Change in PCT <=80%  A decrease of PCT levels below or equal to 80% defines a positive change in PCT test result representing a higher risk for 28-day all-cause mortality of patients diagnosed with severe sepsis for septic shock.    Change in PCT >80%  A decrease of PCT levels of more than 80% defines a negative change in PCT result representing a lower risk for 28-day all-cause mortality of patients diagnosed with severe sepsis or septic shock.      CBC AND DIFFERENTIAL    Narrative:     The following orders were created for panel order CBC & Differential.  Procedure                               Abnormality         Status                     ---------                               -----------         ------                     CBC Auto Differential[[id number]]        Normal              Final result                 Please view results for these tests on the individual orders.       EKG:   ECG 12 Lead Dyspnea   Final Result   HEART RATE= 101  bpm   RR Interval= 594  ms   NM Interval= 129  ms   P Horizontal Axis= -1  deg   P Front Axis= 47  deg   QRSD Interval= 86  ms   QT Interval= 321  ms   QTcB= 416  ms   QRS Axis= 19  deg   T Wave Axis= 43  deg   - BORDERLINE ECG -   Sinus tachycardia   Low " voltage, precordial leads   Borderline T abnormalities, anterior leads   No change from previous tracing   Electronically Signed By: Sophy Cid ([facility]) 2024 12:45:54   Date and Time of Study: 2024 11:36:38          Meds given in ED:   Medications   sodium chloride 0.9 % flush 10 mL (has no administration in time range)   diphenhydrAMINE (BENADRYL) injection 25 mg (25 mg Intravenous Given 24 1146)   prochlorperazine (COMPAZINE) injection 5 mg (5 mg Intravenous Given 24 1145)   magnesium sulfate 2g/50 mL (PREMIX) infusion (2 g Intravenous New Bag 24 1150)   dexAMETHasone sodium phosphate injection 4 mg (4 mg Intravenous Given 24 1143)   albuterol (PROVENTIL) nebulizer solution 0.083% 2.5 mg/3mL (2.5 mg Nebulization Given 24 1156)   butalbital-acetaminophen-caffeine (FIORICET, ESGIC) -40 MG per tablet 1 tablet (1 tablet Oral Given 24 1310)       Imaging results:  XR Chest 1 View    Result Date: 2024  1. Mild right infrahilar and left basilar atelectasis and/or pneumonia similar to the 2024 study.   This report was finalized on 2024 12:25 PM by Dr. Mikael Lopez M.D on Workstation: [id number]      XR Chest 1 View    Result Date: 2024  1. Mild increased density in the right infrahilar region and left lung base may represent some mild acute pneumonia and/or atelectasis. The right infrahilar region has a slightly nodular appearance. 2. Follow-up films recommended.   This report was finalized on 2024 4:17 PM by Dr. Mikael Lopez M.D on Workstation: [id number]       Ambulatory status:   - Assist x1     Social issues:   Social History     Socioeconomic History    Marital status:      Spouse name: Roe    Number of children: 3    Years of education: College   Tobacco Use    Smoking status: Former     Years: 1     Types: Cigarettes     Quit date: 1974     Years since quittin.0     Passive exposure: Past    Smokeless tobacco: Never    Vaping Use    Vaping Use: Never used   Substance and Sexual Activity    Alcohol use: Not Currently    Drug use: No    Sexual activity: Never       NIH Stroke Scale:       Nancy Gonzalez RN  01/23/24 13:40 EST

## 2024-01-23 NOTE — H&P
Internal medicine history and physical  INTERNAL MEDICINE   [facility]       Patient Identification:  Name: Khalida Gilliland  Age: 68 y.o.  Sex: female  :  1955  MRN: [id number]                   Primary Care Physician: Sergo Owen MD                               Date of admission:2024    Chief Complaint: Came to the emergency room with persistent cough shortness of breath and decreased oxygen saturation during episodes of cough and worsening headaches and recurrence of migraine over the course of last couple of weeks.    History of Present Illness:   Patient is a 68-year-old female who has complicated past medical history including history of asthma, diabetes mellitus, hypertension, dyslipidemia and migraine headaches who was diagnosed about 3 weeks ago with RSV bronchitis and was treated in the hospital for hypoxia and respiratory failure due to RSV bronchitis.  Patient was treated with nebulizer treatment and steroids and was discharged and since then patient has had episodes of cough with hypoxia and progressive persistent migraine headaches.  Patient has been to various freestanding emergency room and has had follow-up with primary care provider for her symptoms.  According the patient when she coughs and gets those spells she feels like she cannot breathe and oxygen saturation dropped in 70s.  She is also having headaches on the left side that is throbbing and she cannot tolerate noise and light and these headaches get worse with those episodes of coughing.  Patient has had history of cerebral artery aneurysm and clipping in 2017.  Patient was recently started on Augmentin after her visit to the freestanding emergency room.  Workup in the emergency room revealed blood sugar of 184 creatinine of 0.56 and respiratory viral panel shows persistent positive RSV.  Her vital signs at the time of visit to the emergency room showed temperature 98.6 pulse 105 blood pressure 139/74  "oxygen saturation on room air was 98%.  Patient received migraine cocktail consisting of Benadryl Compazine magnesium sulfate dexamethasone and a nebulizer treatment with some relief of her symptoms.  Chest x-ray shows mild right infrahilar and left basilar atelectasis unchanged from this similar study on 2024.  Patient did have CT angiogram of the chest on 2024 that showed focal area of atelectasis or possible consolidation in the right middle lobe for which follow-up repeat CAT scan was recommended in 4 to 6 weeks.      Past Medical History:  Past Medical History:   Diagnosis Date    Abdominal pain, right lower quadrant     Anemia     Asthma     Brain aneurysm      and 2017    Cancer 2014    Basal Cell Carcinoma Left Arm,SKIN CANCER    COVID-19 2021    Cyst of left kidney     Diabetes mellitus     Tyoe 2 diabetic    Diverticulosis     Fatty liver     GERD (gastroesophageal reflux disease)     History of kidney stones 2011    History of surgery for cerebral aneurysm     Clipping of cerebral Aneurysm    Hx of migraines     Hyperlipidemia     Hypertension     Insomnia     Neck pain     Peptic ulceration     Pneumonia     PONV (postoperative nausea and vomiting)     Stroke     \"years ago\" TIA no after effects     Past Surgical History:  Past Surgical History:   Procedure Laterality Date    APPENDECTOMY      Emergency at time of 2nd     BASAL CELL CARCINOMA EXCISION Left 2014    Excision basal cell carcinoma, left arm (1.1 cm) with frozen section control and layered wound closure ( 3.1 cm), Dr. Isael Andrade, [facility]    BRAIN SURGERY      Ruptured aneurysm, clipped/Dr. Sims    CEREBRAL ANEURYSM REPAIR      clipping of cerebral aneurysm    CEREBRAL ANGIOGRAM N/A 2017    Procedure: CEREBRAL ANGIOGRAM ;  Surgeon: Orlando Bella MD;  Location: [facility] ;  Service:     CEREBRAL ANGIOGRAM N/A 10/11/2017    Procedure: CEREBRAL ANGIOGRAM;  Surgeon: Orlando LIN" MD Shayne;  Location: [facility] OR ;  Service:      SECTION  , ,     CHOLECYSTECTOMY      COLON RESECTION WITH COLOSTOMY Right     COLONOSCOPY  2009    COLONOSCOPY N/A 2020    Procedure: COLONOSCOPY to terminal ileum;  Surgeon: Luiza Norris MD;  Location: [facility] ENDOSCOPY;  Service: Gastroenterology;  Laterality: N/A;  PREOP/ SCREENING  POSTOP/ DIVERTICULOSIS. POOR PREP    COLONOSCOPY N/A 2023    Procedure: COLONOSCOPY TO CECUM WITH COLD BX POLYPECTOMIES AND COLD SNARE POLYPECTOMY;  Surgeon: Luiza Norris MD;  Location: [facility] ENDOSCOPY;  Service: Gastroenterology;  Laterality: N/A;  PRE OP - SCREENING  POST OP - COLON POLYPS, DIVERTICULOSIS, HEMORRHOIDS    EMBOLIZATION CEREBRAL N/A 2017    Procedure: Cerebral angiography and embolization of cerebral aneurysm with Pipeline Embolization Device;  Surgeon: Orlando Bella MD;  Location: [facility] OR ;  Service:     EMBOLIZATION CEREBRAL N/A 10/31/2018    Procedure: Cerebral angiogram with embolization of cerebral aneurysm;  Surgeon: Orlando Bella MD;  Location: [facility] OR ;  Service: Neurosurgery    ILEOSTOMY REVISION      INCISIONAL HERNIA REPAIR      Patient suffered some nerve entrapment secondary to the attack, some of which were removed during a secondary operation.    INGUINAL HERNIA REPAIR      LARYNX SURGERY      OSTOMY TAKE DOWN      TUBAL ABDOMINAL LIGATION      URETERAL STENT INSERTION  2011    Due to kidney stones      Home Meds:  (Not in a hospital admission)    Current Meds:     Current Facility-Administered Medications:     [COMPLETED] Insert Peripheral IV, , , Once **AND** sodium chloride 0.9 % flush 10 mL, 10 mL, Intravenous, PRN, Akila Cohen PA-C    Current Outpatient Medications:     albuterol sulfate  (90 Base) MCG/ACT inhaler, Inhale 2 puffs Every 4 (Four) Hours As Needed for Wheezing or Shortness of Air (Cough)., Disp: 6.7 g, Rfl: 9     amoxicillin-clavulanate (AUGMENTIN) 875-125 MG per tablet, Take 1 tablet by mouth 2 (Two) Times a Day for 7 days., Disp: 14 tablet, Rfl: 0    Ascorbic Acid (VITAMIN C PO), Take 1,000 mg by mouth Daily. PT INSTRUCTED TO CONTINUE PER DR. CALLUM URBINA, Disp: , Rfl:     aspirin 81 MG EC tablet, Take 1 tablet by mouth Daily., Disp: 30 tablet, Rfl: 6    atorvastatin (LIPITOR) 20 MG tablet, , Disp: , Rfl:     azelastine (ASTELIN) 0.1 % nasal spray, USE 1 SPRAY IN EACH NOSTRIL TWICE A DAY AS NEEDED FOR RHINITIS AS DIRECTED, Disp: 30 mL, Rfl: 6    benzonatate (Tessalon Perles) 100 MG capsule, Take 1 capsule by mouth 3 (Three) Times a Day As Needed for Cough., Disp: 60 capsule, Rfl: 0    Biotin 09722 MCG tablet, Take 1 tablet by mouth Every Evening., Disp: , Rfl:     budesonide-formoterol (SYMBICORT) 160-4.5 MCG/ACT inhaler, Inhale 2 puffs 2 (Two) Times a Day., Disp: , Rfl:     butalbital-acetaminophen-caffeine (FIORICET, ESGIC) -40 MG per tablet, Take 1 tablet by mouth 2 (Two) Times a Day As Needed for Headache., Disp: 30 tablet, Rfl: 0    cholecalciferol (VITAMIN D3) 1000 units tablet, Take 1 tablet by mouth Daily. PER PT TO CONTINUE PER DR. CALLUM URBINA, Disp: , Rfl:     Coenzyme Q10 (CO Q 10 PO), Take 100 mg by mouth Every Evening., Disp: , Rfl:     Cyanocobalamin (VITAMIN B 12 PO), Take 5,000 mcg by mouth Every Night., Disp: , Rfl:     cycloSPORINE (RESTASIS) 0.05 % ophthalmic emulsion, Administer 1 drop to both eyes 2 (Two) Times a Day., Disp: , Rfl:     diphenhydrAMINE-acetaminophen (TYLENOL PM)  MG tablet per tablet, Take 1 tablet by mouth At Night As Needed for Sleep., Disp: , Rfl:     doxycycline (MONODOX) 100 MG capsule, Take 1 capsule by mouth 2 (Two) Times a Day for 7 days., Disp: 14 capsule, Rfl: 0    fluocinonide (LIDEX) 0.05 % cream, , Disp: , Rfl:     fluticasone (FLONASE) 50 MCG/ACT nasal spray, USE 2 SPRAYS IN EACH NOSTRIL DAILY, Disp: 16 g, Rfl: 11    Ginkgo Biloba 40 MG tablet, Take 120 mg  by mouth Daily., Disp: , Rfl:     glipizide (glipiZIDE XL) 5 MG ER tablet, Take 1 tablet by mouth Daily., Disp: 90 tablet, Rfl: 3    glucosamine-chondroitin 500-400 MG capsule capsule, Take 1 capsule by mouth 2 (Two) Times a Day With Meals. PT TO CONTINUE PER DR. CALLUM URBINA, Disp: , Rfl:     glucose blood (FREESTYLE LITE) test strip, PT to use once daily, Disp: 100 each, Rfl: 12    Hydrocod Rudi-Chlorphe Rudi ER (TUSSIONEX PENNKINETIC) 10-8 MG/5ML ER suspension, Take 5 mL by mouth Every 12 (Twelve) Hours As Needed for Cough for up to 12 days., Disp: 115 mL, Rfl: 0    ipratropium (ATROVENT) 0.06 % nasal spray, 2 sprays 2 (Two) Times a Day., Disp: , Rfl:     ipratropium-albuterol (DUO-NEB) 0.5-2.5 mg/3 ml nebulizer, Take 3 mL by nebulization Every 2 (Two) Hours As Needed for Shortness of Air., Disp: 360 mL, Rfl: 0    Lancets (freestyle) lancets, Pt tests daily, Disp: 100 each, Rfl: 6    lisinopril (PRINIVIL,ZESTRIL) 10 MG tablet, Take 1 tablet by mouth Daily., Disp: 90 tablet, Rfl: 3    Misc Natural Products (CORTISOL PO), Take  by mouth., Disp: , Rfl:     montelukast (SINGULAIR) 10 MG tablet, Take 1 tablet by mouth Every Night., Disp: 90 tablet, Rfl: 3    multivitamin (THERAGRAN) tablet tablet, Take 1 tablet by mouth Daily., Disp: , Rfl:     NYSTATIN 316967 UNIT/GM topical powder, , Disp: , Rfl:     Omega-3 Fatty Acids (FISH OIL) 1000 MG capsule capsule, Take  by mouth Daily With Breakfast., Disp: , Rfl:     omeprazole (priLOSEC) 40 MG capsule, TAKE 1 CAPSULE EVERY MORNING, Disp: 90 capsule, Rfl: 3    Potassium Gluconate 550 MG tablet, Take  by mouth., Disp: , Rfl:     predniSONE (DELTASONE) 20 MG tablet, Take 1 tablet by mouth Daily With Breakfast for 3 days, THEN 0.5 tablets Daily With Breakfast for 3 days., Disp: 5 tablet, Rfl: 0    pregabalin (LYRICA) 100 MG capsule, TAKE 1 CAPSULE BY MOUTH THREE TIMES DAILY, Disp: 270 capsule, Rfl: 1    Probiotic Product (PROBIOTIC-10 PO), Take  by mouth., Disp: , Rfl:      promethazine-dextromethorphan (PROMETHAZINE-DM) 6.25-15 MG/5ML syrup, Take 5 mL by mouth 4 (Four) Times a Day As Needed for Cough. May cause drowsiness.  Do not drive., Disp: 118 mL, Rfl: 0    propranolol (INDERAL) 40 MG tablet, Take 1 tablet by mouth 2 (Two) Times a Day., Disp: 180 tablet, Rfl: 3    raNITIdine (ZANTAC) 75 MG tablet, Take 2 tablets by mouth 2 (Two) Times a Day., Disp: , Rfl:     Scopolamine 1 MG/3DAYS patch, Place 1 patch on the skin as directed by provider Every 72 (Seventy-Two) Hours., Disp: 4 each, Rfl: 0    Semaglutide (Rybelsus) 7 MG tablet, Take 1 mg by mouth Daily., Disp: 90 tablet, Rfl: 1    Spacer/Aero-Holding Chambers device, Use with all inhaled treatments, Disp: 1 each, Rfl: 0    traZODone (DESYREL) 50 MG tablet, Take 1 tablet by mouth Every Night., Disp: 90 tablet, Rfl: 1    zolpidem (Ambien) 5 MG tablet, Take 1 tablet by mouth At Night As Needed for Sleep., Disp: 30 tablet, Rfl: 0  Allergies:  Allergies   Allergen Reactions    Contrast Dye (Echo Or Unknown Ct/Mr) Shortness Of Breath    Iodinated Contrast Media Shortness Of Breath    Codeine Nausea Only    Methylprednisolone Anxiety    Other Other (See Comments)     Bamlanivimab infusion- muscle aches, joint pain, nausea    Ciprofloxacin Hcl Rash     Social History:   Social History     Tobacco Use    Smoking status: Former     Years: 1     Types: Cigarettes     Quit date:      Years since quittin.0     Passive exposure: Past    Smokeless tobacco: Never   Substance Use Topics    Alcohol use: Not Currently      Family History:  Family History   Problem Relation Age of Onset    Skin cancer Mother     Dementia Mother     KALYAN disease Mother     Heart disease Father     Heart attack Father     Hypertension Father     Aneurysm Son         cerebral    Nephrolithiasis Son     Suicide Attempts Brother     Malig Hyperthermia Neg Hx           Review of Systems  See history of present illness and past medical history.   As described in  history of presenting illness.  Patient denies any focal weakness of arm or legs or difficulty in thinking understanding expressing herself a loss of continence.      Vitals:   /74 (BP Location: Right arm, Patient Position: Sitting)   Pulse 77   Temp 98.6 °F (37 °C) (Tympanic)   Resp 18   LMP  (LMP Unknown)   SpO2 92%   I/O: No intake or output data in the 24 hours ending 01/23/24 1346  Exam:  Patient is examined using the personal protective equipment as per guidelines from infection control for this particular patient as enacted.  Hand washing was performed before and after patient interaction.  General Appearance:  Anxious female cannot tolerate noise and light   Head:    Normocephalic, without obvious abnormality, atraumatic   Eyes:    PERRL, conjunctiva/corneas clear, EOM's intact, both eyes   Ears:    Normal external ear canals, both ears   Nose:   Nares normal, septum midline, mucosa normal, no drainage    or sinus tenderness   Throat:   Lips, tongue, gums normal; oral mucosa pink and moist   Neck:   Supple, symmetrical, trachea midline, no adenopathy;     thyroid:  no enlargement/tenderness/nodules; no carotid    bruit or JVD   Back:     Symmetric, no curvature, ROM normal, no CVA tenderness   Lungs:   No obvious use of accessory muscles of breathing noted   Chest Wall:    No tenderness or deformity    Heart:  S1-S2 regular   Abdomen:   Soft nontender   Extremities:   Extremities normal, atraumatic, no cyanosis or edema   Pulses:   Pulses palpable in all extremities; symmetric all extremities   Skin:   Skin color normal, Skin is warm and dry,  no rashes or palpable lesions   Neurologic: Alert and oriented and grossly nonfocal examination       Data Review:      I reviewed the patient's new clinical results.  Results from last 7 days   Lab Units 01/23/24  1141 01/21/24  1524 01/18/24  1027   WBC 10*3/mm3 9.13 9.27 14.2*   HEMOGLOBIN g/dL 12.3 12.5 12.9   PLATELETS 10*3/mm3 289 298 285     Results  from last 7 days   Lab Units 01/23/24  1141 01/21/24  1524 01/18/24  1027   SODIUM mmol/L 136 134* 137   POTASSIUM mmol/L 4.1 4.8 4.7   CHLORIDE mmol/L 102 99 97   CO2 mmol/L 22.8 26.2 23   BUN mg/dL 13 12 14   CREATININE mg/dL 0.56* 0.69 0.63   CALCIUM mg/dL 9.4 9.8 9.3   GLUCOSE mg/dL 184* 275* 234*     Microbiology Results (last 10 days)       Procedure Component Value - Date/Time    Respiratory Panel PCR w/COVID-19(SARS-CoV-2) [facility]ONNE/CATRACHITO/[facility]I In-House, NP Swab in UTM/VTM, 2 HR TAT - Swab, Nasopharynx [[id number]]  (Abnormal) Collected: 01/23/24 1151    Lab Status: Final result Specimen: Swab from Nasopharynx Updated: 01/23/24 1302     ADENOVIRUS, PCR Not Detected     Coronavirus 229E Not Detected     Coronavirus HKU1 Not Detected     Coronavirus NL63 Not Detected     Coronavirus OC43 Not Detected     COVID19 Not Detected     Human Metapneumovirus Not Detected     Human Rhinovirus/Enterovirus Not Detected     Influenza A PCR Not Detected     Influenza B PCR Not Detected     Parainfluenza Virus 1 Not Detected     Parainfluenza Virus 2 Not Detected     Parainfluenza Virus 3 Not Detected     Parainfluenza Virus 4 Not Detected     RSV, PCR Detected     Bordetella pertussis pcr Not Detected     Bordetella parapertussis PCR Not Detected     Chlamydophila pneumoniae PCR Not Detected     Mycoplasma pneumo by PCR Not Detected    Narrative:      In the setting of a positive respiratory panel with a viral infection PLUS a negative procalcitonin without other underlying concern for bacterial infection, consider observing off antibiotics or discontinuation of antibiotics and continue supportive care. If the respiratory panel is positive for atypical bacterial infection (Bordetella pertussis, Chlamydophila pneumoniae, or Mycoplasma pneumoniae), consider antibiotic de-escalation to target atypical bacterial infection.    Blood Culture - Blood, Arm, Right [[id number]]  (Normal) Collected: 01/21/24 1704    Lab Status:  Preliminary result Specimen: Blood from Arm, Right Updated: 01/22/24 8523     Blood Culture No growth at 24 hours            Assessment:  Active Hospital Problems    Diagnosis  POA    **Hypoxia [R09.02]  Yes    RSV (respiratory syncytial virus infection) [B33.8]  Yes    Asthma exacerbation [J45.901]  Yes    GERD (gastroesophageal reflux disease) [K21.9]  Yes    Fatty liver [K76.0]  Yes    Essential hypertension [I10]  Yes    Type 2 diabetes mellitus with hyperglycemia, without long-term current use of insulin [E11.65]  Yes    Migraine without aura and without status migrainosus, not intractable [G43.009]  Yes    Hx of cerebral aneurysm repair [Z98.890, Z86.79]  Not Applicable    Monoclonal gammopathy [D47.2]  Yes       Medical decision making/care plan: See admitting orders  Persistent RSV bronchitis with exacerbation of asthma with episodes of cough and spells of hypoxia during the episodes of cough-plan is to continue with supportive care, oxygen supplementation as needed nebulizer treatment and continue her home regimen of Singulair and Symbicort.  May benefit from pulmonary evaluation.  Migraine headache-patient feels somewhat better after receiving migraine cocktail-continue with as needed Fioricet and avoidance of light and noise.  Because of her history of intracranial aneurysm clipping will get baseline CT scan of the head and monitor her migraine pattern.  May consider neurology evaluation if migraine headache is persistent.  Diabetes mellitus-continue with Accu-Cheks and sliding scale coverage and home regimen watch for hypoglycemia and check hemoglobin A1c.  Hypertension-continue antihypertensive regimen and avoid hypotensive episode.  History of monoclonal gammopathy of unknown significance-her persistent RSV positive testing either represent recurrent knee infection from the common source exposure or her inability to get rid of it due to possible evolving underlying immunodeficiency.  Plan is to  consider workup if her bronchitis and posttussive hypoxia persists.    Lani Rao MD   1/23/2024  13:46 EST    Parts of this note may be an electronic transcription/translation of spoken language to printed text using the Dragon dictation system.

## 2024-01-23 NOTE — OUTREACH NOTE
Medical Week 2 Survey      Flowsheet Row Responses   [facility] patient discharged from? [place]   Does the patient have one of the following disease processes/diagnoses(primary or secondary)? Other   Week 2 attempt successful? No   Unsuccessful attempts Attempt 1   Revoke Readmitted            Ijeoma DELEON - Registered Nurse

## 2024-01-23 NOTE — ED PROVIDER NOTES
Pt presents to the ED c/o several weeks of ongoing cough, headache, fatigue, intermittent hypoxia at home on room air into the 70s and 80s.  Has been seen and evaluated several times through the course of illness including 1 hospitalization.  Has been on several rounds of antibiotics and steroids.     On exam,   General: Appears uncomfortable, nontoxic, nondiaphoretic  HEENT: Mucous membranes moist, atraumatic, EOMI  Neck: Full ROM  Pulm: Symmetric chest rise, nonlabored, lungs slightly diminished bilaterally but no overtly obvious wheezing, rales, rhonchi  Cardiovascular: Regular rate and rhythm, intact distal pulses, no peripheral edema  GI: Soft, nontender, nondistended, no rebound, no guarding, bowel sounds present  MSK: Full ROM, no deformity  Skin: Warm, dry  Neuro: Awake, alert, oriented x 4, GCS 15, moving all extremities, no focal deficits  Psych: Calm, cooperative      I wore an N95 mask, eye protection, and gloves used during this encounter.     EKG - Per my independent interpretation at 1145:    EKG Time: 1136  Rhythm/Rate: Sinus tachycardia with rate of 101  Normal axis  Normal intervals  Borderline nonspecific T wave abnormalities  No STEMI       Increased rate as compared to January 7, 2024      Plan: Initial concern for ongoing or new viral infection, community-acquired pneumonia, dehydration, renal failure, electrolyte abnormalities, among others.  Plan for labs, supportive care, chest x-ray, viral panel, and reevaluation with results.    ED Course as of 01/23/24 1335   Tue Jan 23, 2024   1209 XR Chest 1 View  Per my independent interpretation of the chest x-ray, no evidence of pneumothorax, possible developing infiltrate in the right lower lung [DC]   1210 WBC: 9.13 [DC]   1210 Hemoglobin: 12.3 [DC]   1238 Glucose(!): 184 [DC]   1238 BUN: 13 [DC]   1238 Creatinine(!): 0.56 [DC]   1238 Sodium: 136 [DC]   1238 Potassium: 4.1 [DC]   1239 ALT (SGPT): 26 [DC]   1239 AST (SGOT): 19 [DC]   1239 Alkaline  Phosphatase: 59 [DC]   1239 Total Bilirubin: 0.3 [DC]   1239 HS Troponin T: 13 [DC]   1239 Procalcitonin: 0.03 [DC]   1239 proBNP: 125.0 [DC]   1304 RSV, PCR(!): Detected [DC]   1334 Discussed with Dr. Rao [facility], discussed patient's clinical course and findings today, treatment modalities, and need for hospitalization. [DC]      ED Course User Index  [DC] Jean Marie Mcnair MD       With new oxygen requirement, was in the mid upper 80s on room air and is now on supplemental oxygen.  This is a new requirement.  Patient showing persistent abnormal lung findings on imaging that are stable as compared to prior.  Labs are overall reassuring with no signs of leukocytosis or elevated procalcitonin.  Suspect ongoing viral process we will hold off on antibiotics for now.  Does have acute headache that was unresponsive to initial migraine cocktail.  Given the constellation of findings with the new oxygen requirement and status migrainosus, plan for hospitalization for further monitoring and management.  All questions and concerns addressed.     Diagnosis Plan   1. Hypoxia        2. RSV (respiratory syncytial virus infection)        3. Status migrainosus        4. History of migraine headaches        5. History of diabetes mellitus        6. History of hypertension          HOSPITALIZATION    Discussed treatment plan and reason for hospitalization with pt/family and hospitalizing physician.  Pt/family voiced understanding of the plan for hospitalization for further testing/treatment as needed.        MD Attestation Note    I supervised care provided by the midlevel provider.    The OMAIRA and I have discussed this patient's history, physical exam, and treatment plan. I have reviewed the documentation and personally had a face to face interaction with the patient  I affirm the documentation and agree with the treatment and plan.   My personal findings are documented in a separate note.                 Jean Marie Mcnair MD  01/23/24  1829

## 2024-01-24 ENCOUNTER — APPOINTMENT (OUTPATIENT)
Dept: CT IMAGING | Facility: HOSPITAL | Age: 69
DRG: 202 | End: 2024-01-24
Payer: MEDICARE

## 2024-01-24 LAB
ALBUMIN SERPL-MCNC: 3.5 G/DL (ref 3.5–5.2)
ALBUMIN/GLOB SERPL: 0.9 G/DL
ALP SERPL-CCNC: 60 U/L (ref 39–117)
ALT SERPL W P-5'-P-CCNC: 21 U/L (ref 1–33)
ANION GAP SERPL CALCULATED.3IONS-SCNC: 15.5 MMOL/L (ref 5–15)
AST SERPL-CCNC: 14 U/L (ref 1–32)
BASOPHILS # BLD AUTO: 0.01 10*3/MM3 (ref 0–0.2)
BASOPHILS NFR BLD AUTO: 0.1 % (ref 0–1.5)
BILIRUB SERPL-MCNC: 0.3 MG/DL (ref 0–1.2)
BILIRUB UR QL STRIP: NEGATIVE
BUN SERPL-MCNC: 18 MG/DL (ref 8–23)
BUN/CREAT SERPL: 28.6 (ref 7–25)
CALCIUM SPEC-SCNC: 9.3 MG/DL (ref 8.6–10.5)
CHLORIDE SERPL-SCNC: 98 MMOL/L (ref 98–107)
CLARITY UR: CLEAR
CO2 SERPL-SCNC: 21.5 MMOL/L (ref 22–29)
COLOR UR: YELLOW
CREAT SERPL-MCNC: 0.63 MG/DL (ref 0.57–1)
D-LACTATE SERPL-SCNC: 2 MMOL/L (ref 0.5–2)
D-LACTATE SERPL-SCNC: 2.7 MMOL/L (ref 0.5–2)
D-LACTATE SERPL-SCNC: 2.8 MMOL/L (ref 0.5–2)
D-LACTATE SERPL-SCNC: 2.9 MMOL/L (ref 0.5–2)
DEPRECATED RDW RBC AUTO: 40.4 FL (ref 37–54)
EGFRCR SERPLBLD CKD-EPI 2021: 96.8 ML/MIN/1.73
EOSINOPHIL # BLD AUTO: 0 10*3/MM3 (ref 0–0.4)
EOSINOPHIL NFR BLD AUTO: 0 % (ref 0.3–6.2)
ERYTHROCYTE [DISTWIDTH] IN BLOOD BY AUTOMATED COUNT: 12.5 % (ref 12.3–15.4)
GLOBULIN UR ELPH-MCNC: 3.9 GM/DL
GLUCOSE BLDC GLUCOMTR-MCNC: 310 MG/DL (ref 70–130)
GLUCOSE BLDC GLUCOMTR-MCNC: 331 MG/DL (ref 70–130)
GLUCOSE BLDC GLUCOMTR-MCNC: 345 MG/DL (ref 70–130)
GLUCOSE BLDC GLUCOMTR-MCNC: 358 MG/DL (ref 70–130)
GLUCOSE BLDC GLUCOMTR-MCNC: 401 MG/DL (ref 70–130)
GLUCOSE SERPL-MCNC: 393 MG/DL (ref 65–99)
GLUCOSE UR STRIP-MCNC: ABNORMAL MG/DL
HBA1C MFR BLD: 9.8 % (ref 4.8–5.6)
HCT VFR BLD AUTO: 37.4 % (ref 34–46.6)
HGB BLD-MCNC: 12.1 G/DL (ref 12–15.9)
HGB UR QL STRIP.AUTO: NEGATIVE
IMM GRANULOCYTES # BLD AUTO: 0.07 10*3/MM3 (ref 0–0.05)
IMM GRANULOCYTES NFR BLD AUTO: 0.9 % (ref 0–0.5)
KETONES UR QL STRIP: ABNORMAL
LEUKOCYTE ESTERASE UR QL STRIP.AUTO: NEGATIVE
LYMPHOCYTES # BLD AUTO: 0.86 10*3/MM3 (ref 0.7–3.1)
LYMPHOCYTES NFR BLD AUTO: 10.9 % (ref 19.6–45.3)
MCH RBC QN AUTO: 29 PG (ref 26.6–33)
MCHC RBC AUTO-ENTMCNC: 32.4 G/DL (ref 31.5–35.7)
MCV RBC AUTO: 89.7 FL (ref 79–97)
MONOCYTES # BLD AUTO: 0.39 10*3/MM3 (ref 0.1–0.9)
MONOCYTES NFR BLD AUTO: 5 % (ref 5–12)
MRSA DNA SPEC QL NAA+PROBE: NORMAL
NEUTROPHILS NFR BLD AUTO: 6.54 10*3/MM3 (ref 1.7–7)
NEUTROPHILS NFR BLD AUTO: 83.1 % (ref 42.7–76)
NITRITE UR QL STRIP: NEGATIVE
NRBC BLD AUTO-RTO: 0 /100 WBC (ref 0–0.2)
PH UR STRIP.AUTO: 7 [PH] (ref 5–8)
PLATELET # BLD AUTO: 352 10*3/MM3 (ref 140–450)
PMV BLD AUTO: 9.5 FL (ref 6–12)
POTASSIUM SERPL-SCNC: 4.3 MMOL/L (ref 3.5–5.2)
PROT SERPL-MCNC: 7.4 G/DL (ref 6–8.5)
PROT UR QL STRIP: NEGATIVE
RBC # BLD AUTO: 4.17 10*6/MM3 (ref 3.77–5.28)
SODIUM SERPL-SCNC: 135 MMOL/L (ref 136–145)
SP GR UR STRIP: >=1.03 (ref 1–1.03)
UROBILINOGEN UR QL STRIP: ABNORMAL
WBC NRBC COR # BLD AUTO: 7.87 10*3/MM3 (ref 3.4–10.8)

## 2024-01-24 PROCEDURE — 94761 N-INVAS EAR/PLS OXIMETRY MLT: CPT

## 2024-01-24 PROCEDURE — 25010000002 PROCHLORPERAZINE 10 MG/2ML SOLUTION: Performed by: INTERNAL MEDICINE

## 2024-01-24 PROCEDURE — 82010 KETONE BODYS QUAN: CPT | Performed by: HOSPITALIST

## 2024-01-24 PROCEDURE — G0378 HOSPITAL OBSERVATION PER HR: HCPCS

## 2024-01-24 PROCEDURE — 25010000002 METHYLPREDNISOLONE PER 125 MG: Performed by: INTERNAL MEDICINE

## 2024-01-24 PROCEDURE — 85025 COMPLETE CBC W/AUTO DIFF WBC: CPT | Performed by: INTERNAL MEDICINE

## 2024-01-24 PROCEDURE — 80053 COMPREHEN METABOLIC PANEL: CPT | Performed by: INTERNAL MEDICINE

## 2024-01-24 PROCEDURE — 81003 URINALYSIS AUTO W/O SCOPE: CPT | Performed by: HOSPITALIST

## 2024-01-24 PROCEDURE — 70450 CT HEAD/BRAIN W/O DYE: CPT

## 2024-01-24 PROCEDURE — 25810000003 LACTATED RINGERS PER 1000 ML: Performed by: HOSPITALIST

## 2024-01-24 PROCEDURE — 25010000002 CEFTRIAXONE PER 250 MG: Performed by: HOSPITALIST

## 2024-01-24 PROCEDURE — 63710000001 INSULIN GLARGINE PER 5 UNITS: Performed by: HOSPITALIST

## 2024-01-24 PROCEDURE — 87641 MR-STAPH DNA AMP PROBE: CPT | Performed by: HOSPITALIST

## 2024-01-24 PROCEDURE — 83036 HEMOGLOBIN GLYCOSYLATED A1C: CPT | Performed by: INTERNAL MEDICINE

## 2024-01-24 PROCEDURE — 83605 ASSAY OF LACTIC ACID: CPT | Performed by: INTERNAL MEDICINE

## 2024-01-24 PROCEDURE — 94799 UNLISTED PULMONARY SVC/PX: CPT

## 2024-01-24 PROCEDURE — 63710000001 INSULIN LISPRO (HUMAN) PER 5 UNITS: Performed by: HOSPITALIST

## 2024-01-24 PROCEDURE — 25010000002 ENOXAPARIN PER 10 MG: Performed by: INTERNAL MEDICINE

## 2024-01-24 PROCEDURE — 63710000001 INSULIN LISPRO (HUMAN) PER 5 UNITS: Performed by: INTERNAL MEDICINE

## 2024-01-24 PROCEDURE — 25010000002 ONDANSETRON PER 1 MG: Performed by: INTERNAL MEDICINE

## 2024-01-24 PROCEDURE — 25010000002 METHYLPREDNISOLONE PER 40 MG: Performed by: HOSPITALIST

## 2024-01-24 PROCEDURE — 94664 DEMO&/EVAL PT USE INHALER: CPT

## 2024-01-24 PROCEDURE — 87040 BLOOD CULTURE FOR BACTERIA: CPT | Performed by: HOSPITALIST

## 2024-01-24 PROCEDURE — 82948 REAGENT STRIP/BLOOD GLUCOSE: CPT

## 2024-01-24 RX ORDER — INSULIN LISPRO 100 [IU]/ML
6 INJECTION, SOLUTION INTRAVENOUS; SUBCUTANEOUS
Status: DISCONTINUED | OUTPATIENT
Start: 2024-01-24 | End: 2024-01-28 | Stop reason: HOSPADM

## 2024-01-24 RX ORDER — PREDNISONE 20 MG/1
40 TABLET ORAL
Status: DISCONTINUED | OUTPATIENT
Start: 2024-01-25 | End: 2024-01-27

## 2024-01-24 RX ORDER — METHYLPREDNISOLONE SODIUM SUCCINATE 40 MG/ML
40 INJECTION, POWDER, LYOPHILIZED, FOR SOLUTION INTRAMUSCULAR; INTRAVENOUS EVERY 4 HOURS
Status: COMPLETED | OUTPATIENT
Start: 2024-01-24 | End: 2024-01-25

## 2024-01-24 RX ORDER — DIPHENHYDRAMINE HYDROCHLORIDE 50 MG/ML
50 INJECTION INTRAMUSCULAR; INTRAVENOUS
Status: ACTIVE | OUTPATIENT
Start: 2024-01-24 | End: 2024-01-24

## 2024-01-24 RX ORDER — SODIUM CHLORIDE, SODIUM LACTATE, POTASSIUM CHLORIDE, CALCIUM CHLORIDE 600; 310; 30; 20 MG/100ML; MG/100ML; MG/100ML; MG/100ML
50 INJECTION, SOLUTION INTRAVENOUS CONTINUOUS
Status: DISCONTINUED | OUTPATIENT
Start: 2024-01-24 | End: 2024-01-25

## 2024-01-24 RX ORDER — PREDNISONE 20 MG/1
40 TABLET ORAL
Status: DISCONTINUED | OUTPATIENT
Start: 2024-01-24 | End: 2024-01-24

## 2024-01-24 RX ADMIN — CYCLOSPORINE 1 DROP: 0.5 EMULSION OPHTHALMIC at 08:15

## 2024-01-24 RX ADMIN — BUTALBITAL, ACETAMINOPHEN AND CAFFEINE 1 TABLET: 325; 50; 40 TABLET ORAL at 10:03

## 2024-01-24 RX ADMIN — BUTALBITAL, ACETAMINOPHEN AND CAFFEINE 1 TABLET: 325; 50; 40 TABLET ORAL at 06:22

## 2024-01-24 RX ADMIN — Medication 10 ML: at 08:16

## 2024-01-24 RX ADMIN — METHYLPREDNISOLONE SODIUM SUCCINATE 40 MG: 40 INJECTION, POWDER, LYOPHILIZED, FOR SOLUTION INTRAMUSCULAR; INTRAVENOUS at 21:35

## 2024-01-24 RX ADMIN — ALBUTEROL SULFATE 2.5 MG: 2.5 SOLUTION RESPIRATORY (INHALATION) at 06:55

## 2024-01-24 RX ADMIN — GLIPIZIDE 2.5 MG: 5 TABLET ORAL at 06:22

## 2024-01-24 RX ADMIN — ENOXAPARIN SODIUM 40 MG: 100 INJECTION SUBCUTANEOUS at 08:14

## 2024-01-24 RX ADMIN — PREGABALIN 100 MG: 100 CAPSULE ORAL at 08:15

## 2024-01-24 RX ADMIN — Medication 1 CAPSULE: at 08:15

## 2024-01-24 RX ADMIN — TRAZODONE HYDROCHLORIDE 50 MG: 50 TABLET ORAL at 21:35

## 2024-01-24 RX ADMIN — INSULIN LISPRO 8 UNITS: 100 INJECTION, SOLUTION INTRAVENOUS; SUBCUTANEOUS at 08:22

## 2024-01-24 RX ADMIN — FLUTICASONE PROPIONATE 2 SPRAY: 50 SPRAY, METERED NASAL at 08:22

## 2024-01-24 RX ADMIN — PROPRANOLOL HYDROCHLORIDE 40 MG: 40 TABLET ORAL at 21:35

## 2024-01-24 RX ADMIN — PANTOPRAZOLE SODIUM 40 MG: 40 TABLET, DELAYED RELEASE ORAL at 06:22

## 2024-01-24 RX ADMIN — ASPIRIN 81 MG: 81 TABLET, COATED ORAL at 08:15

## 2024-01-24 RX ADMIN — METHYLPREDNISOLONE SODIUM SUCCINATE 40 MG: 40 INJECTION, POWDER, LYOPHILIZED, FOR SOLUTION INTRAMUSCULAR; INTRAVENOUS at 11:19

## 2024-01-24 RX ADMIN — INSULIN LISPRO 7 UNITS: 100 INJECTION, SOLUTION INTRAVENOUS; SUBCUTANEOUS at 12:25

## 2024-01-24 RX ADMIN — LISINOPRIL 10 MG: 10 TABLET ORAL at 08:15

## 2024-01-24 RX ADMIN — BUTALBITAL, ACETAMINOPHEN AND CAFFEINE 1 TABLET: 325; 50; 40 TABLET ORAL at 15:09

## 2024-01-24 RX ADMIN — INSULIN GLARGINE 10 UNITS: 100 INJECTION, SOLUTION SUBCUTANEOUS at 12:25

## 2024-01-24 RX ADMIN — IPRATROPIUM BROMIDE 2 SPRAY: 42 SPRAY, METERED NASAL at 21:38

## 2024-01-24 RX ADMIN — ONDANSETRON 4 MG: 2 INJECTION INTRAMUSCULAR; INTRAVENOUS at 10:03

## 2024-01-24 RX ADMIN — CEFTRIAXONE 2000 MG: 2 INJECTION, POWDER, FOR SOLUTION INTRAMUSCULAR; INTRAVENOUS at 12:29

## 2024-01-24 RX ADMIN — MONTELUKAST SODIUM 10 MG: 10 TABLET, FILM COATED ORAL at 21:35

## 2024-01-24 RX ADMIN — SODIUM CHLORIDE, POTASSIUM CHLORIDE, SODIUM LACTATE AND CALCIUM CHLORIDE 100 ML/HR: 600; 310; 30; 20 INJECTION, SOLUTION INTRAVENOUS at 17:28

## 2024-01-24 RX ADMIN — METHYLPREDNISOLONE SODIUM SUCCINATE 40 MG: 40 INJECTION, POWDER, LYOPHILIZED, FOR SOLUTION INTRAMUSCULAR; INTRAVENOUS at 15:09

## 2024-01-24 RX ADMIN — IPRATROPIUM BROMIDE 2 SPRAY: 42 SPRAY, METERED NASAL at 08:16

## 2024-01-24 RX ADMIN — INSULIN LISPRO 9 UNITS: 100 INJECTION, SOLUTION INTRAVENOUS; SUBCUTANEOUS at 16:51

## 2024-01-24 RX ADMIN — SODIUM CHLORIDE, POTASSIUM CHLORIDE, SODIUM LACTATE AND CALCIUM CHLORIDE 100 ML/HR: 600; 310; 30; 20 INJECTION, SOLUTION INTRAVENOUS at 08:27

## 2024-01-24 RX ADMIN — Medication 1000 UNITS: at 08:15

## 2024-01-24 RX ADMIN — CYCLOSPORINE 1 DROP: 0.5 EMULSION OPHTHALMIC at 21:35

## 2024-01-24 RX ADMIN — INSULIN LISPRO 6 UNITS: 100 INJECTION, SOLUTION INTRAVENOUS; SUBCUTANEOUS at 16:52

## 2024-01-24 RX ADMIN — ZOLPIDEM TARTRATE 5 MG: 5 TABLET ORAL at 23:29

## 2024-01-24 RX ADMIN — GLIPIZIDE 2.5 MG: 5 TABLET ORAL at 16:50

## 2024-01-24 RX ADMIN — PREGABALIN 100 MG: 100 CAPSULE ORAL at 21:35

## 2024-01-24 RX ADMIN — PREGABALIN 100 MG: 100 CAPSULE ORAL at 15:09

## 2024-01-24 RX ADMIN — INSULIN LISPRO 7 UNITS: 100 INJECTION, SOLUTION INTRAVENOUS; SUBCUTANEOUS at 21:39

## 2024-01-24 RX ADMIN — PROCHLORPERAZINE EDISYLATE 5 MG: 5 INJECTION INTRAMUSCULAR; INTRAVENOUS at 17:26

## 2024-01-24 RX ADMIN — METHYLPREDNISOLONE SODIUM SUCCINATE 60 MG: 125 INJECTION INTRAMUSCULAR; INTRAVENOUS at 06:22

## 2024-01-24 RX ADMIN — Medication 1 TABLET: at 08:15

## 2024-01-24 RX ADMIN — Medication 10 ML: at 21:36

## 2024-01-24 RX ADMIN — PROPRANOLOL HYDROCHLORIDE 40 MG: 40 TABLET ORAL at 08:15

## 2024-01-24 RX ADMIN — ATORVASTATIN CALCIUM 20 MG: 20 TABLET, FILM COATED ORAL at 08:15

## 2024-01-24 NOTE — NURSING NOTE
"Diabetes Education  Assessment/Teaching    Patient Name:  Khalida Gilliland  YOB: 1955  MRN: [id number]  Admit Date:  1/23/2024      Assessment Date:  1/24/2024  Flowsheet Row Most Recent Value   General Information     Referral From: MD order. Meet with 67 y/o at bedside in obs unit.    Height 172.7 cm (68\")   Height Method Stated   Weight 99.1 kg (218 lb 7.6 oz)   Weight Method Bed scale   Diabetes History    What type of diabetes do you have? Type 2   Have you had high blood sugar? (>140mg/dl) Yes- recent A1c is 9.8%. pt describes being ill or on steroids since Gabino week.    Education Preferences    Barriers to Learning -- emotional. pt appeared upset at the mention of starting insulin (as an outpt rx.)   Nutrition Information Discuss.    Assessment Topics    Taking Medication - Assessment Needs education   Reducing Risk - Assessment -- Discuss. elevated a1c.   Healthy Coping - Assessment Competent   Monitoring - Assessment -- DIscuss.   DM Goals    Contact Plan Follow-up medical care with PCP.            Flowsheet Row Most Recent Value   DM Education Needs    Frequency of Testing AC meals -discuss starting ac meal BG checks at dc or frequency per provider.   Medication Insulin use [-Lantus]   Reducing Risks A1C testing, goal a1c   Healthy Coping Appropriate   Discharge Plan Home, Follow-up with PCP   Teaching Method Discussion   Patient Response Verbalized understanding  [-pt not ready for insulin teaching today.]        Other Comments:    Electronically signed by:  Aminata Pan RN  01/24/24 16:25 EST  "

## 2024-01-24 NOTE — PROGRESS NOTES
Name: Khalida Gilliland ADMIT: 2024   : 1955  PCP: Sergo Owen MD    MRN: [id number] LOS: 0 days   AGE/SEX: 68 y.o. female  ROOM: 125/1     Subjective   Subjective   Feels about the same. Headache down to a 5 from 10. Still with coughing.      Objective   Objective   Vital Signs  Temp:  [97.7 °F (36.5 °C)-98.8 °F (37.1 °C)] 98.4 °F (36.9 °C)  Heart Rate:  [] 73  Resp:  [16-20] 18  BP: (139-170)/(74-89) 154/74  SpO2:  [91 %-98 %] 96 %  on  Flow (L/min):  [2] 2;   Device (Oxygen Therapy): room air  Body mass index is 33.22 kg/m².    Physical Exam  Constitutional:       General: She is not in acute distress.     Appearance: She is obese. She is not ill-appearing or toxic-appearing.   HENT:      Head: Normocephalic and atraumatic.   Eyes:      Extraocular Movements: Extraocular movements intact.   Cardiovascular:      Rate and Rhythm: Normal rate and regular rhythm.   Pulmonary:      Effort: Pulmonary effort is normal. No respiratory distress.      Breath sounds: Normal breath sounds. No wheezing or rhonchi.   Abdominal:      General: Bowel sounds are normal.      Palpations: Abdomen is soft.      Tenderness: There is no abdominal tenderness. There is no guarding or rebound.   Musculoskeletal:         General: No swelling.      Right lower leg: No edema.      Left lower leg: No edema.   Skin:     General: Skin is warm and dry.   Neurological:      General: No focal deficit present.      Mental Status: She is alert and oriented to person, place, and time.   Psychiatric:         Mood and Affect: Mood normal.         Behavior: Behavior normal.     Results Review  I reviewed the patient's new clinical results.  Results from last 7 days   Lab Units 24  0633 24  1141 24  1524 24  1027   WBC 10*3/mm3 7.87 9.13 9.27 14.2*   HEMOGLOBIN g/dL 12.1 12.3 12.5 12.9   PLATELETS 10*3/mm3 352 289 298 285     Results from last 7 days   Lab Units 24  0633 24  1141  01/21/24  1524 01/18/24  1027   SODIUM mmol/L 135* 136 134* 137   POTASSIUM mmol/L 4.3 4.1 4.8 4.7   CHLORIDE mmol/L 98 102 99 97   CO2 mmol/L 21.5* 22.8 26.2 23   BUN mg/dL 18 13 12 14   CREATININE mg/dL 0.63 0.56* 0.69 0.63   GLUCOSE mg/dL 393* 184* 275* 234*     Lab Results   Component Value Date    ANIONGAP 15.5 (H) 01/24/2024     Estimated Creatinine Clearance: 105.2 mL/min (by C-G formula based on SCr of 0.63 mg/dL).   Lab Results   Component Value Date    EGFR 96.8 01/24/2024     Results from last 7 days   Lab Units 01/24/24  0633 01/23/24  1141 01/21/24  1524 01/18/24  1027   ALBUMIN g/dL 3.5 4.0 3.8 3.9   BILIRUBIN mg/dL 0.3 0.3 0.3 0.6   ALK PHOS U/L 60 59 61 62   AST (SGOT) U/L 14 19 18 18   ALT (SGPT) U/L 21 26 25 27     Results from last 7 days   Lab Units 01/24/24  0633 01/23/24  1141 01/21/24  1524 01/18/24  1027   CALCIUM mg/dL 9.3 9.4 9.8 9.3   ALBUMIN g/dL 3.5 4.0 3.8 3.9     Results from last 7 days   Lab Units 01/24/24  0633 01/23/24  1141   PROCALCITONIN ng/mL  --  0.03   LACTATE mmol/L 2.7*  --      Hemoglobin A1C   Date/Time Value Ref Range Status   01/24/2024 0633 9.80 (H) 4.80 - 5.60 % Final     Glucose   Date/Time Value Ref Range Status   01/24/2024 0736 358 (H) 70 - 130 mg/dL Final   01/24/2024 0632 345 (H) 70 - 130 mg/dL Final   01/23/2024 2126 282 (H) 70 - 130 mg/dL Final   01/23/2024 1624 288 (H) 70 - 130 mg/dL Final       CT Head Without Contrast    Result Date: 1/24/2024  1. Since prior head CT on 06/03/2017 there has been development of subtotal opacification of the maxillary sinuses with circumferential mucosal thickening and central air-fluid levels bilaterally and correlate this clinically in this patient with headaches.  2. The remainder the head CT is unchanged since 06/03/2017.  3. This patient has had a remote prior lateral right frontotemporal parietal-pterional craniotomy with multiple aneurysm clips in place along the margins of the juxta-clinoid segment of the  supracavernous right internal carotid artery and there is a pipeline stent extending from the cavernous segment into the supracavernous segment of the right internal carotid artery. There is minimal small vessel disease in the cerebral white matter. There are tiny subcentimeter areas of encephalomalacia in the anterolateral right frontal lobe and anterolateral tip of the right temporal lobe, likely postoperative areas of encephalomalacia, and the remainder of the head CT is normal. Specifically, no acute intracranial hemorrhage is seen.  Radiation dose reduction techniques were utilized, including automated exposure control and exposure modulation based on body size.   This report was finalized on 1/24/2024 10:08 AM by Dr. Song Mcneill M.D on Workstation: [id number]      XR Chest 1 View    Result Date: 1/23/2024  1. Mild right infrahilar and left basilar atelectasis and/or pneumonia similar to the 1/21/2024 study.   This report was finalized on 1/23/2024 12:25 PM by Dr. Mikael Lopez M.D on Workstation: [id number]       Scheduled Meds  aspirin, 81 mg, Oral, Daily  atorvastatin, 20 mg, Oral, Daily  budesonide-formoterol, 2 puff, Inhalation, BID - RT  cefTRIAXone, 2,000 mg, Intravenous, Q24H  cholecalciferol, 1,000 Units, Oral, Daily  cycloSPORINE, 1 drop, Both Eyes, BID  enoxaparin, 40 mg, Subcutaneous, Daily  fluticasone, 2 spray, Each Nare, Daily  glipizide, 2.5 mg, Oral, BID AC  insulin lispro, 2-9 Units, Subcutaneous, 4x Daily AC & at Bedtime  ipratropium, 2 spray, Each Nare, BID  lactobacillus acidophilus, 1 capsule, Oral, Daily  lisinopril, 10 mg, Oral, Daily  miconazole, 1 application , Topical, Q12H  montelukast, 10 mg, Oral, Nightly  multivitamin, 1 tablet, Oral, Daily  pantoprazole, 40 mg, Oral, Q AM  predniSONE, 40 mg, Oral, Daily With Breakfast  pregabalin, 100 mg, Oral, TID  propranolol, 40 mg, Oral, BID  Scopolamine, 1 patch, Transdermal, Q72H  senna-docusate sodium, 2 tablet, Oral, BID  sodium  chloride, 10 mL, Intravenous, Q12H  traZODone, 50 mg, Oral, Nightly    Continuous Infusions  lactated ringers, 100 mL/hr, Last Rate: 100 mL/hr (01/24/24 0827)    PRN Meds    acetaminophen **OR** acetaminophen **OR** acetaminophen    albuterol    benzonatate    senna-docusate sodium **AND** polyethylene glycol **AND** bisacodyl **AND** bisacodyl    butalbital-acetaminophen-caffeine    Calcium Replacement - Follow Nurse / BPA Driven Protocol    dextrose    dextrose    diphenhydrAMINE    glucagon (human recombinant)    Hydrocod Rudi-Chlorphe Rudi ER    ipratropium-albuterol    Magnesium Standard Dose Replacement - Follow Nurse / BPA Driven Protocol    nitroglycerin    ondansetron    Phosphorus Replacement - Follow Nurse / BPA Driven Protocol    Potassium Replacement - Follow Nurse / BPA Driven Protocol    prochlorperazine    promethazine-dextromethorphan    [COMPLETED] Insert Peripheral IV **AND** sodium chloride    sodium chloride    sodium chloride    zolpidem    lactated ringers, 100 mL/hr, Last Rate: 100 mL/hr (01/24/24 0827)    Diet  Diet: Cardiac Diets, Diabetic Diets, Renal Diets; Healthy Heart (2-3 Na+); Consistent Carbohydrate; Low Sodium (2-3g), Low Potassium, Low Phosphorus; Texture: Regular Texture (IDDSI 7); Fluid Consistency: Thin (IDDSI 0)    I have personally reviewed:  [x]  Medications  [x]  Laboratory   [x]  Microbiology   [x]  Radiology   [x]  EKG/Telemetry sinus  []  Cardiology/Vascular   []  Pathology   []  Records      Assessment/Plan     Active Hospital Problems    Diagnosis  POA    **Hypoxia [R09.02]  Yes    RSV (respiratory syncytial virus infection) [B33.8]  Yes    Asthma exacerbation [J45.901]  Yes    GERD (gastroesophageal reflux disease) [K21.9]  Yes    Fatty liver [K76.0]  Yes    Essential hypertension [I10]  Yes    Type 2 diabetes mellitus with hyperglycemia, without long-term current use of insulin [E11.65]  Yes    Migraine without aura and without status migrainosus, not intractable  [G43.009]  Yes    Hx of cerebral aneurysm repair [Z98.890, Z86.79]  Not Applicable    Monoclonal gammopathy [D47.2]  Yes      Resolved Hospital Problems   No resolved problems to display.     68 y.o. female recently diagnosed with RSV and also given Augmentin and doxycycline did not improve and return to the hospital with persistent symptoms (cough, headache)    RSV  Pneumonia  Asthma exacerbation  Continue steroids (plan to switch to PO tomorrow- see below re: solumedrol)  Add antibiotics treating secondary bacterial pneumonia  Continue respiratory treatments  Continue supportive care  Follow-up CT chest 4 to 6 weeks to ensure resolution    Migraine headache  She states headaches worsened d/t persistent coughing  Improved today but starting to come back  Similar to previous migraines however she has a history of cerebral aneurysm and has headache with coughing will check CTA head (premedicate with solumedrol)    HTN  BP acceptable acutely    Fatty liver  Body mass index is 33.22 kg/m².     DM 2  A1c 9.80%  Add long-acting insulin  Continue correctional insulin  Steroids worsening control  Diabetes educator    MGUS    DVT prophylaxis  Lovenox 40 mg SC daily    Discharge  Maybe tomorrow  Expected discharge date/ time has not been documented.    Discussed with patient and nursing staff    Parish Hernandez MD  [facility]  01/24/24

## 2024-01-24 NOTE — PLAN OF CARE
Goal Outcome Evaluation:  A&Ox4 on room air and awake most of the shift, with O2 sats 90% and above. No c/o shortness of breath. Up to BSC and ambulated to room ad pierce. VSS. Large BM.

## 2024-01-24 NOTE — PLAN OF CARE
Goal Outcome Evaluation:      67 yo female admitted 1/23 with hypoxia. Sats > 90 on RA throughout this shift. RSV positive. Complains of headache and generalized sick feeling. CT head done, CT angiogram pending to rule out aneurysm. Pt is allergic to contrast dye, on pre-treating protocol. Lactate elevated, IV fluids and antibiotics initiated. PRN fioricet for headaches. VS stable, A&OX4.

## 2024-01-24 NOTE — NURSING NOTE
Noted DM ed order per MD. Meet with 69 y/o at bedside in obs unit to attempt to start insulin teaching. Pt visibly upset upon mentioning starting insulin. Pls see note to follow for more details. Will f/u.

## 2024-01-24 NOTE — CASE MANAGEMENT/SOCIAL WORK
Discharge Planning Assessment  [facility]     Patient Name: Khalida Gilliland  MRN: [id number]  Today's Date: 1/24/2024    Admit Date: 1/23/2024    Plan: Home, family to transport   Discharge Needs Assessment       Row Name 01/24/24 1052       Living Environment    People in Home spouse;sibling(s)    Name(s) of People in Home Roe    Current Living Arrangements home    Potentially Unsafe Housing Conditions none    Primary Care Provided by self    Provides Primary Care For no one    Family Caregiver if Needed spouse;sibling(s)    Able to Return to Prior Arrangements yes       Resource/Environmental Concerns    Resource/Environmental Concerns none       Transition Planning    Patient/Family Anticipates Transition to home with family    Patient/Family Anticipated Services at Transition none    Transportation Anticipated family or friend will provide       Discharge Needs Assessment    Equipment Currently Used at Home nebulizer;pulse ox    Concerns to be Addressed discharge planning    Equipment Needed After Discharge none                   Discharge Plan       Row Name 01/24/24 1054       Plan    Plan Home, family to transport    Patient/Family in Agreement with Plan yes    Plan Comments CCP spoke with patient at bedside; explained role, verified facesheet, and discussed dc plan. Patient is independent for mobility at baseline. She has a nebulizer and a pulse ox at home. She lives with her spouse Roe ([phone]) and her brother in a 1 level home with no steps to enter. She reports no trouble with ambulation throughout her home. She is up ad pierce in the room. She has used HH in the past, but cannot remember what agency. She has no history of SNF. Patient denies any HH or DME needs and reports plan is home via family transport. RENE PARIKH                  Continued Care and Services - Admitted Since 1/23/2024    Coordination has not been started for this encounter.          Demographic Summary       Row Name  01/24/24 1051       General Information    Admission Type observation    Arrived From home    Referral Source admission list    Reason for Consult discharge planning    Preferred Language English                   Functional Status       Row Name 01/24/24 1052       Functional Status    Usual Activity Tolerance good    Current Activity Tolerance good       Functional Status, IADL    Medications independent    Meal Preparation independent    Housekeeping independent    Laundry independent    Shopping independent       Mental Status    General Appearance WDL WDL                   Psychosocial    No documentation.                  Abuse/Neglect    No documentation.                  Legal    No documentation.                  Substance Abuse    No documentation.                  Patient Forms    No documentation.                     RENE More

## 2024-01-25 ENCOUNTER — APPOINTMENT (OUTPATIENT)
Dept: CT IMAGING | Facility: HOSPITAL | Age: 69
DRG: 202 | End: 2024-01-25
Payer: MEDICARE

## 2024-01-25 LAB
ANION GAP SERPL CALCULATED.3IONS-SCNC: 11.4 MMOL/L (ref 5–15)
BUN SERPL-MCNC: 17 MG/DL (ref 8–23)
BUN/CREAT SERPL: 28.3 (ref 7–25)
CALCIUM SPEC-SCNC: 9.2 MG/DL (ref 8.6–10.5)
CHLORIDE SERPL-SCNC: 102 MMOL/L (ref 98–107)
CO2 SERPL-SCNC: 23.6 MMOL/L (ref 22–29)
CREAT SERPL-MCNC: 0.6 MG/DL (ref 0.57–1)
DEPRECATED RDW RBC AUTO: 40.6 FL (ref 37–54)
EGFRCR SERPLBLD CKD-EPI 2021: 97.9 ML/MIN/1.73
ERYTHROCYTE [DISTWIDTH] IN BLOOD BY AUTOMATED COUNT: 12.6 % (ref 12.3–15.4)
GLUCOSE BLDC GLUCOMTR-MCNC: 198 MG/DL (ref 70–130)
GLUCOSE BLDC GLUCOMTR-MCNC: 275 MG/DL (ref 70–130)
GLUCOSE BLDC GLUCOMTR-MCNC: 312 MG/DL (ref 70–130)
GLUCOSE BLDC GLUCOMTR-MCNC: 336 MG/DL (ref 70–130)
GLUCOSE SERPL-MCNC: 307 MG/DL (ref 65–99)
HCT VFR BLD AUTO: 36.4 % (ref 34–46.6)
HGB BLD-MCNC: 11.7 G/DL (ref 12–15.9)
MCH RBC QN AUTO: 29 PG (ref 26.6–33)
MCHC RBC AUTO-ENTMCNC: 32.1 G/DL (ref 31.5–35.7)
MCV RBC AUTO: 90.1 FL (ref 79–97)
PLATELET # BLD AUTO: 317 10*3/MM3 (ref 140–450)
PMV BLD AUTO: 9.7 FL (ref 6–12)
POTASSIUM SERPL-SCNC: 4.4 MMOL/L (ref 3.5–5.2)
RBC # BLD AUTO: 4.04 10*6/MM3 (ref 3.77–5.28)
SODIUM SERPL-SCNC: 137 MMOL/L (ref 136–145)
WBC NRBC COR # BLD AUTO: 8.96 10*3/MM3 (ref 3.4–10.8)

## 2024-01-25 PROCEDURE — 25010000002 CEFTRIAXONE PER 250 MG: Performed by: HOSPITALIST

## 2024-01-25 PROCEDURE — 70496 CT ANGIOGRAPHY HEAD: CPT

## 2024-01-25 PROCEDURE — 85027 COMPLETE CBC AUTOMATED: CPT | Performed by: HOSPITALIST

## 2024-01-25 PROCEDURE — 82948 REAGENT STRIP/BLOOD GLUCOSE: CPT

## 2024-01-25 PROCEDURE — 25010000002 ENOXAPARIN PER 10 MG: Performed by: INTERNAL MEDICINE

## 2024-01-25 PROCEDURE — 25010000002 DIPHENHYDRAMINE PER 50 MG: Performed by: NURSE PRACTITIONER

## 2024-01-25 PROCEDURE — 63710000001 INSULIN LISPRO (HUMAN) PER 5 UNITS: Performed by: INTERNAL MEDICINE

## 2024-01-25 PROCEDURE — 25010000002 METHYLPREDNISOLONE PER 40 MG: Performed by: HOSPITALIST

## 2024-01-25 PROCEDURE — 63710000001 PREDNISONE PER 1 MG: Performed by: HOSPITALIST

## 2024-01-25 PROCEDURE — 63710000001 INSULIN GLARGINE PER 5 UNITS: Performed by: HOSPITALIST

## 2024-01-25 PROCEDURE — 63710000001 INSULIN LISPRO (HUMAN) PER 5 UNITS: Performed by: HOSPITALIST

## 2024-01-25 PROCEDURE — 25810000003 LACTATED RINGERS PER 1000 ML: Performed by: HOSPITALIST

## 2024-01-25 PROCEDURE — 94799 UNLISTED PULMONARY SVC/PX: CPT

## 2024-01-25 PROCEDURE — 25010000002 DIPHENHYDRAMINE PER 50 MG: Performed by: INTERNAL MEDICINE

## 2024-01-25 PROCEDURE — 25510000001 IOPAMIDOL PER 1 ML: Performed by: HOSPITALIST

## 2024-01-25 PROCEDURE — 80048 BASIC METABOLIC PNL TOTAL CA: CPT | Performed by: HOSPITALIST

## 2024-01-25 RX ORDER — DIPHENHYDRAMINE HYDROCHLORIDE 50 MG/ML
50 INJECTION INTRAMUSCULAR; INTRAVENOUS ONCE
Status: COMPLETED | OUTPATIENT
Start: 2024-01-25 | End: 2024-01-25

## 2024-01-25 RX ORDER — INSULIN ASPART 100 [IU]/ML
INJECTION, SOLUTION INTRAVENOUS; SUBCUTANEOUS
Qty: 15 ML | Refills: 0 | Status: CANCELLED | OUTPATIENT
Start: 2024-01-25

## 2024-01-25 RX ORDER — ECHINACEA PURPUREA EXTRACT 125 MG
2 TABLET ORAL AS NEEDED
Status: DISCONTINUED | OUTPATIENT
Start: 2024-01-25 | End: 2024-01-28 | Stop reason: HOSPADM

## 2024-01-25 RX ADMIN — INSULIN LISPRO 8 UNITS: 100 INJECTION, SOLUTION INTRAVENOUS; SUBCUTANEOUS at 12:12

## 2024-01-25 RX ADMIN — BUTALBITAL, ACETAMINOPHEN AND CAFFEINE 1 TABLET: 325; 50; 40 TABLET ORAL at 22:09

## 2024-01-25 RX ADMIN — INSULIN LISPRO 6 UNITS: 100 INJECTION, SOLUTION INTRAVENOUS; SUBCUTANEOUS at 12:13

## 2024-01-25 RX ADMIN — INSULIN LISPRO 6 UNITS: 100 INJECTION, SOLUTION INTRAVENOUS; SUBCUTANEOUS at 18:00

## 2024-01-25 RX ADMIN — METHYLPREDNISOLONE SODIUM SUCCINATE 40 MG: 40 INJECTION, POWDER, LYOPHILIZED, FOR SOLUTION INTRAMUSCULAR; INTRAVENOUS at 06:30

## 2024-01-25 RX ADMIN — INSULIN GLARGINE 20 UNITS: 100 INJECTION, SOLUTION SUBCUTANEOUS at 09:07

## 2024-01-25 RX ADMIN — PROPRANOLOL HYDROCHLORIDE 40 MG: 40 TABLET ORAL at 20:57

## 2024-01-25 RX ADMIN — ZOLPIDEM TARTRATE 5 MG: 5 TABLET ORAL at 22:14

## 2024-01-25 RX ADMIN — Medication 1000 UNITS: at 08:50

## 2024-01-25 RX ADMIN — DOCUSATE SODIUM 50MG AND SENNOSIDES 8.6MG 2 TABLET: 8.6; 5 TABLET, FILM COATED ORAL at 09:31

## 2024-01-25 RX ADMIN — INSULIN LISPRO 7 UNITS: 100 INJECTION, SOLUTION INTRAVENOUS; SUBCUTANEOUS at 17:10

## 2024-01-25 RX ADMIN — BUTALBITAL, ACETAMINOPHEN AND CAFFEINE 1 TABLET: 325; 50; 40 TABLET ORAL at 13:06

## 2024-01-25 RX ADMIN — Medication 1 TABLET: at 08:46

## 2024-01-25 RX ADMIN — CEFTRIAXONE 2000 MG: 2 INJECTION, POWDER, FOR SOLUTION INTRAMUSCULAR; INTRAVENOUS at 12:12

## 2024-01-25 RX ADMIN — CYCLOSPORINE 1 DROP: 0.5 EMULSION OPHTHALMIC at 08:51

## 2024-01-25 RX ADMIN — PREGABALIN 100 MG: 100 CAPSULE ORAL at 20:49

## 2024-01-25 RX ADMIN — LISINOPRIL 10 MG: 10 TABLET ORAL at 08:47

## 2024-01-25 RX ADMIN — ENOXAPARIN SODIUM 40 MG: 100 INJECTION SUBCUTANEOUS at 09:23

## 2024-01-25 RX ADMIN — BUDESONIDE AND FORMOTEROL FUMARATE DIHYDRATE 2 PUFF: 160; 4.5 AEROSOL RESPIRATORY (INHALATION) at 21:12

## 2024-01-25 RX ADMIN — PREGABALIN 100 MG: 100 CAPSULE ORAL at 09:22

## 2024-01-25 RX ADMIN — PANTOPRAZOLE SODIUM 40 MG: 40 TABLET, DELAYED RELEASE ORAL at 06:30

## 2024-01-25 RX ADMIN — DOCUSATE SODIUM 50MG AND SENNOSIDES 8.6MG 2 TABLET: 8.6; 5 TABLET, FILM COATED ORAL at 20:49

## 2024-01-25 RX ADMIN — PROPRANOLOL HYDROCHLORIDE 40 MG: 40 TABLET ORAL at 09:22

## 2024-01-25 RX ADMIN — Medication 10 ML: at 20:53

## 2024-01-25 RX ADMIN — INSULIN LISPRO 2 UNITS: 100 INJECTION, SOLUTION INTRAVENOUS; SUBCUTANEOUS at 22:09

## 2024-01-25 RX ADMIN — TRAZODONE HYDROCHLORIDE 50 MG: 50 TABLET ORAL at 20:50

## 2024-01-25 RX ADMIN — SODIUM CHLORIDE, POTASSIUM CHLORIDE, SODIUM LACTATE AND CALCIUM CHLORIDE 100 ML/HR: 600; 310; 30; 20 INJECTION, SOLUTION INTRAVENOUS at 06:35

## 2024-01-25 RX ADMIN — DIPHENHYDRAMINE HYDROCHLORIDE 50 MG: 50 INJECTION, SOLUTION INTRAMUSCULAR; INTRAVENOUS at 06:30

## 2024-01-25 RX ADMIN — DIPHENHYDRAMINE HYDROCHLORIDE 25 MG: 50 INJECTION, SOLUTION INTRAMUSCULAR; INTRAVENOUS at 20:49

## 2024-01-25 RX ADMIN — IOPAMIDOL 95 ML: 755 INJECTION, SOLUTION INTRAVENOUS at 07:36

## 2024-01-25 RX ADMIN — METHYLPREDNISOLONE SODIUM SUCCINATE 40 MG: 40 INJECTION, POWDER, LYOPHILIZED, FOR SOLUTION INTRAMUSCULAR; INTRAVENOUS at 12:38

## 2024-01-25 RX ADMIN — ASPIRIN 81 MG: 81 TABLET, COATED ORAL at 09:22

## 2024-01-25 RX ADMIN — IPRATROPIUM BROMIDE 2 SPRAY: 42 SPRAY, METERED NASAL at 20:52

## 2024-01-25 RX ADMIN — PREGABALIN 100 MG: 100 CAPSULE ORAL at 17:10

## 2024-01-25 RX ADMIN — Medication 1 CAPSULE: at 09:24

## 2024-01-25 RX ADMIN — GLIPIZIDE 2.5 MG: 5 TABLET ORAL at 08:48

## 2024-01-25 RX ADMIN — PREDNISONE 40 MG: 20 TABLET ORAL at 09:25

## 2024-01-25 RX ADMIN — Medication 10 ML: at 09:26

## 2024-01-25 RX ADMIN — HYDROCODONE POLISTIREX AND CHLORPHENIRAMINE POLISTIREX 5 ML: 10; 8 SUSPENSION, EXTENDED RELEASE ORAL at 17:11

## 2024-01-25 RX ADMIN — GLIPIZIDE 2.5 MG: 5 TABLET ORAL at 17:10

## 2024-01-25 RX ADMIN — METHYLPREDNISOLONE SODIUM SUCCINATE 40 MG: 40 INJECTION, POWDER, LYOPHILIZED, FOR SOLUTION INTRAMUSCULAR; INTRAVENOUS at 03:18

## 2024-01-25 RX ADMIN — ATORVASTATIN CALCIUM 20 MG: 20 TABLET, FILM COATED ORAL at 09:23

## 2024-01-25 RX ADMIN — BUTALBITAL, ACETAMINOPHEN AND CAFFEINE 1 TABLET: 325; 50; 40 TABLET ORAL at 17:10

## 2024-01-25 RX ADMIN — INSULIN LISPRO 6 UNITS: 100 INJECTION, SOLUTION INTRAVENOUS; SUBCUTANEOUS at 09:24

## 2024-01-25 RX ADMIN — MONTELUKAST SODIUM 10 MG: 10 TABLET, FILM COATED ORAL at 20:50

## 2024-01-25 RX ADMIN — CYCLOSPORINE 1 DROP: 0.5 EMULSION OPHTHALMIC at 20:52

## 2024-01-25 RX ADMIN — INSULIN LISPRO 6 UNITS: 100 INJECTION, SOLUTION INTRAVENOUS; SUBCUTANEOUS at 09:05

## 2024-01-25 RX ADMIN — FLUTICASONE PROPIONATE 2 SPRAY: 50 SPRAY, METERED NASAL at 08:52

## 2024-01-25 NOTE — CASE MANAGEMENT/SOCIAL WORK
Continued Stay Note  [facility]     Patient Name: Khalida Gilliland  MRN: [id number]  Today's Date: 1/25/2024    Admit Date: 1/23/2024    Plan: Home, family to transport home   Discharge Plan       Row Name 01/25/24 1024       Plan    Plan Home, family to transport home    Plan Comments Met briefly with pt, confirmed that pt has no discharge needs. Pt to meet with diabetic educators prior to dc. Advised pt that CCP is available should any needs arise.                   Discharge Codes    No documentation.                 Expected Discharge Date and Time       Expected Discharge Date Expected Discharge Time    Jan 26, 2024               Nella Lloyd RN

## 2024-01-25 NOTE — NURSING NOTE
"Diabetes Education  Assessment/Teaching    Patient Name:  Khalida Gilliland  YOB: 1955  MRN: [id number]  Admit Date:  1/23/2024      Assessment Date:  1/25/2024  Flowsheet Row Most Recent Value   General Information     Referral From: MD order. F/u with 67 y/o at bedside on 4N to start insulin instructions.    Height 172 cm (67.72\")   Height Method Stated   Weight 95 kg (209 lb 7 oz)   Weight Method Bed scale   Diabetes History    What type of diabetes do you have? Type 2   Do you test your blood sugar at home? no   Have you had high blood sugar? (>140mg/dl) yes   Education Preferences    Barriers to Learning -- no learning barriers evident at this time.   Nutrition Information RD consult   Assessment Topics    Taking Medication - Assessment Needs education -anticipate pt to dc home on insulin while she is taking steroids.   Problem Solving - Assessment Needs education   Reducing Risk - Assessment -- Discuss. elevated a1c.   Healthy Coping - Assessment Competent -supportive dtr here.   Monitoring - Assessment -- Discuss/review.   DM Goals    Contact Plan Follow-up medical care            Flowsheet Row Most Recent Value   DM Education Needs    Meter Has own   Frequency of Testing AC meals -reinforce w/pt checking BGs tid to qid a.m. and ac meal.   Medication Lantus Insulin name, Administration, Pen use -instruct pt, dtr on Lantus name and action, and aspart/Novolog name and meal time scheduling and correct use of insulin pen.    Problem Solving Hypoglycemia, Signs   Reducing Risks A1C number   Healthy Coping Appropriate   Discharge Plan Home, Follow-up with MD   Motivation Engaged   Teaching Method Explanation, Discussion, Demonstration, Handouts, Teach back   Patient Response Verbalized understanding        Other Comments:    Electronically signed by:  Aminata Pan RN  01/25/24 15:33 EST  "

## 2024-01-25 NOTE — PLAN OF CARE
Goal Outcome Evaluation:  Plan of Care Reviewed With: patient        Progress: no change  Outcome Evaluation: Maintained on R/A, no c/o's of soa or dyspnea. Occ non prod cough, cough less frequent. IV Solu Medrol/Bendaryl for Contrast Dye Allergy in prep for CTA this am. Pt denies headache. Safety maintained.

## 2024-01-25 NOTE — PROGRESS NOTES
Name: Khalida Gillialnd ADMIT: 2024   : 1955  PCP: Sergo Owen MD    MRN: [id number] LOS: 0 days   AGE/SEX: 68 y.o. female  ROOM: [facility]     Subjective   Subjective   Overall feels the same may be a little better.     Objective   Objective   Vital Signs  Temp:  [97.5 °F (36.4 °C)-98.1 °F (36.7 °C)] 97.7 °F (36.5 °C)  Heart Rate:  [68-86] 73  Resp:  [18] 18  BP: (138-170)/(60-88) 170/88  SpO2:  [91 %-100 %] 100 %  on   ;   Device (Oxygen Therapy): room air  Body mass index is 32.11 kg/m².    Physical Exam  Constitutional:       General: She is not in acute distress.     Appearance: Normal appearance. She is not toxic-appearing.   HENT:      Head: Normocephalic and atraumatic.   Cardiovascular:      Rate and Rhythm: Normal rate and regular rhythm.   Pulmonary:      Effort: Pulmonary effort is normal. No respiratory distress.      Breath sounds: Normal breath sounds. No wheezing or rhonchi.   Abdominal:      General: Bowel sounds are normal.      Palpations: Abdomen is soft.      Tenderness: There is no abdominal tenderness. There is no guarding or rebound.   Musculoskeletal:         General: No swelling.      Right lower leg: No edema.      Left lower leg: No edema.   Skin:     General: Skin is warm and dry.   Neurological:      General: No focal deficit present.      Mental Status: She is alert and oriented to person, place, and time.   Psychiatric:         Mood and Affect: Mood normal.         Behavior: Behavior normal.     Results Review  I reviewed the patient's new clinical results.  Results from last 7 days   Lab Units 24  0310 24  0633 24  1141 24  1524   WBC 10*3/mm3 8.96 7.87 9.13 9.27   HEMOGLOBIN g/dL 11.7* 12.1 12.3 12.5   PLATELETS 10*3/mm3 317 352 289 298     Results from last 7 days   Lab Units 24  0310 24  0633 24  1141 24  1524   SODIUM mmol/L 137 135* 136 134*   POTASSIUM mmol/L 4.4 4.3 4.1 4.8   CHLORIDE mmol/L 102 98 102 99   CO2  mmol/L 23.6 21.5* 22.8 26.2   BUN mg/dL 17 18 13 12   CREATININE mg/dL 0.60 0.63 0.56* 0.69   GLUCOSE mg/dL 307* 393* 184* 275*     Lab Results   Component Value Date    ANIONGAP 11.4 01/25/2024     Estimated Creatinine Clearance: 107.5 mL/min (by C-G formula based on SCr of 0.6 mg/dL).   Lab Results   Component Value Date    EGFR 97.9 01/25/2024     Results from last 7 days   Lab Units 01/24/24  0633 01/23/24  1141 01/21/24  1524 01/18/24  1027   ALBUMIN g/dL 3.5 4.0 3.8 3.9   BILIRUBIN mg/dL 0.3 0.3 0.3 0.6   ALK PHOS U/L 60 59 61 62   AST (SGOT) U/L 14 19 18 18   ALT (SGPT) U/L 21 26 25 27     Results from last 7 days   Lab Units 01/25/24  0310 01/24/24  0633 01/23/24  1141 01/21/24  1524 01/18/24  1027   CALCIUM mg/dL 9.2 9.3 9.4 9.8 9.3   ALBUMIN g/dL  --  3.5 4.0 3.8 3.9     Results from last 7 days   Lab Units 01/24/24  1742 01/24/24  1419 01/24/24  1134 01/24/24  0633 01/23/24  1141   PROCALCITONIN ng/mL  --   --   --   --  0.03   LACTATE mmol/L 2.0 2.9* 2.8* 2.7*  --      Hemoglobin A1C   Date/Time Value Ref Range Status   01/24/2024 0633 9.80 (H) 4.80 - 5.60 % Final     Glucose   Date/Time Value Ref Range Status   01/25/2024 0616 275 (H) 70 - 130 mg/dL Final   01/24/2024 2102 310 (H) 70 - 130 mg/dL Final   01/24/2024 1635 401 (C) 70 - 130 mg/dL Final   01/24/2024 1145 331 (H) 70 - 130 mg/dL Final   01/24/2024 0736 358 (H) 70 - 130 mg/dL Final   01/24/2024 0632 345 (H) 70 - 130 mg/dL Final   01/23/2024 2126 282 (H) 70 - 130 mg/dL Final       CT Head Without Contrast    Result Date: 1/24/2024  1. Since prior head CT on 06/03/2017 there has been development of subtotal opacification of the maxillary sinuses with circumferential mucosal thickening and central air-fluid levels bilaterally and correlate this clinically in this patient with headaches.  2. The remainder the head CT is unchanged since 06/03/2017.  3. This patient has had a remote prior lateral right frontotemporal parietal-pterional craniotomy with  multiple aneurysm clips in place along the margins of the juxta-clinoid segment of the supracavernous right internal carotid artery and there is a pipeline stent extending from the cavernous segment into the supracavernous segment of the right internal carotid artery. There is minimal small vessel disease in the cerebral white matter. There are tiny subcentimeter areas of encephalomalacia in the anterolateral right frontal lobe and anterolateral tip of the right temporal lobe, likely postoperative areas of encephalomalacia, and the remainder of the head CT is normal. Specifically, no acute intracranial hemorrhage is seen.  Radiation dose reduction techniques were utilized, including automated exposure control and exposure modulation based on body size.   This report was finalized on 1/24/2024 10:08 AM by Dr. Song Mcneill M.D on Workstation: [id number]      XR Chest 1 View    Result Date: 1/23/2024  1. Mild right infrahilar and left basilar atelectasis and/or pneumonia similar to the 1/21/2024 study.   This report was finalized on 1/23/2024 12:25 PM by Dr. Mikael Lopez M.D on Workstation: [id number]       Scheduled Meds  aspirin, 81 mg, Oral, Daily  atorvastatin, 20 mg, Oral, Daily  budesonide-formoterol, 2 puff, Inhalation, BID - RT  cefTRIAXone, 2,000 mg, Intravenous, Q24H  cholecalciferol, 1,000 Units, Oral, Daily  cycloSPORINE, 1 drop, Both Eyes, BID  enoxaparin, 40 mg, Subcutaneous, Daily  fluticasone, 2 spray, Each Nare, Daily  glipizide, 2.5 mg, Oral, BID AC  insulin glargine, 20 Units, Subcutaneous, Daily  insulin lispro, 2-9 Units, Subcutaneous, 4x Daily AC & at Bedtime  insulin lispro, 6 Units, Subcutaneous, TID With Meals  ipratropium, 2 spray, Each Nare, BID  lactobacillus acidophilus, 1 capsule, Oral, Daily  lisinopril, 10 mg, Oral, Daily  methylPREDNISolone sodium succinate, 40 mg, Intravenous, Q4H  miconazole, 1 application , Topical, Q12H  montelukast, 10 mg, Oral, Nightly  multivitamin, 1 tablet,  Oral, Daily  pantoprazole, 40 mg, Oral, Q AM  predniSONE, 40 mg, Oral, Daily With Breakfast  pregabalin, 100 mg, Oral, TID  propranolol, 40 mg, Oral, BID  Scopolamine, 1 patch, Transdermal, Q72H  senna-docusate sodium, 2 tablet, Oral, BID  sodium chloride, 10 mL, Intravenous, Q12H  traZODone, 50 mg, Oral, Nightly    Continuous Infusions  lactated ringers, 100 mL/hr, Last Rate: 100 mL/hr (01/25/24 0635)    PRN Meds    acetaminophen **OR** acetaminophen **OR** acetaminophen    benzonatate    senna-docusate sodium **AND** polyethylene glycol **AND** bisacodyl **AND** bisacodyl    butalbital-acetaminophen-caffeine    Calcium Replacement - Follow Nurse / BPA Driven Protocol    dextrose    dextrose    diphenhydrAMINE    glucagon (human recombinant)    Hydrocod Rudi-Chlorphe Rudi ER    ipratropium-albuterol    Magnesium Standard Dose Replacement - Follow Nurse / BPA Driven Protocol    nitroglycerin    ondansetron    Phosphorus Replacement - Follow Nurse / BPA Driven Protocol    Potassium Replacement - Follow Nurse / BPA Driven Protocol    prochlorperazine    promethazine-dextromethorphan    [COMPLETED] Insert Peripheral IV **AND** sodium chloride    sodium chloride    sodium chloride    zolpidem    lactated ringers, 100 mL/hr, Last Rate: 100 mL/hr (01/25/24 0635)    Diet  Diet: Cardiac Diets, Diabetic Diets, Renal Diets; Healthy Heart (2-3 Na+); Consistent Carbohydrate; Low Sodium (2-3g), Low Potassium, Low Phosphorus; Texture: Regular Texture (IDDSI 7); Fluid Consistency: Thin (IDDSI 0)    I have personally reviewed:  [x]  Medications  [x]  Laboratory   [x]  Microbiology   [x]  Radiology   [x]  EKG/Telemetry sinus on telemetry  []  Cardiology/Vascular   []  Pathology   []  Records      Assessment/Plan     Active Hospital Problems    Diagnosis  POA    **Hypoxia [R09.02]  Yes    RSV (respiratory syncytial virus infection) [B33.8]  Yes    Asthma exacerbation [J45.901]  Yes    GERD (gastroesophageal reflux disease) [K21.9]   Yes    Fatty liver [K76.0]  Yes    Essential hypertension [I10]  Yes    Type 2 diabetes mellitus with hyperglycemia, without long-term current use of insulin [E11.65]  Yes    Migraine without aura and without status migrainosus, not intractable [G43.009]  Yes    Hx of cerebral aneurysm repair [Z98.890, Z86.79]  Not Applicable    Monoclonal gammopathy [D47.2]  Yes      Resolved Hospital Problems   No resolved problems to display.     68 y.o. female recently diagnosed with RSV and also given Augmentin and doxycycline did not improve and return to the hospital with persistent symptoms (cough, headache)     RSV  Pneumonia  Asthma exacerbation  Sinusitis on CTA  Lactate elevation resolved. WBC normal. Procalcitonin not elevated.  Steroids switched to p.o.  Continue antibiotics treating secondary bacterial pneumonia and sinusitis, probably switch to Augmentin at discharge  Continue respiratory treatments  Continue supportive care  Follow-up CT chest 4 to 6 weeks to ensure resolution     Migraine headache  She states headaches worsened d/t persistent coughing  Sinusitis probably contributing  Discussed with radiology CTA no aneurysm     HTN  BP acceptable acutely     Fatty liver  Body mass index is 33.22 kg/m².      DM 2  A1c 9.80%  Add long-acting insulin  Continue correctional insulin  Steroids worsening control but switching to p.o. should help  She states she has been on steroids a lot recently     MGUS    DVT prophylaxis  Lovenox 40 mg SC daily    Discharge  May be tomorrow  Expected Discharge Date: 1/25/2024; Expected Discharge Time:     Discussed with patient and nursing staff and Dr. Mcneill and diabetic nurse educator    Parish Hernandez MD  [facility]  01/25/24

## 2024-01-26 ENCOUNTER — TELEPHONE (OUTPATIENT)
Dept: FAMILY MEDICINE CLINIC | Facility: CLINIC | Age: 69
End: 2024-01-26

## 2024-01-26 LAB
ANION GAP SERPL CALCULATED.3IONS-SCNC: 10.6 MMOL/L (ref 5–15)
BACTERIA SPEC AEROBE CULT: NORMAL
BUN SERPL-MCNC: 17 MG/DL (ref 8–23)
BUN/CREAT SERPL: 27.9 (ref 7–25)
CALCIUM SPEC-SCNC: 9.7 MG/DL (ref 8.6–10.5)
CHLORIDE SERPL-SCNC: 101 MMOL/L (ref 98–107)
CO2 SERPL-SCNC: 26.4 MMOL/L (ref 22–29)
CREAT SERPL-MCNC: 0.61 MG/DL (ref 0.57–1)
DEPRECATED RDW RBC AUTO: 43.5 FL (ref 37–54)
EGFRCR SERPLBLD CKD-EPI 2021: 97.5 ML/MIN/1.73
ERYTHROCYTE [DISTWIDTH] IN BLOOD BY AUTOMATED COUNT: 12.9 % (ref 12.3–15.4)
GLUCOSE BLDC GLUCOMTR-MCNC: 175 MG/DL (ref 70–130)
GLUCOSE BLDC GLUCOMTR-MCNC: 196 MG/DL (ref 70–130)
GLUCOSE BLDC GLUCOMTR-MCNC: 267 MG/DL (ref 70–130)
GLUCOSE BLDC GLUCOMTR-MCNC: 303 MG/DL (ref 70–130)
GLUCOSE SERPL-MCNC: 246 MG/DL (ref 65–99)
HCT VFR BLD AUTO: 35.5 % (ref 34–46.6)
HGB BLD-MCNC: 11.6 G/DL (ref 12–15.9)
MCH RBC QN AUTO: 30.1 PG (ref 26.6–33)
MCHC RBC AUTO-ENTMCNC: 32.7 G/DL (ref 31.5–35.7)
MCV RBC AUTO: 92.2 FL (ref 79–97)
PLATELET # BLD AUTO: 278 10*3/MM3 (ref 140–450)
PMV BLD AUTO: 9.8 FL (ref 6–12)
POTASSIUM SERPL-SCNC: 4 MMOL/L (ref 3.5–5.2)
RBC # BLD AUTO: 3.85 10*6/MM3 (ref 3.77–5.28)
SODIUM SERPL-SCNC: 138 MMOL/L (ref 136–145)
WBC NRBC COR # BLD AUTO: 8.19 10*3/MM3 (ref 3.4–10.8)

## 2024-01-26 PROCEDURE — 63710000001 PREDNISONE PER 1 MG: Performed by: HOSPITALIST

## 2024-01-26 PROCEDURE — 94664 DEMO&/EVAL PT USE INHALER: CPT

## 2024-01-26 PROCEDURE — 25010000002 CEFTRIAXONE PER 250 MG: Performed by: HOSPITALIST

## 2024-01-26 PROCEDURE — 80048 BASIC METABOLIC PNL TOTAL CA: CPT | Performed by: HOSPITALIST

## 2024-01-26 PROCEDURE — 85027 COMPLETE CBC AUTOMATED: CPT | Performed by: HOSPITALIST

## 2024-01-26 PROCEDURE — 63710000001 INSULIN LISPRO (HUMAN) PER 5 UNITS: Performed by: INTERNAL MEDICINE

## 2024-01-26 PROCEDURE — 63710000001 INSULIN GLARGINE PER 5 UNITS: Performed by: HOSPITALIST

## 2024-01-26 PROCEDURE — 25010000002 ENOXAPARIN PER 10 MG: Performed by: INTERNAL MEDICINE

## 2024-01-26 PROCEDURE — 94799 UNLISTED PULMONARY SVC/PX: CPT

## 2024-01-26 PROCEDURE — 82948 REAGENT STRIP/BLOOD GLUCOSE: CPT

## 2024-01-26 PROCEDURE — 94761 N-INVAS EAR/PLS OXIMETRY MLT: CPT

## 2024-01-26 PROCEDURE — 63710000001 INSULIN LISPRO (HUMAN) PER 5 UNITS: Performed by: HOSPITALIST

## 2024-01-26 RX ORDER — HYDROCODONE BITARTRATE AND ACETAMINOPHEN 5; 325 MG/1; MG/1
1 TABLET ORAL EVERY 6 HOURS PRN
Status: DISCONTINUED | OUTPATIENT
Start: 2024-01-26 | End: 2024-01-28 | Stop reason: HOSPADM

## 2024-01-26 RX ADMIN — BUTALBITAL, ACETAMINOPHEN AND CAFFEINE 1 TABLET: 325; 50; 40 TABLET ORAL at 19:51

## 2024-01-26 RX ADMIN — CYCLOSPORINE 1 DROP: 0.5 EMULSION OPHTHALMIC at 08:53

## 2024-01-26 RX ADMIN — IPRATROPIUM BROMIDE AND ALBUTEROL SULFATE 3 ML: 2.5; .5 SOLUTION RESPIRATORY (INHALATION) at 03:12

## 2024-01-26 RX ADMIN — BUDESONIDE AND FORMOTEROL FUMARATE DIHYDRATE 2 PUFF: 160; 4.5 AEROSOL RESPIRATORY (INHALATION) at 07:31

## 2024-01-26 RX ADMIN — MONTELUKAST SODIUM 10 MG: 10 TABLET, FILM COATED ORAL at 19:52

## 2024-01-26 RX ADMIN — BUTALBITAL, ACETAMINOPHEN AND CAFFEINE 1 TABLET: 325; 50; 40 TABLET ORAL at 05:35

## 2024-01-26 RX ADMIN — ATORVASTATIN CALCIUM 20 MG: 20 TABLET, FILM COATED ORAL at 08:52

## 2024-01-26 RX ADMIN — SCOPALAMINE 1 PATCH: 1 PATCH, EXTENDED RELEASE TRANSDERMAL at 15:45

## 2024-01-26 RX ADMIN — BENZONATATE 100 MG: 100 CAPSULE ORAL at 19:59

## 2024-01-26 RX ADMIN — PREDNISONE 40 MG: 20 TABLET ORAL at 08:50

## 2024-01-26 RX ADMIN — INSULIN GLARGINE 20 UNITS: 100 INJECTION, SOLUTION SUBCUTANEOUS at 08:56

## 2024-01-26 RX ADMIN — INSULIN LISPRO 6 UNITS: 100 INJECTION, SOLUTION INTRAVENOUS; SUBCUTANEOUS at 12:07

## 2024-01-26 RX ADMIN — Medication 1 CAPSULE: at 08:51

## 2024-01-26 RX ADMIN — PROPRANOLOL HYDROCHLORIDE 40 MG: 40 TABLET ORAL at 08:54

## 2024-01-26 RX ADMIN — INSULIN LISPRO 6 UNITS: 100 INJECTION, SOLUTION INTRAVENOUS; SUBCUTANEOUS at 17:26

## 2024-01-26 RX ADMIN — Medication 1000 UNITS: at 08:54

## 2024-01-26 RX ADMIN — BUTALBITAL, ACETAMINOPHEN AND CAFFEINE 1 TABLET: 325; 50; 40 TABLET ORAL at 12:06

## 2024-01-26 RX ADMIN — INSULIN LISPRO 2 UNITS: 100 INJECTION, SOLUTION INTRAVENOUS; SUBCUTANEOUS at 12:07

## 2024-01-26 RX ADMIN — ENOXAPARIN SODIUM 40 MG: 100 INJECTION SUBCUTANEOUS at 08:54

## 2024-01-26 RX ADMIN — TRAZODONE HYDROCHLORIDE 50 MG: 50 TABLET ORAL at 19:52

## 2024-01-26 RX ADMIN — DOCUSATE SODIUM 50MG AND SENNOSIDES 8.6MG 2 TABLET: 8.6; 5 TABLET, FILM COATED ORAL at 08:52

## 2024-01-26 RX ADMIN — BUDESONIDE AND FORMOTEROL FUMARATE DIHYDRATE 2 PUFF: 160; 4.5 AEROSOL RESPIRATORY (INHALATION) at 19:57

## 2024-01-26 RX ADMIN — INSULIN LISPRO 6 UNITS: 100 INJECTION, SOLUTION INTRAVENOUS; SUBCUTANEOUS at 08:56

## 2024-01-26 RX ADMIN — GLIPIZIDE 2.5 MG: 5 TABLET ORAL at 17:25

## 2024-01-26 RX ADMIN — ASPIRIN 81 MG: 81 TABLET, COATED ORAL at 08:52

## 2024-01-26 RX ADMIN — HYDROCODONE POLISTIREX AND CHLORPHENIRAMINE POLISTIREX 5 ML: 10; 8 SUSPENSION, EXTENDED RELEASE ORAL at 12:08

## 2024-01-26 RX ADMIN — DOCUSATE SODIUM 50MG AND SENNOSIDES 8.6MG 2 TABLET: 8.6; 5 TABLET, FILM COATED ORAL at 19:52

## 2024-01-26 RX ADMIN — IPRATROPIUM BROMIDE AND ALBUTEROL SULFATE 3 ML: 2.5; .5 SOLUTION RESPIRATORY (INHALATION) at 07:44

## 2024-01-26 RX ADMIN — IPRATROPIUM BROMIDE 2 SPRAY: 42 SPRAY, METERED NASAL at 19:53

## 2024-01-26 RX ADMIN — INSULIN LISPRO 6 UNITS: 100 INJECTION, SOLUTION INTRAVENOUS; SUBCUTANEOUS at 17:25

## 2024-01-26 RX ADMIN — LISINOPRIL 10 MG: 10 TABLET ORAL at 08:53

## 2024-01-26 RX ADMIN — PANTOPRAZOLE SODIUM 40 MG: 40 TABLET, DELAYED RELEASE ORAL at 05:34

## 2024-01-26 RX ADMIN — PREGABALIN 100 MG: 100 CAPSULE ORAL at 08:54

## 2024-01-26 RX ADMIN — GLIPIZIDE 2.5 MG: 5 TABLET ORAL at 08:51

## 2024-01-26 RX ADMIN — INSULIN LISPRO 4 UNITS: 100 INJECTION, SOLUTION INTRAVENOUS; SUBCUTANEOUS at 08:55

## 2024-01-26 RX ADMIN — CEFTRIAXONE 2000 MG: 2 INJECTION, POWDER, FOR SOLUTION INTRAMUSCULAR; INTRAVENOUS at 12:08

## 2024-01-26 RX ADMIN — Medication 1 TABLET: at 08:51

## 2024-01-26 RX ADMIN — CYCLOSPORINE 1 DROP: 0.5 EMULSION OPHTHALMIC at 19:53

## 2024-01-26 RX ADMIN — ZOLPIDEM TARTRATE 5 MG: 5 TABLET ORAL at 19:51

## 2024-01-26 RX ADMIN — Medication 10 ML: at 12:22

## 2024-01-26 RX ADMIN — FLUTICASONE PROPIONATE 2 SPRAY: 50 SPRAY, METERED NASAL at 08:57

## 2024-01-26 RX ADMIN — Medication 10 ML: at 19:55

## 2024-01-26 RX ADMIN — INSULIN LISPRO 7 UNITS: 100 INJECTION, SOLUTION INTRAVENOUS; SUBCUTANEOUS at 20:08

## 2024-01-26 RX ADMIN — PREGABALIN 100 MG: 100 CAPSULE ORAL at 15:45

## 2024-01-26 RX ADMIN — PREGABALIN 100 MG: 100 CAPSULE ORAL at 19:52

## 2024-01-26 RX ADMIN — HYDROCODONE BITARTRATE AND ACETAMINOPHEN 1 TABLET: 5; 325 TABLET ORAL at 13:37

## 2024-01-26 NOTE — PLAN OF CARE
Goal Outcome Evaluation:  Plan of Care Reviewed With: patient        Progress: improving  Outcome Evaluation: Pt remains on RA. Cough continues. Ambulating in room. PRN breathing tx given per pt request. Fioricet given for HA. VSS

## 2024-01-26 NOTE — PLAN OF CARE
Goal Outcome Evaluation:      Pt A&O times 4  Hot red rash on pt  right arm on assessment @0800  Red rash on right arm cleared up @1200  Pt was crying and stated that she had a headache pain that was a 11 out of 10, pt was given hydrocodone for headache   Pt had a non productive dry cough on assessment, pt was given codeine for cough   Pulmonary consult ordered  Neurology consult ordered   Safety maintained

## 2024-01-26 NOTE — CONSULTS
"CONSULT NOTE    Patient Identification:  Khalida Gilliland  68 y.o.  female  1955  [id number]            Requesting physician: Hospitalist    Reason for Consultation: Asthma exacerbation RSV infection pneumonia    CC:     History of Present Illness:  68-year-old female known history of asthma recently discharged from the hospital after being treated for asthma exacerbation RSV infection.  Patient now readmitted with cough shortness of breath chest tightness.  She tells me that she was using her Symbicort on an as-needed basis.  Currently she has improved but has residual cough and shortness of breath.  No purulent sputum fever chills or aspiration was reported.      Review of Systems  As above rest is negative  Past Medical History:  Past Medical History:   Diagnosis Date    Abdominal pain, right lower quadrant     Anemia     Asthma     Brain aneurysm      and 2017    Cancer 2014    Basal Cell Carcinoma Left Arm,SKIN CANCER    COVID-19 2021    Cyst of left kidney     Diabetes mellitus     Tyoe 2 diabetic    Diverticulosis     Fatty liver     GERD (gastroesophageal reflux disease)     History of kidney stones 2011    History of surgery for cerebral aneurysm     Clipping of cerebral Aneurysm    Hx of migraines     Hyperlipidemia     Hypertension     Insomnia     Neck pain     Peptic ulceration     Pneumonia     PONV (postoperative nausea and vomiting)     Stroke     \"years ago\" TIA no after effects       Past Surgical History:  Past Surgical History:   Procedure Laterality Date    APPENDECTOMY      Emergency at time of 2nd     BASAL CELL CARCINOMA EXCISION Left 2014    Excision basal cell carcinoma, left arm (1.1 cm) with frozen section control and layered wound closure ( 3.1 cm), Dr. Isael Andrade, [facility]    BRAIN SURGERY  2004    Ruptured aneurysm, clipped/Dr. Sims    CEREBRAL ANEURYSM REPAIR      clipping of cerebral aneurysm    CEREBRAL ANGIOGRAM N/A 2017    Procedure: " CEREBRAL ANGIOGRAM ;  Surgeon: Orladno Bella MD;  Location: [facility] ;  Service:     CEREBRAL ANGIOGRAM N/A 10/11/2017    Procedure: CEREBRAL ANGIOGRAM;  Surgeon: Orlando Bella MD;  Location: [facility] ;  Service:      SECTION  , ,     CHOLECYSTECTOMY      COLON RESECTION WITH COLOSTOMY Right     COLONOSCOPY  2009    COLONOSCOPY N/A 2020    Procedure: COLONOSCOPY to terminal ileum;  Surgeon: Luiza Norris MD;  Location: [facility];  Service: Gastroenterology;  Laterality: N/A;  PREOP/ SCREENING  POSTOP/ DIVERTICULOSIS. POOR PREP    COLONOSCOPY N/A 2023    Procedure: COLONOSCOPY TO CECUM WITH COLD BX POLYPECTOMIES AND COLD SNARE POLYPECTOMY;  Surgeon: Luiza Norris MD;  Location: [facility];  Service: Gastroenterology;  Laterality: N/A;  PRE OP - SCREENING  POST OP - COLON POLYPS, DIVERTICULOSIS, HEMORRHOIDS    EMBOLIZATION CEREBRAL N/A 2017    Procedure: Cerebral angiography and embolization of cerebral aneurysm with Pipeline Embolization Device;  Surgeon: Orlando Bella MD;  Location: [facility] ;  Service:     EMBOLIZATION CEREBRAL N/A 10/31/2018    Procedure: Cerebral angiogram with embolization of cerebral aneurysm;  Surgeon: Orlando Bella MD;  Location: [facility] ;  Service: Neurosurgery    ILEOSTOMY REVISION      INCISIONAL HERNIA REPAIR      Patient suffered some nerve entrapment secondary to the attack, some of which were removed during a secondary operation.    INGUINAL HERNIA REPAIR      LARYNX SURGERY      OSTOMY TAKE DOWN      TUBAL ABDOMINAL LIGATION      URETERAL STENT INSERTION  2011    Due to kidney stones        Home Meds:  Medications Prior to Admission   Medication Sig Dispense Refill Last Dose    albuterol sulfate  (90 Base) MCG/ACT inhaler Inhale 2 puffs Every 4 (Four) Hours As Needed for Wheezing or Shortness of Air (Cough). 6.7 g 9 2024     amoxicillin-clavulanate (AUGMENTIN) 875-125 MG per tablet Take 1 tablet by mouth 2 (Two) Times a Day for 7 days. 14 tablet 0 1/22/2024    Ascorbic Acid (VITAMIN C PO) Take 1,000 mg by mouth Daily. PT INSTRUCTED TO CONTINUE PER DR. CALLUM URBINA   1/22/2024    aspirin 81 MG EC tablet Take 1 tablet by mouth Daily. 30 tablet 6 1/22/2024    atorvastatin (LIPITOR) 20 MG tablet    1/22/2024    azelastine (ASTELIN) 0.1 % nasal spray USE 1 SPRAY IN EACH NOSTRIL TWICE A DAY AS NEEDED FOR RHINITIS AS DIRECTED 30 mL 6 1/22/2024    benzonatate (Tessalon Perles) 100 MG capsule Take 1 capsule by mouth 3 (Three) Times a Day As Needed for Cough. 60 capsule 0 1/22/2024    Biotin 84101 MCG tablet Take 1 tablet by mouth Every Evening.   1/22/2024    budesonide-formoterol (SYMBICORT) 160-4.5 MCG/ACT inhaler Inhale 2 puffs 2 (Two) Times a Day.   1/22/2024    butalbital-acetaminophen-caffeine (FIORICET, ESGIC) -40 MG per tablet Take 1 tablet by mouth 2 (Two) Times a Day As Needed for Headache. 30 tablet 0 1/22/2024    cholecalciferol (VITAMIN D3) 1000 units tablet Take 1 tablet by mouth Daily. PER PT TO CONTINUE PER DR. CALLUM URBINA   1/22/2024    Coenzyme Q10 (CO Q 10 PO) Take 100 mg by mouth Every Evening.   1/22/2024    Cyanocobalamin (VITAMIN B 12 PO) Take 5,000 mcg by mouth Every Night.   1/22/2024    cycloSPORINE (RESTASIS) 0.05 % ophthalmic emulsion Administer 1 drop to both eyes 2 (Two) Times a Day.   1/22/2024    diphenhydrAMINE-acetaminophen (TYLENOL PM)  MG tablet per tablet Take 1 tablet by mouth At Night As Needed for Sleep.   1/22/2024    doxycycline (MONODOX) 100 MG capsule Take 1 capsule by mouth 2 (Two) Times a Day for 7 days. 14 capsule 0 1/22/2024    fluticasone (FLONASE) 50 MCG/ACT nasal spray USE 2 SPRAYS IN EACH NOSTRIL DAILY 16 g 11 1/22/2024    Ginkgo Biloba 40 MG tablet Take 120 mg by mouth Daily.   1/22/2024    glipizide (glipiZIDE XL) 5 MG ER tablet Take 1 tablet by mouth Daily. 90 tablet 3  1/22/2024    glucosamine-chondroitin 500-400 MG capsule capsule Take 1 capsule by mouth 2 (Two) Times a Day With Meals. PT TO CONTINUE PER DR. CALLUM URBINA   1/22/2024    Hydrocod Rudi-Chlorphe Rudi ER (TUSSIONEX PENNKINETIC) 10-8 MG/5ML ER suspension Take 5 mL by mouth Every 12 (Twelve) Hours As Needed for Cough for up to 12 days. 115 mL 0 1/22/2024    ipratropium (ATROVENT) 0.06 % nasal spray 2 sprays 2 (Two) Times a Day.   1/22/2024    ipratropium-albuterol (DUO-NEB) 0.5-2.5 mg/3 ml nebulizer Take 3 mL by nebulization Every 2 (Two) Hours As Needed for Shortness of Air. 360 mL 0 1/22/2024    lisinopril (PRINIVIL,ZESTRIL) 10 MG tablet Take 1 tablet by mouth Daily. 90 tablet 3 1/22/2024    Misc Natural Products (CORTISOL PO) Take  by mouth.   1/22/2024    montelukast (SINGULAIR) 10 MG tablet Take 1 tablet by mouth Every Night. 90 tablet 3 1/22/2024    multivitamin (THERAGRAN) tablet tablet Take 1 tablet by mouth Daily.   1/22/2024    NYSTATIN 510266 UNIT/GM topical powder    1/22/2024    Omega-3 Fatty Acids (FISH OIL) 1000 MG capsule capsule Take  by mouth Daily With Breakfast.   1/22/2024    omeprazole (priLOSEC) 40 MG capsule TAKE 1 CAPSULE EVERY MORNING 90 capsule 3 1/22/2024    Potassium Gluconate 550 MG tablet Take  by mouth.   1/22/2024    predniSONE (DELTASONE) 20 MG tablet Take 1 tablet by mouth Daily With Breakfast for 3 days, THEN 0.5 tablets Daily With Breakfast for 3 days. 5 tablet 0 1/22/2024    pregabalin (LYRICA) 100 MG capsule TAKE 1 CAPSULE BY MOUTH THREE TIMES DAILY 270 capsule 1 1/22/2024    Probiotic Product (PROBIOTIC-10 PO) Take  by mouth.   1/22/2024    promethazine-dextromethorphan (PROMETHAZINE-DM) 6.25-15 MG/5ML syrup Take 5 mL by mouth 4 (Four) Times a Day As Needed for Cough. May cause drowsiness.  Do not drive. 118 mL 0 1/22/2024    propranolol (INDERAL) 40 MG tablet Take 1 tablet by mouth 2 (Two) Times a Day. 180 tablet 3 1/22/2024    raNITIdine (ZANTAC) 75 MG tablet Take 2 tablets by  mouth 2 (Two) Times a Day.   2024    Semaglutide (Rybelsus) 7 MG tablet Take 1 mg by mouth Daily. (Patient taking differently: Take 3 mg by mouth Daily.) 90 tablet 1 Patient Taking Differently    Spacer/Aero-Holding Chambers device Use with all inhaled treatments 1 each 0 2024    traZODone (DESYREL) 50 MG tablet Take 1 tablet by mouth Every Night. 90 tablet 1 2024    zolpidem (Ambien) 5 MG tablet Take 1 tablet by mouth At Night As Needed for Sleep. 30 tablet 0 2024    glucose blood (FREESTYLE LITE) test strip PT to use once daily 100 each 12     Lancets (freestyle) lancets Pt tests daily 100 each 6     Scopolamine 1 MG/3DAYS patch Place 1 patch on the skin as directed by provider Every 72 (Seventy-Two) Hours. 4 each 0        Allergies:  Allergies   Allergen Reactions    Contrast Dye (Echo Or Unknown Ct/Mr) Shortness Of Breath    Iodinated Contrast Media Shortness Of Breath    Codeine Nausea Only    Methylprednisolone Anxiety    Other Other (See Comments)     Bamlanivimab infusion- muscle aches, joint pain, nausea    Ciprofloxacin Hcl Rash       Social History:   Social History     Socioeconomic History    Marital status:      Spouse name: Roe    Number of children: 3    Years of education: College   Tobacco Use    Smoking status: Former     Years: 1     Types: Cigarettes     Quit date:      Years since quittin.1     Passive exposure: Past    Smokeless tobacco: Never   Vaping Use    Vaping Use: Never used   Substance and Sexual Activity    Alcohol use: Not Currently    Drug use: No    Sexual activity: Never       Family History:  Family History   Problem Relation Age of Onset    Skin cancer Mother     Dementia Mother     KALYAN disease Mother     Heart disease Father     Heart attack Father     Hypertension Father     Aneurysm Son         cerebral    Nephrolithiasis Son     Suicide Attempts Brother     Malig Hyperthermia Neg Hx        Physical Exam:  /99 (BP Location: Right  "arm, Patient Position: Sitting)   Pulse 66   Temp 97.3 °F (36.3 °C) (Oral)   Resp 18   Ht 172 cm (67.72\")   Wt 95 kg (209 lb 7 oz)   LMP  (LMP Unknown)   SpO2 92%   BMI 32.11 kg/m²  Body mass index is 32.11 kg/m². 92% 95 kg (209 lb 7 oz)  Physical Exam  Patient is examined using the personal protective equipment as per guidelines from infection control for this particular patient as enacted.  Hand hygiene was performed before and after patient interaction.  Well-developed normal body habitus  Eyes normal conjunctivae and pupils reactive to light  ENT Mallampati between 3 and 4 normal nasal exam  Neck midline trachea no thyromegaly  Chest diminished breath sounds with occasional rhonchi bilaterally on the bases  CVS regular rate and rhythm no lower extremity edema  Abdomen soft nontender no hepatosplenomegaly  CNS intact normal sensory exam  Skin no rashes no nodules  Psych oriented to time place and person normal memory  Musculoskeletal no cyanosis no clubbing normal range of motion        LABS:  Lab Results   Component Value Date    CALCIUM 9.7 01/26/2024     Results from last 7 days   Lab Units 01/26/24  0345 01/25/24  0310 01/24/24  0633 01/23/24  1141 01/21/24  1524   SODIUM mmol/L 138 137 135* 136 134*   POTASSIUM mmol/L 4.0 4.4 4.3 4.1 4.8   CHLORIDE mmol/L 101 102 98 102 99   CO2 mmol/L 26.4 23.6 21.5* 22.8 26.2   BUN mg/dL 17 17 18 13 12   CREATININE mg/dL 0.61 0.60 0.63 0.56* 0.69   GLUCOSE mg/dL 246* 307* 393* 184* 275*   CALCIUM mg/dL 9.7 9.2 9.3 9.4 9.8   WBC 10*3/mm3 8.19 8.96 7.87 9.13 9.27   HEMOGLOBIN g/dL 11.6* 11.7* 12.1 12.3 12.5   PLATELETS 10*3/mm3 278 317 352 289 298   ALT (SGPT) U/L  --   --  21 26 25   AST (SGOT) U/L  --   --  14 19 18   PROBNP pg/mL  --   --   --  125.0 137.3   PROCALCITONIN ng/mL  --   --   --  0.03  --      Lab Results   Component Value Date    TROPONINI <0.010 01/02/2024    TROPONINT 13 01/23/2024     Results from last 7 days   Lab Units 01/23/24  1141 " 01/21/24  1524   HSTROP T ng/L 13 7     Results from last 7 days   Lab Units 01/24/24  1204 01/24/24  1134 01/21/24  1704   BLOODCX  No growth at 24 hours No growth at 2 days No growth at 4 days     Results from last 7 days   Lab Units 01/24/24  1742 01/24/24  1419 01/24/24  1134 01/24/24  0633 01/23/24  1141   PROCALCITONIN ng/mL  --   --   --   --  0.03   LACTATE mmol/L 2.0 2.9* 2.8* 2.7*  --          Results from last 7 days   Lab Units 01/23/24  1151   ADENOVIRUS DETECTION BY PCR  Not Detected   CORONAVIRUS 229E  Not Detected   CORONAVIRUS HKU1  Not Detected   CORONAVIRUS NL63  Not Detected   CORONAVIRUS OC43  Not Detected   HUMAN METAPNEUMOVIRUS  Not Detected   HUMAN RHINOVIRUS/ENTEROVIRUS  Not Detected   INFLUENZA B PCR  Not Detected   PARAINFLUENZA 1  Not Detected   PARAINFLUENZA VIRUS 2  Not Detected   PARAINFLUENZA VIRUS 3  Not Detected   PARAINFLUENZA VIRUS 4  Not Detected   BORDETELLA PERTUSSIS PCR  Not Detected   BORDETELLA PARAPERTUSSIS PCR  Not Detected   CHLAMYDOPHILA PNEUMONIAE PCR  Not Detected   MYCOPLAMA PNEUMO PCR  Not Detected   RSV, PCR  Detected*         Results from last 7 days   Lab Units 01/24/24  1204 01/24/24  1134 01/21/24  1704   BLOODCX  No growth at 24 hours No growth at 2 days No growth at 4 days     Lab Results   Component Value Date    TSH 1.540 01/18/2024     Estimated Creatinine Clearance: 105.8 mL/min (by C-G formula based on SCr of 0.61 mg/dL).  Results from last 7 days   Lab Units 01/24/24  1729   NITRITE UA  Negative        Imaging: I personally visualized the images of scans/x-rays performed within last 3 days.      Assessment:  Hypoxia  Asthma exacerbation  RSV infection  Pneumonia  Obesity  Fatty liver  MGUS    Recommendations:  At this time we have a middle-aged female with known history of asthma likely exacerbation from infection.  Continue p.o. steroids wean slowly as tolerated  Advised patient to stay on scheduled Symbicort.  Will need outpatient follow-up and  PFTs  Complete antibiotics for secondary bacterial infection.  Recommend follow-up CT chest in 6 to 8 weeks to ensure complete resolution  Mobilize ambulate  No objections to discharge from pulmonary point of view with follow-up with pulmonary in 6 to 8 weeks.          Bucky Vela MD  1/26/2024  12:19 EST      Much of this encounter note is an electronic transcription/translation of spoken language to printed text using Dragon Software.

## 2024-01-26 NOTE — CONSULTS
"Nutrition Services    Patient Name:  Khalida Gilliland  YOB: 1955  MRN: [id number]  Admit Date:  1/23/2024  Assessment Date:  01/26/24    CLINICAL NUTRITION - EDUCATION     ASSESSMENT  Encounter Information         Consult from Physician    Education topic Diabetes    Learner Patient    Learning readiness Motivated to learn    Pertinent nutrition history New to insulin     Anthropometrics         Height Height: 172 cm (67.72\")    Weight Weight: 95 kg (209 lb 7 oz) (01/24/24 1935)    BMI  Body mass index is 32.11 kg/m².   Obese, Class I (30 - 34.9)    Comments      Medication/Biochemical          Pertinent medications Insulin, Oral hypoglycemic agent       Pertinent lab values Results from last 7 days   Lab Units 01/26/24  0345 01/25/24  0310 01/24/24  0633 01/23/24  1141 01/21/24  1524   SODIUM mmol/L 138 137 135* 136 134*   POTASSIUM mmol/L 4.0 4.4 4.3 4.1 4.8   CHLORIDE mmol/L 101 102 98 102 99   CO2 mmol/L 26.4 23.6 21.5* 22.8 26.2   BUN mg/dL 17 17 18 13 12   CREATININE mg/dL 0.61 0.60 0.63 0.56* 0.69   CALCIUM mg/dL 9.7 9.2 9.3 9.4 9.8   BILIRUBIN mg/dL  --   --  0.3 0.3 0.3   ALK PHOS U/L  --   --  60 59 61   ALT (SGPT) U/L  --   --  21 26 25   AST (SGOT) U/L  --   --  14 19 18   GLUCOSE mg/dL 246* 307* 393* 184* 275*       Lab Results   Component Value Date    HGBA1C 9.80 (H) 01/24/2024        DIAGNOSIS  PES Statement         Problem  Food and nutrition knowledge deficit    Etiology Medical Diagnosis - DM    Signs/Symptoms Information deficit     INTERVENTION  Intervention/Evaluation         Goal   Disease management/therapy, Improved nutrition related labs, Provide information , Reduce/improve symptoms    Educated regarding Appropriate portions, Beverage choices, Eating pattern, Food choices, Food preparation, Food shopping, Label reading, Snacks    Resources given Discussion only, Printed materials    Monitor/evaluation  Per protocol    Discharge planning Contact RD if questions/concerns "     RD to follow per protocol.     Electronically signed by:  Sandra Zambrano RD  01/26/24 09:41 EST

## 2024-01-26 NOTE — PLAN OF CARE
Goal Outcome Evaluation:  Plan of Care Reviewed With: patient  VSS  Pt medicated with cough meds for cough and fiorcet for headache  Up in room  Accucks as noted  Safety maintained        Progress: improving

## 2024-01-26 NOTE — PLAN OF CARE
Goal Outcome Evaluation:      Pt A&O times 4  Hot red rash on right arm on assessment @0800  Red rash on right arm cleared up @1200  Pt was crying and stated her headache was a 11 out of 10, pt was given hydrocodone due to headache   Pt presented with a non productive cough on assessment, codeine was give due to cough   Pulmonary consult ordered   Neurology consult ordered   Safety maintained

## 2024-01-26 NOTE — PROGRESS NOTES
Name: Khalida Gilliland ADMIT: 2024   : 1955  PCP: Sergo Owen MD    MRN: [id number] LOS: 1 days   AGE/SEX: 68 y.o. female  ROOM: [facility]     Subjective   Subjective   She states she is coughing a lot. States she feels terrible.     Objective   Objective   Vital Signs  Temp:  [97.3 °F (36.3 °C)-98.2 °F (36.8 °C)] 97.3 °F (36.3 °C)  Heart Rate:  [61-69] 66  Resp:  [16-18] 18  BP: (122-167)/(51-99) 126/99  SpO2:  [92 %-99 %] 92 %  on   ;   Device (Oxygen Therapy): (P) room air  Body mass index is 32.11 kg/m².    Physical Exam  Constitutional:       General: She is not in acute distress.     Appearance: Normal appearance. She is not toxic-appearing.   HENT:      Head: Normocephalic and atraumatic.   Cardiovascular:      Rate and Rhythm: Normal rate and regular rhythm.   Pulmonary:      Effort: Pulmonary effort is normal. No respiratory distress.      Breath sounds: Normal breath sounds. No wheezing or rhonchi.   Abdominal:      General: Bowel sounds are normal.      Palpations: Abdomen is soft.      Tenderness: There is no abdominal tenderness. There is no guarding or rebound.   Musculoskeletal:         General: No swelling.      Right lower leg: No edema.      Left lower leg: No edema.   Skin:     General: Skin is warm and dry.   Neurological:      General: No focal deficit present.      Mental Status: She is alert and oriented to person, place, and time.   Psychiatric:         Mood and Affect: Mood normal.         Behavior: Behavior normal.     Results Review  I reviewed the patient's new clinical results.  Results from last 7 days   Lab Units 24  03424  0310 24  0633 24  1141   WBC 10*3/mm3 8.19 8.96 7.87 9.13   HEMOGLOBIN g/dL 11.6* 11.7* 12.1 12.3   PLATELETS 10*3/mm3 278 317 352 289     Results from last 7 days   Lab Units 24  0345 24  0310 24  0633 24  1141   SODIUM mmol/L 138 137 135* 136   POTASSIUM mmol/L 4.0 4.4 4.3 4.1   CHLORIDE mmol/L  101 102 98 102   CO2 mmol/L 26.4 23.6 21.5* 22.8   BUN mg/dL 17 17 18 13   CREATININE mg/dL 0.61 0.60 0.63 0.56*   GLUCOSE mg/dL 246* 307* 393* 184*     Lab Results   Component Value Date    ANIONGAP 10.6 01/26/2024     Estimated Creatinine Clearance: 105.8 mL/min (by C-G formula based on SCr of 0.61 mg/dL).   Lab Results   Component Value Date    EGFR 97.5 01/26/2024     Results from last 7 days   Lab Units 01/24/24  0633 01/23/24  1141 01/21/24  1524   ALBUMIN g/dL 3.5 4.0 3.8   BILIRUBIN mg/dL 0.3 0.3 0.3   ALK PHOS U/L 60 59 61   AST (SGOT) U/L 14 19 18   ALT (SGPT) U/L 21 26 25     Results from last 7 days   Lab Units 01/26/24  0345 01/25/24  0310 01/24/24  0633 01/23/24  1141 01/21/24  1524   CALCIUM mg/dL 9.7 9.2 9.3 9.4 9.8   ALBUMIN g/dL  --   --  3.5 4.0 3.8     Results from last 7 days   Lab Units 01/24/24  1742 01/24/24  1419 01/24/24  1134 01/24/24  0633 01/23/24  1141   PROCALCITONIN ng/mL  --   --   --   --  0.03   LACTATE mmol/L 2.0 2.9* 2.8* 2.7*  --      Hemoglobin A1C   Date/Time Value Ref Range Status   01/24/2024 0633 9.80 (H) 4.80 - 5.60 % Final     Glucose   Date/Time Value Ref Range Status   01/26/2024 1127 175 (H) 70 - 130 mg/dL Final   01/26/2024 0823 196 (H) 70 - 130 mg/dL Final   01/25/2024 2132 198 (H) 70 - 130 mg/dL Final   01/25/2024 1651 312 (H) 70 - 130 mg/dL Final   01/25/2024 1106 336 (H) 70 - 130 mg/dL Final   01/25/2024 0616 275 (H) 70 - 130 mg/dL Final   01/24/2024 2102 310 (H) 70 - 130 mg/dL Final       CT Angiogram Head    Result Date: 1/25/2024  1. This patient has some sinus opacification with small amount of fluid and mucosal thickening in the medial left frontal sinus, left frontal recess and in the ethmoid sinuses bilaterally and there is subtotal opacification of the maxillary sinuses with circumferential mucosal thickening and central air-fluid levels. There is moderate mucosal thickening of lateral left sphenoid sinus.  2. Previous lateral right frontotemporal parietal  craniotomy for clipping of an aneurysm off of the distal right internal carotid artery with 2 aneurysm clips in place and there is a flow diverting stent extending from the mid cavernous segment of the right internal carotid artery into the supracavernous segment. There is some artifact off the aneurysm clips streaking through the adjacent brain and extra-axial spaces slightly limiting evaluation and it does limit evaluation of the M1 segment of the right middle cerebral artery as well. There is a suggestion of mild to moderate stenosis of the midportion M1 segment of the right middle cerebral artery although a portion of this may be artifact from beam hardening off the adjacent aneurysm clips. Otherwise the CT angiogram of the head is normal with no discernible intracranial aneurysm identified.  Radiation dose reduction techniques were utilized, including automated exposure control and exposure modulation based on body size.   This report was finalized on 1/25/2024 1:00 PM by Dr. Song Mcneill M.D on Workstation: [id number]       Scheduled Meds  aspirin, 81 mg, Oral, Daily  atorvastatin, 20 mg, Oral, Daily  budesonide-formoterol, 2 puff, Inhalation, BID - RT  cefTRIAXone, 2,000 mg, Intravenous, Q24H  cholecalciferol, 1,000 Units, Oral, Daily  cycloSPORINE, 1 drop, Both Eyes, BID  enoxaparin, 40 mg, Subcutaneous, Daily  fluticasone, 2 spray, Each Nare, Daily  glipizide, 2.5 mg, Oral, BID AC  insulin glargine, 20 Units, Subcutaneous, Daily  insulin lispro, 2-9 Units, Subcutaneous, 4x Daily AC & at Bedtime  insulin lispro, 6 Units, Subcutaneous, TID With Meals  ipratropium, 2 spray, Each Nare, BID  lactobacillus acidophilus, 1 capsule, Oral, Daily  lisinopril, 10 mg, Oral, Daily  miconazole, 1 application , Topical, Q12H  montelukast, 10 mg, Oral, Nightly  multivitamin, 1 tablet, Oral, Daily  pantoprazole, 40 mg, Oral, Q AM  predniSONE, 40 mg, Oral, Daily With Breakfast  pregabalin, 100 mg, Oral, TID  propranolol, 40 mg,  Oral, BID  Scopolamine, 1 patch, Transdermal, Q72H  senna-docusate sodium, 2 tablet, Oral, BID  sodium chloride, 10 mL, Intravenous, Q12H  traZODone, 50 mg, Oral, Nightly    Continuous Infusions     PRN Meds    acetaminophen **OR** acetaminophen **OR** acetaminophen    benzonatate    senna-docusate sodium **AND** polyethylene glycol **AND** bisacodyl **AND** bisacodyl    butalbital-acetaminophen-caffeine    Calcium Replacement - Follow Nurse / BPA Driven Protocol    dextrose    dextrose    diphenhydrAMINE    glucagon (human recombinant)    Hydrocod Rudi-Chlorphe Rudi ER    ipratropium-albuterol    Magnesium Standard Dose Replacement - Follow Nurse / BPA Driven Protocol    nitroglycerin    ondansetron    Phosphorus Replacement - Follow Nurse / BPA Driven Protocol    Potassium Replacement - Follow Nurse / BPA Driven Protocol    prochlorperazine    promethazine-dextromethorphan    [COMPLETED] Insert Peripheral IV **AND** sodium chloride    sodium chloride    sodium chloride    sodium chloride    zolpidem       Diet  Diet: Cardiac Diets, Diabetic Diets, Renal Diets; Healthy Heart (2-3 Na+); Consistent Carbohydrate; Low Sodium (2-3g), Low Potassium, Low Phosphorus; Texture: Regular Texture (IDDSI 7); Fluid Consistency: Thin (IDDSI 0)    I have personally reviewed:  [x]  Medications  [x]  Laboratory   [x]  Microbiology blood cultures no growth  []  Radiology   [x]  EKG/Telemetry sinus on telemetry  []  Cardiology/Vascular   []  Pathology   []  Records      Assessment/Plan     Active Hospital Problems    Diagnosis  POA    **Hypoxia [R09.02]  Yes    RSV (respiratory syncytial virus infection) [B33.8]  Yes    Asthma exacerbation [J45.901]  Yes    GERD (gastroesophageal reflux disease) [K21.9]  Yes    Fatty liver [K76.0]  Yes    Essential hypertension [I10]  Yes    Type 2 diabetes mellitus with hyperglycemia, without long-term current use of insulin [E11.65]  Yes    Migraine without aura and without status migrainosus, not  intractable [G43.009]  Yes    Hx of cerebral aneurysm repair [Z98.890, Z86.79]  Not Applicable    Monoclonal gammopathy [D47.2]  Yes      Resolved Hospital Problems   No resolved problems to display.     68 y.o. female recently diagnosed with RSV and also given Augmentin and doxycycline did not improve and return to the hospital with persistent symptoms (cough, headache)     RSV  Pneumonia  Asthma exacerbation  Sinusitis on CTA  Lactate elevation resolved. WBC normal. Procalcitonin not elevated.  Steroids switched to p.o.  Continue antibiotics treating secondary bacterial pneumonia and sinusitis, probably switch to Augmentin at discharge  Continue respiratory treatments  Continue supportive care  Follow-up CT chest 4 to 6 weeks to ensure resolution  Subjectively slow to improve will ask pulmonology to see     Migraine headache  She states headaches worsened d/t persistent coughing  Sinusitis probably contributing  CTA no aneurysm     HTN  BP controlled     Fatty liver  Body mass index is 33.22 kg/m².      DM 2  A1c 9.80%  On long-acting, mealtime, and correctional insulin  Steroids worsening control but improved after switching to p.o.  She states she has been on steroids a lot recently     MGUS    DVT prophylaxis  Lovenox 40 mg SC daily    Discharge  May be tomorrow  Expected Discharge Date: 1/27/2024; Expected Discharge Time:     Discussed with patient and nursing staff     Parish Hernandez MD  [facility]  01/26/24

## 2024-01-26 NOTE — TELEPHONE ENCOUNTER
Caller: Khalida Gilliland    Relationship: Self    Best call back number: [phone]     Who are you requesting to speak with (clinical staff, provider,  specific staff member): ABIMAEL    What was the call regarding: PATIENT IS REQUESTING A CALL BACK FROM ABIMAEL. PATIENT STATED SHE HAS SOME INFORMATION FOR HER.

## 2024-01-26 NOTE — TELEPHONE ENCOUNTER
Caller: Khalida Gilliland    Relationship: Self    Best call back number: [phone]     What was the call regarding: PATIENT STATED THAT SHE IS BACK IN THE HOSPITAL. PATIENT HAS BEEN IN SINCE TUESDAY.

## 2024-01-27 LAB
ANION GAP SERPL CALCULATED.3IONS-SCNC: 11 MMOL/L (ref 5–15)
BUN SERPL-MCNC: 23 MG/DL (ref 8–23)
BUN/CREAT SERPL: 31.1 (ref 7–25)
CALCIUM SPEC-SCNC: 9.1 MG/DL (ref 8.6–10.5)
CHLORIDE SERPL-SCNC: 101 MMOL/L (ref 98–107)
CO2 SERPL-SCNC: 27 MMOL/L (ref 22–29)
CREAT SERPL-MCNC: 0.74 MG/DL (ref 0.57–1)
DEPRECATED RDW RBC AUTO: 41.6 FL (ref 37–54)
EGFRCR SERPLBLD CKD-EPI 2021: 88.3 ML/MIN/1.73
ERYTHROCYTE [DISTWIDTH] IN BLOOD BY AUTOMATED COUNT: 12.7 % (ref 12.3–15.4)
GLUCOSE BLDC GLUCOMTR-MCNC: 183 MG/DL (ref 70–130)
GLUCOSE BLDC GLUCOMTR-MCNC: 245 MG/DL (ref 70–130)
GLUCOSE BLDC GLUCOMTR-MCNC: 255 MG/DL (ref 70–130)
GLUCOSE BLDC GLUCOMTR-MCNC: 272 MG/DL (ref 70–130)
GLUCOSE SERPL-MCNC: 259 MG/DL (ref 65–99)
HCT VFR BLD AUTO: 36.9 % (ref 34–46.6)
HGB BLD-MCNC: 11.9 G/DL (ref 12–15.9)
MCH RBC QN AUTO: 29.3 PG (ref 26.6–33)
MCHC RBC AUTO-ENTMCNC: 32.2 G/DL (ref 31.5–35.7)
MCV RBC AUTO: 90.9 FL (ref 79–97)
PLATELET # BLD AUTO: 288 10*3/MM3 (ref 140–450)
PMV BLD AUTO: 9.5 FL (ref 6–12)
POTASSIUM SERPL-SCNC: 3.9 MMOL/L (ref 3.5–5.2)
RBC # BLD AUTO: 4.06 10*6/MM3 (ref 3.77–5.28)
SODIUM SERPL-SCNC: 139 MMOL/L (ref 136–145)
WBC NRBC COR # BLD AUTO: 8.72 10*3/MM3 (ref 3.4–10.8)

## 2024-01-27 PROCEDURE — 82948 REAGENT STRIP/BLOOD GLUCOSE: CPT

## 2024-01-27 PROCEDURE — 25010000002 DIPHENHYDRAMINE PER 50 MG: Performed by: STUDENT IN AN ORGANIZED HEALTH CARE EDUCATION/TRAINING PROGRAM

## 2024-01-27 PROCEDURE — 85027 COMPLETE CBC AUTOMATED: CPT | Performed by: HOSPITALIST

## 2024-01-27 PROCEDURE — 94664 DEMO&/EVAL PT USE INHALER: CPT

## 2024-01-27 PROCEDURE — 63710000001 INSULIN GLARGINE PER 5 UNITS: Performed by: HOSPITALIST

## 2024-01-27 PROCEDURE — 80048 BASIC METABOLIC PNL TOTAL CA: CPT | Performed by: HOSPITALIST

## 2024-01-27 PROCEDURE — 94799 UNLISTED PULMONARY SVC/PX: CPT

## 2024-01-27 PROCEDURE — 25010000002 KETOROLAC TROMETHAMINE PER 15 MG: Performed by: STUDENT IN AN ORGANIZED HEALTH CARE EDUCATION/TRAINING PROGRAM

## 2024-01-27 PROCEDURE — 25010000002 ENOXAPARIN PER 10 MG: Performed by: INTERNAL MEDICINE

## 2024-01-27 PROCEDURE — 99222 1ST HOSP IP/OBS MODERATE 55: CPT | Performed by: STUDENT IN AN ORGANIZED HEALTH CARE EDUCATION/TRAINING PROGRAM

## 2024-01-27 PROCEDURE — 25010000002 CEFTRIAXONE PER 250 MG: Performed by: HOSPITALIST

## 2024-01-27 PROCEDURE — 63710000001 INSULIN LISPRO (HUMAN) PER 5 UNITS: Performed by: INTERNAL MEDICINE

## 2024-01-27 PROCEDURE — 63710000001 PREDNISONE PER 1 MG: Performed by: HOSPITALIST

## 2024-01-27 PROCEDURE — 25010000002 PROCHLORPERAZINE 10 MG/2ML SOLUTION: Performed by: STUDENT IN AN ORGANIZED HEALTH CARE EDUCATION/TRAINING PROGRAM

## 2024-01-27 PROCEDURE — 63710000001 INSULIN LISPRO (HUMAN) PER 5 UNITS: Performed by: HOSPITALIST

## 2024-01-27 RX ORDER — BLOOD-GLUCOSE METER
KIT MISCELLANEOUS
Qty: 1 EACH | Refills: 0 | Status: SHIPPED | OUTPATIENT
Start: 2024-01-27

## 2024-01-27 RX ORDER — IPRATROPIUM BROMIDE AND ALBUTEROL SULFATE 2.5; .5 MG/3ML; MG/3ML
3 SOLUTION RESPIRATORY (INHALATION)
Status: DISCONTINUED | OUTPATIENT
Start: 2024-01-27 | End: 2024-01-28 | Stop reason: HOSPADM

## 2024-01-27 RX ORDER — DIPHENHYDRAMINE HYDROCHLORIDE 50 MG/ML
12.5 INJECTION INTRAMUSCULAR; INTRAVENOUS ONCE
Status: COMPLETED | OUTPATIENT
Start: 2024-01-27 | End: 2024-01-27

## 2024-01-27 RX ORDER — KETOROLAC TROMETHAMINE 15 MG/ML
15 INJECTION, SOLUTION INTRAMUSCULAR; INTRAVENOUS ONCE AS NEEDED
Status: COMPLETED | OUTPATIENT
Start: 2024-01-27 | End: 2024-01-27

## 2024-01-27 RX ORDER — PROCHLORPERAZINE EDISYLATE 5 MG/ML
2.5 INJECTION INTRAMUSCULAR; INTRAVENOUS ONCE
Status: COMPLETED | OUTPATIENT
Start: 2024-01-27 | End: 2024-01-27

## 2024-01-27 RX ORDER — ECHINACEA PURPUREA EXTRACT 125 MG
2 TABLET ORAL AS NEEDED
Qty: 50 ML | Refills: 0 | Status: SHIPPED | OUTPATIENT
Start: 2024-01-27

## 2024-01-27 RX ORDER — LANCETS 30 GAUGE
EACH MISCELLANEOUS
Qty: 100 EACH | Refills: 0 | Status: SHIPPED | OUTPATIENT
Start: 2024-01-27

## 2024-01-27 RX ORDER — PREDNISONE 20 MG/1
20 TABLET ORAL
Status: DISCONTINUED | OUTPATIENT
Start: 2024-01-28 | End: 2024-01-28 | Stop reason: HOSPADM

## 2024-01-27 RX ORDER — INSULIN ASPART 100 [IU]/ML
3 INJECTION, SOLUTION INTRAVENOUS; SUBCUTANEOUS
Qty: 3 ML | Refills: 0 | Status: SHIPPED | OUTPATIENT
Start: 2024-01-27 | End: 2024-01-27 | Stop reason: HOSPADM

## 2024-01-27 RX ORDER — LEVETIRACETAM 250 MG/1
250 TABLET ORAL DAILY
Status: DISCONTINUED | OUTPATIENT
Start: 2024-01-27 | End: 2024-01-28 | Stop reason: HOSPADM

## 2024-01-27 RX ORDER — AMOXICILLIN AND CLAVULANATE POTASSIUM 875; 125 MG/1; MG/1
1 TABLET, FILM COATED ORAL 2 TIMES DAILY
Qty: 8 TABLET | Refills: 0 | Status: SHIPPED | OUTPATIENT
Start: 2024-01-27 | End: 2024-02-02 | Stop reason: HOSPADM

## 2024-01-27 RX ORDER — INSULIN GLARGINE 100 [IU]/ML
10 INJECTION, SOLUTION SUBCUTANEOUS NIGHTLY
Qty: 3 ML | Refills: 0 | Status: SHIPPED | OUTPATIENT
Start: 2024-01-27

## 2024-01-27 RX ORDER — PEN NEEDLE, DIABETIC 30 GX3/16"
1 NEEDLE, DISPOSABLE MISCELLANEOUS 4 TIMES DAILY PRN
Qty: 100 EACH | Refills: 0 | Status: SHIPPED | OUTPATIENT
Start: 2024-01-27

## 2024-01-27 RX ORDER — ONDANSETRON 2 MG/ML
4 INJECTION INTRAMUSCULAR; INTRAVENOUS EVERY 6 HOURS PRN
Status: DISCONTINUED | OUTPATIENT
Start: 2024-01-27 | End: 2024-01-28 | Stop reason: HOSPADM

## 2024-01-27 RX ORDER — PREDNISONE 10 MG/1
TABLET ORAL
Qty: 14 TABLET | Refills: 0 | Status: SHIPPED | OUTPATIENT
Start: 2024-01-27 | End: 2024-01-28 | Stop reason: SDUPTHER

## 2024-01-27 RX ADMIN — FLUTICASONE PROPIONATE 2 SPRAY: 50 SPRAY, METERED NASAL at 12:15

## 2024-01-27 RX ADMIN — CYCLOSPORINE 1 DROP: 0.5 EMULSION OPHTHALMIC at 12:14

## 2024-01-27 RX ADMIN — Medication 300 MG: at 18:13

## 2024-01-27 RX ADMIN — TRAZODONE HYDROCHLORIDE 50 MG: 50 TABLET ORAL at 23:08

## 2024-01-27 RX ADMIN — HYDROCODONE POLISTIREX AND CHLORPHENIRAMINE POLISTIREX 5 ML: 10; 8 SUSPENSION, EXTENDED RELEASE ORAL at 21:33

## 2024-01-27 RX ADMIN — Medication 10 ML: at 12:16

## 2024-01-27 RX ADMIN — KETOROLAC TROMETHAMINE 15 MG: 15 INJECTION, SOLUTION INTRAMUSCULAR; INTRAVENOUS at 12:02

## 2024-01-27 RX ADMIN — BUTALBITAL, ACETAMINOPHEN AND CAFFEINE 1 TABLET: 325; 50; 40 TABLET ORAL at 21:20

## 2024-01-27 RX ADMIN — INSULIN LISPRO 4 UNITS: 100 INJECTION, SOLUTION INTRAVENOUS; SUBCUTANEOUS at 21:22

## 2024-01-27 RX ADMIN — PREDNISONE 40 MG: 20 TABLET ORAL at 12:13

## 2024-01-27 RX ADMIN — PROCHLORPERAZINE EDISYLATE 2.5 MG: 5 INJECTION INTRAMUSCULAR; INTRAVENOUS at 12:02

## 2024-01-27 RX ADMIN — PREGABALIN 100 MG: 100 CAPSULE ORAL at 18:13

## 2024-01-27 RX ADMIN — Medication 1000 UNITS: at 12:13

## 2024-01-27 RX ADMIN — LISINOPRIL 10 MG: 10 TABLET ORAL at 12:13

## 2024-01-27 RX ADMIN — DIPHENHYDRAMINE HYDROCHLORIDE 12.5 MG: 50 INJECTION, SOLUTION INTRAMUSCULAR; INTRAVENOUS at 12:02

## 2024-01-27 RX ADMIN — IPRATROPIUM BROMIDE 2 SPRAY: 42 SPRAY, METERED NASAL at 21:23

## 2024-01-27 RX ADMIN — IPRATROPIUM BROMIDE 2 SPRAY: 42 SPRAY, METERED NASAL at 12:15

## 2024-01-27 RX ADMIN — Medication 1 TABLET: at 12:13

## 2024-01-27 RX ADMIN — IPRATROPIUM BROMIDE AND ALBUTEROL SULFATE 3 ML: 2.5; .5 SOLUTION RESPIRATORY (INHALATION) at 19:20

## 2024-01-27 RX ADMIN — INSULIN LISPRO 6 UNITS: 100 INJECTION, SOLUTION INTRAVENOUS; SUBCUTANEOUS at 12:40

## 2024-01-27 RX ADMIN — MONTELUKAST SODIUM 10 MG: 10 TABLET, FILM COATED ORAL at 21:21

## 2024-01-27 RX ADMIN — INSULIN LISPRO 6 UNITS: 100 INJECTION, SOLUTION INTRAVENOUS; SUBCUTANEOUS at 18:14

## 2024-01-27 RX ADMIN — BUDESONIDE AND FORMOTEROL FUMARATE DIHYDRATE 2 PUFF: 160; 4.5 AEROSOL RESPIRATORY (INHALATION) at 07:23

## 2024-01-27 RX ADMIN — INSULIN GLARGINE 20 UNITS: 100 INJECTION, SOLUTION SUBCUTANEOUS at 12:15

## 2024-01-27 RX ADMIN — Medication 10 ML: at 21:23

## 2024-01-27 RX ADMIN — PREGABALIN 100 MG: 100 CAPSULE ORAL at 21:20

## 2024-01-27 RX ADMIN — ASPIRIN 81 MG: 81 TABLET, COATED ORAL at 12:13

## 2024-01-27 RX ADMIN — MAGNESIUM OXIDE 400 MG (241.3 MG MAGNESIUM) TABLET 400 MG: TABLET at 18:13

## 2024-01-27 RX ADMIN — GLIPIZIDE 2.5 MG: 5 TABLET ORAL at 12:13

## 2024-01-27 RX ADMIN — DOCUSATE SODIUM 50MG AND SENNOSIDES 8.6MG 2 TABLET: 8.6; 5 TABLET, FILM COATED ORAL at 21:21

## 2024-01-27 RX ADMIN — ATORVASTATIN CALCIUM 20 MG: 20 TABLET, FILM COATED ORAL at 12:13

## 2024-01-27 RX ADMIN — ANTI-FUNGAL POWDER MICONAZOLE NITRATE TALC FREE 1 APPLICATION: 1.42 POWDER TOPICAL at 12:16

## 2024-01-27 RX ADMIN — PANTOPRAZOLE SODIUM 40 MG: 40 TABLET, DELAYED RELEASE ORAL at 18:14

## 2024-01-27 RX ADMIN — ENOXAPARIN SODIUM 40 MG: 100 INJECTION SUBCUTANEOUS at 12:12

## 2024-01-27 RX ADMIN — Medication 1 CAPSULE: at 12:13

## 2024-01-27 RX ADMIN — PROPRANOLOL HYDROCHLORIDE 40 MG: 40 TABLET ORAL at 21:22

## 2024-01-27 RX ADMIN — CYCLOSPORINE 1 DROP: 0.5 EMULSION OPHTHALMIC at 21:22

## 2024-01-27 RX ADMIN — IPRATROPIUM BROMIDE AND ALBUTEROL SULFATE 3 ML: 2.5; .5 SOLUTION RESPIRATORY (INHALATION) at 15:36

## 2024-01-27 RX ADMIN — GLIPIZIDE 2.5 MG: 5 TABLET ORAL at 18:13

## 2024-01-27 RX ADMIN — CEFTRIAXONE 2000 MG: 2 INJECTION, POWDER, FOR SOLUTION INTRAMUSCULAR; INTRAVENOUS at 12:40

## 2024-01-27 RX ADMIN — INSULIN LISPRO 6 UNITS: 100 INJECTION, SOLUTION INTRAVENOUS; SUBCUTANEOUS at 18:13

## 2024-01-27 RX ADMIN — HYDROCODONE BITARTRATE AND ACETAMINOPHEN 1 TABLET: 5; 325 TABLET ORAL at 18:18

## 2024-01-27 RX ADMIN — BUDESONIDE AND FORMOTEROL FUMARATE DIHYDRATE 2 PUFF: 160; 4.5 AEROSOL RESPIRATORY (INHALATION) at 19:20

## 2024-01-27 RX ADMIN — PROPRANOLOL HYDROCHLORIDE 40 MG: 40 TABLET ORAL at 12:13

## 2024-01-27 RX ADMIN — LEVETIRACETAM 250 MG: 250 TABLET, FILM COATED ORAL at 18:13

## 2024-01-27 RX ADMIN — PREGABALIN 100 MG: 100 CAPSULE ORAL at 12:13

## 2024-01-27 NOTE — PROGRESS NOTES
[facility]  [phone]  Dr. Edson Liu    Subjective:  LOS: 2    Chief Complaint: Asthma exacerbation with viral infection    Currently with a headache from excessive cough.  States she feels better with regular breathing treatments.    Objective   Vital Signs past 24hrs  Temp range: Temp (24hrs), Av.9 °F (36.6 °C), Min:97.3 °F (36.3 °C), Max:98.4 °F (36.9 °C)    BP range: BP: (118-143)/(47-99) 139/59  Pulse range: Heart Rate:  [55-71] 60  Resp rate range: Resp:  [18] 18  Device (Oxygen Therapy): room air   Oxygen range:SpO2:  [92 %-97 %] 96 %   Mechanical Ventilator:     Physical Exam  Constitutional:       Appearance: She is obese.   Eyes:      Pupils: Pupils are equal, round, and reactive to light.   Cardiovascular:      Rate and Rhythm: Normal rate and regular rhythm.      Heart sounds: No murmur heard.  Pulmonary:      Effort: Pulmonary effort is normal.      Breath sounds: Normal breath sounds.   Abdominal:      General: Bowel sounds are normal.      Palpations: Abdomen is soft. There is no mass.      Tenderness: There is no abdominal tenderness.   Musculoskeletal:         General: No swelling.   Neurological:      Mental Status: She is alert.       Results Review:    I have reviewed the laboratory and imaging data since the last note by [facility] physician.  My annotations are noted in assessment and plan.      Result Review:  I have personally reviewed the results from last note by [facility] physician to 2024 07:51 EST and agree with these findings:  []  Laboratory list / accordion  []  Microbiology  []  Radiology  []  EKG/Telemetry   []  Cardiology/Vascular   []  Pathology  []  Old records  []  Other:    Medication Review:  I have reviewed the current MAR.  My annotations are noted in assessment and plan.    aspirin, 81 mg, Oral, Daily  atorvastatin, 20 mg, Oral, Daily  budesonide-formoterol, 2 puff, Inhalation, BID - RT  cefTRIAXone, 2,000 mg, Intravenous, Q24H  cholecalciferol, 1,000  Units, Oral, Daily  cycloSPORINE, 1 drop, Both Eyes, BID  enoxaparin, 40 mg, Subcutaneous, Daily  fluticasone, 2 spray, Each Nare, Daily  glipizide, 2.5 mg, Oral, BID AC  insulin glargine, 20 Units, Subcutaneous, Daily  insulin lispro, 2-9 Units, Subcutaneous, 4x Daily AC & at Bedtime  insulin lispro, 6 Units, Subcutaneous, TID With Meals  ipratropium, 2 spray, Each Nare, BID  lactobacillus acidophilus, 1 capsule, Oral, Daily  lisinopril, 10 mg, Oral, Daily  miconazole, 1 application , Topical, Q12H  montelukast, 10 mg, Oral, Nightly  multivitamin, 1 tablet, Oral, Daily  pantoprazole, 40 mg, Oral, Q AM  predniSONE, 40 mg, Oral, Daily With Breakfast  pregabalin, 100 mg, Oral, TID  propranolol, 40 mg, Oral, BID  Scopolamine, 1 patch, Transdermal, Q72H  senna-docusate sodium, 2 tablet, Oral, BID  sodium chloride, 10 mL, Intravenous, Q12H  traZODone, 50 mg, Oral, Nightly           Lines, Drains & Airways       Active LDAs       Name Placement date Placement time Site Days    Peripheral IV 01/21/24 1521 Left Antecubital 01/21/24  1521  Antecubital  5    Peripheral IV 01/24/24 1158 Anterior;Proximal;Right Forearm 01/24/24  1158  Forearm  2                  Isolation status: Contact    Dietary Orders (From admission, onward)       Start     Ordered    01/23/24 1432  Diet: Cardiac Diets, Diabetic Diets, Renal Diets; Healthy Heart (2-3 Na+); Consistent Carbohydrate; Low Sodium (2-3g), Low Potassium, Low Phosphorus; Texture: Regular Texture (IDDSI 7); Fluid Consistency: Thin (IDDSI 0)  Diet Effective Now        References:    Diet Order [place]   Question Answer Comment   Diets: Cardiac Diets    Diets: Diabetic Diets    Diets: Renal Diets    Cardiac Diet: Healthy Heart (2-3 Na+)    Diabetic Diet: Consistent Carbohydrate    Renal Diet: Low Sodium (2-3g)    Renal Diet: Low Potassium    Renal Diet: Low Phosphorus    Texture: Regular Texture (IDDSI 7)    Fluid Consistency: Thin (IDDSI 0)        01/23/24 1431                     PCCM Problems  Acute asthma exacerbation  Recent RSV infection  Secondary bacterial pneumonia  MGUS  [place]  Obesity  Diabetes mellitus type 2  History CVA        THESE ARE NEW MEDICAL PROBLEMS TO ME.    Plan of Treatment    Currently with HA and neurology is following.    Patient reports excessive coughing. Better with nebs and wants to try scheduled. Added.    Asthma exacerbation is better.  Cut down steroids to 20 mg prednisone and taper on discharge.    Expected hyperglycemia from steroids.    Needs follow-up imaging for acute infiltrates. Completing abx.    Will fu in [facility] office for same and further asthma management.      Edson Liu MD  01/27/24  07:51 EST      Part of this note may be an electronic transcription/translation of spoken language to printed text using the Dragon Dictation System.

## 2024-01-27 NOTE — CONSULTS
Neurology Consult Note    Consult Date: 1/27/2024    Referring MD: Lani Rao MD    Reason for Consult I have been asked to see the patient in neurological consultation to render advice and opinion regarding headache    Khalida Gilliland is a 68 y.o. female with past medical history of asthma, diabetes mellitus, hypertension, dyslipidemia, migraine headaches, history of right ICA aneurysm s/p clipping and stenting/frontoparietal craniotomy.  Was recently diagnosed 3 weeks ago with RSV bronchitis.  Patient has been to several freestanding ERs for complaints of cough/hypoxia and also persistent migraine headaches.  Currently being treated by the hospitalist team for pneumonias/asthma exacerbation and sinusitis on CTA. She was seen in March 2022 for similar complaints of headache and she was given triptan/migraine cocktail which made her feel better.    Patient states she has a long history of migraines in the past for which she tried several medications which she does not remember but after menopause her migraines subsided considerably until around 6 weeks ago when she started having headaches again this time most likely associated with chronic cough she has been having from upper respiratory tract infection.    She states she has pain behind her head mostly right side with some nausea but no light sensitivity or loud noise sensitivity.  Denies any lacrimation or eye redness or tearing.  Denies any weakness numbness speech or vision problems.  Denies any jaw claudication or temporal pain.    Past Medical History:   Diagnosis Date    Abdominal pain, right lower quadrant     Anemia     Asthma     Brain aneurysm     2003 and 2017    Cancer 07/30/2014    Basal Cell Carcinoma Left Arm,SKIN CANCER    COVID-19 04/2021    Cyst of left kidney     Diabetes mellitus     Tyoe 2 diabetic    Diverticulosis     Fatty liver     GERD (gastroesophageal reflux disease)     History of kidney stones 06/2011    History of surgery for  "cerebral aneurysm     Clipping of cerebral Aneurysm    Hx of migraines     Hyperlipidemia     Hypertension     Insomnia     Neck pain     Peptic ulceration     Pneumonia     PONV (postoperative nausea and vomiting)     Stroke     \"years ago\" TIA no after effects       Exam  /59 (BP Location: Right arm, Patient Position: Lying)   Pulse 77   Temp 97.7 °F (36.5 °C) (Oral)   Resp 16   Ht 172 cm (67.72\")   Wt 95 kg (209 lb 7 oz)   LMP  (LMP Unknown)   SpO2 98%   BMI 32.11 kg/m²   Gen: NAD, vitals reviewed  MS: oriented x3, recent/remote memory intact, normal attention/concentration, language intact, no neglect.  CN: visual acuity grossly normal, PERRL, EOMI, no facial droop, no dysarthria  Motor: 5/5 throughout upper and lower extremities, normal tone    DATA:    Lab Results   Component Value Date    GLUCOSE 259 (H) 01/27/2024    CALCIUM 9.1 01/27/2024     01/27/2024    K 3.9 01/27/2024    CO2 27.0 01/27/2024     01/27/2024    BUN 23 01/27/2024    CREATININE 0.74 01/27/2024    EGFRIFAFRI 106 09/07/2021    EGFRIFNONA 93 02/03/2022    BCR 31.1 (H) 01/27/2024    ANIONGAP 11.0 01/27/2024     Lab Results   Component Value Date    WBC 8.72 01/27/2024    HGB 11.9 (L) 01/27/2024    HCT 36.9 01/27/2024    MCV 90.9 01/27/2024     01/27/2024       Lab review:   Sodium 139  Creatinine 0.74  Blood glucose 259    WBC 8.72  Hemoglobin 11.9 and platelets 288    Imaging review:   CT head no acute hypodensity or hypodensity  CTA head and neck showed right frontoparietal craniotomy s/p aneurysm clipping and stenting of right ICA cannot properly visualize vessels along the right MCA because of artifact but on the left side vessels show no significant atherosclerosis or stenosis.       Diagnoses:  Acute on chronic headaches  Cough induced headaches  RSV bronchitis  History of migraines      Patient has a long history of migraines which got better after menopause now complains of chronic headaches since since " November same time since her chronic cough started.    She is already on lyrica for pain 100mg TID  She is a poor candidate for Topamax because of history of kidney stones  She is already on propranolol 40 mg twice daily which is not helping her (her heart rate has been in low 60s and 50s this admission so not a good candidate increasing propranolol)    Short-term plan -we will start her on low-dose Keppra 250 once a day along with magnesium oxide 400 mg daily along with riboflavin 400 mg daily.    While inpatient we can try Migraine cocktail with Zofran, Toradol, Benadryl and Compazine q6 PRN     Long-term plan as get her to outpatient neurology clinic and try Gpants and also Botox injection, also need outpatient MRI brain with and without    Will Check ESR and CRP    We will continue to follow her and schedule an outpatient neurology appointment.      MDM   Reviewed: Previous charts, nursing notes and vitals   Reviewed: Previous labs and CT scan    Interpretation: Labs and CT scan   Total time providing care is :30-74 minutes. This excluded time spent performing separately reportable procedures and services  Consults :Neurology/Stroke    Please note that portions of this note were completed with a voice recognition program.     Lefty Blanco MD  Neuro Hospitalist /Vascular Neurology.

## 2024-01-27 NOTE — DISCHARGE SUMMARY
[facility]    Date of Discharge:  1/27/2024    PCP: Srego Owen MD    Discharge Diagnosis:   Active Hospital Problems    Diagnosis  POA    **Hypoxia [R09.02]  Yes    RSV (respiratory syncytial virus infection) [B33.8]  Yes    Asthma exacerbation [J45.901]  Yes    GERD (gastroesophageal reflux disease) [K21.9]  Yes    Fatty liver [K76.0]  Yes    Essential hypertension [I10]  Yes    Type 2 diabetes mellitus with hyperglycemia, without long-term current use of insulin [E11.65]  Yes    Migraine without aura and without status migrainosus, not intractable [G43.009]  Yes    Hx of cerebral aneurysm repair [Z98.890, Z86.79]  Not Applicable    Monoclonal gammopathy [D47.2]  Yes      Resolved Hospital Problems   No resolved problems to display.          Consults       Date and Time Order Name Status Description    1/26/2024  4:20 PM Inpatient Neurology Consult General      1/26/2024  8:30 AM Inpatient Pulmonology Consult Completed     1/23/2024  1:19 PM [facility] (on-call MD unless specified) Details      1/8/2024 10:28 AM Inpatient Internal Medicine Consult Completed     1/8/2024  2:18 AM Inpatient Pulmonology Consult Completed           Hospital Course  68 y.o. female recently diagnosed with RSV and given Augmentin and doxycycline did not improve and return to the hospital with persistent symptoms (cough, headache). She was admitted and treated with supportive care for RSV as well as antibiotics treating secondary bacterial pneumonia (chest x-ray showed mild right infrahilar and left basilar atelectasis and/or pneumonia similar to previous study) and sinusitis (noted on CT scan). Blood cultures negative at 2 days. MRSA nares negative. Respiratory panel showed nothing other than RSV again. She was given supportive care and gradually improved. She should have a follow-up CT chest in 4 to 6 weeks to ensure resolution. Pulmonology has cleared her to discharge. She had some  migraine headache that worsened due to coughing but is improved today (neurology consulted). CTA was done given her history of aneurysm showed no new aneurysm. She has an A1c of 9.80%. She was seen by the diabetic nurse educator. While here she was started on long-acting and mealtime insulin. Glucose control is improved now that her steroids have been switched to p.o. but likely she will need some long-acting insulin at least for a while after discharge. She will follow-up closely with her PCP.    I discussed the patient's findings and my recommendations with patient and nursing staff.    initially discharged 1/27/24 however that was cancelled due to headache. I saw and evaulated patient 1/27/24    Temp:  [97.7 °F (36.5 °C)-98.4 °F (36.9 °C)] 97.7 °F (36.5 °C)  Heart Rate:  [55-77] 77  Resp:  [16-18] 16  BP: (118-143)/(47-71) 139/59  Body mass index is 32.11 kg/m².    Physical Exam  Constitutional:       General: She is not in acute distress.     Appearance: Normal appearance. She is not toxic-appearing.   HENT:      Head: Normocephalic and atraumatic.   Cardiovascular:      Rate and Rhythm: Normal rate and regular rhythm.   Pulmonary:      Effort: Pulmonary effort is normal. No respiratory distress.      Breath sounds: Normal breath sounds. No wheezing or rhonchi.   Abdominal:      General: Bowel sounds are normal.      Palpations: Abdomen is soft.      Tenderness: There is no abdominal tenderness. There is no guarding or rebound.   Musculoskeletal:         General: No swelling.   Skin:     General: Skin is warm and dry.   Neurological:      General: No focal deficit present.      Mental Status: She is alert and oriented to person, place, and time.   Psychiatric:         Mood and Affect: Mood normal.         Behavior: Behavior normal.       Disposition: Home or Self Care       Discharge Medications        New Medications        Instructions Start Date   glucose monitor monitoring kit   Use to test blood glucose up  to four times daily as needed. Formulary Compliance Approval. Diagnosis: Type 2 Diabetes - Insulin Dependent      Lantus SoloStar 100 UNIT/ML injection pen  Generic drug: Insulin Glargine   10 Units, Subcutaneous, Nightly, Formulary Compliance Approval      Pen Needles 32G X 4 MM misc   1 each, Subcutaneous, 4 Times Daily PRN, Formulary Compliance Approval      sodium chloride 0.65 % nasal spray   2 sprays, Nasal, As Needed             Changes to Medications        Instructions Start Date   freestyle lancets  What changed: Another medication with the same name was added. Make sure you understand how and when to take each.   Pt tests daily      Lancets misc  What changed: You were already taking a medication with the same name, and this prescription was added. Make sure you understand how and when to take each.   Use to test blood glucose up to four times daily as needed. Formulary Compliance Approval. Diagnosis: Type 2 Diabetes - Insulin Dependent      FREESTYLE LITE test strip  Generic drug: glucose blood  What changed: Another medication with the same name was added. Make sure you understand how and when to take each.   PT to use once daily      glucose blood test strip  What changed: You were already taking a medication with the same name, and this prescription was added. Make sure you understand how and when to take each.   Use to test blood glucose up to four times daily as needed. Formulary Compliance Approval. Diagnosis: Type 2 Diabetes - Insulin Dependent      predniSONE 10 MG tablet  Commonly known as: DELTASONE  What changed:   medication strength  See the new instructions.   Take 4 tablets by mouth Daily With Breakfast for 2 days, THEN 2 tablets Daily With Breakfast for 2 days, THEN 1 tablet Daily With Breakfast for 2 days.   Start Date: January 27, 2024            Continue These Medications        Instructions Start Date   amoxicillin-clavulanate 875-125 MG per tablet  Commonly known as: AUGMENTIN   1  tablet, Oral, 2 Times Daily      aspirin 81 MG EC tablet   81 mg, Oral, Daily      atorvastatin 20 MG tablet  Commonly known as: LIPITOR       azelastine 0.1 % nasal spray  Commonly known as: ASTELIN   USE 1 SPRAY IN EACH NOSTRIL TWICE A DAY AS NEEDED FOR RHINITIS AS DIRECTED      benzonatate 100 MG capsule  Commonly known as: Tessalon Perles   100 mg, Oral, 3 Times Daily PRN      Biotin 72296 MCG tablet   1 tablet, Oral, Every Evening      budesonide-formoterol 160-4.5 MCG/ACT inhaler  Commonly known as: SYMBICORT   2 puffs, Inhalation, 2 Times Daily - RT      butalbital-acetaminophen-caffeine -40 MG per tablet  Commonly known as: FIORICET, ESGIC   1 tablet, Oral, 2 Times Daily PRN      cholecalciferol 25 MCG (1000 UT) tablet  Commonly known as: VITAMIN D3   1,000 Units, Oral, Daily, PER PT TO CONTINUE PER DR. CALLUM URBINA      CORTISOL PO   Oral      cycloSPORINE 0.05 % ophthalmic emulsion  Commonly known as: RESTASIS   1 drop, Both Eyes, 2 Times Daily      diphenhydrAMINE-acetaminophen  MG tablet per tablet  Commonly known as: TYLENOL PM   1 tablet, Oral, Nightly PRN      fish oil 1000 MG capsule capsule   Oral, Daily With Breakfast      fluticasone 50 MCG/ACT nasal spray  Commonly known as: FLONASE   USE 2 SPRAYS IN EACH NOSTRIL DAILY      glipizide 5 MG ER tablet  Commonly known as: glipiZIDE XL   5 mg, Oral, Daily      Hydrocod Rudi-Chlorphe Rudi ER 10-8 MG/5ML ER suspension  Commonly known as: TUSSIONEX PENNKINETIC   5 mL, Oral, Every 12 Hours PRN      ipratropium 0.06 % nasal spray  Commonly known as: ATROVENT   2 sprays, 2 Times Daily      ipratropium-albuterol 0.5-2.5 mg/3 ml nebulizer  Commonly known as: DUO-NEB   3 mL, Nebulization, Every 2 Hours PRN      lisinopril 10 MG tablet  Commonly known as: PRINIVIL,ZESTRIL   10 mg, Oral, Daily      montelukast 10 MG tablet  Commonly known as: SINGULAIR   10 mg, Oral, Nightly      multivitamin tablet tablet   1 tablet, Oral, Daily      nystatin  578481 UNIT/GM topical powder  Generic drug: nystatin   No dose, route, or frequency recorded.      omeprazole 40 MG capsule  Commonly known as: priLOSEC   TAKE 1 CAPSULE EVERY MORNING      pregabalin 100 MG capsule  Commonly known as: LYRICA   TAKE 1 CAPSULE BY MOUTH THREE TIMES DAILY      PROBIOTIC-10 PO   Oral      promethazine-dextromethorphan 6.25-15 MG/5ML syrup  Commonly known as: PROMETHAZINE-DM   5 mL, Oral, 4 Times Daily PRN, May cause drowsiness.  Do not drive.      propranolol 40 MG tablet  Commonly known as: INDERAL   40 mg, Oral, 2 Times Daily      raNITIdine 75 MG tablet  Commonly known as: ZANTAC   150 mg, Oral, 2 Times Daily      Rybelsus 7 MG tablet  Generic drug: Semaglutide   1 mg, Oral, Daily      Scopolamine 1 MG/3DAYS patch   1 patch, Transdermal, Every 72 Hours      Spacer/Aero-Holding Chambers device   Use with all inhaled treatments      traZODone 50 MG tablet  Commonly known as: DESYREL   50 mg, Oral, Nightly      VITAMIN B 12 PO   5,000 mcg, Oral, Nightly      VITAMIN C PO   1,000 mg, Oral, Daily, PT INSTRUCTED TO CONTINUE PER DR. CALLUM URBINA      zolpidem 5 MG tablet  Commonly known as: Ambien   5 mg, Oral, Nightly PRN             Stop These Medications      albuterol sulfate  (90 Base) MCG/ACT inhaler  Commonly known as: PROVENTIL HFA;VENTOLIN HFA;PROAIR HFA     CO Q 10 PO     doxycycline 100 MG capsule  Commonly known as: MONODOX     Ginkgo Biloba 40 MG tablet     glucosamine-chondroitin 500-400 MG capsule capsule     Potassium Gluconate 550 MG tablet             Diet Instructions       Diet: Regular/House Diet      Discharge Diet: Regular/House Diet    Texture: Regular Texture (IDDSI 7)    Fluid Consistency: Thin (IDDSI 0)           Activity Instructions       Activity as Tolerated             Additional Instructions for the Follow-ups that You Need to Schedule       Call MD for problems / concerns.   As directed             Follow-up Information       Sergo Owen MD  Follow up in 1 week(s).    Specialty: Internal Medicine  Why: post hospital follow up. Follow-up diabetes. Also needs chest CT 4 to 6 weeks. Has pulmonary follow-up 6 weeks  Contact information:  [address] [id number] [phone]               Bucky Vela MD Follow up in 6 week(s).    Specialties: Pulmonary Disease, Sleep Medicine, Intensive Care  Contact information:  [address] [id number] [phone]                            Future Appointments   Date Time Provider Department Center   2/22/2024 10:00 AM Kodi Noble MD [facility] None   4/24/2024 10:15 AM [facility]   5/7/2024 10:45 AM Sergo Owen MD [facility]     Pending Labs       Order Current Status    Betahydroxybutyric Acid, Urine - Urine, Clean Catch In process    Blood Culture - Blood, Arm, Left Preliminary result    Blood Culture - Blood, Hand, Left Preliminary result           Parish Hernandez MD  [facility]  01/27/24    Discharge time spent greater than 30 minutes.

## 2024-01-27 NOTE — PLAN OF CARE
Goal Outcome Evaluation:  Plan of Care Reviewed With: patient        Progress: improving  Outcome Evaluation: Pt rested well this shift. Continues to c/o HA, medicated per MAR. Possible D/C today.

## 2024-01-28 ENCOUNTER — READMISSION MANAGEMENT (OUTPATIENT)
Dept: CALL CENTER | Facility: HOSPITAL | Age: 69
End: 2024-01-28
Payer: MEDICARE

## 2024-01-28 VITALS
RESPIRATION RATE: 19 BRPM | HEIGHT: 68 IN | OXYGEN SATURATION: 94 % | TEMPERATURE: 97.5 F | SYSTOLIC BLOOD PRESSURE: 114 MMHG | BODY MASS INDEX: 31.74 KG/M2 | HEART RATE: 59 BPM | WEIGHT: 209.44 LBS | DIASTOLIC BLOOD PRESSURE: 60 MMHG

## 2024-01-28 LAB
ANION GAP SERPL CALCULATED.3IONS-SCNC: 10 MMOL/L (ref 5–15)
BUN SERPL-MCNC: 19 MG/DL (ref 8–23)
BUN/CREAT SERPL: 31.1 (ref 7–25)
CALCIUM SPEC-SCNC: 9.7 MG/DL (ref 8.6–10.5)
CHLORIDE SERPL-SCNC: 100 MMOL/L (ref 98–107)
CO2 SERPL-SCNC: 28 MMOL/L (ref 22–29)
CREAT SERPL-MCNC: 0.61 MG/DL (ref 0.57–1)
DEPRECATED RDW RBC AUTO: 43.8 FL (ref 37–54)
EGFRCR SERPLBLD CKD-EPI 2021: 97.5 ML/MIN/1.73
ERYTHROCYTE [DISTWIDTH] IN BLOOD BY AUTOMATED COUNT: 13.1 % (ref 12.3–15.4)
GLUCOSE BLDC GLUCOMTR-MCNC: 210 MG/DL (ref 70–130)
GLUCOSE BLDC GLUCOMTR-MCNC: 215 MG/DL (ref 70–130)
GLUCOSE BLDC GLUCOMTR-MCNC: 237 MG/DL (ref 70–130)
GLUCOSE SERPL-MCNC: 204 MG/DL (ref 65–99)
HCT VFR BLD AUTO: 39.5 % (ref 34–46.6)
HGB BLD-MCNC: 13 G/DL (ref 12–15.9)
MCH RBC QN AUTO: 30.3 PG (ref 26.6–33)
MCHC RBC AUTO-ENTMCNC: 32.9 G/DL (ref 31.5–35.7)
MCV RBC AUTO: 92.1 FL (ref 79–97)
PLATELET # BLD AUTO: 285 10*3/MM3 (ref 140–450)
PMV BLD AUTO: 9.7 FL (ref 6–12)
POTASSIUM SERPL-SCNC: 4.4 MMOL/L (ref 3.5–5.2)
RBC # BLD AUTO: 4.29 10*6/MM3 (ref 3.77–5.28)
SODIUM SERPL-SCNC: 138 MMOL/L (ref 136–145)
WBC NRBC COR # BLD AUTO: 8.08 10*3/MM3 (ref 3.4–10.8)

## 2024-01-28 PROCEDURE — 63710000001 DIPHENHYDRAMINE PER 50 MG: Performed by: PHYSICIAN ASSISTANT

## 2024-01-28 PROCEDURE — 63710000001 INSULIN LISPRO (HUMAN) PER 5 UNITS: Performed by: INTERNAL MEDICINE

## 2024-01-28 PROCEDURE — 99232 SBSQ HOSP IP/OBS MODERATE 35: CPT | Performed by: PHYSICIAN ASSISTANT

## 2024-01-28 PROCEDURE — 80048 BASIC METABOLIC PNL TOTAL CA: CPT | Performed by: HOSPITALIST

## 2024-01-28 PROCEDURE — 94799 UNLISTED PULMONARY SVC/PX: CPT

## 2024-01-28 PROCEDURE — 82948 REAGENT STRIP/BLOOD GLUCOSE: CPT

## 2024-01-28 PROCEDURE — 63710000001 INSULIN GLARGINE PER 5 UNITS: Performed by: HOSPITALIST

## 2024-01-28 PROCEDURE — 25010000002 KETOROLAC TROMETHAMINE PER 15 MG: Performed by: PHYSICIAN ASSISTANT

## 2024-01-28 PROCEDURE — 25010000002 PROCHLORPERAZINE 10 MG/2ML SOLUTION: Performed by: PHYSICIAN ASSISTANT

## 2024-01-28 PROCEDURE — 94664 DEMO&/EVAL PT USE INHALER: CPT

## 2024-01-28 PROCEDURE — 25010000002 CEFTRIAXONE PER 250 MG: Performed by: HOSPITALIST

## 2024-01-28 PROCEDURE — 85027 COMPLETE CBC AUTOMATED: CPT | Performed by: HOSPITALIST

## 2024-01-28 PROCEDURE — 94761 N-INVAS EAR/PLS OXIMETRY MLT: CPT

## 2024-01-28 PROCEDURE — 63710000001 INSULIN LISPRO (HUMAN) PER 5 UNITS: Performed by: HOSPITALIST

## 2024-01-28 PROCEDURE — 63710000001 PREDNISONE PER 1 MG: Performed by: INTERNAL MEDICINE

## 2024-01-28 PROCEDURE — 25010000002 ENOXAPARIN PER 10 MG: Performed by: INTERNAL MEDICINE

## 2024-01-28 RX ORDER — LEVETIRACETAM 250 MG/1
250 TABLET ORAL DAILY
Qty: 30 TABLET | Refills: 0 | Status: SHIPPED | OUTPATIENT
Start: 2024-01-29

## 2024-01-28 RX ORDER — DIPHENHYDRAMINE HCL 25 MG
25 CAPSULE ORAL ONCE
Status: COMPLETED | OUTPATIENT
Start: 2024-01-28 | End: 2024-01-28

## 2024-01-28 RX ORDER — PREDNISONE 5 MG/1
TABLET ORAL
Qty: 10 TABLET | Refills: 0 | Status: SHIPPED | OUTPATIENT
Start: 2024-01-28 | End: 2024-02-02 | Stop reason: HOSPADM

## 2024-01-28 RX ORDER — KETOROLAC TROMETHAMINE 15 MG/ML
15 INJECTION, SOLUTION INTRAMUSCULAR; INTRAVENOUS ONCE
Status: COMPLETED | OUTPATIENT
Start: 2024-01-28 | End: 2024-01-28

## 2024-01-28 RX ORDER — PROCHLORPERAZINE EDISYLATE 5 MG/ML
5 INJECTION INTRAMUSCULAR; INTRAVENOUS EVERY 6 HOURS PRN
Status: DISCONTINUED | OUTPATIENT
Start: 2024-01-28 | End: 2024-01-28 | Stop reason: HOSPADM

## 2024-01-28 RX ADMIN — Medication 1 TABLET: at 10:44

## 2024-01-28 RX ADMIN — Medication 10 ML: at 10:49

## 2024-01-28 RX ADMIN — LISINOPRIL 10 MG: 10 TABLET ORAL at 10:44

## 2024-01-28 RX ADMIN — INSULIN LISPRO 6 UNITS: 100 INJECTION, SOLUTION INTRAVENOUS; SUBCUTANEOUS at 10:48

## 2024-01-28 RX ADMIN — PROPRANOLOL HYDROCHLORIDE 40 MG: 40 TABLET ORAL at 10:44

## 2024-01-28 RX ADMIN — HYDROCODONE BITARTRATE AND ACETAMINOPHEN 1 TABLET: 5; 325 TABLET ORAL at 13:39

## 2024-01-28 RX ADMIN — HYDROCODONE BITARTRATE AND ACETAMINOPHEN 1 TABLET: 5; 325 TABLET ORAL at 00:29

## 2024-01-28 RX ADMIN — PREGABALIN 100 MG: 100 CAPSULE ORAL at 10:45

## 2024-01-28 RX ADMIN — PREGABALIN 100 MG: 100 CAPSULE ORAL at 17:00

## 2024-01-28 RX ADMIN — DIPHENHYDRAMINE HYDROCHLORIDE 25 MG: 25 CAPSULE ORAL at 10:45

## 2024-01-28 RX ADMIN — ATORVASTATIN CALCIUM 20 MG: 20 TABLET, FILM COATED ORAL at 10:44

## 2024-01-28 RX ADMIN — INSULIN LISPRO 6 UNITS: 100 INJECTION, SOLUTION INTRAVENOUS; SUBCUTANEOUS at 13:28

## 2024-01-28 RX ADMIN — MAGNESIUM OXIDE 400 MG (241.3 MG MAGNESIUM) TABLET 400 MG: TABLET at 10:44

## 2024-01-28 RX ADMIN — INSULIN LISPRO 4 UNITS: 100 INJECTION, SOLUTION INTRAVENOUS; SUBCUTANEOUS at 10:41

## 2024-01-28 RX ADMIN — INSULIN LISPRO 6 UNITS: 100 INJECTION, SOLUTION INTRAVENOUS; SUBCUTANEOUS at 17:00

## 2024-01-28 RX ADMIN — ZOLPIDEM TARTRATE 5 MG: 5 TABLET ORAL at 00:29

## 2024-01-28 RX ADMIN — INSULIN LISPRO 4 UNITS: 100 INJECTION, SOLUTION INTRAVENOUS; SUBCUTANEOUS at 13:28

## 2024-01-28 RX ADMIN — ASPIRIN 81 MG: 81 TABLET, COATED ORAL at 10:44

## 2024-01-28 RX ADMIN — DOCUSATE SODIUM 50MG AND SENNOSIDES 8.6MG 2 TABLET: 8.6; 5 TABLET, FILM COATED ORAL at 10:49

## 2024-01-28 RX ADMIN — CYCLOSPORINE 1 DROP: 0.5 EMULSION OPHTHALMIC at 10:44

## 2024-01-28 RX ADMIN — PROCHLORPERAZINE EDISYLATE 5 MG: 5 INJECTION INTRAMUSCULAR; INTRAVENOUS at 10:45

## 2024-01-28 RX ADMIN — Medication 1000 UNITS: at 10:45

## 2024-01-28 RX ADMIN — Medication 1 CAPSULE: at 10:43

## 2024-01-28 RX ADMIN — GLIPIZIDE 2.5 MG: 5 TABLET ORAL at 17:00

## 2024-01-28 RX ADMIN — Medication 300 MG: at 10:43

## 2024-01-28 RX ADMIN — LEVETIRACETAM 250 MG: 250 TABLET, FILM COATED ORAL at 10:43

## 2024-01-28 RX ADMIN — INSULIN LISPRO 4 UNITS: 100 INJECTION, SOLUTION INTRAVENOUS; SUBCUTANEOUS at 17:01

## 2024-01-28 RX ADMIN — BUDESONIDE AND FORMOTEROL FUMARATE DIHYDRATE 2 PUFF: 160; 4.5 AEROSOL RESPIRATORY (INHALATION) at 08:00

## 2024-01-28 RX ADMIN — PANTOPRAZOLE SODIUM 40 MG: 40 TABLET, DELAYED RELEASE ORAL at 06:22

## 2024-01-28 RX ADMIN — CEFTRIAXONE 2000 MG: 2 INJECTION, POWDER, FOR SOLUTION INTRAMUSCULAR; INTRAVENOUS at 13:27

## 2024-01-28 RX ADMIN — PREDNISONE 20 MG: 20 TABLET ORAL at 10:44

## 2024-01-28 RX ADMIN — INSULIN GLARGINE 20 UNITS: 100 INJECTION, SOLUTION SUBCUTANEOUS at 10:42

## 2024-01-28 RX ADMIN — ENOXAPARIN SODIUM 40 MG: 100 INJECTION SUBCUTANEOUS at 10:44

## 2024-01-28 RX ADMIN — GLIPIZIDE 2.5 MG: 5 TABLET ORAL at 06:22

## 2024-01-28 RX ADMIN — IPRATROPIUM BROMIDE 2 SPRAY: 42 SPRAY, METERED NASAL at 13:29

## 2024-01-28 RX ADMIN — IPRATROPIUM BROMIDE AND ALBUTEROL SULFATE 3 ML: 2.5; .5 SOLUTION RESPIRATORY (INHALATION) at 08:02

## 2024-01-28 RX ADMIN — KETOROLAC TROMETHAMINE 15 MG: 15 INJECTION, SOLUTION INTRAMUSCULAR; INTRAVENOUS at 10:45

## 2024-01-28 NOTE — DISCHARGE SUMMARY
[facility]    Date of Discharge:  1/28/2024    PCP: Sergo Owen MD    Discharge Diagnosis:   Active Hospital Problems    Diagnosis  POA    **Hypoxia [R09.02]  Yes    RSV (respiratory syncytial virus infection) [B33.8]  Yes    Asthma exacerbation [J45.901]  Yes    GERD (gastroesophageal reflux disease) [K21.9]  Yes    Fatty liver [K76.0]  Yes    Essential hypertension [I10]  Yes    Type 2 diabetes mellitus with hyperglycemia, without long-term current use of insulin [E11.65]  Yes    Migraine without aura and without status migrainosus, not intractable [G43.009]  Yes    Hx of cerebral aneurysm repair [Z98.890, Z86.79]  Not Applicable    Monoclonal gammopathy [D47.2]  Yes      Resolved Hospital Problems   No resolved problems to display.          Consults       Date and Time Order Name Status Description    1/26/2024  4:20 PM Inpatient Neurology Consult General Completed     1/26/2024  8:30 AM Inpatient Pulmonology Consult Completed     1/23/2024  1:19 PM [facility] (on-call MD unless specified) Details      1/8/2024 10:28 AM Inpatient Internal Medicine Consult Completed     1/8/2024  2:18 AM Inpatient Pulmonology Consult Completed           Hospital Course  68 y.o. female recently diagnosed with RSV and given Augmentin and doxycycline did not improve and return to the hospital with persistent symptoms (cough, headache). She was admitted and treated with supportive care for RSV as well as antibiotics treating secondary bacterial pneumonia (chest x-ray showed mild right infrahilar and left basilar atelectasis and/or pneumonia similar to previous study) and sinusitis (noted on CT scan). Blood cultures negative at 2 days. MRSA nares negative. Respiratory panel showed nothing other than RSV again. She was given supportive care and gradually improved. She should have a follow-up CT chest in 4 to 6 weeks to ensure resolution. Pulmonology has cleared her to discharge. She  had some migraine headache that worsened due to coughing but is improved today. CTA was done given her history of aneurysm showed no new aneurysm. She has an A1c of 9.80%. She was seen by the diabetic nurse educator. While here she was started on long-acting and mealtime insulin. Glucose control is improved now that her steroids have been switched to p.o. but likely she will need some long-acting insulin at least for a while after discharge. She will follow-up closely with her PCP.    She was initially discharged 1/27/2024 however due to persistent migraine headache discharge was held. She was seen by neurology and given migraine cocktail that helped her headache. She is going to have a repeat migraine cocktail this morning. Neurology is recommending Keppra, riboflavin, and magnesium for prevention. They recommend neuro follow-up and outpatient MRI. They felt she was a good candidate for CGRP inhibitor.    I discussed the patient's findings and my recommendations with patient and nursing staff. She would like to go home today after migraine cocktail.    Temp:  [97.5 °F (36.4 °C)-98.6 °F (37 °C)] 97.5 °F (36.4 °C)  Heart Rate:  [59-83] 59  Resp:  [18-20] 19  BP: (114-150)/(60-80) 114/60  Body mass index is 32.11 kg/m².    Physical Exam  Constitutional:       General: She is not in acute distress.     Appearance: Normal appearance. She is not toxic-appearing.   HENT:      Head: Normocephalic and atraumatic.   Cardiovascular:      Rate and Rhythm: Normal rate and regular rhythm.   Pulmonary:      Effort: Pulmonary effort is normal. No respiratory distress.      Breath sounds: Normal breath sounds. No wheezing or rhonchi.   Abdominal:      General: Bowel sounds are normal.      Palpations: Abdomen is soft.      Tenderness: There is no abdominal tenderness. There is no guarding or rebound.   Musculoskeletal:         General: No swelling.   Skin:     General: Skin is warm and dry.   Neurological:      General: No focal  deficit present.      Mental Status: She is alert and oriented to person, place, and time.   Psychiatric:         Mood and Affect: Mood normal.         Behavior: Behavior normal.       Disposition: Home or Self Care       Discharge Medications        New Medications        Instructions Start Date   glucose monitor monitoring kit   Use to test blood glucose up to four times daily as needed. Formulary Compliance Approval. Diagnosis: Type 2 Diabetes - Insulin Dependent      Lantus SoloStar 100 UNIT/ML injection pen  Generic drug: Insulin Glargine   10 Units, Subcutaneous, Nightly, Formulary Compliance Approval      levETIRAcetam 250 MG tablet  Commonly known as: KEPPRA   250 mg, Oral, Daily   Start Date: January 29, 2024     magnesium oxide 400 tablet tablet  Commonly known as: MAG-OX   400 mg, Oral, Daily   Start Date: January 29, 2024     Pen Needles 32G X 4 MM misc   1 each, Subcutaneous, 4 Times Daily PRN, Formulary Compliance Approval      sodium chloride 0.65 % nasal spray   2 sprays, Nasal, As Needed      Vitamin B-2 100 MG tablet tablet  Commonly known as: RIBOFLAVIN   300 mg, Oral, Daily   Start Date: January 29, 2024            Changes to Medications        Instructions Start Date   freestyle lancets  What changed: Another medication with the same name was added. Make sure you understand how and when to take each.   Pt tests daily      Lancets misc  What changed: You were already taking a medication with the same name, and this prescription was added. Make sure you understand how and when to take each.   Use to test blood glucose up to four times daily as needed. Formulary Compliance Approval. Diagnosis: Type 2 Diabetes - Insulin Dependent      FREESTYLE LITE test strip  Generic drug: glucose blood  What changed: Another medication with the same name was added. Make sure you understand how and when to take each.   PT to use once daily      glucose blood test strip  What changed: You were already taking a  medication with the same name, and this prescription was added. Make sure you understand how and when to take each.   Use to test blood glucose up to four times daily as needed. Formulary Compliance Approval. Diagnosis: Type 2 Diabetes - Insulin Dependent      predniSONE 5 MG tablet  Commonly known as: DELTASONE  What changed:   medication strength  See the new instructions.   Take 4 tablets by mouth Daily With Breakfast for 1 day, THEN 3 tablets Daily With Breakfast for 1 day, THEN 2 tablets Daily With Breakfast for 1 day, THEN 1 tablet Daily With Breakfast for 1 day.   Start Date: January 28, 2024            Continue These Medications        Instructions Start Date   amoxicillin-clavulanate 875-125 MG per tablet  Commonly known as: AUGMENTIN   1 tablet, Oral, 2 Times Daily      aspirin 81 MG EC tablet   81 mg, Oral, Daily      atorvastatin 20 MG tablet  Commonly known as: LIPITOR       azelastine 0.1 % nasal spray  Commonly known as: ASTELIN   USE 1 SPRAY IN EACH NOSTRIL TWICE A DAY AS NEEDED FOR RHINITIS AS DIRECTED      benzonatate 100 MG capsule  Commonly known as: Tessalon Perles   100 mg, Oral, 3 Times Daily PRN      Biotin 86521 MCG tablet   1 tablet, Oral, Every Evening      budesonide-formoterol 160-4.5 MCG/ACT inhaler  Commonly known as: SYMBICORT   2 puffs, Inhalation, 2 Times Daily - RT      cholecalciferol 25 MCG (1000 UT) tablet  Commonly known as: VITAMIN D3   1,000 Units, Oral, Daily, PER PT TO CONTINUE PER DR. CALLUM URBINA      CORTISOL PO   Oral      cycloSPORINE 0.05 % ophthalmic emulsion  Commonly known as: RESTASIS   1 drop, Both Eyes, 2 Times Daily      diphenhydrAMINE-acetaminophen  MG tablet per tablet  Commonly known as: TYLENOL PM   1 tablet, Oral, Nightly PRN      fish oil 1000 MG capsule capsule   Oral, Daily With Breakfast      fluticasone 50 MCG/ACT nasal spray  Commonly known as: FLONASE   USE 2 SPRAYS IN EACH NOSTRIL DAILY      glipizide 5 MG ER tablet  Commonly known as:  glipiZIDE XL   5 mg, Oral, Daily      Hydrocod Rudi-Chlorphe Rudi ER 10-8 MG/5ML ER suspension  Commonly known as: TUSSIONEX PENNKINETIC   5 mL, Oral, Every 12 Hours PRN      ipratropium 0.06 % nasal spray  Commonly known as: ATROVENT   2 sprays, 2 Times Daily      ipratropium-albuterol 0.5-2.5 mg/3 ml nebulizer  Commonly known as: DUO-NEB   3 mL, Nebulization, Every 2 Hours PRN      lisinopril 10 MG tablet  Commonly known as: PRINIVIL,ZESTRIL   10 mg, Oral, Daily      montelukast 10 MG tablet  Commonly known as: SINGULAIR   10 mg, Oral, Nightly      multivitamin tablet tablet   1 tablet, Oral, Daily      nystatin 762982 UNIT/GM topical powder  Generic drug: nystatin   No dose, route, or frequency recorded.      omeprazole 40 MG capsule  Commonly known as: priLOSEC   TAKE 1 CAPSULE EVERY MORNING      pregabalin 100 MG capsule  Commonly known as: LYRICA   TAKE 1 CAPSULE BY MOUTH THREE TIMES DAILY      PROBIOTIC-10 PO   Oral      promethazine-dextromethorphan 6.25-15 MG/5ML syrup  Commonly known as: PROMETHAZINE-DM   5 mL, Oral, 4 Times Daily PRN, May cause drowsiness.  Do not drive.      propranolol 40 MG tablet  Commonly known as: INDERAL   40 mg, Oral, 2 Times Daily      raNITIdine 75 MG tablet  Commonly known as: ZANTAC   150 mg, Oral, 2 Times Daily      Rybelsus 7 MG tablet  Generic drug: Semaglutide   1 mg, Oral, Daily      Spacer/Aero-Holding Chambers device   Use with all inhaled treatments      traZODone 50 MG tablet  Commonly known as: DESYREL   50 mg, Oral, Nightly      VITAMIN B 12 PO   5,000 mcg, Oral, Nightly      VITAMIN C PO   1,000 mg, Oral, Daily, PT INSTRUCTED TO CONTINUE PER DR. CALLUM URBINA      zolpidem 5 MG tablet  Commonly known as: Ambien   5 mg, Oral, Nightly PRN             Stop These Medications      albuterol sulfate  (90 Base) MCG/ACT inhaler  Commonly known as: PROVENTIL HFA;VENTOLIN HFA;PROAIR HFA     butalbital-acetaminophen-caffeine -40 MG per tablet  Commonly known as:  FIORICET, ESGIC     CO Q 10 PO     doxycycline 100 MG capsule  Commonly known as: MONODOX     Ginkgo Biloba 40 MG tablet     glucosamine-chondroitin 500-400 MG capsule capsule     Potassium Gluconate 550 MG tablet     Scopolamine 1 MG/3DAYS patch             Diet Instructions       Diet: Regular/House Diet      Discharge Diet: Regular/House Diet    Texture: Regular Texture (IDDSI 7)    Fluid Consistency: Thin (IDDSI 0)           Activity Instructions       Activity as Tolerated             Additional Instructions for the Follow-ups that You Need to Schedule       Call MD for problems / concerns.   As directed             Follow-up Information       Sergo Owen MD Follow up in 1 week(s).    Specialty: Internal Medicine  Why: post hospital follow up. Follow-up diabetes. Also needs chest CT 4 to 6 weeks. Has pulmonary follow-up 6 weeks  Contact information:  [address] [place] [id number] [phone]               Bucky Vela MD Follow up in 6 week(s).    Specialties: Pulmonary Disease, Sleep Medicine, Intensive Care  Contact information:  [address]  [place] [address]  [phone]               Janet Eastman PA Follow up.    Specialties: Physician Assistant, Neurology  Contact information:  [address]  [place] [address]  [phone]                            Future Appointments   Date Time Provider Department Center   2/22/2024 10:00 AM Kodi Noble MD [facility] None   4/24/2024 10:15 AM [facility]   5/7/2024 10:45 AM Sergo Owen MD [facility]     Pending Labs       Order Current Status    Betahydroxybutyric Acid, Urine - Urine, Clean Catch In process    Blood Culture - Blood, Arm, Left Preliminary result    Blood Culture - Blood, Hand, Left Preliminary result           Parish Hernandez MD  [facility]  01/28/24    Discharge time spent greater than 30 minutes.

## 2024-01-28 NOTE — OUTREACH NOTE
Prep Survey      Flowsheet Row Responses   [facility], [place] patient discharged from? [place]   Is LACE score < 7 ? No   Eligibility [facility]   Date of Admission 01/23/24   Date of Discharge 01/28/24   Discharge Disposition Home or Self Care   Discharge diagnosis **Hypoxia (   Does the patient have one of the following disease processes/diagnoses(primary or secondary)? Other   Does the patient have Home health ordered? No   Is there a DME ordered? No   Prep survey completed? Yes            YUNG CISNEROS - Registered Nurse

## 2024-01-28 NOTE — PLAN OF CARE
Goal Outcome Evaluation:  Plan of Care Reviewed With: patient        Progress: improving  Outcome Evaluation: All needs met. Rested well. Up ad pierce. Cardiac, cc, renal diet. VSS. Oriented x 4. SR/SB on the monitor. RA. Headache complaints persists and Fioricet and Norco given. Blood glucose monitoring. Potential d/c today.

## 2024-01-28 NOTE — PROGRESS NOTES
"DOS: 2024  NAME: Khalida Gilliland   : 1955  PCP: Sergo Owen MD  Chief Complaint   Patient presents with    Shortness of Breath    Cough    Headache       Chief complaint: headache  Subjective: Headache improved but seems to be coming back this morning    Objective:  Vital signs: /70 (BP Location: Right arm, Patient Position: Lying)   Pulse 72   Temp 97.9 °F (36.6 °C) (Oral)   Resp 18   Ht 172 cm (67.72\")   Wt 95 kg (209 lb 7 oz)   LMP  (LMP Unknown)   SpO2 98%   BMI 32.11 kg/m²      Gen: NAD, vitals reviewed  MS: oriented x3, recent/remote memory intact, normal attention/concentration, language intact, no neglect.  CN: visual acuity grossly normal, PERRL, EOMI, no facial droop, no dysarthria  Motor: 5/5 throughout upper and lower extremities, normal tone  Sensory: intact to light touch all 4 ext.    ROS:  No weakness, numbness  No fevers, chills      Laboratory results:  Lab Results   Component Value Date    GLUCOSE 204 (H) 2024    CALCIUM 9.7 2024     2024    K 4.4 2024    CO2 28.0 2024     2024    BUN 19 2024    CREATININE 0.61 2024    EGFRIFAFRI 106 2021    EGFRIFNONA 93 2022    BCR 31.1 (H) 2024    ANIONGAP 10.0 2024     Lab Results   Component Value Date    WBC 8.08 2024    HGB 13.0 2024    HCT 39.5 2024    MCV 92.1 2024     2024     Lab Results   Component Value Date    LDL 73 2024    LDL 91 10/17/2023    LDL 70 2022         Lab 24  0633   HEMOGLOBIN A1C 9.80*        Review of labs: crp nml    Review and interpretation of imaging:   HCT and CTA head   FINDINGS:     Head CT: This patient has had a remote prior right frontal temporal  parietal craniotomy for clipping of an aneurysm and there are 2 aneurysm  clips in place along the margin of the supracavernous right internal  carotid artery. There is beam hardening artifact streaking off " the metal  in the aneurysm clips that streaks through the adjacent brain parenchyma  and extra-axial spaces slightly limiting evaluation. There are tiny  subcentimeter areas of encephalomalacia in the anterolateral right  temporal lobe and anterolateral right frontal lobe likely postoperative  areas of encephalomalacia. There is minimal low-density in the  periventricular white matter consistent with minimal small vessel  disease. The remainder of the brain parenchyma is normal in attenuation.  The ventricles are normal in size. I see no focal mass effect. There is  no midline shift. No extra-axial fluid collections are identified. There  is no evidence of acute intracranial hemorrhage. No abnormal areas of  enhancement are seen in the head. There is tiny amount of fluid and  mucosal thickening in  medial left frontal sinus and left frontal recess  as well as in the ethmoid sinuses bilaterally and there is subtotal  opacification of the maxillary sinuses with circumferential mucosal  thickening and central air-fluid levels. There is mucosal thickening in  the posterior lateral left sphenoid sinus. The mastoid middle ear  cavities are clear.     CT angiogram of the head: The visualized portion of the distal vertebral  arteries are normal in appearance from the C1 ring level to the  vertebrobasilar junction. The basilar artery and the basilar tip is  normal in appearance. The posterior cerebral and superior cerebellar  arteries are normal in appearance bilaterally. The upper cervical,  petrous segments of the internal carotid arteries are normal in  appearance. There is a flow diverting stent extending from the cavernous  segment of the right internal carotid artery into the supracavernous  segment adjacent to which are 2 aneurysm clips where there was prior  clipping of an aneurysm off the supracavernous right internal carotid  artery. Artifact off the stent limits evaluation of the lumen of the  right internal  carotid artery at the level of the stent but otherwise  the supracavernous segment of the right internal carotid artery is  normal to the right internal carotid terminus. The cavernous and  supracavernous segment of left internal carotid artery is normal in  appearance. The A1 segments of the anterior cerebral arteries and the  anterior communicating artery origin and A2 segments of the anterior  cerebral arteries are normal in appearance. M1 segments of the left  middle cerebral artery and left middle cerebral artery bifurcations  within normal limits. There is focal mild to moderate narrowing of the  midportion of the M1 segment of the right middle cerebral artery, the  right middle cerebral artery bifurcation is within normal limits.      IMPRESSION:  1. This patient has some sinus opacification with small amount of fluid  and mucosal thickening in the medial left frontal sinus, left frontal  recess and in the ethmoid sinuses bilaterally and there is subtotal  opacification of the maxillary sinuses with circumferential mucosal  thickening and central air-fluid levels. There is moderate mucosal  thickening of lateral left sphenoid sinus.     2. Previous lateral right frontotemporal parietal craniotomy for  clipping of an aneurysm off of the distal right internal carotid artery  with 2 aneurysm clips in place and there is a flow diverting stent  extending from the mid cavernous segment of the right internal carotid  artery into the supracavernous segment. There is some artifact off the  aneurysm clips streaking through the adjacent brain and extra-axial  spaces slightly limiting evaluation and it does limit evaluation of the  M1 segment of the right middle cerebral artery as well. There is a  suggestion of mild to moderate stenosis of the midportion M1 segment of  the right middle cerebral artery although a portion of this may be  artifact from beam hardening off the adjacent aneurysm clips. Otherwise  the CT  angiogram of the head is normal with no discernible intracranial  aneurysm identified.     Radiation dose reduction techniques were utilized, including automated  exposure control and exposure modulation based on body size.     Workup to date:    Diagnoses:  1 Migraine headache  2 Pneumonia  3 asthma exacerbation  4 recent RSV    Impression: 67 yo F with h/o , HTN, BURTON, DM, asthma, h/o cerebral aneurysm s/p clipping and stenting, R frontoparietal craniotomy, and migraines.  She was recently treated for asthma exacerbation in setting of RSV infection.  She represented with SOB and chest pain, treated for PNA and sinusitis.  For headache neurology was consulted    Previously she thinks her migraines were well controlled after menopause.  She does take fiorect fairly frequently at home when she has migrainous HA.  She describes typical migraine features.  Headache cocktail has helped but she feels it coming back on this morning.    Plan  1 repeat headache cocktail this morn  1 keppra 250mg daily, riboflavin, mg for prevention.  She is also on lyrica and propranolol  2 she is good candidate for CGRP inhibitor  3 MRI brain can be done outp  4 will send electronic message to schedule in neuro clinic       Thank you for this consultation.  Discussed above plan with neuro attending, Dr. Blanco who agrees with above plan.  Neurology team is available for concerns or questions.

## 2024-01-29 ENCOUNTER — TELEPHONE (OUTPATIENT)
Dept: FAMILY MEDICINE CLINIC | Facility: CLINIC | Age: 69
End: 2024-01-29

## 2024-01-29 ENCOUNTER — TRANSITIONAL CARE MANAGEMENT TELEPHONE ENCOUNTER (OUTPATIENT)
Dept: CALL CENTER | Facility: HOSPITAL | Age: 69
End: 2024-01-29
Payer: MEDICARE

## 2024-01-29 DIAGNOSIS — B37.31 VAGINAL YEAST INFECTION: Primary | ICD-10-CM

## 2024-01-29 LAB
BACTERIA SPEC AEROBE CULT: NORMAL
BACTERIA SPEC AEROBE CULT: NORMAL

## 2024-01-29 RX ORDER — FLUCONAZOLE 150 MG/1
150 TABLET ORAL
Qty: 2 TABLET | Refills: 0 | Status: SHIPPED | OUTPATIENT
Start: 2024-01-29 | End: 2024-01-30 | Stop reason: SDUPTHER

## 2024-01-29 NOTE — TELEPHONE ENCOUNTER
Caller: Darshana, Khalida CHRIS    Relationship: Self    Best call back number: [phone]     What are your current symptoms: NEON YELLOW URINE AND VAGINAL ITCHING     How long have you been experiencing symptoms: 4 DAYS AGO     Have you had these symptoms before:    [] Yes  [x] No    Have you been treated for these symptoms before:   [] Yes  [x] No    If a prescription is needed, what is your preferred pharmacy and phone number:  [facility] #[id number] [facility] [address] [place] [facility] [place] - [phone] [facility] [phone] FX     Additional notes: PATIENT STATES SHE WAS JUST RELEASED FROM THE HOSPITAL 1/28/24 AND WAS PUT ON SEVERAL MEDICATIONS     PATIENT STATES SHE DID NOT MENTION THE SYMPTOMS IN THE HOSPITAL BECAUSE IT WAS THE LEAST OF HER WORRIES     PATIENT STATES THE DISCOMFORT IS GETTING WORSE BY THE DAY

## 2024-01-29 NOTE — CASE MANAGEMENT/SOCIAL WORK
Case Management Discharge Note      Final Note: Home, family to transport         Selected Continued Care - Discharged on 1/28/2024 Admission date: 1/23/2024 - Discharge disposition: Home or Self Care      Destination    No services have been selected for the patient.                Durable Medical Equipment    No services have been selected for the patient.                Dialysis/Infusion    No services have been selected for the patient.                Home Medical Care    No services have been selected for the patient.                Therapy    No services have been selected for the patient.                Community Resources    No services have been selected for the patient.                Community & DME    No services have been selected for the patient.                    Transportation Services  Private: Car    Final Discharge Disposition Code: 01 - home or self-care

## 2024-01-29 NOTE — OUTREACH NOTE
Call Center TCM Note      Flowsheet Row Responses   [facility] patient discharged from? [facility]   Does the patient have one of the following disease processes/diagnoses(primary or secondary)? Other   TCM attempt successful? Yes   Call start time 1256   Call end time 1300   Discharge diagnosis **Hypoxia (   Meds reviewed with patient/caregiver? Yes   Is the patient having any side effects they believe may be caused by any medication additions or changes? No   Does the patient have all medications ordered at discharge? Yes   Is the patient taking all medications as directed (includes completed medication regime)? Yes   Does the patient have an appointment with their PCP within 7-14 days of discharge? No   Nursing Interventions Patient declined scheduling/rescheduling appointment at this time   Has home health visited the patient within 72 hours of discharge? N/A   What DME was ordered? neb- [facility]-pt's spouse purchased a neb   Has all DME been delivered? Yes   Did the patient receive a copy of their discharge instructions? Yes   Nursing interventions Reviewed instructions with patient   What is the patient's perception of their health status since discharge? Improving   Is the patient/caregiver able to teach back signs and symptoms related to disease process for when to call PCP? Yes   Is the patient/caregiver able to teach back signs and symptoms related to disease process for when to call 911? Yes   Is the patient/caregiver able to teach back the hierarchy of who to call/visit for symptoms/problems? PCP, Specialist, Home health nurse, Urgent Care, ED, 911 Yes   If the patient is a current smoker, are they able to teach back resources for cessation? Not a smoker   TCM call completed? Yes   Call end time 1300   Would this patient benefit from a Referral to Amb Social Work? No   Is the patient interested in additional calls from an ambulatory ? No            Carol Jc,  LPN    1/29/2024, 13:06 EST

## 2024-01-30 ENCOUNTER — TELEPHONE (OUTPATIENT)
Dept: FAMILY MEDICINE CLINIC | Facility: CLINIC | Age: 69
End: 2024-01-30
Payer: MEDICARE

## 2024-01-30 DIAGNOSIS — B37.31 VAGINAL YEAST INFECTION: ICD-10-CM

## 2024-01-30 LAB — B-OH-BUTYR SERPL-MCNC: 53 MCG/ML

## 2024-01-30 RX ORDER — FLUCONAZOLE 150 MG/1
150 TABLET ORAL
Qty: 2 TABLET | Refills: 0 | Status: SHIPPED | OUTPATIENT
Start: 2024-01-30

## 2024-01-30 NOTE — TELEPHONE ENCOUNTER
Pt called in stating that she had done a few breathing treatments throughout the night due to her O1 stats being in the 80s. Pt stated that she is really tired and declines an appointment for today. Pt stated that she is feeling better sitting up and being up right pt stated that she is fatigued and that she will track he stats. Informed pt that if she starts wheezing or gasping for air to go and be evaluated at the ER. Pt verbally understood.

## 2024-01-30 NOTE — TELEPHONE ENCOUNTER
Caller: Khalida Gilliland    Relationship: Self    Best call back number: [phone]     What was the call regarding: PATIENT STATED THAT THE PHARMACY NEEDS THE PRESCRIPTION THAT WAS SENT IN FOR THESE SYMPTOMS TO BE RESENT WITH CLEARER DIRECTIONS. PLEASE ADVISE.

## 2024-01-30 NOTE — TELEPHONE ENCOUNTER
Please call patient and inform I talked to pharmacist and corrected the prescription. So she is okay to collect medication from pharmacy.

## 2024-02-01 ENCOUNTER — HOSPITAL ENCOUNTER (OUTPATIENT)
Facility: HOSPITAL | Age: 69
Setting detail: OBSERVATION
Discharge: HOME | End: 2024-02-02
Attending: EMERGENCY MEDICINE | Admitting: EMERGENCY MEDICINE
Payer: MEDICARE

## 2024-02-01 ENCOUNTER — APPOINTMENT (OUTPATIENT)
Dept: CARDIOLOGY | Facility: HOSPITAL | Age: 69
End: 2024-02-01
Payer: MEDICARE

## 2024-02-01 ENCOUNTER — READMISSION MANAGEMENT (OUTPATIENT)
Dept: CALL CENTER | Facility: HOSPITAL | Age: 69
End: 2024-02-01
Payer: MEDICARE

## 2024-02-01 ENCOUNTER — APPOINTMENT (OUTPATIENT)
Dept: GENERAL RADIOLOGY | Facility: HOSPITAL | Age: 69
End: 2024-02-01
Payer: MEDICARE

## 2024-02-01 DIAGNOSIS — R07.9 CHEST PAIN, UNSPECIFIED TYPE: Primary | ICD-10-CM

## 2024-02-01 DIAGNOSIS — R79.89 POSITIVE D DIMER: ICD-10-CM

## 2024-02-01 LAB
ALBUMIN SERPL-MCNC: 4.1 G/DL (ref 3.5–5.2)
ALBUMIN/GLOB SERPL: 1.4 G/DL
ALP SERPL-CCNC: 58 U/L (ref 39–117)
ALT SERPL W P-5'-P-CCNC: 25 U/L (ref 1–33)
ANION GAP SERPL CALCULATED.3IONS-SCNC: 11.2 MMOL/L (ref 5–15)
AST SERPL-CCNC: 22 U/L (ref 1–32)
BH CV LOWER VASCULAR LEFT COMMON FEMORAL AUGMENT: NORMAL
BH CV LOWER VASCULAR LEFT COMMON FEMORAL COMPETENT: NORMAL
BH CV LOWER VASCULAR LEFT COMMON FEMORAL COMPRESS: NORMAL
BH CV LOWER VASCULAR LEFT COMMON FEMORAL PHASIC: NORMAL
BH CV LOWER VASCULAR LEFT COMMON FEMORAL SPONT: NORMAL
BH CV LOWER VASCULAR LEFT DISTAL FEMORAL COMPRESS: NORMAL
BH CV LOWER VASCULAR LEFT GASTRONEMIUS COMPRESS: NORMAL
BH CV LOWER VASCULAR LEFT GREATER SAPH AK COMPRESS: NORMAL
BH CV LOWER VASCULAR LEFT GREATER SAPH BK COMPRESS: NORMAL
BH CV LOWER VASCULAR LEFT LESSER SAPH COMPRESS: NORMAL
BH CV LOWER VASCULAR LEFT MID FEMORAL AUGMENT: NORMAL
BH CV LOWER VASCULAR LEFT MID FEMORAL COMPETENT: NORMAL
BH CV LOWER VASCULAR LEFT MID FEMORAL COMPRESS: NORMAL
BH CV LOWER VASCULAR LEFT MID FEMORAL PHASIC: NORMAL
BH CV LOWER VASCULAR LEFT MID FEMORAL SPONT: NORMAL
BH CV LOWER VASCULAR LEFT PERONEAL COMPRESS: NORMAL
BH CV LOWER VASCULAR LEFT POPLITEAL AUGMENT: NORMAL
BH CV LOWER VASCULAR LEFT POPLITEAL COMPETENT: NORMAL
BH CV LOWER VASCULAR LEFT POPLITEAL COMPRESS: NORMAL
BH CV LOWER VASCULAR LEFT POPLITEAL PHASIC: NORMAL
BH CV LOWER VASCULAR LEFT POPLITEAL SPONT: NORMAL
BH CV LOWER VASCULAR LEFT POSTERIOR TIBIAL COMPRESS: NORMAL
BH CV LOWER VASCULAR LEFT PROFUNDA FEMORAL COMPRESS: NORMAL
BH CV LOWER VASCULAR LEFT PROXIMAL FEMORAL COMPRESS: NORMAL
BH CV LOWER VASCULAR LEFT SAPHENOFEMORAL JUNCTION COMPRESS: NORMAL
BH CV LOWER VASCULAR RIGHT COMMON FEMORAL AUGMENT: NORMAL
BH CV LOWER VASCULAR RIGHT COMMON FEMORAL COMPETENT: NORMAL
BH CV LOWER VASCULAR RIGHT COMMON FEMORAL COMPRESS: NORMAL
BH CV LOWER VASCULAR RIGHT COMMON FEMORAL PHASIC: NORMAL
BH CV LOWER VASCULAR RIGHT COMMON FEMORAL SPONT: NORMAL
BH CV LOWER VASCULAR RIGHT DISTAL FEMORAL COMPRESS: NORMAL
BH CV LOWER VASCULAR RIGHT GASTRONEMIUS COMPRESS: NORMAL
BH CV LOWER VASCULAR RIGHT GREATER SAPH AK COMPRESS: NORMAL
BH CV LOWER VASCULAR RIGHT GREATER SAPH BK COMPRESS: NORMAL
BH CV LOWER VASCULAR RIGHT LESSER SAPH COMPRESS: NORMAL
BH CV LOWER VASCULAR RIGHT MID FEMORAL AUGMENT: NORMAL
BH CV LOWER VASCULAR RIGHT MID FEMORAL COMPETENT: NORMAL
BH CV LOWER VASCULAR RIGHT MID FEMORAL COMPRESS: NORMAL
BH CV LOWER VASCULAR RIGHT MID FEMORAL PHASIC: NORMAL
BH CV LOWER VASCULAR RIGHT MID FEMORAL SPONT: NORMAL
BH CV LOWER VASCULAR RIGHT PERONEAL COMPRESS: NORMAL
BH CV LOWER VASCULAR RIGHT POPLITEAL AUGMENT: NORMAL
BH CV LOWER VASCULAR RIGHT POPLITEAL COMPETENT: NORMAL
BH CV LOWER VASCULAR RIGHT POPLITEAL COMPRESS: NORMAL
BH CV LOWER VASCULAR RIGHT POPLITEAL PHASIC: NORMAL
BH CV LOWER VASCULAR RIGHT POPLITEAL SPONT: NORMAL
BH CV LOWER VASCULAR RIGHT POSTERIOR TIBIAL COMPRESS: NORMAL
BH CV LOWER VASCULAR RIGHT PROFUNDA FEMORAL COMPRESS: NORMAL
BH CV LOWER VASCULAR RIGHT PROXIMAL FEMORAL COMPRESS: NORMAL
BH CV LOWER VASCULAR RIGHT SAPHENOFEMORAL JUNCTION COMPRESS: NORMAL
BH CV VAS PRELIMINARY FINDINGS SCRIPTING: 1
BILIRUB SERPL-MCNC: 0.4 MG/DL (ref 0–1.2)
BUN SERPL-MCNC: 17 MG/DL (ref 8–23)
BUN/CREAT SERPL: 27.9 (ref 7–25)
CALCIUM SPEC-SCNC: 9.7 MG/DL (ref 8.6–10.5)
CHLORIDE SERPL-SCNC: 100 MMOL/L (ref 98–107)
CHOLEST SERPL-MCNC: 170 MG/DL (ref 0–200)
CO2 SERPL-SCNC: 24.8 MMOL/L (ref 22–29)
CREAT SERPL-MCNC: 0.61 MG/DL (ref 0.57–1)
D DIMER PPP FEU-MCNC: 0.87 MCGFEU/ML (ref 0–0.68)
DEPRECATED RDW RBC AUTO: 51 FL (ref 37–54)
EGFRCR SERPLBLD CKD-EPI 2021: 97.5 ML/MIN/1.73
EOSINOPHIL # BLD MANUAL: 0.23 10*3/MM3 (ref 0–0.4)
EOSINOPHIL NFR BLD MANUAL: 3 % (ref 0.3–6.2)
ERYTHROCYTE [DISTWIDTH] IN BLOOD BY AUTOMATED COUNT: 14.9 % (ref 12.3–15.4)
GEN 5 2HR TROPONIN T REFLEX: <6 NG/L
GLOBULIN UR ELPH-MCNC: 3 GM/DL
GLUCOSE BLDC GLUCOMTR-MCNC: 233 MG/DL (ref 70–130)
GLUCOSE SERPL-MCNC: 186 MG/DL (ref 65–99)
HCT VFR BLD AUTO: 42.6 % (ref 34–46.6)
HDLC SERPL-MCNC: 39 MG/DL (ref 40–60)
HGB BLD-MCNC: 13.7 G/DL (ref 12–15.9)
INR PPP: 0.9
LDLC SERPL CALC-MCNC: 79 MG/DL (ref 0–100)
LDLC/HDLC SERPL: 1.7 {RATIO}
LYMPHOCYTES # BLD MANUAL: 2.96 10*3/MM3 (ref 0.7–3.1)
LYMPHOCYTES NFR BLD MANUAL: 7.1 % (ref 5–12)
MCH RBC QN AUTO: 30.2 PG (ref 26.6–33)
MCHC RBC AUTO-ENTMCNC: 32.2 G/DL (ref 31.5–35.7)
MCV RBC AUTO: 94 FL (ref 79–97)
MICROCYTES BLD QL: ABNORMAL
MONOCYTES # BLD: 0.53 10*3/MM3 (ref 0.1–0.9)
NEUTROPHILS # BLD AUTO: 3.79 10*3/MM3 (ref 1.7–7)
NEUTROPHILS NFR BLD MANUAL: 50.5 % (ref 42.7–76)
NRBC SPEC MANUAL: 1 /100 WBC (ref 0–0.2)
NT-PROBNP SERPL-MCNC: <36 PG/ML (ref 0–900)
PLAT MORPH BLD: NORMAL
PLATELET # BLD AUTO: 319 10*3/MM3 (ref 140–450)
PMV BLD AUTO: 10.2 FL (ref 6–12)
POLYCHROMASIA BLD QL SMEAR: ABNORMAL
POTASSIUM SERPL-SCNC: 4.6 MMOL/L (ref 3.5–5.2)
PROT SERPL-MCNC: 7.1 G/DL (ref 6–8.5)
PROTHROMBIN TIME: 10.6 SECONDS (ref 11–15)
QT INTERVAL: 400 MS
QT INTERVAL: 410 MS
QTC INTERVAL: 420 MS
QTC INTERVAL: 426 MS
RBC # BLD AUTO: 4.53 10*6/MM3 (ref 3.77–5.28)
SODIUM SERPL-SCNC: 136 MMOL/L (ref 136–145)
TRIGL SERPL-MCNC: 323 MG/DL (ref 0–150)
TROPONIN T DELTA: NORMAL
TROPONIN T SERPL HS-MCNC: 7 NG/L
VARIANT LYMPHS NFR BLD MANUAL: 39.4 % (ref 19.6–45.3)
VLDLC SERPL-MCNC: 52 MG/DL (ref 5–40)
WBC MORPH BLD: NORMAL
WBC NRBC COR # BLD AUTO: 7.39 10*3/MM3 (ref 3.4–10.8)

## 2024-02-01 PROCEDURE — 25010000002 ENOXAPARIN PER 10 MG: Performed by: EMERGENCY MEDICINE

## 2024-02-01 PROCEDURE — 85379 FIBRIN DEGRADATION QUANT: CPT | Performed by: EMERGENCY MEDICINE

## 2024-02-01 PROCEDURE — 25010000002 METOCLOPRAMIDE PER 10 MG: Performed by: EMERGENCY MEDICINE

## 2024-02-01 PROCEDURE — 93010 ELECTROCARDIOGRAM REPORT: CPT | Performed by: INTERNAL MEDICINE

## 2024-02-01 PROCEDURE — 82948 REAGENT STRIP/BLOOD GLUCOSE: CPT

## 2024-02-01 PROCEDURE — 71045 X-RAY EXAM CHEST 1 VIEW: CPT

## 2024-02-01 PROCEDURE — 99284 EMERGENCY DEPT VISIT MOD MDM: CPT

## 2024-02-01 PROCEDURE — 93005 ELECTROCARDIOGRAM TRACING: CPT | Performed by: NURSE PRACTITIONER

## 2024-02-01 PROCEDURE — 96374 THER/PROPH/DIAG INJ IV PUSH: CPT

## 2024-02-01 PROCEDURE — 25010000002 METHYLPREDNISOLONE PER 40 MG: Performed by: NURSE PRACTITIONER

## 2024-02-01 PROCEDURE — G0378 HOSPITAL OBSERVATION PER HR: HCPCS

## 2024-02-01 PROCEDURE — 96375 TX/PRO/DX INJ NEW DRUG ADDON: CPT

## 2024-02-01 PROCEDURE — 25010000002 MORPHINE PER 10 MG: Performed by: EMERGENCY MEDICINE

## 2024-02-01 PROCEDURE — 25810000003 SODIUM CHLORIDE 0.9 % SOLUTION: Performed by: EMERGENCY MEDICINE

## 2024-02-01 PROCEDURE — 85025 COMPLETE CBC W/AUTO DIFF WBC: CPT | Performed by: EMERGENCY MEDICINE

## 2024-02-01 PROCEDURE — 93005 ELECTROCARDIOGRAM TRACING: CPT | Performed by: EMERGENCY MEDICINE

## 2024-02-01 PROCEDURE — 96372 THER/PROPH/DIAG INJ SC/IM: CPT

## 2024-02-01 PROCEDURE — 63710000001 INSULIN GLARGINE PER 5 UNITS: Performed by: PHYSICIAN ASSISTANT

## 2024-02-01 PROCEDURE — 99284 EMERGENCY DEPT VISIT MOD MDM: CPT | Performed by: EMERGENCY MEDICINE

## 2024-02-01 PROCEDURE — 83880 ASSAY OF NATRIURETIC PEPTIDE: CPT | Performed by: EMERGENCY MEDICINE

## 2024-02-01 PROCEDURE — 84484 ASSAY OF TROPONIN QUANT: CPT | Performed by: EMERGENCY MEDICINE

## 2024-02-01 PROCEDURE — 36415 COLL VENOUS BLD VENIPUNCTURE: CPT

## 2024-02-01 PROCEDURE — 80061 LIPID PANEL: CPT | Performed by: NURSE PRACTITIONER

## 2024-02-01 PROCEDURE — 85007 BL SMEAR W/DIFF WBC COUNT: CPT | Performed by: EMERGENCY MEDICINE

## 2024-02-01 PROCEDURE — 80053 COMPREHEN METABOLIC PANEL: CPT | Performed by: EMERGENCY MEDICINE

## 2024-02-01 PROCEDURE — 93970 EXTREMITY STUDY: CPT

## 2024-02-01 PROCEDURE — 96361 HYDRATE IV INFUSION ADD-ON: CPT

## 2024-02-01 PROCEDURE — 85610 PROTHROMBIN TIME: CPT

## 2024-02-01 RX ORDER — FLUTICASONE PROPIONATE 50 MCG
2 SPRAY, SUSPENSION (ML) NASAL DAILY
Status: DISCONTINUED | OUTPATIENT
Start: 2024-02-02 | End: 2024-02-02 | Stop reason: HOSPADM

## 2024-02-01 RX ORDER — TRAZODONE HYDROCHLORIDE 50 MG/1
50 TABLET ORAL NIGHTLY
Status: DISCONTINUED | OUTPATIENT
Start: 2024-02-01 | End: 2024-02-02 | Stop reason: HOSPADM

## 2024-02-01 RX ORDER — BUDESONIDE AND FORMOTEROL FUMARATE DIHYDRATE 160; 4.5 UG/1; UG/1
2 AEROSOL RESPIRATORY (INHALATION)
Status: DISCONTINUED | OUTPATIENT
Start: 2024-02-01 | End: 2024-02-02 | Stop reason: HOSPADM

## 2024-02-01 RX ORDER — KETOROLAC TROMETHAMINE 15 MG/ML
15 INJECTION, SOLUTION INTRAMUSCULAR; INTRAVENOUS ONCE
Status: COMPLETED | OUTPATIENT
Start: 2024-02-01 | End: 2024-02-02

## 2024-02-01 RX ORDER — ZOLPIDEM TARTRATE 5 MG/1
5 TABLET ORAL ONCE
Status: COMPLETED | OUTPATIENT
Start: 2024-02-01 | End: 2024-02-01

## 2024-02-01 RX ORDER — PROCHLORPERAZINE EDISYLATE 5 MG/ML
10 INJECTION INTRAMUSCULAR; INTRAVENOUS ONCE
Status: COMPLETED | OUTPATIENT
Start: 2024-02-01 | End: 2024-02-02

## 2024-02-01 RX ORDER — ASPIRIN 81 MG/1
81 TABLET ORAL DAILY
Status: DISCONTINUED | OUTPATIENT
Start: 2024-02-02 | End: 2024-02-02 | Stop reason: HOSPADM

## 2024-02-01 RX ORDER — METHYLPREDNISOLONE SODIUM SUCCINATE 40 MG/ML
40 INJECTION, POWDER, LYOPHILIZED, FOR SOLUTION INTRAMUSCULAR; INTRAVENOUS EVERY 4 HOURS
Status: DISCONTINUED | OUTPATIENT
Start: 2024-02-01 | End: 2024-02-02

## 2024-02-01 RX ORDER — IPRATROPIUM BROMIDE AND ALBUTEROL SULFATE 2.5; .5 MG/3ML; MG/3ML
3 SOLUTION RESPIRATORY (INHALATION) ONCE
Status: COMPLETED | OUTPATIENT
Start: 2024-02-01 | End: 2024-02-02

## 2024-02-01 RX ORDER — ACETAMINOPHEN 500 MG
1000 TABLET ORAL ONCE
Status: COMPLETED | OUTPATIENT
Start: 2024-02-01 | End: 2024-02-01

## 2024-02-01 RX ORDER — CYCLOSPORINE 0.5 MG/ML
1 EMULSION OPHTHALMIC 2 TIMES DAILY
Status: DISCONTINUED | OUTPATIENT
Start: 2024-02-01 | End: 2024-02-02 | Stop reason: HOSPADM

## 2024-02-01 RX ORDER — DIPHENHYDRAMINE HYDROCHLORIDE 50 MG/ML
25 INJECTION INTRAMUSCULAR; INTRAVENOUS ONCE
Status: DISCONTINUED | OUTPATIENT
Start: 2024-02-01 | End: 2024-02-01

## 2024-02-01 RX ORDER — LISINOPRIL 10 MG/1
10 TABLET ORAL DAILY
Status: DISCONTINUED | OUTPATIENT
Start: 2024-02-02 | End: 2024-02-02 | Stop reason: HOSPADM

## 2024-02-01 RX ORDER — AMOXICILLIN AND CLAVULANATE POTASSIUM 875; 125 MG/1; MG/1
1 TABLET, FILM COATED ORAL 2 TIMES DAILY
Status: DISCONTINUED | OUTPATIENT
Start: 2024-02-01 | End: 2024-02-02 | Stop reason: HOSPADM

## 2024-02-01 RX ORDER — ZOLPIDEM TARTRATE 5 MG/1
5 TABLET ORAL NIGHTLY PRN
Status: DISCONTINUED | OUTPATIENT
Start: 2024-02-01 | End: 2024-02-02 | Stop reason: HOSPADM

## 2024-02-01 RX ORDER — METOCLOPRAMIDE HYDROCHLORIDE 5 MG/ML
10 INJECTION INTRAMUSCULAR; INTRAVENOUS ONCE
Status: COMPLETED | OUTPATIENT
Start: 2024-02-01 | End: 2024-02-01

## 2024-02-01 RX ORDER — ASPIRIN 81 MG/1
324 TABLET, CHEWABLE ORAL ONCE
Status: DISCONTINUED | OUTPATIENT
Start: 2024-02-01 | End: 2024-02-02 | Stop reason: HOSPADM

## 2024-02-01 RX ORDER — ASPIRIN 81 MG/1
324 TABLET, CHEWABLE ORAL ONCE
Status: DISCONTINUED | OUTPATIENT
Start: 2024-02-01 | End: 2024-02-01

## 2024-02-01 RX ORDER — DIPHENHYDRAMINE HYDROCHLORIDE 50 MG/ML
50 INJECTION INTRAMUSCULAR; INTRAVENOUS
Status: ACTIVE | OUTPATIENT
Start: 2024-02-01 | End: 2024-02-02

## 2024-02-01 RX ORDER — SODIUM CHLORIDE 0.9 % (FLUSH) 0.9 %
10 SYRINGE (ML) INJECTION AS NEEDED
Status: DISCONTINUED | OUTPATIENT
Start: 2024-02-01 | End: 2024-02-02 | Stop reason: HOSPADM

## 2024-02-01 RX ORDER — SODIUM CHLORIDE 0.9 % (FLUSH) 0.9 %
10 SYRINGE (ML) INJECTION EVERY 12 HOURS SCHEDULED
Status: DISCONTINUED | OUTPATIENT
Start: 2024-02-01 | End: 2024-02-02 | Stop reason: HOSPADM

## 2024-02-01 RX ORDER — NITROGLYCERIN 0.4 MG/1
0.4 TABLET SUBLINGUAL
Status: DISCONTINUED | OUTPATIENT
Start: 2024-02-01 | End: 2024-02-01

## 2024-02-01 RX ORDER — FAMOTIDINE 10 MG/ML
20 INJECTION, SOLUTION INTRAVENOUS ONCE
Status: DISCONTINUED | OUTPATIENT
Start: 2024-02-01 | End: 2024-02-01

## 2024-02-01 RX ORDER — ENOXAPARIN SODIUM 100 MG/ML
1 INJECTION SUBCUTANEOUS ONCE
Status: COMPLETED | OUTPATIENT
Start: 2024-02-01 | End: 2024-02-01

## 2024-02-01 RX ORDER — SODIUM CHLORIDE 9 MG/ML
125 INJECTION, SOLUTION INTRAVENOUS CONTINUOUS
Status: DISCONTINUED | OUTPATIENT
Start: 2024-02-01 | End: 2024-02-02 | Stop reason: HOSPADM

## 2024-02-01 RX ORDER — METHYLPREDNISOLONE SODIUM SUCCINATE 125 MG/2ML
125 INJECTION, POWDER, LYOPHILIZED, FOR SOLUTION INTRAMUSCULAR; INTRAVENOUS ONCE
Status: DISCONTINUED | OUTPATIENT
Start: 2024-02-01 | End: 2024-02-01

## 2024-02-01 RX ORDER — ASPIRIN 81 MG/1
81 TABLET ORAL DAILY
Status: DISCONTINUED | OUTPATIENT
Start: 2024-02-02 | End: 2024-02-01 | Stop reason: SDUPTHER

## 2024-02-01 RX ORDER — IPRATROPIUM BROMIDE AND ALBUTEROL SULFATE 2.5; .5 MG/3ML; MG/3ML
3 SOLUTION RESPIRATORY (INHALATION)
Status: DISCONTINUED | OUTPATIENT
Start: 2024-02-01 | End: 2024-02-02 | Stop reason: HOSPADM

## 2024-02-01 RX ORDER — PANTOPRAZOLE SODIUM 40 MG/1
40 TABLET, DELAYED RELEASE ORAL
Status: DISCONTINUED | OUTPATIENT
Start: 2024-02-02 | End: 2024-02-02 | Stop reason: HOSPADM

## 2024-02-01 RX ORDER — PROPRANOLOL HYDROCHLORIDE 40 MG/1
40 TABLET ORAL 2 TIMES DAILY
Status: DISCONTINUED | OUTPATIENT
Start: 2024-02-01 | End: 2024-02-01

## 2024-02-01 RX ORDER — NITROGLYCERIN 0.4 MG/1
0.4 TABLET SUBLINGUAL
Status: DISCONTINUED | OUTPATIENT
Start: 2024-02-01 | End: 2024-02-02 | Stop reason: HOSPADM

## 2024-02-01 RX ORDER — SODIUM CHLORIDE 9 MG/ML
40 INJECTION, SOLUTION INTRAVENOUS AS NEEDED
Status: DISCONTINUED | OUTPATIENT
Start: 2024-02-01 | End: 2024-02-02 | Stop reason: HOSPADM

## 2024-02-01 RX ORDER — ATORVASTATIN CALCIUM 20 MG/1
20 TABLET, FILM COATED ORAL DAILY
Status: DISCONTINUED | OUTPATIENT
Start: 2024-02-02 | End: 2024-02-02 | Stop reason: HOSPADM

## 2024-02-01 RX ORDER — LEVETIRACETAM 500 MG/1
250 TABLET ORAL DAILY
Status: DISCONTINUED | OUTPATIENT
Start: 2024-02-02 | End: 2024-02-02 | Stop reason: HOSPADM

## 2024-02-01 RX ORDER — GLIPIZIDE 5 MG/1
2.5 TABLET ORAL
Status: DISCONTINUED | OUTPATIENT
Start: 2024-02-02 | End: 2024-02-02 | Stop reason: HOSPADM

## 2024-02-01 RX ORDER — MORPHINE SULFATE 4 MG/ML
4 INJECTION, SOLUTION INTRAMUSCULAR; INTRAVENOUS ONCE
Status: COMPLETED | OUTPATIENT
Start: 2024-02-01 | End: 2024-02-01

## 2024-02-01 RX ADMIN — METOCLOPRAMIDE 10 MG: 5 INJECTION, SOLUTION INTRAMUSCULAR; INTRAVENOUS at 16:04

## 2024-02-01 RX ADMIN — CYCLOSPORINE 1 DROP: 0.5 EMULSION OPHTHALMIC at 23:57

## 2024-02-01 RX ADMIN — INSULIN GLARGINE 10 UNITS: 100 INJECTION, SOLUTION SUBCUTANEOUS at 23:57

## 2024-02-01 RX ADMIN — ACETAMINOPHEN 1000 MG: 500 TABLET ORAL at 16:04

## 2024-02-01 RX ADMIN — SODIUM CHLORIDE 125 ML/HR: 9 INJECTION, SOLUTION INTRAVENOUS at 11:26

## 2024-02-01 RX ADMIN — ZOLPIDEM TARTRATE 5 MG: 5 TABLET ORAL at 23:57

## 2024-02-01 RX ADMIN — ENOXAPARIN SODIUM 90 MG: 100 INJECTION SUBCUTANEOUS at 13:44

## 2024-02-01 RX ADMIN — TRAZODONE HYDROCHLORIDE 50 MG: 50 TABLET ORAL at 23:57

## 2024-02-01 RX ADMIN — METHYLPREDNISOLONE SODIUM SUCCINATE 40 MG: 40 INJECTION, POWDER, FOR SOLUTION INTRAMUSCULAR; INTRAVENOUS at 19:55

## 2024-02-01 RX ADMIN — MORPHINE SULFATE 4 MG: 4 INJECTION, SOLUTION INTRAMUSCULAR; INTRAVENOUS at 13:54

## 2024-02-01 NOTE — FSED PROVIDER NOTE
"Subjective   History of Present Illness  67yo female pmh significant htn/hyperlipidemia/dm2/fatty liver/asthma/migraine/tia, recently discharged [facility] secondary to RSV infection/migraine, presents ED c/o acute onset anterior precordial chest pain while driving this morning characterized as \"severe\"/radiating thru to back/associated soa/neg exac or relieve factors.  Pt reports chest pain x1 minute; denies chest pain at time of exam.    History provided by:  Patient  Chest Pain  Associated symptoms: cough and shortness of breath    Associated symptoms: no palpitations        Review of Systems   Constitutional: Negative.    HENT: Negative.     Eyes: Negative.    Respiratory:  Positive for cough, chest tightness and shortness of breath.    Cardiovascular:  Positive for chest pain. Negative for palpitations and leg swelling.   Gastrointestinal: Negative.    Genitourinary: Negative.    Neurological:  Negative for syncope.   All other systems reviewed and are negative.      Past Medical History:   Diagnosis Date    Abdominal pain, right lower quadrant     Anemia     Asthma     Brain aneurysm     2003 and 2017    Cancer 07/30/2014    Basal Cell Carcinoma Left Arm,SKIN CANCER    COVID-19 04/2021    Cyst of left kidney     Diabetes mellitus     Tyoe 2 diabetic    Diverticulosis     Fatty liver     GERD (gastroesophageal reflux disease)     History of kidney stones 06/2011    History of surgery for cerebral aneurysm     Clipping of cerebral Aneurysm    Hx of migraines     Hyperlipidemia     Hypertension     Insomnia     Neck pain     Peptic ulceration     Pneumonia     PONV (postoperative nausea and vomiting)     Stroke     \"years ago\" TIA no after effects       Allergies   Allergen Reactions    Contrast Dye (Echo Or Unknown Ct/Mr) Shortness Of Breath    Iodinated Contrast Media Shortness Of Breath    Codeine Nausea Only    Methylprednisolone Anxiety    Other Other (See Comments)     Bamlanivimab infusion- muscle " aches, joint pain, nausea    Ciprofloxacin Hcl Rash       Past Surgical History:   Procedure Laterality Date    APPENDECTOMY      Emergency at time of 2nd     BASAL CELL CARCINOMA EXCISION Left 2014    Excision basal cell carcinoma, left arm (1.1 cm) with frozen section control and layered wound closure ( 3.1 cm), Dr. Isael Andrade, [facility]    BRAIN SURGERY  2004    Ruptured aneurysm, clipped/Dr. Sims    CEREBRAL ANEURYSM REPAIR      clipping of cerebral aneurysm    CEREBRAL ANGIOGRAM N/A 2017    Procedure: CEREBRAL ANGIOGRAM ;  Surgeon: Orlando Bella MD;  Location: [facility] OR ;  Service:     CEREBRAL ANGIOGRAM N/A 10/11/2017    Procedure: CEREBRAL ANGIOGRAM;  Surgeon: Orlando Bella MD;  Location: [facility] OR ;  Service:      SECTION  , ,     CHOLECYSTECTOMY      COLON RESECTION WITH COLOSTOMY Right     COLONOSCOPY  2009    COLONOSCOPY N/A 2020    Procedure: COLONOSCOPY to terminal ileum;  Surgeon: Luiza Norris MD;  Location: [facility] ENDOSCOPY;  Service: Gastroenterology;  Laterality: N/A;  PREOP/ SCREENING  POSTOP/ DIVERTICULOSIS. POOR PREP    COLONOSCOPY N/A 2023    Procedure: COLONOSCOPY TO CECUM WITH COLD BX POLYPECTOMIES AND COLD SNARE POLYPECTOMY;  Surgeon: Luiza Norris MD;  Location: [facility] ENDOSCOPY;  Service: Gastroenterology;  Laterality: N/A;  PRE OP - SCREENING  POST OP - COLON POLYPS, DIVERTICULOSIS, HEMORRHOIDS    EMBOLIZATION CEREBRAL N/A 2017    Procedure: Cerebral angiography and embolization of cerebral aneurysm with Pipeline Embolization Device;  Surgeon: Orlando Bella MD;  Location: [facility] OR ;  Service:     EMBOLIZATION CEREBRAL N/A 10/31/2018    Procedure: Cerebral angiogram with embolization of cerebral aneurysm;  Surgeon: Orlando Bella MD;  Location: [facility] OR ;  Service: Neurosurgery    ILEOSTOMY REVISION      INCISIONAL HERNIA REPAIR      Patient suffered  some nerve entrapment secondary to the attack, some of which were removed during a secondary operation.    INGUINAL HERNIA REPAIR      LARYNX SURGERY      OSTOMY TAKE DOWN      TUBAL ABDOMINAL LIGATION      URETERAL STENT INSERTION  2011    Due to kidney stones       Family History   Problem Relation Age of Onset    Skin cancer Mother     Dementia Mother     KALYAN disease Mother     Heart disease Father     Heart attack Father     Hypertension Father     Aneurysm Son         cerebral    Nephrolithiasis Son     Suicide Attempts Brother     [place] Hyperthermia Neg Hx        Social History     Socioeconomic History    Marital status:      Spouse name: Roe    Number of children: 3    Years of education: College   Tobacco Use    Smoking status: Former     Years: 1     Types: Cigarettes     Quit date:      Years since quittin.1     Passive exposure: Past    Smokeless tobacco: Never   Vaping Use    Vaping Use: Never used   Substance and Sexual Activity    Alcohol use: Not Currently    Drug use: No    Sexual activity: Never           Objective   Physical Exam  Vitals and nursing note reviewed.   Constitutional:       Appearance: Normal appearance. She is well-developed. She is not toxic-appearing or diaphoretic.   HENT:      Head: Normocephalic and atraumatic.      Right Ear: External ear normal.      Left Ear: External ear normal.      Nose: Nose normal.      Mouth/Throat:      Mouth: Mucous membranes are moist.      Pharynx: Oropharynx is clear.   Eyes:      Conjunctiva/sclera: Conjunctivae normal.      Pupils: Pupils are equal, round, and reactive to light.   Cardiovascular:      Rate and Rhythm: Normal rate and regular rhythm.      Pulses: Normal pulses.      Heart sounds: Normal heart sounds. No murmur heard.     No friction rub. No gallop.   Pulmonary:      Effort: Pulmonary effort is normal. No respiratory distress.      Breath sounds: Normal breath sounds. No wheezing, rhonchi or rales.    Abdominal:      General: Bowel sounds are normal. There is no distension.      Palpations: Abdomen is soft.      Tenderness: There is no abdominal tenderness. There is no guarding or rebound.   Musculoskeletal:         General: No swelling or deformity.      Cervical back: Normal range of motion and neck supple. No rigidity.      Right lower leg: No edema.      Left lower leg: No edema.   Lymphadenopathy:      Cervical: No cervical adenopathy.   Skin:     General: Skin is warm and dry.   Neurological:      General: No focal deficit present.      Mental Status: She is alert and oriented to person, place, and time.      GCS: GCS eye subscore is 4. GCS verbal subscore is 5. GCS motor subscore is 6.      Sensory: Sensation is intact.      Motor: Motor function is intact.         ECG 12 Lead      Date/Time: 2/1/2024 1:15 PM    Performed by: Ty Keller MD  Authorized by: Ty Keller MD  Interpreted by ED physician  Rhythm: sinus rhythm  Rate: normal  BPM: 63  QRS axis: normal  Conduction: conduction normal  ST Segments: ST segments normal  T Waves: T waves normal  Other: no other findings  Clinical impression: normal ECG               ED Course  ED Course as of 02/01/24 1319   Thu Feb 01, 2024   1317 D/w [facility] ER observation unit accepting for Dr. ANIL Bowles.  Pt stable transport. [SD]      ED Course User Index  [SD] Ty Keller MD                HEART Score: 5                  Labs Reviewed   COMPREHENSIVE METABOLIC PANEL - Abnormal; Notable for the following components:       Result Value    Glucose 186 (*)     BUN/Creatinine Ratio 27.9 (*)     All other components within normal limits    Narrative:     GFR Normal >60  Chronic Kidney Disease <60  Kidney Failure <15     D-DIMER, QUANTITATIVE - Abnormal; Notable for the following components:    D-Dimer, Quantitative 0.87 (*)     All other components within normal limits    Narrative:     According to the assay 's published package insert, a  "normal (<0.50 MCGFEU/mL) D-dimer result in conjunction with a non-high clinical probability assessment, excludes deep vein thrombosis (DVT) and pulmonary embolism (PE) with high sensitivity.    D-dimer values increase with age and this can make VTE exclusion of an older population difficult. To address this, the [employer] College [employer], based on best available evidence and recent guidelines, recommends that clinicians use age-adjusted D-dimer thresholds in patients greater than 50 years of age with: a) a low probability of PE who do not meet all Pulmonary Embolism Rule Out Criteria, or b) in those with intermediate probability of PE.   The formula for an age-adjusted D-dimer cut-off is \"age/100\".  For example, a 60 year old patient would have an age-adjusted cut-off of 0.60 MCGFEU/mL and an 80 year old 0.80 MCGFEU/mL.   LAB PROTIME-INR, FINGERSTICK - Abnormal; Notable for the following components:    Protime 10.6 (*)     All other components within normal limits   TROPONIN - Normal    Narrative:     High Sensitive Troponin T Reference Range:  <14.0 ng/L- Negative Female for AMI  <22.0 ng/L- Negative Male for AMI  >=14 - Abnormal Female indicating possible myocardial injury.  >=22 - Abnormal Male indicating possible myocardial injury.   Clinicians would have to utilize clinical acumen, EKG, Troponin, and serial changes to determine if it is an Acute Myocardial Infarction or myocardial injury due to an underlying chronic condition.        CBC WITH AUTO DIFFERENTIAL - Normal   BNP (IN-HOUSE) - Normal    Narrative:     This assay is used as an aid in the diagnosis of individuals suspected of having heart failure. It can be used as an aid in the diagnosis of acute decompensated heart failure (ADHF) in patients presenting with signs and symptoms of ADHF to the emergency department (ED). In addition, NT-proBNP of <300 pg/mL indicates ADHF is not likely.    Age Range Result Interpretation  NT-proBNP Concentration " (pg/mL:      <50             Positive            >450                   Gray                 300-450                    Negative             <300    50-75           Positive            >900                  Gray                300-900                  Negative            <300      >75             Positive            >1800                  Gray                300-1800                  Negative            <300   MANUAL DIFFERENTIAL   HIGH SENSITIVITIY TROPONIN T 2HR   LIPID PANEL   POCT PROTIME - INR   CBC AND DIFFERENTIAL    Narrative:     The following orders were created for panel order CBC & Differential.  Procedure                               Abnormality         Status                     ---------                               -----------         ------                     CBC Auto Differential[[id number]]        Normal              Final result                 Please view results for these tests on the individual orders.     XR Chest 1 View    Result Date: 2/1/2024  Narrative: CHEST SINGLE VIEW  HISTORY: Chest pain with radicular symptoms into the back. Cough.  COMPARISON: AP chest 01/23/2024, 01/21/2024, CT angiogram chest 01/07/2024.  FINDINGS: Heart size is within normal limits. The mediastinal structures appear normal. There is mild linear opacity within the medial aspect of the right middle lobe that correlates with that demonstrated on CT and exhibits some evidence for improvement when compared to the previous exam. No new airspace disease has developed.      Impression: Since exam 01/23/2024, there has been improvement in thickness of linear opacity superimposed in the medial right lung base which appears to be in the right middle lobe when correlated with previous CT previous. This is consistent with an area of improving atelectasis. No new airspace disease has developed and there is no new abnormality.  This report was finalized on 2/1/2024 12:11 PM by Dr. Marino Wilson M.D on Workstation:  [id number]      CT Angiogram Head    Result Date: 1/25/2024  Narrative: CONTRAST-ENHANCED CT ANGIOGRAM OF THE HEAD 01/25/2024  CLINICAL HISTORY: The patient has history of prior aneurysm clipping and has headache.  TECHNIQUE: Spiral CT images were obtained from the base of the skull to the vertex without intravenous contrast. The images were reformatted and are submitted in 3 mm thick axial, sagittal and coronal CT sections with brain algorithm. Additional spiral CT images were obtained from the C1-2 cervical level up through the vertex of the skull during the arterial phase of contrast and images were reformatted and submitted  in 1 mm thick axial, sagittal and coronal CT sections with soft tissue algorithm and 3D reconstructions were performed to complete the CT angiogram of the head.  COMPARISON: This is correlated to yesterday's head CT 01/24/2024 at 7:21 a.m. This is also correlated to a prior CT angiogram of the head on 01/06/2019.  FINDINGS:  Head CT: This patient has had a remote prior right frontal temporal parietal craniotomy for clipping of an aneurysm and there are 2 aneurysm clips in place along the margin of the supracavernous right internal carotid artery. There is beam hardening artifact streaking off the metal in the aneurysm clips that streaks through the adjacent brain parenchyma and extra-axial spaces slightly limiting evaluation. There are tiny subcentimeter areas of encephalomalacia in the anterolateral right temporal lobe and anterolateral right frontal lobe likely postoperative areas of encephalomalacia. There is minimal low-density in the periventricular white matter consistent with minimal small vessel disease. The remainder of the brain parenchyma is normal in attenuation. The ventricles are normal in size. I see no focal mass effect. There is no midline shift. No extra-axial fluid collections are identified. There is no evidence of acute intracranial hemorrhage. No abnormal areas of  enhancement are seen in the head. There is tiny amount of fluid and mucosal thickening in  medial left frontal sinus and left frontal recess as well as in the ethmoid sinuses bilaterally and there is subtotal opacification of the maxillary sinuses with circumferential mucosal thickening and central air-fluid levels. There is mucosal thickening in the posterior lateral left sphenoid sinus. The mastoid middle ear cavities are clear.  CT angiogram of the head: The visualized portion of the distal vertebral arteries are normal in appearance from the C1 ring level to the vertebrobasilar junction. The basilar artery and the basilar tip is normal in appearance. The posterior cerebral and superior cerebellar arteries are normal in appearance bilaterally. The upper cervical, petrous segments of the internal carotid arteries are normal in appearance. There is a flow diverting stent extending from the cavernous segment of the right internal carotid artery into the supracavernous segment adjacent to which are 2 aneurysm clips where there was prior clipping of an aneurysm off the supracavernous right internal carotid artery. Artifact off the stent limits evaluation of the lumen of the right internal carotid artery at the level of the stent but otherwise the supracavernous segment of the right internal carotid artery is normal to the right internal carotid terminus. The cavernous and supracavernous segment of left internal carotid artery is normal in appearance. The A1 segments of the anterior cerebral arteries and the anterior communicating artery origin and A2 segments of the anterior cerebral arteries are normal in appearance. M1 segments of the left middle cerebral artery and left middle cerebral artery bifurcations within normal limits. There is focal mild to moderate narrowing of the midportion of the M1 segment of the right middle cerebral artery, the right middle cerebral artery bifurcation is within normal limits.       Impression: 1. This patient has some sinus opacification with small amount of fluid and mucosal thickening in the medial left frontal sinus, left frontal recess and in the ethmoid sinuses bilaterally and there is subtotal opacification of the maxillary sinuses with circumferential mucosal thickening and central air-fluid levels. There is moderate mucosal thickening of lateral left sphenoid sinus.  2. Previous lateral right frontotemporal parietal craniotomy for clipping of an aneurysm off of the distal right internal carotid artery with 2 aneurysm clips in place and there is a flow diverting stent extending from the mid cavernous segment of the right internal carotid artery into the supracavernous segment. There is some artifact off the aneurysm clips streaking through the adjacent brain and extra-axial spaces slightly limiting evaluation and it does limit evaluation of the M1 segment of the right middle cerebral artery as well. There is a suggestion of mild to moderate stenosis of the midportion M1 segment of the right middle cerebral artery although a portion of this may be artifact from beam hardening off the adjacent aneurysm clips. Otherwise the CT angiogram of the head is normal with no discernible intracranial aneurysm identified.  Radiation dose reduction techniques were utilized, including automated exposure control and exposure modulation based on body size.   This report was finalized on 1/25/2024 1:00 PM by Dr. Song Mcneill M.D on Workstation: [id number]      CT Head Without Contrast    Result Date: 1/24/2024  Narrative: CT SCAN OF THE HEAD WITHOUT CONTRAST ON 01/24/2024  CLINICAL HISTORY: History of cerebral aneurysm clipping in 2017. The patient has headache.  TECHNIQUE: Spiral CT images were obtained from the base of the skull to the vertex without intravenous contrast. The images were reformatted and submitted in 3 mm thick axial, sagittal and coronal CT sections with brain algorithm.  This is  correlated to a prior head CT on 05/09/2016 and an MRA of the head on 02/02/2017 and a cerebral arteriogram on 04/12/2017 and most recent head CT on 06/03/2017.  FINDINGS: The patient has had a remote prior lateral right frontotemporal parietal-pterional craniotomy and there are aneurysm clips in place along the margin of the juxta-clinoid segment of the supracavernous right internal carotid artery and there is a pipeline stent extending from the cavernous into the supracavernous segment of the right internal carotid artery for previous treatment of an aneurysm. This finding is unchanged. There are tiny chronic subcentimeter areas of encephalomalacia in the anterolateral right frontal lobe and anterolateral tip of the right temporal lobe, likely postoperative areas of encephalomalacia, unchanged. There is minimal low-density in the periventricular white matter consistent with minimal small vessel disease. The remainder of the brain parenchyma is normal in attenuation. The ventricles are normal in size. I see no focal mass effect and no midline shift. No extra-axial fluid collections are identified and there is no evidence of acute intracranial hemorrhage. There is subtotal opacification of the maxillary sinus with circumferential mucosal thickening with central air-fluid levels bilaterally. This is a new finding when compared to the prior head CT on 06/02/2017. There is some chronic mucosal thickening or a small 1.5 cm mucous retention cyst in the lateral left sphenoid sinus. Mastoid and middle ear cavities are clear.      Impression: 1. Since prior head CT on 06/03/2017 there has been development of subtotal opacification of the maxillary sinuses with circumferential mucosal thickening and central air-fluid levels bilaterally and correlate this clinically in this patient with headaches.  2. The remainder the head CT is unchanged since 06/03/2017.  3. This patient has had a remote prior lateral right frontotemporal  parietal-pterional craniotomy with multiple aneurysm clips in place along the margins of the juxta-clinoid segment of the supracavernous right internal carotid artery and there is a pipeline stent extending from the cavernous segment into the supracavernous segment of the right internal carotid artery. There is minimal small vessel disease in the cerebral white matter. There are tiny subcentimeter areas of encephalomalacia in the anterolateral right frontal lobe and anterolateral tip of the right temporal lobe, likely postoperative areas of encephalomalacia, and the remainder of the head CT is normal. Specifically, no acute intracranial hemorrhage is seen.  Radiation dose reduction techniques were utilized, including automated exposure control and exposure modulation based on body size.   This report was finalized on 1/24/2024 10:08 AM by Dr. Song Mcneill M.D on Workstation: [id number]      XR Chest 1 View    Result Date: 1/23/2024  Narrative: XR CHEST 1 VW-1/23/2024  HISTORY: Shortness of breath. Cough.  Heart size is within normal limits. There is some mild patchy increased density in the right infrahilar region and left lung base which may represent some mild atelectasis or pneumonia and appear similar to the 1/21/2024 study.  No new infiltrates are seen. Bony structures appear unremarkable.      Impression: 1. Mild right infrahilar and left basilar atelectasis and/or pneumonia similar to the 1/21/2024 study.   This report was finalized on 1/23/2024 12:25 PM by Dr. Mikael Lopez M.D on Workstation: [id number]      XR Chest 1 View    Result Date: 1/21/2024  Narrative: XR CHEST 1 VW-1/21/2024  HISTORY: Shortness of breath. Cough.  Heart size is within normal limits. There is some patchy increased density in the right infrahilar region as well as in the left lung base. This finding appears slightly more prominent than on the previous study of 1/15/2024 and may represent some mild acute pneumonia and/or atelectasis.  The right infrahilar region has a slightly nodular appearance but no nodules were seen in this region on the CT scan of 1/15/2024. Upper lungs appear clear.      Impression: 1. Mild increased density in the right infrahilar region and left lung base may represent some mild acute pneumonia and/or atelectasis. The right infrahilar region has a slightly nodular appearance. 2. Follow-up films recommended.   This report was finalized on 1/21/2024 4:17 PM by Dr. Mikael Lopez M.D on Workstation: [id number]      XR Chest 1 View    Result Date: 1/15/2024  Narrative: XR CHEST 1 VW-  INDICATION: Dyspnea and leukocytosis  COMPARISON: CT chest 1/7/2024.. Chest radiographs dating back to 1/11/2016      Impression: No focal consolidation. No pleural effusion or pneumothorax.  Normal size cardiomediastinal silhouette.  No focal osseous abnormality.   This report was finalized on 1/15/2024 9:15 AM by Dr. Fabrice Lorenzana M.D on Workstation: [id number]      CT Angiogram Chest    Result Date: 1/7/2024  Narrative: CT ANGIOGRAPHY OF THE CHEST WITH INTRAVENOUS CONTRAST AND 3D RECONSTRUCTIONS  HISTORY: Shortness of breath.  The CT scan was performed as an emergency procedure with CT angiography protocol using intravenous contrast and 3D reconstructions. It is compared to previous CT angiography of the chest dated 03/31/2020 as well as more recent chest x-rays. The following findings are present: 1. The pulmonary arteries are well-opacified and there is no evidence of pulmonary embolus. The thoracic aorta shows no evidence of aneurysm or dissection.  2. The lungs are well expanded and clear except for an area of focal atelectasis or possible consolidation in the right middle lobe that is new since 2020. I suspect this represents focal inflammatory change but follow-up CT scan without contrast in 4-6 weeks is recommended to ensure appropriate resolution.  3. There is no mediastinal or hilar or axillary adenopathy. There is no pericardial  effusion. The CT images through the upper liver, spleen, both adrenal glands, and upper poles of both kidneys are unremarkable. The gallbladder has been removed.     Radiation dose reduction techniques were utilized, including automated exposure control and exposure modulation based on body size.   This report was finalized on 1/7/2024 3:31 PM by Dr. Mohsen Howell M.D on Workstation: [id number]      XR Chest 1 View    Result Date: 1/7/2024  Narrative: SINGLE VIEW OF THE CHEST  HISTORY: Shortness of breath  COMPARISON: January 4, 2024  FINDINGS: There is cardiomegaly. There is no vascular congestion. No pneumothorax, pleural effusion, or acute infiltrate is seen.      Impression: No acute findings.  This report was finalized on 1/7/2024 4:28 AM by Dr. Isabel Jules M.D on Workstation: [id number]      XR Chest 1 View    Result Date: 1/4/2024  Narrative: XR CHEST 1 VW-1/4/2024  HISTORY: Shortness of breath.  Heart size is mildly enlarged. Lungs are underinflated with some vascular crowding. No focal infiltrates are seen. Bony structures appear unremarkable.      Impression: 1. Mild cardiomegaly.  This report was finalized on 1/4/2024 12:31 PM by Dr. Mikael Lopez M.D on Workstation: [id number]               Medical Decision Making  Problems Addressed:  Chest pain, unspecified type: complicated acute illness or injury  Positive D dimer: complicated acute illness or injury    Amount and/or Complexity of Data Reviewed  Labs: ordered.  Radiology: ordered.  ECG/medicine tests: ordered and independent interpretation performed.    Risk  OTC drugs.  Prescription drug management.  Decision regarding hospitalization.        Final diagnoses:   Chest pain, unspecified type   Positive D dimer       ED Disposition  ED Disposition       ED Disposition   Decision to Admit    Condition   --    Comment   Level of Care: Telemetry [5]   Diagnosis: Chest pain [548937]   Admitting Physician: RANJIT HELTON [[id number]]   Attending  Physician: RANJIT HELTON [[id number]]                 No follow-up provider specified.       Medication List        ASK your doctor about these medications      amoxicillin-clavulanate 875-125 MG per tablet  Commonly known as: AUGMENTIN  Take 1 tablet by mouth 2 (Two) Times a Day for 4 days.  Ask about: Should I take this medication?

## 2024-02-01 NOTE — PLAN OF CARE
Goal Outcome Evaluation:   Pt admitted for chest pain and elevated d dimer. Pt denies any chest pain since admission to [facility]. Pt has contrast allergy and requires pre meds. Spoke with CT. Pt requires 13 hr pre meds. Steroids to start at 8. Pt is alert and oriented x4. Stand by assist.

## 2024-02-01 NOTE — Clinical Note
Level of Care: Telemetry [5]   Diagnosis: Chest pain [665910]   Admitting Physician: RANJIT HELTON [[id number]]   Attending Physician: RANJIT HELTON [[id number]]

## 2024-02-01 NOTE — ED NOTES
Nursing report ED to floor  Khalida Gilliland  68 y.o.  female    HPI :   Chief Complaint   Patient presents with    Chest Pain       Admitting doctor:   Lisandro Bowles MD    Admitting diagnosis:   The primary encounter diagnosis was Chest pain, unspecified type. A diagnosis of Positive D dimer was also pertinent to this visit.    Code status:   Current Code Status       Date Active Code Status Order ID Comments User Context       Prior            Allergies:   Contrast dye (echo or unknown ct/mr), Iodinated contrast media, Codeine, Methylprednisolone, Other, and Ciprofloxacin hcl    Isolation:   Contact    Intake and Output  No intake or output data in the 24 hours ending 02/01/24 1621    Weight:       02/01/24  1052   Weight: 94.8 kg (209 lb)       Most recent vitals:   Vitals:    02/01/24 1400 02/01/24 1500 02/01/24 1530 02/01/24 1604   BP: 127/54 137/44 120/57 140/65   BP Location:       Patient Position:       Pulse: 76 72 70 71   Resp: 16      Temp:    97.8 °F (36.6 °C)   TempSrc:    Oral   SpO2: 97% 96% 93% 96%   Weight:       Height:           Active LDAs/IV Access:   Lines, Drains & Airways       Active LDAs       Name Placement date Placement time Site Days    Peripheral IV 02/01/24 1125 Right Antecubital 02/01/24  1125  Antecubital  less than 1                    Labs (abnormal labs have a star):   Labs Reviewed   COMPREHENSIVE METABOLIC PANEL - Abnormal; Notable for the following components:       Result Value    Glucose 186 (*)     BUN/Creatinine Ratio 27.9 (*)     All other components within normal limits    Narrative:     GFR Normal >60  Chronic Kidney Disease <60  Kidney Failure <15     D-DIMER, QUANTITATIVE - Abnormal; Notable for the following components:    D-Dimer, Quantitative 0.87 (*)     All other components within normal limits    Narrative:     According to the assay 's published package insert, a normal (<0.50 MCGFEU/mL) D-dimer result in conjunction with a non-high clinical  "probability assessment, excludes deep vein thrombosis (DVT) and pulmonary embolism (PE) with high sensitivity.    D-dimer values increase with age and this can make VTE exclusion of an older population difficult. To address this, the [employer], based on best available evidence and recent guidelines, recommends that clinicians use age-adjusted D-dimer thresholds in patients greater than 50 years of age with: a) a low probability of PE who do not meet all Pulmonary Embolism Rule Out Criteria, or b) in those with intermediate probability of PE.   The formula for an age-adjusted D-dimer cut-off is \"age/100\".  For example, a 60 year old patient would have an age-adjusted cut-off of 0.60 MCGFEU/mL and an 80 year old 0.80 MCGFEU/mL.   LAB PROTIME-INR, FINGERSTICK - Abnormal; Notable for the following components:    Protime 10.6 (*)     All other components within normal limits   MANUAL DIFFERENTIAL - Abnormal; Notable for the following components:    nRBC 1.0 (*)     All other components within normal limits   LIPID PANEL - Abnormal; Notable for the following components:    Triglycerides 323 (*)     HDL Cholesterol 39 (*)     VLDL Cholesterol 52 (*)     All other components within normal limits    Narrative:     Cholesterol Reference Ranges  (U.S. Department of Health and Human Services ATP III Classifications)    Desirable          <200 mg/dL  Borderline High    200-239 mg/dL  High Risk          >240 mg/dL      Triglyceride Reference Ranges  (U.S. Department of Health and Human Services ATP III Classifications)    Normal           <150 mg/dL  Borderline High  150-199 mg/dL  High             200-499 mg/dL  Very High        >500 mg/dL    HDL Reference Ranges  (U.S. Department of Health and Human Services ATP III Classifications)    Low     <40 mg/dl (major risk factor for CHD)  High    >60 mg/dl ('negative' risk factor for CHD)        LDL Reference Ranges  (U.S. Department of Health and Human Services " ATP III Classifications)    Optimal          <100 mg/dL  Near Optimal     100-129 mg/dL  Borderline High  130-159 mg/dL  High             160-189 mg/dL  Very High        >189 mg/dL   TROPONIN - Normal    Narrative:     High Sensitive Troponin T Reference Range:  <14.0 ng/L- Negative Female for AMI  <22.0 ng/L- Negative Male for AMI  >=14 - Abnormal Female indicating possible myocardial injury.  >=22 - Abnormal Male indicating possible myocardial injury.   Clinicians would have to utilize clinical acumen, EKG, Troponin, and serial changes to determine if it is an Acute Myocardial Infarction or myocardial injury due to an underlying chronic condition.        CBC WITH AUTO DIFFERENTIAL - Normal   BNP (IN-HOUSE) - Normal    Narrative:     This assay is used as an aid in the diagnosis of individuals suspected of having heart failure. It can be used as an aid in the diagnosis of acute decompensated heart failure (ADHF) in patients presenting with signs and symptoms of ADHF to the emergency department (ED). In addition, NT-proBNP of <300 pg/mL indicates ADHF is not likely.    Age Range Result Interpretation  NT-proBNP Concentration (pg/mL:      <50             Positive            >450                   Gray                 300-450                    Negative             <300    50-75           Positive            >900                  Gray                300-900                  Negative            <300      >75             Positive            >1800                  Gray                300-1800                  Negative            <300   HIGH SENSITIVITIY TROPONIN T 2HR   POCT PROTIME - INR   CBC AND DIFFERENTIAL    Narrative:     The following orders were created for panel order CBC & Differential.  Procedure                               Abnormality         Status                     ---------                               -----------         ------                     CBC Auto Differential[[id number]]        Normal               Final result                 Please view results for these tests on the individual orders.       EKG:   ECG 12 Lead Chest Pain   Final Result   HEART RATE= 68  bpm   RR Interval= 882  ms   IN Interval= 154  ms   P Horizontal Axis= 8  deg   P Front Axis= 47  deg   QRSD Interval= 93  ms   QT Interval= 400  ms   QTcB= 426  ms   QRS Axis= -1  deg   T Wave Axis= 31  deg   - OTHERWISE NORMAL ECG -   Sinus rhythm   Low voltage, precordial leads   No change from previous tracing   Electronically Signed By: Rick Boogie ([facility]) 01-Feb-2024 14:31:00   Date and Time of Study: 2024-02-01 13:35:00      ECG 12 Lead   ED Interpretation   Ty Keller MD     2/1/2024  1:19 PM   ECG 12 Lead         Date/Time: 2/1/2024 1:15 PM      Performed by: Ty Keller MD   Authorized by: Ty Keller MD  Interpreted by ED physician   Rhythm: sinus rhythm   Rate: normal   BPM: 63   QRS axis: normal   Conduction: conduction normal   ST Segments: ST segments normal   T Waves: T waves normal   Other: no other findings   Clinical impression: normal ECG      ECG 12 Lead Chest Pain   Final Result   HEART RATE= 63  bpm   RR Interval= 952  ms   IN Interval= 131  ms   P Horizontal Axis= 20  deg   P Front Axis= 9  deg   QRSD Interval= 84  ms   QT Interval= 410  ms   QTcB= 420  ms   QRS Axis= 4  deg   T Wave Axis= 40  deg   - OTHERWISE NORMAL ECG -   Sinus rhythm   Low voltage, precordial leads   No change from previous tracing   Electronically Signed By: Rick Boogie ([facility]) 01-Feb-2024 14:30:52   Date and Time of Study: 2024-02-01 10:52:42      ECG 12 Lead Chest Pain    (Results Pending)       Meds given in ED:   Medications   sodium chloride 0.9 % flush 10 mL (has no administration in time range)   sodium chloride 0.9 % infusion (125 mL/hr Intravenous New Bag 2/1/24 1126)   nitroglycerin (NITROSTAT) SL tablet 0.4 mg (has no administration in time range)   sodium chloride 0.9 % flush 10 mL (0 mL Intravenous Hold 2/1/24 1332)   sodium  chloride 0.9 % flush 10 mL (has no administration in time range)   sodium chloride 0.9 % infusion 40 mL (has no administration in time range)   aspirin chewable tablet 324 mg (0 mg Oral Hold 24 1330)     And   aspirin EC tablet 81 mg (has no administration in time range)   diphenhydrAMINE (BENADRYL) injection 25 mg (0 mg Intravenous Hold 24 1332)   methylPREDNISolone sodium succinate (SOLU-Medrol) injection 125 mg (0 mg Intravenous Hold 24 1332)   famotidine (PEPCID) injection 20 mg (0 mg Intravenous Hold 24 1332)   Enoxaparin Sodium (LOVENOX) syringe 90 mg (90 mg Subcutaneous Given 24 1344)   Morphine sulfate (PF) injection 4 mg (4 mg Intravenous Given 24 1354)   metoclopramide (REGLAN) injection 10 mg (10 mg Intravenous Given 24 1604)   acetaminophen (TYLENOL) tablet 1,000 mg (1,000 mg Oral Given 24 1604)       Imaging results:  XR Chest 1 View    Result Date: 2024  Since exam 2024, there has been improvement in thickness of linear opacity superimposed in the medial right lung base which appears to be in the right middle lobe when correlated with previous CT previous. This is consistent with an area of improving atelectasis. No new airspace disease has developed and there is no new abnormality.  This report was finalized on 2024 12:11 PM by Dr. Marino Wilson M.D on Workstation: [id number]       Ambulatory status:   - up ad pierce    Social issues:   Social History     Socioeconomic History    Marital status:      Spouse name: Roe    Number of children: 3    Years of education: College   Tobacco Use    Smoking status: Former     Years: 1     Types: Cigarettes     Quit date:      Years since quittin.1     Passive exposure: Past    Smokeless tobacco: Never   Vaping Use    Vaping Use: Never used   Substance and Sexual Activity    Alcohol use: Not Currently    Drug use: No    Sexual activity: Never       NIH Stroke Scale:         Blaire Griggs  RN  02/01/24 16:21 EST

## 2024-02-02 ENCOUNTER — APPOINTMENT (OUTPATIENT)
Dept: CT IMAGING | Facility: HOSPITAL | Age: 69
End: 2024-02-02
Payer: MEDICARE

## 2024-02-02 ENCOUNTER — READMISSION MANAGEMENT (OUTPATIENT)
Dept: CALL CENTER | Facility: HOSPITAL | Age: 69
End: 2024-02-02
Payer: MEDICARE

## 2024-02-02 ENCOUNTER — APPOINTMENT (OUTPATIENT)
Dept: CARDIOLOGY | Facility: HOSPITAL | Age: 69
End: 2024-02-02
Payer: MEDICARE

## 2024-02-02 VITALS
BODY MASS INDEX: 30.77 KG/M2 | OXYGEN SATURATION: 96 % | TEMPERATURE: 98 F | RESPIRATION RATE: 18 BRPM | DIASTOLIC BLOOD PRESSURE: 78 MMHG | HEIGHT: 68 IN | SYSTOLIC BLOOD PRESSURE: 155 MMHG | HEART RATE: 76 BPM | WEIGHT: 203 LBS

## 2024-02-02 LAB
ANION GAP SERPL CALCULATED.3IONS-SCNC: 13.3 MMOL/L (ref 5–15)
BH CV ECHO MEAS - ACS: 1.94 CM
BH CV ECHO MEAS - AO MAX PG: 15.1 MMHG
BH CV ECHO MEAS - AO MEAN PG: 7.5 MMHG
BH CV ECHO MEAS - AO ROOT DIAM: 3.2 CM
BH CV ECHO MEAS - AO V2 MAX: 194.4 CM/SEC
BH CV ECHO MEAS - AO V2 VTI: 34.9 CM
BH CV ECHO MEAS - AVA(I,D): 2.7 CM2
BH CV ECHO MEAS - EDV(CUBED): 108.2 ML
BH CV ECHO MEAS - EDV(MOD-SP2): 60 ML
BH CV ECHO MEAS - EDV(MOD-SP4): 91 ML
BH CV ECHO MEAS - EF(MOD-BP): 59 %
BH CV ECHO MEAS - EF(MOD-SP2): 58.3 %
BH CV ECHO MEAS - EF(MOD-SP4): 59.3 %
BH CV ECHO MEAS - ESV(CUBED): 34.8 ML
BH CV ECHO MEAS - ESV(MOD-SP2): 25 ML
BH CV ECHO MEAS - ESV(MOD-SP4): 37 ML
BH CV ECHO MEAS - FS: 31.5 %
BH CV ECHO MEAS - IVS/LVPW: 1.11 CM
BH CV ECHO MEAS - IVSD: 1.13 CM
BH CV ECHO MEAS - LAT PEAK E' VEL: 5.4 CM/SEC
BH CV ECHO MEAS - LV DIASTOLIC VOL/BSA (35-75): 44.2 CM2
BH CV ECHO MEAS - LV MASS(C)D: 185.4 GRAMS
BH CV ECHO MEAS - LV MAX PG: 9 MMHG
BH CV ECHO MEAS - LV MEAN PG: 5 MMHG
BH CV ECHO MEAS - LV SYSTOLIC VOL/BSA (12-30): 18 CM2
BH CV ECHO MEAS - LV V1 MAX: 150.2 CM/SEC
BH CV ECHO MEAS - LV V1 VTI: 30.4 CM
BH CV ECHO MEAS - LVIDD: 4.8 CM
BH CV ECHO MEAS - LVIDS: 3.3 CM
BH CV ECHO MEAS - LVOT AREA: 3.1 CM2
BH CV ECHO MEAS - LVOT DIAM: 1.97 CM
BH CV ECHO MEAS - LVPWD: 1.02 CM
BH CV ECHO MEAS - MED PEAK E' VEL: 4.4 CM/SEC
BH CV ECHO MEAS - MV A DUR: 0.11 SEC
BH CV ECHO MEAS - MV A MAX VEL: 88.9 CM/SEC
BH CV ECHO MEAS - MV DEC SLOPE: 550.7 CM/SEC2
BH CV ECHO MEAS - MV DEC TIME: 0.21 SEC
BH CV ECHO MEAS - MV E MAX VEL: 67.6 CM/SEC
BH CV ECHO MEAS - MV E/A: 0.76
BH CV ECHO MEAS - MV MAX PG: 4.4 MMHG
BH CV ECHO MEAS - MV MEAN PG: 1.88 MMHG
BH CV ECHO MEAS - MV P1/2T: 55.2 MSEC
BH CV ECHO MEAS - MV V2 VTI: 22.6 CM
BH CV ECHO MEAS - MVA(P1/2T): 4 CM2
BH CV ECHO MEAS - MVA(VTI): 4.1 CM2
BH CV ECHO MEAS - PA ACC TIME: 0.13 SEC
BH CV ECHO MEAS - PA V2 MAX: 112.7 CM/SEC
BH CV ECHO MEAS - RAP SYSTOLE: 3 MMHG
BH CV ECHO MEAS - RV MAX PG: 5.4 MMHG
BH CV ECHO MEAS - RV V1 MAX: 116.1 CM/SEC
BH CV ECHO MEAS - RV V1 VTI: 22.4 CM
BH CV ECHO MEAS - SI(MOD-SP2): 17 ML/M2
BH CV ECHO MEAS - SI(MOD-SP4): 26.3 ML/M2
BH CV ECHO MEAS - SV(LVOT): 92.9 ML
BH CV ECHO MEAS - SV(MOD-SP2): 35 ML
BH CV ECHO MEAS - SV(MOD-SP4): 54 ML
BH CV ECHO MEAS - TAPSE (>1.6): 3 CM
BH CV ECHO MEASUREMENTS AVERAGE E/E' RATIO: 13.8
BH CV XLRA - RV BASE: 3.5 CM
BH CV XLRA - RV LENGTH: 7.5 CM
BH CV XLRA - RV MID: 2.6 CM
BH CV XLRA - TDI S': 18.5 CM/SEC
BUN SERPL-MCNC: 14 MG/DL (ref 8–23)
BUN/CREAT SERPL: 19.7 (ref 7–25)
CALCIUM SPEC-SCNC: 9 MG/DL (ref 8.6–10.5)
CHLORIDE SERPL-SCNC: 102 MMOL/L (ref 98–107)
CO2 SERPL-SCNC: 22.7 MMOL/L (ref 22–29)
CREAT SERPL-MCNC: 0.71 MG/DL (ref 0.57–1)
DEPRECATED RDW RBC AUTO: 46.2 FL (ref 37–54)
EGFRCR SERPLBLD CKD-EPI 2021: 92.7 ML/MIN/1.73
ERYTHROCYTE [DISTWIDTH] IN BLOOD BY AUTOMATED COUNT: 13.1 % (ref 12.3–15.4)
GLUCOSE BLDC GLUCOMTR-MCNC: 360 MG/DL (ref 70–130)
GLUCOSE SERPL-MCNC: 397 MG/DL (ref 65–99)
HCT VFR BLD AUTO: 39.7 % (ref 34–46.6)
HGB BLD-MCNC: 12.7 G/DL (ref 12–15.9)
LEFT ATRIUM VOLUME INDEX: 16.6 ML/M2
MAGNESIUM SERPL-MCNC: 2.4 MG/DL (ref 1.6–2.4)
MCH RBC QN AUTO: 30.7 PG (ref 26.6–33)
MCHC RBC AUTO-ENTMCNC: 32 G/DL (ref 31.5–35.7)
MCV RBC AUTO: 95.9 FL (ref 79–97)
PLATELET # BLD AUTO: 272 10*3/MM3 (ref 140–450)
PMV BLD AUTO: 10.1 FL (ref 6–12)
POTASSIUM SERPL-SCNC: 4.7 MMOL/L (ref 3.5–5.2)
QT INTERVAL: 371 MS
QT INTERVAL: 395 MS
QTC INTERVAL: 430 MS
QTC INTERVAL: 439 MS
RBC # BLD AUTO: 4.14 10*6/MM3 (ref 3.77–5.28)
SINUS: 2.9 CM
SODIUM SERPL-SCNC: 138 MMOL/L (ref 136–145)
STJ: 2.7 CM
TROPONIN T SERPL HS-MCNC: 7 NG/L
WBC NRBC COR # BLD AUTO: 6.36 10*3/MM3 (ref 3.4–10.8)

## 2024-02-02 PROCEDURE — 25010000002 PROCHLORPERAZINE 10 MG/2ML SOLUTION: Performed by: NURSE PRACTITIONER

## 2024-02-02 PROCEDURE — 63710000001 INSULIN LISPRO (HUMAN) PER 5 UNITS: Performed by: NURSE PRACTITIONER

## 2024-02-02 PROCEDURE — 80048 BASIC METABOLIC PNL TOTAL CA: CPT | Performed by: PHYSICIAN ASSISTANT

## 2024-02-02 PROCEDURE — G0378 HOSPITAL OBSERVATION PER HR: HCPCS

## 2024-02-02 PROCEDURE — 83735 ASSAY OF MAGNESIUM: CPT | Performed by: PHYSICIAN ASSISTANT

## 2024-02-02 PROCEDURE — 25510000001 IOPAMIDOL PER 1 ML: Performed by: EMERGENCY MEDICINE

## 2024-02-02 PROCEDURE — 94640 AIRWAY INHALATION TREATMENT: CPT

## 2024-02-02 PROCEDURE — 93005 ELECTROCARDIOGRAM TRACING: CPT | Performed by: NURSE PRACTITIONER

## 2024-02-02 PROCEDURE — 94799 UNLISTED PULMONARY SVC/PX: CPT

## 2024-02-02 PROCEDURE — 25510000001 PERFLUTREN (DEFINITY) 8.476 MG IN SODIUM CHLORIDE (PF) 0.9 % 10 ML INJECTION: Performed by: NURSE PRACTITIONER

## 2024-02-02 PROCEDURE — 25010000002 METHYLPREDNISOLONE PER 40 MG: Performed by: NURSE PRACTITIONER

## 2024-02-02 PROCEDURE — 25010000002 KETOROLAC TROMETHAMINE PER 15 MG: Performed by: NURSE PRACTITIONER

## 2024-02-02 PROCEDURE — 71275 CT ANGIOGRAPHY CHEST: CPT

## 2024-02-02 PROCEDURE — 25010000002 DIPHENHYDRAMINE PER 50 MG: Performed by: PHYSICIAN ASSISTANT

## 2024-02-02 PROCEDURE — 96375 TX/PRO/DX INJ NEW DRUG ADDON: CPT

## 2024-02-02 PROCEDURE — 93306 TTE W/DOPPLER COMPLETE: CPT

## 2024-02-02 PROCEDURE — 82948 REAGENT STRIP/BLOOD GLUCOSE: CPT

## 2024-02-02 PROCEDURE — 84484 ASSAY OF TROPONIN QUANT: CPT | Performed by: PHYSICIAN ASSISTANT

## 2024-02-02 PROCEDURE — 99204 OFFICE O/P NEW MOD 45 MIN: CPT | Performed by: INTERNAL MEDICINE

## 2024-02-02 PROCEDURE — 93306 TTE W/DOPPLER COMPLETE: CPT | Performed by: INTERNAL MEDICINE

## 2024-02-02 PROCEDURE — 93010 ELECTROCARDIOGRAM REPORT: CPT | Performed by: INTERNAL MEDICINE

## 2024-02-02 PROCEDURE — 94761 N-INVAS EAR/PLS OXIMETRY MLT: CPT

## 2024-02-02 PROCEDURE — 85027 COMPLETE CBC AUTOMATED: CPT | Performed by: PHYSICIAN ASSISTANT

## 2024-02-02 RX ORDER — PREGABALIN 100 MG/1
100 CAPSULE ORAL 3 TIMES DAILY
Status: DISCONTINUED | OUTPATIENT
Start: 2024-02-02 | End: 2024-02-02 | Stop reason: HOSPADM

## 2024-02-02 RX ORDER — INSULIN LISPRO 100 [IU]/ML
2-9 INJECTION, SOLUTION INTRAVENOUS; SUBCUTANEOUS
Status: DISCONTINUED | OUTPATIENT
Start: 2024-02-02 | End: 2024-02-02 | Stop reason: HOSPADM

## 2024-02-02 RX ORDER — NICOTINE POLACRILEX 4 MG
15 LOZENGE BUCCAL
Status: DISCONTINUED | OUTPATIENT
Start: 2024-02-02 | End: 2024-02-02 | Stop reason: HOSPADM

## 2024-02-02 RX ORDER — IBUPROFEN 600 MG/1
1 TABLET ORAL
Status: DISCONTINUED | OUTPATIENT
Start: 2024-02-02 | End: 2024-02-02 | Stop reason: HOSPADM

## 2024-02-02 RX ORDER — DEXTROSE MONOHYDRATE 25 G/50ML
25 INJECTION, SOLUTION INTRAVENOUS
Status: DISCONTINUED | OUTPATIENT
Start: 2024-02-02 | End: 2024-02-02 | Stop reason: HOSPADM

## 2024-02-02 RX ORDER — DIPHENHYDRAMINE HYDROCHLORIDE 50 MG/ML
50 INJECTION INTRAMUSCULAR; INTRAVENOUS ONCE
Status: COMPLETED | OUTPATIENT
Start: 2024-02-02 | End: 2024-02-02

## 2024-02-02 RX ADMIN — LISINOPRIL 10 MG: 10 TABLET ORAL at 10:06

## 2024-02-02 RX ADMIN — IOPAMIDOL 95 ML: 755 INJECTION, SOLUTION INTRAVENOUS at 08:34

## 2024-02-02 RX ADMIN — LEVETIRACETAM 250 MG: 500 TABLET, FILM COATED ORAL at 10:06

## 2024-02-02 RX ADMIN — ATORVASTATIN CALCIUM 20 MG: 20 TABLET, FILM COATED ORAL at 10:08

## 2024-02-02 RX ADMIN — PREGABALIN 100 MG: 100 CAPSULE ORAL at 10:19

## 2024-02-02 RX ADMIN — CYCLOSPORINE 1 DROP: 0.5 EMULSION OPHTHALMIC at 10:09

## 2024-02-02 RX ADMIN — KETOROLAC TROMETHAMINE 15 MG: 15 INJECTION, SOLUTION INTRAMUSCULAR; INTRAVENOUS at 12:37

## 2024-02-02 RX ADMIN — PERFLUTREN 3 ML: 6.52 INJECTION, SUSPENSION INTRAVENOUS at 09:53

## 2024-02-02 RX ADMIN — Medication 10 ML: at 10:09

## 2024-02-02 RX ADMIN — METHYLPREDNISOLONE SODIUM SUCCINATE 40 MG: 40 INJECTION, POWDER, FOR SOLUTION INTRAMUSCULAR; INTRAVENOUS at 00:00

## 2024-02-02 RX ADMIN — ASPIRIN 81 MG: 81 TABLET, COATED ORAL at 10:06

## 2024-02-02 RX ADMIN — DIPHENHYDRAMINE HYDROCHLORIDE 50 MG: 50 INJECTION, SOLUTION INTRAMUSCULAR; INTRAVENOUS at 06:58

## 2024-02-02 RX ADMIN — METHYLPREDNISOLONE SODIUM SUCCINATE 40 MG: 40 INJECTION, POWDER, FOR SOLUTION INTRAMUSCULAR; INTRAVENOUS at 06:58

## 2024-02-02 RX ADMIN — PROCHLORPERAZINE EDISYLATE 10 MG: 5 INJECTION INTRAMUSCULAR; INTRAVENOUS at 12:37

## 2024-02-02 RX ADMIN — INSULIN LISPRO 8 UNITS: 100 INJECTION, SOLUTION INTRAVENOUS; SUBCUTANEOUS at 11:18

## 2024-02-02 RX ADMIN — IPRATROPIUM BROMIDE AND ALBUTEROL SULFATE 3 ML: 2.5; .5 SOLUTION RESPIRATORY (INHALATION) at 00:03

## 2024-02-02 RX ADMIN — BUDESONIDE AND FORMOTEROL FUMARATE DIHYDRATE 2 PUFF: 160; 4.5 AEROSOL RESPIRATORY (INHALATION) at 07:01

## 2024-02-02 NOTE — OUTREACH NOTE
Prep Survey      Flowsheet Row Responses   [facility] patient discharged from? [place]   Is LACE score < 7 ? No   Eligibility [facility]   Date of Admission 02/01/24   Date of Discharge 02/02/24   Discharge Disposition Home or Self Care   Discharge diagnosis Chest pain   Does the patient have one of the following disease processes/diagnoses(primary or secondary)? Other   Does the patient have Home health ordered? No   Is there a DME ordered? No   Prep survey completed? Yes            Caro RANDALL - Registered Nurse

## 2024-02-02 NOTE — PROGRESS NOTES
ED OBSERVATION PROGRESS/DISCHARGE SUMMARY    Date of Admission: 2/1/2024   LOS: 0 days   PCP: Sergo Owen MD    Final Diagnosis chest pain      Subjective     Hospital Outcome:     Pleasant afebrile ambulatory 68-year-old  female admitted to the ED observation unit for chest discomfort.  She was initially seen at the freestanding ED at [facility] and transferred to the observation unit as a direct admit.    She had 3 negative high-sensitivity troponin enzymes.  Echocardiogram performed showed ejection fraction 59% with mild mitral valve regurgitation.    Seen and evaluated Dr. Flynn with the cardiology service who advises she is a low suspicion for significant coronary artery disease as the etiology as her of her discomfort recommends holding off on any further ischemic workup at this time.    ROS:  General: no fevers, chills  Respiratory: no cough, dyspnea  Cardiovascular: no chest pain, palpitations  Abdomen: No abdominal pain, nausea, vomiting, or diarrhea  Neurologic: No focal weakness    Objective   Physical Exam:  I have reviewed the vital signs.  Temp:  [97.6 °F (36.4 °C)-98.2 °F (36.8 °C)] 97.7 °F (36.5 °C)  Heart Rate:  [64-85] 85  Resp:  [15-18] 18  BP: (119-156)/(44-86) 132/63  General Appearance:    Alert, cooperative, no distress  Head:    Normocephalic, atraumatic  Eyes:    Sclerae anicteric  Neck:   Supple, no mass  Lungs: Clear to auscultation bilaterally, respirations unlabored  Heart: Regular rate and rhythm, S1 and S2 normal, no murmur, rub or gallop  Abdomen:  Soft, non-tender, bowel sounds active, nondistended  Extremities: No clubbing, cyanosis, or edema to lower extremities  Pulses:  2+ and symmetric in distal lower extremities  Skin: No rashes   Psychiatric: Anxious affect, nonsuicidal  Neurologic: Oriented x3, Normal strength to extremities    Results Review:    I have reviewed the labs, radiology results and diagnostic studies.    Results from last 7 days    Lab Units 02/02/24  0626   WBC 10*3/mm3 6.36   HEMOGLOBIN g/dL 12.7   HEMATOCRIT % 39.7   PLATELETS 10*3/mm3 272     Results from last 7 days   Lab Units 02/02/24  0627 02/01/24  1126 01/28/24  0725   SODIUM mmol/L 138 136 138   POTASSIUM mmol/L 4.7 4.6 4.4   CHLORIDE mmol/L 102 100 100   CO2 mmol/L 22.7 24.8 28.0   BUN mg/dL 14 17 19   CREATININE mg/dL 0.71 0.61 0.61   CALCIUM mg/dL 9.0 9.7 9.7   BILIRUBIN mg/dL  --  0.4  --    ALK PHOS U/L  --  58  --    ALT (SGPT) U/L  --  25  --    AST (SGOT) U/L  --  22  --    GLUCOSE mg/dL 397* 186* 204*     Imaging Results (Last 24 Hours)       Procedure Component Value Units Date/Time    XR Chest 1 View [[id number]] Collected: 02/01/24 1140     Updated: 02/01/24 1214    Narrative:      CHEST SINGLE VIEW     HISTORY: Chest pain with radicular symptoms into the back. Cough.     COMPARISON: AP chest 01/23/2024, 01/21/2024, CT angiogram chest  01/07/2024.     FINDINGS: Heart size is within normal limits. The mediastinal structures  appear normal. There is mild linear opacity within the medial aspect of  the right middle lobe that correlates with that demonstrated on CT and  exhibits some evidence for improvement when compared to the previous  exam. No new airspace disease has developed.       Impression:      Since exam 01/23/2024, there has been improvement in  thickness of linear opacity superimposed in the medial right lung base  which appears to be in the right middle lobe when correlated with  previous CT previous. This is consistent with an area of improving  atelectasis. No new airspace disease has developed and there is no new  abnormality.     This report was finalized on 2/1/2024 12:11 PM by Dr. Marino Wilson M.D on Workstation: [id number]               I have reviewed the medications.  ---------------------------------------------------------------------------------------------  Assessment & Plan   Assessment/Problem List    Chest pain      Plan:    Chest  pain/elevated D-dimer:  -CTA of the chest negative for PE or any acute cardiopulmonary findings  -Steroids and Benadryl to be given prior to CTA chest due to contrast allergy  -Cardiology evaluation, cleared for discharge home     Diabetes mellitus  -Continue home medication     Hypertension  -Resume home medications at discharge        Insomnia  -Continue home medications     Reactive airway disease  -Continue home medications       Disposition: Home    Follow-up after Discharge: PCP and cardiology    This note will serve as a discharge summary    Olga Whitfield, APRN 02/02/24 07:31 EST    I have worn appropriate PPE during this patient encounter, sanitized my hands both with entering and exiting patient's room.      42 minutes has been spent by [facility] providers in the care of this patient while under observation status

## 2024-02-02 NOTE — PLAN OF CARE
Goal Outcome Evaluation:   Pt d/c via private vehicle. IV removed. Belongings returned. Follow up instructions given. No further questions/complaints at this time.

## 2024-02-02 NOTE — PROGRESS NOTES
CARLITOS NAVARRO Attestation Note    I supervised care provided by the midlevel provider.    The OMAIRA and I have discussed this patient's history, physical exam, and treatment plan. I have reviewed the documentation and personally had a face to face interaction with the patient  I affirm the documentation and agree with the treatment and plan. I provided a substantive portion of the care of this patient.  I personally performed the physical exam, in its entirety.  My personal findings are documented in below:    History:  68-year-old female who was recently discharged from the hospital for hypoxia related to an RSV infection returns today following episode of severe chest discomfort that has been resolved for several hours.  She was seen at an outlying ER and transferred here for further evaluation.  She was noted to have an elevated D-dimer but reports significant allergy to contrast.  At this time she feels back to baseline.    Physical Exam:  General: No acute distress.  HENT: NCAT, PERRL, Nares patent.  Eyes: no scleral icterus.  Neck: trachea midline, no ROM limitations.  CV: Pink warm and well-perfused throughout.  Regular rate and rhythm  Respiratory: No distress or increased work of breathing.  Clear to auscultation  Musculoskeletal: no deformity.  Neuro: alert, moves all extremities, follows commands.  Skin: warm, dry.    Assessment and Plan:  68-year-old female with no prior cardiac history being evaluated for chest discomfort.  Cardiology consulted.  Patient is being pretreated for contrast allergy with plan for CTA chest in a.m.

## 2024-02-02 NOTE — OUTREACH NOTE
Medical Week 2 Survey      Flowsheet Row Responses   [facility] [place] patient discharged from? [place]   Does the patient have one of the following disease processes/diagnoses(primary or secondary)? Other   Week 2 attempt successful? No   Unsuccessful attempts Attempt 2   Revoke Readmitted            JOHANNA PEREZ - Registered Nurse

## 2024-02-02 NOTE — CONSULTS
[facility] Consult    Patient Name: Khalida Gilliland  Age/Sex: 68 y.o. female  : 1955  MRN: [id number]    Date of Admission: 2024  Date of Encounter Visit: 24  Encounter Provider: Naima Flynn MD  Referring Provider: Sergo Owen MD  Place of Service: [facility] CARDIOLOGY  Patient Care Team:  Sergo Owen MD as PCP - General (Internal Medicine)  Lex Morris MD as Consulting Physician (Hematology and Oncology)  Mikael David MD as Referring Physician (Internal Medicine)    Subjective:     Consulted for: Chest pain    Chief Complaint: Chest pain    History of Present Illness:  Khalida Gilliland is a 68 y.o. female with diabetes mellitus type 2, hypertension, hyperlipidemia, who presented to the emergency room with complaints of chest pain.    The patient reports that she has been suffering with the aftereffects of RSV infection that she first contracted at the end of December.  Since then she is continue to have issues with congestion and cough.  She has been on more than one round of steroids including before this admission.  She reports that she went to run some errands yesterday.  She admits that this was the first time she had really gotten out of the house in a while.  She dropped off her 's car for servicing and went to the bank and picked up some food.  She was being driven by her brother when she had a sudden onset of chest discomfort that radiated around to her back.  Symptoms lasted in total about 60 to 90 seconds per the patient's report.  Not associated with diaphoresis, nausea or shortness of breath.  Symptoms were quite severe.  They resolved on their own.  She ended up going home and taking some aspirin and then driving herself to the emergency room.    Following arrival to the [facility] emergency room her workup included normal EKGs.  Troponins have been normal.  BNP, CBC and CMP were unremarkable.   "D-dimer was mildly elevated 0.87.  CT angiogram of the chest was recommended but due to contrast allergy she was transferred to [facility] and admitted to the observation unit for precontrast treatment before undergoing a CT.  Additionally an echocardiogram was ordered.    She remains pain-free this morning.  She denies any worsening shortness of breath before this episode yesterday.  She denies any prior cardiac history.  She reports that she has a remote history of cardiac testing.  She does report a significant family history of coronary artery disease in her father who  from myocardial infarction.      Past Medical History:  Past Medical History:   Diagnosis Date    Abdominal pain, right lower quadrant     Anemia     Asthma     Brain aneurysm      and 2017    Cancer 2014    Basal Cell Carcinoma Left Arm,SKIN CANCER    COVID-19 2021    Cyst of left kidney     Diabetes mellitus     Tyoe 2 diabetic    Diverticulosis     Fatty liver     GERD (gastroesophageal reflux disease)     History of kidney stones 2011    History of surgery for cerebral aneurysm     Clipping of cerebral Aneurysm    Hx of migraines     Hyperlipidemia     Hypertension     Insomnia     Neck pain     Peptic ulceration     Pneumonia     PONV (postoperative nausea and vomiting)     Stroke     \"years ago\" TIA no after effects       Past Surgical History:   Procedure Laterality Date    APPENDECTOMY      Emergency at time of 2nd     BASAL CELL CARCINOMA EXCISION Left 2014    Excision basal cell carcinoma, left arm (1.1 cm) with frozen section control and layered wound closure ( 3.1 cm), Dr. Isael Andrade, [facility]    BRAIN SURGERY      Ruptured aneurysm, clipped/Dr. Sims    CEREBRAL ANEURYSM REPAIR      clipping of cerebral aneurysm    CEREBRAL ANGIOGRAM N/A 2017    Procedure: CEREBRAL ANGIOGRAM ;  Surgeon: Orlando Bella MD;  Location: [facility] OR ;  Service:     CEREBRAL ANGIOGRAM " N/A 10/11/2017    Procedure: CEREBRAL ANGIOGRAM;  Surgeon: Callum Bella MD;  Location: [facility] OR ;  Service:      SECTION  , ,     CHOLECYSTECTOMY      COLON RESECTION WITH COLOSTOMY Right     COLONOSCOPY  2009    COLONOSCOPY N/A 2020    Procedure: COLONOSCOPY to terminal ileum;  Surgeon: Luiza Norris MD;  Location: [facility] ENDOSCOPY;  Service: Gastroenterology;  Laterality: N/A;  PREOP/ SCREENING  POSTOP/ DIVERTICULOSIS. POOR PREP    COLONOSCOPY N/A 2023    Procedure: COLONOSCOPY TO CECUM WITH COLD BX POLYPECTOMIES AND COLD SNARE POLYPECTOMY;  Surgeon: Luiza Norris MD;  Location: [facility] ENDOSCOPY;  Service: Gastroenterology;  Laterality: N/A;  PRE OP - SCREENING  POST OP - COLON POLYPS, DIVERTICULOSIS, HEMORRHOIDS    EMBOLIZATION CEREBRAL N/A 2017    Procedure: Cerebral angiography and embolization of cerebral aneurysm with Pipeline Embolization Device;  Surgeon: Callum Bella MD;  Location: [facility] OR ;  Service:     EMBOLIZATION CEREBRAL N/A 10/31/2018    Procedure: Cerebral angiogram with embolization of cerebral aneurysm;  Surgeon: Callum Bella MD;  Location: [facility] OR ;  Service: Neurosurgery    ILEOSTOMY REVISION      INCISIONAL HERNIA REPAIR      Patient suffered some nerve entrapment secondary to the attack, some of which were removed during a secondary operation.    INGUINAL HERNIA REPAIR      LARYNX SURGERY      OSTOMY TAKE DOWN      TUBAL ABDOMINAL LIGATION      URETERAL STENT INSERTION  2011    Due to kidney stones       Home Medications:   Medications Prior to Admission   Medication Sig Dispense Refill Last Dose    Ascorbic Acid (VITAMIN C PO) Take 1,000 mg by mouth Daily. PT INSTRUCTED TO CONTINUE PER DR. CALLUM BELLA   2024    aspirin 81 MG EC tablet Take 1 tablet by mouth Daily. 30 tablet 6 2024    atorvastatin (LIPITOR) 20 MG tablet    2024    azelastine (ASTELIN) 0.1 % nasal  spray USE 1 SPRAY IN EACH NOSTRIL TWICE A DAY AS NEEDED FOR RHINITIS AS DIRECTED 30 mL 6 2/1/2024    benzonatate (Tessalon Perles) 100 MG capsule Take 1 capsule by mouth 3 (Three) Times a Day As Needed for Cough. 60 capsule 0 1/31/2024    Biotin 66575 MCG tablet Take 1 tablet by mouth Every Evening.   2/1/2024    budesonide-formoterol (SYMBICORT) 160-4.5 MCG/ACT inhaler Inhale 2 puffs 2 (Two) Times a Day.   2/1/2024    cholecalciferol (VITAMIN D3) 1000 units tablet Take 1 tablet by mouth Daily. PER PT TO CONTINUE PER DR. CALLUM URBINA   2/1/2024    Cyanocobalamin (VITAMIN B 12 PO) Take 5,000 mcg by mouth Every Night.   2/1/2024    cycloSPORINE (RESTASIS) 0.05 % ophthalmic emulsion Administer 1 drop to both eyes 2 (Two) Times a Day.   2/1/2024    diphenhydrAMINE-acetaminophen (TYLENOL PM)  MG tablet per tablet Take 1 tablet by mouth At Night As Needed for Sleep.   1/31/2024    fluconazole (Diflucan) 150 MG tablet Take 1 tablet by mouth Every 72 (Seventy-Two) Hours As Needed (vaginal yeast infection). 2 tablet 0 2/1/2024    fluticasone (FLONASE) 50 MCG/ACT nasal spray USE 2 SPRAYS IN EACH NOSTRIL DAILY 16 g 11 2/1/2024    glipizide (glipiZIDE XL) 5 MG ER tablet Take 1 tablet by mouth Daily. 90 tablet 3 2/1/2024    glucose blood (FREESTYLE LITE) test strip PT to use once daily 100 each 12 2/1/2024    glucose blood test strip Use to test blood glucose up to four times daily as needed. Formulary Compliance Approval. Diagnosis: Type 2 Diabetes - Insulin Dependent 100 each 0 2/1/2024    glucose monitor monitoring kit Use to test blood glucose up to four times daily as needed. Formulary Compliance Approval. Diagnosis: Type 2 Diabetes - Insulin Dependent 1 each 0 2/1/2024    Hydrocod Rudi-Chlorphe Rudi ER (TUSSIONEX PENNKINETIC) 10-8 MG/5ML ER suspension Take 5 mL by mouth Every 12 (Twelve) Hours As Needed for Cough for up to 12 days. 115 mL 0 2/1/2024    Insulin Glargine (Lantus SoloStar) 100 UNIT/ML injection pen  Inject 10 Units under the skin into the appropriate area as directed Every Night. Formulary Compliance Approval 3 mL 0 2024    Insulin Pen Needle (Pen Needles) 32G X 4 MM misc Inject 1 each under the skin into the appropriate area as directed 4 (Four) Times a Day As Needed (for insulin injections). Formulary Compliance Approval 100 each 0 2024    ipratropium (ATROVENT) 0.06 % nasal spray 2 sprays 2 (Two) Times a Day.   2024    ipratropium-albuterol (DUO-NEB) 0.5-2.5 mg/3 ml nebulizer Take 3 mL by nebulization Every 2 (Two) Hours As Needed for Shortness of Air. 360 mL 0 2024    Lancets (freestyle) lancets Pt tests daily 100 each 6 2024    Lancets misc Use to test blood glucose up to four times daily as needed. Formulary Compliance Approval. Diagnosis: Type 2 Diabetes - Insulin Dependent 100 each 0 2024    levETIRAcetam (KEPPRA) 250 MG tablet Take 1 tablet by mouth Daily. 30 tablet 0 2024    lisinopril (PRINIVIL,ZESTRIL) 10 MG tablet Take 1 tablet by mouth Daily. 90 tablet 3 2024    magnesium oxide (MAG-OX) 400 tablet tablet Take 1 tablet by mouth Daily. 30 each 0 2024    Misc Natural Products (CORTISOL PO) Take  by mouth.   2024    montelukast (SINGULAIR) 10 MG tablet Take 1 tablet by mouth Every Night. 90 tablet 3 2024    multivitamin (THERAGRAN) tablet tablet Take 1 tablet by mouth Daily.   2024    NYSTATIN 474511 UNIT/GM topical powder    2024    Omega-3 Fatty Acids (FISH OIL) 1000 MG capsule capsule Take  by mouth Daily With Breakfast.   2024    omeprazole (priLOSEC) 40 MG capsule TAKE 1 CAPSULE EVERY MORNING 90 capsule 3 2024    [] predniSONE (DELTASONE) 5 MG tablet Take 4 tablets by mouth Daily With Breakfast for 1 day, THEN 3 tablets Daily With Breakfast for 1 day, THEN 2 tablets Daily With Breakfast for 1 day, THEN 1 tablet Daily With Breakfast for 1 day. 10 tablet 0 2024    pregabalin (LYRICA) 100 MG capsule TAKE 1 CAPSULE BY MOUTH  THREE TIMES DAILY 270 capsule 1 2024    Probiotic Product (PROBIOTIC-10 PO) Take  by mouth.   2024    promethazine-dextromethorphan (PROMETHAZINE-DM) 6.25-15 MG/5ML syrup Take 5 mL by mouth 4 (Four) Times a Day As Needed for Cough. May cause drowsiness.  Do not drive. 118 mL 0 2024    propranolol (INDERAL) 40 MG tablet Take 1 tablet by mouth 2 (Two) Times a Day. 180 tablet 3 2024    raNITIdine (ZANTAC) 75 MG tablet Take 2 tablets by mouth 2 (Two) Times a Day.   2024    Semaglutide (Rybelsus) 7 MG tablet Take 1 mg by mouth Daily. (Patient taking differently: Take 3 mg by mouth Daily.) 90 tablet 1 2024    sodium chloride 0.65 % nasal spray 2 sprays into the nostril(s) as directed by provider As Needed for Congestion. 50 mL 0 2024    Spacer/Aero-Holding Chambers device Use with all inhaled treatments 1 each 0 2024    traZODone (DESYREL) 50 MG tablet Take 1 tablet by mouth Every Night. 90 tablet 1 2024    Vitamin B-2 (RIBOFLAVIN) 100 MG tablet tablet Take 3 tablets by mouth Daily. 30 each 0 2024    zolpidem (Ambien) 5 MG tablet Take 1 tablet by mouth At Night As Needed for Sleep. 30 tablet 0 2024    [] amoxicillin-clavulanate (AUGMENTIN) 875-125 MG per tablet Take 1 tablet by mouth 2 (Two) Times a Day for 4 days. 8 tablet 0        Allergies:  Allergies   Allergen Reactions    Contrast Dye (Echo Or Unknown Ct/Mr) Shortness Of Breath    Iodinated Contrast Media Shortness Of Breath    Codeine Nausea Only    Methylprednisolone Anxiety    Other Other (See Comments)     Bamlanivimab infusion- muscle aches, joint pain, nausea    Ciprofloxacin Hcl Rash       Past Social History:  Social History     Socioeconomic History    Marital status:      Spouse name: Roe    Number of children: 3    Years of education: College   Tobacco Use    Smoking status: Former     Years: 1     Types: Cigarettes     Quit date:      Years since quittin.1     Passive exposure: Past     Smokeless tobacco: Never   Vaping Use    Vaping Use: Never used   Substance and Sexual Activity    Alcohol use: Not Currently    Drug use: No    Sexual activity: Never       Past Family History:  Family History   Problem Relation Age of Onset    Skin cancer Mother     Dementia Mother     KALYAN disease Mother     Heart disease Father     Heart attack Father     Hypertension Father     Aneurysm Son         cerebral    Nephrolithiasis Son     Suicide Attempts Brother     Malig Hyperthermia Neg Hx        Review of Systems:   All systems reviewed. Pertinent positives identified in HPI. All other systems are negative.    Objective:   Temp:  [97.6 °F (36.4 °C)-98.2 °F (36.8 °C)] 97.7 °F (36.5 °C)  Heart Rate:  [64-85] 85  Resp:  [15-18] 18  BP: (119-156)/(44-86) 132/63     Intake/Output Summary (Last 24 hours) at 2/2/2024 0732  Last data filed at 2/1/2024 1713  Gross per 24 hour   Intake 300 ml   Output --   Net 300 ml     Body mass index is 30.87 kg/m².      02/01/24  1052 02/01/24  1713   Weight: 94.8 kg (209 lb) 92.1 kg (203 lb)     Weight change:     Physical Exam:   General Appearance:    Alert, cooperative, in no acute distress   Head:    Normocephalic, without obvious abnormality, atraumatic   Eyes:            Lids and lashes normal, conjunctivae and sclerae normal, no   icterus, no pallor, corneas clear, PERRLA   Ears:    Ears appear intact with no abnormalities noted   Neck:   No adenopathy, supple, trachea midline, no thyromegaly, no   carotid bruit, no JVD   Lungs:     Clear to auscultation,respirations regular, even and unlabored    Heart:    Regular rhythm and normal rate, normal S1 and S2, no murmur, no gallop, no rub, no click   Chest Wall:    No abnormalities observed   Abdomen:     Normal bowel sounds, no masses, no organomegaly, soft        non-tender, non-distended, no guarding, no rebound  tenderness   Extremities:   Moves all extremities well, no edema, no cyanosis, no redness   Pulses:   Pulses  "palpable and equal bilaterally. Normal radial, carotid, femoral, dorsalis pedis and posterior tibial pulses bilaterally. Normal abdominal aorta   Skin:  Psychiatric:   No bleeding, bruising or rash    Alert and oriented x 3, normal mood and affect       Lab Review:   Results from last 7 days   Lab Units 02/02/24  0627 02/01/24  1126 01/28/24  0725 01/27/24  0316   SODIUM mmol/L 138 136 138 139   POTASSIUM mmol/L 4.7 4.6 4.4 3.9   CHLORIDE mmol/L 102 100 100 101   CO2 mmol/L 22.7 24.8 28.0 27.0   BUN mg/dL 14 17 19 23   CREATININE mg/dL 0.71 0.61 0.61 0.74   GLUCOSE mg/dL 397* 186* 204* 259*   CALCIUM mg/dL 9.0 9.7 9.7 9.1   AST (SGOT) U/L  --  22  --   --    ALT (SGPT) U/L  --  25  --   --      Results from last 7 days   Lab Units 02/02/24  0627 02/01/24  1340 02/01/24  1126   HSTROP T ng/L 7 <6 7     Results from last 7 days   Lab Units 02/02/24  0626 02/01/24  1126 01/28/24  0725 01/27/24  0316   WBC 10*3/mm3 6.36 7.39 8.08 8.72   HEMOGLOBIN g/dL 12.7 13.7 13.0 11.9*   HEMATOCRIT % 39.7 42.6 39.5 36.9   PLATELETS 10*3/mm3 272 319 285 288     Results from last 7 days   Lab Units 02/01/24  1146   INR  0.90     Results from last 7 days   Lab Units 02/02/24  0627   MAGNESIUM mg/dL 2.4     Results from last 7 days   Lab Units 02/01/24  1126   CHOLESTEROL mg/dL 170   TRIGLYCERIDES mg/dL 323*   HDL CHOL mg/dL 39*     Results from last 7 days   Lab Units 02/01/24  1126   PROBNP pg/mL <36.0           Echo EF Estimated  No results found for: \"ECHOEFEST\"    EKG:     Imaging:  Imaging Results (Most Recent)       Procedure Component Value Units Date/Time    XR Chest 1 View [[id number]] Collected: 02/01/24 1140     Updated: 02/01/24 1214    Narrative:      CHEST SINGLE VIEW     HISTORY: Chest pain with radicular symptoms into the back. Cough.     COMPARISON: AP chest 01/23/2024, 01/21/2024, CT angiogram chest  01/07/2024.     FINDINGS: Heart size is within normal limits. The mediastinal structures  appear normal. There is mild " linear opacity within the medial aspect of  the right middle lobe that correlates with that demonstrated on CT and  exhibits some evidence for improvement when compared to the previous  exam. No new airspace disease has developed.       Impression:      Since exam 01/23/2024, there has been improvement in  thickness of linear opacity superimposed in the medial right lung base  which appears to be in the right middle lobe when correlated with  previous CT previous. This is consistent with an area of improving  atelectasis. No new airspace disease has developed and there is no new  abnormality.     This report was finalized on 2/1/2024 12:11 PM by Dr. Marino Wilson M.D on Workstation: [id number]               I personally viewed and interpreted the patient's EKG    Assessment/Plan:     1.  Chest pain.  Atypical sounding symptoms.  Isolated episode without recurrence.  Normal troponins and EKG.  2.  Abnormal D-dimer.  Mildly elevated.  Plan for CT angiogram of the chest noted.  3.  Hypertension.  Well-controlled.  4.  Hyperlipidemia  5.  Diabetes mellitus type 2.    - Will follow-up on echocardiogram and CT angiogram results.  - I have a low suspicion for significant coronary artery disease as a cause of her episode of pain.  If above workup is unremarkable and she has no further symptoms I would hold off on further ischemic testing at this time.    Thank you for allowing me to participate in the care of Khalida Gilliland. Feel free to contact me directly with any further questions or concerns.    Naima Flynn MD  [facility]  02/02/24  07:32 EST
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
## 2024-02-02 NOTE — PLAN OF CARE
Goal Outcome Evaluation:   Plan of care reviewed with patient    Outcome summary: Patient AOX4, vitals stable, up without assist, no chest pain reported during shift, plan for CTA and echo today with cardiology consult

## 2024-02-05 ENCOUNTER — TRANSITIONAL CARE MANAGEMENT TELEPHONE ENCOUNTER (OUTPATIENT)
Dept: CALL CENTER | Facility: HOSPITAL | Age: 69
End: 2024-02-05
Payer: MEDICARE

## 2024-02-05 NOTE — OUTREACH NOTE
Call Center TCM Note      Flowsheet Row Responses   [facility] patient discharged from? [place]   Does the patient have one of the following disease processes/diagnoses(primary or secondary)? Other   TCM attempt successful? Yes   Call start time 1348   Call end time 1349   Discharge diagnosis Chest pain   Does the patient have all medications ordered at discharge? N/A   Prescription comments no new medications ordered at discharge   Is the patient taking all medications as directed (includes completed medication regime)? Yes   Does the patient have an appointment with their PCP within 7-14 days of discharge? No   Nursing Interventions Patient declined scheduling/rescheduling appointment at this time   Has home health visited the patient within 72 hours of discharge? N/A   Psychosocial issues? No   Did the patient receive a copy of their discharge instructions? Yes   Nursing interventions Reviewed instructions with patient   What is the patient's perception of their health status since discharge? Improving   Is the patient/caregiver able to teach back signs and symptoms related to disease process for when to call PCP? Yes   Is the patient/caregiver able to teach back signs and symptoms related to disease process for when to call 911? Yes   Is the patient/caregiver able to teach back the hierarchy of who to call/visit for symptoms/problems? PCP, Specialist, Home health nurse, Urgent Care, ED, 911 Yes   TCM call completed? Yes   Wrap up additional comments Doing well, no questions, states she will see PCP once she gets back from her trip, declined to see anyone else in the PCP office.   Call end time 1349   Would this patient benefit from a Referral to Amb Social Work? No   Is the patient interested in additional calls from an ambulatory ? No            Tierra Luna RN    2/5/2024, 13:49 EST

## 2024-02-13 ENCOUNTER — READMISSION MANAGEMENT (OUTPATIENT)
Dept: CALL CENTER | Facility: HOSPITAL | Age: 69
End: 2024-02-13
Payer: MEDICARE

## 2024-02-14 ENCOUNTER — TELEPHONE (OUTPATIENT)
Dept: NEUROLOGY | Facility: CLINIC | Age: 69
End: 2024-02-14
Payer: MEDICARE

## 2024-02-26 DIAGNOSIS — E11.9 TYPE 2 DIABETES MELLITUS WITHOUT COMPLICATION, WITHOUT LONG-TERM CURRENT USE OF INSULIN: ICD-10-CM

## 2024-02-26 DIAGNOSIS — J30.9 ALLERGIC RHINITIS, UNSPECIFIED SEASONALITY, UNSPECIFIED TRIGGER: ICD-10-CM

## 2024-02-26 DIAGNOSIS — E11.65 TYPE 2 DIABETES MELLITUS WITH HYPERGLYCEMIA, WITHOUT LONG-TERM CURRENT USE OF INSULIN: ICD-10-CM

## 2024-02-26 DIAGNOSIS — J45.909 UNCOMPLICATED ASTHMA, UNSPECIFIED ASTHMA SEVERITY, UNSPECIFIED WHETHER PERSISTENT: Primary | ICD-10-CM

## 2024-02-26 RX ORDER — NYSTATIN 100000 [USP'U]/G
POWDER TOPICAL
OUTPATIENT
Start: 2024-02-26

## 2024-02-26 RX ORDER — MONTELUKAST SODIUM 10 MG/1
10 TABLET ORAL NIGHTLY
Qty: 90 TABLET | Refills: 3 | Status: SHIPPED | OUTPATIENT
Start: 2024-02-26

## 2024-02-26 RX ORDER — ORAL SEMAGLUTIDE 7 MG/1
1 TABLET ORAL DAILY
Qty: 90 TABLET | Refills: 1 | Status: SHIPPED | OUTPATIENT
Start: 2024-02-26

## 2024-02-26 RX ORDER — IPRATROPIUM BROMIDE 42 UG/1
2 SPRAY, METERED NASAL 2 TIMES DAILY
OUTPATIENT
Start: 2024-02-26

## 2024-02-26 RX ORDER — AZELASTINE 1 MG/ML
1 SPRAY, METERED NASAL 2 TIMES DAILY
Qty: 30 ML | Refills: 6 | Status: SHIPPED | OUTPATIENT
Start: 2024-02-26

## 2024-02-26 RX ORDER — CYCLOSPORINE 0.5 MG/ML
1 EMULSION OPHTHALMIC
OUTPATIENT
Start: 2024-02-26

## 2024-02-26 RX ORDER — GLIPIZIDE 5 MG/1
5 TABLET, FILM COATED, EXTENDED RELEASE ORAL DAILY
Qty: 90 TABLET | Refills: 3 | Status: SHIPPED | OUTPATIENT
Start: 2024-02-26

## 2024-02-26 RX ORDER — FAMOTIDINE 10 MG
10 TABLET ORAL 2 TIMES DAILY
Qty: 180 TABLET | Refills: 1 | Status: SHIPPED | OUTPATIENT
Start: 2024-02-26 | End: 2024-02-29

## 2024-02-26 RX ORDER — RANITIDINE HCL 75 MG
150 TABLET ORAL
Status: CANCELLED | OUTPATIENT
Start: 2024-02-26

## 2024-02-26 RX ORDER — BUDESONIDE AND FORMOTEROL FUMARATE DIHYDRATE 160; 4.5 UG/1; UG/1
2 AEROSOL RESPIRATORY (INHALATION)
Qty: 10.2 G | Refills: 11 | Status: CANCELLED | OUTPATIENT
Start: 2024-02-26

## 2024-02-26 NOTE — TELEPHONE ENCOUNTER
Rx Refill Note  Requested Prescriptions     Pending Prescriptions Disp Refills    cycloSPORINE (RESTASIS) 0.05 % ophthalmic emulsion       Sig: Administer 1 drop to both eyes.    nystatin 307367 UNIT/GM topical powder      budesonide-formoterol (SYMBICORT) 160-4.5 MCG/ACT inhaler       Sig: Inhale 2 puffs 2 (Two) Times a Day.    raNITIdine (ZANTAC) 75 MG tablet       Sig: Take 2 tablets by mouth.    ipratropium (ATROVENT) 0.06 % nasal spray       Si sprays 2 (Two) Times a Day.    glipizide (glipiZIDE XL) 5 MG ER tablet 90 tablet 3     Sig: Take 1 tablet by mouth Daily.    montelukast (SINGULAIR) 10 MG tablet 90 tablet 3     Sig: Take 1 tablet by mouth Every Night.    azelastine (ASTELIN) 0.1 % nasal spray 30 mL 6     Sig: Use in each nostril as directed    Semaglutide (Rybelsus) 7 MG tablet 90 tablet 1     Sig: Take 1 mg by mouth Daily.      Last office visit with prescribing clinician: 2024   Last telemedicine visit with prescribing clinician: Visit date not found   Next office visit with prescribing clinician: 2024

## 2024-02-27 ENCOUNTER — HOSPITAL ENCOUNTER (OUTPATIENT)
Facility: HOSPITAL | Age: 69
Discharge: HOME | End: 2024-02-27
Admitting: STUDENT IN AN ORGANIZED HEALTH CARE EDUCATION/TRAINING PROGRAM
Payer: MEDICARE

## 2024-02-27 DIAGNOSIS — J18.9 PNEUMONIA OF RIGHT MIDDLE LOBE DUE TO INFECTIOUS ORGANISM: ICD-10-CM

## 2024-02-27 PROCEDURE — 71250 CT THORAX DX C-: CPT

## 2024-02-29 ENCOUNTER — OFFICE VISIT (OUTPATIENT)
Dept: FAMILY MEDICINE CLINIC | Facility: CLINIC | Age: 69
End: 2024-02-29
Payer: MEDICARE

## 2024-02-29 VITALS
HEIGHT: 68 IN | OXYGEN SATURATION: 97 % | DIASTOLIC BLOOD PRESSURE: 60 MMHG | TEMPERATURE: 98 F | RESPIRATION RATE: 14 BRPM | HEART RATE: 72 BPM | BODY MASS INDEX: 32.48 KG/M2 | SYSTOLIC BLOOD PRESSURE: 102 MMHG | WEIGHT: 214.3 LBS

## 2024-02-29 DIAGNOSIS — J45.909 UNCOMPLICATED ASTHMA, UNSPECIFIED ASTHMA SEVERITY, UNSPECIFIED WHETHER PERSISTENT: ICD-10-CM

## 2024-02-29 DIAGNOSIS — G43.009 MIGRAINE WITHOUT AURA AND WITHOUT STATUS MIGRAINOSUS, NOT INTRACTABLE: ICD-10-CM

## 2024-02-29 DIAGNOSIS — Z87.19 HISTORY OF COLITIS: ICD-10-CM

## 2024-02-29 DIAGNOSIS — E11.9 TYPE 2 DIABETES MELLITUS WITHOUT COMPLICATION, WITHOUT LONG-TERM CURRENT USE OF INSULIN: ICD-10-CM

## 2024-02-29 DIAGNOSIS — G47.00 INSOMNIA, UNSPECIFIED TYPE: ICD-10-CM

## 2024-02-29 DIAGNOSIS — J45.40 MODERATE PERSISTENT ASTHMA WITHOUT COMPLICATION: Primary | ICD-10-CM

## 2024-02-29 DIAGNOSIS — I10 ESSENTIAL HYPERTENSION: ICD-10-CM

## 2024-02-29 DIAGNOSIS — Z87.442 HISTORY OF KIDNEY STONES: ICD-10-CM

## 2024-02-29 DIAGNOSIS — J30.9 ALLERGIC RHINITIS, UNSPECIFIED SEASONALITY, UNSPECIFIED TRIGGER: ICD-10-CM

## 2024-02-29 RX ORDER — INSULIN GLARGINE 100 [IU]/ML
10 INJECTION, SOLUTION SUBCUTANEOUS NIGHTLY
Qty: 3 ML | Refills: 0 | Status: SHIPPED | OUTPATIENT
Start: 2024-02-29

## 2024-02-29 RX ORDER — LEVETIRACETAM 250 MG/1
250 TABLET ORAL DAILY
Qty: 90 TABLET | Refills: 1 | Status: SHIPPED | OUTPATIENT
Start: 2024-02-29

## 2024-02-29 RX ORDER — PEN NEEDLE, DIABETIC 30 GX3/16"
1 NEEDLE, DISPOSABLE MISCELLANEOUS 4 TIMES DAILY PRN
Qty: 100 EACH | Refills: 0 | Status: SHIPPED | OUTPATIENT
Start: 2024-02-29

## 2024-02-29 RX ORDER — FLUTICASONE PROPIONATE AND SALMETEROL XINAFOATE 45; 21 UG/1; UG/1
2 AEROSOL, METERED RESPIRATORY (INHALATION)
Qty: 12 G | Refills: 11 | Status: SHIPPED | OUTPATIENT
Start: 2024-02-29

## 2024-02-29 NOTE — PROGRESS NOTES
"Chief Complaint  Follow-up (Pt says she feels to be doing better now )    Subjective        Khalida Gilliland presents to [facility]  History of Present Illness  For follow-up on type 2 diabetes mellitus, hyperlipidemia, hypertension  Patient main reason was to have follow-up on current medication as she has been discharged from the hospital 3 times and was not able to do hospital follow-up and is very confused about her current medication.  Stated she is feeling better but not completely recovered  Patient stated she was asked to do insulin 3 times before meals which she is not doing it as she was instructed  She will need refill on few medications    Objective   Vital Signs:  /60 (BP Location: Left arm, Patient Position: Sitting, Cuff Size: Large Adult)   Pulse 72   Temp 98 °F (36.7 °C) (Temporal)   Resp 14   Ht 172.7 cm (67.99\")   Wt 97.2 kg (214 lb 4.8 oz)   SpO2 97%   BMI 32.59 kg/m²   Estimated body mass index is 32.59 kg/m² as calculated from the following:    Height as of this encounter: 172.7 cm (67.99\").    Weight as of this encounter: 97.2 kg (214 lb 4.8 oz).               Physical Exam  HENT:      Head: Normocephalic and atraumatic.      Mouth/Throat:      Mouth: Mucous membranes are moist.      Pharynx: Oropharynx is clear.   Eyes:      Extraocular Movements: Extraocular movements intact.      Conjunctiva/sclera: Conjunctivae normal.      Pupils: Pupils are equal, round, and reactive to light.   Cardiovascular:      Rate and Rhythm: Normal rate and regular rhythm.   Pulmonary:      Effort: Pulmonary effort is normal.      Breath sounds: Rales present.   Abdominal:      General: Bowel sounds are normal.      Palpations: Abdomen is soft.   Musculoskeletal:         General: Normal range of motion.      Cervical back: Neck supple.   Skin:     General: Skin is warm.      Capillary Refill: Capillary refill takes less than 2 seconds.   Neurological:      General: No " focal deficit present.      Mental Status: She is alert and oriented to person, place, and time. Mental status is at baseline.   Psychiatric:         Mood and Affect: Mood normal.        Result Review :    The following data was reviewed by: Sergo Owen MD on 02/29/2024:  CMP          1/28/2024    07:25 2/1/2024    11:26 2/2/2024    06:27   CMP   Glucose 204  186  397    BUN 19  17  14    Creatinine 0.61  0.61  0.71    EGFR 97.5  97.5  92.7    Sodium 138  136  138    Potassium 4.4  4.6  4.7    Chloride 100  100  102    Calcium 9.7  9.7  9.0    Total Protein  7.1     Albumin  4.1     Globulin  3.0     Total Bilirubin  0.4     Alkaline Phosphatase  58     AST (SGOT)  22     ALT (SGPT)  25     Albumin/Globulin Ratio  1.4     BUN/Creatinine Ratio 31.1  27.9  19.7    Anion Gap 10.0  11.2  13.3      CBC          1/28/2024    07:25 2/1/2024    11:26 2/2/2024    06:26   CBC   WBC 8.08  7.39  6.36    RBC 4.29  4.53  4.14    Hemoglobin 13.0  13.7  12.7    Hematocrit 39.5  42.6  39.7    MCV 92.1  94.0  95.9    MCH 30.3  30.2  30.7    MCHC 32.9  32.2  32.0    RDW 13.1  14.9  13.1    Platelets 285  319  272                   Assessment and Plan     Diagnoses and all orders for this visit:    1. Moderate persistent asthma without complication (Primary)  -     fluticasone-salmeterol (Advair HFA) 45-21 MCG/ACT inhaler; Inhale 2 puffs 2 (Two) Times a Day.  Dispense: 12 g; Refill: 11    2. Type 2 diabetes mellitus without complication, without long-term current use of insulin  -     Insulin Pen Needle (Pen Needles) 32G X 4 MM misc; Inject 1 each under the skin into the appropriate area as directed 4 (Four) Times a Day As Needed (for insulin injections). Formulary Compliance Approval  Dispense: 100 each; Refill: 0  -     Insulin Glargine (Lantus SoloStar) 100 UNIT/ML injection pen; Inject 10 Units under the skin into the appropriate area as directed Every Night. Formulary Compliance Approval  Dispense: 3 mL; Refill: 0    3.  Allergic rhinitis, unspecified seasonality, unspecified trigger    4. Uncomplicated asthma, unspecified asthma severity, unspecified whether persistent    5. Migraine without aura and without status migrainosus, not intractable    6. Essential hypertension    7. Insomnia, unspecified type    8. History of colitis    9. History of kidney stones    Other orders  -     levETIRAcetam (KEPPRA) 250 MG tablet; Take 1 tablet by mouth Daily.  Dispense: 90 tablet; Refill: 1         # supplements  Vit C , biotin,vit D,Vit B12, magnesium oxide, MV, fish oil capsule, probiotics, vit b2    # Hyperlipidemia  Lipitor 20 mg Po daily    # Seasonal Allergies  Azelastine spray ( as needed), symbicort twice a day , flonase, singulair    # sleep issues  Tylenol PM , trazodone, ambien only while travelling    #GERD  Ranitidine twice a day, prilosec    # type 2 DM  Glipizide 5 mg Po daily, insulin glargine 10 U SQ Nightly      # hypertension  Controlled, continue following medication  Lisinopril, propanolol    # neuropathy  Pregabalin three times a day    # migraine headache  On lyrica 100 mg Po TID, ( no topomax due to hx of kidney stones), propanolol and keppra was started during hospitalization, plan is to follow neurology outpatient for other treatments and to have MRI brain w/without       Follow Up     No follow-ups on file.  Patient was given instructions and counseling regarding her condition or for health maintenance advice. Please see specific information pulled into the AVS if appropriate.

## 2024-03-05 ENCOUNTER — TELEPHONE (OUTPATIENT)
Dept: FAMILY MEDICINE CLINIC | Facility: CLINIC | Age: 69
End: 2024-03-05

## 2024-03-05 NOTE — TELEPHONE ENCOUNTER
Caller: [employer]     Relationship: [unfilled]     Best call back number: [phone]  REF # [id number]    What is your medical concern?  [facility] WITH [employer] CALLED STATED THE MEDICATION     Insulin Glargine (Lantus SoloStar) 100 UNIT/ML injection pen   NEED TO CLARIFY QUANTITY IT WAS WRITTEN FOR LESS THAN THE PACKAGE SIZE.  PLEASE CALL THEM BACK TO VERIFY.      How long has this issue been going on?      Is your provider already aware of this issue?      Have you been treated for this issue?

## 2024-03-07 ENCOUNTER — OFFICE VISIT (OUTPATIENT)
Dept: SLEEP MEDICINE | Facility: HOSPITAL | Age: 69
End: 2024-03-07
Payer: MEDICARE

## 2024-03-07 VITALS
SYSTOLIC BLOOD PRESSURE: 96 MMHG | HEART RATE: 68 BPM | WEIGHT: 212 LBS | HEIGHT: 68 IN | DIASTOLIC BLOOD PRESSURE: 52 MMHG | BODY MASS INDEX: 32.13 KG/M2 | OXYGEN SATURATION: 96 %

## 2024-03-07 DIAGNOSIS — E66.9 CLASS 1 OBESITY: ICD-10-CM

## 2024-03-07 DIAGNOSIS — R06.89 GASPING FOR BREATH: ICD-10-CM

## 2024-03-07 DIAGNOSIS — G47.8 NON-RESTORATIVE SLEEP: ICD-10-CM

## 2024-03-07 DIAGNOSIS — R06.83 LOUD SNORING: Primary | ICD-10-CM

## 2024-03-07 DIAGNOSIS — R06.83 SNORING: ICD-10-CM

## 2024-03-07 DIAGNOSIS — G47.30 OBSERVED SLEEP APNEA: ICD-10-CM

## 2024-03-07 PROBLEM — E66.811 CLASS 1 OBESITY: Status: ACTIVE | Noted: 2022-02-03

## 2024-03-07 PROBLEM — G47.19 EXCESSIVE DAYTIME SLEEPINESS: Status: ACTIVE | Noted: 2024-03-07

## 2024-03-07 PROCEDURE — G0463 HOSPITAL OUTPT CLINIC VISIT: HCPCS

## 2024-03-07 NOTE — PROGRESS NOTES
[facility]  [address]  [address]  [place] [address]  Phone   Fax       Khalida Gilliland  [phone]   1955  68 y.o.  female      PCP:Sergo Owen MD    Type of service: Initial New Patient Office Visit  Date of service: 3/7/2024    Chief Complaint   Patient presents with    Witnessed Apnea    Snoring    Non-restorative Sleep    Fatigue    Dry Mouth    Daytime Sleepiness    Obesity       History of present illness;  Khalida Gilliland 68 y.o.  is a new patient for me and was seen today for sleep related problems of snoring, non-restorative sleep and witnessed apneas. The symptoms are present for few years and they are persistent in nature.  The snoring is present in all positions and it is loud.  Patient has no prior surgery namely tonsillectomy, nasal surgery and UPPP.  Recently she was in the hospital due to RSV and she was told that she stops breathing and also her oxygen was Dropping.  Patient reports that she is tired and feels sleepy in the daytime.    Patient gives the following sleep history.  Sleep schedule:  Bedtime: 8 PM  Wake time: 8 AM  Normally takes about 10-15 minutes to fall asleep  Average hours of sleep 10-12  Number of naps per day 1  Symptoms  In addition to snoring, nonrestorative sleep and witnessed apneas patient gives the following associated symptoms.  Have you ever awakened gasping for breath, coughing, choking: Yes   Change in weight,  No   Morning headaches  No   Awaken with a sore throat or dry mouth  Yes   Leg jerking at night:  No   Crawly feeling/urge sensation to move in the legs: No   Teeth grinding:No   Have you ever awakened at night with a sour taste or burning sensation in your chest:  No   Do you have muscle weakness with laughing or anger or sleep paralysis:  No   Have you ever felt paralyzed while going to sleep or waking up:  No   Sleepwalking, nightmares, No   Nocturia (urination at night): 2 times per  "night  Memory Problem:No     Past medical history: (Relevant to sleep medicine)  Hypertension  Diabetes mellitus  Asthma  History of TIA  Migraine    Medications are reviewed by me and documented in the encounter  Allergies reviewed and documented in encounter    Social history:  Do you drive a commercial vehicle:  No   Shift work:  No   Tobacco use:  No   Alcohol use:  0 per week  Caffeinated drinks: 2    FAMILY HISTORY (Your mother, father, brothers and sisters) (relevant to sleep medicine)  No history of sleep apnea    REVIEW OF SYSTEMS.  Full review of systems available on the intake form which is scanned in the media tab.  The relevant positive are noted below  Daytime excessive sleepiness with [place] Sleepiness Scale :Total score: 4   Snoring  Fatigue  Morning headache      Physical exam:  Vitals:    03/07/24 0944   BP: 96/52   Pulse: 68   SpO2: 96%   Weight: 96.2 kg (212 lb)   Height: 172.7 cm (67.99\")    Body mass index is 32.24 kg/m². Neck Circumference: 17.5 inches  Nose: no nasal septal defects or deviation and the nasal passages are clear, no nasal polyps,  Throat: tonsils are nonenlarged, tongue normal, oral airway Mallampati class 3  NECK:Neck Circumference: 17.5 inches, trachea is in the midline, thyroid not enlarged  RESPIRATORY SYSTEM: Breath sounds are equal on both sides, there are no wheezes   CARDIOVASULAR SYSTEM: Heart sounds are regular rhythm and sonal rate, no edema  EXTREMITES: No cyanosis, clubbing  NEUROLOGICAL SYSTEM: Oriented x 3, no gross motor defects, gait normal    Office notes from care team reviewed. Office note dated February 29, 2024,reviewed  Labs reviewed.  TSH Results:  TSH          10/17/2023    09:03 1/18/2024    10:27   TSH   TSH 2.390  1.540       Most Recent A1C          1/24/2024    06:33   HGBA1C Most Recent   Hemoglobin A1C 9.80         Assessment and plan:  Witnessed apnea (R06.81) patient's symptoms and examination is consistent with sleep apnea (G47.30)  I have " talked to the patient about the signs and symptoms of sleep apnea. In addition, I have also discussed pathophysiology of sleep apnea.  I also discussed the complications of untreated sleep apnea including effects on hypertension, diabetes mellitus and nonrestorative sleep with hypersomnia which can increase risk for motor vehicle accidents.  Untreated sleep apnea is also a risk factor for development of atrial fibrillation, pulmonary hypertension, insulin resistance and stroke.  Discussed in detail of various testing methods including home-based and lab based sleep studies.  Based on history and physical examination the most appropriate study is home sleep test.  The order for the sleep study is placed in [facility].  The test will be scheduled after approval from insurance. Treatment and management will be discussed after the test is completed.  Patient was given opportunity to ask questions and all the questions were answered.   Snoring (R06.83) snoring is the sound created by turbulent airflow vibrating upper airway soft tissue.  I have also discussed factors affecting snoring including sleep deprivation, sleeping on the back and alcohol ingestion. To minimize snoring, patient is advised to have adequate sleep, sleep on the side and avoid alcohol and sedative medications before bedtime  Daytime excessive sleepiness .  It was assessed with Cut Off Sleepiness Scale of Total score: 4.  There are many causes for daytime excessive sleepiness including sleep depression, shiftwork syndrome, depression and other medical disorders including heart, kidney and liver failure.  The most serious cause of excessive sleepiness is due to neurological conditions like narcolepsy/cataplexy.  But the most common cause of excessive sleepiness is due to sleep apnea with frequent awakenings during sleep time.  I have discussed safety of driving and to remain vigilant while driving.  Obesity 1, with BMI Body mass index is 32.24 kg/m².. I have  discussed the relationship between weight and sleep apnea.There is direct correlation between weight and severity of sleep apnea.  Weight reduction is encouraged, as it is going to reduce the severity of sleep apnea. I have also discussed with the patient diet and exercise to achieve ideal body weight  Hypertension,   Diabetes mellitus  Return for 31 to 90 days after PAP setup with down load..  Patient's questions were answered.      3/7/2024  Kodi Noble MD  Sleep Medicine  Medical Director  [facility]: [facility] sleep [facility]

## 2024-03-11 RX ORDER — TRAZODONE HYDROCHLORIDE 50 MG/1
50 TABLET ORAL NIGHTLY
Qty: 90 TABLET | Refills: 3 | Status: SHIPPED | OUTPATIENT
Start: 2024-03-11

## 2024-03-13 DIAGNOSIS — J40 BRONCHITIS: ICD-10-CM

## 2024-03-14 RX ORDER — ALBUTEROL SULFATE 90 UG/1
2 AEROSOL, METERED RESPIRATORY (INHALATION) EVERY 4 HOURS PRN
Qty: 8.5 G | Refills: 1 | Status: SHIPPED | OUTPATIENT
Start: 2024-03-14

## 2024-03-14 NOTE — TELEPHONE ENCOUNTER
Rx Refill Note  Requested Prescriptions     Pending Prescriptions Disp Refills    albuterol sulfate  (90 Base) MCG/ACT inhaler [Pharmacy Med Name: ALBUTEROL HFA INH (200 PUFFS) 8.5GM] 8.5 g 1     Sig: INHALE 2 PUFFS BY MOUTH EVERY 4 HOURS AS NEEDED      Last office visit with prescribing clinician: 8/1/2023   Last telemedicine visit with prescribing clinician: Visit date not found   Next office visit with prescribing clinician: Visit date not found                         Would you like a call back once the refill request has been completed: [] Yes [] No    If the office needs to give you a call back, can they leave a voicemail: [] Yes [] No    Abdirahman Saldana Rep  03/14/24, 13:19 EDT

## 2024-03-27 ENCOUNTER — TELEPHONE (OUTPATIENT)
Dept: NEUROLOGY | Facility: CLINIC | Age: 69
End: 2024-03-27

## 2024-03-27 NOTE — TELEPHONE ENCOUNTER
Left farheen Gonzales had to leave office.  Appt r/s for 6/24/24 @ 2.  Sending reminder in mail.  Add to waitlist

## 2024-04-10 ENCOUNTER — TELEPHONE (OUTPATIENT)
Dept: FAMILY MEDICINE CLINIC | Facility: CLINIC | Age: 69
End: 2024-04-10

## 2024-04-10 DIAGNOSIS — G43.809 OTHER MIGRAINE WITHOUT STATUS MIGRAINOSUS, NOT INTRACTABLE: Primary | ICD-10-CM

## 2024-04-10 RX ORDER — BUTALBITAL, ACETAMINOPHEN AND CAFFEINE 50; 325; 40 MG/1; MG/1; MG/1
1 TABLET ORAL EVERY 6 HOURS PRN
Qty: 30 TABLET | Refills: 0 | Status: SHIPPED | OUTPATIENT
Start: 2024-04-10

## 2024-04-18 ENCOUNTER — TELEPHONE (OUTPATIENT)
Dept: FAMILY MEDICINE CLINIC | Facility: CLINIC | Age: 69
End: 2024-04-18

## 2024-04-19 DIAGNOSIS — G59 MONONEUROPATHY DUE TO UNDERLYING DISEASE: ICD-10-CM

## 2024-04-22 ENCOUNTER — TELEPHONE (OUTPATIENT)
Dept: FAMILY MEDICINE CLINIC | Facility: CLINIC | Age: 69
End: 2024-04-22

## 2024-04-22 DIAGNOSIS — G59 MONONEUROPATHY DUE TO UNDERLYING DISEASE: ICD-10-CM

## 2024-04-22 DIAGNOSIS — E78.5 HYPERLIPIDEMIA, UNSPECIFIED HYPERLIPIDEMIA TYPE: Primary | ICD-10-CM

## 2024-04-22 RX ORDER — PREGABALIN 100 MG/1
100 CAPSULE ORAL 3 TIMES DAILY
Qty: 270 CAPSULE | Refills: 1 | Status: SHIPPED | OUTPATIENT
Start: 2024-04-22

## 2024-04-22 RX ORDER — ATORVASTATIN CALCIUM 20 MG/1
20 TABLET, FILM COATED ORAL NIGHTLY
Qty: 90 TABLET | Refills: 1 | Status: SHIPPED | OUTPATIENT
Start: 2024-04-22

## 2024-04-22 RX ORDER — PREGABALIN 100 MG/1
CAPSULE ORAL
Qty: 270 CAPSULE | OUTPATIENT
Start: 2024-04-22

## 2024-04-22 NOTE — TELEPHONE ENCOUNTER
"Relay     \"Please inform patient following answers for other medication queries.      Pt was started on keppra for migraine headache during her last hospitalization. She was supposed to follow up with neurology as outpatient for follow up on migraine headache  and also plan was to do MRI brain w/without contrast .   Pregabalin is for both neuropathy and migraine headache.      Today I have sent pregabalin and lipitor prescription to her pharmacy. Pt need to take Lipitor for her cholesterol 1 pill everyday.      Please let me know if any more questions. Thank you    \"                  "

## 2024-04-22 NOTE — TELEPHONE ENCOUNTER
Please inform patient following answers for other medication queries.     Pt was started on keppra for migraine headache during her last hospitalization. She was supposed to follow up with neurology as outpatient for follow up on migraine headache  and also plan was to do MRI brain w/without contrast .   Pregabalin is for both neuropathy and migraine headache.     Today I have sent pregabalin and lipitor prescription to her pharmacy. Pt need to take Lipitor for her cholesterol 1 pill everyday.     Please let me know if any more questions. Thank you

## 2024-04-22 NOTE — TELEPHONE ENCOUNTER
Caller: Khalida Gilliland    Relationship: Self    Best call back number: [phone]     Requested Prescriptions:   Requested Prescriptions     Pending Prescriptions Disp Refills    pregabalin (LYRICA) 100 MG capsule 270 capsule 1     Sig: Take 1 capsule by mouth 3 (Three) Times a Day.        Pharmacy where request should be sent: [id number] [id number] AT [facility] [phone] [facility] [phone]      Last office visit with prescribing clinician: 2/29/2024   Last telemedicine visit with prescribing clinician: Visit date not found   Next office visit with prescribing clinician: 5/7/2024     Additional details provided by patient: WILL NEED NEW PRESCRIPTION.  PATIENT STATES SHE HAS CALLED MULTIPLE TIMES ON THIS REFILL AND THE PHARMACY CALLED IT IN ALSO.    THIS WAS SENT BY PHARMACY WHICH DENIED [facility], BUT DR MENSAH IS THE ORDERING DOCTOR LISTED IN THE CHART PLEASE REVIEW.      Does the patient have less than a 3 day supply:  [x] Yes  [] No    Would you like a call back once the refill request has been completed: [] Yes [] No    If the office needs to give you a call back, can they leave a voicemail: [] Yes [] No    Abdirahman Goldman Rep   04/22/24 10:03 EDT

## 2024-04-22 NOTE — TELEPHONE ENCOUNTER
Caller: Khalida Gilliland    Relationship: Self    Best call back number:     [phone]       What was the call regarding: PATIENT IS WANTING TO KNOW IF DR. MENSAH IS WANTING TO KEEP HER OFF THE LIPITOR OR IF SHE IS TO RESTART IT.     SHE HAS QUESTIONS REGARDING THE KEPPRA AS WELL. SHE DOESN'T REMEMBER WHAT IT IS FOR. IT STATES TO TAKE ONCE DAILY. SHE DOESN'T KNOW IF IT IS TO TAKE MORNING OR NIGHT.     PATIENT REQUESTS MARKED AS URGENT    PLEASE CALL AND ADVISE.

## 2024-04-23 DIAGNOSIS — E11.9 TYPE 2 DIABETES MELLITUS WITHOUT COMPLICATION, WITHOUT LONG-TERM CURRENT USE OF INSULIN: ICD-10-CM

## 2024-04-23 DIAGNOSIS — E78.5 HYPERLIPIDEMIA, UNSPECIFIED HYPERLIPIDEMIA TYPE: Primary | ICD-10-CM

## 2024-04-23 DIAGNOSIS — J40 BRONCHITIS: ICD-10-CM

## 2024-04-23 RX ORDER — ALBUTEROL SULFATE 90 UG/1
2 AEROSOL, METERED RESPIRATORY (INHALATION) EVERY 4 HOURS PRN
Qty: 8.5 G | Refills: 1 | Status: SHIPPED | OUTPATIENT
Start: 2024-04-23

## 2024-04-24 LAB
ALBUMIN SERPL-MCNC: 4.6 G/DL (ref 3.5–5.2)
ALBUMIN/GLOB SERPL: 1.6 G/DL
ALP SERPL-CCNC: 83 U/L (ref 39–117)
ALT SERPL-CCNC: 75 U/L (ref 1–33)
AST SERPL-CCNC: 67 U/L (ref 1–32)
BASOPHILS # BLD AUTO: 0.03 10*3/MM3 (ref 0–0.2)
BASOPHILS NFR BLD AUTO: 0.4 % (ref 0–1.5)
BILIRUB SERPL-MCNC: 0.6 MG/DL (ref 0–1.2)
BUN SERPL-MCNC: 19 MG/DL (ref 8–23)
BUN/CREAT SERPL: 26.8 (ref 7–25)
CALCIUM SERPL-MCNC: 10.2 MG/DL (ref 8.6–10.5)
CHLORIDE SERPL-SCNC: 97 MMOL/L (ref 98–107)
CHOLEST SERPL-MCNC: 158 MG/DL (ref 0–200)
CO2 SERPL-SCNC: 28.4 MMOL/L (ref 22–29)
CREAT SERPL-MCNC: 0.71 MG/DL (ref 0.57–1)
EGFRCR SERPLBLD CKD-EPI 2021: 92.7 ML/MIN/1.73
EOSINOPHIL # BLD AUTO: 0.28 10*3/MM3 (ref 0–0.4)
EOSINOPHIL NFR BLD AUTO: 3.9 % (ref 0.3–6.2)
ERYTHROCYTE [DISTWIDTH] IN BLOOD BY AUTOMATED COUNT: 12.2 % (ref 12.3–15.4)
GLOBULIN SER CALC-MCNC: 2.9 GM/DL
GLUCOSE SERPL-MCNC: 201 MG/DL (ref 65–99)
HBA1C MFR BLD: 8.7 % (ref 4.8–5.6)
HCT VFR BLD AUTO: 45.5 % (ref 34–46.6)
HDLC SERPL-MCNC: 35 MG/DL (ref 40–60)
HGB BLD-MCNC: 14.9 G/DL (ref 12–15.9)
IMM GRANULOCYTES # BLD AUTO: 0.02 10*3/MM3 (ref 0–0.05)
IMM GRANULOCYTES NFR BLD AUTO: 0.3 % (ref 0–0.5)
LDLC SERPL CALC-MCNC: 92 MG/DL (ref 0–100)
LDLC/HDLC SERPL: 2.49 {RATIO}
LYMPHOCYTES # BLD AUTO: 2.18 10*3/MM3 (ref 0.7–3.1)
LYMPHOCYTES NFR BLD AUTO: 30.2 % (ref 19.6–45.3)
MCH RBC QN AUTO: 30.3 PG (ref 26.6–33)
MCHC RBC AUTO-ENTMCNC: 32.7 G/DL (ref 31.5–35.7)
MCV RBC AUTO: 92.5 FL (ref 79–97)
MONOCYTES # BLD AUTO: 0.85 10*3/MM3 (ref 0.1–0.9)
MONOCYTES NFR BLD AUTO: 11.8 % (ref 5–12)
NEUTROPHILS # BLD AUTO: 3.87 10*3/MM3 (ref 1.7–7)
NEUTROPHILS NFR BLD AUTO: 53.4 % (ref 42.7–76)
NRBC BLD AUTO-RTO: 0 /100 WBC (ref 0–0.2)
PLATELET # BLD AUTO: 283 10*3/MM3 (ref 140–450)
POTASSIUM SERPL-SCNC: 4.5 MMOL/L (ref 3.5–5.2)
PROT SERPL-MCNC: 7.5 G/DL (ref 6–8.5)
RBC # BLD AUTO: 4.92 10*6/MM3 (ref 3.77–5.28)
SODIUM SERPL-SCNC: 136 MMOL/L (ref 136–145)
TRIGL SERPL-MCNC: 179 MG/DL (ref 0–150)
UNABLE TO VOID: NORMAL
VLDLC SERPL CALC-MCNC: 31 MG/DL (ref 5–40)
WBC # BLD AUTO: 7.23 10*3/MM3 (ref 3.4–10.8)

## 2024-05-07 ENCOUNTER — OFFICE VISIT (OUTPATIENT)
Dept: FAMILY MEDICINE CLINIC | Facility: CLINIC | Age: 69
End: 2024-05-07
Payer: MEDICARE

## 2024-05-07 VITALS
RESPIRATION RATE: 16 BRPM | SYSTOLIC BLOOD PRESSURE: 138 MMHG | WEIGHT: 219.7 LBS | TEMPERATURE: 98.5 F | HEIGHT: 68 IN | HEART RATE: 68 BPM | BODY MASS INDEX: 33.3 KG/M2 | OXYGEN SATURATION: 97 % | DIASTOLIC BLOOD PRESSURE: 68 MMHG

## 2024-05-07 DIAGNOSIS — E11.9 TYPE 2 DIABETES MELLITUS WITHOUT COMPLICATION, WITHOUT LONG-TERM CURRENT USE OF INSULIN: ICD-10-CM

## 2024-05-07 DIAGNOSIS — R79.89 ELEVATED LFTS: Primary | ICD-10-CM

## 2024-05-07 DIAGNOSIS — E78.5 HYPERLIPIDEMIA, UNSPECIFIED HYPERLIPIDEMIA TYPE: ICD-10-CM

## 2024-05-07 PROCEDURE — 99214 OFFICE O/P EST MOD 30 MIN: CPT | Performed by: STUDENT IN AN ORGANIZED HEALTH CARE EDUCATION/TRAINING PROGRAM

## 2024-05-07 PROCEDURE — 1160F RVW MEDS BY RX/DR IN RCRD: CPT | Performed by: STUDENT IN AN ORGANIZED HEALTH CARE EDUCATION/TRAINING PROGRAM

## 2024-05-07 PROCEDURE — 1126F AMNT PAIN NOTED NONE PRSNT: CPT | Performed by: STUDENT IN AN ORGANIZED HEALTH CARE EDUCATION/TRAINING PROGRAM

## 2024-05-07 PROCEDURE — 1159F MED LIST DOCD IN RCRD: CPT | Performed by: STUDENT IN AN ORGANIZED HEALTH CARE EDUCATION/TRAINING PROGRAM

## 2024-05-07 PROCEDURE — 3078F DIAST BP <80 MM HG: CPT | Performed by: STUDENT IN AN ORGANIZED HEALTH CARE EDUCATION/TRAINING PROGRAM

## 2024-05-07 PROCEDURE — 3075F SYST BP GE 130 - 139MM HG: CPT | Performed by: STUDENT IN AN ORGANIZED HEALTH CARE EDUCATION/TRAINING PROGRAM

## 2024-05-07 PROCEDURE — 3052F HG A1C>EQUAL 8.0%<EQUAL 9.0%: CPT | Performed by: STUDENT IN AN ORGANIZED HEALTH CARE EDUCATION/TRAINING PROGRAM

## 2024-05-07 RX ORDER — PREGABALIN 100 MG/1
100 CAPSULE ORAL 3 TIMES DAILY
COMMUNITY

## 2024-05-07 NOTE — PROGRESS NOTES
"Chief Complaint  Diabetes (DISCCUS RX.)    Subjective        Khalida Gilliland presents to [facility]  History of Present Illness  For follow-up on diabetes.  Patient stated she was not able to tolerate Ozempic.  She had severe nausea, vomiting, bloating after having 1 dose.  Patient stated currently for diabetes she is taking long-acting insulin insulin glargine at night along withGlipizide.  Patient has done labs recently and would like to discuss those labs today    Objective   Vital Signs:  /68 (BP Location: Left arm, Patient Position: Sitting, Cuff Size: Large Adult)   Pulse 68   Temp 98.5 °F (36.9 °C) (Oral)   Resp 16   Ht 172.7 cm (68\")   Wt 99.7 kg (219 lb 11.2 oz)   SpO2 97%   BMI 33.41 kg/m²   Estimated body mass index is 33.41 kg/m² as calculated from the following:    Height as of this encounter: 172.7 cm (68\").    Weight as of this encounter: 99.7 kg (219 lb 11.2 oz).               Physical Exam  Cardiovascular:      Rate and Rhythm: Normal rate.      Pulses: Normal pulses.   Pulmonary:      Effort: Pulmonary effort is normal.      Breath sounds: Normal breath sounds.   Abdominal:      General: Bowel sounds are normal.      Palpations: Abdomen is soft.   Skin:     General: Skin is warm.   Neurological:      Mental Status: She is alert and oriented to person, place, and time.        Result Review :                     Assessment and Plan     Diagnoses and all orders for this visit:    1. Elevated LFTs (Primary)  -     Comprehensive metabolic panel  -     Hemoglobin A1c; Future  -     Microalbumin / Creatinine Urine Ratio - Urine, Clean Catch; Future  -     Lipid Panel With / Chol / HDL Ratio; Future  -     Comprehensive Metabolic Panel; Future    2. Type 2 diabetes mellitus without complication, without long-term current use of insulin  -     Hemoglobin A1c; Future  -     Microalbumin / Creatinine Urine Ratio - Urine, Clean Catch; Future  -     Lipid Panel With " / Chol / HDL Ratio; Future  -     Comprehensive Metabolic Panel; Future    3. Hyperlipidemia, unspecified hyperlipidemia type  -     Hemoglobin A1c; Future  -     Microalbumin / Creatinine Urine Ratio - Urine, Clean Catch; Future  -     Lipid Panel With / Chol / HDL Ratio; Future  -     Comprehensive Metabolic Panel; Future         #Patient hemoglobin A1c has improved from 9.8-8.7 but still on the higher side  Patient is advised to do fasting glucose readings every day and goal is less than 140  Patient should increase Lantus dose to 15 units every day and should increase it by 2 units till her fasting blood glucose are less than 140  Patient is educated about the range which should be between 60-1 40 and if blood glucose readings are coming below 60 then patient should inform me and should stop increasing Lantus unit.  Patient agreed and showed verbalized understanding  # Patient liver enzymes are on the higher side  Cause unknown as patient has not done anything different in past few months  Not at that level where her statin should be held  Will repeat liver function test in a month and if it continue to trend up will might need to hold statins and do further workup for elevated liver enzymes  # Triglycerides level have improved from 3 23-1 79 and LDL has gone slightly worse to 92  Educated patient goal is LDL below 70 in diabetes people  Encouraged to continue statins and make some lifestyle modification which include diet and exercise  RTC in 3 months with repeat labs for follow-up    Follow Up     No follow-ups on file.  Patient was given instructions and counseling regarding her condition or for health maintenance advice. Please see specific information pulled into the AVS if appropriate.

## 2024-05-08 LAB
APPEARANCE UR: CLEAR
BACTERIA #/AREA URNS HPF: NORMAL /[HPF]
BILIRUB UR QL STRIP: NEGATIVE
CASTS URNS QL MICRO: NORMAL /LPF
COLOR UR: YELLOW
EPI CELLS #/AREA URNS HPF: NORMAL /HPF (ref 0–10)
GLUCOSE UR QL STRIP: NEGATIVE
HGB UR QL STRIP: NEGATIVE
KETONES UR QL STRIP: NEGATIVE
LEUKOCYTE ESTERASE UR QL STRIP: NEGATIVE
MICRO URNS: NORMAL
MICRO URNS: NORMAL
NITRITE UR QL STRIP: NEGATIVE
PH UR STRIP: 5.5 [PH] (ref 5–7.5)
PROT UR QL STRIP: NEGATIVE
RBC #/AREA URNS HPF: NORMAL /HPF (ref 0–2)
SP GR UR STRIP: 1.02 (ref 1–1.03)
UROBILINOGEN UR STRIP-MCNC: 0.2 MG/DL (ref 0.2–1)
WBC #/AREA URNS HPF: NORMAL /HPF (ref 0–5)

## 2024-05-20 DIAGNOSIS — I10 ESSENTIAL HYPERTENSION: ICD-10-CM

## 2024-05-20 RX ORDER — PROPRANOLOL HYDROCHLORIDE 40 MG/1
40 TABLET ORAL 2 TIMES DAILY
Qty: 180 TABLET | Refills: 3 | Status: SHIPPED | OUTPATIENT
Start: 2024-05-20

## 2024-05-27 DIAGNOSIS — I10 ESSENTIAL HYPERTENSION: ICD-10-CM

## 2024-05-28 RX ORDER — LISINOPRIL 10 MG/1
10 TABLET ORAL DAILY
Qty: 90 TABLET | Refills: 3 | Status: SHIPPED | OUTPATIENT
Start: 2024-05-28

## 2024-05-31 ENCOUNTER — TELEPHONE (OUTPATIENT)
Dept: FAMILY MEDICINE CLINIC | Facility: CLINIC | Age: 69
End: 2024-05-31

## 2024-05-31 DIAGNOSIS — T14.8XXA FRACTURE: Primary | ICD-10-CM

## 2024-05-31 NOTE — TELEPHONE ENCOUNTER
Caller: Khalida Gilliland    Relationship: Self    Best call back number: [phone]    What is the medical concern/diagnosis: FRACTURED TOE     What specialty or service is being requested: ORTHOPEDICS        Any additional details: PATIENT IS CURRENTLY OUT OF STATE. PATIENT WAS SEEN AT URGENT CARE IN [place], DIAGNOSIS FRACTURED TOE. PATIENT STATES SHE WAS ADVISED TO SEE ORTHOPEDICS DUE TO HER BEING DIABETIC.          Cantharidin Pregnancy And Lactation Text: The use of this medication during pregnancy or lactation is not recommended as there is insufficient data.

## 2024-05-31 NOTE — TELEPHONE ENCOUNTER
Caller: Khalida Gilliland    Relationship to patient: Self    Best call back number: [phone]    Type of visit: LABS    Requested date: FOR APPOINTMENT SCHEDULED 6-18-24

## 2024-05-31 NOTE — TELEPHONE ENCOUNTER
Caller: Khalida Gilliland    Relationship: Self    Best call back number:[phone]      Who are you requesting to speak with (clinical staff, provider,  specific staff member): CLINICAL       What was the call regarding: PATIENT STATES DR MENSAH ADVISED IF BLOOD SUGAR NUMBERS WERE IN A CERTAIN RANGE FOR PATIENT TO NOT TAKE INSULIN AT NIGHT. PATIENT CAN'T REMEMBER WHAT THE NUMBER RANGE IS.   PLEASE ADVISE

## 2024-05-31 NOTE — TELEPHONE ENCOUNTER
I called pt and she was unsure of what to do regarding when to NOT take insulin. I informed her of the message in her most recent office visit note regarding increasing to 15 units and the range you mentioned.     Can you please advise of when you want her to stop insulin, etc?

## 2024-06-03 NOTE — TELEPHONE ENCOUNTER
"Relay     \"Please ask patient what date/time works best for lab appt. Info can be sent via [facility]. \"                  "

## 2024-06-12 LAB
ALBUMIN SERPL-MCNC: 4.5 G/DL (ref 3.9–4.9)
ALBUMIN/CREAT UR: 5 MG/G CREAT (ref 0–29)
ALBUMIN/GLOB SERPL: 1.5 {RATIO}
ALP SERPL-CCNC: 71 IU/L (ref 44–121)
ALT SERPL-CCNC: 43 IU/L (ref 0–32)
AST SERPL-CCNC: 38 IU/L (ref 0–40)
BILIRUB SERPL-MCNC: 0.3 MG/DL (ref 0–1.2)
BUN SERPL-MCNC: 13 MG/DL (ref 8–27)
BUN/CREAT SERPL: 19 (ref 12–28)
CALCIUM SERPL-MCNC: 10 MG/DL (ref 8.7–10.3)
CHLORIDE SERPL-SCNC: 101 MMOL/L (ref 96–106)
CO2 SERPL-SCNC: 23 MMOL/L (ref 20–29)
CREAT SERPL-MCNC: 0.67 MG/DL (ref 0.57–1)
CREAT UR-MCNC: 111.8 MG/DL
EGFRCR SERPLBLD CKD-EPI 2021: 95 ML/MIN/1.73
GLOBULIN SER CALC-MCNC: 3 G/DL (ref 1.5–4.5)
GLUCOSE SERPL-MCNC: 113 MG/DL (ref 70–99)
MICROALBUMIN UR-MCNC: 5.4 UG/ML
POTASSIUM SERPL-SCNC: 4.4 MMOL/L (ref 3.5–5.2)
PROT SERPL-MCNC: 7.5 G/DL (ref 6–8.5)
SODIUM SERPL-SCNC: 140 MMOL/L (ref 134–144)

## 2024-06-18 ENCOUNTER — OFFICE VISIT (OUTPATIENT)
Dept: FAMILY MEDICINE CLINIC | Facility: CLINIC | Age: 69
End: 2024-06-18
Payer: MEDICARE

## 2024-06-18 VITALS
RESPIRATION RATE: 16 BRPM | WEIGHT: 207.6 LBS | BODY MASS INDEX: 31.46 KG/M2 | OXYGEN SATURATION: 95 % | TEMPERATURE: 98.2 F | HEIGHT: 68 IN | HEART RATE: 60 BPM | SYSTOLIC BLOOD PRESSURE: 128 MMHG | DIASTOLIC BLOOD PRESSURE: 84 MMHG

## 2024-06-18 DIAGNOSIS — E11.9 TYPE 2 DIABETES MELLITUS WITHOUT COMPLICATION, WITHOUT LONG-TERM CURRENT USE OF INSULIN: Primary | ICD-10-CM

## 2024-06-18 DIAGNOSIS — R79.89 ELEVATED LFTS: ICD-10-CM

## 2024-06-18 PROCEDURE — 1160F RVW MEDS BY RX/DR IN RCRD: CPT | Performed by: STUDENT IN AN ORGANIZED HEALTH CARE EDUCATION/TRAINING PROGRAM

## 2024-06-18 PROCEDURE — 1126F AMNT PAIN NOTED NONE PRSNT: CPT | Performed by: STUDENT IN AN ORGANIZED HEALTH CARE EDUCATION/TRAINING PROGRAM

## 2024-06-18 PROCEDURE — 1159F MED LIST DOCD IN RCRD: CPT | Performed by: STUDENT IN AN ORGANIZED HEALTH CARE EDUCATION/TRAINING PROGRAM

## 2024-06-18 PROCEDURE — 3052F HG A1C>EQUAL 8.0%<EQUAL 9.0%: CPT | Performed by: STUDENT IN AN ORGANIZED HEALTH CARE EDUCATION/TRAINING PROGRAM

## 2024-06-18 PROCEDURE — 99213 OFFICE O/P EST LOW 20 MIN: CPT | Performed by: STUDENT IN AN ORGANIZED HEALTH CARE EDUCATION/TRAINING PROGRAM

## 2024-06-18 PROCEDURE — 3079F DIAST BP 80-89 MM HG: CPT | Performed by: STUDENT IN AN ORGANIZED HEALTH CARE EDUCATION/TRAINING PROGRAM

## 2024-06-18 PROCEDURE — 3074F SYST BP LT 130 MM HG: CPT | Performed by: STUDENT IN AN ORGANIZED HEALTH CARE EDUCATION/TRAINING PROGRAM

## 2024-06-22 NOTE — PROGRESS NOTES
"Chief Complaint  Elevated LFTs    Subjective        Khalida Gilliland presents to [facility]  History of Present Illness  For follow up on elevated LFTs  Pt doesnot have any new complaints   Pt stated her fasting glucose are coming below 140  Objective   Vital Signs:  /84 (BP Location: Left arm, Patient Position: Sitting, Cuff Size: Adult)   Pulse 60   Temp 98.2 °F (36.8 °C) (Oral)   Resp 16   Ht 172.7 cm (68\")   Wt 94.2 kg (207 lb 9.6 oz)   SpO2 95%   BMI 31.57 kg/m²   Estimated body mass index is 31.57 kg/m² as calculated from the following:    Height as of this encounter: 172.7 cm (68\").    Weight as of this encounter: 94.2 kg (207 lb 9.6 oz).               Physical Exam  Cardiovascular:      Rate and Rhythm: Normal rate.      Pulses: Normal pulses.      Heart sounds: Normal heart sounds.   Pulmonary:      Effort: Pulmonary effort is normal.      Breath sounds: Normal breath sounds.   Abdominal:      General: Bowel sounds are normal.      Palpations: Abdomen is soft.   Skin:     General: Skin is warm.   Neurological:      Mental Status: She is alert and oriented to person, place, and time.        Result Review :    The following data was reviewed by: Sergo Owen MD on 06/18/2024:  CMP          2/2/2024    06:27 4/24/2024    10:15 6/11/2024    13:28   CMP   Glucose 397  201  113    BUN 14  19  13    Creatinine 0.71  0.71  0.67    EGFR 92.7      Sodium 138  136  140    Potassium 4.7  4.5  4.4    Chloride 102  97  101    Calcium 9.0  10.2  10.0    Total Protein  7.5  7.5    Albumin  4.6  4.5    Globulin  2.9  3.0    Total Bilirubin  0.6  0.3    Alkaline Phosphatase  83  71    AST (SGOT)  67  38    ALT (SGPT)  75  43    BUN/Creatinine Ratio 19.7  26.8  19    Anion Gap 13.3        CBC          2/1/2024    11:26 2/2/2024    06:26 4/24/2024    10:15   CBC   WBC 7.39  6.36  7.23    RBC 4.53  4.14  4.92    Hemoglobin 13.7  12.7  14.9    Hematocrit 42.6  39.7  45.5    MCV 94.0 "  95.9  92.5    MCH 30.2  30.7  30.3    MCHC 32.2  32.0  32.7    RDW 14.9  13.1  12.2    Platelets 319  272  283      Lipid Panel          1/18/2024    10:27 2/1/2024    11:26 4/24/2024    10:15   Lipid Panel   Total Cholesterol  170     Total Cholesterol 142   158    Triglycerides 163  323  179    HDL Cholesterol 41  39  35    VLDL Cholesterol 28  52  31    LDL Cholesterol  73  79  92    LDL/HDL Ratio  1.70  2.49      TSH          10/17/2023    09:03 1/18/2024    10:27   TSH   TSH 2.390  1.540                   Assessment and Plan     Diagnoses and all orders for this visit:    1. Type 2 diabetes mellitus without complication, without long-term current use of insulin (Primary)  Comments:  fasting glucose trended down to 110 from 200s, currently taking Insulin 14 U at night, continue same, RTC in 3 months with repeat labs    2. Elevated LFTs  Comments:  trending down to normal, continue statins             Follow Up     No follow-ups on file.  Patient was given instructions and counseling regarding her condition or for health maintenance advice. Please see specific information pulled into the AVS if appropriate.

## 2024-07-05 DIAGNOSIS — E11.9 TYPE 2 DIABETES MELLITUS WITHOUT COMPLICATION, WITHOUT LONG-TERM CURRENT USE OF INSULIN: ICD-10-CM

## 2024-07-05 RX ORDER — INSULIN GLARGINE 100 [IU]/ML
10 INJECTION, SOLUTION SUBCUTANEOUS NIGHTLY
Qty: 3 ML | Refills: 0 | Status: SHIPPED | OUTPATIENT
Start: 2024-07-05

## 2024-07-05 RX ORDER — PEN NEEDLE, DIABETIC 30 GX3/16"
1 NEEDLE, DISPOSABLE MISCELLANEOUS 4 TIMES DAILY PRN
Qty: 100 EACH | Refills: 0 | Status: SHIPPED | OUTPATIENT
Start: 2024-07-05

## 2024-07-05 NOTE — TELEPHONE ENCOUNTER
Caller: Khalida Gilliland CHRIS    Relationship: Self    Best call back number: [phone]     Requested Prescriptions:   Requested Prescriptions     Pending Prescriptions Disp Refills    Insulin Pen Needle (Pen Needles) 32G X 4 MM misc 100 each 0     Sig: Inject 1 each under the skin into the appropriate area as directed 4 (Four) Times a Day As Needed (for insulin injections). Formulary Compliance Approval    Insulin Glargine (Lantus SoloStar) 100 UNIT/ML injection pen 3 mL 0     Sig: Inject 10 Units under the skin into the appropriate area as directed Every Night. Formulary Compliance Approval        Pharmacy where request should be sent: [facility] [address] [phone] [facility] [phone] [facility]     Last office visit with prescribing clinician: 6/18/2024   Last telemedicine visit with prescribing clinician: Visit date not found   Next office visit with prescribing clinician: 8/14/2024     Additional details provided by patient: PATIENT STATED SHE HAS ABOUT A WEEK REMAINING OF NEEDLES.    PATIENT STATED SHE HAS ENOUGH INSULIN FOR UNTIL SHE GETS A REFILL FROM [facility].    Does the patient have less than a 3 day supply:  [] Yes  [x] No    Would you like a call back once the refill request has been completed: [] Yes [x] No    If the office needs to give you a call back, can they leave a voicemail: [] Yes [x] No    Abdirahman Estrada Rep   07/05/24 15:32 EDT

## 2024-07-10 ENCOUNTER — TELEPHONE (OUTPATIENT)
Dept: FAMILY MEDICINE CLINIC | Facility: CLINIC | Age: 69
End: 2024-07-10
Payer: MEDICARE

## 2024-07-10 ENCOUNTER — APPOINTMENT (OUTPATIENT)
Dept: CT IMAGING | Facility: HOSPITAL | Age: 69
End: 2024-07-10
Payer: MEDICARE

## 2024-07-10 ENCOUNTER — HOSPITAL ENCOUNTER (EMERGENCY)
Facility: HOSPITAL | Age: 69
Discharge: HOME | End: 2024-07-10
Attending: EMERGENCY MEDICINE
Payer: MEDICARE

## 2024-07-10 VITALS
RESPIRATION RATE: 16 BRPM | WEIGHT: 200.5 LBS | HEIGHT: 68 IN | OXYGEN SATURATION: 98 % | HEART RATE: 59 BPM | SYSTOLIC BLOOD PRESSURE: 106 MMHG | BODY MASS INDEX: 30.39 KG/M2 | DIASTOLIC BLOOD PRESSURE: 89 MMHG | TEMPERATURE: 96.4 F

## 2024-07-10 DIAGNOSIS — R19.7 DIARRHEA, UNSPECIFIED TYPE: ICD-10-CM

## 2024-07-10 DIAGNOSIS — R10.84 GENERALIZED ABDOMINAL PAIN: Primary | ICD-10-CM

## 2024-07-10 LAB
ALBUMIN SERPL-MCNC: 4.6 G/DL (ref 3.5–5.2)
ALBUMIN/GLOB SERPL: 1.4 G/DL
ALP SERPL-CCNC: 73 U/L (ref 39–117)
ALT SERPL W P-5'-P-CCNC: 26 U/L (ref 1–33)
ANION GAP SERPL CALCULATED.3IONS-SCNC: 10 MMOL/L (ref 5–15)
AST SERPL-CCNC: 24 U/L (ref 1–32)
BACTERIA UR QL AUTO: NORMAL /HPF
BASOPHILS # BLD AUTO: 0.02 10*3/MM3 (ref 0–0.2)
BASOPHILS NFR BLD AUTO: 0.3 % (ref 0–1.5)
BILIRUB SERPL-MCNC: 0.4 MG/DL (ref 0–1.2)
BILIRUB UR QL STRIP: NEGATIVE
BUN SERPL-MCNC: 13 MG/DL (ref 8–23)
BUN/CREAT SERPL: 20.3 (ref 7–25)
CALCIUM SPEC-SCNC: 10.1 MG/DL (ref 8.6–10.5)
CHLORIDE SERPL-SCNC: 100 MMOL/L (ref 98–107)
CLARITY UR: CLEAR
CO2 SERPL-SCNC: 28 MMOL/L (ref 22–29)
COLOR UR: YELLOW
CREAT SERPL-MCNC: 0.64 MG/DL (ref 0.57–1)
D-LACTATE SERPL-SCNC: 1.2 MMOL/L (ref 0.5–2)
DEPRECATED RDW RBC AUTO: 47.7 FL (ref 37–54)
EGFRCR SERPLBLD CKD-EPI 2021: 96.4 ML/MIN/1.73
EOSINOPHIL # BLD AUTO: 0.43 10*3/MM3 (ref 0–0.4)
EOSINOPHIL NFR BLD AUTO: 6.8 % (ref 0.3–6.2)
ERYTHROCYTE [DISTWIDTH] IN BLOOD BY AUTOMATED COUNT: 13.8 % (ref 12.3–15.4)
GLOBULIN UR ELPH-MCNC: 3.2 GM/DL
GLUCOSE SERPL-MCNC: 135 MG/DL (ref 65–99)
GLUCOSE UR STRIP-MCNC: NEGATIVE MG/DL
HCT VFR BLD AUTO: 43.9 % (ref 34–46.6)
HGB BLD-MCNC: 14 G/DL (ref 12–15.9)
HGB UR QL STRIP.AUTO: ABNORMAL
HOLD SPECIMEN: NORMAL
HYALINE CASTS UR QL AUTO: NORMAL /LPF
IMM GRANULOCYTES # BLD AUTO: 0.01 10*3/MM3 (ref 0–0.05)
IMM GRANULOCYTES NFR BLD AUTO: 0.2 % (ref 0–0.5)
KETONES UR QL STRIP: NEGATIVE
LEUKOCYTE ESTERASE UR QL STRIP.AUTO: NEGATIVE
LIPASE SERPL-CCNC: 16 U/L (ref 13–60)
LYMPHOCYTES # BLD AUTO: 2.18 10*3/MM3 (ref 0.7–3.1)
LYMPHOCYTES NFR BLD AUTO: 34.3 % (ref 19.6–45.3)
MCH RBC QN AUTO: 29.4 PG (ref 26.6–33)
MCHC RBC AUTO-ENTMCNC: 31.9 G/DL (ref 31.5–35.7)
MCV RBC AUTO: 92 FL (ref 79–97)
MONOCYTES # BLD AUTO: 0.54 10*3/MM3 (ref 0.1–0.9)
MONOCYTES NFR BLD AUTO: 8.5 % (ref 5–12)
NEUTROPHILS NFR BLD AUTO: 3.18 10*3/MM3 (ref 1.7–7)
NEUTROPHILS NFR BLD AUTO: 49.9 % (ref 42.7–76)
NITRITE UR QL STRIP: NEGATIVE
PH UR STRIP.AUTO: 7 [PH] (ref 5–8)
PLATELET # BLD AUTO: 226 10*3/MM3 (ref 140–450)
PMV BLD AUTO: 10.7 FL (ref 6–12)
POTASSIUM SERPL-SCNC: 4 MMOL/L (ref 3.5–5.2)
PROT SERPL-MCNC: 7.8 G/DL (ref 6–8.5)
PROT UR QL STRIP: NEGATIVE
RBC # BLD AUTO: 4.77 10*6/MM3 (ref 3.77–5.28)
RBC # UR STRIP: NORMAL /HPF
REF LAB TEST METHOD: NORMAL
SODIUM SERPL-SCNC: 138 MMOL/L (ref 136–145)
SP GR UR STRIP: 1.01 (ref 1–1.03)
SQUAMOUS #/AREA URNS HPF: NORMAL /HPF
UROBILINOGEN UR QL STRIP: ABNORMAL
WBC # UR STRIP: NORMAL /HPF
WBC NRBC COR # BLD AUTO: 6.36 10*3/MM3 (ref 3.4–10.8)

## 2024-07-10 PROCEDURE — 83605 ASSAY OF LACTIC ACID: CPT | Performed by: EMERGENCY MEDICINE

## 2024-07-10 PROCEDURE — 99284 EMERGENCY DEPT VISIT MOD MDM: CPT

## 2024-07-10 PROCEDURE — 80053 COMPREHEN METABOLIC PANEL: CPT | Performed by: EMERGENCY MEDICINE

## 2024-07-10 PROCEDURE — 25010000002 ONDANSETRON PER 1 MG: Performed by: EMERGENCY MEDICINE

## 2024-07-10 PROCEDURE — 99283 EMERGENCY DEPT VISIT LOW MDM: CPT | Performed by: PHYSICIAN ASSISTANT

## 2024-07-10 PROCEDURE — 85025 COMPLETE CBC W/AUTO DIFF WBC: CPT

## 2024-07-10 PROCEDURE — 74176 CT ABD & PELVIS W/O CONTRAST: CPT

## 2024-07-10 PROCEDURE — 83690 ASSAY OF LIPASE: CPT | Performed by: EMERGENCY MEDICINE

## 2024-07-10 PROCEDURE — 25010000002 MORPHINE PER 10 MG: Performed by: EMERGENCY MEDICINE

## 2024-07-10 PROCEDURE — 81001 URINALYSIS AUTO W/SCOPE: CPT | Performed by: EMERGENCY MEDICINE

## 2024-07-10 PROCEDURE — 96375 TX/PRO/DX INJ NEW DRUG ADDON: CPT

## 2024-07-10 PROCEDURE — 96374 THER/PROPH/DIAG INJ IV PUSH: CPT

## 2024-07-10 RX ORDER — DICYCLOMINE HCL 20 MG
20 TABLET ORAL EVERY 6 HOURS PRN
Qty: 15 TABLET | Refills: 0 | Status: SHIPPED | OUTPATIENT
Start: 2024-07-10 | End: 2024-07-15

## 2024-07-10 RX ORDER — ONDANSETRON 2 MG/ML
4 INJECTION INTRAMUSCULAR; INTRAVENOUS ONCE
Status: COMPLETED | OUTPATIENT
Start: 2024-07-10 | End: 2024-07-10

## 2024-07-10 RX ORDER — LOPERAMIDE HYDROCHLORIDE 2 MG/1
2 CAPSULE ORAL 4 TIMES DAILY PRN
Qty: 15 CAPSULE | Refills: 0 | Status: SHIPPED | OUTPATIENT
Start: 2024-07-10 | End: 2024-07-15

## 2024-07-10 RX ORDER — MORPHINE SULFATE 4 MG/ML
4 INJECTION, SOLUTION INTRAMUSCULAR; INTRAVENOUS ONCE
Status: COMPLETED | OUTPATIENT
Start: 2024-07-10 | End: 2024-07-10

## 2024-07-10 RX ORDER — SODIUM CHLORIDE 0.9 % (FLUSH) 0.9 %
10 SYRINGE (ML) INJECTION AS NEEDED
Status: DISCONTINUED | OUTPATIENT
Start: 2024-07-10 | End: 2024-07-10 | Stop reason: HOSPADM

## 2024-07-10 RX ADMIN — ONDANSETRON 4 MG: 2 INJECTION INTRAMUSCULAR; INTRAVENOUS at 13:44

## 2024-07-10 RX ADMIN — MORPHINE SULFATE 4 MG: 4 INJECTION, SOLUTION INTRAMUSCULAR; INTRAVENOUS at 13:45
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
## 2024-07-10 NOTE — DISCHARGE INSTRUCTIONS
Please take the medications as prescribed.  Follow-up with your primary care provider next week to recheck your symptoms.  Light activity for the next 2 to 3 days.

## 2024-07-10 NOTE — TELEPHONE ENCOUNTER
Patient called with severe abdominal pain and having diarrhea. Advised to go the ER to get evaluated. Patient understood and will go to the ER.

## 2024-07-15 ENCOUNTER — OFFICE VISIT (OUTPATIENT)
Dept: FAMILY MEDICINE CLINIC | Facility: CLINIC | Age: 69
End: 2024-07-15
Payer: MEDICARE

## 2024-07-15 ENCOUNTER — PATIENT OUTREACH (OUTPATIENT)
Dept: CASE MANAGEMENT | Facility: OTHER | Age: 69
End: 2024-07-15
Payer: MEDICARE

## 2024-07-15 VITALS
BODY MASS INDEX: 30.58 KG/M2 | WEIGHT: 201.8 LBS | HEART RATE: 54 BPM | DIASTOLIC BLOOD PRESSURE: 62 MMHG | RESPIRATION RATE: 16 BRPM | SYSTOLIC BLOOD PRESSURE: 112 MMHG | OXYGEN SATURATION: 96 % | HEIGHT: 68 IN | TEMPERATURE: 98 F

## 2024-07-15 DIAGNOSIS — B96.89 BACTERIAL CONJUNCTIVITIS OF BOTH EYES: ICD-10-CM

## 2024-07-15 DIAGNOSIS — H10.9 BACTERIAL CONJUNCTIVITIS OF BOTH EYES: ICD-10-CM

## 2024-07-15 DIAGNOSIS — R11.2 NAUSEA AND VOMITING, UNSPECIFIED VOMITING TYPE: ICD-10-CM

## 2024-07-15 DIAGNOSIS — R19.7 DIARRHEA, UNSPECIFIED TYPE: Primary | ICD-10-CM

## 2024-07-15 DIAGNOSIS — R10.33 CHRONIC PERIUMBILICAL PAIN: ICD-10-CM

## 2024-07-15 DIAGNOSIS — G89.29 CHRONIC PERIUMBILICAL PAIN: ICD-10-CM

## 2024-07-15 PROCEDURE — 3052F HG A1C>EQUAL 8.0%<EQUAL 9.0%: CPT | Performed by: STUDENT IN AN ORGANIZED HEALTH CARE EDUCATION/TRAINING PROGRAM

## 2024-07-15 PROCEDURE — 1160F RVW MEDS BY RX/DR IN RCRD: CPT | Performed by: STUDENT IN AN ORGANIZED HEALTH CARE EDUCATION/TRAINING PROGRAM

## 2024-07-15 PROCEDURE — 99214 OFFICE O/P EST MOD 30 MIN: CPT | Performed by: STUDENT IN AN ORGANIZED HEALTH CARE EDUCATION/TRAINING PROGRAM

## 2024-07-15 PROCEDURE — 1159F MED LIST DOCD IN RCRD: CPT | Performed by: STUDENT IN AN ORGANIZED HEALTH CARE EDUCATION/TRAINING PROGRAM

## 2024-07-15 PROCEDURE — 3078F DIAST BP <80 MM HG: CPT | Performed by: STUDENT IN AN ORGANIZED HEALTH CARE EDUCATION/TRAINING PROGRAM

## 2024-07-15 PROCEDURE — 3074F SYST BP LT 130 MM HG: CPT | Performed by: STUDENT IN AN ORGANIZED HEALTH CARE EDUCATION/TRAINING PROGRAM

## 2024-07-15 PROCEDURE — 1126F AMNT PAIN NOTED NONE PRSNT: CPT | Performed by: STUDENT IN AN ORGANIZED HEALTH CARE EDUCATION/TRAINING PROGRAM

## 2024-07-15 RX ORDER — ERYTHROMYCIN 5 MG/G
OINTMENT OPHTHALMIC 2 TIMES DAILY
Qty: 3.5 G | Refills: 0 | Status: SHIPPED | OUTPATIENT
Start: 2024-07-15

## 2024-07-15 RX ORDER — ONDANSETRON 4 MG/1
4 TABLET, FILM COATED ORAL EVERY 8 HOURS PRN
Qty: 4 TABLET | Refills: 0 | Status: SHIPPED | OUTPATIENT
Start: 2024-07-15

## 2024-07-15 NOTE — PROGRESS NOTES
"Chief Complaint  Hospital Follow Up Visit, Diarrhea (COUPLE MONTHS FOR EVERYDAY.), and Eye Problem (SWOLLEN & OOZING PUSS X YESTERDAY PM.)    Subjective        Khalida Gilliland presents to [facility]  History of Present Illness  For follow-up on ER visit  Patient had ER visit for generalized abdominal pain   Also complaining of redness in eyes b/l.  Objective   Vital Signs:  /62 (BP Location: Left arm, Patient Position: Sitting, Cuff Size: Large Adult)   Pulse 54   Temp 98 °F (36.7 °C) (Oral)   Resp 16   Ht 172.7 cm (68\")   Wt 91.5 kg (201 lb 12.8 oz)   SpO2 96%   BMI 30.68 kg/m²   Estimated body mass index is 30.68 kg/m² as calculated from the following:    Height as of this encounter: 172.7 cm (68\").    Weight as of this encounter: 91.5 kg (201 lb 12.8 oz).               Physical Exam  Eyes:      General:         Right eye: Discharge present.         Left eye: Discharge present.     Comments: Redness of conjuctiva noticed b/l   Cardiovascular:      Pulses: Normal pulses.      Heart sounds: Normal heart sounds.   Pulmonary:      Effort: Pulmonary effort is normal.      Breath sounds: Normal breath sounds.   Abdominal:      General: Bowel sounds are normal.      Tenderness: There is abdominal tenderness.      Comments: Periumbilical tenderness positive   Musculoskeletal:      Cervical back: Normal range of motion.   Neurological:      Mental Status: She is alert and oriented to person, place, and time.        Result Review :    The following data was reviewed by: Sergo Owen MD on 07/15/2024:  CMP          4/24/2024    10:15 6/11/2024    13:28 7/10/2024    12:46   CMP   Glucose 201  113  135    BUN 19  13  13    Creatinine 0.71  0.67  0.64    EGFR   96.4    Sodium 136  140  138    Potassium 4.5  4.4  4.0    Chloride 97  101  100    Calcium 10.2  10.0  10.1    Total Protein 7.5  7.5     Total Protein   7.8    Albumin 4.6  4.5  4.6    Globulin 2.9  3.0     Globulin   3.2 "    Total Bilirubin 0.6  0.3  0.4    Alkaline Phosphatase 83  71  73    AST (SGOT) 67  38  24    ALT (SGPT) 75  43  26    Albumin/Globulin Ratio   1.4    BUN/Creatinine Ratio 26.8  19  20.3    Anion Gap   10.0      CBC          2/2/2024    06:26 4/24/2024    10:15 7/10/2024    12:46   CBC   WBC 6.36  7.23  6.36    RBC 4.14  4.92  4.77    Hemoglobin 12.7  14.9  14.0    Hematocrit 39.7  45.5  43.9    MCV 95.9  92.5  92.0    MCH 30.7  30.3  29.4    MCHC 32.0  32.7  31.9    RDW 13.1  12.2  13.8    Platelets 272  283  226      Lipid Panel          1/18/2024    10:27 2/1/2024    11:26 4/24/2024    10:15   Lipid Panel   Total Cholesterol  170     Total Cholesterol 142   158    Triglycerides 163  323  179    HDL Cholesterol 41  39  35    VLDL Cholesterol 28  52  31    LDL Cholesterol  73  79  92    LDL/HDL Ratio  1.70  2.49      TSH          10/17/2023    09:03 1/18/2024    10:27   TSH   TSH 2.390  1.540                   Assessment and Plan     Diagnoses and all orders for this visit:    1. Diarrhea, unspecified type (Primary)  -     Ambulatory Referral to Gastroenterology  -     Fecal Fat, Qualitative - Stool, Per Rectum  -     Fecal Leukocytes - Stool, Per Rectum  -     Osmolality, Stool - Stool, Per Rectum  -     pH, Stool - Stool, Per Rectum  -     Stool Culture (Reference Lab) - Stool, Per Rectum  -     Clostridioides difficile Toxin, PCR - Stool, Per Rectum    2. Chronic periumbilical pain  -     Ambulatory Referral to Gastroenterology    3. Nausea and vomiting, unspecified vomiting type  -     ondansetron (Zofran) 4 MG tablet; Take 1 tablet by mouth Every 8 (Eight) Hours As Needed for Nausea or Vomiting.  Dispense: 4 tablet; Refill: 0    4. Bacterial conjunctivitis of both eyes  -     erythromycin (ROMYCIN) 5 MG/GM ophthalmic ointment; Administer  to both eyes 2 (Two) Times a Day.  Dispense: 3.5 g; Refill: 0           # Chart reviewed and labs reviewed from ER visit  as per the chart it urine analysis was done which  came out trace positive for blood but negative for other signs of infection and on microscopy no blood was seen.  Complete metabolic panel was done and  glucose was 135, kidney function EGFR 96.4, electrolytes and LFTs within normal limit, lactic acid was done which was within normal limit lipase was done which is within normal limits 16  CBC was done with total white cell count 6.36 and only eosinophil absolute count were on the higher side rest of the cells are within normal limit  CT abdomen pelvis without contrast was done and which showed lung bases clear, liver spleen adrenal gland pancreas kidney normal, no hydronephrosis no cystitis no bowel dilation or evidence of for bowel obstruction.  There was evidence of cholecystectomy and previous partial sigmoid colon resection.  No diverticulitis suspected.  Almost CT abdomen pelvis without contrast came out negative for any acute pathology  # Patient was discharged with dicyclomine and loperamide.    # plan is to do some stool studies to make sure her symptoms not related to c diff  GI referral given  # for bacterial conjunctivitis apply erythromycin ointment twice daily for 5 days    Follow Up     No follow-ups on file.  Patient was given instructions and counseling regarding her condition or for health maintenance advice. Please see specific information pulled into the AVS if appropriate.

## 2024-07-15 NOTE — OUTREACH NOTE
AMBULATORY CASE MANAGEMENT NOTE    Names and Relationships of Patient/Support Persons: Contact: Khalida Gilliland CHRIS; Relationship: Self -     Patient Outreach  RN-ACM outreach with patient. Discussed 7/10/24 ED visit regarding generalized abdominal pain and diarrhea. Patient treated and discharged . Patient states to be compliant with ED recommendations; taking  medications as ED directed and states to have had 7/15/24 PCP appointment. Patient states to continue with abdominal pain; diarrhea and to have GI referral. Patient states no difficulty with fever; chest pain; SOB; appetite or sleeping.Reviewed with patient ED AVS recommendations; education; role of RN-ACM and HRCM case management services. Patient verbalized understanding. Patient states to appreciate outreach and declines needs for further outreach at this time. No further questions voiced at this time.   Adult Patient Profile  Questions/Answers      Flowsheet Row Most Recent Value   Symptoms/Conditions Managed at Home gastrointestinal   Gastrointestinal Symptoms/Conditions abdominal pain, diarrhea   Gastrointestinal Management Strategies medication therapy, other (see comments)  [Physician follow up]   Barriers to Taking Medication as Prescribed none   Primary Source of Support/Comfort spouse   People in Home spouse        Social Work Assessment  Questions/Answers      Flowsheet Row Most Recent Value   People in Home spouse   Functional Status Comments Patient states to be independent with ADL's.        Send Education  Questions/Answers      Flowsheet Row Most Recent Value   Annual Wellness Visit:  Patient Has Completed   Other Patient Education/Resources  24/7 [facility] Nurse Call Line, [facility]   24/7 Nurse Call Line Education Method Verbal          Education Documentation  Unresolved/Worsening Symptoms, taught by Chika Nelson, RN at 7/15/2024  4:42 PM.  Learner: Patient  Readiness: Acceptance  Method: Explanation  Response: Verbalizes  Understanding    Medication Management, taught by Chika Nelson, RN at 7/15/2024  4:42 PM.  Learner: Patient  Readiness: Acceptance  Method: Explanation  Response: Verbalizes Understanding    Diet Modification, taught by Chika Nelson, RN at 7/15/2024  4:42 PM.  Learner: Patient  Readiness: Acceptance  Method: Explanation  Response: Verbalizes Understanding          Chika CISNEROS  Ambulatory Case Management    7/15/2024, 16:43 EDT

## 2024-07-23 LAB
BACTERIA SPEC CULT: NORMAL
BACTERIA SPEC CULT: NORMAL
C DIFF TOX GENS STL QL NAA+PROBE: NEGATIVE
CAMPYLOBACTER STL CULT: NORMAL
E COLI SXT STL QL IA: NEGATIVE
FA STL QL: NORMAL
NEUTRAL FAT STL QL: NORMAL
OSMOLALITY STL: NORMAL MOSMOL/KG
PH STL: 7.5 [PH] (ref 7–7.5)
SALM + SHIG STL CULT: NORMAL
SPECIMEN STATUS: NORMAL
WBC STL QL MICRO: NORMAL
WBC STL QL MICRO: NORMAL

## 2024-07-27 DIAGNOSIS — E11.9 TYPE 2 DIABETES MELLITUS WITHOUT COMPLICATION, WITHOUT LONG-TERM CURRENT USE OF INSULIN: ICD-10-CM

## 2024-07-29 RX ORDER — PEN NEEDLE, DIABETIC 32GX 5/32"
NEEDLE, DISPOSABLE MISCELLANEOUS
Qty: 100 EACH | Refills: 13 | Status: SHIPPED | OUTPATIENT
Start: 2024-07-29

## 2024-07-29 NOTE — FSED PROVIDER NOTE
"Subjective   History of Present Illness  67yo female pmh significant htn/hyperlipidemia/dm2/migraine presents ED c/o 1d hx nasal congestion/nonproductive cough/headache/malaise.  ROS neg fever/chills/nausea/vomiting.    History provided by:  Patient  URI  Presenting symptoms: congestion, cough, fatigue, rhinorrhea and sore throat    Presenting symptoms: no ear pain        Review of Systems   Constitutional:  Positive for fatigue.   HENT:  Positive for congestion, rhinorrhea and sore throat. Negative for ear pain.    Eyes: Negative.    Respiratory:  Positive for cough.    Cardiovascular: Negative.    Gastrointestinal: Negative.    Allergic/Immunologic: Negative for immunocompromised state.   All other systems reviewed and are negative.      Past Medical History:   Diagnosis Date    Abdominal pain, right lower quadrant     Anemia     Asthma     Brain aneurysm      and 2017    Cancer 2014    Basal Cell Carcinoma Left Arm,SKIN CANCER    COVID-19 2021    Cyst of left kidney     Diabetes mellitus     Tyoe 2 diabetic    Diverticulosis     Fatty liver     GERD (gastroesophageal reflux disease)     History of kidney stones 2011    History of surgery for cerebral aneurysm     Clipping of cerebral Aneurysm    Hx of migraines     Hyperlipidemia     Hypertension     Insomnia     Neck pain     Peptic ulceration     Pneumonia     PONV (postoperative nausea and vomiting)     Stroke     \"years ago\" TIA no after effects       Allergies   Allergen Reactions    Contrast Dye (Echo Or Unknown Ct/Mr) Shortness Of Breath    Iodinated Contrast Media Shortness Of Breath    Codeine Nausea Only    Methylprednisolone Anxiety    Other Other (See Comments)     Bamlanivimab infusion- muscle aches, joint pain, nausea    Ciprofloxacin Hcl Rash       Past Surgical History:   Procedure Laterality Date    APPENDECTOMY      Emergency at time of 2nd     BASAL CELL CARCINOMA EXCISION Left 2014    Excision basal cell " Has not been seen in person since June of last year. Pended for 30 day supply if ok. Will call to schedule.    carcinoma, left arm (1.1 cm) with frozen section control and layered wound closure ( 3.1 cm), Dr. Isael Andrade, [facility]    BRAIN SURGERY  2004    Ruptured aneurysm, clipped/Dr. Sims    CEREBRAL ANEURYSM REPAIR      clipping of cerebral aneurysm    CEREBRAL ANGIOGRAM N/A 2017    Procedure: CEREBRAL ANGIOGRAM ;  Surgeon: Orlando Bella MD;  Location: [facility] OR ;  Service:     CEREBRAL ANGIOGRAM N/A 10/11/2017    Procedure: CEREBRAL ANGIOGRAM;  Surgeon: Orlando Bella MD;  Location: [facility] OR ;  Service:      SECTION  , ,     CHOLECYSTECTOMY      COLON RESECTION WITH COLOSTOMY Right     COLONOSCOPY  2009    COLONOSCOPY N/A 2020    Procedure: COLONOSCOPY to terminal ileum;  Surgeon: Luiza Norris MD;  Location: [facility] ENDOSCOPY;  Service: Gastroenterology;  Laterality: N/A;  PREOP/ SCREENING  POSTOP/ DIVERTICULOSIS. POOR PREP    COLONOSCOPY N/A 2023    Procedure: COLONOSCOPY TO CECUM WITH COLD BX POLYPECTOMIES AND COLD SNARE POLYPECTOMY;  Surgeon: Luiza Norris MD;  Location: [facility] ENDOSCOPY;  Service: Gastroenterology;  Laterality: N/A;  PRE OP - SCREENING  POST OP - COLON POLYPS, DIVERTICULOSIS, HEMORRHOIDS    EMBOLIZATION CEREBRAL N/A 2017    Procedure: Cerebral angiography and embolization of cerebral aneurysm with Pipeline Embolization Device;  Surgeon: Orlando Bella MD;  Location: [facility] OR ;  Service:     EMBOLIZATION CEREBRAL N/A 10/31/2018    Procedure: Cerebral angiogram with embolization of cerebral aneurysm;  Surgeon: Orlando Bella MD;  Location: [facility] OR ;  Service: Neurosurgery    ILEOSTOMY REVISION      INCISIONAL HERNIA REPAIR      Patient suffered some nerve entrapment secondary to the attack, some of which were removed during a secondary operation.    INGUINAL HERNIA REPAIR      LARYNX SURGERY      OSTOMY TAKE DOWN      TUBAL ABDOMINAL LIGATION      URETERAL STENT INSERTION  2011     Due to kidney stones       Family History   Problem Relation Age of Onset    Skin cancer Mother     Dementia Mother     KALYAN disease Mother     Heart disease Father     Heart attack Father     Hypertension Father     Aneurysm Son         cerebral    Nephrolithiasis Son     Suicide Attempts Brother     Malig Hyperthermia Neg Hx        Social History     Socioeconomic History    Marital status:      Spouse name: Roe    Number of children: 3    Years of education: College   Tobacco Use    Smoking status: Former     Years: 1     Types: Cigarettes     Quit date:      Years since quittin.8     Passive exposure: Past    Smokeless tobacco: Never   Vaping Use    Vaping Use: Never used   Substance and Sexual Activity    Alcohol use: Not Currently    Drug use: No    Sexual activity: Defer           Objective   Physical Exam  Vitals and nursing note reviewed.   Constitutional:       General: She is not in acute distress.     Appearance: She is not toxic-appearing.   HENT:      Head: Normocephalic and atraumatic.      Right Ear: Tympanic membrane, ear canal and external ear normal.      Left Ear: Tympanic membrane, ear canal and external ear normal.      Nose: Nose normal.      Mouth/Throat:      Mouth: Mucous membranes are moist.      Pharynx: Oropharynx is clear.   Eyes:      Pupils: Pupils are equal, round, and reactive to light.   Cardiovascular:      Rate and Rhythm: Normal rate and regular rhythm.      Pulses: Normal pulses.      Heart sounds: Normal heart sounds. No murmur heard.     No friction rub. No gallop.   Pulmonary:      Effort: Pulmonary effort is normal. No respiratory distress.      Breath sounds: Normal breath sounds. No wheezing, rhonchi or rales.   Abdominal:      General: Abdomen is flat. Bowel sounds are normal. There is no distension.      Palpations: Abdomen is soft.      Tenderness: There is no abdominal tenderness. There is no guarding or rebound.   Musculoskeletal:          General: No swelling or deformity.      Cervical back: Normal range of motion and neck supple. No rigidity.   Lymphadenopathy:      Cervical: No cervical adenopathy.   Skin:     General: Skin is warm and dry.   Neurological:      General: No focal deficit present.      Mental Status: She is alert and oriented to person, place, and time.      GCS: GCS eye subscore is 4. GCS verbal subscore is 5. GCS motor subscore is 6.      Sensory: Sensation is intact.      Motor: Motor function is intact.         Procedures           ED Course      Labs Reviewed   COVID-19 AND FLU A/B, NP SWAB IN TRANSPORT MEDIA 8-12 HR TAT - Abnormal; Notable for the following components:       Result Value    COVID19 Detected (*)     All other components within normal limits    Narrative:     Fact sheet for providers: https://www.fda.gov/media/059004/download    Fact sheet for patients: https://www.fda.gov/media/195603/download    Test performed by PCR.  Influenza A and Influenza B negative results should be considered presumptive in samples that have a positive SARS-CoV-2 result.    Competitive inhibition studies showed that SARS-CoV-2 virus, when present at concentrations above 3.6E+04 copies/mL, can inhibit the detection and amplification of influenza A and influenza B virus RNA if present at or below 1.8E+02 copies/mL or 4.9E+02 copies/mL, respectively, and may lead to false negative influenza virus results. If co-infection with influenza A or influenza B virus is suspected in samples with a positive SARS-CoV-2 result, the sample should be re-tested with another FDA cleared, approved, or authorized influenza test, if influenza virus detection would change clinical management.  Fact sheet for providers: https://www.fda.gov/media/562899/download    Fact sheet for patients: https://www.fda.gov/media/721561/download    Test performed by PCR.     No results found.                                       Medical Decision Making  Covid19 (+).  Sao2  98%.  Plan discharge w/supportive care.  Paxlovid bid x5d as directed/albuterol hfa prn/bromfed dm prn.  Return precautions provided.    Problems Addressed:  COVID-19 virus infection: complicated acute illness or injury    Risk  Prescription drug management.        Final diagnoses:   COVID-19 virus infection       ED Disposition  ED Disposition       ED Disposition   Discharge    Condition   Good    Comment   --               Sergo Owen MD  [address] [facility] [address]  [phone]    In 1 week           Medication List        New Prescriptions      brompheniramine-pseudoephedrine-DM 30-2-10 MG/5ML syrup  Take 5 mL by mouth 4 (Four) Times a Day As Needed for Cough or Congestion.     Nirmatrelvir&Ritonavir 300/100 20 x 150 MG & 10 x 100MG tablet therapy pack tablet  Commonly known as: PAXLOVID  Take 3 tablets by mouth 2 (Two) Times a Day for 5 days.            Changed      * albuterol sulfate  (90 Base) MCG/ACT inhaler  Commonly known as: PROVENTIL HFA;VENTOLIN HFA;PROAIR HFA  Inhale 2 puffs Every 4 (Four) Hours As Needed for Wheezing or Shortness of Air (Cough).  What changed: Another medication with the same name was added. Make sure you understand how and when to take each.     * albuterol sulfate  (90 Base) MCG/ACT inhaler  Commonly known as: PROVENTIL HFA;VENTOLIN HFA;PROAIR HFA  INHALE 2 PUFFS EVERY 4 HOURS AS NEEDED  What changed: Another medication with the same name was added. Make sure you understand how and when to take each.     * albuterol sulfate  (90 Base) MCG/ACT inhaler  Commonly known as: PROVENTIL HFA;VENTOLIN HFA;PROAIR HFA  Inhale 2 puffs Every 4 (Four) Hours As Needed for Wheezing or Shortness of Air.  What changed: You were already taking a medication with the same name, and this prescription was added. Make sure you understand how and when to take each.     lisinopril 10 MG tablet  Commonly known as: PRINIVIL,ZESTRIL  Take 1 tablet by mouth Daily.  What  changed: when to take this           * This list has 3 medication(s) that are the same as other medications prescribed for you. Read the directions carefully, and ask your doctor or other care provider to review them with you.                Stop      atorvastatin 20 MG tablet  Commonly known as: LIPITOR               Where to Get Your Medications        These medications were sent to [facility] - [place], [place] - [address] [place] [address] - [phone]  - [phone] [address] [address]      Phone: [phone]   albuterol sulfate  (90 Base) MCG/ACT inhaler  brompheniramine-pseudoephedrine-DM 30-2-10 MG/5ML syrup  Nirmatrelvir&Ritonavir 300/100 20 x 150 MG & 10 x 100MG tablet therapy pack tablet

## 2024-08-02 DIAGNOSIS — B37.31 VAGINAL YEAST INFECTION: Primary | ICD-10-CM

## 2024-08-02 RX ORDER — NYSTATIN 100000 U/G
1 CREAM TOPICAL 2 TIMES DAILY
COMMUNITY
End: 2024-08-02 | Stop reason: SDUPTHER

## 2024-08-02 RX ORDER — NYSTATIN 100000 U/G
1 CREAM TOPICAL 2 TIMES DAILY
Qty: 15 G | Refills: 1 | Status: SHIPPED | OUTPATIENT
Start: 2024-08-02

## 2024-08-09 RX ORDER — LEVETIRACETAM 250 MG/1
250 TABLET ORAL DAILY
Qty: 90 TABLET | Refills: 3 | Status: SHIPPED | OUTPATIENT
Start: 2024-08-09

## 2024-08-14 ENCOUNTER — OFFICE VISIT (OUTPATIENT)
Dept: FAMILY MEDICINE CLINIC | Facility: CLINIC | Age: 69
End: 2024-08-14
Payer: MEDICARE

## 2024-08-14 VITALS
OXYGEN SATURATION: 94 % | RESPIRATION RATE: 16 BRPM | SYSTOLIC BLOOD PRESSURE: 102 MMHG | DIASTOLIC BLOOD PRESSURE: 62 MMHG | TEMPERATURE: 98.1 F | HEIGHT: 68 IN | BODY MASS INDEX: 31.57 KG/M2 | HEART RATE: 64 BPM | WEIGHT: 208.3 LBS

## 2024-08-14 DIAGNOSIS — G89.29 CHRONIC PERIUMBILICAL PAIN: ICD-10-CM

## 2024-08-14 DIAGNOSIS — E11.65 TYPE 2 DIABETES MELLITUS WITH HYPERGLYCEMIA, WITHOUT LONG-TERM CURRENT USE OF INSULIN: Primary | ICD-10-CM

## 2024-08-14 DIAGNOSIS — R10.33 CHRONIC PERIUMBILICAL PAIN: ICD-10-CM

## 2024-08-14 DIAGNOSIS — R79.89 ELEVATED LFTS: ICD-10-CM

## 2024-08-14 DIAGNOSIS — I10 ESSENTIAL HYPERTENSION: ICD-10-CM

## 2024-08-14 DIAGNOSIS — I10 ESSENTIAL HYPERTENSION: Primary | ICD-10-CM

## 2024-08-14 PROCEDURE — 3051F HG A1C>EQUAL 7.0%<8.0%: CPT | Performed by: STUDENT IN AN ORGANIZED HEALTH CARE EDUCATION/TRAINING PROGRAM

## 2024-08-14 PROCEDURE — 99214 OFFICE O/P EST MOD 30 MIN: CPT | Performed by: STUDENT IN AN ORGANIZED HEALTH CARE EDUCATION/TRAINING PROGRAM

## 2024-08-14 PROCEDURE — 3074F SYST BP LT 130 MM HG: CPT | Performed by: STUDENT IN AN ORGANIZED HEALTH CARE EDUCATION/TRAINING PROGRAM

## 2024-08-14 PROCEDURE — 1160F RVW MEDS BY RX/DR IN RCRD: CPT | Performed by: STUDENT IN AN ORGANIZED HEALTH CARE EDUCATION/TRAINING PROGRAM

## 2024-08-14 PROCEDURE — 1159F MED LIST DOCD IN RCRD: CPT | Performed by: STUDENT IN AN ORGANIZED HEALTH CARE EDUCATION/TRAINING PROGRAM

## 2024-08-14 PROCEDURE — 1126F AMNT PAIN NOTED NONE PRSNT: CPT | Performed by: STUDENT IN AN ORGANIZED HEALTH CARE EDUCATION/TRAINING PROGRAM

## 2024-08-14 PROCEDURE — 3078F DIAST BP <80 MM HG: CPT | Performed by: STUDENT IN AN ORGANIZED HEALTH CARE EDUCATION/TRAINING PROGRAM

## 2024-08-14 RX ORDER — HYDROCHLOROTHIAZIDE 25 MG/1
25 TABLET ORAL DAILY
COMMUNITY
End: 2024-08-14 | Stop reason: SDUPTHER

## 2024-08-14 RX ORDER — HYDROCHLOROTHIAZIDE 25 MG/1
25 TABLET ORAL DAILY
Qty: 90 TABLET | Refills: 1 | Status: SHIPPED | OUTPATIENT
Start: 2024-08-14

## 2024-08-14 NOTE — PROGRESS NOTES
"Chief Complaint  Elevated LFTs    Subjective        Khalida Gilliland presents to [facility]  History of Present Illness  For follow-up on the labs recently done  No new complaints today  Patient stated she is taking her medication religiously    Objective   Vital Signs:  /62 (BP Location: Left arm, Patient Position: Sitting, Cuff Size: Adult)   Pulse 64   Temp 98.1 °F (36.7 °C) (Oral)   Resp 16   Ht 172.7 cm (68\")   Wt 94.5 kg (208 lb 4.8 oz)   SpO2 94%   BMI 31.67 kg/m²   Estimated body mass index is 31.67 kg/m² as calculated from the following:    Height as of this encounter: 172.7 cm (68\").    Weight as of this encounter: 94.5 kg (208 lb 4.8 oz).            Physical Exam  Cardiovascular:      Rate and Rhythm: Normal rate.      Pulses: Normal pulses.      Heart sounds: Normal heart sounds.   Pulmonary:      Effort: Pulmonary effort is normal.   Abdominal:      General: Bowel sounds are normal.      Palpations: Abdomen is soft.   Musculoskeletal:         General: Normal range of motion.   Neurological:      Mental Status: She is alert and oriented to person, place, and time.        Result Review :                Assessment and Plan   Diagnoses and all orders for this visit:    1. Type 2 diabetes mellitus with hyperglycemia, without long-term current use of insulin (Primary)    2. Chronic periumbilical pain    3. Elevated LFTs    4. Essential hypertension      # Labs reviewed done on August 8, 2024  Hemoglobin A1c has improved but not still at goal 7.5   # currently Insulin 14 U , today increasing to 19 U  # Triglyceride also has increased from 1 79-3 57 but LDL is at goal around 70  LFTs trended back to normal  RTC in a month for recheck up of fasting glucose with home glucose readings    Follow Up   No follow-ups on file.  Patient was given instructions and counseling regarding her condition or for health maintenance advice. Please see specific information pulled into " the AVS if appropriate.

## 2024-08-19 DIAGNOSIS — R19.7 DIARRHEA, UNSPECIFIED TYPE: Primary | ICD-10-CM

## 2024-08-19 DIAGNOSIS — B37.31 VAGINAL YEAST INFECTION: ICD-10-CM

## 2024-08-19 RX ORDER — NYSTATIN 100000 [USP'U]/G
POWDER TOPICAL 3 TIMES DAILY
Qty: 60 G | Refills: 1 | Status: SHIPPED | OUTPATIENT
Start: 2024-08-19

## 2024-08-27 DIAGNOSIS — G43.809 OTHER MIGRAINE WITHOUT STATUS MIGRAINOSUS, NOT INTRACTABLE: ICD-10-CM

## 2024-08-27 RX ORDER — BUTALBITAL, ACETAMINOPHEN AND CAFFEINE 50; 325; 40 MG/1; MG/1; MG/1
1 TABLET ORAL EVERY 6 HOURS PRN
Qty: 30 TABLET | Refills: 0 | Status: SHIPPED | OUTPATIENT
Start: 2024-08-27

## 2024-08-27 NOTE — TELEPHONE ENCOUNTER
Caller: Khalida Gilliland    Relationship: Self    Best call back number: [phone]     Requested Prescriptions:   Requested Prescriptions     Pending Prescriptions Disp Refills    butalbital-acetaminophen-caffeine (Esgic) -40 MG per tablet 30 tablet 0     Sig: Take 1 tablet by mouth Every 6 (Six) Hours As Needed for Headache.        Pharmacy where request should be sent: [id number] [place], [place] - [address] AT [facility] [phone] [facility] [phone]      Last office visit with prescribing clinician: 8/14/2024   Last telemedicine visit with prescribing clinician: Visit date not found   Next office visit with prescribing clinician: 9/18/2024     Additional details provided by patient: PATIENT STATED SHE HAS BEEN OUT OF THIS MEDICATION FOR SEVERAL DAYS.    PATIENT STATED SHE HAS BEEN EXPERIENCING HEADACHES LATELY, AND WOULD LIKE THIS MEDICATION REFILLED.    Does the patient have less than a 3 day supply:  [x] Yes  [] No    Would you like a call back once the refill request has been completed: [] Yes [x] No    If the office needs to give you a call back, can they leave a voicemail: [] Yes [x] No    Abdirahman Estrada Rep   08/27/24 11:10 EDT

## 2024-09-18 ENCOUNTER — OFFICE VISIT (OUTPATIENT)
Dept: FAMILY MEDICINE CLINIC | Facility: CLINIC | Age: 69
End: 2024-09-18
Payer: MEDICARE

## 2024-09-18 VITALS
DIASTOLIC BLOOD PRESSURE: 64 MMHG | WEIGHT: 205.7 LBS | HEART RATE: 60 BPM | HEIGHT: 68 IN | TEMPERATURE: 98.5 F | OXYGEN SATURATION: 96 % | RESPIRATION RATE: 16 BRPM | SYSTOLIC BLOOD PRESSURE: 114 MMHG | BODY MASS INDEX: 31.17 KG/M2

## 2024-09-18 DIAGNOSIS — E11.65 TYPE 2 DIABETES MELLITUS WITH HYPERGLYCEMIA, WITHOUT LONG-TERM CURRENT USE OF INSULIN: Primary | ICD-10-CM

## 2024-09-18 DIAGNOSIS — G47.00 INSOMNIA, UNSPECIFIED TYPE: ICD-10-CM

## 2024-09-18 PROCEDURE — 1160F RVW MEDS BY RX/DR IN RCRD: CPT | Performed by: STUDENT IN AN ORGANIZED HEALTH CARE EDUCATION/TRAINING PROGRAM

## 2024-09-18 PROCEDURE — 1126F AMNT PAIN NOTED NONE PRSNT: CPT | Performed by: STUDENT IN AN ORGANIZED HEALTH CARE EDUCATION/TRAINING PROGRAM

## 2024-09-18 PROCEDURE — 3051F HG A1C>EQUAL 7.0%<8.0%: CPT | Performed by: STUDENT IN AN ORGANIZED HEALTH CARE EDUCATION/TRAINING PROGRAM

## 2024-09-18 PROCEDURE — 99214 OFFICE O/P EST MOD 30 MIN: CPT | Performed by: STUDENT IN AN ORGANIZED HEALTH CARE EDUCATION/TRAINING PROGRAM

## 2024-09-18 PROCEDURE — 1159F MED LIST DOCD IN RCRD: CPT | Performed by: STUDENT IN AN ORGANIZED HEALTH CARE EDUCATION/TRAINING PROGRAM

## 2024-09-18 PROCEDURE — 3078F DIAST BP <80 MM HG: CPT | Performed by: STUDENT IN AN ORGANIZED HEALTH CARE EDUCATION/TRAINING PROGRAM

## 2024-09-18 PROCEDURE — 3074F SYST BP LT 130 MM HG: CPT | Performed by: STUDENT IN AN ORGANIZED HEALTH CARE EDUCATION/TRAINING PROGRAM

## 2024-09-18 RX ORDER — TRAZODONE HYDROCHLORIDE 100 MG/1
100 TABLET ORAL NIGHTLY
Qty: 90 TABLET | Refills: 3 | Status: SHIPPED | OUTPATIENT
Start: 2024-09-18

## 2024-09-18 RX ORDER — GLIPIZIDE 5 MG/1
5 TABLET, FILM COATED, EXTENDED RELEASE ORAL 2 TIMES DAILY
Qty: 180 TABLET | Refills: 3 | Status: SHIPPED | OUTPATIENT
Start: 2024-09-18

## 2024-09-18 RX ORDER — ZOLPIDEM TARTRATE 5 MG/1
5 TABLET ORAL NIGHTLY PRN
Qty: 30 TABLET | Refills: 0 | Status: SHIPPED | OUTPATIENT
Start: 2024-09-18

## 2024-09-25 ENCOUNTER — OFFICE VISIT (OUTPATIENT)
Dept: GASTROENTEROLOGY | Facility: CLINIC | Age: 69
End: 2024-09-25
Payer: MEDICARE

## 2024-09-25 ENCOUNTER — TELEPHONE (OUTPATIENT)
Dept: GASTROENTEROLOGY | Facility: CLINIC | Age: 69
End: 2024-09-25
Payer: MEDICARE

## 2024-09-25 VITALS
BODY MASS INDEX: 29.58 KG/M2 | SYSTOLIC BLOOD PRESSURE: 132 MMHG | HEART RATE: 58 BPM | HEIGHT: 70 IN | WEIGHT: 206.6 LBS | DIASTOLIC BLOOD PRESSURE: 77 MMHG | TEMPERATURE: 96.9 F

## 2024-09-25 DIAGNOSIS — K21.9 GASTROESOPHAGEAL REFLUX DISEASE, UNSPECIFIED WHETHER ESOPHAGITIS PRESENT: ICD-10-CM

## 2024-09-25 DIAGNOSIS — R19.7 DIARRHEA, UNSPECIFIED TYPE: Primary | ICD-10-CM

## 2024-09-25 DIAGNOSIS — R10.9 RIGHT SIDED ABDOMINAL PAIN: ICD-10-CM

## 2024-09-25 DIAGNOSIS — R14.2 BELCHING: ICD-10-CM

## 2024-09-25 RX ORDER — DICYCLOMINE HCL 20 MG
20 TABLET ORAL
Qty: 90 TABLET | Refills: 5 | Status: SHIPPED | OUTPATIENT
Start: 2024-09-25

## 2024-09-27 DIAGNOSIS — G43.809 OTHER MIGRAINE WITHOUT STATUS MIGRAINOSUS, NOT INTRACTABLE: ICD-10-CM

## 2024-09-27 RX ORDER — BUTALBITAL, ACETAMINOPHEN AND CAFFEINE 50; 325; 40 MG/1; MG/1; MG/1
1 TABLET ORAL EVERY 6 HOURS PRN
Qty: 30 TABLET | Refills: 0 | Status: SHIPPED | OUTPATIENT
Start: 2024-09-27

## 2024-10-08 DIAGNOSIS — E78.5 HYPERLIPIDEMIA, UNSPECIFIED HYPERLIPIDEMIA TYPE: ICD-10-CM

## 2024-10-08 RX ORDER — ATORVASTATIN CALCIUM 20 MG/1
20 TABLET, FILM COATED ORAL NIGHTLY
Qty: 90 TABLET | Refills: 3 | Status: SHIPPED | OUTPATIENT
Start: 2024-10-08

## 2024-10-14 DIAGNOSIS — E11.9 TYPE 2 DIABETES MELLITUS WITHOUT COMPLICATION, WITHOUT LONG-TERM CURRENT USE OF INSULIN: ICD-10-CM

## 2024-10-14 RX ORDER — SITAGLIPTIN 100 MG/1
100 TABLET, FILM COATED ORAL DAILY
Qty: 90 TABLET | Refills: 3 | OUTPATIENT
Start: 2024-10-14

## 2024-10-22 ENCOUNTER — OUTSIDE FACILITY SERVICE (OUTPATIENT)
Dept: GASTROENTEROLOGY | Facility: CLINIC | Age: 69
End: 2024-10-22
Payer: MEDICARE

## 2024-10-22 ENCOUNTER — LAB REQUISITION (OUTPATIENT)
Dept: LAB | Facility: HOSPITAL | Age: 69
End: 2024-10-22
Payer: MEDICARE

## 2024-10-22 DIAGNOSIS — K21.9 GASTROESOPHAGEAL REFLUX DISEASE WITHOUT ESOPHAGITIS: ICD-10-CM

## 2024-10-22 PROCEDURE — 88305 TISSUE EXAM BY PATHOLOGIST: CPT | Performed by: INTERNAL MEDICINE

## 2024-10-23 LAB
CYTO UR: NORMAL
LAB AP CASE REPORT: NORMAL
PATH REPORT.FINAL DX SPEC: NORMAL
PATH REPORT.GROSS SPEC: NORMAL

## 2024-10-24 NOTE — PROGRESS NOTES
Pathology benign  Please prescribe Xifaxan 5 or 50 mg p.o. 3 times daily for 14 days, #42, 2 refills for suspected small bowel bacterial overgrowth  Office visit BRANDY 6 to 8 weeks

## 2024-10-28 ENCOUNTER — TELEPHONE (OUTPATIENT)
Dept: GASTROENTEROLOGY | Facility: CLINIC | Age: 69
End: 2024-10-28
Payer: MEDICARE

## 2024-10-30 ENCOUNTER — HOSPITAL ENCOUNTER (EMERGENCY)
Facility: HOSPITAL | Age: 69
Discharge: HOME | End: 2024-10-31
Attending: EMERGENCY MEDICINE
Payer: MEDICARE

## 2024-10-30 ENCOUNTER — APPOINTMENT (OUTPATIENT)
Dept: CT IMAGING | Facility: HOSPITAL | Age: 69
End: 2024-10-30
Payer: MEDICARE

## 2024-10-30 ENCOUNTER — OFFICE VISIT (OUTPATIENT)
Dept: GASTROENTEROLOGY | Facility: CLINIC | Age: 69
End: 2024-10-30
Payer: MEDICARE

## 2024-10-30 VITALS
DIASTOLIC BLOOD PRESSURE: 75 MMHG | BODY MASS INDEX: 31.98 KG/M2 | WEIGHT: 211 LBS | SYSTOLIC BLOOD PRESSURE: 119 MMHG | TEMPERATURE: 96.6 F | HEIGHT: 68 IN

## 2024-10-30 DIAGNOSIS — R19.7 DIARRHEA, UNSPECIFIED TYPE: ICD-10-CM

## 2024-10-30 DIAGNOSIS — K58.0 IRRITABLE BOWEL SYNDROME WITH DIARRHEA: Primary | ICD-10-CM

## 2024-10-30 DIAGNOSIS — R10.11 RIGHT UPPER QUADRANT ABDOMINAL PAIN: Primary | ICD-10-CM

## 2024-10-30 DIAGNOSIS — E11.9 TYPE 2 DIABETES MELLITUS WITHOUT COMPLICATION, WITHOUT LONG-TERM CURRENT USE OF INSULIN: ICD-10-CM

## 2024-10-30 LAB
ALBUMIN SERPL-MCNC: 4.7 G/DL (ref 3.5–5.2)
ALBUMIN/GLOB SERPL: 1.3 G/DL
ALP SERPL-CCNC: 66 U/L (ref 39–117)
ALT SERPL W P-5'-P-CCNC: 52 U/L (ref 1–33)
ANION GAP SERPL CALCULATED.3IONS-SCNC: 11.4 MMOL/L (ref 5–15)
AST SERPL-CCNC: 43 U/L (ref 1–32)
BASOPHILS # BLD AUTO: 0.04 10*3/MM3 (ref 0–0.2)
BASOPHILS NFR BLD AUTO: 0.4 % (ref 0–1.5)
BILIRUB SERPL-MCNC: 0.7 MG/DL (ref 0–1.2)
BILIRUB UR QL STRIP: NEGATIVE
BUN SERPL-MCNC: 13 MG/DL (ref 8–23)
BUN/CREAT SERPL: 22.4 (ref 7–25)
CALCIUM SPEC-SCNC: 10.2 MG/DL (ref 8.6–10.5)
CHLORIDE SERPL-SCNC: 98 MMOL/L (ref 98–107)
CLARITY UR: CLEAR
CO2 SERPL-SCNC: 27.6 MMOL/L (ref 22–29)
COLOR UR: YELLOW
CREAT SERPL-MCNC: 0.58 MG/DL (ref 0.57–1)
D-LACTATE SERPL-SCNC: 0.9 MMOL/L (ref 0.5–2)
DEPRECATED RDW RBC AUTO: 44.1 FL (ref 37–54)
EGFRCR SERPLBLD CKD-EPI 2021: 98.1 ML/MIN/1.73
EOSINOPHIL # BLD AUTO: 0.32 10*3/MM3 (ref 0–0.4)
EOSINOPHIL NFR BLD AUTO: 3.5 % (ref 0.3–6.2)
ERYTHROCYTE [DISTWIDTH] IN BLOOD BY AUTOMATED COUNT: 12.9 % (ref 12.3–15.4)
GLOBULIN UR ELPH-MCNC: 3.7 GM/DL
GLUCOSE SERPL-MCNC: 119 MG/DL (ref 65–99)
GLUCOSE UR STRIP-MCNC: NEGATIVE MG/DL
HCT VFR BLD AUTO: 44.1 % (ref 34–46.6)
HGB BLD-MCNC: 14.6 G/DL (ref 12–15.9)
HGB UR QL STRIP.AUTO: NEGATIVE
HOLD SPECIMEN: NORMAL
IMM GRANULOCYTES # BLD AUTO: 0.02 10*3/MM3 (ref 0–0.05)
IMM GRANULOCYTES NFR BLD AUTO: 0.2 % (ref 0–0.5)
KETONES UR QL STRIP: NEGATIVE
LEUKOCYTE ESTERASE UR QL STRIP.AUTO: NEGATIVE
LIPASE SERPL-CCNC: 15 U/L (ref 13–60)
LYMPHOCYTES # BLD AUTO: 3.4 10*3/MM3 (ref 0.7–3.1)
LYMPHOCYTES NFR BLD AUTO: 37.2 % (ref 19.6–45.3)
MCH RBC QN AUTO: 30.4 PG (ref 26.6–33)
MCHC RBC AUTO-ENTMCNC: 33.1 G/DL (ref 31.5–35.7)
MCV RBC AUTO: 91.9 FL (ref 79–97)
MONOCYTES # BLD AUTO: 0.83 10*3/MM3 (ref 0.1–0.9)
MONOCYTES NFR BLD AUTO: 9.1 % (ref 5–12)
NEUTROPHILS NFR BLD AUTO: 4.52 10*3/MM3 (ref 1.7–7)
NEUTROPHILS NFR BLD AUTO: 49.6 % (ref 42.7–76)
NITRITE UR QL STRIP: NEGATIVE
PH UR STRIP.AUTO: 7 [PH] (ref 5–8)
PLATELET # BLD AUTO: 263 10*3/MM3 (ref 140–450)
PMV BLD AUTO: 10.5 FL (ref 6–12)
POTASSIUM SERPL-SCNC: 3.9 MMOL/L (ref 3.5–5.2)
PROT SERPL-MCNC: 8.4 G/DL (ref 6–8.5)
PROT UR QL STRIP: NEGATIVE
RBC # BLD AUTO: 4.8 10*6/MM3 (ref 3.77–5.28)
SODIUM SERPL-SCNC: 137 MMOL/L (ref 136–145)
SP GR UR STRIP: 1.01 (ref 1–1.03)
UROBILINOGEN UR QL STRIP: NORMAL
WBC NRBC COR # BLD AUTO: 9.13 10*3/MM3 (ref 3.4–10.8)
WHOLE BLOOD HOLD SPECIMEN: NORMAL

## 2024-10-30 PROCEDURE — 99284 EMERGENCY DEPT VISIT MOD MDM: CPT

## 2024-10-30 PROCEDURE — 81003 URINALYSIS AUTO W/O SCOPE: CPT | Performed by: EMERGENCY MEDICINE

## 2024-10-30 PROCEDURE — 25010000002 ONDANSETRON PER 1 MG: Performed by: EMERGENCY MEDICINE

## 2024-10-30 PROCEDURE — 99284 EMERGENCY DEPT VISIT MOD MDM: CPT | Performed by: EMERGENCY MEDICINE

## 2024-10-30 PROCEDURE — 25010000002 FENTANYL CITRATE (PF) 50 MCG/ML SOLUTION: Performed by: EMERGENCY MEDICINE

## 2024-10-30 PROCEDURE — 25810000003 SODIUM CHLORIDE 0.9 % SOLUTION: Performed by: EMERGENCY MEDICINE

## 2024-10-30 PROCEDURE — 85025 COMPLETE CBC W/AUTO DIFF WBC: CPT | Performed by: EMERGENCY MEDICINE

## 2024-10-30 PROCEDURE — 96375 TX/PRO/DX INJ NEW DRUG ADDON: CPT

## 2024-10-30 PROCEDURE — 80053 COMPREHEN METABOLIC PANEL: CPT | Performed by: EMERGENCY MEDICINE

## 2024-10-30 PROCEDURE — 74176 CT ABD & PELVIS W/O CONTRAST: CPT

## 2024-10-30 PROCEDURE — 96374 THER/PROPH/DIAG INJ IV PUSH: CPT

## 2024-10-30 PROCEDURE — 25010000002 HYDROMORPHONE PER 4 MG: Performed by: EMERGENCY MEDICINE

## 2024-10-30 PROCEDURE — 83605 ASSAY OF LACTIC ACID: CPT | Performed by: EMERGENCY MEDICINE

## 2024-10-30 PROCEDURE — 83690 ASSAY OF LIPASE: CPT | Performed by: EMERGENCY MEDICINE

## 2024-10-30 RX ORDER — FENTANYL CITRATE 50 UG/ML
50 INJECTION, SOLUTION INTRAMUSCULAR; INTRAVENOUS ONCE
Status: COMPLETED | OUTPATIENT
Start: 2024-10-30 | End: 2024-10-30

## 2024-10-30 RX ORDER — ONDANSETRON 2 MG/ML
4 INJECTION INTRAMUSCULAR; INTRAVENOUS ONCE
Status: COMPLETED | OUTPATIENT
Start: 2024-10-30 | End: 2024-10-30

## 2024-10-30 RX ORDER — INSULIN GLARGINE 100 [IU]/ML
10 INJECTION, SOLUTION SUBCUTANEOUS NIGHTLY
Qty: 15 ML | Refills: 2 | Status: SHIPPED | OUTPATIENT
Start: 2024-10-30

## 2024-10-30 RX ORDER — SODIUM CHLORIDE 0.9 % (FLUSH) 0.9 %
10 SYRINGE (ML) INJECTION AS NEEDED
Status: DISCONTINUED | OUTPATIENT
Start: 2024-10-30 | End: 2024-10-31 | Stop reason: HOSPADM

## 2024-10-30 RX ORDER — HYDROMORPHONE HYDROCHLORIDE 1 MG/ML
0.5 INJECTION, SOLUTION INTRAMUSCULAR; INTRAVENOUS; SUBCUTANEOUS ONCE
Status: COMPLETED | OUTPATIENT
Start: 2024-10-30 | End: 2024-10-30

## 2024-10-30 RX ADMIN — HYDROMORPHONE HYDROCHLORIDE 0.5 MG: 1 INJECTION, SOLUTION INTRAMUSCULAR; INTRAVENOUS; SUBCUTANEOUS at 23:16

## 2024-10-30 RX ADMIN — SODIUM CHLORIDE 500 ML: 9 INJECTION, SOLUTION INTRAVENOUS at 22:19

## 2024-10-30 RX ADMIN — FENTANYL CITRATE 50 MCG: 50 INJECTION INTRAMUSCULAR; INTRAVENOUS at 22:19

## 2024-10-30 RX ADMIN — ONDANSETRON 4 MG: 2 INJECTION, SOLUTION INTRAMUSCULAR; INTRAVENOUS at 22:19

## 2024-10-30 NOTE — PROGRESS NOTES
"Chief Complaint  Abdominal Pain and Diarrhea    Subjective          History Of Present Illness:    Khalida Gilliland is a  69 y.o. female patient of Dr. Regalado who presents as a follow-up for IBS-D and heartburn.    Patient reports she continues to have the same symptoms she was complaining on of her last visit.  She is additionally complaining of significant back pain.  She continues to have diarrhea with abdominal bloating and discomfort.  Abdominal pain remains generalized.  She feels like gas is moving throughout her abdomen.  No blood in the stool.  Appetite is stable.  Weight is unchanged.    She does have a history of multiple abdominal surgeries which include appendectomy, partial colectomy which was then complicated by bowel perforation requiring ostomy status post reversal, cholecystectomy.     Additional data reviewed:  Flexible sigmoidoscopy 10/22/2024 with diverticulosis of the sigmoid and descending colon, otherwise normal.  Biopsies without microscopic colitis.  EGD 10/22/2024 -normal-appearing esophagus, stomach, duodenum.  Benign biopsies.    Objective   Vital Signs:   /75   Temp 96.6 °F (35.9 °C)   Ht 172.7 cm (68\")   Wt 95.7 kg (211 lb)   BMI 32.08 kg/m²       Physical Exam  Vitals reviewed.   Constitutional:       General: She is not in acute distress.     Appearance: Normal appearance. She is not ill-appearing.   HENT:      Head: Normocephalic and atraumatic.      Nose: Nose normal.      Mouth/Throat:      Pharynx: Oropharynx is clear.   Eyes:      Conjunctiva/sclera: Conjunctivae normal.   Pulmonary:      Effort: Pulmonary effort is normal.   Abdominal:      General: There is no distension.      Palpations: Abdomen is soft. There is no mass.      Tenderness: There is no abdominal tenderness.   Musculoskeletal:         General: No swelling. Normal range of motion.      Cervical back: Normal range of motion.   Skin:     General: Skin is warm and dry.      Findings: No bruising or rash. "   Neurological:      General: No focal deficit present.      Mental Status: She is alert and oriented to person, place, and time.      Motor: No weakness.      Gait: Gait normal.   Psychiatric:         Mood and Affect: Mood normal.          Result Review :   The following data was reviewed by: Louisa Modi PA-C on 10/30/2024:  CMP          6/11/2024    13:28 7/10/2024    12:46 8/8/2024    09:07   CMP   Glucose 113  135  221    BUN 13  13  16    Creatinine 0.67  0.64  0.77    EGFR  96.4     Sodium 140  138  138    Potassium 4.4  4.0  4.8    Chloride 101  100  99    Calcium 10.0  10.1  9.9    Total Protein 7.5   7.2    Total Protein  7.8     Albumin 4.5  4.6  4.2    Globulin 3.0   3.0    Globulin  3.2     Total Bilirubin 0.3  0.4  <0.2    Alkaline Phosphatase 71  73  92    AST (SGOT) 38  24  19    ALT (SGPT) 43  26  25    Albumin/Globulin Ratio  1.4     BUN/Creatinine Ratio 19  20.3  21    Anion Gap  10.0       CBC          2/2/2024    06:26 4/24/2024    10:15 7/10/2024    12:46   CBC   WBC 6.36  7.23  6.36    RBC 4.14  4.92  4.77    Hemoglobin 12.7  14.9  14.0    Hematocrit 39.7  45.5  43.9    MCV 95.9  92.5  92.0    MCH 30.7  30.3  29.4    MCHC 32.0  32.7  31.9    RDW 13.1  12.2  13.8    Platelets 272  283  226            Assessment and Plan    Diagnoses and all orders for this visit:    1. Irritable bowel syndrome with diarrhea (Primary)  -     riFAXIMin (Xifaxan) 550 MG tablet; Take 1 tablet by mouth Every 8 (Eight) Hours for 14 days.  Dispense: 42 tablet; Refill: 0       Recommend a course of Xifaxan  Start dicyclomine as advised on her last office visit  If patient does not respond to antispasmodics and Xifaxan therapy then I would recommend TCA therapy.    Follow Up   Return in about 3 months (around 1/30/2025) for Louisa Steele PA-C.    Dragon dictation used throughout this note.            [facility]  [address] [facility] [address]  [place]  [address]  Office: [phone]

## 2024-10-31 ENCOUNTER — PATIENT OUTREACH (OUTPATIENT)
Dept: CASE MANAGEMENT | Facility: OTHER | Age: 69
End: 2024-10-31
Payer: MEDICARE

## 2024-10-31 ENCOUNTER — SPECIALTY PHARMACY (OUTPATIENT)
Dept: GASTROENTEROLOGY | Facility: CLINIC | Age: 69
End: 2024-10-31
Payer: MEDICARE

## 2024-10-31 VITALS
HEART RATE: 59 BPM | SYSTOLIC BLOOD PRESSURE: 151 MMHG | BODY MASS INDEX: 31.98 KG/M2 | HEIGHT: 68 IN | RESPIRATION RATE: 18 BRPM | TEMPERATURE: 96.6 F | OXYGEN SATURATION: 94 % | WEIGHT: 211 LBS | DIASTOLIC BLOOD PRESSURE: 78 MMHG

## 2024-10-31 PROCEDURE — 63710000001 PROMETHAZINE PER 25 MG: Performed by: EMERGENCY MEDICINE

## 2024-10-31 RX ORDER — HYDROCODONE BITARTRATE AND ACETAMINOPHEN 5; 325 MG/1; MG/1
1 TABLET ORAL ONCE AS NEEDED
Status: DISCONTINUED | OUTPATIENT
Start: 2024-10-31 | End: 2024-10-31 | Stop reason: HOSPADM

## 2024-10-31 RX ORDER — PROMETHAZINE HYDROCHLORIDE 25 MG/1
25 TABLET ORAL ONCE
Status: COMPLETED | OUTPATIENT
Start: 2024-10-31 | End: 2024-10-31

## 2024-10-31 RX ORDER — DICYCLOMINE HCL 20 MG
20 TABLET ORAL EVERY 6 HOURS PRN
Qty: 20 TABLET | Refills: 0 | Status: SHIPPED | OUTPATIENT
Start: 2024-10-31

## 2024-10-31 RX ORDER — HYDROCODONE BITARTRATE AND ACETAMINOPHEN 5; 325 MG/1; MG/1
1 TABLET ORAL EVERY 6 HOURS PRN
Qty: 10 TABLET | Refills: 0 | Status: SHIPPED | OUTPATIENT
Start: 2024-10-31 | End: 2024-11-03

## 2024-10-31 RX ORDER — ONDANSETRON 4 MG/1
4 TABLET, ORALLY DISINTEGRATING ORAL EVERY 6 HOURS PRN
Qty: 20 TABLET | Refills: 0 | Status: SHIPPED | OUTPATIENT
Start: 2024-10-31

## 2024-10-31 RX ADMIN — HYDROCODONE BITARTRATE AND ACETAMINOPHEN 1 TABLET: 5; 325 TABLET ORAL at 00:48

## 2024-10-31 RX ADMIN — PROMETHAZINE HYDROCHLORIDE 25 MG: 25 TABLET ORAL at 00:48

## 2024-10-31 NOTE — PROGRESS NOTES
Xifaxan PA approved  Key: [id number]  CaseId:[id number];Status:Approved;Review Type:Prior Auth;Coverage Start Date:10/01/2024;Coverage End Date:10/31/2025;. Authorization Expiration Date: October 31, 2025.     Ct Ramos  Specialty Pharmacy Technician

## 2024-10-31 NOTE — DISCHARGE INSTRUCTIONS
Tonight your CAT scan of your abdomen pelvis is reassuring.  Specifically we do not see any evidence of perforation from the endoscopies last week.    Likewise we do not see any evidence of acute diverticulitis, kidney stone, or kidney inflammation.    I did send in medications for abdominal pain, colon spasm, and nausea.  Use as needed.    I would start with a relatively bland diet this morning and advance as tolerated.  I did also give you an example of the low FODMAP eating plan.    Return anytime for worsening pain or fever    Please read all of the instructions in this handout.  If you receive prescriptions please fill them and take them as directed.  Please call your primary care physician for follow-up appointment in the next 5 to 7 days.  If you do not have a physician you may call the [facility] referral line at [phone].    You may return to the emergency department at any time for any concerns such as worsening symptoms.  If you received a work or school note it will be printed at the back of this packet.
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
## 2024-10-31 NOTE — OUTREACH NOTE
AMBULATORY CASE MANAGEMENT NOTE    Names and Relationships of Patient/Support Persons: Contact: Khalida Gilliland; Relationship: Self -   Patient Outreach  RN-ACM outreach with patient. Discussed 10/30/24 ED visit regarding .right upper quadrant abdominal pain and diarrhea. Patient treated and discharged.  Patient states to be compliant with ED recommendations; voiced intent to obtain medications as ED directed and to contact PCP to schedule ED appointment for follow up and recommendations. Patient states improvement regarding abdominal pain; nausea; vomiting; appetite and no difficulty with fever; chest pain; SOB or sleeping. Patient states to be compliant with medications and medical appointments. Reviewed with patient ED AVS recommendations; education; role of RN-ACM and HRCM case management services. Patient verbalized understanding. Patient states to appreciate outreach and declines needs for further outreach at this time. No further questions voiced at this time.   Adult Patient Profile  Questions/Answers      Flowsheet Row Most Recent Value   Symptoms/Conditions Managed at Home gastrointestinal   Gastrointestinal Symptoms/Conditions abdominal pain, diarrhea, nausea, vomiting   Gastrointestinal Management Strategies medication therapy, other (see comments)  [Physician follow up]   Primary Source of Support/Comfort spouse   People in Home alone        Social Work Assessment  Questions/Answers      Flowsheet Row Most Recent Value   People in Home alone   Functional Status Comments Patient states to be independent with ADL's,  meal preparation,  transportation and ambulation        Send Education  Questions/Answers      Flowsheet Row Most Recent Value   Other Patient Education/Resources  24/7 [facility] Call Line, [facility]   [facility]/7 Nurse Call Line Education Method Verbal        SDOH updated and reviewed with the patient during this program:  --     Food Insecurity: No Food Insecurity (10/31/2024)     Hunger Vital Sign     Worried About Running Out of Food in the Last Year: Never true     Ran Out of Food in the Last Year: Never true      --     Transportation Needs: No Transportation Needs (10/31/2024)    PRAPARE - Transportation     Lack of Transportation (Medical): No     Lack of Transportation (Non-Medical): No           Adult Patient Profile  Questions/Answers      Flowsheet Row Most Recent Value   Symptoms/Conditions Managed at Home gastrointestinal   Gastrointestinal Symptoms/Conditions abdominal pain, diarrhea, nausea, vomiting   Gastrointestinal Management Strategies medication therapy, other (see comments)  [Physician follow up]   Primary Source of Support/Comfort spouse   People in Home alone            Education Documentation  Unresolved/Worsening Symptoms, taught by Chika Nelson RN at 10/31/2024  3:55 PM.  Learner: Patient  Readiness: Acceptance  Method: Explanation  Response: Verbalizes Understanding    Diet Modification, taught by Chika Nelson RN at 10/31/2024  3:55 PM.  Learner: Patient  Readiness: Acceptance  Method: Explanation  Response: Verbalizes Understanding    Unresolved/Worsening Symptoms, taught by Chika Nelson RN at 10/31/2024  3:55 PM.  Learner: Patient  Readiness: Acceptance  Method: Explanation  Response: Verbalizes Understanding    Medication Management, taught by Chika Nelson RN at 10/31/2024  3:55 PM.  Learner: Patient  Readiness: Acceptance  Method: Explanation  Response: Verbalizes Understanding    Diet Modification, taught by Chika Nelson RN at 10/31/2024  3:55 PM.  Learner: Patient  Readiness: Acceptance  Method: Explanation  Response: Verbalizes Understanding    Unresolved/Worsening Symptoms, taught by Chika Nelson RN at 10/31/2024  3:55 PM.  Learner: Patient  Readiness: Acceptance  Method: Explanation  Response: Verbalizes Understanding          Chika CISNEROS  Ambulatory Case Management    10/31/2024, 15:55 EDT

## 2024-11-08 DIAGNOSIS — K58.0 IRRITABLE BOWEL SYNDROME WITH DIARRHEA: ICD-10-CM

## 2024-11-11 RX ORDER — RIFAXIMIN 550 MG/1
TABLET ORAL
Qty: 42 TABLET | Refills: 1 | Status: SHIPPED | OUTPATIENT
Start: 2024-11-11

## 2024-11-11 NOTE — PLAN OF CARE
Goal Outcome Evaluation:        Pt A&O times 4  Pt had CT scan @4632  Pt is cooperative   Pt is presenting with a non productive dry cough upon assessment   HOB elevated 30-40 degrees   Safety maintained                                       .

## 2024-11-12 RX ORDER — PREGABALIN 100 MG/1
100 CAPSULE ORAL 3 TIMES DAILY
Qty: 270 CAPSULE | Refills: 1 | Status: SHIPPED | OUTPATIENT
Start: 2024-11-12

## 2024-11-12 NOTE — TELEPHONE ENCOUNTER
Caller: Khalida Gilliland    Relationship: Self    Best call back number: [phone]     Requested Prescriptions:   Requested Prescriptions     Pending Prescriptions Disp Refills    pregabalin (LYRICA) 100 MG capsule       Sig: Take 1 capsule by mouth 3 (Three) Times a Day.        Pharm[facility] where [facility] should be sent[id number] [address] AT [facility] [phone] [facility] [phone]      Last office visit with prescribing clinician: 9/18/2024   Last telemedicine visit with prescribing clinician: Visit date not found   Next office visit with prescribing clinician: 12/4/2024     Additional details provided by patient: PATIENT STATED SHE HAS LESS THAN A WEEK OF THIS MEDICATION     PATIENT STATED SHE TAKES THIS MEDICATION 3 TIMES A DAY, AND NORMALLY RECEIVES A 3 MONTH SUPPLY OF THIS MEDICATION    Does the patient have less than a 3 day supply:  [] Yes  [x] No    Would you like a call back once the refill request has been completed: [] Yes [x] No    If the office needs to give you a call back, can they leave a voicemail: [] Yes [x] No    Abdirahman Estrada Rep   11/12/24 08:41 EST

## 2024-11-18 DIAGNOSIS — E11.9 TYPE 2 DIABETES MELLITUS WITHOUT COMPLICATION, WITHOUT LONG-TERM CURRENT USE OF INSULIN: Primary | ICD-10-CM

## 2024-11-18 DIAGNOSIS — I10 ESSENTIAL HYPERTENSION: ICD-10-CM

## 2024-11-18 DIAGNOSIS — E78.5 HYPERLIPIDEMIA, UNSPECIFIED HYPERLIPIDEMIA TYPE: ICD-10-CM

## 2024-11-20 LAB
ALBUMIN SERPL-MCNC: 4.3 G/DL (ref 3.5–5.2)
ALBUMIN/CREAT UR: 7 MG/G CREAT (ref 0–29)
ALBUMIN/GLOB SERPL: 1.3 G/DL
ALP SERPL-CCNC: 63 U/L (ref 39–117)
ALT SERPL-CCNC: 39 U/L (ref 1–33)
APPEARANCE UR: CLEAR
AST SERPL-CCNC: 35 U/L (ref 1–32)
BACTERIA #/AREA URNS HPF: ABNORMAL /HPF
BASOPHILS # BLD AUTO: 0.04 10*3/MM3 (ref 0–0.2)
BASOPHILS NFR BLD AUTO: 0.7 % (ref 0–1.5)
BILIRUB SERPL-MCNC: 0.5 MG/DL (ref 0–1.2)
BILIRUB UR QL STRIP: NEGATIVE
BUN SERPL-MCNC: 11 MG/DL (ref 8–23)
BUN/CREAT SERPL: 16.4 (ref 7–25)
CALCIUM SERPL-MCNC: 10 MG/DL (ref 8.6–10.5)
CASTS URNS MICRO: ABNORMAL
CHLORIDE SERPL-SCNC: 103 MMOL/L (ref 98–107)
CHOLEST SERPL-MCNC: 129 MG/DL (ref 0–200)
CHOLEST/HDLC SERPL: 3.39 {RATIO}
CO2 SERPL-SCNC: 28.9 MMOL/L (ref 22–29)
COLOR UR: YELLOW
CREAT SERPL-MCNC: 0.67 MG/DL (ref 0.57–1)
CREAT UR-MCNC: 81.3 MG/DL
EGFRCR SERPLBLD CKD-EPI 2021: 94.7 ML/MIN/1.73
EOSINOPHIL # BLD AUTO: 0.29 10*3/MM3 (ref 0–0.4)
EOSINOPHIL NFR BLD AUTO: 4.8 % (ref 0.3–6.2)
EPI CELLS #/AREA URNS HPF: ABNORMAL /HPF
ERYTHROCYTE [DISTWIDTH] IN BLOOD BY AUTOMATED COUNT: 12.5 % (ref 12.3–15.4)
GLOBULIN SER CALC-MCNC: 3.3 GM/DL
GLUCOSE SERPL-MCNC: 128 MG/DL (ref 65–99)
GLUCOSE UR QL STRIP: NEGATIVE
HBA1C MFR BLD: 7.4 % (ref 4.8–5.6)
HCT VFR BLD AUTO: 41 % (ref 34–46.6)
HDLC SERPL-MCNC: 38 MG/DL (ref 40–60)
HGB BLD-MCNC: 13.8 G/DL (ref 12–15.9)
HGB UR QL STRIP: NEGATIVE
IMM GRANULOCYTES # BLD AUTO: 0.01 10*3/MM3 (ref 0–0.05)
IMM GRANULOCYTES NFR BLD AUTO: 0.2 % (ref 0–0.5)
KETONES UR QL STRIP: NEGATIVE
LDLC SERPL CALC-MCNC: 64 MG/DL (ref 0–100)
LEUKOCYTE ESTERASE UR QL STRIP: NEGATIVE
LYMPHOCYTES # BLD AUTO: 2.27 10*3/MM3 (ref 0.7–3.1)
LYMPHOCYTES NFR BLD AUTO: 37.8 % (ref 19.6–45.3)
MCH RBC QN AUTO: 30.3 PG (ref 26.6–33)
MCHC RBC AUTO-ENTMCNC: 33.7 G/DL (ref 31.5–35.7)
MCV RBC AUTO: 90.1 FL (ref 79–97)
MICROALBUMIN UR-MCNC: 5.4 UG/ML
MONOCYTES # BLD AUTO: 0.58 10*3/MM3 (ref 0.1–0.9)
MONOCYTES NFR BLD AUTO: 9.7 % (ref 5–12)
NEUTROPHILS # BLD AUTO: 2.81 10*3/MM3 (ref 1.7–7)
NEUTROPHILS NFR BLD AUTO: 46.8 % (ref 42.7–76)
NITRITE UR QL STRIP: NEGATIVE
NRBC BLD AUTO-RTO: 0 /100 WBC (ref 0–0.2)
PH UR STRIP: 6 [PH] (ref 5–8)
PLATELET # BLD AUTO: 183 10*3/MM3 (ref 140–450)
POTASSIUM SERPL-SCNC: 4.8 MMOL/L (ref 3.5–5.2)
PROT SERPL-MCNC: 7.6 G/DL (ref 6–8.5)
PROT UR QL STRIP: NEGATIVE
RBC # BLD AUTO: 4.55 10*6/MM3 (ref 3.77–5.28)
RBC #/AREA URNS HPF: ABNORMAL /HPF
SODIUM SERPL-SCNC: 141 MMOL/L (ref 136–145)
SP GR UR STRIP: 1.02 (ref 1–1.03)
TRIGL SERPL-MCNC: 154 MG/DL (ref 0–150)
TSH SERPL DL<=0.005 MIU/L-ACNC: 1.68 UIU/ML (ref 0.27–4.2)
UROBILINOGEN UR STRIP-MCNC: NORMAL MG/DL
VLDLC SERPL CALC-MCNC: 27 MG/DL (ref 5–40)
WBC # BLD AUTO: 6 10*3/MM3 (ref 3.4–10.8)
WBC #/AREA URNS HPF: ABNORMAL /HPF

## 2025-01-01 DIAGNOSIS — R19.7 DIARRHEA, UNSPECIFIED TYPE: ICD-10-CM

## 2025-01-01 DIAGNOSIS — B37.31 VAGINAL YEAST INFECTION: ICD-10-CM

## 2025-01-01 RX ORDER — NYSTATIN 100000 [USP'U]/G
POWDER TOPICAL
Qty: 60 G | Refills: 17 | Status: SHIPPED | OUTPATIENT
Start: 2025-01-01

## 2025-01-02 ENCOUNTER — OFFICE VISIT (OUTPATIENT)
Dept: FAMILY MEDICINE CLINIC | Facility: CLINIC | Age: 70
End: 2025-01-02
Payer: MEDICARE

## 2025-01-02 VITALS
OXYGEN SATURATION: 98 % | RESPIRATION RATE: 14 BRPM | HEIGHT: 68 IN | BODY MASS INDEX: 33.18 KG/M2 | DIASTOLIC BLOOD PRESSURE: 64 MMHG | WEIGHT: 218.9 LBS | HEART RATE: 80 BPM | SYSTOLIC BLOOD PRESSURE: 110 MMHG | TEMPERATURE: 98 F

## 2025-01-02 DIAGNOSIS — G47.00 INSOMNIA, UNSPECIFIED TYPE: ICD-10-CM

## 2025-01-02 DIAGNOSIS — R35.0 URINARY FREQUENCY: Primary | ICD-10-CM

## 2025-01-02 LAB
BILIRUB BLD-MCNC: NEGATIVE MG/DL
CLARITY, POC: ABNORMAL
COLOR UR: ABNORMAL
GLUCOSE UR STRIP-MCNC: NEGATIVE MG/DL
KETONES UR QL: NEGATIVE
LEUKOCYTE EST, POC: NEGATIVE
NITRITE UR-MCNC: POSITIVE MG/ML
PH UR: 6 [PH] (ref 5–8)
PROT UR STRIP-MCNC: NEGATIVE MG/DL
RBC # UR STRIP: NEGATIVE /UL
SP GR UR: 1.03 (ref 1–1.03)
UROBILINOGEN UR QL: NORMAL

## 2025-01-02 PROCEDURE — 3078F DIAST BP <80 MM HG: CPT | Performed by: STUDENT IN AN ORGANIZED HEALTH CARE EDUCATION/TRAINING PROGRAM

## 2025-01-02 PROCEDURE — 1160F RVW MEDS BY RX/DR IN RCRD: CPT | Performed by: STUDENT IN AN ORGANIZED HEALTH CARE EDUCATION/TRAINING PROGRAM

## 2025-01-02 PROCEDURE — 1125F AMNT PAIN NOTED PAIN PRSNT: CPT | Performed by: STUDENT IN AN ORGANIZED HEALTH CARE EDUCATION/TRAINING PROGRAM

## 2025-01-02 PROCEDURE — 81002 URINALYSIS NONAUTO W/O SCOPE: CPT | Performed by: STUDENT IN AN ORGANIZED HEALTH CARE EDUCATION/TRAINING PROGRAM

## 2025-01-02 PROCEDURE — 1159F MED LIST DOCD IN RCRD: CPT | Performed by: STUDENT IN AN ORGANIZED HEALTH CARE EDUCATION/TRAINING PROGRAM

## 2025-01-02 PROCEDURE — 3074F SYST BP LT 130 MM HG: CPT | Performed by: STUDENT IN AN ORGANIZED HEALTH CARE EDUCATION/TRAINING PROGRAM

## 2025-01-02 PROCEDURE — 99213 OFFICE O/P EST LOW 20 MIN: CPT | Performed by: STUDENT IN AN ORGANIZED HEALTH CARE EDUCATION/TRAINING PROGRAM

## 2025-01-02 RX ORDER — NITROFURANTOIN 25; 75 MG/1; MG/1
100 CAPSULE ORAL 2 TIMES DAILY
Qty: 10 CAPSULE | Refills: 0 | Status: SHIPPED | OUTPATIENT
Start: 2025-01-02

## 2025-01-02 RX ORDER — TRAZODONE HYDROCHLORIDE 100 MG/1
100 TABLET ORAL NIGHTLY
Qty: 90 TABLET | Refills: 3 | Status: SHIPPED | OUTPATIENT
Start: 2025-01-02

## 2025-01-02 NOTE — PROGRESS NOTES
"    Office Note     Name: Khalida Gilliland    : 1955     MRN: [id number]     Chief Complaint  Urinary Tract Infection    Subjective     History of Present Illness:  Khalida Gilliland is a 69 y.o. female     History of Present Illness  The patient is a 69-year-old female who presents with concerns for a potential urinary tract infection (UTI).    She began experiencing symptoms indicative of a UTI yesterday afternoon, characterized by frequent urination every 10 minutes, minimal urine output, and severe dysuria. Her  administered cranberry pills and ibuprofen, which appeared to alleviate the symptoms, allowing her to sleep through the night. However, upon awakening this morning, she noticed a recurrence of the dysuria, although less severe than the previous day. She has a history of a few UTIs but does not recall any instances of antibiotic resistance.    MEDICATIONS  Current: ibuprofen       Past Medical History:   Past Medical History:   Diagnosis Date    Abdominal pain, right lower quadrant     Anemia     Asthma     Brain aneurysm      and 2017    Cancer 2014    Basal Cell Carcinoma Left Arm,SKIN CANCER    COVID-19 2021    Cyst of left kidney     Diabetes mellitus     Tyoe 2 diabetic    Diverticulosis     Fatty liver     GERD (gastroesophageal reflux disease)     History of kidney stones 2011    History of surgery for cerebral aneurysm     Clipping of cerebral Aneurysm    Hx of migraines     Hyperlipidemia     Hypertension     Insomnia     Neck pain     Peptic ulceration     Pneumonia     PONV (postoperative nausea and vomiting)     Stroke     \"years ago\" TIA no after effects       Past Surgical History:   Past Surgical History:   Procedure Laterality Date    APPENDECTOMY      Emergency at time of 2nd     BASAL CELL CARCINOMA EXCISION Left 2014    Excision basal cell carcinoma, left arm (1.1 cm) with frozen section control and layered wound closure ( 3.1 cm), Dr." Isael Andrade, [facility]    BRAIN SURGERY  2004    Ruptured aneurysm, clipped/Dr. Sims    CEREBRAL ANEURYSM REPAIR      clipping of cerebral aneurysm    CEREBRAL ANGIOGRAM N/A 2017    Procedure: CEREBRAL ANGIOGRAM ;  Surgeon: Orlando Bella MD;  Location: [facility] OR ;  Service:     CEREBRAL ANGIOGRAM N/A 10/11/2017    Procedure: CEREBRAL ANGIOGRAM;  Surgeon: Orlando Bella MD;  Location: [facility] OR ;  Service:      SECTION  , ,     CHOLECYSTECTOMY      COLON RESECTION WITH COLOSTOMY Right     COLONOSCOPY  2009    COLONOSCOPY N/A 2020    Procedure: COLONOSCOPY to terminal ileum;  Surgeon: Luiza Norris MD;  Location: [facility] ENDOSCOPY;  Service: Gastroenterology;  Laterality: N/A;  PREOP/ SCREENING  POSTOP/ DIVERTICULOSIS. POOR PREP    COLONOSCOPY N/A 2023    Procedure: COLONOSCOPY TO CECUM WITH COLD BX POLYPECTOMIES AND COLD SNARE POLYPECTOMY;  Surgeon: Luiza Norris MD;  Location: [facility] ENDOSCOPY;  Service: Gastroenterology;  Laterality: N/A;  PRE OP - SCREENING  POST OP - COLON POLYPS, DIVERTICULOSIS, HEMORRHOIDS    EMBOLIZATION CEREBRAL N/A 2017    Procedure: Cerebral angiography and embolization of cerebral aneurysm with Pipeline Embolization Device;  Surgeon: Orlando Bella MD;  Location: [facility] OR ;  Service:     EMBOLIZATION CEREBRAL N/A 10/31/2018    Procedure: Cerebral angiogram with embolization of cerebral aneurysm;  Surgeon: Orlando Bella MD;  Location: [facility] OR ;  Service: Neurosurgery    ILEOSTOMY REVISION      INCISIONAL HERNIA REPAIR      Patient suffered some nerve entrapment secondary to the attack, some of which were removed during a secondary operation.    INGUINAL HERNIA REPAIR      LARYNX SURGERY      OSTOMY TAKE DOWN      TUBAL ABDOMINAL LIGATION      URETERAL STENT INSERTION  2011    Due to kidney stones       Immunizations:   Immunization History   Administered Date(s)  Administered    COVID-19 (PFIZER) Purple Cap Monovalent 03/21/2021    Covid-19 (Pfizer) Gray Cap Monovalent 05/05/2022    Flu Vaccine Quad PF >36MO 11/18/2018    Fluad Quad 65+ 09/22/2020    Fluzone High-Dose 65+yrs 11/07/2022    Hepatitis A 12/19/2016, 06/22/2017    Hepatitis B Adult/Adolescent IM 12/19/2016, 06/22/2017, 11/18/2017    Pneumococcal Conjugate 13-Valent (PCV13) 10/19/2015    Pneumococcal Polysaccharide (PPSV23) 01/01/2008, 07/16/2020    Shingrix 11/11/2022, 01/08/2023    Tdap 09/03/2015    Zostavax 10/09/2014    flucelvax quad pfs =>4 YRS 11/12/2018, 11/22/2019        Medications:     Current Outpatient Medications:     albuterol sulfate  (90 Base) MCG/ACT inhaler, INHALE 2 PUFFS BY MOUTH EVERY 4 HOURS AS NEEDED, Disp: 8.5 g, Rfl: 1    Ascorbic Acid (VITAMIN C PO), Take 1,000 mg by mouth Daily. PT INSTRUCTED TO CONTINUE PER DR. CALLUM URBINA, Disp: , Rfl:     aspirin 81 MG EC tablet, Take 1 tablet by mouth Daily., Disp: 30 tablet, Rfl: 6    atorvastatin (LIPITOR) 20 MG tablet, TAKE 1 TABLET EVERY NIGHT, Disp: 90 tablet, Rfl: 3    azelastine (ASTELIN) 0.1 % nasal spray, 1 spray into the nostril(s) as directed by provider 2 (Two) Times a Day. Use in each nostril as directed, Disp: 30 mL, Rfl: 6    BD Pen Needle Milady 2nd Gen 32G X 4 MM misc, INJECT 1 NEEDLE UNDER THE SKIN, INTO THE APPROPRIATE AREA AS DIRECTED FOUR TIMES A DAY (FOR INSULIN INJECTIONS), Disp: 100 each, Rfl: 13    Biotin 68169 MCG tablet, Take 1 tablet by mouth Every Evening., Disp: , Rfl:     butalbital-acetaminophen-caffeine (Esgic) -40 MG per tablet, Take 1 tablet by mouth Every 6 (Six) Hours As Needed for Headache., Disp: 30 tablet, Rfl: 0    cholecalciferol (VITAMIN D3) 1000 units tablet, Take 1 tablet by mouth Daily. PER PT TO CONTINUE PER DR. CALLUM URBINA, Disp: , Rfl:     Cyanocobalamin (VITAMIN B 12 PO), Take 5,000 mcg by mouth Every Night., Disp: , Rfl:     cycloSPORINE (RESTASIS) 0.05 % ophthalmic emulsion,  Administer 1 drop to both eyes 2 (Two) Times a Day., Disp: , Rfl:     dicyclomine (BENTYL) 20 MG tablet, Take 1 tablet by mouth 4 (Four) Times a Day Before Meals & at Bedtime As Needed for Abdominal Cramping (Diarrhea)., Disp: 90 tablet, Rfl: 5    dicyclomine (BENTYL) 20 MG tablet, Take 1 tablet by mouth Every 6 (Six) Hours As Needed (abdominal pain and /or diarrhea)., Disp: 20 tablet, Rfl: 0    diphenhydrAMINE-acetaminophen (TYLENOL PM)  MG tablet per tablet, Take 1 tablet by mouth At Night As Needed for Sleep., Disp: , Rfl:     erythromycin (ROMYCIN) 5 MG/GM ophthalmic ointment, Administer  to both eyes 2 (Two) Times a Day., Disp: 3.5 g, Rfl: 0    fluticasone (FLONASE) 50 MCG/ACT nasal spray, USE 2 SPRAYS IN EACH NOSTRIL DAILY, Disp: 16 g, Rfl: 11    fluticasone-salmeterol (Advair HFA) 45-21 MCG/ACT inhaler, Inhale 2 puffs 2 (Two) Times a Day., Disp: 12 g, Rfl: 11    glipizide (GLUCOTROL XL) 5 MG ER tablet, Take 1 tablet by mouth 2 (Two) Times a Day., Disp: 180 tablet, Rfl: 3    glucose blood (FREESTYLE LITE) test strip, PT to use once daily, Disp: 100 each, Rfl: 12    glucose blood test strip, Use to test blood glucose up to four times daily as needed. Formulary Compliance Approval. Diagnosis: Type 2 Diabetes - Insulin Dependent, Disp: 100 each, Rfl: 0    glucose monitor monitoring kit, Use to test blood glucose up to four times daily as needed. Formulary Compliance Approval. Diagnosis: Type 2 Diabetes - Insulin Dependent, Disp: 1 each, Rfl: 0    hydroCHLOROthiazide 25 MG tablet, Take 1 tablet by mouth Daily. Indications: High Blood Pressure Disorder, Disp: 90 tablet, Rfl: 1    Lancets (freestyle) lancets, Pt tests daily, Disp: 100 each, Rfl: 6    Lancets misc, Use to test blood glucose up to four times daily as needed. Formulary Compliance Approval. Diagnosis: Type 2 Diabetes - Insulin Dependent, Disp: 100 each, Rfl: 0    Lantus SoloStar 100 UNIT/ML injection pen, INJECT 10 UNITS UNDER THE SKIN, INTO THE  APPROPRIATE AREA AS DIRECTED EVERY NIGHT, Disp: 15 mL, Rfl: 2    levETIRAcetam (KEPPRA) 250 MG tablet, TAKE 1 TABLET DAILY, Disp: 90 tablet, Rfl: 3    lisinopril (PRINIVIL,ZESTRIL) 10 MG tablet, TAKE 1 TABLET DAILY, Disp: 90 tablet, Rfl: 3    magnesium oxide (MAG-OX) 400 tablet tablet, Take 1 tablet by mouth Daily., Disp: 30 each, Rfl: 0    montelukast (SINGULAIR) 10 MG tablet, Take 1 tablet by mouth Every Night., Disp: 90 tablet, Rfl: 3    multivitamin (THERAGRAN) tablet tablet, Take 1 tablet by mouth Daily., Disp: , Rfl:     nystatin (MYCOSTATIN) 562264 UNIT/GM cream, Apply 1 Application topically to the appropriate area as directed 2 (Two) Times a Day. Indications: Skin Infection due to Candida Yeast, Disp: 15 g, Rfl: 1    nystatin 925183 UNIT/GM topical powder, APPLY TOPICALLY TO THE APPROPRIATE AREA AS DIRECTED THREE TIMES A DAY, Disp: 60 g, Rfl: 17    Omega-3 Fatty Acids (FISH OIL) 1000 MG capsule capsule, Take  by mouth Daily With Breakfast., Disp: , Rfl:     omeprazole (priLOSEC) 40 MG capsule, TAKE 1 CAPSULE EVERY MORNING, Disp: 90 capsule, Rfl: 3    ondansetron (Zofran) 4 MG tablet, Take 1 tablet by mouth Every 8 (Eight) Hours As Needed for Nausea or Vomiting., Disp: 4 tablet, Rfl: 0    ondansetron ODT (ZOFRAN-ODT) 4 MG disintegrating tablet, Place 1 tablet on the tongue Every 6 (Six) Hours As Needed for Vomiting or Nausea., Disp: 20 tablet, Rfl: 0    pregabalin (LYRICA) 100 MG capsule, Take 1 capsule by mouth 3 (Three) Times a Day., Disp: 270 capsule, Rfl: 1    Probiotic Product (PROBIOTIC-10 PO), Take  by mouth., Disp: , Rfl:     propranolol (INDERAL) 40 MG tablet, TAKE 1 TABLET TWICE A DAY, Disp: 180 tablet, Rfl: 3    raNITIdine (ZANTAC) 75 MG tablet, Take 2 tablets by mouth 2 (Two) Times a Day., Disp: , Rfl:     riFAXIMin (Xifaxan) 550 MG tablet, TAKE 1 TABLET BY MOUTH EVERY 8 HOURS FOR 14 DAYS, Disp: 42 tablet, Rfl: 1    Spacer/Aero-Holding Chambers device, Use with all inhaled treatments, Disp: 1  "each, Rfl: 0    traZODone (DESYREL) 100 MG tablet, Take 1 tablet by mouth Every Night., Disp: 90 tablet, Rfl: 3    Vitamin B-2 (RIBOFLAVIN) 100 MG tablet tablet, Take 3 tablets by mouth Daily., Disp: 30 each, Rfl: 0    zolpidem (Ambien) 5 MG tablet, Take 1 tablet by mouth At Night As Needed for Sleep., Disp: 30 tablet, Rfl: 0    Allergies:   Allergies   Allergen Reactions    Contrast Dye (Echo Or Unknown Ct/Mr) Shortness Of Breath    Iodinated Contrast Media Shortness Of Breath    Codeine Nausea Only    Methylprednisolone Anxiety    Other Other (See Comments)     Bamlanivimab infusion- muscle aches, joint pain, nausea    Ciprofloxacin Hcl Rash       Family History:   Family History   Problem Relation Age of Onset    Skin cancer Mother     Dementia Mother     KALYAN disease Mother     Heart disease Father     Heart attack Father     Hypertension Father     Aneurysm Son         cerebral    Nephrolithiasis Son     Suicide Attempts Brother     Malig Hyperthermia Neg Hx        Social History:   Social History     Socioeconomic History    Marital status:      Spouse name: Roe    Number of children: 3    Years of education: College   Tobacco Use    Smoking status: Former     Current packs/day: 0.00     Types: Cigarettes     Start date:      Quit date:      Years since quittin.0     Passive exposure: Past    Smokeless tobacco: Never   Vaping Use    Vaping status: Never Used   Substance and Sexual Activity    Alcohol use: Not Currently    Drug use: No    Sexual activity: Never         Objective     Vital Signs  /64   Pulse 80   Temp 98 °F (36.7 °C) (Temporal)   Resp 14   Ht 172.7 cm (68\")   Wt 99.3 kg (218 lb 14.4 oz)   SpO2 98%   BMI 33.28 kg/m²   Estimated body mass index is 33.28 kg/m² as calculated from the following:    Height as of this encounter: 172.7 cm (68\").    Weight as of this encounter: 99.3 kg (218 lb 14.4 oz).          Physical Exam  Constitutional:       General: She is not " in acute distress.  HENT:      Head: Normocephalic and atraumatic.   Pulmonary:      Effort: Pulmonary effort is normal. No respiratory distress.   Musculoskeletal:      Right lower leg: No edema.      Left lower leg: No edema.   Skin:     Findings: No rash.   Neurological:      General: No focal deficit present.      Mental Status: She is alert and oriented to person, place, and time.   Psychiatric:         Mood and Affect: Mood normal.         Behavior: Behavior normal.         Thought Content: Thought content normal.         Judgment: Judgment normal.          Assessment and Plan     Assessment & Plan  1. Urinary Tract Infection (UTI).  Symptoms include frequent urination and dysuria, which started yesterday afternoon. Urinalysis indicates a UTI, and a urine culture has been sent for further analysis. A prescription for Macrobid 100 mg, to be taken twice daily for 5 days, has been issued and sent to [facility] on [address]. She is advised to continue consuming cranberry juice, which may help alleviate dysuria. If the urine culture results indicate that the bacteria are resistant to Macrobid, an alternative antibiotic will be prescribed.         Follow Up  No follow-ups on file.            Leobardo He MD   [facility]  [address] [address] [address] [phone]    Patient or patient representative verbalized consent for the use of Ambient Listening during the visit with  Leobardo He MD for chart documentation. 1/2/2025  14:33 EST

## 2025-01-06 LAB
BACTERIA UR CULT: ABNORMAL
BACTERIA UR CULT: ABNORMAL
OTHER ANTIBIOTIC SUSC ISLT: ABNORMAL

## 2025-01-08 ENCOUNTER — APPOINTMENT (OUTPATIENT)
Dept: GENERAL RADIOLOGY | Facility: HOSPITAL | Age: 70
End: 2025-01-08
Payer: MEDICARE

## 2025-01-08 ENCOUNTER — HOSPITAL ENCOUNTER (EMERGENCY)
Facility: HOSPITAL | Age: 70
Discharge: HOME | End: 2025-01-08
Attending: EMERGENCY MEDICINE | Admitting: EMERGENCY MEDICINE
Payer: MEDICARE

## 2025-01-08 VITALS
RESPIRATION RATE: 20 BRPM | OXYGEN SATURATION: 93 % | DIASTOLIC BLOOD PRESSURE: 89 MMHG | HEART RATE: 70 BPM | BODY MASS INDEX: 33.18 KG/M2 | TEMPERATURE: 98.4 F | HEIGHT: 68 IN | SYSTOLIC BLOOD PRESSURE: 139 MMHG | WEIGHT: 218.92 LBS

## 2025-01-08 DIAGNOSIS — R74.8 ELEVATED LIVER ENZYMES: ICD-10-CM

## 2025-01-08 DIAGNOSIS — J06.9 VIRAL URI WITH COUGH: Primary | ICD-10-CM

## 2025-01-08 DIAGNOSIS — Z87.09 HISTORY OF ASTHMA: ICD-10-CM

## 2025-01-08 LAB
ALBUMIN SERPL-MCNC: 3.6 G/DL (ref 3.5–5.2)
ALBUMIN/GLOB SERPL: 0.9 G/DL
ALP SERPL-CCNC: 145 U/L (ref 39–117)
ALT SERPL W P-5'-P-CCNC: 235 U/L (ref 1–33)
ANION GAP SERPL CALCULATED.3IONS-SCNC: 8.1 MMOL/L (ref 5–15)
AST SERPL-CCNC: 169 U/L (ref 1–32)
BASOPHILS # BLD AUTO: 0.04 10*3/MM3 (ref 0–0.2)
BASOPHILS NFR BLD AUTO: 0.3 % (ref 0–1.5)
BILIRUB SERPL-MCNC: 1.8 MG/DL (ref 0–1.2)
BILIRUB UR QL STRIP: NEGATIVE
BUN SERPL-MCNC: 25 MG/DL (ref 8–23)
BUN/CREAT SERPL: 33.8 (ref 7–25)
CALCIUM SPEC-SCNC: 9.5 MG/DL (ref 8.6–10.5)
CHLORIDE SERPL-SCNC: 92 MMOL/L (ref 98–107)
CLARITY UR: CLEAR
CO2 SERPL-SCNC: 29.9 MMOL/L (ref 22–29)
COLOR UR: YELLOW
CREAT SERPL-MCNC: 0.74 MG/DL (ref 0.57–1)
DEPRECATED RDW RBC AUTO: 44.5 FL (ref 37–54)
EGFRCR SERPLBLD CKD-EPI 2021: 87.7 ML/MIN/1.73
EOSINOPHIL # BLD AUTO: 0.58 10*3/MM3 (ref 0–0.4)
EOSINOPHIL NFR BLD AUTO: 4.4 % (ref 0.3–6.2)
ERYTHROCYTE [DISTWIDTH] IN BLOOD BY AUTOMATED COUNT: 13 % (ref 12.3–15.4)
FLUAV SUBTYP SPEC NAA+PROBE: NOT DETECTED
FLUBV RNA ISLT QL NAA+PROBE: NOT DETECTED
GEN 5 1HR TROPONIN T REFLEX: 8 NG/L
GLOBULIN UR ELPH-MCNC: 4.1 GM/DL
GLUCOSE SERPL-MCNC: 220 MG/DL (ref 65–99)
GLUCOSE UR STRIP-MCNC: NEGATIVE MG/DL
HCT VFR BLD AUTO: 43.2 % (ref 34–46.6)
HGB BLD-MCNC: 14.2 G/DL (ref 12–15.9)
HGB UR QL STRIP.AUTO: NEGATIVE
HOLD SPECIMEN: NORMAL
HOLD SPECIMEN: NORMAL
IMM GRANULOCYTES # BLD AUTO: 0.06 10*3/MM3 (ref 0–0.05)
IMM GRANULOCYTES NFR BLD AUTO: 0.5 % (ref 0–0.5)
KETONES UR QL STRIP: ABNORMAL
LEUKOCYTE ESTERASE UR QL STRIP.AUTO: NEGATIVE
LIPASE SERPL-CCNC: 31 U/L (ref 13–60)
LYMPHOCYTES # BLD AUTO: 0.59 10*3/MM3 (ref 0.7–3.1)
LYMPHOCYTES NFR BLD AUTO: 4.4 % (ref 19.6–45.3)
MCH RBC QN AUTO: 30.1 PG (ref 26.6–33)
MCHC RBC AUTO-ENTMCNC: 32.9 G/DL (ref 31.5–35.7)
MCV RBC AUTO: 91.5 FL (ref 79–97)
MONOCYTES # BLD AUTO: 0.96 10*3/MM3 (ref 0.1–0.9)
MONOCYTES NFR BLD AUTO: 7.2 % (ref 5–12)
NEUTROPHILS NFR BLD AUTO: 11.1 10*3/MM3 (ref 1.7–7)
NEUTROPHILS NFR BLD AUTO: 83.2 % (ref 42.7–76)
NITRITE UR QL STRIP: NEGATIVE
NT-PROBNP SERPL-MCNC: 127.9 PG/ML (ref 0–900)
PH UR STRIP.AUTO: 7 [PH] (ref 5–8)
PLATELET # BLD AUTO: 218 10*3/MM3 (ref 140–450)
PMV BLD AUTO: 10.7 FL (ref 6–12)
POTASSIUM SERPL-SCNC: 3.5 MMOL/L (ref 3.5–5.2)
PROT SERPL-MCNC: 7.7 G/DL (ref 6–8.5)
PROT UR QL STRIP: NEGATIVE
RBC # BLD AUTO: 4.72 10*6/MM3 (ref 3.77–5.28)
RSV RNA NPH QL NAA+NON-PROBE: NOT DETECTED
SARS-COV-2 RNA RESP QL NAA+PROBE: NOT DETECTED
SODIUM SERPL-SCNC: 130 MMOL/L (ref 136–145)
SP GR UR STRIP: 1.01 (ref 1–1.03)
TROPONIN T NUMERIC DELTA: 0 NG/L
TROPONIN T SERPL HS-MCNC: 8 NG/L
UROBILINOGEN UR QL STRIP: ABNORMAL
WBC NRBC COR # BLD AUTO: 13.33 10*3/MM3 (ref 3.4–10.8)
WHOLE BLOOD HOLD COAG: NORMAL
WHOLE BLOOD HOLD SPECIMEN: NORMAL

## 2025-01-08 PROCEDURE — 84484 ASSAY OF TROPONIN QUANT: CPT | Performed by: NURSE PRACTITIONER

## 2025-01-08 PROCEDURE — 96374 THER/PROPH/DIAG INJ IV PUSH: CPT

## 2025-01-08 PROCEDURE — 87637 SARSCOV2&INF A&B&RSV AMP PRB: CPT

## 2025-01-08 PROCEDURE — 96372 THER/PROPH/DIAG INJ SC/IM: CPT

## 2025-01-08 PROCEDURE — 81003 URINALYSIS AUTO W/O SCOPE: CPT | Performed by: EMERGENCY MEDICINE

## 2025-01-08 PROCEDURE — 83880 ASSAY OF NATRIURETIC PEPTIDE: CPT | Performed by: NURSE PRACTITIONER

## 2025-01-08 PROCEDURE — 25010000002 ONDANSETRON PER 1 MG: Performed by: NURSE PRACTITIONER

## 2025-01-08 PROCEDURE — 99283 EMERGENCY DEPT VISIT LOW MDM: CPT

## 2025-01-08 PROCEDURE — 85025 COMPLETE CBC W/AUTO DIFF WBC: CPT

## 2025-01-08 PROCEDURE — 96375 TX/PRO/DX INJ NEW DRUG ADDON: CPT

## 2025-01-08 PROCEDURE — 25010000002 DICYCLOMINE PER 20 MG: Performed by: NURSE PRACTITIONER

## 2025-01-08 PROCEDURE — 71045 X-RAY EXAM CHEST 1 VIEW: CPT

## 2025-01-08 PROCEDURE — 80053 COMPREHEN METABOLIC PANEL: CPT

## 2025-01-08 PROCEDURE — 83690 ASSAY OF LIPASE: CPT

## 2025-01-08 PROCEDURE — 36415 COLL VENOUS BLD VENIPUNCTURE: CPT

## 2025-01-08 PROCEDURE — 25010000002 METHYLPREDNISOLONE PER 125 MG: Performed by: NURSE PRACTITIONER

## 2025-01-08 RX ORDER — METHYLPREDNISOLONE SODIUM SUCCINATE 125 MG/2ML
125 INJECTION, POWDER, LYOPHILIZED, FOR SOLUTION INTRAMUSCULAR; INTRAVENOUS ONCE
Status: COMPLETED | OUTPATIENT
Start: 2025-01-08 | End: 2025-01-08

## 2025-01-08 RX ORDER — METHYLPREDNISOLONE 4 MG/1
TABLET ORAL
Qty: 21 TABLET | Refills: 0 | Status: SHIPPED | OUTPATIENT
Start: 2025-01-08

## 2025-01-08 RX ORDER — AZITHROMYCIN 250 MG/1
TABLET, FILM COATED ORAL
Qty: 6 TABLET | Refills: 0 | Status: SHIPPED | OUTPATIENT
Start: 2025-01-10

## 2025-01-08 RX ORDER — ONDANSETRON 4 MG/1
4 TABLET, FILM COATED ORAL EVERY 8 HOURS PRN
Qty: 10 TABLET | Refills: 0 | Status: SHIPPED | OUTPATIENT
Start: 2025-01-08

## 2025-01-08 RX ORDER — IPRATROPIUM BROMIDE AND ALBUTEROL SULFATE 2.5; .5 MG/3ML; MG/3ML
3 SOLUTION RESPIRATORY (INHALATION) ONCE
Status: COMPLETED | OUTPATIENT
Start: 2025-01-08 | End: 2025-01-08

## 2025-01-08 RX ORDER — ONDANSETRON 2 MG/ML
4 INJECTION INTRAMUSCULAR; INTRAVENOUS ONCE
Status: COMPLETED | OUTPATIENT
Start: 2025-01-08 | End: 2025-01-08

## 2025-01-08 RX ORDER — SODIUM CHLORIDE 0.9 % (FLUSH) 0.9 %
10 SYRINGE (ML) INJECTION AS NEEDED
Status: DISCONTINUED | OUTPATIENT
Start: 2025-01-08 | End: 2025-01-08 | Stop reason: HOSPADM

## 2025-01-08 RX ORDER — DICYCLOMINE HYDROCHLORIDE 10 MG/ML
20 INJECTION INTRAMUSCULAR ONCE
Status: COMPLETED | OUTPATIENT
Start: 2025-01-08 | End: 2025-01-08

## 2025-01-08 RX ORDER — ALBUTEROL SULFATE 1.25 MG/3ML
1 SOLUTION RESPIRATORY (INHALATION) EVERY 6 HOURS PRN
Qty: 90 EACH | Refills: 0 | Status: SHIPPED | OUTPATIENT
Start: 2025-01-08

## 2025-01-08 RX ADMIN — METHYLPREDNISOLONE SODIUM SUCCINATE 125 MG: 125 INJECTION INTRAMUSCULAR; INTRAVENOUS at 13:41

## 2025-01-08 RX ADMIN — IPRATROPIUM BROMIDE AND ALBUTEROL SULFATE 3 ML: .5; 3 SOLUTION RESPIRATORY (INHALATION) at 13:41

## 2025-01-08 RX ADMIN — ONDANSETRON 4 MG: 2 INJECTION, SOLUTION INTRAMUSCULAR; INTRAVENOUS at 13:41

## 2025-01-08 RX ADMIN — DICYCLOMINE HYDROCHLORIDE 20 MG: 20 INJECTION, SOLUTION INTRAMUSCULAR at 13:53
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
## 2025-01-08 NOTE — DISCHARGE INSTRUCTIONS
Please make a follow up appointment with your Primary Care Provider early next week for recheck of liver enzymes.    Start steroids on Thursday  Other medications as directed    Can do breathing treatments every 4-6 hours or  inhaler.    Rest  Eat small frequent meals.    Although you are being discharged from the ED today, I encourage you to return for worsening symptoms. Things can, and do, change such that treatment at home with medication may not be adequate. Specifically I recommend returning for chest pain or discomfort, difficulty breathing, persistent vomiting or difficulty holding down liquids or medications, fever > 102.0 F,  or any other worsening or alarming symptoms.     Rest. Drink plenty of fluids.  Follow up with PCP or provider listed for further evaluation and management.  Follow up with primary care provider for further management and to have blood pressure rechecked.  Take all medications as prescribed.

## 2025-01-09 ENCOUNTER — PATIENT OUTREACH (OUTPATIENT)
Dept: CASE MANAGEMENT | Facility: OTHER | Age: 70
End: 2025-01-09
Payer: MEDICARE

## 2025-01-09 DIAGNOSIS — G47.00 INSOMNIA, UNSPECIFIED TYPE: ICD-10-CM

## 2025-01-09 NOTE — OUTREACH NOTE
AMBULATORY CASE MANAGEMENT NOTE    Names and Relationships of Patient/Support Persons: Contact: Khalida Gilliland CHRIS; Relationship: Self -     Patient Outreach  RN-ACM outreach with patient. Discussed 1/8/25 ED visit regarding viral URI with cough.Patient treated and discharged.  Patient states to be compliant with ED recommendations; taking medications as ED directed and states to continue with episodes of SOB and cough. Patient states to be compliant with nebulizer and monitoring of O2 SAT with value at 93%. Patient states no difficulty with fever; chest pain but has decrease in appetite and difficulty sleeping. Patient voiced intent to follow up with physician/ED regarding ED follow up and recommendations with continued or worsening symptoms. Patient states to be compliant with medications and monitoring of blood sugars (stating to be slightly elevated). Reviewed with patient ED AVS recommendations; education; role of RN-ACM and HRCM case management services. Patient verbalized understanding. Patient states to appreciate outreach and declines needs for further outreach at this time. No further questions voiced at this time.   Adult Patient Profile  Questions/Answers      Flowsheet Row Most Recent Value   Symptoms/Conditions Managed at Home respiratory, diabetes, type 2   Diabetes Management Strategies medication therapy, other (see comments)  [Physician follow up]   Respiratory Symptoms/Conditions other (see comments), cough  [Viral URI with cough]   Respiratory Management Strategies nebulizer therapy, medication therapy, other (see comments)  [Physician follow up]   Barriers to Taking Medication as Prescribed none   Primary Source of Support/Comfort spouse   People in Home spouse        Social Work Assessment  Questions/Answers      Flowsheet Row Most Recent Value   People in Home spouse   Functional Status Comments Patient states to be independent with ADL's,  light meal preparation,  ambulation and transportation         Send Education  Questions/Answers      Flowsheet Row Most Recent Value   Other Patient Education/Resources  24/7 [facility] Nurse Call Line, [facility]   24/7 Nurse Call Line Education Method Verbal            Education Documentation  Unresolved/Worsening Symptoms, taught by Chika Nelson RN at 1/9/2025  2:32 PM.  Learner: Patient  Readiness: Acceptance  Method: Explanation  Response: Verbalizes Understanding    Sleep/Rest, taught by Chika Nelson RN at 1/9/2025  2:32 PM.  Learner: Patient  Readiness: Acceptance  Method: Explanation  Response: Verbalizes Understanding    Fluid/Food Intake, taught by Chika Nelson RN at 1/9/2025  2:32 PM.  Learner: Patient  Readiness: Acceptance  Method: Explanation  Response: Verbalizes Understanding    Signs/Symptoms, taught by Chika Nelson RN at 1/9/2025  2:32 PM.  Learner: Patient  Readiness: Acceptance  Method: Explanation  Response: Verbalizes Understanding    General Equipment, Care and Maintenance, taught by Chika Nelson RN at 1/9/2025  2:32 PM.  Learner: Patient  Readiness: Acceptance  Method: Explanation  Response: Verbalizes Understanding    Medication Effects, taught by Chika Nelson RN at 1/9/2025  2:32 PM.  Learner: Patient  Readiness: Acceptance  Method: Explanation  Response: Verbalizes Understanding          Chika CISNEROS  Ambulatory Case Management    1/9/2025, 14:33 EST

## 2025-01-10 RX ORDER — ZOLPIDEM TARTRATE 5 MG/1
5 TABLET ORAL NIGHTLY PRN
Qty: 30 TABLET | Refills: 0 | Status: SHIPPED | OUTPATIENT
Start: 2025-01-10

## 2025-01-17 ENCOUNTER — OFFICE VISIT (OUTPATIENT)
Dept: FAMILY MEDICINE CLINIC | Facility: CLINIC | Age: 70
End: 2025-01-17
Payer: MEDICARE

## 2025-01-17 ENCOUNTER — HOSPITAL ENCOUNTER (INPATIENT)
Facility: HOSPITAL | Age: 70
LOS: 5 days | Discharge: HOME | DRG: 202 | End: 2025-01-24
Attending: EMERGENCY MEDICINE | Admitting: STUDENT IN AN ORGANIZED HEALTH CARE EDUCATION/TRAINING PROGRAM
Payer: MEDICARE

## 2025-01-17 ENCOUNTER — APPOINTMENT (OUTPATIENT)
Dept: CT IMAGING | Facility: HOSPITAL | Age: 70
DRG: 202 | End: 2025-01-17
Payer: MEDICARE

## 2025-01-17 ENCOUNTER — APPOINTMENT (OUTPATIENT)
Dept: GENERAL RADIOLOGY | Facility: HOSPITAL | Age: 70
DRG: 202 | End: 2025-01-17
Payer: MEDICARE

## 2025-01-17 VITALS
RESPIRATION RATE: 16 BRPM | HEIGHT: 68 IN | BODY MASS INDEX: 32.24 KG/M2 | HEART RATE: 72 BPM | TEMPERATURE: 96.9 F | OXYGEN SATURATION: 97 % | WEIGHT: 212.7 LBS | DIASTOLIC BLOOD PRESSURE: 70 MMHG | SYSTOLIC BLOOD PRESSURE: 130 MMHG

## 2025-01-17 DIAGNOSIS — J40 BRONCHITIS: ICD-10-CM

## 2025-01-17 DIAGNOSIS — R03.0 ELEVATED BLOOD PRESSURE READING WITHOUT DIAGNOSIS OF HYPERTENSION: ICD-10-CM

## 2025-01-17 DIAGNOSIS — J45.41 MODERATE PERSISTENT ASTHMA WITH EXACERBATION: Primary | ICD-10-CM

## 2025-01-17 DIAGNOSIS — J45.41 MODERATE PERSISTENT ASTHMA WITH ACUTE EXACERBATION: ICD-10-CM

## 2025-01-17 DIAGNOSIS — E11.9 TYPE 2 DIABETES MELLITUS WITHOUT COMPLICATION, WITHOUT LONG-TERM CURRENT USE OF INSULIN: ICD-10-CM

## 2025-01-17 DIAGNOSIS — R06.02 SHORTNESS OF BREATH: Primary | ICD-10-CM

## 2025-01-17 PROBLEM — E87.1 HYPONATREMIA: Status: ACTIVE | Noted: 2025-01-17

## 2025-01-17 LAB
ALBUMIN SERPL-MCNC: 3.9 G/DL (ref 3.5–5.2)
ALBUMIN/GLOB SERPL: 1 G/DL
ALP SERPL-CCNC: 106 U/L (ref 39–117)
ALT SERPL W P-5'-P-CCNC: 51 U/L (ref 1–33)
ANION GAP SERPL CALCULATED.3IONS-SCNC: 9.8 MMOL/L (ref 5–15)
AST SERPL-CCNC: 26 U/L (ref 1–32)
BASOPHILS # BLD AUTO: 0.05 10*3/MM3 (ref 0–0.2)
BASOPHILS NFR BLD AUTO: 0.5 % (ref 0–1.5)
BILIRUB SERPL-MCNC: 0.4 MG/DL (ref 0–1.2)
BILIRUB UR QL STRIP: NEGATIVE
BUN SERPL-MCNC: 13 MG/DL (ref 8–23)
BUN/CREAT SERPL: 18.8 (ref 7–25)
CALCIUM SPEC-SCNC: 9.5 MG/DL (ref 8.6–10.5)
CHLORIDE SERPL-SCNC: 95 MMOL/L (ref 98–107)
CLARITY UR: CLEAR
CO2 SERPL-SCNC: 26.2 MMOL/L (ref 22–29)
COLOR UR: YELLOW
CREAT SERPL-MCNC: 0.69 MG/DL (ref 0.57–1)
D-LACTATE SERPL-SCNC: 2.3 MMOL/L (ref 0.5–2)
DEPRECATED RDW RBC AUTO: 46.1 FL (ref 37–54)
EGFRCR SERPLBLD CKD-EPI 2021: 94.1 ML/MIN/1.73
EOSINOPHIL # BLD AUTO: 0.66 10*3/MM3 (ref 0–0.4)
EOSINOPHIL NFR BLD AUTO: 6.8 % (ref 0.3–6.2)
ERYTHROCYTE [DISTWIDTH] IN BLOOD BY AUTOMATED COUNT: 13.4 % (ref 12.3–15.4)
FLUAV SUBTYP SPEC NAA+PROBE: NOT DETECTED
FLUBV RNA ISLT QL NAA+PROBE: NOT DETECTED
GEN 5 1HR TROPONIN T REFLEX: <6 NG/L
GLOBULIN UR ELPH-MCNC: 4.1 GM/DL
GLUCOSE BLDC GLUCOMTR-MCNC: 258 MG/DL (ref 70–130)
GLUCOSE BLDC GLUCOMTR-MCNC: 446 MG/DL (ref 70–130)
GLUCOSE SERPL-MCNC: 366 MG/DL (ref 65–99)
GLUCOSE UR STRIP-MCNC: ABNORMAL MG/DL
HCT VFR BLD AUTO: 43.9 % (ref 34–46.6)
HGB BLD-MCNC: 14.2 G/DL (ref 12–15.9)
HGB UR QL STRIP.AUTO: NEGATIVE
IMM GRANULOCYTES # BLD AUTO: 0.02 10*3/MM3 (ref 0–0.05)
IMM GRANULOCYTES NFR BLD AUTO: 0.2 % (ref 0–0.5)
KETONES UR QL STRIP: NEGATIVE
LEUKOCYTE ESTERASE UR QL STRIP.AUTO: NEGATIVE
LYMPHOCYTES # BLD AUTO: 2.59 10*3/MM3 (ref 0.7–3.1)
LYMPHOCYTES NFR BLD AUTO: 26.8 % (ref 19.6–45.3)
MCH RBC QN AUTO: 30 PG (ref 26.6–33)
MCHC RBC AUTO-ENTMCNC: 32.3 G/DL (ref 31.5–35.7)
MCV RBC AUTO: 92.8 FL (ref 79–97)
MONOCYTES # BLD AUTO: 0.66 10*3/MM3 (ref 0.1–0.9)
MONOCYTES NFR BLD AUTO: 6.8 % (ref 5–12)
NEUTROPHILS NFR BLD AUTO: 5.67 10*3/MM3 (ref 1.7–7)
NEUTROPHILS NFR BLD AUTO: 58.9 % (ref 42.7–76)
NITRITE UR QL STRIP: NEGATIVE
PH UR STRIP.AUTO: 7 [PH] (ref 5–8)
PLATELET # BLD AUTO: 326 10*3/MM3 (ref 140–450)
PMV BLD AUTO: 10.1 FL (ref 6–12)
POTASSIUM SERPL-SCNC: 4.4 MMOL/L (ref 3.5–5.2)
PROT SERPL-MCNC: 8 G/DL (ref 6–8.5)
PROT UR QL STRIP: NEGATIVE
QT INTERVAL: 383 MS
QTC INTERVAL: 428 MS
RBC # BLD AUTO: 4.73 10*6/MM3 (ref 3.77–5.28)
RSV RNA NPH QL NAA+NON-PROBE: NOT DETECTED
SARS-COV-2 RNA RESP QL NAA+PROBE: NOT DETECTED
SODIUM SERPL-SCNC: 131 MMOL/L (ref 136–145)
SP GR UR STRIP: 1.02 (ref 1–1.03)
TROPONIN T NUMERIC DELTA: NORMAL
TROPONIN T SERPL HS-MCNC: <6 NG/L
UROBILINOGEN UR QL STRIP: ABNORMAL
WBC NRBC COR # BLD AUTO: 9.65 10*3/MM3 (ref 3.4–10.8)

## 2025-01-17 PROCEDURE — 94799 UNLISTED PULMONARY SVC/PX: CPT

## 2025-01-17 PROCEDURE — 63710000001 INSULIN LISPRO (HUMAN) PER 5 UNITS

## 2025-01-17 PROCEDURE — 94640 AIRWAY INHALATION TREATMENT: CPT

## 2025-01-17 PROCEDURE — 25010000002 METHYLPREDNISOLONE PER 125 MG: Performed by: NURSE PRACTITIONER

## 2025-01-17 PROCEDURE — 0202U NFCT DS 22 TRGT SARS-COV-2: CPT | Performed by: STUDENT IN AN ORGANIZED HEALTH CARE EDUCATION/TRAINING PROGRAM

## 2025-01-17 PROCEDURE — G0378 HOSPITAL OBSERVATION PER HR: HCPCS

## 2025-01-17 PROCEDURE — 71250 CT THORAX DX C-: CPT

## 2025-01-17 PROCEDURE — 85025 COMPLETE CBC W/AUTO DIFF WBC: CPT | Performed by: NURSE PRACTITIONER

## 2025-01-17 PROCEDURE — 93005 ELECTROCARDIOGRAM TRACING: CPT | Performed by: NURSE PRACTITIONER

## 2025-01-17 PROCEDURE — 87637 SARSCOV2&INF A&B&RSV AMP PRB: CPT | Performed by: NURSE PRACTITIONER

## 2025-01-17 PROCEDURE — 99214 OFFICE O/P EST MOD 30 MIN: CPT | Performed by: INTERNAL MEDICINE

## 2025-01-17 PROCEDURE — 84484 ASSAY OF TROPONIN QUANT: CPT | Performed by: NURSE PRACTITIONER

## 2025-01-17 PROCEDURE — 63710000001 INSULIN GLARGINE PER 5 UNITS: Performed by: STUDENT IN AN ORGANIZED HEALTH CARE EDUCATION/TRAINING PROGRAM

## 2025-01-17 PROCEDURE — 25010000002 CEFTRIAXONE PER 250 MG: Performed by: NURSE PRACTITIONER

## 2025-01-17 PROCEDURE — 99285 EMERGENCY DEPT VISIT HI MDM: CPT

## 2025-01-17 PROCEDURE — 1126F AMNT PAIN NOTED NONE PRSNT: CPT | Performed by: INTERNAL MEDICINE

## 2025-01-17 PROCEDURE — 81003 URINALYSIS AUTO W/O SCOPE: CPT | Performed by: EMERGENCY MEDICINE

## 2025-01-17 PROCEDURE — 36415 COLL VENOUS BLD VENIPUNCTURE: CPT

## 2025-01-17 PROCEDURE — 3078F DIAST BP <80 MM HG: CPT | Performed by: INTERNAL MEDICINE

## 2025-01-17 PROCEDURE — 83605 ASSAY OF LACTIC ACID: CPT | Performed by: NURSE PRACTITIONER

## 2025-01-17 PROCEDURE — 99285 EMERGENCY DEPT VISIT HI MDM: CPT | Performed by: NURSE PRACTITIONER

## 2025-01-17 PROCEDURE — 82948 REAGENT STRIP/BLOOD GLUCOSE: CPT

## 2025-01-17 PROCEDURE — 71046 X-RAY EXAM CHEST 2 VIEWS: CPT

## 2025-01-17 PROCEDURE — 25010000002 ONDANSETRON PER 1 MG: Performed by: STUDENT IN AN ORGANIZED HEALTH CARE EDUCATION/TRAINING PROGRAM

## 2025-01-17 PROCEDURE — 80053 COMPREHEN METABOLIC PANEL: CPT | Performed by: NURSE PRACTITIONER

## 2025-01-17 PROCEDURE — 83605 ASSAY OF LACTIC ACID: CPT | Performed by: STUDENT IN AN ORGANIZED HEALTH CARE EDUCATION/TRAINING PROGRAM

## 2025-01-17 PROCEDURE — 93010 ELECTROCARDIOGRAM REPORT: CPT | Performed by: INTERNAL MEDICINE

## 2025-01-17 PROCEDURE — 3075F SYST BP GE 130 - 139MM HG: CPT | Performed by: INTERNAL MEDICINE

## 2025-01-17 RX ORDER — LEVETIRACETAM 500 MG/1
250 TABLET ORAL DAILY
Status: DISCONTINUED | OUTPATIENT
Start: 2025-01-18 | End: 2025-01-24 | Stop reason: HOSPADM

## 2025-01-17 RX ORDER — IPRATROPIUM BROMIDE AND ALBUTEROL SULFATE 2.5; .5 MG/3ML; MG/3ML
3 SOLUTION RESPIRATORY (INHALATION)
Status: DISCONTINUED | OUTPATIENT
Start: 2025-01-17 | End: 2025-01-24 | Stop reason: HOSPADM

## 2025-01-17 RX ORDER — TRAZODONE HYDROCHLORIDE 100 MG/1
100 TABLET ORAL NIGHTLY
Status: DISCONTINUED | OUTPATIENT
Start: 2025-01-17 | End: 2025-01-24 | Stop reason: HOSPADM

## 2025-01-17 RX ORDER — CHOLECALCIFEROL (VITAMIN D3) 25 MCG
1000 TABLET ORAL DAILY
Status: DISCONTINUED | OUTPATIENT
Start: 2025-01-18 | End: 2025-01-24 | Stop reason: HOSPADM

## 2025-01-17 RX ORDER — BISACODYL 5 MG/1
5 TABLET, DELAYED RELEASE ORAL DAILY PRN
Status: DISCONTINUED | OUTPATIENT
Start: 2025-01-17 | End: 2025-01-24 | Stop reason: HOSPADM

## 2025-01-17 RX ORDER — ASPIRIN 81 MG/1
81 TABLET ORAL DAILY
Status: DISCONTINUED | OUTPATIENT
Start: 2025-01-18 | End: 2025-01-24 | Stop reason: HOSPADM

## 2025-01-17 RX ORDER — INSULIN LISPRO 100 [IU]/ML
3 INJECTION, SOLUTION INTRAVENOUS; SUBCUTANEOUS ONCE
Status: COMPLETED | OUTPATIENT
Start: 2025-01-18 | End: 2025-01-17

## 2025-01-17 RX ORDER — BISACODYL 10 MG
10 SUPPOSITORY, RECTAL RECTAL DAILY PRN
Status: DISCONTINUED | OUTPATIENT
Start: 2025-01-17 | End: 2025-01-24 | Stop reason: HOSPADM

## 2025-01-17 RX ORDER — FLUTICASONE PROPIONATE 50 MCG
2 SPRAY, SUSPENSION (ML) NASAL DAILY
Status: DISCONTINUED | OUTPATIENT
Start: 2025-01-18 | End: 2025-01-24 | Stop reason: HOSPADM

## 2025-01-17 RX ORDER — PANTOPRAZOLE SODIUM 40 MG/1
40 TABLET, DELAYED RELEASE ORAL
Status: DISCONTINUED | OUTPATIENT
Start: 2025-01-18 | End: 2025-01-24 | Stop reason: HOSPADM

## 2025-01-17 RX ORDER — PREDNISONE 20 MG/1
40 TABLET ORAL
Status: DISCONTINUED | OUTPATIENT
Start: 2025-01-18 | End: 2025-01-24 | Stop reason: HOSPADM

## 2025-01-17 RX ORDER — PROPRANOLOL HCL 20 MG
40 TABLET ORAL 2 TIMES DAILY
Status: DISCONTINUED | OUTPATIENT
Start: 2025-01-17 | End: 2025-01-24 | Stop reason: HOSPADM

## 2025-01-17 RX ORDER — AMOXICILLIN 250 MG
2 CAPSULE ORAL 2 TIMES DAILY PRN
Status: DISCONTINUED | OUTPATIENT
Start: 2025-01-17 | End: 2025-01-24 | Stop reason: HOSPADM

## 2025-01-17 RX ORDER — POLYETHYLENE GLYCOL 3350 17 G/17G
17 POWDER, FOR SOLUTION ORAL DAILY PRN
Status: DISCONTINUED | OUTPATIENT
Start: 2025-01-17 | End: 2025-01-24 | Stop reason: HOSPADM

## 2025-01-17 RX ORDER — SODIUM CHLORIDE 0.9 % (FLUSH) 0.9 %
10 SYRINGE (ML) INJECTION EVERY 12 HOURS SCHEDULED
Status: DISCONTINUED | OUTPATIENT
Start: 2025-01-17 | End: 2025-01-24 | Stop reason: HOSPADM

## 2025-01-17 RX ORDER — ONDANSETRON 2 MG/ML
4 INJECTION INTRAMUSCULAR; INTRAVENOUS EVERY 6 HOURS PRN
Status: DISCONTINUED | OUTPATIENT
Start: 2025-01-17 | End: 2025-01-24 | Stop reason: HOSPADM

## 2025-01-17 RX ORDER — PREGABALIN 100 MG/1
100 CAPSULE ORAL 3 TIMES DAILY
Status: DISCONTINUED | OUTPATIENT
Start: 2025-01-17 | End: 2025-01-24 | Stop reason: HOSPADM

## 2025-01-17 RX ORDER — SODIUM CHLORIDE 0.9 % (FLUSH) 0.9 %
10 SYRINGE (ML) INJECTION AS NEEDED
Status: DISCONTINUED | OUTPATIENT
Start: 2025-01-17 | End: 2025-01-24 | Stop reason: HOSPADM

## 2025-01-17 RX ORDER — RIBOFLAVIN (VITAMIN B2) 100 MG
300 TABLET ORAL DAILY
Status: DISCONTINUED | OUTPATIENT
Start: 2025-01-18 | End: 2025-01-21

## 2025-01-17 RX ORDER — ACETAMINOPHEN 500 MG
1000 TABLET ORAL EVERY 8 HOURS PRN
Status: DISCONTINUED | OUTPATIENT
Start: 2025-01-17 | End: 2025-01-24 | Stop reason: HOSPADM

## 2025-01-17 RX ORDER — METHYLPREDNISOLONE SODIUM SUCCINATE 125 MG/2ML
125 INJECTION, POWDER, LYOPHILIZED, FOR SOLUTION INTRAMUSCULAR; INTRAVENOUS ONCE
Status: COMPLETED | OUTPATIENT
Start: 2025-01-17 | End: 2025-01-17

## 2025-01-17 RX ORDER — SODIUM CHLORIDE 9 MG/ML
40 INJECTION, SOLUTION INTRAVENOUS AS NEEDED
Status: DISCONTINUED | OUTPATIENT
Start: 2025-01-17 | End: 2025-01-24 | Stop reason: HOSPADM

## 2025-01-17 RX ORDER — CYCLOSPORINE 0.5 MG/ML
1 EMULSION OPHTHALMIC 2 TIMES DAILY
Status: DISCONTINUED | OUTPATIENT
Start: 2025-01-17 | End: 2025-01-24 | Stop reason: HOSPADM

## 2025-01-17 RX ORDER — BUDESONIDE AND FORMOTEROL FUMARATE DIHYDRATE 160; 4.5 UG/1; UG/1
2 AEROSOL RESPIRATORY (INHALATION)
Status: DISCONTINUED | OUTPATIENT
Start: 2025-01-17 | End: 2025-01-24 | Stop reason: HOSPADM

## 2025-01-17 RX ORDER — ENOXAPARIN SODIUM 100 MG/ML
40 INJECTION SUBCUTANEOUS DAILY
Status: DISCONTINUED | OUTPATIENT
Start: 2025-01-18 | End: 2025-01-24 | Stop reason: HOSPADM

## 2025-01-17 RX ORDER — LISINOPRIL 10 MG/1
10 TABLET ORAL DAILY
Status: DISCONTINUED | OUTPATIENT
Start: 2025-01-18 | End: 2025-01-23

## 2025-01-17 RX ORDER — BUTALBITAL, ACETAMINOPHEN AND CAFFEINE 50; 325; 40 MG/1; MG/1; MG/1
1 TABLET ORAL ONCE AS NEEDED
Status: COMPLETED | OUTPATIENT
Start: 2025-01-17 | End: 2025-01-17

## 2025-01-17 RX ORDER — MONTELUKAST SODIUM 10 MG/1
10 TABLET ORAL NIGHTLY
Status: DISCONTINUED | OUTPATIENT
Start: 2025-01-18 | End: 2025-01-24 | Stop reason: HOSPADM

## 2025-01-17 RX ORDER — DEXTROSE MONOHYDRATE 25 G/50ML
25 INJECTION, SOLUTION INTRAVENOUS
Status: DISCONTINUED | OUTPATIENT
Start: 2025-01-17 | End: 2025-01-24 | Stop reason: HOSPADM

## 2025-01-17 RX ORDER — ZOLPIDEM TARTRATE 5 MG/1
5 TABLET ORAL NIGHTLY PRN
Status: DISCONTINUED | OUTPATIENT
Start: 2025-01-17 | End: 2025-01-24 | Stop reason: HOSPADM

## 2025-01-17 RX ORDER — IPRATROPIUM BROMIDE AND ALBUTEROL SULFATE 2.5; .5 MG/3ML; MG/3ML
3 SOLUTION RESPIRATORY (INHALATION) ONCE
Status: COMPLETED | OUTPATIENT
Start: 2025-01-17 | End: 2025-01-17

## 2025-01-17 RX ORDER — NICOTINE POLACRILEX 4 MG
15 LOZENGE BUCCAL
Status: DISCONTINUED | OUTPATIENT
Start: 2025-01-17 | End: 2025-01-24 | Stop reason: HOSPADM

## 2025-01-17 RX ORDER — IBUPROFEN 600 MG/1
1 TABLET ORAL
Status: DISCONTINUED | OUTPATIENT
Start: 2025-01-17 | End: 2025-01-24 | Stop reason: HOSPADM

## 2025-01-17 RX ORDER — INSULIN LISPRO 100 [IU]/ML
2-7 INJECTION, SOLUTION INTRAVENOUS; SUBCUTANEOUS
Status: DISCONTINUED | OUTPATIENT
Start: 2025-01-17 | End: 2025-01-24 | Stop reason: HOSPADM

## 2025-01-17 RX ADMIN — METHYLPREDNISOLONE SODIUM SUCCINATE 125 MG: 125 INJECTION INTRAMUSCULAR; INTRAVENOUS at 15:57

## 2025-01-17 RX ADMIN — ONDANSETRON 4 MG: 2 INJECTION, SOLUTION INTRAMUSCULAR; INTRAVENOUS at 20:42

## 2025-01-17 RX ADMIN — INSULIN LISPRO 3 UNITS: 100 INJECTION, SOLUTION INTRAVENOUS; SUBCUTANEOUS at 23:25

## 2025-01-17 RX ADMIN — CEFTRIAXONE SODIUM 1000 MG: 1 INJECTION, POWDER, FOR SOLUTION INTRAMUSCULAR; INTRAVENOUS at 16:49

## 2025-01-17 RX ADMIN — INSULIN GLARGINE 10 UNITS: 100 INJECTION, SOLUTION SUBCUTANEOUS at 22:58

## 2025-01-17 RX ADMIN — PROPRANOLOL HYDROCHLORIDE 40 MG: 20 TABLET ORAL at 22:50

## 2025-01-17 RX ADMIN — PREGABALIN 100 MG: 100 CAPSULE ORAL at 22:50

## 2025-01-17 RX ADMIN — BUDESONIDE AND FORMOTEROL FUMARATE DIHYDRATE 2 PUFF: 160; 4.5 AEROSOL RESPIRATORY (INHALATION) at 23:45

## 2025-01-17 RX ADMIN — SODIUM CHLORIDE 1000 ML: 9 INJECTION, SOLUTION INTRAVENOUS at 16:48

## 2025-01-17 RX ADMIN — BUTALBITAL, ACETAMINOPHEN, AND CAFFEINE 1 TABLET: 50; 325; 40 TABLET ORAL at 21:51

## 2025-01-17 RX ADMIN — CYCLOSPORINE 1 DROP: 0.5 EMULSION OPHTHALMIC at 23:16

## 2025-01-17 RX ADMIN — TRAZODONE HYDROCHLORIDE 100 MG: 100 TABLET ORAL at 22:50

## 2025-01-17 RX ADMIN — INSULIN LISPRO 4 UNITS: 100 INJECTION, SOLUTION INTRAVENOUS; SUBCUTANEOUS at 21:22

## 2025-01-17 RX ADMIN — Medication 10 ML: at 20:42

## 2025-01-17 RX ADMIN — IPRATROPIUM BROMIDE AND ALBUTEROL SULFATE 3 ML: 2.5; .5 SOLUTION RESPIRATORY (INHALATION) at 15:31

## 2025-01-17 RX ADMIN — IPRATROPIUM BROMIDE AND ALBUTEROL SULFATE 3 ML: 2.5; .5 SOLUTION RESPIRATORY (INHALATION) at 23:42

## 2025-01-17 RX ADMIN — ZOLPIDEM TARTRATE 5 MG: 5 TABLET, FILM COATED ORAL at 23:17
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
## 2025-01-17 NOTE — ED NOTES
"Nursing report ED to floor  Khalida Gilliland  69 y.o.  female    HPI :   Chief Complaint   Patient presents with    Shortness of Breath    Cough       Admitting doctor:   Luiza Taveras MD    Admitting diagnosis:   The primary encounter diagnosis was Shortness of breath. Diagnoses of Moderate persistent asthma with acute exacerbation and Elevated blood pressure reading without diagnosis of hypertension were also pertinent to this visit.    Code status:   Current Code Status       Date Active Code Status Order ID Comments User Context       Prior            Allergies:   Contrast dye (echo or unknown ct/mr), Iodinated contrast media, Codeine, Methylprednisolone, Other, and Ciprofloxacin hcl    Intake and Output  No intake or output data in the 24 hours ending 01/17/25 1705    Weight:       01/17/25  1445   Weight: 96.2 kg (212 lb)       Most recent vitals:   Vitals:    01/17/25 1445 01/17/25 1448   BP: 157/61    BP Location: Right arm    Patient Position: Sitting    Pulse: 82    Resp: 18    Temp:  99 °F (37.2 °C)   TempSrc:  Oral   SpO2: 95%    Weight: 96.2 kg (212 lb)    Height: 172.7 cm (68\")        Active LDAs/IV Access:   Lines, Drains & Airways       Active LDAs       Name Placement date Placement time Site Days    Peripheral IV 01/17/25 1557 Right Antecubital 01/17/25  1557  Antecubital  less than 1                    Labs (abnormal labs have a star):   Labs Reviewed   COMPREHENSIVE METABOLIC PANEL - Abnormal; Notable for the following components:       Result Value    Glucose 366 (*)     Sodium 131 (*)     Chloride 95 (*)     ALT (SGPT) 51 (*)     All other components within normal limits    Narrative:     GFR Categories in Chronic Kidney Disease (CKD)      GFR Category          GFR (mL/min/1.73)    Interpretation  G1                     90 or greater         Normal or high (1)  G2                      60-89                Mild decrease (1)  G3a                   45-59                Mild to moderate " decrease  G3b                   30-44                Moderate to severe decrease  G4                    15-29                Severe decrease  G5                    14 or less           Kidney failure          (1)In the absence of evidence of kidney disease, neither GFR category G1 or G2 fulfill the criteria for CKD.    eGFR calculation 2021 CKD-EPI creatinine equation, which does not include race as a factor   LACTIC ACID, PLASMA - Abnormal; Notable for the following components:    Lactate 2.3 (*)     All other components within normal limits   CBC WITH AUTO DIFFERENTIAL - Abnormal; Notable for the following components:    Eosinophil % 6.8 (*)     Eosinophils, Absolute 0.66 (*)     All other components within normal limits   URINALYSIS W/ CULTURE IF INDICATED - Abnormal; Notable for the following components:    Glucose,  mg/dL (2+) (*)     All other components within normal limits    Narrative:     In absence of clinical symptoms, the presence of pyuria, bacteria, and/or nitrites on the urinalysis result does not correlate with infection.  Urine microscopic not indicated.   COVID-19 AND FLU A/B, NP SWAB IN TRANSPORT MEDIA 1 HR TAT - Normal    Narrative:     Fact sheet for providers: https://www.fda.gov/media/045294/download    Fact sheet for patients: https://www.fda.gov/media/630315/download    Test performed by PCR.   RSV PCR - Normal   TROPONIN - Normal    Narrative:     High Sensitive Troponin T Reference Range:  <14.0 ng/L- Negative Female for AMI  <22.0 ng/L- Negative Male for AMI  >=14 - Abnormal Female indicating possible myocardial injury.  >=22 - Abnormal Male indicating possible myocardial injury.   Clinicians would have to utilize clinical acumen, EKG, Troponin, and serial changes to determine if it is an Acute Myocardial Infarction or myocardial injury due to an underlying chronic condition.        LACTIC ACID, REFLEX   HIGH SENSITIVITIY TROPONIN T 1HR   CBC AND DIFFERENTIAL    Narrative:     The  following orders were created for panel order CBC & Differential.  Procedure                               Abnormality         Status                     ---------                               -----------         ------                     CBC Auto Differential[[id number]]        Abnormal            Final result                 Please view results for these tests on the individual orders.       EKG:   ECG 12 Lead Dyspnea   Final Result   HEART RATE=75  bpm   RR Mrwuqtdt=641  ms   UT Unydqzsa=530  ms   P Horizontal Axis=-9  deg   P Front Axis=43  deg   QRSD Interval=79  ms   QT Qrbvxtza=953  ms   NTkZ=468  ms   QRS Axis=18  deg   T Wave Axis=52  deg   - NORMAL ECG -   Sinus rhythm   No change from previous tracing   Electronically Signed By: Naima Flynn ([facility]) 2025-01-17 16:29:12   Date and Time of Study:2025-01-17 15:20:09          Meds given in ED:   Medications   cefTRIAXone (ROCEPHIN) 1,000 mg in sodium chloride 0.9 % 100 mL MBP (1,000 mg Intravenous New Bag 1/17/25 1649)   sodium chloride 0.9 % bolus 1,000 mL (1,000 mL Intravenous New Bag 1/17/25 1648)   methylPREDNISolone sodium succinate (SOLU-Medrol) injection 125 mg (125 mg Intravenous Given 1/17/25 1557)   ipratropium-albuterol (DUO-NEB) nebulizer solution 3 mL (3 mL Nebulization Given 1/17/25 1531)       Imaging results:  XR Chest 2 View    Result Date: 1/17/2025  No focal consolidation. No pleural effusion or pneumothorax.  Normal size cardiomediastinal silhouette.  No focal osseous abnormality.   This report was finalized on 1/17/2025 4:09 PM by Dr. Fabrice Lorenzana M.D on Workstation: [id number]        Ambulatory status:   - up at [facility]      Social issues:   Social History     Socioeconomic History    Marital status:      Spouse name: Roe    Number of children: 3    Years of education: College   Tobacco Use    Smoking status: Former     Current packs/day: 0.00     Types: Cigarettes     Start date: 1973     Quit date: 1974     Years since  quittin.0     Passive exposure: Past    Smokeless tobacco: Never   Vaping Use    Vaping status: Never Used   Substance and Sexual Activity    Alcohol use: Not Currently    Drug use: No    Sexual activity: Never       NIH Stroke Scale:        Nursing report ED to floor:

## 2025-01-17 NOTE — ED NOTES
Pt was informed that the visit today will be transitioned from Urgent Care to Emergency Room Care and billing. Understanding expressed, ER ACKNOWLEDGEMENT form signed, and all questions answered.

## 2025-01-18 LAB
ALBUMIN SERPL-MCNC: 3.5 G/DL (ref 3.5–5.2)
ALBUMIN/GLOB SERPL: 0.9 G/DL
ALP SERPL-CCNC: 88 U/L (ref 39–117)
ALT SERPL W P-5'-P-CCNC: 45 U/L (ref 1–33)
ANION GAP SERPL CALCULATED.3IONS-SCNC: 14.3 MMOL/L (ref 5–15)
AST SERPL-CCNC: 23 U/L (ref 1–32)
B PARAPERT DNA SPEC QL NAA+PROBE: NOT DETECTED
B PERT DNA SPEC QL NAA+PROBE: NOT DETECTED
BASOPHILS # BLD AUTO: 0.02 10*3/MM3 (ref 0–0.2)
BASOPHILS NFR BLD AUTO: 0.2 % (ref 0–1.5)
BILIRUB SERPL-MCNC: 0.3 MG/DL (ref 0–1.2)
BUN SERPL-MCNC: 14 MG/DL (ref 8–23)
BUN/CREAT SERPL: 20.9 (ref 7–25)
C PNEUM DNA NPH QL NAA+NON-PROBE: NOT DETECTED
CALCIUM SPEC-SCNC: 9.3 MG/DL (ref 8.6–10.5)
CHLORIDE SERPL-SCNC: 99 MMOL/L (ref 98–107)
CO2 SERPL-SCNC: 23.7 MMOL/L (ref 22–29)
CREAT SERPL-MCNC: 0.67 MG/DL (ref 0.57–1)
D-LACTATE SERPL-SCNC: 2.6 MMOL/L (ref 0.5–2)
D-LACTATE SERPL-SCNC: 2.9 MMOL/L (ref 0.5–2)
D-LACTATE SERPL-SCNC: 3.4 MMOL/L (ref 0.5–2)
D-LACTATE SERPL-SCNC: 4 MMOL/L (ref 0.5–2)
D-LACTATE SERPL-SCNC: 4.4 MMOL/L (ref 0.5–2)
D-LACTATE SERPL-SCNC: 4.6 MMOL/L (ref 0.5–2)
DEPRECATED RDW RBC AUTO: 42.1 FL (ref 37–54)
EGFRCR SERPLBLD CKD-EPI 2021: 94.7 ML/MIN/1.73
EOSINOPHIL # BLD AUTO: 0 10*3/MM3 (ref 0–0.4)
EOSINOPHIL NFR BLD AUTO: 0 % (ref 0.3–6.2)
ERYTHROCYTE [DISTWIDTH] IN BLOOD BY AUTOMATED COUNT: 12.5 % (ref 12.3–15.4)
FLUAV SUBTYP SPEC NAA+PROBE: NOT DETECTED
FLUBV RNA ISLT QL NAA+PROBE: NOT DETECTED
GLOBULIN UR ELPH-MCNC: 3.9 GM/DL
GLUCOSE BLDC GLUCOMTR-MCNC: 260 MG/DL (ref 70–130)
GLUCOSE BLDC GLUCOMTR-MCNC: 264 MG/DL (ref 70–130)
GLUCOSE BLDC GLUCOMTR-MCNC: 299 MG/DL (ref 70–130)
GLUCOSE BLDC GLUCOMTR-MCNC: 308 MG/DL (ref 70–130)
GLUCOSE SERPL-MCNC: 342 MG/DL (ref 65–99)
HADV DNA SPEC NAA+PROBE: NOT DETECTED
HCOV 229E RNA SPEC QL NAA+PROBE: NOT DETECTED
HCOV HKU1 RNA SPEC QL NAA+PROBE: NOT DETECTED
HCOV NL63 RNA SPEC QL NAA+PROBE: NOT DETECTED
HCOV OC43 RNA SPEC QL NAA+PROBE: NOT DETECTED
HCT VFR BLD AUTO: 40.6 % (ref 34–46.6)
HGB BLD-MCNC: 13.5 G/DL (ref 12–15.9)
HMPV RNA NPH QL NAA+NON-PROBE: NOT DETECTED
HPIV1 RNA ISLT QL NAA+PROBE: NOT DETECTED
HPIV2 RNA SPEC QL NAA+PROBE: NOT DETECTED
HPIV3 RNA NPH QL NAA+PROBE: NOT DETECTED
HPIV4 P GENE NPH QL NAA+PROBE: NOT DETECTED
IGG1 SER-MCNC: 1455 MG/DL (ref 700–1600)
IMM GRANULOCYTES # BLD AUTO: 0.06 10*3/MM3 (ref 0–0.05)
IMM GRANULOCYTES NFR BLD AUTO: 0.6 % (ref 0–0.5)
LYMPHOCYTES # BLD AUTO: 0.79 10*3/MM3 (ref 0.7–3.1)
LYMPHOCYTES NFR BLD AUTO: 7.4 % (ref 19.6–45.3)
M PNEUMO IGG SER IA-ACNC: NOT DETECTED
MCH RBC QN AUTO: 30.7 PG (ref 26.6–33)
MCHC RBC AUTO-ENTMCNC: 33.3 G/DL (ref 31.5–35.7)
MCV RBC AUTO: 92.3 FL (ref 79–97)
MONOCYTES # BLD AUTO: 0.18 10*3/MM3 (ref 0.1–0.9)
MONOCYTES NFR BLD AUTO: 1.7 % (ref 5–12)
NEUTROPHILS NFR BLD AUTO: 9.61 10*3/MM3 (ref 1.7–7)
NEUTROPHILS NFR BLD AUTO: 90.1 % (ref 42.7–76)
NRBC BLD AUTO-RTO: 0 /100 WBC (ref 0–0.2)
PLATELET # BLD AUTO: 284 10*3/MM3 (ref 140–450)
PMV BLD AUTO: 10.5 FL (ref 6–12)
POTASSIUM SERPL-SCNC: 4.3 MMOL/L (ref 3.5–5.2)
PROT SERPL-MCNC: 7.4 G/DL (ref 6–8.5)
RBC # BLD AUTO: 4.4 10*6/MM3 (ref 3.77–5.28)
RHINOVIRUS RNA SPEC NAA+PROBE: NOT DETECTED
RSV RNA NPH QL NAA+NON-PROBE: NOT DETECTED
SARS-COV-2 RNA NPH QL NAA+NON-PROBE: NOT DETECTED
SODIUM SERPL-SCNC: 137 MMOL/L (ref 136–145)
WBC NRBC COR # BLD AUTO: 10.66 10*3/MM3 (ref 3.4–10.8)

## 2025-01-18 PROCEDURE — 83516 IMMUNOASSAY NONANTIBODY: CPT | Performed by: INTERNAL MEDICINE

## 2025-01-18 PROCEDURE — 85025 COMPLETE CBC W/AUTO DIFF WBC: CPT | Performed by: STUDENT IN AN ORGANIZED HEALTH CARE EDUCATION/TRAINING PROGRAM

## 2025-01-18 PROCEDURE — 86037 ANCA TITER EACH ANTIBODY: CPT | Performed by: INTERNAL MEDICINE

## 2025-01-18 PROCEDURE — 25810000003 SODIUM CHLORIDE 0.9 % SOLUTION: Performed by: STUDENT IN AN ORGANIZED HEALTH CARE EDUCATION/TRAINING PROGRAM

## 2025-01-18 PROCEDURE — 25810000003 SODIUM CHLORIDE 0.9 % SOLUTION: Performed by: NURSE PRACTITIONER

## 2025-01-18 PROCEDURE — 63710000001 REVEFENACIN 175 MCG/3ML SOLUTION: Performed by: INTERNAL MEDICINE

## 2025-01-18 PROCEDURE — 82948 REAGENT STRIP/BLOOD GLUCOSE: CPT

## 2025-01-18 PROCEDURE — 92524 BEHAVRAL QUALIT ANALYS VOICE: CPT

## 2025-01-18 PROCEDURE — 94761 N-INVAS EAR/PLS OXIMETRY MLT: CPT

## 2025-01-18 PROCEDURE — 63710000001 PREDNISONE PER 1 MG: Performed by: STUDENT IN AN ORGANIZED HEALTH CARE EDUCATION/TRAINING PROGRAM

## 2025-01-18 PROCEDURE — G0378 HOSPITAL OBSERVATION PER HR: HCPCS

## 2025-01-18 PROCEDURE — 80053 COMPREHEN METABOLIC PANEL: CPT | Performed by: STUDENT IN AN ORGANIZED HEALTH CARE EDUCATION/TRAINING PROGRAM

## 2025-01-18 PROCEDURE — 94664 DEMO&/EVAL PT USE INHALER: CPT

## 2025-01-18 PROCEDURE — 83605 ASSAY OF LACTIC ACID: CPT | Performed by: STUDENT IN AN ORGANIZED HEALTH CARE EDUCATION/TRAINING PROGRAM

## 2025-01-18 PROCEDURE — 82784 ASSAY IGA/IGD/IGG/IGM EACH: CPT | Performed by: INTERNAL MEDICINE

## 2025-01-18 PROCEDURE — 94799 UNLISTED PULMONARY SVC/PX: CPT

## 2025-01-18 PROCEDURE — 63710000001 INSULIN LISPRO (HUMAN) PER 5 UNITS

## 2025-01-18 PROCEDURE — 25010000002 ENOXAPARIN PER 10 MG: Performed by: STUDENT IN AN ORGANIZED HEALTH CARE EDUCATION/TRAINING PROGRAM

## 2025-01-18 PROCEDURE — 25810000003 SODIUM CHLORIDE 0.9 % SOLUTION

## 2025-01-18 PROCEDURE — 63710000001 INSULIN GLARGINE PER 5 UNITS: Performed by: STUDENT IN AN ORGANIZED HEALTH CARE EDUCATION/TRAINING PROGRAM

## 2025-01-18 PROCEDURE — 82785 ASSAY OF IGE: CPT | Performed by: INTERNAL MEDICINE

## 2025-01-18 PROCEDURE — 63710000001 DIPHENHYDRAMINE PER 50 MG: Performed by: STUDENT IN AN ORGANIZED HEALTH CARE EDUCATION/TRAINING PROGRAM

## 2025-01-18 PROCEDURE — 63710000001 INSULIN LISPRO (HUMAN) PER 5 UNITS: Performed by: STUDENT IN AN ORGANIZED HEALTH CARE EDUCATION/TRAINING PROGRAM

## 2025-01-18 RX ORDER — HYDROCHLOROTHIAZIDE 25 MG/1
25 TABLET ORAL DAILY
Status: DISCONTINUED | OUTPATIENT
Start: 2025-01-18 | End: 2025-01-24 | Stop reason: HOSPADM

## 2025-01-18 RX ORDER — DIPHENHYDRAMINE HCL 25 MG
25 CAPSULE ORAL NIGHTLY PRN
Status: DISCONTINUED | OUTPATIENT
Start: 2025-01-18 | End: 2025-01-24 | Stop reason: HOSPADM

## 2025-01-18 RX ORDER — SODIUM CHLORIDE 9 MG/ML
150 INJECTION, SOLUTION INTRAVENOUS CONTINUOUS
Status: ACTIVE | OUTPATIENT
Start: 2025-01-18 | End: 2025-01-18

## 2025-01-18 RX ORDER — INSULIN LISPRO 100 [IU]/ML
3 INJECTION, SOLUTION INTRAVENOUS; SUBCUTANEOUS
Status: DISCONTINUED | OUTPATIENT
Start: 2025-01-18 | End: 2025-01-24 | Stop reason: HOSPADM

## 2025-01-18 RX ORDER — ENOXAPARIN SODIUM 100 MG/ML
40 INJECTION SUBCUTANEOUS EVERY 24 HOURS
Status: DISCONTINUED | OUTPATIENT
Start: 2025-01-18 | End: 2025-01-18

## 2025-01-18 RX ORDER — BENZONATATE 100 MG/1
100 CAPSULE ORAL 3 TIMES DAILY PRN
Status: DISCONTINUED | OUTPATIENT
Start: 2025-01-18 | End: 2025-01-24 | Stop reason: HOSPADM

## 2025-01-18 RX ORDER — ATORVASTATIN CALCIUM 20 MG/1
20 TABLET, FILM COATED ORAL NIGHTLY
Status: DISCONTINUED | OUTPATIENT
Start: 2025-01-18 | End: 2025-01-24 | Stop reason: HOSPADM

## 2025-01-18 RX ADMIN — DIPHENHYDRAMINE HYDROCHLORIDE 25 MG: 25 CAPSULE ORAL at 22:19

## 2025-01-18 RX ADMIN — INSULIN GLARGINE 15 UNITS: 100 INJECTION, SOLUTION SUBCUTANEOUS at 21:41

## 2025-01-18 RX ADMIN — PREDNISONE 40 MG: 20 TABLET ORAL at 09:13

## 2025-01-18 RX ADMIN — IPRATROPIUM BROMIDE AND ALBUTEROL SULFATE 3 ML: 2.5; .5 SOLUTION RESPIRATORY (INHALATION) at 15:23

## 2025-01-18 RX ADMIN — INSULIN LISPRO 4 UNITS: 100 INJECTION, SOLUTION INTRAVENOUS; SUBCUTANEOUS at 21:41

## 2025-01-18 RX ADMIN — Medication 1000 UNITS: at 09:14

## 2025-01-18 RX ADMIN — IPRATROPIUM BROMIDE AND ALBUTEROL SULFATE 3 ML: 2.5; .5 SOLUTION RESPIRATORY (INHALATION) at 03:46

## 2025-01-18 RX ADMIN — CYCLOSPORINE 1 DROP: 0.5 EMULSION OPHTHALMIC at 09:15

## 2025-01-18 RX ADMIN — INSULIN LISPRO 3 UNITS: 100 INJECTION, SOLUTION INTRAVENOUS; SUBCUTANEOUS at 09:22

## 2025-01-18 RX ADMIN — INSULIN LISPRO 3 UNITS: 100 INJECTION, SOLUTION INTRAVENOUS; SUBCUTANEOUS at 13:22

## 2025-01-18 RX ADMIN — INSULIN LISPRO 3 UNITS: 100 INJECTION, SOLUTION INTRAVENOUS; SUBCUTANEOUS at 17:38

## 2025-01-18 RX ADMIN — PREGABALIN 100 MG: 100 CAPSULE ORAL at 16:20

## 2025-01-18 RX ADMIN — IPRATROPIUM BROMIDE AND ALBUTEROL SULFATE 3 ML: 2.5; .5 SOLUTION RESPIRATORY (INHALATION) at 11:45

## 2025-01-18 RX ADMIN — IPRATROPIUM BROMIDE AND ALBUTEROL SULFATE 3 ML: 2.5; .5 SOLUTION RESPIRATORY (INHALATION) at 20:36

## 2025-01-18 RX ADMIN — ATORVASTATIN CALCIUM 20 MG: 20 TABLET, FILM COATED ORAL at 21:33

## 2025-01-18 RX ADMIN — PREGABALIN 100 MG: 100 CAPSULE ORAL at 21:33

## 2025-01-18 RX ADMIN — REVEFENACIN 175 MCG: 175 SOLUTION RESPIRATORY (INHALATION) at 11:45

## 2025-01-18 RX ADMIN — ACETAMINOPHEN 1000 MG: 500 TABLET, FILM COATED ORAL at 16:20

## 2025-01-18 RX ADMIN — PREGABALIN 100 MG: 100 CAPSULE ORAL at 09:14

## 2025-01-18 RX ADMIN — SODIUM CHLORIDE 500 ML: 9 INJECTION, SOLUTION INTRAVENOUS at 00:34

## 2025-01-18 RX ADMIN — ASPIRIN 81 MG: 81 TABLET, DELAYED RELEASE ORAL at 09:13

## 2025-01-18 RX ADMIN — TRAZODONE HYDROCHLORIDE 100 MG: 100 TABLET ORAL at 21:33

## 2025-01-18 RX ADMIN — BUDESONIDE AND FORMOTEROL FUMARATE DIHYDRATE 2 PUFF: 160; 4.5 AEROSOL RESPIRATORY (INHALATION) at 07:21

## 2025-01-18 RX ADMIN — INSULIN GLARGINE 15 UNITS: 100 INJECTION, SOLUTION SUBCUTANEOUS at 09:23

## 2025-01-18 RX ADMIN — SODIUM CHLORIDE 500 ML: 9 INJECTION, SOLUTION INTRAVENOUS at 22:22

## 2025-01-18 RX ADMIN — INSULIN LISPRO 5 UNITS: 100 INJECTION, SOLUTION INTRAVENOUS; SUBCUTANEOUS at 09:23

## 2025-01-18 RX ADMIN — IPRATROPIUM BROMIDE AND ALBUTEROL SULFATE 3 ML: 2.5; .5 SOLUTION RESPIRATORY (INHALATION) at 07:20

## 2025-01-18 RX ADMIN — INSULIN LISPRO 4 UNITS: 100 INJECTION, SOLUTION INTRAVENOUS; SUBCUTANEOUS at 13:22

## 2025-01-18 RX ADMIN — MONTELUKAST 10 MG: 10 TABLET, FILM COATED ORAL at 21:33

## 2025-01-18 RX ADMIN — LISINOPRIL 10 MG: 10 TABLET ORAL at 09:13

## 2025-01-18 RX ADMIN — Medication 300 MG: at 09:13

## 2025-01-18 RX ADMIN — LEVETIRACETAM 250 MG: 500 TABLET, FILM COATED ORAL at 09:13

## 2025-01-18 RX ADMIN — SODIUM CHLORIDE 150 ML/HR: 9 INJECTION, SOLUTION INTRAVENOUS at 09:23

## 2025-01-18 RX ADMIN — CYCLOSPORINE 1 DROP: 0.5 EMULSION OPHTHALMIC at 21:33

## 2025-01-18 RX ADMIN — INSULIN LISPRO 4 UNITS: 100 INJECTION, SOLUTION INTRAVENOUS; SUBCUTANEOUS at 17:38

## 2025-01-18 RX ADMIN — FLUTICASONE PROPIONATE 2 SPRAY: 50 SPRAY, METERED NASAL at 09:14

## 2025-01-18 RX ADMIN — ENOXAPARIN SODIUM 40 MG: 100 INJECTION SUBCUTANEOUS at 09:22

## 2025-01-18 RX ADMIN — PANTOPRAZOLE SODIUM 40 MG: 40 TABLET, DELAYED RELEASE ORAL at 05:36

## 2025-01-18 RX ADMIN — Medication 10 ML: at 21:33

## 2025-01-18 RX ADMIN — BUDESONIDE AND FORMOTEROL FUMARATE DIHYDRATE 2 PUFF: 160; 4.5 AEROSOL RESPIRATORY (INHALATION) at 20:36

## 2025-01-18 RX ADMIN — PROPRANOLOL HYDROCHLORIDE 40 MG: 20 TABLET ORAL at 09:14

## 2025-01-18 RX ADMIN — PROPRANOLOL HYDROCHLORIDE 40 MG: 20 TABLET ORAL at 21:32

## 2025-01-18 NOTE — PLAN OF CARE
Goal Outcome Evaluation:  Plan of Care Reviewed With: patient        Progress: improving  Outcome Evaluation: Frequent dry cough, O2 sats WDL on room air, receiving breathing treatments, given Tylenol for headache, vss, afebrile, Lactic acid trending down, blood sugars range between 200-300.
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
## 2025-01-18 NOTE — PLAN OF CARE
Goal Outcome Evaluation:  Plan of Care Reviewed With: patient        Progress: no change  Outcome Evaluation: vss, afebrile, pulmonology saw pt tonight, elevated blood sugars- coverage given as ordered, c/o of headache- Floricet given x1, zofran given for nausea x1, up ad pierce, voiding freely, lactate 4.4 and on-call NP aware and ordered a bolus x1, room air, Lovenox ordered, saline locked, respiratory panel ordered, recieving scheduled breathing treatments, morning labs ordered, video stroboscopy ordered

## 2025-01-18 NOTE — PROGRESS NOTES
"[facility] Clinical Pharmacy Services: Enoxaparin Consult    Khalida Gilliland has a pharmacy consult to dose prophylactic enoxaparin per Dr. Dontrell Iverson's request.     Indication: VTE Prophylaxis  Home Anticoagulation: None     Relevant clinical data and objective history reviewed:  69 y.o. female 172.7 cm (68\") 96.2 kg (212 lb)   Body mass index is 32.23 kg/m².     Results from last 7 days   Lab Units 01/18/25  0258   PLATELETS 10*3/mm3 284     Estimated Creatinine Clearance: 96.1 mL/min (by C-G formula based on SCr of 0.67 mg/dL).    Assessment/Plan    Will start patient on 40mg subcutaneous every 24 hours, adjusted for renal function. Consult order will be discontinued but pharmacy will continue to follow.     Stephany Nicholas, Pharm.D., [facility]   Clinical Pharmacist   "

## 2025-01-18 NOTE — CONSULTS
"[facility]     Reason for consult: asthma with ae    HPI  Mrs. Gilliland is a 70yo female with about a 7 year history of asthma.  She says she began having asthma symptoms after a trip to [facility].  She since that time has stuggled with fairly frequent and sudden acute exacerbations with a few hospitalization.  Ae due to both viral triggers and dust/irritatant exposure.  She seems to be on some sort of maintence inhaler but she is not sure which, looks like it was supposed to be symbicort per the one office note from Dr. Liu in our office records.  She has persitent eosinophillia in the 5% range on reviewed of labs.  She reports she has ongoing cough for the past week, made worse by a specific incdient of dust exposure. She has a times very loud exhalational wheezing particularly after coughing fits.  She is able to talk in complete rapid sentences right now but does still have some coughing with deep breathing.  She says frequent po steroids have been messing with her blood sugars.  She says she feels like her vocal cords are closing off at times.     Past Medical History:   Diagnosis Date    Abdominal pain, right lower quadrant     Anemia     Asthma     Brain aneurysm     2003 and 2017    Cancer 07/30/2014    Basal Cell Carcinoma Left Arm,SKIN CANCER    COVID-19 04/2021    Cyst of left kidney     Diabetes mellitus     Tyoe 2 diabetic    Diverticulosis     Fatty liver     GERD (gastroesophageal reflux disease)     History of kidney stones 06/2011    History of surgery for cerebral aneurysm     Clipping of cerebral Aneurysm    Hx of migraines     Hyperlipidemia     Hypertension     Insomnia     Neck pain     Peptic ulceration     Pneumonia     PONV (postoperative nausea and vomiting)     Stroke     \"years ago\" TIA no after effects     Social History     Socioeconomic History    Marital status:      Spouse name: Roe    Number of children: 3    Years of education: College   Tobacco Use    " Smoking status: Former     Current packs/day: 0.00     Types: Cigarettes     Start date:      Quit date:      Years since quittin.0     Passive exposure: Past    Smokeless tobacco: Never   Vaping Use    Vaping status: Never Used   Substance and Sexual Activity    Alcohol use: Not Currently    Drug use: No    Sexual activity: Never     Family History   Problem Relation Age of Onset    Skin cancer Mother     Dementia Mother     KALYAN disease Mother     Heart disease Father     Heart attack Father     Hypertension Father     Aneurysm Son         cerebral    Nephrolithiasis Son     Suicide Attempts Brother     Malig Hyperthermia Neg Hx      MEDS: reviewed as per chart  ALL: list of 6 as per chart  ROS: some post nasal drainge no cold like symptoms and she has h/o gerd but says well controlled otherwise 12 point negative except as in HPI    Vital Sign Min/Max for last 24 hours  Temp  Min: 96.9 °F (36.1 °C)  Max: 99.1 °F (37.3 °C)   BP  Min: 107/58  Max: 157/61   Pulse  Min: 69  Max: 83   Resp  Min: 16  Max: 18   SpO2  Min: 95 %  Max: 97 %   No data recorded   Weight  Min: 96.2 kg (212 lb)  Max: 96.5 kg (212 lb 11.2 oz)     GEN:  NAD, obese, A&O x3  HEENT: PERRL, EOMI, no icterus, mmm, no JVD, trachea midline, neck supple  CHEST: CTA bilat, no wheezes, no crackles, no use of accessory muscles  CV: RRR, no m/g/r  ABD: soft, nt, nd +bs, no hepatosplenomegaly  EXT: no c/c/e  SKIN: no rashes, no xanthomas, nl turgor, warm, dry  LYMPH: no palpable cervical or supraclavicular lymphadenopathy  NEURO: CN 2-12 intact and symmetric bilaterally  PSYCH: nl affect, nl orientation, nl judgment, nl mood  MSK: no kyphoscoliosis, 5/5 strength ue and le bilaterally     Labs: : reviewed:  Glucose 366  Bun 13  Cr 0.69  Na 131  Bicarb 26  Wbc 9.6 (6.8% eos abs 0.66)  Hgb 14.2  Plts 326  :   Alt 235  Ast 169  Alk phos 145  Tbili 1.8    : ct chest: normal    A/P:  Moderate persistent eosinophillic asthma with frequent  acute exacerbations --she will need to be discharged on laba/lama/lics.  Trelegy 200 or symbicort/spiriva. She may wind up needing biologics if she continues to have frequent acute exacerbations. Check ige and igg levels (given recurrent viral uri)  Eosinophillia -- check anca  Likely VCD - -suspect she does have a component of VCD when she gets into her coughing episdoes in particular I can trigger it a bit just by having her breath deeply on exam today  Steroid worsened hyperglycemia  Obesity    No need for any antibiotics from a pulmonary standpoint.

## 2025-01-18 NOTE — PLAN OF CARE
Goal Outcome Evaluation:              Outcome Evaluation: Voice eval completed. Pulm suspects VCD. Education/training provided regarding rescue breaths and diaphragmatic breathing. Handout provided. Suggest consider order consult with Outpatient Speech Language Pathology for Videostroboscopy for further evaluation and treatment with biofeedback.    Anticipated Discharge Disposition (SLP): home with OP services    SLP Diagnosis:  (Dysphonia. Concern for Vocal Cord Dysfunction) (01/18/25 7122)
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
## 2025-01-18 NOTE — PROGRESS NOTES
Name: Khalida Gilliland ADMIT: 2025   : 1955  PCP: Sergo Owen MD    MRN: [id number] LOS: 0 days   AGE/SEX: 69 y.o. female  ROOM: [facility]     Subjective   Subjective   Sitting up in the bed.  Continues to have shortness of breath, chest tightness cough, nonproductive.  Denies fevers or chills.  No abdominal pain, nausea or vomiting.    Review of Systems   As above  Objective   Objective   Vital Signs  Temp:  [96.9 °F (36.1 °C)-99.1 °F (37.3 °C)] 97.2 °F (36.2 °C)  Heart Rate:  [69-86] 73  Resp:  [16-20] 18  BP: (107-157)/(58-75) 145/71  SpO2:  [92 %-97 %] 97 %  on   ;   Device (Oxygen Therapy): room air  Body mass index is 32.23 kg/m².  Physical Exam    General: Alert, sitting up in the bed, ill-appearing,  HEENT: Normocephalic, atraumatic  CV: Regular rate and rhythm, no murmurs rubs or gallops  Lungs: Diminished, diffuse bilateral wheezing ,no crackles, on room air  Abdomen: Soft, nontender, nondistended  Extremities: No significant peripheral edema , no cyanosis     Results Review     I reviewed the patient's new clinical results.  Results from last 7 days   Lab Units 25  0258 25  1558   WBC 10*3/mm3 10.66 9.65   HEMOGLOBIN g/dL 13.5 14.2   PLATELETS 10*3/mm3 284 326     Results from last 7 days   Lab Units 25  0258 25  1558   SODIUM mmol/L 137 131*   POTASSIUM mmol/L 4.3 4.4   CHLORIDE mmol/L 99 95*   CO2 mmol/L 23.7 26.2   BUN mg/dL 14 13   CREATININE mg/dL 0.67 0.69   GLUCOSE mg/dL 342* 366*   Estimated Creatinine Clearance: 96.1 mL/min (by C-G formula based on SCr of 0.67 mg/dL).  Results from last 7 days   Lab Units 25  0258 25  1558   ALBUMIN g/dL 3.5 3.9   BILIRUBIN mg/dL 0.3 0.4   ALK PHOS U/L 88 106   AST (SGOT) U/L 23 26   ALT (SGPT) U/L 45* 51*     Results from last 7 days   Lab Units 25  0258 25  1558   CALCIUM mg/dL 9.3 9.5   ALBUMIN g/dL 3.5 3.9     Results from last 7 days   Lab Units 25  0836 25  0540 25  0258  01/17/25  2330   LACTATE mmol/L 3.4* 4.0* 4.6* 4.4*     COVID19   Date Value Ref Range Status   01/17/2025 Not Detected Not Detected - Ref. Range Final   01/17/2025 Not Detected Not Detected - Ref. Range Final     Glucose   Date/Time Value Ref Range Status   01/18/2025 1201 264 (H) 70 - 130 mg/dL Final   01/18/2025 0830 308 (H) 70 - 130 mg/dL Final   01/17/2025 2256 446 (C) 70 - 130 mg/dL Final   01/17/2025 2059 258 (H) 70 - 130 mg/dL Final           CT Chest Without Contrast Diagnostic  Narrative: CT CHEST WO CONTRAST DIAGNOSTIC-     INDICATION: Shortness of air, wheezing     COMPARISON: CT chest February 27, 2024     TECHNIQUE:  Routine CT chest without IV contrast. Coronal and sagittal reformats.  Radiation dose reduction techniques were utilized, including automated  exposure control and exposure modulation based on body size.     FINDINGS:      Chest wall: No lymphadenopathy.     Mediastinum: No coronary artery atherosclerotic calcifications seen.  Heart is normal in size. No pericardial effusion. Right paratracheal/4R  lymph node, series 2, axial mage 32, measures 9 mm in short axis,  unchanged. No new or enlarging mediastinal or hilar lymph nodes seen.     Lungs/pleura: No effusions. Patent central airways. Small amount of  posterior dependent and bibasilar subsegmental atelectasis. Solid  pulmonary nodule in the superior segment right lower lobe, series 6,  axial mage 45, measures 5 mm, unchanged. No new or enlarging pulmonary  nodules identified. Calcified pulmonary nodule in the lateral basilar  left lower lobe base, consistent with prior granulomatous infection.     Upper abdomen: Cholecystectomy. Mild pancreatic lipomatosis.     Osseous structures: No destructive osseous lesions.     Impression: No acute findings identified in the chest.     This report was finalized on 1/17/2025 5:17 PM by Dr. Xu Morley M.D on Workstation: [id number]     XR Chest 2 View  Narrative: XR CHEST 2 VW-      INDICATION: Shortness of breath, cough, asthma.     COMPARISON: Chest radiographs dating back to 1/11/2016     Impression: No focal consolidation. No pleural effusion or pneumothorax.   Normal size cardiomediastinal silhouette.  No focal osseous  abnormality.       This report was finalized on 1/17/2025 4:09 PM by Dr. Fabrice Lorenzana M.D on Workstation: [id number]          Scheduled Medications  aspirin, 81 mg, Oral, Daily  budesonide-formoterol, 2 puff, Inhalation, BID - RT  cholecalciferol, 1,000 Units, Oral, Daily  cycloSPORINE, 1 drop, Both Eyes, BID  enoxaparin, 40 mg, Subcutaneous, Daily  fluticasone, 2 spray, Each Nare, Daily  insulin glargine, 15 Units, Subcutaneous, Q12H  insulin lispro, 2-7 Units, Subcutaneous, 4x Daily AC & at Bedtime  insulin lispro, 3 Units, Subcutaneous, TID With Meals  ipratropium-albuterol, 3 mL, Nebulization, Q4H - RT  levETIRAcetam, 250 mg, Oral, Daily  lisinopril, 10 mg, Oral, Daily  montelukast, 10 mg, Oral, Nightly  pantoprazole, 40 mg, Oral, Q AM  predniSONE, 40 mg, Oral, Daily With Breakfast  pregabalin, 100 mg, Oral, TID  propranolol, 40 mg, Oral, BID  revefenacin, 175 mcg, Nebulization, Daily - RT  sodium chloride, 10 mL, Intravenous, Q12H  traZODone, 100 mg, Oral, Nightly  Vitamin B-2, 300 mg, Oral, Daily    Infusions  sodium chloride, 150 mL/hr, Last Rate: 150 mL/hr (01/18/25 0923)    Diet  Diet: Regular/House, Diabetic; Consistent Carbohydrate; Fluid Consistency: Thin (IDDSI 0)    I have personally reviewed     [x]  Laboratory   [x]  Microbiology   [x]  Radiology   [x]  EKG/Telemetry  []  Cardiology/Vascular   []  Pathology    []  Records       Assessment/Plan     Active Hospital Problems    Diagnosis  POA    **Asthma [J45.909]  Yes    Hyponatremia [E87.1]  Unknown    Moderate asthma with exacerbation [J45.901]  Yes    Essential hypertension [I10]  Yes    Type 2 diabetes mellitus with hyperglycemia, without long-term current use of insulin [E11.65]  Yes      Resolved  Hospital Problems   No resolved problems to display.     Ms. Gilliland is a 69 y.o. female with a history of asthma, GERD, type 2 diabetes mellitus, migraines, hypertension, and cerebral aneurysm that presents to [facility] complaining of shortness of breath.       Acute asthma exacerbation  -CXR and CT chest negative for infiltrate or consolidation  -Respiratory panel negative  -On scheduled DuoNebs, Symbicort, prednisone  -Continue Singulair  -Pulmonology following     Type 2 diabetes mellitus  -Blood glucose elevated secondary to steroids  -Increase Lantus to 15 units twice daily  -Continue SSI, add scheduled lispro with meals 3 units     Elevated lactic acid  -Lactic acid elevated at 2.3, no evidence of sepsis or hypoperfusion on exam  -Suspect secondary to albuterol/COPD exacerbation  -Bicarbonate and anion gap normal  -Lactic acid peaked at 4.6, improving     Hyponatremia  -Sodium low at 131, but corrects to 135 when accounting for hyperglycemia  -Hold home HCTZ for now  -Repeat BMP with morning labs.  Further workup if sodium more to continue to decline.     Hypertension  -EKG was sinus rhythm, no ischemic changes  -Troponin negative, suspect chest pressure associated with above  -Outpatient HCTZ resumed  -Continue lisinopril and propranolol  -Ongoing titration of meds based on BP trends     Elevated transaminases  -ALT elevated at 51 on admission, actually decreased from most recent check  -ALT improved, ALT remains normal.  -Resume statin , monitor liver enzymes     Lovenox 40 mg SC daily for DVT prophylaxis.  Full code.  Discussed with patient.  Expected discharge date/ time has not been documented.       Copied text in this note has been reviewed and is accurate as of 01/18/25.         Dictated utilizing Dragon dictation        Dontrell Iverson MD  [facility]  01/18/25  13:13 EST

## 2025-01-18 NOTE — PROGRESS NOTES
LOS: 0 days   Patient Care Team:  Sergo Owen MD as PCP - General (Internal Medicine)  Lex Morris MD as Consulting Physician (Hematology and Oncology)  Mikael David MD as Referring Physician (Internal Medicine)    Subjective     Pulmonary coverage from Dr. Burt Cannon have reviewed his consult and other records.  Patient for exacerbation of asthma.  Patient has been having exacerbations for about 7 years now off and on.  This wounds been building for a number of days she tried managing at home unsuccessfully.  She is having mostly coughing particular with any deep breath not producing much in the way of sputum she is just worn out today she did not sleep any last night maybe her breathing is a little better and maybe her cough is a little less.    Review of Systems:          Objective     Vital Signs  Vital Sign Min/Max for last 24 hours  Temp  Min: 97.1 °F (36.2 °C)  Max: 97.8 °F (36.6 °C)   BP  Min: 105/65  Max: 159/69   Pulse  Min: 70  Max: 86   Resp  Min: 16  Max: 20   SpO2  Min: 92 %  Max: 99 %   No data recorded   No data recorded        Ventilator/Non-Invasive Ventilation Settings (From admission, onward)      None                         Body mass index is 32.23 kg/m².  I/O last 3 completed shifts:  In: 1840 [P.O.:240; IV Piggyback:1600]  Out: -   No intake/output data recorded.        Physical Exam:  General Appearance: Well-developed obese white female she is resting in bed until she took some deep breaths she was doing good then she had some coughing paroxysms  Eyes: Conjunctiva are clear and anicteric  ENT: Mucous membranes are moist no erythema no exudates Mallampati 2 airway  Neck: No adenopathy or thyromegaly no jugular venous distension I did not hear any laryngeal inspiratory or expiratory wheezing  Lungs: Actually were clear I did not hear any wheezes rales or rhonchi she just had coughing paroxysms with deep breath  Cardiac: Regular rate rhythm no murmur  Abdomen:  Soft nontender no palpable hepatosplenomegaly or mass  : Not examined  Musculoskeletal: Grossly normal aside from obesity  Skin: Warm and dry no jaundice no petechiae  Neuro: She is alert cooperative grossly intact  Extremities/P Vascular: No clubbing no cyanosis no edema palpable radial and dorsalis pedis pulses  MSE: Pleasant and appropriate       Labs:  Results from last 7 days   Lab Units 01/18/25  0258 01/17/25  1558   GLUCOSE mg/dL 342* 366*   SODIUM mmol/L 137 131*   POTASSIUM mmol/L 4.3 4.4   CO2 mmol/L 23.7 26.2   CHLORIDE mmol/L 99 95*   ANION GAP mmol/L 14.3 9.8   CREATININE mg/dL 0.67 0.69   BUN mg/dL 14 13   BUN / CREAT RATIO  20.9 18.8   CALCIUM mg/dL 9.3 9.5   ALK PHOS U/L 88 106   TOTAL PROTEIN g/dL 7.4 8.0   ALT (SGPT) U/L 45* 51*   AST (SGOT) U/L 23 26   BILIRUBIN mg/dL 0.3 0.4   ALBUMIN g/dL 3.5 3.9   GLOBULIN gm/dL 3.9 4.1     Estimated Creatinine Clearance: 96.1 mL/min (by C-G formula based on SCr of 0.67 mg/dL).      Results from last 7 days   Lab Units 01/18/25  0258 01/17/25  1558   WBC 10*3/mm3 10.66 9.65   RBC 10*6/mm3 4.40 4.73   HEMOGLOBIN g/dL 13.5 14.2   HEMATOCRIT % 40.6 43.9   MCV fL 92.3 92.8   MCH pg 30.7 30.0   MCHC g/dL 33.3 32.3   RDW % 12.5 13.4   RDW-SD fl 42.1 46.1   MPV fL 10.5 10.1   PLATELETS 10*3/mm3 284 326   NEUTROPHIL % % 90.1* 58.9   LYMPHOCYTE % % 7.4* 26.8   MONOCYTES % % 1.7* 6.8   EOSINOPHIL % % 0.0* 6.8*   BASOPHIL % % 0.2 0.5   IMM GRAN % % 0.6* 0.2   NEUTROS ABS 10*3/mm3 9.61* 5.67   LYMPHS ABS 10*3/mm3 0.79 2.59   MONOS ABS 10*3/mm3 0.18 0.66   EOS ABS 10*3/mm3 0.00 0.66*   BASOS ABS 10*3/mm3 0.02 0.05   IMMATURE GRANS (ABS) 10*3/mm3 0.06* 0.02   NRBC /100 WBC 0.0  --          Results from last 7 days   Lab Units 01/17/25  1648 01/17/25  1558   HSTROP T ng/L <6 <6             Results from last 7 days   Lab Units 01/18/25  1439 01/18/25  0836 01/18/25  0540 01/18/25  0258 01/17/25  2330 01/17/25  1558   LACTATE mmol/L 2.6* 3.4* 4.0* 4.6* 4.4* 2.3*          Microbiology Results (last 10 days)       Procedure Component Value - Date/Time    Respiratory Panel PCR w/COVID-19(SARS-CoV-2) YAZMIN/SHAVONNE/CATRACHITO/PAD/COR/FEMI In-House, NP Swab in UTM/VTM, 2 HR TAT - Swab, Nasopharynx [[id number]]  (Normal) Collected: 01/17/25 2323    Lab Status: Final result Specimen: Swab from Nasopharynx Updated: 01/18/25 0127     ADENOVIRUS, PCR Not Detected     Coronavirus 229E Not Detected     Coronavirus HKU1 Not Detected     Coronavirus NL63 Not Detected     Coronavirus OC43 Not Detected     COVID19 Not Detected     Human Metapneumovirus Not Detected     Human Rhinovirus/Enterovirus Not Detected     Influenza A PCR Not Detected     Influenza B PCR Not Detected     Parainfluenza Virus 1 Not Detected     Parainfluenza Virus 2 Not Detected     Parainfluenza Virus 3 Not Detected     Parainfluenza Virus 4 Not Detected     RSV, PCR Not Detected     Bordetella pertussis pcr Not Detected     Bordetella parapertussis PCR Not Detected     Chlamydophila pneumoniae PCR Not Detected     Mycoplasma pneumo by PCR Not Detected    Narrative:      In the setting of a positive respiratory panel with a viral infection PLUS a negative procalcitonin without other underlying concern for bacterial infection, consider observing off antibiotics or discontinuation of antibiotics and continue supportive care. If the respiratory panel is positive for atypical bacterial infection (Bordetella pertussis, Chlamydophila pneumoniae, or Mycoplasma pneumoniae), consider antibiotic de-escalation to target atypical bacterial infection.    COVID-19 and FLU A/B PCR, 1 HR TAT - Swab, Nasopharynx [[id number]]  (Normal) Collected: 01/17/25 1530    Lab Status: Final result Specimen: Swab from Nasopharynx Updated: 01/17/25 1559     COVID19 Not Detected     Influenza A PCR Not Detected     Influenza B PCR Not Detected    Narrative:      Fact sheet for providers: https://www.fda.gov/media/206315/download    Fact sheet for patients:  https://www.fda.gov/media/947885/download    Test performed by PCR.    RSV PCR - Swab, Nasopharynx [[id number]]  (Normal) Collected: 01/17/25 1530    Lab Status: Final result Specimen: Swab from Nasopharynx Updated: 01/17/25 1559     RSV, PCR Not Detected                aspirin, 81 mg, Oral, Daily  atorvastatin, 20 mg, Oral, Nightly  budesonide-formoterol, 2 puff, Inhalation, BID - RT  cholecalciferol, 1,000 Units, Oral, Daily  cycloSPORINE, 1 drop, Both Eyes, BID  enoxaparin, 40 mg, Subcutaneous, Daily  fluticasone, 2 spray, Each Nare, Daily  [Held by provider] hydroCHLOROthiazide, 25 mg, Oral, Daily  insulin glargine, 15 Units, Subcutaneous, Q12H  insulin lispro, 2-7 Units, Subcutaneous, 4x Daily AC & at Bedtime  insulin lispro, 3 Units, Subcutaneous, TID With Meals  ipratropium-albuterol, 3 mL, Nebulization, Q4H - RT  levETIRAcetam, 250 mg, Oral, Daily  lisinopril, 10 mg, Oral, Daily  montelukast, 10 mg, Oral, Nightly  pantoprazole, 40 mg, Oral, Q AM  predniSONE, 40 mg, Oral, Daily With Breakfast  pregabalin, 100 mg, Oral, TID  propranolol, 40 mg, Oral, BID  revefenacin, 175 mcg, Nebulization, Daily - RT  sodium chloride, 10 mL, Intravenous, Q12H  traZODone, 100 mg, Oral, Nightly  Vitamin B-2, 300 mg, Oral, Daily           Diagnostics:  CT Chest Without Contrast Diagnostic    Result Date: 1/17/2025  CT CHEST WO CONTRAST DIAGNOSTIC-  INDICATION: Shortness of air, wheezing  COMPARISON: CT chest February 27, 2024  TECHNIQUE: Routine CT chest without IV contrast. Coronal and sagittal reformats. Radiation dose reduction techniques were utilized, including automated exposure control and exposure modulation based on body size.  FINDINGS:  Chest wall: No lymphadenopathy.  Mediastinum: No coronary artery atherosclerotic calcifications seen. Heart is normal in size. No pericardial effusion. Right paratracheal/4R lymph node, series 2, axial mage 32, measures 9 mm in short axis, unchanged. No new or enlarging mediastinal or  hilar lymph nodes seen.  Lungs/pleura: No effusions. Patent central airways. Small amount of posterior dependent and bibasilar subsegmental atelectasis. Solid pulmonary nodule in the superior segment right lower lobe, series 6, axial mage 45, measures 5 mm, unchanged. No new or enlarging pulmonary nodules identified. Calcified pulmonary nodule in the lateral basilar left lower lobe base, consistent with prior granulomatous infection.  Upper abdomen: Cholecystectomy. Mild pancreatic lipomatosis.  Osseous structures: No destructive osseous lesions.      No acute findings identified in the chest.  This report was finalized on 1/17/2025 5:17 PM by Dr. Xu Morley M.D on Workstation: [id number]      XR Chest 2 View    Result Date: 1/17/2025  XR CHEST 2 VW-  INDICATION: Shortness of breath, cough, asthma.  COMPARISON: Chest radiographs dating back to 1/11/2016      No focal consolidation. No pleural effusion or pneumothorax.  Normal size cardiomediastinal silhouette.  No focal osseous abnormality.   This report was finalized on 1/17/2025 4:09 PM by Dr. Fabrice Lorenzana M.D on Workstation: [id number]       XR Chest 1 View    Result Date: 1/8/2025  XR CHEST 1 VW-1/8/2025  HISTORY: Cough.  The heart size is at the upper limits of normal. Lungs appear free of acute infiltrates. Bones and soft tissues are unremarkable.      1. No acute process.   This report was finalized on 1/8/2025 1:51 PM by Dr. Mikael Lopez M.D on Workstation: [id number]      Results for orders placed during the hospital encounter of 02/01/24    Adult Transthoracic Echo Complete W/ Cont if Necessary Per Protocol    Interpretation Summary    Left ventricular systolic function is normal. Calculated left ventricular EF = 59%    Left ventricular diastolic function is consistent with (grade I) impaired relaxation.    Mild mitral valve regurgitation is present.          Active Hospital Problems    Diagnosis  POA    **Asthma [J45.909]  Yes     Hyponatremia [E87.1]  Unknown    Moderate asthma with exacerbation [J45.901]  Yes    Essential hypertension [I10]  Yes    Type 2 diabetes mellitus with hyperglycemia, without long-term current use of insulin [E11.65]  Yes      Resolved Hospital Problems   No resolved problems to display.         Assessment & Plan     Asthma with acute exacerbation and history of recent frequent acute exacerbations has eosinophilia presentation eosinophil count 660.  Dr. Cannon is recommended triple inhaler therapy and consideration of Biologics after discharge IgE and IgG level pending.  He is also checking an ANCA level  Possible vocal cord dysfunction Dr. Cannon felt the likely she had it and she may need work with speech therapy  Lactic acidosis may be in part due to exacerbation also due to beta agonist use improved  Hyperglycemia diabetes mellitus type 2 exacerbated by steroids  Obesity weight loss would help asthma    Plan for disposition:    Bal Morales Jr, MD  01/18/25  18:34 EST    Time:

## 2025-01-19 LAB
ALBUMIN SERPL-MCNC: 3.5 G/DL (ref 3.5–5.2)
ALBUMIN/GLOB SERPL: 1.1 G/DL
ALP SERPL-CCNC: 67 U/L (ref 39–117)
ALT SERPL W P-5'-P-CCNC: 36 U/L (ref 1–33)
ANION GAP SERPL CALCULATED.3IONS-SCNC: 8 MMOL/L (ref 5–15)
AST SERPL-CCNC: 17 U/L (ref 1–32)
BASOPHILS # BLD AUTO: 0.03 10*3/MM3 (ref 0–0.2)
BASOPHILS NFR BLD AUTO: 0.4 % (ref 0–1.5)
BILIRUB SERPL-MCNC: 0.4 MG/DL (ref 0–1.2)
BUN SERPL-MCNC: 15 MG/DL (ref 8–23)
BUN/CREAT SERPL: 24.2 (ref 7–25)
CALCIUM SPEC-SCNC: 9.1 MG/DL (ref 8.6–10.5)
CHLORIDE SERPL-SCNC: 106 MMOL/L (ref 98–107)
CO2 SERPL-SCNC: 26 MMOL/L (ref 22–29)
CREAT SERPL-MCNC: 0.62 MG/DL (ref 0.57–1)
DEPRECATED RDW RBC AUTO: 44.7 FL (ref 37–54)
EGFRCR SERPLBLD CKD-EPI 2021: 96.5 ML/MIN/1.73
EOSINOPHIL # BLD AUTO: 0.1 10*3/MM3 (ref 0–0.4)
EOSINOPHIL NFR BLD AUTO: 1.2 % (ref 0.3–6.2)
ERYTHROCYTE [DISTWIDTH] IN BLOOD BY AUTOMATED COUNT: 13.1 % (ref 12.3–15.4)
GLOBULIN UR ELPH-MCNC: 3.3 GM/DL
GLUCOSE BLDC GLUCOMTR-MCNC: 130 MG/DL (ref 70–130)
GLUCOSE BLDC GLUCOMTR-MCNC: 226 MG/DL (ref 70–130)
GLUCOSE BLDC GLUCOMTR-MCNC: 258 MG/DL (ref 70–130)
GLUCOSE BLDC GLUCOMTR-MCNC: 259 MG/DL (ref 70–130)
GLUCOSE SERPL-MCNC: 134 MG/DL (ref 65–99)
HCT VFR BLD AUTO: 39.5 % (ref 34–46.6)
HGB BLD-MCNC: 12.3 G/DL (ref 12–15.9)
IMM GRANULOCYTES # BLD AUTO: 0.03 10*3/MM3 (ref 0–0.05)
IMM GRANULOCYTES NFR BLD AUTO: 0.4 % (ref 0–0.5)
LYMPHOCYTES # BLD AUTO: 3.04 10*3/MM3 (ref 0.7–3.1)
LYMPHOCYTES NFR BLD AUTO: 36.8 % (ref 19.6–45.3)
MAGNESIUM SERPL-MCNC: 2.3 MG/DL (ref 1.6–2.4)
MCH RBC QN AUTO: 29.1 PG (ref 26.6–33)
MCHC RBC AUTO-ENTMCNC: 31.1 G/DL (ref 31.5–35.7)
MCV RBC AUTO: 93.4 FL (ref 79–97)
MONOCYTES # BLD AUTO: 0.76 10*3/MM3 (ref 0.1–0.9)
MONOCYTES NFR BLD AUTO: 9.2 % (ref 5–12)
NEUTROPHILS NFR BLD AUTO: 4.29 10*3/MM3 (ref 1.7–7)
NEUTROPHILS NFR BLD AUTO: 52 % (ref 42.7–76)
NRBC BLD AUTO-RTO: 0 /100 WBC (ref 0–0.2)
PHOSPHATE SERPL-MCNC: 2.3 MG/DL (ref 2.5–4.5)
PLATELET # BLD AUTO: 266 10*3/MM3 (ref 140–450)
PMV BLD AUTO: 10 FL (ref 6–12)
POTASSIUM SERPL-SCNC: 4 MMOL/L (ref 3.5–5.2)
PROT SERPL-MCNC: 6.8 G/DL (ref 6–8.5)
RBC # BLD AUTO: 4.23 10*6/MM3 (ref 3.77–5.28)
SODIUM SERPL-SCNC: 140 MMOL/L (ref 136–145)
WBC NRBC COR # BLD AUTO: 8.25 10*3/MM3 (ref 3.4–10.8)

## 2025-01-19 PROCEDURE — 25010000002 ENOXAPARIN PER 10 MG: Performed by: STUDENT IN AN ORGANIZED HEALTH CARE EDUCATION/TRAINING PROGRAM

## 2025-01-19 PROCEDURE — 63710000001 INSULIN GLARGINE PER 5 UNITS: Performed by: STUDENT IN AN ORGANIZED HEALTH CARE EDUCATION/TRAINING PROGRAM

## 2025-01-19 PROCEDURE — 94799 UNLISTED PULMONARY SVC/PX: CPT

## 2025-01-19 PROCEDURE — 63710000001 INSULIN LISPRO (HUMAN) PER 5 UNITS

## 2025-01-19 PROCEDURE — 85025 COMPLETE CBC W/AUTO DIFF WBC: CPT | Performed by: STUDENT IN AN ORGANIZED HEALTH CARE EDUCATION/TRAINING PROGRAM

## 2025-01-19 PROCEDURE — 94760 N-INVAS EAR/PLS OXIMETRY 1: CPT

## 2025-01-19 PROCEDURE — 82948 REAGENT STRIP/BLOOD GLUCOSE: CPT

## 2025-01-19 PROCEDURE — 25010000002 ONDANSETRON PER 1 MG: Performed by: STUDENT IN AN ORGANIZED HEALTH CARE EDUCATION/TRAINING PROGRAM

## 2025-01-19 PROCEDURE — 63710000001 PREDNISONE PER 1 MG: Performed by: STUDENT IN AN ORGANIZED HEALTH CARE EDUCATION/TRAINING PROGRAM

## 2025-01-19 PROCEDURE — 94761 N-INVAS EAR/PLS OXIMETRY MLT: CPT

## 2025-01-19 PROCEDURE — 80053 COMPREHEN METABOLIC PANEL: CPT | Performed by: STUDENT IN AN ORGANIZED HEALTH CARE EDUCATION/TRAINING PROGRAM

## 2025-01-19 PROCEDURE — 63710000001 REVEFENACIN 175 MCG/3ML SOLUTION: Performed by: INTERNAL MEDICINE

## 2025-01-19 PROCEDURE — 94664 DEMO&/EVAL PT USE INHALER: CPT

## 2025-01-19 PROCEDURE — 63710000001 INSULIN LISPRO (HUMAN) PER 5 UNITS: Performed by: STUDENT IN AN ORGANIZED HEALTH CARE EDUCATION/TRAINING PROGRAM

## 2025-01-19 PROCEDURE — 84100 ASSAY OF PHOSPHORUS: CPT | Performed by: STUDENT IN AN ORGANIZED HEALTH CARE EDUCATION/TRAINING PROGRAM

## 2025-01-19 PROCEDURE — 83735 ASSAY OF MAGNESIUM: CPT | Performed by: STUDENT IN AN ORGANIZED HEALTH CARE EDUCATION/TRAINING PROGRAM

## 2025-01-19 RX ADMIN — PREGABALIN 100 MG: 100 CAPSULE ORAL at 16:05

## 2025-01-19 RX ADMIN — Medication 10 ML: at 09:16

## 2025-01-19 RX ADMIN — CYCLOSPORINE 1 DROP: 0.5 EMULSION OPHTHALMIC at 09:11

## 2025-01-19 RX ADMIN — INSULIN LISPRO 3 UNITS: 100 INJECTION, SOLUTION INTRAVENOUS; SUBCUTANEOUS at 09:11

## 2025-01-19 RX ADMIN — ACETAMINOPHEN 1000 MG: 500 TABLET, FILM COATED ORAL at 09:09

## 2025-01-19 RX ADMIN — LISINOPRIL 10 MG: 10 TABLET ORAL at 09:10

## 2025-01-19 RX ADMIN — BENZONATATE 100 MG: 100 CAPSULE ORAL at 17:47

## 2025-01-19 RX ADMIN — INSULIN LISPRO 3 UNITS: 100 INJECTION, SOLUTION INTRAVENOUS; SUBCUTANEOUS at 12:59

## 2025-01-19 RX ADMIN — SENNOSIDES AND DOCUSATE SODIUM 2 TABLET: 50; 8.6 TABLET ORAL at 10:41

## 2025-01-19 RX ADMIN — IPRATROPIUM BROMIDE AND ALBUTEROL SULFATE 3 ML: 2.5; .5 SOLUTION RESPIRATORY (INHALATION) at 23:56

## 2025-01-19 RX ADMIN — PANTOPRAZOLE SODIUM 40 MG: 40 TABLET, DELAYED RELEASE ORAL at 05:47

## 2025-01-19 RX ADMIN — IPRATROPIUM BROMIDE AND ALBUTEROL SULFATE 3 ML: 2.5; .5 SOLUTION RESPIRATORY (INHALATION) at 19:40

## 2025-01-19 RX ADMIN — PREDNISONE 40 MG: 20 TABLET ORAL at 09:11

## 2025-01-19 RX ADMIN — ONDANSETRON 4 MG: 2 INJECTION, SOLUTION INTRAMUSCULAR; INTRAVENOUS at 09:09

## 2025-01-19 RX ADMIN — POLYETHYLENE GLYCOL 3350 17 G: 17 POWDER, FOR SOLUTION ORAL at 16:12

## 2025-01-19 RX ADMIN — LEVETIRACETAM 250 MG: 500 TABLET, FILM COATED ORAL at 09:10

## 2025-01-19 RX ADMIN — BUDESONIDE AND FORMOTEROL FUMARATE DIHYDRATE 2 PUFF: 160; 4.5 AEROSOL RESPIRATORY (INHALATION) at 19:41

## 2025-01-19 RX ADMIN — INSULIN LISPRO 3 UNITS: 100 INJECTION, SOLUTION INTRAVENOUS; SUBCUTANEOUS at 17:47

## 2025-01-19 RX ADMIN — BUDESONIDE AND FORMOTEROL FUMARATE DIHYDRATE 2 PUFF: 160; 4.5 AEROSOL RESPIRATORY (INHALATION) at 08:29

## 2025-01-19 RX ADMIN — REVEFENACIN 175 MCG: 175 SOLUTION RESPIRATORY (INHALATION) at 08:23

## 2025-01-19 RX ADMIN — IPRATROPIUM BROMIDE AND ALBUTEROL SULFATE 3 ML: 2.5; .5 SOLUTION RESPIRATORY (INHALATION) at 03:20

## 2025-01-19 RX ADMIN — INSULIN LISPRO 4 UNITS: 100 INJECTION, SOLUTION INTRAVENOUS; SUBCUTANEOUS at 22:43

## 2025-01-19 RX ADMIN — PROPRANOLOL HYDROCHLORIDE 40 MG: 20 TABLET ORAL at 21:12

## 2025-01-19 RX ADMIN — IPRATROPIUM BROMIDE AND ALBUTEROL SULFATE 3 ML: 2.5; .5 SOLUTION RESPIRATORY (INHALATION) at 15:50

## 2025-01-19 RX ADMIN — ASPIRIN 81 MG: 81 TABLET, DELAYED RELEASE ORAL at 09:10

## 2025-01-19 RX ADMIN — PREGABALIN 100 MG: 100 CAPSULE ORAL at 09:10

## 2025-01-19 RX ADMIN — INSULIN LISPRO 4 UNITS: 100 INJECTION, SOLUTION INTRAVENOUS; SUBCUTANEOUS at 12:58

## 2025-01-19 RX ADMIN — MONTELUKAST 10 MG: 10 TABLET, FILM COATED ORAL at 21:12

## 2025-01-19 RX ADMIN — IPRATROPIUM BROMIDE AND ALBUTEROL SULFATE 3 ML: 2.5; .5 SOLUTION RESPIRATORY (INHALATION) at 08:14

## 2025-01-19 RX ADMIN — CYCLOSPORINE 1 DROP: 0.5 EMULSION OPHTHALMIC at 21:12

## 2025-01-19 RX ADMIN — Medication 10 ML: at 21:14

## 2025-01-19 RX ADMIN — BENZONATATE 100 MG: 100 CAPSULE ORAL at 10:41

## 2025-01-19 RX ADMIN — INSULIN GLARGINE 15 UNITS: 100 INJECTION, SOLUTION SUBCUTANEOUS at 22:43

## 2025-01-19 RX ADMIN — BISACODYL 5 MG: 5 TABLET, COATED ORAL at 21:18

## 2025-01-19 RX ADMIN — PROPRANOLOL HYDROCHLORIDE 40 MG: 20 TABLET ORAL at 09:10

## 2025-01-19 RX ADMIN — IPRATROPIUM BROMIDE AND ALBUTEROL SULFATE 3 ML: 2.5; .5 SOLUTION RESPIRATORY (INHALATION) at 12:08

## 2025-01-19 RX ADMIN — INSULIN LISPRO 3 UNITS: 100 INJECTION, SOLUTION INTRAVENOUS; SUBCUTANEOUS at 17:48

## 2025-01-19 RX ADMIN — ENOXAPARIN SODIUM 40 MG: 100 INJECTION SUBCUTANEOUS at 09:11

## 2025-01-19 RX ADMIN — INSULIN GLARGINE 15 UNITS: 100 INJECTION, SOLUTION SUBCUTANEOUS at 09:12

## 2025-01-19 RX ADMIN — Medication 1000 UNITS: at 09:10

## 2025-01-19 RX ADMIN — PREGABALIN 100 MG: 100 CAPSULE ORAL at 21:12

## 2025-01-19 RX ADMIN — ATORVASTATIN CALCIUM 20 MG: 20 TABLET, FILM COATED ORAL at 21:12

## 2025-01-19 RX ADMIN — FLUTICASONE PROPIONATE 2 SPRAY: 50 SPRAY, METERED NASAL at 09:12

## 2025-01-19 RX ADMIN — Medication 300 MG: at 09:10

## 2025-01-19 NOTE — PLAN OF CARE
Goal Outcome Evaluation:           Progress: improving  Outcome Evaluation: VSS, on room air, infrequent cough, tessalon pearls given, Tylenol given for headache, zofran given for nausea, colace and miralax given for c/o constipation, monitoring blood sugar, refusing alarms, saline locked

## 2025-01-19 NOTE — PROGRESS NOTES
LOS: 0 days   Patient Care Team:  Sergo Owen MD as PCP - General (Internal Medicine)  Lex Morris MD as Consulting Physician (Hematology and Oncology)  Mikael David MD as Referring Physician (Internal Medicine)    Subjective     Following patient for exacerbation of asthma.  Patient has been having exacerbations for about 7 years now off and on.  This one been building for a number of days she tried managing at home unsuccessfully.  She is having mostly coughing particular with any deep breath not producing much in the way of sputum she is just worn out today she did not sleep any last night maybe her breathing is a little better and maybe her cough is a little less.  Patient is doing better today she just walked 7 laps around the nurses station she is exhausted she still has a cough nonproductive.  Talking with patient a lot of times her biggest problem is getting air in.  Review of Systems:          Objective     Vital Signs  Vital Sign Min/Max for last 24 hours  Temp  Min: 96.9 °F (36.1 °C)  Max: 97.4 °F (36.3 °C)   BP  Min: 132/57  Max: 159/69   Pulse  Min: 67  Max: 78   Resp  Min: 16  Max: 18   SpO2  Min: 94 %  Max: 99 %   No data recorded   No data recorded        Ventilator/Non-Invasive Ventilation Settings (From admission, onward)      None                         Body mass index is 32.23 kg/m².  I/O last 3 completed shifts:  In: 740 [P.O.:240; IV Piggyback:500]  Out: -   No intake/output data recorded.        Physical Exam:  General Appearance: Well-developed obese white female she is resting in bed today she was having inspiratory wheeze loudest over the laryngeal area she got nervous are little anxious he got worse.  Eyes: Conjunctiva are clear and anicteric  ENT: Mucous membranes are moist no erythema no exudates Mallampati 2 airway  Neck: No adenopathy or thyromegaly no jugular venous distension definitely has inspiratory wheezing over the laryngeal area.  Lungs: Actually  were clear I did not hear any wheezes rales or rhonchi she just had coughing paroxysms with deep breath  Cardiac: Regular rate rhythm no murmur  Abdomen: Soft nontender no palpable hepatosplenomegaly or mass  : Not examined  Musculoskeletal: Grossly normal aside from obesity  Skin: Warm and dry no jaundice no petechiae  Neuro: She is alert cooperative grossly intact  Extremities/P Vascular: No clubbing no cyanosis no edema palpable radial and dorsalis pedis pulses  MSE: Pleasant and appropriate       Labs:  Results from last 7 days   Lab Units 01/19/25  0712 01/18/25 0258 01/17/25  1558   GLUCOSE mg/dL 134* 342* 366*   SODIUM mmol/L 140 137 131*   POTASSIUM mmol/L 4.0 4.3 4.4   MAGNESIUM mg/dL 2.3  --   --    CO2 mmol/L 26.0 23.7 26.2   CHLORIDE mmol/L 106 99 95*   ANION GAP mmol/L 8.0 14.3 9.8   CREATININE mg/dL 0.62 0.67 0.69   BUN mg/dL 15 14 13   BUN / CREAT RATIO  24.2 20.9 18.8   CALCIUM mg/dL 9.1 9.3 9.5   ALK PHOS U/L 67 88 106   TOTAL PROTEIN g/dL 6.8 7.4 8.0   ALT (SGPT) U/L 36* 45* 51*   AST (SGOT) U/L 17 23 26   BILIRUBIN mg/dL 0.4 0.3 0.4   ALBUMIN g/dL 3.5 3.5 3.9   GLOBULIN gm/dL 3.3 3.9 4.1     Estimated Creatinine Clearance: 103.8 mL/min (by C-G formula based on SCr of 0.62 mg/dL).      Results from last 7 days   Lab Units 01/19/25 0712 01/18/25 0258 01/17/25  1558   WBC 10*3/mm3 8.25 10.66 9.65   RBC 10*6/mm3 4.23 4.40 4.73   HEMOGLOBIN g/dL 12.3 13.5 14.2   HEMATOCRIT % 39.5 40.6 43.9   MCV fL 93.4 92.3 92.8   MCH pg 29.1 30.7 30.0   MCHC g/dL 31.1* 33.3 32.3   RDW % 13.1 12.5 13.4   RDW-SD fl 44.7 42.1 46.1   MPV fL 10.0 10.5 10.1   PLATELETS 10*3/mm3 266 284 326   NEUTROPHIL % % 52.0 90.1* 58.9   LYMPHOCYTE % % 36.8 7.4* 26.8   MONOCYTES % % 9.2 1.7* 6.8   EOSINOPHIL % % 1.2 0.0* 6.8*   BASOPHIL % % 0.4 0.2 0.5   IMM GRAN % % 0.4 0.6* 0.2   NEUTROS ABS 10*3/mm3 4.29 9.61* 5.67   LYMPHS ABS 10*3/mm3 3.04 0.79 2.59   MONOS ABS 10*3/mm3 0.76 0.18 0.66   EOS ABS 10*3/mm3 0.10 0.00 0.66*   BASOS  ABS 10*3/mm3 0.03 0.02 0.05   IMMATURE GRANS (ABS) 10*3/mm3 0.03 0.06* 0.02   NRBC /100 WBC 0.0 0.0  --          Results from last 7 days   Lab Units 01/17/25  1648 01/17/25  1558   HSTROP T ng/L <6 <6             Results from last 7 days   Lab Units 01/18/25  2055 01/18/25  1439 01/18/25  0836 01/18/25  0540 01/18/25  0258 01/17/25  2330 01/17/25  1558   LACTATE mmol/L 2.9* 2.6* 3.4* 4.0* 4.6* 4.4* 2.3*         Microbiology Results (last 10 days)       Procedure Component Value - Date/Time    Respiratory Panel PCR w/COVID-19(SARS-CoV-2) [facility]AZ[facility]/SHAVONNE/CATRACHITO/PAD/COR/FEMI In-House, NP Swab in UTM/VTM, 2 HR TAT - Swab, Nasopharynx [[id number]]  (Normal) Collected: 01/17/25 2323    Lab Status: Final result Specimen: Swab from Nasopharynx Updated: 01/18/25 0127     ADENOVIRUS, PCR Not Detected     Coronavirus 229E Not Detected     Coronavirus HKU1 Not Detected     Coronavirus NL63 Not Detected     Coronavirus OC43 Not Detected     COVID19 Not Detected     Human Metapneumovirus Not Detected     Human Rhinovirus/Enterovirus Not Detected     Influenza A PCR Not Detected     Influenza B PCR Not Detected     Parainfluenza Virus 1 Not Detected     Parainfluenza Virus 2 Not Detected     Parainfluenza Virus 3 Not Detected     Parainfluenza Virus 4 Not Detected     RSV, PCR Not Detected     Bordetella pertussis pcr Not Detected     Bordetella parapertussis PCR Not Detected     Chlamydophila pneumoniae PCR Not Detected     Mycoplasma pneumo by PCR Not Detected    Narrative:      In the setting of a positive respiratory panel with a viral infection PLUS a negative procalcitonin without other underlying concern for bacterial infection, consider observing off antibiotics or discontinuation of antibiotics and continue supportive care. If the respiratory panel is positive for atypical bacterial infection (Bordetella pertussis, Chlamydophila pneumoniae, or Mycoplasma pneumoniae), consider antibiotic de-escalation to target atypical bacterial  infection.    COVID-19 and FLU A/B PCR, 1 HR TAT - Swab, Nasopharynx [[id number]]  (Normal) Collected: 01/17/25 1530    Lab Status: Final result Specimen: Swab from Nasopharynx Updated: 01/17/25 1559     COVID19 Not Detected     Influenza A PCR Not Detected     Influenza B PCR Not Detected    Narrative:      Fact sheet for providers: https://www.fda.gov/media/192972/download    Fact sheet for patients: https://www.fda.gov/media/448405/download    Test performed by PCR.    RSV PCR - Swab, Nasopharynx [[id number]]  (Normal) Collected: 01/17/25 1530    Lab Status: Final result Specimen: Swab from Nasopharynx Updated: 01/17/25 1559     RSV, PCR Not Detected                aspirin, 81 mg, Oral, Daily  atorvastatin, 20 mg, Oral, Nightly  budesonide-formoterol, 2 puff, Inhalation, BID - RT  cholecalciferol, 1,000 Units, Oral, Daily  cycloSPORINE, 1 drop, Both Eyes, BID  enoxaparin, 40 mg, Subcutaneous, Daily  fluticasone, 2 spray, Each Nare, Daily  [Held by provider] hydroCHLOROthiazide, 25 mg, Oral, Daily  insulin glargine, 15 Units, Subcutaneous, Q12H  insulin lispro, 2-7 Units, Subcutaneous, 4x Daily AC & at Bedtime  insulin lispro, 3 Units, Subcutaneous, TID With Meals  ipratropium-albuterol, 3 mL, Nebulization, Q4H - RT  levETIRAcetam, 250 mg, Oral, Daily  lisinopril, 10 mg, Oral, Daily  montelukast, 10 mg, Oral, Nightly  pantoprazole, 40 mg, Oral, Q AM  predniSONE, 40 mg, Oral, Daily With Breakfast  pregabalin, 100 mg, Oral, TID  propranolol, 40 mg, Oral, BID  revefenacin, 175 mcg, Nebulization, Daily - RT  sodium chloride, 10 mL, Intravenous, Q12H  traZODone, 100 mg, Oral, Nightly  Vitamin B-2, 300 mg, Oral, Daily           Diagnostics:  CT Chest Without Contrast Diagnostic    Result Date: 1/17/2025  CT CHEST WO CONTRAST DIAGNOSTIC-  INDICATION: Shortness of air, wheezing  COMPARISON: CT chest February 27, 2024  TECHNIQUE: Routine CT chest without IV contrast. Coronal and sagittal reformats. Radiation dose reduction  techniques were utilized, including automated exposure control and exposure modulation based on body size.  FINDINGS:  Chest wall: No lymphadenopathy.  Mediastinum: No coronary artery atherosclerotic calcifications seen. Heart is normal in size. No pericardial effusion. Right paratracheal/4R lymph node, series 2, axial mage 32, measures 9 mm in short axis, unchanged. No new or enlarging mediastinal or hilar lymph nodes seen.  Lungs/pleura: No effusions. Patent central airways. Small amount of posterior dependent and bibasilar subsegmental atelectasis. Solid pulmonary nodule in the superior segment right lower lobe, series 6, axial mage 45, measures 5 mm, unchanged. No new or enlarging pulmonary nodules identified. Calcified pulmonary nodule in the lateral basilar left lower lobe base, consistent with prior granulomatous infection.  Upper abdomen: Cholecystectomy. Mild pancreatic lipomatosis.  Osseous structures: No destructive osseous lesions.      No acute findings identified in the chest.  This report was finalized on 1/17/2025 5:17 PM by Dr. Xu Morley M.D on Workstation: [id number]      XR Chest 2 View    Result Date: 1/17/2025  XR CHEST 2 VW-  INDICATION: Shortness of breath, cough, asthma.  COMPARISON: Chest radiographs dating back to 1/11/2016      No focal consolidation. No pleural effusion or pneumothorax.  Normal size cardiomediastinal silhouette.  No focal osseous abnormality.   This report was finalized on 1/17/2025 4:09 PM by Dr. Fabrice Lorenzana M.D on Workstation: [id number]       XR Chest 1 View    Result Date: 1/8/2025  XR CHEST 1 VW-1/8/2025  HISTORY: Cough.  The heart size is at the upper limits of normal. Lungs appear free of acute infiltrates. Bones and soft tissues are unremarkable.      1. No acute process.   This report was finalized on 1/8/2025 1:51 PM by Dr. Mikael Lopez M.D on Workstation: [id number]      Results for orders placed during the hospital encounter of  02/01/24    Adult Transthoracic Echo Complete W/ Cont if Necessary Per Protocol    Interpretation Summary    Left ventricular systolic function is normal. Calculated left ventricular EF = 59%    Left ventricular diastolic function is consistent with (grade I) impaired relaxation.    Mild mitral valve regurgitation is present.          Active Hospital Problems    Diagnosis  POA    **Asthma [J45.909]  Yes    Hyponatremia [E87.1]  Unknown    Moderate asthma with exacerbation [J45.901]  Yes    Essential hypertension [I10]  Yes    Type 2 diabetes mellitus with hyperglycemia, without long-term current use of insulin [E11.65]  Yes      Resolved Hospital Problems   No resolved problems to display.         Assessment & Plan     Asthma with acute exacerbation and history of recent frequent acute exacerbations has eosinophilia presentation eosinophil count 660.  Dr. Cannon is recommended triple inhaler therapy and consideration of Biologics after discharge IgE and IgG level pending.  He is also checking an ANCA level.  I really think she probably should get antieosinophil therapy she can see Dr. Liu right after discharge to look into this.  Possible vocal cord dysfunction seems very likely that she does.  At discharge I would like her to get an appointment with Dr. Liu's soon as possible to arrange evaluation at the [facility] speech center with Cinthia Grimes for video laryngoscopy and relaxed breathing therapy  Lactic acidosis may be in part due to exacerbation also due to beta agonist use improved  Hyperglycemia diabetes mellitus type 2 exacerbated by steroids  Obesity weight loss would help asthma    Plan for disposition: She continues to do well she can probably go home tomorrow with systemic steroids another 5 days, triple therapy as noted by Dr. Hyde close follow-up with Dr. Noe Morales Jr, MD  01/19/25  16:43 EST    Time:

## 2025-01-19 NOTE — SIGNIFICANT NOTE
01/19/25 0864   OTHER   Discipline physical therapist   Rehab Time/Intention   Session Not Performed other (see comments)  (Per chart, pt up ad pierce. Will s/o for acute skilled PT needs. Please follow-up if functional mobility changes.)   Therapy Assessment/Plan (PT)   Criteria for Skilled Interventions Met (PT) no

## 2025-01-19 NOTE — PROGRESS NOTES
Name: Khalida Gilliland ADMIT: 2025   : 1955  PCP: Sergo Owen MD    MRN: [id number] LOS: 0 days   AGE/SEX: 69 y.o. female  ROOM: [facility]     Subjective   Subjective   Sitting up in the bed.  Reports she is feeling little rough, no significant improvement.  However dose not she slept better.  Continues to have cough, nonproductive.  Shortness of breath unchanged, denies chest pain, palpitations.  Blood glucose improved, reports she is very happy about it.    Review of Systems   As above  Objective   Objective   Vital Signs  Temp:  [96.9 °F (36.1 °C)-97.4 °F (36.3 °C)] 97 °F (36.1 °C)  Heart Rate:  [67-78] 70  Resp:  [16-18] 16  BP: (132-159)/(57-75) 133/62  SpO2:  [94 %-99 %] 95 %  on   ;   Device (Oxygen Therapy): room air  Body mass index is 32.23 kg/m².  Physical Exam    General: Alert, sitting up in the bed, ill-appearing,  HEENT: Normocephalic, atraumatic  CV: Regular rate and rhythm, no murmurs rubs or gallops  Lungs: Diminished, mild expiratory wheezing, on room air  Abdomen: Soft, nontender, nondistended  Extremities: No significant peripheral edema , no cyanosis     Results Review     I reviewed the patient's new clinical results.  Results from last 7 days   Lab Units 25  0712 25  0258 25  1558   WBC 10*3/mm3 8.25 10.66 9.65   HEMOGLOBIN g/dL 12.3 13.5 14.2   PLATELETS 10*3/mm3 266 284 326     Results from last 7 days   Lab Units 25  0712 25  0258 25  1558   SODIUM mmol/L 140 137 131*   POTASSIUM mmol/L 4.0 4.3 4.4   CHLORIDE mmol/L 106 99 95*   CO2 mmol/L 26.0 23.7 26.2   BUN mg/dL 15 14 13   CREATININE mg/dL 0.62 0.67 0.69   GLUCOSE mg/dL 134* 342* 366*   Estimated Creatinine Clearance: 103.8 mL/min (by C-G formula based on SCr of 0.62 mg/dL).  Results from last 7 days   Lab Units 25  0712 25  0258 25  1558   ALBUMIN g/dL 3.5 3.5 3.9   BILIRUBIN mg/dL 0.4 0.3 0.4   ALK PHOS U/L 67 88 106   AST (SGOT) U/L 17 23 26   ALT (SGPT) U/L  36* 45* 51*     Results from last 7 days   Lab Units 01/19/25  0712 01/18/25  0258 01/17/25  1558   CALCIUM mg/dL 9.1 9.3 9.5   ALBUMIN g/dL 3.5 3.5 3.9   MAGNESIUM mg/dL 2.3  --   --    PHOSPHORUS mg/dL 2.3*  --   --      Results from last 7 days   Lab Units 01/18/25  2055 01/18/25  1439 01/18/25  0836 01/18/25  0540   LACTATE mmol/L 2.9* 2.6* 3.4* 4.0*     COVID19   Date Value Ref Range Status   01/17/2025 Not Detected Not Detected - Ref. Range Final   01/17/2025 Not Detected Not Detected - Ref. Range Final     Glucose   Date/Time Value Ref Range Status   01/19/2025 1119 259 (H) 70 - 130 mg/dL Final   01/19/2025 0734 130 70 - 130 mg/dL Final   01/18/2025 2136 260 (H) 70 - 130 mg/dL Final   01/18/2025 1632 299 (H) 70 - 130 mg/dL Final   01/18/2025 1201 264 (H) 70 - 130 mg/dL Final   01/18/2025 0830 308 (H) 70 - 130 mg/dL Final   01/17/2025 2256 446 (C) 70 - 130 mg/dL Final           CT Chest Without Contrast Diagnostic  Narrative: CT CHEST WO CONTRAST DIAGNOSTIC-     INDICATION: Shortness of air, wheezing     COMPARISON: CT chest February 27, 2024     TECHNIQUE:  Routine CT chest without IV contrast. Coronal and sagittal reformats.  Radiation dose reduction techniques were utilized, including automated  exposure control and exposure modulation based on body size.     FINDINGS:      Chest wall: No lymphadenopathy.     Mediastinum: No coronary artery atherosclerotic calcifications seen.  Heart is normal in size. No pericardial effusion. Right paratracheal/4R  lymph node, series 2, axial mage 32, measures 9 mm in short axis,  unchanged. No new or enlarging mediastinal or hilar lymph nodes seen.     Lungs/pleura: No effusions. Patent central airways. Small amount of  posterior dependent and bibasilar subsegmental atelectasis. Solid  pulmonary nodule in the superior segment right lower lobe, series 6,  axial mage 45, measures 5 mm, unchanged. No new or enlarging pulmonary  nodules identified. Calcified pulmonary nodule  in the lateral basilar  left lower lobe base, consistent with prior granulomatous infection.     Upper abdomen: Cholecystectomy. Mild pancreatic lipomatosis.     Osseous structures: No destructive osseous lesions.     Impression: No acute findings identified in the chest.     This report was finalized on 1/17/2025 5:17 PM by Dr. Xu Morley M.D on Workstation: [id number]     XR Chest 2 View  Narrative: XR CHEST 2 VW-     INDICATION: Shortness of breath, cough, asthma.     COMPARISON: Chest radiographs dating back to 1/11/2016     Impression: No focal consolidation. No pleural effusion or pneumothorax.   Normal size cardiomediastinal silhouette.  No focal osseous  abnormality.       This report was finalized on 1/17/2025 4:09 PM by Dr. Fabrice Lorenzana M.D on Workstation: [id number]          Scheduled Medications  aspirin, 81 mg, Oral, Daily  atorvastatin, 20 mg, Oral, Nightly  budesonide-formoterol, 2 puff, Inhalation, BID - RT  cholecalciferol, 1,000 Units, Oral, Daily  cycloSPORINE, 1 drop, Both Eyes, BID  enoxaparin, 40 mg, Subcutaneous, Daily  fluticasone, 2 spray, Each Nare, Daily  [Held by provider] hydroCHLOROthiazide, 25 mg, Oral, Daily  insulin glargine, 15 Units, Subcutaneous, Q12H  insulin lispro, 2-7 Units, Subcutaneous, 4x Daily AC & at Bedtime  insulin lispro, 3 Units, Subcutaneous, TID With Meals  ipratropium-albuterol, 3 mL, Nebulization, Q4H - RT  levETIRAcetam, 250 mg, Oral, Daily  lisinopril, 10 mg, Oral, Daily  montelukast, 10 mg, Oral, Nightly  pantoprazole, 40 mg, Oral, Q AM  predniSONE, 40 mg, Oral, Daily With Breakfast  pregabalin, 100 mg, Oral, TID  propranolol, 40 mg, Oral, BID  revefenacin, 175 mcg, Nebulization, Daily - RT  sodium chloride, 10 mL, Intravenous, Q12H  traZODone, 100 mg, Oral, Nightly  Vitamin B-2, 300 mg, Oral, Daily    Infusions     Diet  Diet: Regular/House, Diabetic; Consistent Carbohydrate; Fluid Consistency: Thin (IDDSI 0)    I have personally reviewed     [x]   Laboratory   [x]  Microbiology   [x]  Radiology   [x]  EKG/Telemetry  []  Cardiology/Vascular   []  Pathology    []  Records       Assessment/Plan     Active Hospital Problems    Diagnosis  POA    **Asthma [J45.909]  Yes    Hyponatremia [E87.1]  Unknown    Moderate asthma with exacerbation [J45.901]  Yes    Essential hypertension [I10]  Yes    Type 2 diabetes mellitus with hyperglycemia, without long-term current use of insulin [E11.65]  Yes      Resolved Hospital Problems   No resolved problems to display.     Ms. Gilliland is a 69 y.o. female with a history of asthma, GERD, type 2 diabetes mellitus, migraines, hypertension, and cerebral aneurysm that presents to [facility] complaining of shortness of breath.       Acute asthma exacerbation  -CXR and CT chest negative for infiltrate or consolidation  -Respiratory panel negative  -On scheduled DuoNebs, Symbicort, prednisone  -Continue Singulair  -Pulmonology following  -Possible vocal cord dysfunction.  Speech therapy evaluated, recommended outpatient  Speech Language Pathology for Videostroboscopy for further evaluation and treatment         Type 2 diabetes mellitus  -Blood glucose elevated secondary to steroids  -Continue Lantus to 15 units twice daily  -Continue SSI,scheduled lispro with meals 3 units  -Blood glucose improved     Elevated lactic acid  -Lactic acid elevated at 2.3, no evidence of sepsis or hypoperfusion on exam  -Suspect secondary to albuterol/COPD exacerbation  -Bicarbonate and anion gap normal  -Lactic acid peaked at 4.6, last lactic acid was 2.9  -Repeat lactic acid in AM.  No concern for sepsis currently.     Mild Hyponatremia  -Clinically insignificant  -Resolved, monitor     Hypertension  -EKG was sinus rhythm, no ischemic changes  -Troponin negative, suspect chest pressure associated with above  -Continue lisinopril, HCTZ, Toprol     Elevated transaminases  -ALT elevated at 51 on admission, actually decreased from most recent  check  -ALT improved, ALT remains normal.  -Resume statin , monitor liver enzymes  -ALT improved     Lovenox 40 mg SC daily for DVT prophylaxis.  Full code.  Discussed with patient.  Disposition: Home, likely in 1-2 days  Expected discharge date/ time has not been documented.       Copied text in this note has been reviewed and is accurate as of 01/19/25.         Dictated utilizing Dragon dictation        Dontrell Iverson MD  [facility]  01/19/25  15:54 EST

## 2025-01-19 NOTE — PLAN OF CARE
Goal Outcome Evaluation:  Plan of Care Reviewed With: patient        Progress: improving  Outcome Evaluation: afebrile, infrequent dry cough, trending lactate, pt refusing breathing treatments at times, independent, benadryl given for sleep, saline locked, monitoring blood sugars and correction given as ordered, resting between care

## 2025-01-20 LAB
ALBUMIN SERPL-MCNC: 3.6 G/DL (ref 3.5–5.2)
ALBUMIN/GLOB SERPL: 1 G/DL
ALP SERPL-CCNC: 71 U/L (ref 39–117)
ALT SERPL W P-5'-P-CCNC: 42 U/L (ref 1–33)
ANION GAP SERPL CALCULATED.3IONS-SCNC: 10.1 MMOL/L (ref 5–15)
AST SERPL-CCNC: 26 U/L (ref 1–32)
BASOPHILS # BLD AUTO: 0.03 10*3/MM3 (ref 0–0.2)
BASOPHILS NFR BLD AUTO: 0.4 % (ref 0–1.5)
BILIRUB SERPL-MCNC: 0.4 MG/DL (ref 0–1.2)
BUN SERPL-MCNC: 16 MG/DL (ref 8–23)
BUN/CREAT SERPL: 25 (ref 7–25)
CALCIUM SPEC-SCNC: 8.8 MG/DL (ref 8.6–10.5)
CHLORIDE SERPL-SCNC: 102 MMOL/L (ref 98–107)
CO2 SERPL-SCNC: 24.9 MMOL/L (ref 22–29)
CREAT SERPL-MCNC: 0.64 MG/DL (ref 0.57–1)
DEPRECATED RDW RBC AUTO: 43.6 FL (ref 37–54)
EGFRCR SERPLBLD CKD-EPI 2021: 95.8 ML/MIN/1.73
EOSINOPHIL # BLD AUTO: 0.14 10*3/MM3 (ref 0–0.4)
EOSINOPHIL NFR BLD AUTO: 1.8 % (ref 0.3–6.2)
ERYTHROCYTE [DISTWIDTH] IN BLOOD BY AUTOMATED COUNT: 13.1 % (ref 12.3–15.4)
GLOBULIN UR ELPH-MCNC: 3.5 GM/DL
GLUCOSE BLDC GLUCOMTR-MCNC: 107 MG/DL (ref 70–130)
GLUCOSE BLDC GLUCOMTR-MCNC: 203 MG/DL (ref 70–130)
GLUCOSE BLDC GLUCOMTR-MCNC: 204 MG/DL (ref 70–130)
GLUCOSE BLDC GLUCOMTR-MCNC: 244 MG/DL (ref 70–130)
GLUCOSE SERPL-MCNC: 148 MG/DL (ref 65–99)
HCT VFR BLD AUTO: 39 % (ref 34–46.6)
HGB BLD-MCNC: 12.8 G/DL (ref 12–15.9)
IMM GRANULOCYTES # BLD AUTO: 0.02 10*3/MM3 (ref 0–0.05)
IMM GRANULOCYTES NFR BLD AUTO: 0.3 % (ref 0–0.5)
LDH SERPL-CCNC: 149 U/L (ref 135–214)
LYMPHOCYTES # BLD AUTO: 3.57 10*3/MM3 (ref 0.7–3.1)
LYMPHOCYTES NFR BLD AUTO: 46.5 % (ref 19.6–45.3)
MCH RBC QN AUTO: 30.4 PG (ref 26.6–33)
MCHC RBC AUTO-ENTMCNC: 32.8 G/DL (ref 31.5–35.7)
MCV RBC AUTO: 92.6 FL (ref 79–97)
MONOCYTES # BLD AUTO: 0.84 10*3/MM3 (ref 0.1–0.9)
MONOCYTES NFR BLD AUTO: 10.9 % (ref 5–12)
NEUTROPHILS NFR BLD AUTO: 3.08 10*3/MM3 (ref 1.7–7)
NEUTROPHILS NFR BLD AUTO: 40.1 % (ref 42.7–76)
NRBC BLD AUTO-RTO: 0 /100 WBC (ref 0–0.2)
PLATELET # BLD AUTO: 275 10*3/MM3 (ref 140–450)
PMV BLD AUTO: 10 FL (ref 6–12)
POTASSIUM SERPL-SCNC: 3.8 MMOL/L (ref 3.5–5.2)
PROT SERPL-MCNC: 7.1 G/DL (ref 6–8.5)
RBC # BLD AUTO: 4.21 10*6/MM3 (ref 3.77–5.28)
SODIUM SERPL-SCNC: 137 MMOL/L (ref 136–145)
WBC NRBC COR # BLD AUTO: 7.68 10*3/MM3 (ref 3.4–10.8)

## 2025-01-20 PROCEDURE — 82948 REAGENT STRIP/BLOOD GLUCOSE: CPT

## 2025-01-20 PROCEDURE — 63710000001 PREDNISONE PER 1 MG: Performed by: STUDENT IN AN ORGANIZED HEALTH CARE EDUCATION/TRAINING PROGRAM

## 2025-01-20 PROCEDURE — 94799 UNLISTED PULMONARY SVC/PX: CPT

## 2025-01-20 PROCEDURE — 63710000001 INSULIN LISPRO (HUMAN) PER 5 UNITS: Performed by: STUDENT IN AN ORGANIZED HEALTH CARE EDUCATION/TRAINING PROGRAM

## 2025-01-20 PROCEDURE — 94761 N-INVAS EAR/PLS OXIMETRY MLT: CPT

## 2025-01-20 PROCEDURE — 85025 COMPLETE CBC W/AUTO DIFF WBC: CPT | Performed by: STUDENT IN AN ORGANIZED HEALTH CARE EDUCATION/TRAINING PROGRAM

## 2025-01-20 PROCEDURE — 83615 LACTATE (LD) (LDH) ENZYME: CPT | Performed by: STUDENT IN AN ORGANIZED HEALTH CARE EDUCATION/TRAINING PROGRAM

## 2025-01-20 PROCEDURE — 25010000002 ENOXAPARIN PER 10 MG: Performed by: STUDENT IN AN ORGANIZED HEALTH CARE EDUCATION/TRAINING PROGRAM

## 2025-01-20 PROCEDURE — 63710000001 REVEFENACIN 175 MCG/3ML SOLUTION: Performed by: INTERNAL MEDICINE

## 2025-01-20 PROCEDURE — 94664 DEMO&/EVAL PT USE INHALER: CPT

## 2025-01-20 PROCEDURE — 63710000001 INSULIN GLARGINE PER 5 UNITS: Performed by: STUDENT IN AN ORGANIZED HEALTH CARE EDUCATION/TRAINING PROGRAM

## 2025-01-20 PROCEDURE — 63710000001 INSULIN LISPRO (HUMAN) PER 5 UNITS

## 2025-01-20 PROCEDURE — 80053 COMPREHEN METABOLIC PANEL: CPT | Performed by: STUDENT IN AN ORGANIZED HEALTH CARE EDUCATION/TRAINING PROGRAM

## 2025-01-20 RX ORDER — DEXTROMETHORPHAN POLISTIREX 30 MG/5ML
60 SUSPENSION ORAL EVERY 12 HOURS SCHEDULED
Status: DISCONTINUED | OUTPATIENT
Start: 2025-01-20 | End: 2025-01-24 | Stop reason: HOSPADM

## 2025-01-20 RX ADMIN — ZOLPIDEM TARTRATE 5 MG: 5 TABLET, FILM COATED ORAL at 21:14

## 2025-01-20 RX ADMIN — PROPRANOLOL HYDROCHLORIDE 40 MG: 20 TABLET ORAL at 20:59

## 2025-01-20 RX ADMIN — IPRATROPIUM BROMIDE AND ALBUTEROL SULFATE 3 ML: 2.5; .5 SOLUTION RESPIRATORY (INHALATION) at 07:45

## 2025-01-20 RX ADMIN — INSULIN LISPRO 3 UNITS: 100 INJECTION, SOLUTION INTRAVENOUS; SUBCUTANEOUS at 12:14

## 2025-01-20 RX ADMIN — INSULIN LISPRO 3 UNITS: 100 INJECTION, SOLUTION INTRAVENOUS; SUBCUTANEOUS at 09:56

## 2025-01-20 RX ADMIN — BUDESONIDE AND FORMOTEROL FUMARATE DIHYDRATE 2 PUFF: 160; 4.5 AEROSOL RESPIRATORY (INHALATION) at 19:34

## 2025-01-20 RX ADMIN — DEXTROMETHORPHAN 60 MG: 30 SUSPENSION, EXTENDED RELEASE ORAL at 21:00

## 2025-01-20 RX ADMIN — FLUTICASONE PROPIONATE 2 SPRAY: 50 SPRAY, METERED NASAL at 09:58

## 2025-01-20 RX ADMIN — Medication 300 MG: at 09:57

## 2025-01-20 RX ADMIN — INSULIN LISPRO 3 UNITS: 100 INJECTION, SOLUTION INTRAVENOUS; SUBCUTANEOUS at 17:29

## 2025-01-20 RX ADMIN — PREGABALIN 100 MG: 100 CAPSULE ORAL at 20:59

## 2025-01-20 RX ADMIN — PROPRANOLOL HYDROCHLORIDE 40 MG: 20 TABLET ORAL at 09:57

## 2025-01-20 RX ADMIN — IPRATROPIUM BROMIDE AND ALBUTEROL SULFATE 3 ML: 2.5; .5 SOLUTION RESPIRATORY (INHALATION) at 11:16

## 2025-01-20 RX ADMIN — Medication 1000 UNITS: at 09:57

## 2025-01-20 RX ADMIN — LEVETIRACETAM 250 MG: 500 TABLET, FILM COATED ORAL at 09:57

## 2025-01-20 RX ADMIN — PREGABALIN 100 MG: 100 CAPSULE ORAL at 09:56

## 2025-01-20 RX ADMIN — DEXTROMETHORPHAN 60 MG: 30 SUSPENSION, EXTENDED RELEASE ORAL at 12:14

## 2025-01-20 RX ADMIN — IPRATROPIUM BROMIDE AND ALBUTEROL SULFATE 3 ML: 2.5; .5 SOLUTION RESPIRATORY (INHALATION) at 15:08

## 2025-01-20 RX ADMIN — ASPIRIN 81 MG: 81 TABLET, DELAYED RELEASE ORAL at 09:57

## 2025-01-20 RX ADMIN — POLYETHYLENE GLYCOL 3350 17 G: 17 POWDER, FOR SOLUTION ORAL at 17:06

## 2025-01-20 RX ADMIN — IPRATROPIUM BROMIDE AND ALBUTEROL SULFATE 3 ML: 2.5; .5 SOLUTION RESPIRATORY (INHALATION) at 19:34

## 2025-01-20 RX ADMIN — REVEFENACIN 175 MCG: 175 SOLUTION RESPIRATORY (INHALATION) at 07:51

## 2025-01-20 RX ADMIN — PREGABALIN 100 MG: 100 CAPSULE ORAL at 16:58

## 2025-01-20 RX ADMIN — INSULIN GLARGINE 15 UNITS: 100 INJECTION, SOLUTION SUBCUTANEOUS at 09:54

## 2025-01-20 RX ADMIN — BISACODYL 10 MG: 10 SUPPOSITORY RECTAL at 04:01

## 2025-01-20 RX ADMIN — INSULIN LISPRO 3 UNITS: 100 INJECTION, SOLUTION INTRAVENOUS; SUBCUTANEOUS at 21:14

## 2025-01-20 RX ADMIN — LISINOPRIL 10 MG: 10 TABLET ORAL at 09:56

## 2025-01-20 RX ADMIN — BUDESONIDE AND FORMOTEROL FUMARATE DIHYDRATE 2 PUFF: 160; 4.5 AEROSOL RESPIRATORY (INHALATION) at 07:49

## 2025-01-20 RX ADMIN — MONTELUKAST 10 MG: 10 TABLET, FILM COATED ORAL at 20:59

## 2025-01-20 RX ADMIN — IPRATROPIUM BROMIDE AND ALBUTEROL SULFATE 3 ML: 2.5; .5 SOLUTION RESPIRATORY (INHALATION) at 03:32

## 2025-01-20 RX ADMIN — ENOXAPARIN SODIUM 40 MG: 100 INJECTION SUBCUTANEOUS at 09:56

## 2025-01-20 RX ADMIN — TRAZODONE HYDROCHLORIDE 100 MG: 100 TABLET ORAL at 20:59

## 2025-01-20 RX ADMIN — PREDNISONE 40 MG: 20 TABLET ORAL at 09:57

## 2025-01-20 RX ADMIN — Medication 10 ML: at 21:00

## 2025-01-20 RX ADMIN — INSULIN GLARGINE 15 UNITS: 100 INJECTION, SOLUTION SUBCUTANEOUS at 21:14

## 2025-01-20 RX ADMIN — Medication 10 ML: at 09:57

## 2025-01-20 RX ADMIN — PANTOPRAZOLE SODIUM 40 MG: 40 TABLET, DELAYED RELEASE ORAL at 05:55

## 2025-01-20 RX ADMIN — CYCLOSPORINE 1 DROP: 0.5 EMULSION OPHTHALMIC at 20:59

## 2025-01-20 RX ADMIN — CYCLOSPORINE 1 DROP: 0.5 EMULSION OPHTHALMIC at 09:54

## 2025-01-20 RX ADMIN — BENZONATATE 100 MG: 100 CAPSULE ORAL at 04:56

## 2025-01-20 RX ADMIN — ATORVASTATIN CALCIUM 20 MG: 20 TABLET, FILM COATED ORAL at 20:59

## 2025-01-20 NOTE — PLAN OF CARE
Goal Outcome Evaluation:           Progress: improving  Outcome Evaluation: VSS. up ad pierce. refusing bed alarm.  gait steady.  blood sugar monitoring. Insulin given last night. occasional NP cough.  c/o constipation.  prn dulcolax given.

## 2025-01-20 NOTE — PROGRESS NOTES
Name: Khalida Gilliland ADMIT: 2025   : 1955  PCP: Sergo Owen MD    MRN: [id number] LOS: 1 days   AGE/SEX: 69 y.o. female  ROOM: [facility]     Subjective   Subjective   Patient feels awful.  She cannot finish a sentence without coughing multiple times.  Some mild shortness of breath.  On room air with appropriate saturations.  Cough is nonproductive.         Objective   Objective   Vital Signs  Temp:  [96.5 °F (35.8 °C)-98.1 °F (36.7 °C)] 96.7 °F (35.9 °C)  Heart Rate:  [64-98] 72  Resp:  [16-20] 16  BP: (123-157)/(61-82) 155/82  SpO2:  [94 %-100 %] 96 %  on   ;   Device (Oxygen Therapy): room air  Body mass index is 32.23 kg/m².  Physical Exam  Constitutional:       General: She is not in acute distress.     Appearance: She is ill-appearing.   Cardiovascular:      Rate and Rhythm: Normal rate and regular rhythm.   Pulmonary:      Effort: Pulmonary effort is normal. No respiratory distress.      Comments: Diminished bilaterally.  Faint expiratory wheezes.  Abdominal:      General: Abdomen is flat. There is no distension.      Tenderness: There is no abdominal tenderness.   Musculoskeletal:         General: No swelling or deformity. Normal range of motion.   Skin:     General: Skin is warm and dry.   Neurological:      General: No focal deficit present.      Mental Status: She is alert. Mental status is at baseline.         Results Review     I reviewed the patient's new clinical results.  Results from last 7 days   Lab Units 25  0629 25  0712 25  1558   WBC 10*3/mm3 7.68 8.25 10.66 9.65   HEMOGLOBIN g/dL 12.8 12.3 13.5 14.2   PLATELETS 10*3/mm3 275 266 284 326     Results from last 7 days   Lab Units 25  0629 25  0712 25  0258 25  1558   SODIUM mmol/L 137 140 137 131*   POTASSIUM mmol/L 3.8 4.0 4.3 4.4   CHLORIDE mmol/L 102 106 99 95*   CO2 mmol/L 24.9 26.0 23.7 26.2   BUN mg/dL 16 15 14 13   CREATININE mg/dL 0.64 0.62 0.67 0.69   GLUCOSE  mg/dL 148* 134* 342* 366*   Estimated Creatinine Clearance: 100.6 mL/min (by C-G formula based on SCr of 0.64 mg/dL).  Results from last 7 days   Lab Units 01/20/25  0629 01/19/25  0712 01/18/25  0258 01/17/25  1558   ALBUMIN g/dL 3.6 3.5 3.5 3.9   BILIRUBIN mg/dL 0.4 0.4 0.3 0.4   ALK PHOS U/L 71 67 88 106   AST (SGOT) U/L 26 17 23 26   ALT (SGPT) U/L 42* 36* 45* 51*     Results from last 7 days   Lab Units 01/20/25  0629 01/19/25  0712 01/18/25  0258 01/17/25  1558   CALCIUM mg/dL 8.8 9.1 9.3 9.5   ALBUMIN g/dL 3.6 3.5 3.5 3.9   MAGNESIUM mg/dL  --  2.3  --   --    PHOSPHORUS mg/dL  --  2.3*  --   --      Results from last 7 days   Lab Units 01/18/25 2055 01/18/25  1439 01/18/25  0836 01/18/25  0540   LACTATE mmol/L 2.9* 2.6* 3.4* 4.0*     COVID19   Date Value Ref Range Status   01/17/2025 Not Detected Not Detected - Ref. Range Final   01/17/2025 Not Detected Not Detected - Ref. Range Final     Glucose   Date/Time Value Ref Range Status   01/20/2025 1127 244 (H) 70 - 130 mg/dL Final   01/20/2025 0816 107 70 - 130 mg/dL Final   01/19/2025 2220 258 (H) 70 - 130 mg/dL Final   01/19/2025 1631 226 (H) 70 - 130 mg/dL Final   01/19/2025 1119 259 (H) 70 - 130 mg/dL Final   01/19/2025 0734 130 70 - 130 mg/dL Final   01/18/2025 2136 260 (H) 70 - 130 mg/dL Final       CT Chest Without Contrast Diagnostic  Narrative: CT CHEST WO CONTRAST DIAGNOSTIC-     INDICATION: Shortness of air, wheezing     COMPARISON: CT chest February 27, 2024     TECHNIQUE:  Routine CT chest without IV contrast. Coronal and sagittal reformats.  Radiation dose reduction techniques were utilized, including automated  exposure control and exposure modulation based on body size.     FINDINGS:      Chest wall: No lymphadenopathy.     Mediastinum: No coronary artery atherosclerotic calcifications seen.  Heart is normal in size. No pericardial effusion. Right paratracheal/4R  lymph node, series 2, axial mage 32, measures 9 mm in short axis,  unchanged. No  new or enlarging mediastinal or hilar lymph nodes seen.     Lungs/pleura: No effusions. Patent central airways. Small amount of  posterior dependent and bibasilar subsegmental atelectasis. Solid  pulmonary nodule in the superior segment right lower lobe, series 6,  axial mage 45, measures 5 mm, unchanged. No new or enlarging pulmonary  nodules identified. Calcified pulmonary nodule in the lateral basilar  left lower lobe base, consistent with prior granulomatous infection.     Upper abdomen: Cholecystectomy. Mild pancreatic lipomatosis.     Osseous structures: No destructive osseous lesions.     Impression: No acute findings identified in the chest.     This report was finalized on 1/17/2025 5:17 PM by Dr. Xu Morley M.D on Workstation: [id number]     XR Chest 2 View  Narrative: XR CHEST 2 VW-     INDICATION: Shortness of breath, cough, asthma.     COMPARISON: Chest radiographs dating back to 1/11/2016     Impression: No focal consolidation. No pleural effusion or pneumothorax.   Normal size cardiomediastinal silhouette.  No focal osseous  abnormality.       This report was finalized on 1/17/2025 4:09 PM by Dr. Fabrice Lorenzana M.D on Workstation: [id number]          I reviewed the patient's daily medications.  Scheduled Medications  aspirin, 81 mg, Oral, Daily  atorvastatin, 20 mg, Oral, Nightly  budesonide-formoterol, 2 puff, Inhalation, BID - RT  cholecalciferol, 1,000 Units, Oral, Daily  cycloSPORINE, 1 drop, Both Eyes, BID  dextromethorphan polistirex ER, 60 mg, Oral, Q12H  enoxaparin, 40 mg, Subcutaneous, Daily  fluticasone, 2 spray, Each Nare, Daily  [Held by provider] hydroCHLOROthiazide, 25 mg, Oral, Daily  insulin glargine, 15 Units, Subcutaneous, Q12H  insulin lispro, 2-7 Units, Subcutaneous, 4x Daily AC & at Bedtime  insulin lispro, 3 Units, Subcutaneous, TID With Meals  ipratropium-albuterol, 3 mL, Nebulization, Q4H - RT  levETIRAcetam, 250 mg, Oral, Daily  lisinopril, 10 mg, Oral,  Daily  montelukast, 10 mg, Oral, Nightly  pantoprazole, 40 mg, Oral, Q AM  predniSONE, 40 mg, Oral, Daily With Breakfast  pregabalin, 100 mg, Oral, TID  propranolol, 40 mg, Oral, BID  revefenacin, 175 mcg, Nebulization, Daily - RT  sodium chloride, 10 mL, Intravenous, Q12H  traZODone, 100 mg, Oral, Nightly  Vitamin B-2, 300 mg, Oral, Daily    Infusions   Diet  Diet: Regular/House, Diabetic; Consistent Carbohydrate; Fluid Consistency: Thin (IDDSI 0)         I have personally reviewed:  [x]  Laboratory   [x]  Microbiology   [x]  Radiology   []  EKG/Telemetry   []  Cardiology/Vascular   []  Pathology   [x]  Records     Assessment/Plan     Active Hospital Problems    Diagnosis  POA    **Asthma [J45.909]  Yes    Hyponatremia [E87.1]  Unknown    Moderate asthma with exacerbation [J45.901]  Yes    Essential hypertension [I10]  Yes    Type 2 diabetes mellitus with hyperglycemia, without long-term current use of insulin [E11.65]  Yes      Resolved Hospital Problems   No resolved problems to display.       69 y.o. female admitted with Asthma.    Ms. Gilliland is a 69 y.o. female with a history of asthma, GERD, type 2 diabetes mellitus, migraines, hypertension, and cerebral aneurysm that presents to [facility] complaining of shortness of breath.         Acute asthma exacerbation  -CXR and CT chest negative for infiltrate or consolidation  -Respiratory panel negative  -On scheduled DuoNebs, Symbicort, prednisone.  Add Delsym, continue Tessalon Perles  -Continue Singulair  -Pulmonology following, recommend starting biologic injectable with with Fasenra as an outpatient  -Possible vocal cord dysfunction.  Speech therapy evaluated, recommended outpatient  Speech Language Pathology for Videostroboscopy for further evaluation and treatment        Type 2 diabetes mellitus  -Blood glucose elevated secondary to steroids  -Continue Lantus to 15 units twice daily  -Continue SSI,scheduled lispro with meals 3 units  -Blood  glucose improved       Elevated lactic acid  -Lactic acid elevated at 2.3, no evidence of sepsis or hypoperfusion on exam  -Suspect secondary to albuterol/COPD exacerbation     Hypertension  -EKG was sinus rhythm, no ischemic changes  -Troponin negative, suspect chest pressure associated with above  -Continue lisinopril, metoprolol.  Hydrochlorothiazide has been held.     Elevated transaminases  - continue statin.  ALT seems chronically elevated.  Will need follow-up as an outpatient.    SCDs for DVT prophylaxis.  Full code.  Discussed with patient and nursing staff.  Anticipate discharge home tomorrow.    Expected Discharge Date: 1/21/2025; Expected Discharge Time:       Hang Diaz MD  [facility]  01/20/25  12:22 EST

## 2025-01-20 NOTE — PROGRESS NOTES
Continued Stay Note  [facility]     Patient Name: Khalida Gilliland  MRN: [id number]  Today's Date: 1/20/2025    Admit Date: 1/17/2025    Plan: Home with spouse assist   Discharge Plan       Row Name 01/20/25 1201       Plan    Plan Home with spouse assist    Plan Comments Introduced self and role of CCP. Patient confirmed DC plan is to return to home. Stated she is independent with ADL's and uses no DMEs. Stated her spouse will assist as needed and will provide transportation at DC. Denies any needs/equipment.                   Discharge Codes    No documentation.                       Nella Topete, RN

## 2025-01-20 NOTE — PROGRESS NOTES
[facility] INPATIENT PROGRESS NOTE         [address]    2025      PATIENT IDENTIFICATION:  Name: Khalida Gilliland ADMIT: 2025   : 1955  PCP: Sergo Owen MD    MRN: [id number] LOS: 1 days   AGE/SEX: 69 y.o. female  ROOM: [facility]                     LOS 1    Reason for visit: Acute asthma exacerbation      SUBJECTIVE:      Says that she feels miserable.  Short of breath with wheezing.  No chest pain.  Diminished breath sounds on auscultation and tight sounding with mild faint expiratory wheeze noted at end of expiration. I am seeing the patient for the first time today.  All patient problems are new to me.      Objective   OBJECTIVE:    Vital Sign Min/Max for last 24 hours  Temp  Min: 96.5 °F (35.8 °C)  Max: 98.1 °F (36.7 °C)   BP  Min: 123/69  Max: 157/80   Pulse  Min: 64  Max: 98   Resp  Min: 16  Max: 20   SpO2  Min: 94 %  Max: 100 %   No data recorded   No data recorded    Vitals:    25 0608 25 0745 25 0753 25 1026   BP: 140/68   155/82   BP Location: Right arm   Right arm   Patient Position: Lying   Lying   Pulse:  96 98 74   Resp:  18 20 18   Temp:    96.7 °F (35.9 °C)   TempSrc:    Oral   SpO2:  100%  95%   Weight:       Height:                25  1445   Weight: 96.2 kg (212 lb)       Body mass index is 32.23 kg/m².                          Body mass index is 32.23 kg/m².  No intake or output data in the 24 hours ending 25 1101      Exam:  GEN:  No distress, appears stated age  EYES:   PERRL, anicteric sclerae  ENT:    External ears/nose normal, OP clear  NECK:  No adenopathy, midline trachea  LUNGS: Normal chest on inspection, palpation and diminished with mild wheeze on auscultation  CV:  Normal S1S2, without murmur  ABD:  Nontender, nondistended, no hepatosplenomegaly, +BS  EXT:  No edema.  No cyanosis or clubbing.  No mottling and normal cap refill.    Assessment     Scheduled meds:  aspirin, 81 mg, Oral, Daily  atorvastatin, 20  mg, Oral, Nightly  budesonide-formoterol, 2 puff, Inhalation, BID - RT  cholecalciferol, 1,000 Units, Oral, Daily  cycloSPORINE, 1 drop, Both Eyes, BID  dextromethorphan polistirex ER, 60 mg, Oral, Q12H  enoxaparin, 40 mg, Subcutaneous, Daily  fluticasone, 2 spray, Each Nare, Daily  [Held by provider] hydroCHLOROthiazide, 25 mg, Oral, Daily  insulin glargine, 15 Units, Subcutaneous, Q12H  insulin lispro, 2-7 Units, Subcutaneous, 4x Daily AC & at Bedtime  insulin lispro, 3 Units, Subcutaneous, TID With Meals  ipratropium-albuterol, 3 mL, Nebulization, Q4H - RT  levETIRAcetam, 250 mg, Oral, Daily  lisinopril, 10 mg, Oral, Daily  montelukast, 10 mg, Oral, Nightly  pantoprazole, 40 mg, Oral, Q AM  predniSONE, 40 mg, Oral, Daily With Breakfast  pregabalin, 100 mg, Oral, TID  propranolol, 40 mg, Oral, BID  revefenacin, 175 mcg, Nebulization, Daily - RT  sodium chloride, 10 mL, Intravenous, Q12H  traZODone, 100 mg, Oral, Nightly  Vitamin B-2, 300 mg, Oral, Daily      IV meds:                         Data Review:  Results from last 7 days   Lab Units 01/20/25  0629 01/19/25  0712 01/18/25 0258 01/17/25  1558   SODIUM mmol/L 137 140 137 131*   POTASSIUM mmol/L 3.8 4.0 4.3 4.4   CHLORIDE mmol/L 102 106 99 95*   CO2 mmol/L 24.9 26.0 23.7 26.2   BUN mg/dL 16 15 14 13   CREATININE mg/dL 0.64 0.62 0.67 0.69   GLUCOSE mg/dL 148* 134* 342* 366*   CALCIUM mg/dL 8.8 9.1 9.3 9.5         Estimated Creatinine Clearance: 100.6 mL/min (by C-G formula based on SCr of 0.64 mg/dL).  Results from last 7 days   Lab Units 01/20/25  0629 01/19/25  0712 01/18/25  0258 01/17/25  1558   WBC 10*3/mm3 7.68 8.25 10.66 9.65   HEMOGLOBIN g/dL 12.8 12.3 13.5 14.2   PLATELETS 10*3/mm3 275 266 284 326         Results from last 7 days   Lab Units 01/20/25  0629 01/19/25  0712 01/18/25  0258 01/17/25  1558   ALT (SGPT) U/L 42* 36* 45* 51*   AST (SGOT) U/L 26 17 23 26         Results from last 7 days   Lab Units 01/18/25  2055 01/18/25  1439 01/18/25  0836  01/18/25  0540 01/18/25  0258 01/17/25  2330 01/17/25  1558   LACTATE mmol/L 2.9* 2.6* 3.4* 4.0* 4.6* 4.4* 2.3*         Glucose   Date/Time Value Ref Range Status   01/20/2025 0816 107 70 - 130 mg/dL Final   01/19/2025 2220 258 (H) 70 - 130 mg/dL Final   01/19/2025 1631 226 (H) 70 - 130 mg/dL Final   01/19/2025 1119 259 (H) 70 - 130 mg/dL Final   01/19/2025 0734 130 70 - 130 mg/dL Final   01/18/2025 2136 260 (H) 70 - 130 mg/dL Final   01/18/2025 1632 299 (H) 70 - 130 mg/dL Final         Imaging reviewed  Chest x-ray 1/17 reviewed    Microbiology reviewed  RVP negative          Active Hospital Problems    Diagnosis  POA    **Asthma [J45.909]  Yes    Hyponatremia [E87.1]  Unknown    Moderate asthma with exacerbation [J45.901]  Yes    Essential hypertension [I10]  Yes    Type 2 diabetes mellitus with hyperglycemia, without long-term current use of insulin [E11.65]  Yes      Resolved Hospital Problems   No resolved problems to display.         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  With elevated eosinophil count of 660 would benefit from biologic injectable therapy with Fasenra as outpatient which can be set up in our office.  She follows with Dr. Liu and we will have her follow-up shortly after discharge in the office.        Lane Alfredo MD  Pulmonary and Critical Care Medicine  [facility], [facility]  1/20/2025    11:01 EST

## 2025-01-21 ENCOUNTER — APPOINTMENT (OUTPATIENT)
Dept: CT IMAGING | Facility: HOSPITAL | Age: 70
DRG: 202 | End: 2025-01-21
Payer: MEDICARE

## 2025-01-21 LAB
ANION GAP SERPL CALCULATED.3IONS-SCNC: 8 MMOL/L (ref 5–15)
BASOPHILS # BLD AUTO: 0.06 10*3/MM3 (ref 0–0.2)
BASOPHILS NFR BLD AUTO: 0.6 % (ref 0–1.5)
BUN SERPL-MCNC: 14 MG/DL (ref 8–23)
BUN/CREAT SERPL: 20.6 (ref 7–25)
C-ANCA TITR SER IF: NORMAL TITER
CALCIUM SPEC-SCNC: 9.2 MG/DL (ref 8.6–10.5)
CHLORIDE SERPL-SCNC: 101 MMOL/L (ref 98–107)
CO2 SERPL-SCNC: 26 MMOL/L (ref 22–29)
CREAT SERPL-MCNC: 0.68 MG/DL (ref 0.57–1)
CRP SERPL-MCNC: 0.37 MG/DL (ref 0–0.5)
DEPRECATED RDW RBC AUTO: 43.8 FL (ref 37–54)
EGFRCR SERPLBLD CKD-EPI 2021: 94.4 ML/MIN/1.73
EOSINOPHIL # BLD AUTO: 0.33 10*3/MM3 (ref 0–0.4)
EOSINOPHIL NFR BLD AUTO: 3.1 % (ref 0.3–6.2)
ERYTHROCYTE [DISTWIDTH] IN BLOOD BY AUTOMATED COUNT: 12.8 % (ref 12.3–15.4)
GLUCOSE BLDC GLUCOMTR-MCNC: 138 MG/DL (ref 70–130)
GLUCOSE BLDC GLUCOMTR-MCNC: 228 MG/DL (ref 70–130)
GLUCOSE BLDC GLUCOMTR-MCNC: 248 MG/DL (ref 70–130)
GLUCOSE BLDC GLUCOMTR-MCNC: 322 MG/DL (ref 70–130)
GLUCOSE SERPL-MCNC: 239 MG/DL (ref 65–99)
HCT VFR BLD AUTO: 45.7 % (ref 34–46.6)
HGB BLD-MCNC: 14.2 G/DL (ref 12–15.9)
IGE SERPL-ACNC: 231 IU/ML (ref 6–495)
IMM GRANULOCYTES # BLD AUTO: 0.04 10*3/MM3 (ref 0–0.05)
IMM GRANULOCYTES NFR BLD AUTO: 0.4 % (ref 0–0.5)
LYMPHOCYTES # BLD AUTO: 2.11 10*3/MM3 (ref 0.7–3.1)
LYMPHOCYTES NFR BLD AUTO: 20 % (ref 19.6–45.3)
MCH RBC QN AUTO: 29 PG (ref 26.6–33)
MCHC RBC AUTO-ENTMCNC: 31.1 G/DL (ref 31.5–35.7)
MCV RBC AUTO: 93.3 FL (ref 79–97)
MONOCYTES # BLD AUTO: 1.05 10*3/MM3 (ref 0.1–0.9)
MONOCYTES NFR BLD AUTO: 10 % (ref 5–12)
MYELOPEROXIDASE AB SER IA-ACNC: 0.2 UNITS (ref 0–0.9)
NEUTROPHILS NFR BLD AUTO: 6.94 10*3/MM3 (ref 1.7–7)
NEUTROPHILS NFR BLD AUTO: 65.9 % (ref 42.7–76)
NRBC BLD AUTO-RTO: 0 /100 WBC (ref 0–0.2)
P-ANCA ATYPICAL TITR SER IF: NORMAL TITER
P-ANCA TITR SER IF: NORMAL TITER
PLATELET # BLD AUTO: 344 10*3/MM3 (ref 140–450)
PMV BLD AUTO: 10.4 FL (ref 6–12)
POTASSIUM SERPL-SCNC: 4 MMOL/L (ref 3.5–5.2)
PROTEINASE3 AB SER IA-ACNC: <0.2 UNITS (ref 0–0.9)
RBC # BLD AUTO: 4.9 10*6/MM3 (ref 3.77–5.28)
SODIUM SERPL-SCNC: 135 MMOL/L (ref 136–145)
WBC NRBC COR # BLD AUTO: 10.53 10*3/MM3 (ref 3.4–10.8)

## 2025-01-21 PROCEDURE — 25010000002 DIPHENHYDRAMINE PER 50 MG

## 2025-01-21 PROCEDURE — 25010000002 HYDROMORPHONE PER 4 MG: Performed by: STUDENT IN AN ORGANIZED HEALTH CARE EDUCATION/TRAINING PROGRAM

## 2025-01-21 PROCEDURE — 25010000002 KETOROLAC TROMETHAMINE PER 15 MG: Performed by: STUDENT IN AN ORGANIZED HEALTH CARE EDUCATION/TRAINING PROGRAM

## 2025-01-21 PROCEDURE — 99222 1ST HOSP IP/OBS MODERATE 55: CPT

## 2025-01-21 PROCEDURE — 80048 BASIC METABOLIC PNL TOTAL CA: CPT | Performed by: STUDENT IN AN ORGANIZED HEALTH CARE EDUCATION/TRAINING PROGRAM

## 2025-01-21 PROCEDURE — 70496 CT ANGIOGRAPHY HEAD: CPT

## 2025-01-21 PROCEDURE — 25010000002 ENOXAPARIN PER 10 MG: Performed by: STUDENT IN AN ORGANIZED HEALTH CARE EDUCATION/TRAINING PROGRAM

## 2025-01-21 PROCEDURE — 63710000001 INSULIN GLARGINE PER 5 UNITS: Performed by: STUDENT IN AN ORGANIZED HEALTH CARE EDUCATION/TRAINING PROGRAM

## 2025-01-21 PROCEDURE — 25010000002 DIPHENHYDRAMINE PER 50 MG: Performed by: STUDENT IN AN ORGANIZED HEALTH CARE EDUCATION/TRAINING PROGRAM

## 2025-01-21 PROCEDURE — 94760 N-INVAS EAR/PLS OXIMETRY 1: CPT

## 2025-01-21 PROCEDURE — 63710000001 INSULIN LISPRO (HUMAN) PER 5 UNITS

## 2025-01-21 PROCEDURE — 85025 COMPLETE CBC W/AUTO DIFF WBC: CPT | Performed by: STUDENT IN AN ORGANIZED HEALTH CARE EDUCATION/TRAINING PROGRAM

## 2025-01-21 PROCEDURE — 25010000002 PROCHLORPERAZINE 10 MG/2ML SOLUTION: Performed by: STUDENT IN AN ORGANIZED HEALTH CARE EDUCATION/TRAINING PROGRAM

## 2025-01-21 PROCEDURE — 82948 REAGENT STRIP/BLOOD GLUCOSE: CPT

## 2025-01-21 PROCEDURE — 25510000001 IOPAMIDOL PER 1 ML: Performed by: STUDENT IN AN ORGANIZED HEALTH CARE EDUCATION/TRAINING PROGRAM

## 2025-01-21 PROCEDURE — 63710000001 REVEFENACIN 175 MCG/3ML SOLUTION: Performed by: INTERNAL MEDICINE

## 2025-01-21 PROCEDURE — 25010000002 MAGNESIUM SULFATE IN D5W 1G/100ML (PREMIX) 1-5 GM/100ML-% SOLUTION

## 2025-01-21 PROCEDURE — 94799 UNLISTED PULMONARY SVC/PX: CPT

## 2025-01-21 PROCEDURE — 86140 C-REACTIVE PROTEIN: CPT

## 2025-01-21 PROCEDURE — 63710000001 INSULIN LISPRO (HUMAN) PER 5 UNITS: Performed by: STUDENT IN AN ORGANIZED HEALTH CARE EDUCATION/TRAINING PROGRAM

## 2025-01-21 PROCEDURE — 25010000002 METHYLPREDNISOLONE PER 40 MG: Performed by: STUDENT IN AN ORGANIZED HEALTH CARE EDUCATION/TRAINING PROGRAM

## 2025-01-21 PROCEDURE — 94761 N-INVAS EAR/PLS OXIMETRY MLT: CPT

## 2025-01-21 PROCEDURE — 63710000001 PREDNISONE PER 1 MG: Performed by: STUDENT IN AN ORGANIZED HEALTH CARE EDUCATION/TRAINING PROGRAM

## 2025-01-21 PROCEDURE — 25010000002 ONDANSETRON PER 1 MG: Performed by: STUDENT IN AN ORGANIZED HEALTH CARE EDUCATION/TRAINING PROGRAM

## 2025-01-21 PROCEDURE — 25810000003 SODIUM CHLORIDE 0.9 % SOLUTION: Performed by: STUDENT IN AN ORGANIZED HEALTH CARE EDUCATION/TRAINING PROGRAM

## 2025-01-21 PROCEDURE — 25010000002 PROCHLORPERAZINE 10 MG/2ML SOLUTION

## 2025-01-21 PROCEDURE — 94664 DEMO&/EVAL PT USE INHALER: CPT

## 2025-01-21 RX ORDER — PREDNISONE 20 MG/1
40 TABLET ORAL DAILY
Qty: 10 TABLET | Refills: 0 | Status: SHIPPED | OUTPATIENT
Start: 2025-01-21 | End: 2025-01-24 | Stop reason: HOSPADM

## 2025-01-21 RX ORDER — DIPHENHYDRAMINE HYDROCHLORIDE 50 MG/ML
25 INJECTION INTRAMUSCULAR; INTRAVENOUS ONCE
Status: COMPLETED | OUTPATIENT
Start: 2025-01-21 | End: 2025-01-21

## 2025-01-21 RX ORDER — ALBUTEROL SULFATE 1.25 MG/3ML
1 SOLUTION RESPIRATORY (INHALATION) EVERY 6 HOURS PRN
Qty: 75 ML | Refills: 0 | Status: SHIPPED | OUTPATIENT
Start: 2025-01-21 | End: 2025-01-21

## 2025-01-21 RX ORDER — ALBUTEROL SULFATE 90 UG/1
2 INHALANT RESPIRATORY (INHALATION) EVERY 4 HOURS PRN
Qty: 8.5 G | Refills: 1 | Status: SHIPPED | OUTPATIENT
Start: 2025-01-21

## 2025-01-21 RX ORDER — DIPHENHYDRAMINE HYDROCHLORIDE 50 MG/ML
25 INJECTION INTRAMUSCULAR; INTRAVENOUS EVERY 8 HOURS
Status: COMPLETED | OUTPATIENT
Start: 2025-01-21 | End: 2025-01-22

## 2025-01-21 RX ORDER — MAGNESIUM SULFATE 1 G/100ML
1 INJECTION INTRAVENOUS ONCE
Status: COMPLETED | OUTPATIENT
Start: 2025-01-21 | End: 2025-01-21

## 2025-01-21 RX ORDER — LEVETIRACETAM 500 MG/1
500 TABLET ORAL EVERY 8 HOURS
Status: COMPLETED | OUTPATIENT
Start: 2025-01-21 | End: 2025-01-22

## 2025-01-21 RX ORDER — ALBUTEROL SULFATE 1.25 MG/3ML
1 SOLUTION RESPIRATORY (INHALATION) EVERY 6 HOURS PRN
Qty: 75 ML | Refills: 0 | Status: SHIPPED | OUTPATIENT
Start: 2025-01-21 | End: 2025-01-28

## 2025-01-21 RX ORDER — PROCHLORPERAZINE EDISYLATE 5 MG/ML
10 INJECTION INTRAMUSCULAR; INTRAVENOUS EVERY 8 HOURS
Status: COMPLETED | OUTPATIENT
Start: 2025-01-21 | End: 2025-01-22

## 2025-01-21 RX ORDER — METHYLPREDNISOLONE SODIUM SUCCINATE 40 MG/ML
40 INJECTION, POWDER, LYOPHILIZED, FOR SOLUTION INTRAMUSCULAR; INTRAVENOUS EVERY 4 HOURS
Status: DISCONTINUED | OUTPATIENT
Start: 2025-01-21 | End: 2025-01-21

## 2025-01-21 RX ORDER — DIPHENHYDRAMINE HYDROCHLORIDE 50 MG/ML
50 INJECTION INTRAMUSCULAR; INTRAVENOUS
Status: COMPLETED | OUTPATIENT
Start: 2025-01-21 | End: 2025-01-21

## 2025-01-21 RX ORDER — RIBOFLAVIN (VITAMIN B2) 100 MG
400 TABLET ORAL DAILY
Status: DISCONTINUED | OUTPATIENT
Start: 2025-01-22 | End: 2025-01-24 | Stop reason: HOSPADM

## 2025-01-21 RX ORDER — HYDROMORPHONE HYDROCHLORIDE 1 MG/ML
0.5 INJECTION, SOLUTION INTRAMUSCULAR; INTRAVENOUS; SUBCUTANEOUS
Status: DISCONTINUED | OUTPATIENT
Start: 2025-01-21 | End: 2025-01-21

## 2025-01-21 RX ORDER — INSULIN GLARGINE 100 [IU]/ML
15 INJECTION, SOLUTION SUBCUTANEOUS NIGHTLY
Qty: 15 ML | Refills: 2 | Status: SHIPPED | OUTPATIENT
Start: 2025-01-21

## 2025-01-21 RX ORDER — KETOROLAC TROMETHAMINE 15 MG/ML
15 INJECTION, SOLUTION INTRAMUSCULAR; INTRAVENOUS ONCE
Status: COMPLETED | OUTPATIENT
Start: 2025-01-21 | End: 2025-01-21

## 2025-01-21 RX ORDER — PROCHLORPERAZINE EDISYLATE 5 MG/ML
10 INJECTION INTRAMUSCULAR; INTRAVENOUS ONCE
Status: COMPLETED | OUTPATIENT
Start: 2025-01-21 | End: 2025-01-21

## 2025-01-21 RX ORDER — BUTALBITAL, ACETAMINOPHEN AND CAFFEINE 50; 325; 40 MG/1; MG/1; MG/1
1 TABLET ORAL EVERY 4 HOURS PRN
Status: DISCONTINUED | OUTPATIENT
Start: 2025-01-21 | End: 2025-01-24 | Stop reason: HOSPADM

## 2025-01-21 RX ORDER — IOPAMIDOL 755 MG/ML
100 INJECTION, SOLUTION INTRAVASCULAR
Status: COMPLETED | OUTPATIENT
Start: 2025-01-21 | End: 2025-01-21

## 2025-01-21 RX ADMIN — INSULIN GLARGINE 15 UNITS: 100 INJECTION, SOLUTION SUBCUTANEOUS at 09:10

## 2025-01-21 RX ADMIN — FLUTICASONE PROPIONATE 2 SPRAY: 50 SPRAY, METERED NASAL at 09:04

## 2025-01-21 RX ADMIN — TRAZODONE HYDROCHLORIDE 100 MG: 100 TABLET ORAL at 20:53

## 2025-01-21 RX ADMIN — PREGABALIN 100 MG: 100 CAPSULE ORAL at 09:04

## 2025-01-21 RX ADMIN — IPRATROPIUM BROMIDE AND ALBUTEROL SULFATE 3 ML: 2.5; .5 SOLUTION RESPIRATORY (INHALATION) at 15:28

## 2025-01-21 RX ADMIN — METHYLPREDNISOLONE SODIUM SUCCINATE 40 MG: 40 INJECTION, POWDER, FOR SOLUTION INTRAMUSCULAR; INTRAVENOUS at 12:01

## 2025-01-21 RX ADMIN — CYCLOSPORINE 1 DROP: 0.5 EMULSION OPHTHALMIC at 20:52

## 2025-01-21 RX ADMIN — BISACODYL 10 MG: 10 SUPPOSITORY RECTAL at 21:12

## 2025-01-21 RX ADMIN — PREGABALIN 100 MG: 100 CAPSULE ORAL at 20:51

## 2025-01-21 RX ADMIN — DIPHENHYDRAMINE HYDROCHLORIDE 25 MG: 50 INJECTION, SOLUTION INTRAMUSCULAR; INTRAVENOUS at 22:57

## 2025-01-21 RX ADMIN — INSULIN LISPRO 3 UNITS: 100 INJECTION, SOLUTION INTRAVENOUS; SUBCUTANEOUS at 17:26

## 2025-01-21 RX ADMIN — IPRATROPIUM BROMIDE AND ALBUTEROL SULFATE 3 ML: 2.5; .5 SOLUTION RESPIRATORY (INHALATION) at 20:22

## 2025-01-21 RX ADMIN — BUTALBITAL, ACETAMINOPHEN, AND CAFFEINE 1 TABLET: 50; 325; 40 TABLET ORAL at 17:26

## 2025-01-21 RX ADMIN — HYDROMORPHONE HYDROCHLORIDE 0.5 MG: 1 INJECTION, SOLUTION INTRAMUSCULAR; INTRAVENOUS; SUBCUTANEOUS at 11:41

## 2025-01-21 RX ADMIN — BUDESONIDE AND FORMOTEROL FUMARATE DIHYDRATE 2 PUFF: 160; 4.5 AEROSOL RESPIRATORY (INHALATION) at 20:23

## 2025-01-21 RX ADMIN — ZOLPIDEM TARTRATE 5 MG: 5 TABLET, FILM COATED ORAL at 22:57

## 2025-01-21 RX ADMIN — BUDESONIDE AND FORMOTEROL FUMARATE DIHYDRATE 2 PUFF: 160; 4.5 AEROSOL RESPIRATORY (INHALATION) at 07:34

## 2025-01-21 RX ADMIN — SODIUM CHLORIDE 1000 ML: 9 INJECTION, SOLUTION INTRAVENOUS at 15:35

## 2025-01-21 RX ADMIN — MAGNESIUM SULFATE IN DEXTROSE 1 G: 10 INJECTION, SOLUTION INTRAVENOUS at 18:29

## 2025-01-21 RX ADMIN — ENOXAPARIN SODIUM 40 MG: 100 INJECTION SUBCUTANEOUS at 09:04

## 2025-01-21 RX ADMIN — PANTOPRAZOLE SODIUM 40 MG: 40 TABLET, DELAYED RELEASE ORAL at 05:32

## 2025-01-21 RX ADMIN — PREDNISONE 40 MG: 20 TABLET ORAL at 09:04

## 2025-01-21 RX ADMIN — IOPAMIDOL 95 ML: 755 INJECTION, SOLUTION INTRAVENOUS at 13:19

## 2025-01-21 RX ADMIN — DIPHENHYDRAMINE HYDROCHLORIDE 25 MG: 50 INJECTION, SOLUTION INTRAMUSCULAR; INTRAVENOUS at 15:37

## 2025-01-21 RX ADMIN — ATORVASTATIN CALCIUM 20 MG: 20 TABLET, FILM COATED ORAL at 21:01

## 2025-01-21 RX ADMIN — Medication 10 ML: at 20:52

## 2025-01-21 RX ADMIN — PREGABALIN 100 MG: 100 CAPSULE ORAL at 16:44

## 2025-01-21 RX ADMIN — INSULIN LISPRO 3 UNITS: 100 INJECTION, SOLUTION INTRAVENOUS; SUBCUTANEOUS at 09:03

## 2025-01-21 RX ADMIN — PROCHLORPERAZINE EDISYLATE 10 MG: 5 INJECTION INTRAMUSCULAR; INTRAVENOUS at 22:57

## 2025-01-21 RX ADMIN — ASPIRIN 81 MG: 81 TABLET, DELAYED RELEASE ORAL at 09:03

## 2025-01-21 RX ADMIN — INSULIN LISPRO 3 UNITS: 100 INJECTION, SOLUTION INTRAVENOUS; SUBCUTANEOUS at 12:23

## 2025-01-21 RX ADMIN — INSULIN LISPRO 5 UNITS: 100 INJECTION, SOLUTION INTRAVENOUS; SUBCUTANEOUS at 21:10

## 2025-01-21 RX ADMIN — PROCHLORPERAZINE EDISYLATE 10 MG: 5 INJECTION INTRAMUSCULAR; INTRAVENOUS at 15:37

## 2025-01-21 RX ADMIN — REVEFENACIN 175 MCG: 175 SOLUTION RESPIRATORY (INHALATION) at 15:31

## 2025-01-21 RX ADMIN — DIPHENHYDRAMINE HYDROCHLORIDE 50 MG: 50 INJECTION, SOLUTION INTRAMUSCULAR; INTRAVENOUS at 11:44

## 2025-01-21 RX ADMIN — DEXTROMETHORPHAN 60 MG: 30 SUSPENSION, EXTENDED RELEASE ORAL at 20:52

## 2025-01-21 RX ADMIN — MONTELUKAST 10 MG: 10 TABLET, FILM COATED ORAL at 20:52

## 2025-01-21 RX ADMIN — INSULIN GLARGINE 15 UNITS: 100 INJECTION, SOLUTION SUBCUTANEOUS at 21:10

## 2025-01-21 RX ADMIN — Medication 300 MG: at 09:04

## 2025-01-21 RX ADMIN — PROPRANOLOL HYDROCHLORIDE 40 MG: 20 TABLET ORAL at 09:03

## 2025-01-21 RX ADMIN — CYCLOSPORINE 1 DROP: 0.5 EMULSION OPHTHALMIC at 09:03

## 2025-01-21 RX ADMIN — LEVETIRACETAM 500 MG: 500 TABLET, FILM COATED ORAL at 18:30

## 2025-01-21 RX ADMIN — ONDANSETRON 4 MG: 2 INJECTION, SOLUTION INTRAMUSCULAR; INTRAVENOUS at 11:42

## 2025-01-21 RX ADMIN — LISINOPRIL 10 MG: 10 TABLET ORAL at 09:02

## 2025-01-21 RX ADMIN — KETOROLAC TROMETHAMINE 15 MG: 15 INJECTION, SOLUTION INTRAMUSCULAR; INTRAVENOUS at 15:37

## 2025-01-21 RX ADMIN — Medication 1000 UNITS: at 09:02

## 2025-01-21 RX ADMIN — LEVETIRACETAM 250 MG: 500 TABLET, FILM COATED ORAL at 09:03

## 2025-01-21 RX ADMIN — PROPRANOLOL HYDROCHLORIDE 40 MG: 20 TABLET ORAL at 20:51

## 2025-01-21 RX ADMIN — Medication 10 ML: at 09:04

## 2025-01-21 RX ADMIN — BISACODYL 5 MG: 5 TABLET, COATED ORAL at 10:24

## 2025-01-21 RX ADMIN — IPRATROPIUM BROMIDE AND ALBUTEROL SULFATE 3 ML: 2.5; .5 SOLUTION RESPIRATORY (INHALATION) at 07:28

## 2025-01-21 RX ADMIN — DEXTROMETHORPHAN 60 MG: 30 SUSPENSION, EXTENDED RELEASE ORAL at 09:04

## 2025-01-21 NOTE — PLAN OF CARE
Goal Outcome Evaluation:           Progress: improving  Outcome Evaluation: VSSHermes norwood feels she is constipated but refused to take any stool softeners or laxatives for now..  Had bm yesterday.  blood sugar monitoring.  Took Ambien last night and has slept good thru the night.

## 2025-01-21 NOTE — PROGRESS NOTES
[facility] INPATIENT PROGRESS NOTE         [address]    2025      PATIENT IDENTIFICATION:  Name: Khalida Gilliland ADMIT: 2025   : 1955  PCP: Sergo Owen MD    MRN: [id number] LOS: 2 days   AGE/SEX: 69 y.o. female  ROOM: [facility]                     LOS 2    Reason for visit: Acute asthma exacerbation      SUBJECTIVE:      Resting comfortably.  Looks to be breathing a bit better.  No objection to discharge home and can follow-up with us in our office as outpatient.      Objective   OBJECTIVE:    Vital Sign Min/Max for last 24 hours  Temp  Min: 96.7 °F (35.9 °C)  Max: 97.9 °F (36.6 °C)   BP  Min: 143/79  Max: 167/77   Pulse  Min: 60  Max: 75   Resp  Min: 16  Max: 18   SpO2  Min: 95 %  Max: 100 %   No data recorded   No data recorded    Vitals:    25 2058 25 0441 25 0728 25 0902   BP: 143/79 148/67  167/77   BP Location: Right arm Left arm  Right arm   Patient Position: Lying Lying  Sitting   Pulse: 75 66 70 74   Resp: 16 16 16 18   Temp: 97.9 °F (36.6 °C) 97.2 °F (36.2 °C)  97.5 °F (36.4 °C)   TempSrc: Oral Oral  Oral   SpO2: 96% 98% 99% 95%   Weight:       Height:                25  1445   Weight: 96.2 kg (212 lb)       Body mass index is 32.23 kg/m².                          Body mass index is 32.23 kg/m².    Intake/Output Summary (Last 24 hours) at 2025 1016  Last data filed at 2025 0903  Gross per 24 hour   Intake 240 ml   Output --   Net 240 ml         Exam:  GEN:  No distress, appears stated age  EYES:   PERRL, anicteric sclerae  ENT:    External ears/nose normal, OP clear  NECK:  No adenopathy, midline trachea  LUNGS: Normal chest on inspection, palpation and diminished with mild wheeze on auscultation  CV:  Normal S1S2, without murmur  ABD:  Nontender, nondistended, no hepatosplenomegaly, +BS  EXT:  No edema.  No cyanosis or clubbing.  No mottling and normal cap refill.    Assessment     Scheduled meds:  aspirin, 81 mg, Oral,  Daily  atorvastatin, 20 mg, Oral, Nightly  budesonide-formoterol, 2 puff, Inhalation, BID - RT  cholecalciferol, 1,000 Units, Oral, Daily  cycloSPORINE, 1 drop, Both Eyes, BID  dextromethorphan polistirex ER, 60 mg, Oral, Q12H  enoxaparin, 40 mg, Subcutaneous, Daily  fluticasone, 2 spray, Each Nare, Daily  [Held by provider] hydroCHLOROthiazide, 25 mg, Oral, Daily  insulin glargine, 15 Units, Subcutaneous, Q12H  insulin lispro, 2-7 Units, Subcutaneous, 4x Daily AC & at Bedtime  insulin lispro, 3 Units, Subcutaneous, TID With Meals  ipratropium-albuterol, 3 mL, Nebulization, Q4H - RT  levETIRAcetam, 250 mg, Oral, Daily  lisinopril, 10 mg, Oral, Daily  montelukast, 10 mg, Oral, Nightly  pantoprazole, 40 mg, Oral, Q AM  predniSONE, 40 mg, Oral, Daily With Breakfast  pregabalin, 100 mg, Oral, TID  propranolol, 40 mg, Oral, BID  revefenacin, 175 mcg, Nebulization, Daily - RT  sodium chloride, 10 mL, Intravenous, Q12H  traZODone, 100 mg, Oral, Nightly  Vitamin B-2, 300 mg, Oral, Daily      IV meds:                         Data Review:  Results from last 7 days   Lab Units 01/20/25  0629 01/19/25  0712 01/18/25 0258 01/17/25  1558   SODIUM mmol/L 137 140 137 131*   POTASSIUM mmol/L 3.8 4.0 4.3 4.4   CHLORIDE mmol/L 102 106 99 95*   CO2 mmol/L 24.9 26.0 23.7 26.2   BUN mg/dL 16 15 14 13   CREATININE mg/dL 0.64 0.62 0.67 0.69   GLUCOSE mg/dL 148* 134* 342* 366*   CALCIUM mg/dL 8.8 9.1 9.3 9.5         Estimated Creatinine Clearance: 100.6 mL/min (by C-G formula based on SCr of 0.64 mg/dL).  Results from last 7 days   Lab Units 01/20/25  0629 01/19/25  0712 01/18/25 0258 01/17/25  1558   WBC 10*3/mm3 7.68 8.25 10.66 9.65   HEMOGLOBIN g/dL 12.8 12.3 13.5 14.2   PLATELETS 10*3/mm3 275 266 284 326         Results from last 7 days   Lab Units 01/20/25  0629 01/19/25  0712 01/18/25  0258 01/17/25  1558   ALT (SGPT) U/L 42* 36* 45* 51*   AST (SGOT) U/L 26 17 23 26         Results from last 7 days   Lab Units 01/18/25  9735  01/18/25  1439 01/18/25  0836 01/18/25  0540 01/18/25  0258 01/17/25  2330 01/17/25  1558   LACTATE mmol/L 2.9* 2.6* 3.4* 4.0* 4.6* 4.4* 2.3*         Glucose   Date/Time Value Ref Range Status   01/21/2025 0823 138 (H) 70 - 130 mg/dL Final   01/20/2025 2109 203 (H) 70 - 130 mg/dL Final   01/20/2025 1712 204 (H) 70 - 130 mg/dL Final   01/20/2025 1127 244 (H) 70 - 130 mg/dL Final   01/20/2025 0816 107 70 - 130 mg/dL Final   01/19/2025 2220 258 (H) 70 - 130 mg/dL Final   01/19/2025 1631 226 (H) 70 - 130 mg/dL Final         Imaging reviewed  Chest x-ray 1/17 reviewed    Microbiology reviewed  RVP negative          Active Hospital Problems    Diagnosis  POA    **Asthma [J45.909]  Yes    Hyponatremia [E87.1]  Unknown    Moderate asthma with exacerbation [J45.901]  Yes    Essential hypertension [I10]  Yes    Type 2 diabetes mellitus with hyperglycemia, without long-term current use of insulin [E11.65]  Yes      Resolved Hospital Problems   No resolved problems to display.         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  With elevated eosinophil count of 660 would benefit from biologic injectable therapy with Fasenra as outpatient which can be set up in our office.  She follows with Dr. Liu and we will have her follow-up shortly after discharge in the office.  No objection to discharge from our standpoint.        Lane Alfredo MD  Pulmonary and Critical Care Medicine  [facility], [facility]  1/21/2025    10:16 EST

## 2025-01-21 NOTE — PROGRESS NOTES
Continued Stay Note  [facility]     Patient Name: Khalida Gilliland  MRN: [id number]  Today's Date: 1/21/2025    Admit Date: 1/17/2025    Plan: Home with spouse assist   Discharge Plan       Row Name 01/21/25 1607       Plan    Plan Home with spouse assist    Plan Comments CCP is continuing to follow for poss needs/equipment                   Discharge Codes    No documentation.                 Expected Discharge Date and Time       Expected Discharge Date Expected Discharge Time    Jan 23, 2025               Nella Topete RN

## 2025-01-21 NOTE — PLAN OF CARE
Goal Outcome Evaluation:  Plan of Care Reviewed With: patient        Progress: no change  Outcome Evaluation: pt was going to be d/c but started to c/o of a really bad migraine, rapid called- Bp elevated and MD notified, CTA ordered and completed along with pain medication and a migraine cocktail, pt still has a really bad headache- MD notified, bolus given, refuses bed alarm at times,  at bedside, monitoring blood sugars, ducolax tab given to help pt have a BM

## 2025-01-21 NOTE — PROGRESS NOTES
"        Name: Khalida Gilliland ADMIT: 2025   : 1955  PCP: Sergo Owen MD    MRN: [id number] LOS: 2 days   AGE/SEX: 69 y.o. female  ROOM: [facility]     Subjective   Subjective   Patient still feels miserable, but improved from yesterday.  Cough has noticeably improved.  Nonproductive.  Was planning on discharge patient home when she had an acute onset  of right sided headache with nausea.  No vomiting.  Rocking back and forth in bed.   went to bedside when alerted she was having a stat response.  Had ordered some Dilaudid that was taking the edge off her pain.  States she has had migraines in the past not very commonly and the headache feels like that but she normally does not have nausea with her migraines.  Usually a hot \"beanbag\" helps with the pain.  Ordered a stat CTA head with her history of brain aneurysms.  CTA head with no acute findings.  Headache started to worsen again so giving IV headache cocktail.       Objective   Objective   Vital Signs  Temp:  [97.1 °F (36.2 °C)-97.9 °F (36.6 °C)] 97.5 °F (36.4 °C)  Heart Rate:  [65-79] 65  Resp:  [16-18] 18  BP: (143-192)/() 178/81  SpO2:  [95 %-100 %] 95 %  on   ;   Device (Oxygen Therapy): room air  Body mass index is 32.23 kg/m².  Physical Exam  Constitutional:       General: She is not in acute distress.     Appearance: She is ill-appearing.   Cardiovascular:      Rate and Rhythm: Normal rate and regular rhythm.   Pulmonary:      Effort: Pulmonary effort is normal. No respiratory distress.      Comments: Diminished bilaterally.  Faint expiratory wheezes.  Abdominal:      General: Abdomen is flat. There is no distension.      Tenderness: There is no abdominal tenderness.   Musculoskeletal:         General: No swelling or deformity. Normal range of motion.   Skin:     General: Skin is warm and dry.   Neurological:      General: No focal deficit present.      Mental Status: She is alert. Mental status is at baseline.         Results Review     " I reviewed the patient's new clinical results.  Results from last 7 days   Lab Units 01/21/25  1159 01/20/25  0629 01/19/25  0712 01/18/25  0258   WBC 10*3/mm3 10.53 7.68 8.25 10.66   HEMOGLOBIN g/dL 14.2 12.8 12.3 13.5   PLATELETS 10*3/mm3 344 275 266 284     Results from last 7 days   Lab Units 01/21/25  1159 01/20/25  0629 01/19/25 0712 01/18/25  0258   SODIUM mmol/L 135* 137 140 137   POTASSIUM mmol/L 4.0 3.8 4.0 4.3   CHLORIDE mmol/L 101 102 106 99   CO2 mmol/L 26.0 24.9 26.0 23.7   BUN mg/dL 14 16 15 14   CREATININE mg/dL 0.68 0.64 0.62 0.67   GLUCOSE mg/dL 239* 148* 134* 342*   Estimated Creatinine Clearance: 94.7 mL/min (by C-G formula based on SCr of 0.68 mg/dL).  Results from last 7 days   Lab Units 01/20/25  0629 01/19/25  0712 01/18/25 0258 01/17/25  1558   ALBUMIN g/dL 3.6 3.5 3.5 3.9   BILIRUBIN mg/dL 0.4 0.4 0.3 0.4   ALK PHOS U/L 71 67 88 106   AST (SGOT) U/L 26 17 23 26   ALT (SGPT) U/L 42* 36* 45* 51*     Results from last 7 days   Lab Units 01/21/25  1159 01/20/25  0629 01/19/25  0712 01/18/25 0258 01/17/25  1558   CALCIUM mg/dL 9.2 8.8 9.1 9.3 9.5   ALBUMIN g/dL  --  3.6 3.5 3.5 3.9   MAGNESIUM mg/dL  --   --  2.3  --   --    PHOSPHORUS mg/dL  --   --  2.3*  --   --      Results from last 7 days   Lab Units 01/18/25  2055 01/18/25  1439 01/18/25  0836 01/18/25  0540   LACTATE mmol/L 2.9* 2.6* 3.4* 4.0*     COVID19   Date Value Ref Range Status   01/17/2025 Not Detected Not Detected - Ref. Range Final   01/17/2025 Not Detected Not Detected - Ref. Range Final     Glucose   Date/Time Value Ref Range Status   01/21/2025 1133 248 (H) 70 - 130 mg/dL Final   01/21/2025 0823 138 (H) 70 - 130 mg/dL Final   01/20/2025 2109 203 (H) 70 - 130 mg/dL Final   01/20/2025 1712 204 (H) 70 - 130 mg/dL Final   01/20/2025 1127 244 (H) 70 - 130 mg/dL Final   01/20/2025 0816 107 70 - 130 mg/dL Final   01/19/2025 2220 258 (H) 70 - 130 mg/dL Final       CT Angiogram Head  Narrative: CT ANGIOGRAM OF THE HEAD WITH  CONTRAST     CLINICAL HISTORY: new sudden headache with nausea, hx of aneursym s/p  clipping; R06.02-Shortness of breath; J45.41-Moderate persistent asthma  with (acute) exacerbation; R03.0-Elevated blood-pressure reading,  without diagnosis of hypertension; E11.9-Type 2 diabetes mellitus  without complications; J40-Bronchitis, not specified as acute or chronic     TECHNIQUE: CT angiogram of the head was obtained with 1 mm axial images  following the administration of IV contrast. Sagittal, coronal, and  3-dimensional reconstructed images were obtained. Additionally, a CT  scan of the head was obtained with 3 mm axial images before and after  the administration of IV contrast.     COMPARISON: CTA head dated 01/25/2024.     FINDINGS:     Head CT: Status post right pterional craniotomy. An aneurysm clip is  seen within the right paraclinoid region. Pipeline stent is seen within  the right internal carotid artery. Small foci of encephalomalacia within  the lateral aspect of the right frontal lobe and the anterior aspect of  the right temporal lobe are again noted.     Posterior Circulation: The vertebral arteries, basilar artery, and  posterior cerebral arteries are within normal limits.  Anterior Circulation: Pipeline stent within the cavernous and  supraclinoid segments of the right ICA and right paraclinoid region  aneurysm clip without evidence for recurrent or residual aneurysm.  Unremarkable appearance of the left internal carotid artery, the middle  cerebral arteries, and the anterior cerebral arteries.     Impression:    Status post right pterional craniotomy. An aneurysm clip is seen within  the right paraclinoid region. A pipeline stent is seen within the  cavernous and supraclinoid segments of the right ICA. There is no  evidence to suggest residual or recurrent aneurysm.     Incidental note of small foci of encephalomalacia within the lateral  aspect of the right frontal and anterior aspect of the right  temporal  lobes.     NOTE: Check reconstructions of posterior cranial circulation.     Radiation dose reduction techniques were utilized, including automated  exposure control and exposure modulation based on body size.       I reviewed the patient's daily medications.  Scheduled Medications  aspirin, 81 mg, Oral, Daily  atorvastatin, 20 mg, Oral, Nightly  budesonide-formoterol, 2 puff, Inhalation, BID - RT  cholecalciferol, 1,000 Units, Oral, Daily  cycloSPORINE, 1 drop, Both Eyes, BID  dextromethorphan polistirex ER, 60 mg, Oral, Q12H  enoxaparin, 40 mg, Subcutaneous, Daily  fluticasone, 2 spray, Each Nare, Daily  [Held by provider] hydroCHLOROthiazide, 25 mg, Oral, Daily  insulin glargine, 15 Units, Subcutaneous, Q12H  insulin lispro, 2-7 Units, Subcutaneous, 4x Daily AC & at Bedtime  insulin lispro, 3 Units, Subcutaneous, TID With Meals  ipratropium-albuterol, 3 mL, Nebulization, Q4H - RT  levETIRAcetam, 250 mg, Oral, Daily  lisinopril, 10 mg, Oral, Daily  montelukast, 10 mg, Oral, Nightly  pantoprazole, 40 mg, Oral, Q AM  [Held by provider] predniSONE, 40 mg, Oral, Daily With Breakfast  pregabalin, 100 mg, Oral, TID  propranolol, 40 mg, Oral, BID  revefenacin, 175 mcg, Nebulization, Daily - RT  sodium chloride, 1,000 mL, Intravenous, Once  sodium chloride, 10 mL, Intravenous, Q12H  traZODone, 100 mg, Oral, Nightly  Vitamin B-2, 300 mg, Oral, Daily    Infusions   Diet  Diet: Regular/House, Diabetic; Consistent Carbohydrate; Fluid Consistency: Thin (IDDSI 0)         I have personally reviewed:  [x]  Laboratory   [x]  Microbiology   [x]  Radiology   []  EKG/Telemetry   []  Cardiology/Vascular   []  Pathology   [x]  Records     Assessment/Plan     Active Hospital Problems    Diagnosis  POA    **Asthma [J45.909]  Yes    Hyponatremia [E87.1]  Unknown    Moderate asthma with exacerbation [J45.901]  Yes    Essential hypertension [I10]  Yes    Type 2 diabetes mellitus with hyperglycemia, without long-term current use of  insulin [E11.65]  Yes      Resolved Hospital Problems   No resolved problems to display.       69 y.o. female admitted with Asthma.    Ms. Gilliland is a 69 y.o. female with a history of asthma, GERD, type 2 diabetes mellitus, migraines, hypertension, and cerebral aneurysm that presents to [facility] complaining of shortness of breath.      Acute migraine  -responded to stat response, at bedside. Stat labs unremarkable  -CTA head with no acute findings, patient required premedications because of contrast allergy  -Given headache cocktail with IV Compazine, IV Benadryl, IV Toradol, 1 L fluid.  -Was given IV Dilaudid x 1 to get her acute pain under control with her uncontrolled hypertension  -If not improving will consult neurology     Acute asthma exacerbation, improving  -CXR and CT chest negative for infiltrate or consolidation  -Respiratory panel negative  -On scheduled DuoNebs, Symbicort, prednisone.  Continue Delsym, continue Tessalon Perles  -Continue Singulair  -Pulmonology following, recommend starting biologic injectable with with Fasenra as an outpatient  -Possible vocal cord dysfunction.  Speech therapy evaluated, recommended outpatient  Speech Language Pathology for Videostroboscopy for further evaluation and treatment  -Discussed with pharmacy and her insurance covers Breztri        Type 2 diabetes mellitus  -Blood glucose elevated secondary to steroids  -Continue Lantus to 15 units twice daily  -Continue SSI,scheduled lispro with meals 3 units  -Blood glucose improved       Elevated lactic acid  -Lactic acid elevated at 2.3, no evidence of sepsis or hypoperfusion on exam  -Suspect secondary to albuterol/COPD exacerbation     Hypertension  -EKG was sinus rhythm, no ischemic changes  -Troponin negative, suspect chest pressure associated with above  -Continue lisinopril, metoprolol.  Hydrochlorothiazide has been held.     Elevated transaminases  - continue statin.  ALT seems chronically  elevated.  Will need follow-up as an outpatient.    SCDs for DVT prophylaxis.  Full code.  Discussed with patient and nursing staff.  Anticipate discharge home tomorrow.  Pending improvement of migraine    Expected Discharge Date: 1/21/2025; Expected Discharge Time:       Hang Diaz MD  [facility]  01/21/25  15:49 EST
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
## 2025-01-21 NOTE — NURSING NOTE
Discussion with patient and family over refusal of bed alarm. Patient refusing and was supposed to be discharged, however has had change in status with complaint of severe HA, rapid called and dc cancelled. CT neg but still complaints of severe HA and working on pain control and neuro consult. Explained the need of  bed alarm with change in status and up with assist only with staff not family and risks of doing so. Wearing dark glasses currently as well with room dark.  in agreement, and I told her we would make sure BA in zone 2 to help with unnecessary alarms. Pt did not want to discuss further due to pain.  She was receiving more meds while I was in the room, BA remains on for now.  voiced understanding of all.

## 2025-01-21 NOTE — CODE DOCUMENTATION
"Patient Name:  Khalida Gilliland  YOB: 1955  MRN:  [id number]  Admit Date:  1/17/2025    Visit Diagnoses:     ICD-10-CM ICD-9-CM   1. Shortness of breath  R06.02 786.05   2. Moderate persistent asthma with acute exacerbation  J45.41 493.92   3. Elevated blood pressure reading without diagnosis of hypertension  R03.0 796.2   4. Type 2 diabetes mellitus without complication, without long-term current use of insulin  E11.9 250.00   5. Bronchitis  J40 490       Reason For Rapid:    migraine, sudden onset    RN Communicated With:   primary RN communciated w/ [facility] Dr. Diaz      Rapid Outcome:   pain meds orders and plan for CTA head    Communication From Rapid Team:    Pt on med/surg floor screamed out for help from staff. Having excruciating head pain, right side of face/neck/back of head. Pt has history of migraines, but hasn't had one for years. She does have history of brain aneurysm. Dr. Diaz ordered CTA head, pre-medication for contrast allergy, and dilaudid for pain. Further care by primary team. No signs of stroke at this time.       Most Recent Vital Signs  Temp:  [97.1 °F (36.2 °C)-97.9 °F (36.6 °C)] 97.5 °F (36.4 °C)  Heart Rate:  [60-79] 79  Resp:  [16-18] 18  BP: (143-192)/() 192/115  SpO2:  [95 %-100 %] 98 %  on   ;   Device (Oxygen Therapy): room air    Labs:  Results from last 7 days   Lab Units 01/17/25  2323   COVID19  Not Detected     Glucose   Date/Time Value Ref Range Status   01/21/2025 1133 248 (H) 70 - 130 mg/dL Final   01/21/2025 0823 138 (H) 70 - 130 mg/dL Final   01/20/2025 2109 203 (H) 70 - 130 mg/dL Final   01/20/2025 1712 204 (H) 70 - 130 mg/dL Final   01/20/2025 1127 244 (H) 70 - 130 mg/dL Final   01/20/2025 0816 107 70 - 130 mg/dL Final   01/19/2025 2220 258 (H) 70 - 130 mg/dL Final     No results found for: \"SITE\", \"ALLENTEST\", \"PHART\", \"FRI0IJJ\", \"PO2ART\", \"NZH9QWO\", \"BASEEXCESS\", \"Y7PXAMBP\", \"HGBBG\", \"HCTABG\", \"OXYHEMOGLOBI\", \"METHHGBN\", \"CARBOXYHGB\", \"CO2CT\", " "\"BAROMETRIC\", \"MODALITY\", \"FIO2\"  Results from last 7 days   Lab Units 01/20/25  0629 01/19/25  0712 01/18/25  0258 01/17/25  1558   WBC 10*3/mm3 7.68 8.25 10.66 9.65   HEMOGLOBIN g/dL 12.8 12.3 13.5 14.2   PLATELETS 10*3/mm3 275 266 284 326     Results from last 7 days   Lab Units 01/20/25  0629 01/19/25  0712 01/18/25  0258   SODIUM mmol/L 137 140 137   POTASSIUM mmol/L 3.8 4.0 4.3   CHLORIDE mmol/L 102 106 99   CO2 mmol/L 24.9 26.0 23.7   BUN mg/dL 16 15 14   CREATININE mg/dL 0.64 0.62 0.67   GLUCOSE mg/dL 148* 134* 342*   ALBUMIN g/dL 3.6 3.5 3.5   BILIRUBIN mg/dL 0.4 0.4 0.3   ALK PHOS U/L 71 67 88   AST (SGOT) U/L 26 17 23   ALT (SGPT) U/L 42* 36* 45*   Estimated Creatinine Clearance: 100.6 mL/min (by C-G formula based on SCr of 0.64 mg/dL).  Results from last 7 days   Lab Units 01/17/25  1648 01/17/25  1558   HSTROP T ng/L <6 <6     Results from last 7 days   Lab Units 01/18/25  2055 01/18/25  1439 01/18/25  0836 01/18/25  0540 01/18/25  0258 01/17/25  2330 01/17/25  1558   LACTATE mmol/L 2.9* 2.6* 3.4* 4.0* 4.6* 4.4* 2.3*     No results found for: \"STREPPNEUAG\", \"LEGANTIGENUR\"    Results from last 7 days   Lab Units 01/17/25  2323   ADENOVIRUS DETECTION BY PCR  Not Detected   CORONAVIRUS 229E  Not Detected   CORONAVIRUS HKU1  Not Detected   CORONAVIRUS NL63  Not Detected   CORONAVIRUS OC43  Not Detected   HUMAN METAPNEUMOVIRUS  Not Detected   HUMAN RHINOVIRUS/ENTEROVIRUS  Not Detected   INFLUENZA B PCR  Not Detected   PARAINFLUENZA 1  Not Detected   PARAINFLUENZA VIRUS 2  Not Detected   PARAINFLUENZA VIRUS 3  Not Detected   PARAINFLUENZA VIRUS 4  Not Detected   BORDETELLA PERTUSSIS PCR  Not Detected   CHLAMYDOPHILA PNEUMONIAE PCR  Not Detected   MYCOPLAMA PNEUMO PCR  Not Detected   INFLUENZA A PCR  Not Detected   RSV, PCR  Not Detected       NIH Stroke Scale:   moves all extremities equally, no focal deficit, neuro intact, sensation intact                                                         Please refer " to full rapid documentation on summary page under Index / Code Timeline

## 2025-01-22 LAB
ERYTHROCYTE [SEDIMENTATION RATE] IN BLOOD: 10 MM/HR (ref 0–30)
GLUCOSE BLDC GLUCOMTR-MCNC: 110 MG/DL (ref 70–130)
GLUCOSE BLDC GLUCOMTR-MCNC: 129 MG/DL (ref 70–130)
GLUCOSE BLDC GLUCOMTR-MCNC: 164 MG/DL (ref 70–130)
GLUCOSE BLDC GLUCOMTR-MCNC: 203 MG/DL (ref 70–130)

## 2025-01-22 PROCEDURE — 94799 UNLISTED PULMONARY SVC/PX: CPT

## 2025-01-22 PROCEDURE — 85652 RBC SED RATE AUTOMATED: CPT

## 2025-01-22 PROCEDURE — 99233 SBSQ HOSP IP/OBS HIGH 50: CPT | Performed by: NURSE PRACTITIONER

## 2025-01-22 PROCEDURE — 94664 DEMO&/EVAL PT USE INHALER: CPT

## 2025-01-22 PROCEDURE — 63710000001 INSULIN LISPRO (HUMAN) PER 5 UNITS: Performed by: STUDENT IN AN ORGANIZED HEALTH CARE EDUCATION/TRAINING PROGRAM

## 2025-01-22 PROCEDURE — 25010000002 ENOXAPARIN PER 10 MG: Performed by: STUDENT IN AN ORGANIZED HEALTH CARE EDUCATION/TRAINING PROGRAM

## 2025-01-22 PROCEDURE — 25010000002 DIPHENHYDRAMINE PER 50 MG

## 2025-01-22 PROCEDURE — 63710000001 INSULIN LISPRO (HUMAN) PER 5 UNITS

## 2025-01-22 PROCEDURE — 82948 REAGENT STRIP/BLOOD GLUCOSE: CPT

## 2025-01-22 PROCEDURE — 63710000001 REVEFENACIN 175 MCG/3ML SOLUTION: Performed by: INTERNAL MEDICINE

## 2025-01-22 PROCEDURE — 63710000001 INSULIN GLARGINE PER 5 UNITS: Performed by: STUDENT IN AN ORGANIZED HEALTH CARE EDUCATION/TRAINING PROGRAM

## 2025-01-22 PROCEDURE — 25010000002 PROCHLORPERAZINE 10 MG/2ML SOLUTION

## 2025-01-22 PROCEDURE — 94761 N-INVAS EAR/PLS OXIMETRY MLT: CPT

## 2025-01-22 PROCEDURE — 94760 N-INVAS EAR/PLS OXIMETRY 1: CPT

## 2025-01-22 RX ORDER — LORAZEPAM 1 MG/1
1 TABLET ORAL ONCE
Status: COMPLETED | OUTPATIENT
Start: 2025-01-22 | End: 2025-01-23

## 2025-01-22 RX ADMIN — PROPRANOLOL HYDROCHLORIDE 40 MG: 20 TABLET ORAL at 22:07

## 2025-01-22 RX ADMIN — Medication 10 ML: at 08:33

## 2025-01-22 RX ADMIN — INSULIN GLARGINE 15 UNITS: 100 INJECTION, SOLUTION SUBCUTANEOUS at 22:07

## 2025-01-22 RX ADMIN — PREGABALIN 100 MG: 100 CAPSULE ORAL at 20:19

## 2025-01-22 RX ADMIN — PROCHLORPERAZINE EDISYLATE 10 MG: 5 INJECTION INTRAMUSCULAR; INTRAVENOUS at 06:32

## 2025-01-22 RX ADMIN — ACETAMINOPHEN 1000 MG: 500 TABLET, FILM COATED ORAL at 14:18

## 2025-01-22 RX ADMIN — ENOXAPARIN SODIUM 40 MG: 100 INJECTION SUBCUTANEOUS at 08:27

## 2025-01-22 RX ADMIN — INSULIN LISPRO 3 UNITS: 100 INJECTION, SOLUTION INTRAVENOUS; SUBCUTANEOUS at 08:39

## 2025-01-22 RX ADMIN — INSULIN GLARGINE 15 UNITS: 100 INJECTION, SOLUTION SUBCUTANEOUS at 08:27

## 2025-01-22 RX ADMIN — IPRATROPIUM BROMIDE AND ALBUTEROL SULFATE 3 ML: 2.5; .5 SOLUTION RESPIRATORY (INHALATION) at 15:23

## 2025-01-22 RX ADMIN — PREGABALIN 100 MG: 100 CAPSULE ORAL at 08:27

## 2025-01-22 RX ADMIN — BUDESONIDE AND FORMOTEROL FUMARATE DIHYDRATE 2 PUFF: 160; 4.5 AEROSOL RESPIRATORY (INHALATION) at 06:51

## 2025-01-22 RX ADMIN — DEXTROMETHORPHAN 60 MG: 30 SUSPENSION, EXTENDED RELEASE ORAL at 08:27

## 2025-01-22 RX ADMIN — PREGABALIN 100 MG: 100 CAPSULE ORAL at 16:53

## 2025-01-22 RX ADMIN — CYCLOSPORINE 1 DROP: 0.5 EMULSION OPHTHALMIC at 08:27

## 2025-01-22 RX ADMIN — INSULIN LISPRO 3 UNITS: 100 INJECTION, SOLUTION INTRAVENOUS; SUBCUTANEOUS at 18:00

## 2025-01-22 RX ADMIN — PROPRANOLOL HYDROCHLORIDE 40 MG: 20 TABLET ORAL at 08:27

## 2025-01-22 RX ADMIN — MONTELUKAST 10 MG: 10 TABLET, FILM COATED ORAL at 20:16

## 2025-01-22 RX ADMIN — LEVETIRACETAM 250 MG: 500 TABLET, FILM COATED ORAL at 08:26

## 2025-01-22 RX ADMIN — IPRATROPIUM BROMIDE AND ALBUTEROL SULFATE 3 ML: 2.5; .5 SOLUTION RESPIRATORY (INHALATION) at 06:45

## 2025-01-22 RX ADMIN — IPRATROPIUM BROMIDE AND ALBUTEROL SULFATE 3 ML: 2.5; .5 SOLUTION RESPIRATORY (INHALATION) at 10:55

## 2025-01-22 RX ADMIN — CYCLOSPORINE 1 DROP: 0.5 EMULSION OPHTHALMIC at 20:16

## 2025-01-22 RX ADMIN — INSULIN LISPRO 2 UNITS: 100 INJECTION, SOLUTION INTRAVENOUS; SUBCUTANEOUS at 22:07

## 2025-01-22 RX ADMIN — TRAZODONE HYDROCHLORIDE 100 MG: 100 TABLET ORAL at 20:16

## 2025-01-22 RX ADMIN — FLUTICASONE PROPIONATE 2 SPRAY: 50 SPRAY, METERED NASAL at 08:27

## 2025-01-22 RX ADMIN — Medication 1000 UNITS: at 08:27

## 2025-01-22 RX ADMIN — MAGNESIUM OXIDE TAB 400 MG (241.3 MG ELEMENTAL MG) 400 MG: 400 (241.3 MG) TAB at 08:26

## 2025-01-22 RX ADMIN — BUTALBITAL, ACETAMINOPHEN, AND CAFFEINE 1 TABLET: 50; 325; 40 TABLET ORAL at 12:45

## 2025-01-22 RX ADMIN — ATORVASTATIN CALCIUM 20 MG: 20 TABLET, FILM COATED ORAL at 20:16

## 2025-01-22 RX ADMIN — PANTOPRAZOLE SODIUM 40 MG: 40 TABLET, DELAYED RELEASE ORAL at 05:10

## 2025-01-22 RX ADMIN — DIPHENHYDRAMINE HYDROCHLORIDE 25 MG: 50 INJECTION, SOLUTION INTRAMUSCULAR; INTRAVENOUS at 06:32

## 2025-01-22 RX ADMIN — BUDESONIDE AND FORMOTEROL FUMARATE DIHYDRATE 2 PUFF: 160; 4.5 AEROSOL RESPIRATORY (INHALATION) at 21:06

## 2025-01-22 RX ADMIN — DEXTROMETHORPHAN 60 MG: 30 SUSPENSION, EXTENDED RELEASE ORAL at 20:16

## 2025-01-22 RX ADMIN — SODIUM CHLORIDE 250 MG: 9 INJECTION, SOLUTION INTRAVENOUS at 16:53

## 2025-01-22 RX ADMIN — BUTALBITAL, ACETAMINOPHEN, AND CAFFEINE 1 TABLET: 50; 325; 40 TABLET ORAL at 02:20

## 2025-01-22 RX ADMIN — ASPIRIN 81 MG: 81 TABLET, DELAYED RELEASE ORAL at 08:26

## 2025-01-22 RX ADMIN — Medication 10 ML: at 20:19

## 2025-01-22 RX ADMIN — INSULIN LISPRO 3 UNITS: 100 INJECTION, SOLUTION INTRAVENOUS; SUBCUTANEOUS at 12:40

## 2025-01-22 RX ADMIN — IPRATROPIUM BROMIDE AND ALBUTEROL SULFATE 3 ML: 2.5; .5 SOLUTION RESPIRATORY (INHALATION) at 21:02

## 2025-01-22 RX ADMIN — REVEFENACIN 175 MCG: 175 SOLUTION RESPIRATORY (INHALATION) at 06:49

## 2025-01-22 RX ADMIN — BUTALBITAL, ACETAMINOPHEN, AND CAFFEINE 1 TABLET: 50; 325; 40 TABLET ORAL at 20:16

## 2025-01-22 RX ADMIN — LISINOPRIL 10 MG: 10 TABLET ORAL at 08:26

## 2025-01-22 RX ADMIN — LEVETIRACETAM 500 MG: 500 TABLET, FILM COATED ORAL at 02:21

## 2025-01-22 RX ADMIN — Medication 400 MG: at 08:27

## 2025-01-22 NOTE — PLAN OF CARE
Goal Outcome Evaluation:           Progress: no change  Outcome Evaluation: still complaining of headache.  Scheduled Benadryl and compazine given as  well as prn Fiorcet.  Also complainig of constipation.  dulcolax suppositiry given  with minimal result.  Bed alarm activated.  blood sugar monitoring.  Saline locked

## 2025-01-22 NOTE — PROGRESS NOTES
DOS: 2025  NAME: Khalida Gilliland   : 1955  PCP: Sergo Owen MD    Chief Complaint   Patient presents with    Shortness of Breath    Cough         Subjective: Pt seen in follow up, however the problem is new to me.  Patient was walking back from the bathroom.  States her headache was better but then she got up to use the bathroom this morning and while straining she states it became severe.  She walked back from the bathroom and  laid down in the bed.  She states right now is about a 5/10.  She has had the migraine cocktails.  They have given her some improvement.  No family at bedside.    Objective:  Vital signs:   Vitals:    25 0503 25 0645 25 1020 25 1055   BP: 136/68  112/60    BP Location: Left arm  Left arm    Patient Position: Lying  Lying    Pulse: 63 61 61 62   Resp: 17 16 16 18   Temp: 97.5 °F (36.4 °C)  97.1 °F (36.2 °C)    TempSrc: Oral  Oral    SpO2: 95% 96% 92% 96%   Weight:       Height:           Current Facility-Administered Medications:     acetaminophen (TYLENOL) tablet 1,000 mg, 1,000 mg, Oral, Q8H PRN, Luiza Taveras MD, 1,000 mg at 25 1418    aspirin EC tablet 81 mg, 81 mg, Oral, Daily, Luiza Taveras MD, 81 mg at 25 0826    atorvastatin (LIPITOR) tablet 20 mg, 20 mg, Oral, Nightly, Dontrell Iverson MD, 20 mg at 25 2101    benzonatate (TESSALON) capsule 100 mg, 100 mg, Oral, TID PRN, Dontrell Iverson MD, 100 mg at 25 0456    sennosides-docusate (PERICOLACE) 8.6-50 MG per tablet 2 tablet, 2 tablet, Oral, BID PRN, 2 tablet at 25 1041 **AND** polyethylene glycol (MIRALAX) packet 17 g, 17 g, Oral, Daily PRN, 17 g at 25 1706 **AND** bisacodyl (DULCOLAX) EC tablet 5 mg, 5 mg, Oral, Daily PRN, 5 mg at 25 1024 **AND** bisacodyl (DULCOLAX) suppository 10 mg, 10 mg, Rectal, Daily PRN, Luiza Taveras MD, 10 mg at 25 2112    budesonide-formoterol (SYMBICORT) 160-4.5 MCG/ACT inhaler 2 puff, 2 puff,  Inhalation, BID - RT, Luiza Taveras MD, 2 puff at 01/22/25 0651    butalbital-acetaminophen-caffeine (FIORICET, ESGIC) -40 MG per tablet 1 tablet, 1 tablet, Oral, Q4H PRN, Hang Diaz MD, 1 tablet at 01/22/25 1245    cholecalciferol (VITAMIN D3) tablet 1,000 Units, 1,000 Units, Oral, Daily, Luiza Taveras MD, 1,000 Units at 01/22/25 0827    cycloSPORINE (RESTASIS) 0.05 % ophthalmic emulsion 1 drop, 1 drop, Both Eyes, BID, Luiza Taveras MD, 1 drop at 01/22/25 0827    dextromethorphan polistirex ER (DELSYM) 30 MG/5ML oral suspension 60 mg, 60 mg, Oral, Q12H, Hang Diaz MD, 60 mg at 01/22/25 0827    dextrose (D50W) (25 g/50 mL) IV injection 25 g, 25 g, Intravenous, Q15 Min PRN, Ellen Jarrett APRN    dextrose (GLUTOSE) oral gel 15 g, 15 g, Oral, Q15 Min PRN, Ellen Jarrett APRN    diphenhydrAMINE (BENADRYL) capsule 25 mg, 25 mg, Oral, Nightly PRN, Dontrell Iverson MD, 25 mg at 01/18/25 2219    Enoxaparin Sodium (LOVENOX) syringe 40 mg, 40 mg, Subcutaneous, Daily, Luiza Taveras MD, 40 mg at 01/22/25 0827    fluticasone (FLONASE) 50 MCG/ACT nasal spray 2 spray, 2 spray, Each Nare, Daily, Luiza Taveras MD, 2 spray at 01/22/25 0827    glucagon (GLUCAGEN) injection 1 mg, 1 mg, Intramuscular, Q15 Min PRN, Ellen Jarrett APRN    [Held by provider] hydroCHLOROthiazide tablet 25 mg, 25 mg, Oral, Daily, Dontrell Iverson MD    insulin glargine (LANTUS, SEMGLEE) injection 15 Units, 15 Units, Subcutaneous, Q12H, Dontrell Iverson MD, 15 Units at 01/22/25 0827    insulin lispro (HUMALOG/ADMELOG) injection 2-7 Units, 2-7 Units, Subcutaneous, 4x Daily AC & at Bedtime, Ellen Jarrett APRN, 3 Units at 01/22/25 1240    insulin lispro (HUMALOG/ADMELOG) injection 3 Units, 3 Units, Subcutaneous, TID With Meals, Dontrell Iverson MD, 3 Units at 01/22/25 1240    ipratropium-albuterol (DUO-NEB) nebulizer solution 3 mL, 3 mL, Nebulization, Q4H - RT, [place], MD Luiza, 3 mL at 01/22/25  1055    levETIRAcetam (KEPPRA) tablet 250 mg, 250 mg, Oral, Daily, Luiza Taveras MD, 250 mg at 01/22/25 0826    lisinopril (PRINIVIL,ZESTRIL) tablet 10 mg, 10 mg, Oral, Daily, Luiza Taveras MD, 10 mg at 01/22/25 0826    magnesium oxide (MAG-OX) tablet 400 mg, 400 mg, Oral, Daily, Natalya Aparicio PA-C, 400 mg at 01/22/25 0826    montelukast (SINGULAIR) tablet 10 mg, 10 mg, Oral, Nightly, Luiza Taveras MD, 10 mg at 01/21/25 2052    ondansetron (ZOFRAN) injection 4 mg, 4 mg, Intravenous, Q6H PRN, Luiza Taveras MD, 4 mg at 01/21/25 1142    pantoprazole (PROTONIX) EC tablet 40 mg, 40 mg, Oral, Q AM, Luiza Taveras MD, 40 mg at 01/22/25 0510    [Held by provider] predniSONE (DELTASONE) tablet 40 mg, 40 mg, Oral, Daily With Breakfast, Luiza Taveras MD, 40 mg at 01/21/25 0904    pregabalin (LYRICA) capsule 100 mg, 100 mg, Oral, TID, Luiza Taveras MD, 100 mg at 01/22/25 0827    propranolol (INDERAL) tablet 40 mg, 40 mg, Oral, BID, Luiza Taveras MD, 40 mg at 01/22/25 0827    revefenacin (YUPELRI) nebulizer solution 175 mcg, 175 mcg, Nebulization, Daily - RT, Burt Cannon MD, 175 mcg at 01/22/25 0649    sodium chloride 0.9 % flush 10 mL, 10 mL, Intravenous, Q12H, Luiza Taveras MD, 10 mL at 01/22/25 0833    sodium chloride 0.9 % flush 10 mL, 10 mL, Intravenous, PRN, Luiza Taveras MD    sodium chloride 0.9 % infusion 40 mL, 40 mL, Intravenous, PRN, Luiza Taveras MD    traZODone (DESYREL) tablet 100 mg, 100 mg, Oral, Nightly, Luiza Taveras MD, 100 mg at 01/21/25 2053    Vitamin B-2 (RIBOFLAVIN) tablet 400 mg, 400 mg, Oral, Daily, Natalya Aparicio PA-C, 400 mg at 01/22/25 0827    zolpidem (AMBIEN) tablet 5 mg, 5 mg, Oral, Nightly PRN, Luiza Taveras MD, 5 mg at 01/21/25 2257    PRN meds    acetaminophen    benzonatate    senna-docusate sodium **AND** polyethylene glycol **AND** bisacodyl **AND** bisacodyl    butalbital-acetaminophen-caffeine    dextrose    dextrose    diphenhydrAMINE    glucagon  (human recombinant)    ondansetron    sodium chloride    sodium chloride    zolpidem    No current facility-administered medications on file prior to encounter.     Current Outpatient Medications on File Prior to Encounter   Medication Sig    Ascorbic Acid (VITAMIN C PO) Take 1,000 mg by mouth Daily. PT INSTRUCTED TO CONTINUE PER DR. CALLUM URBINA    aspirin 81 MG EC tablet Take 1 tablet by mouth Daily.    atorvastatin (LIPITOR) 20 MG tablet TAKE 1 TABLET EVERY NIGHT    azelastine (ASTELIN) 0.1 % nasal spray 1 spray into the nostril(s) as directed by provider 2 (Two) Times a Day. Use in each nostril as directed    azithromycin (Zithromax Z-Aubrey) 250 MG tablet Take 2 tablets the first day, then 1 tablet daily for 4 days.    BD Pen Needle Milady 2nd Gen 32G X 4 MM misc INJECT 1 NEEDLE UNDER THE SKIN, INTO THE APPROPRIATE AREA AS DIRECTED FOUR TIMES A DAY (FOR INSULIN INJECTIONS)    Biotin 41148 MCG tablet Take 1 tablet by mouth Every Evening.    butalbital-acetaminophen-caffeine (Esgic) -40 MG per tablet Take 1 tablet by mouth Every 6 (Six) Hours As Needed for Headache.    cholecalciferol (VITAMIN D3) 1000 units tablet Take 1 tablet by mouth Daily. PER PT TO CONTINUE PER DR. CALLUM URBINA    Cyanocobalamin (VITAMIN B 12 PO) Take 5,000 mcg by mouth Every Night.    cycloSPORINE (RESTASIS) 0.05 % ophthalmic emulsion Administer 1 drop to both eyes 2 (Two) Times a Day.    diphenhydrAMINE-acetaminophen (TYLENOL PM)  MG tablet per tablet Take 1 tablet by mouth At Night As Needed for Sleep.    fluticasone (FLONASE) 50 MCG/ACT nasal spray USE 2 SPRAYS IN EACH NOSTRIL DAILY    fluticasone-salmeterol (Advair HFA) 45-21 MCG/ACT inhaler Inhale 2 puffs 2 (Two) Times a Day.    glipizide (GLUCOTROL XL) 5 MG ER tablet Take 1 tablet by mouth 2 (Two) Times a Day.    glucose blood (FREESTYLE LITE) test strip PT to use once daily    glucose blood test strip Use to test blood glucose up to four times daily as needed. Formulary  Compliance Approval. Diagnosis: Type 2 Diabetes - Insulin Dependent    glucose monitor monitoring kit Use to test blood glucose up to four times daily as needed. Formulary Compliance Approval. Diagnosis: Type 2 Diabetes - Insulin Dependent    hydroCHLOROthiazide 25 MG tablet Take 1 tablet by mouth Daily. Indications: High Blood Pressure Disorder    Lancets (freestyle) lancets Pt tests daily    Lancets misc Use to test blood glucose up to four times daily as needed. Formulary Compliance Approval. Diagnosis: Type 2 Diabetes - Insulin Dependent    levETIRAcetam (KEPPRA) 250 MG tablet TAKE 1 TABLET DAILY    lisinopril (PRINIVIL,ZESTRIL) 10 MG tablet TAKE 1 TABLET DAILY    magnesium oxide (MAG-OX) 400 tablet tablet Take 1 tablet by mouth Daily.    methylPREDNISolone (MEDROL) 4 MG dose pack Take as directed on package instructions.    montelukast (SINGULAIR) 10 MG tablet Take 1 tablet by mouth Every Night.    multivitamin (THERAGRAN) tablet tablet Take 1 tablet by mouth Daily.    nitrofurantoin, macrocrystal-monohydrate, (Macrobid) 100 MG capsule Take 1 capsule by mouth 2 (Two) Times a Day.    nystatin (MYCOSTATIN) 292487 UNIT/GM cream Apply 1 Application topically to the appropriate area as directed 2 (Two) Times a Day. Indications: Skin Infection due to Candida Yeast    nystatin 974262 UNIT/GM topical powder APPLY TOPICALLY TO THE APPROPRIATE AREA AS DIRECTED THREE TIMES A DAY    Omega-3 Fatty Acids (FISH OIL) 1000 MG capsule capsule Take  by mouth Daily With Breakfast.    omeprazole (priLOSEC) 40 MG capsule TAKE 1 CAPSULE EVERY MORNING    ondansetron (Zofran) 4 MG tablet Take 1 tablet by mouth Every 8 (Eight) Hours As Needed for Nausea or Vomiting.    ondansetron (ZOFRAN) 4 MG tablet Take 1 tablet by mouth Every 8 (Eight) Hours As Needed for Nausea or Vomiting.    ondansetron ODT (ZOFRAN-ODT) 4 MG disintegrating tablet Place 1 tablet on the tongue Every 6 (Six) Hours As Needed for Vomiting or Nausea.    pregabalin  (LYRICA) 100 MG capsule Take 1 capsule by mouth 3 (Three) Times a Day.    Probiotic Product (PROBIOTIC-10 PO) Take  by mouth.    propranolol (INDERAL) 40 MG tablet TAKE 1 TABLET TWICE A DAY    raNITIdine (ZANTAC) 75 MG tablet Take 2 tablets by mouth 2 (Two) Times a Day.    Spacer/Aero-Holding Chambers device Use with all inhaled treatments    traZODone (DESYREL) 100 MG tablet Take 1 tablet by mouth Every Night.    Vitamin B-2 (RIBOFLAVIN) 100 MG tablet tablet Take 3 tablets by mouth Daily.    dicyclomine (BENTYL) 20 MG tablet Take 1 tablet by mouth 4 (Four) Times a Day Before Meals & at Bedtime As Needed for Abdominal Cramping (Diarrhea).    dicyclomine (BENTYL) 20 MG tablet Take 1 tablet by mouth Every 6 (Six) Hours As Needed (abdominal pain and /or diarrhea).    erythromycin (ROMYCIN) 5 MG/GM ophthalmic ointment Administer  to both eyes 2 (Two) Times a Day.    riFAXIMin (Xifaxan) 550 MG tablet TAKE 1 TABLET BY MOUTH EVERY 8 HOURS FOR 14 DAYS    zolpidem (Ambien) 5 MG tablet Take 1 tablet by mouth At Night As Needed for Sleep.       General appearance: Obese elderly female, NAD, alert and cooperative  HEENT: Normocephalic, atraumatic, PERRL, no masses or tenderness    Neurological:   MS: oriented x3, normal attention/concentration, language intact, no neglect, normal fund of knowledge  CN: visual fields full, EOMI, facial sensation equal, no facial droop, shoulder shrug equal, tongue midline  Motor: 5/5 in all 4 ext.  Sensory: light touch and cold sensation intact in all 4 ext.  Coordination: Normal finger to nose test  Gait and station: no ataxia  Rapid alternating movements: normal finger to thumb tap    Laboratory results:  Lab Results   Component Value Date    TSH 1.680 11/19/2024     Lab Results   Component Value Date    UBDMSUSF11 372 09/02/2023     Lab Results   Component Value Date    HGBA1C 7.40 (H) 11/19/2024     Lab Results   Component Value Date    GLUCOSE 239 (H) 01/21/2025    BUN 14 01/21/2025     "CREATININE 0.68 01/21/2025    EGFRIFNONA 93 02/03/2022    EGFRIFAFRI 106 09/07/2021    BCR 20.6 01/21/2025    K 4.0 01/21/2025    CO2 26.0 01/21/2025    CALCIUM 9.2 01/21/2025    PROTENTOTREF 7.6 11/19/2024    ALBUMIN 3.6 01/20/2025    LABIL2 1.3 11/19/2024    AST 26 01/20/2025    ALT 42 (H) 01/20/2025     Lab Results   Component Value Date    WBC 10.53 01/21/2025    HGB 14.2 01/21/2025    HCT 45.7 01/21/2025    MCV 93.3 01/21/2025     01/21/2025     Brief Urine Lab Results  (Last result in the past 365 days)        Color   Clarity   Blood   Leuk Est   Nitrite   Protein   CREAT   Urine HCG        01/17/25 1534 Yellow   Clear   Negative   Negative   Negative   Negative                 No results found for: \"ACANTHNAEG\", \"AFBCX\", \"BPERTUSSISCX\", \"BLOODCX\"  No results found for: \"BCIDPCR\", \"CXREFLEX\", \"CSFCX\", \"CULTURETIS\"  No results found for: \"CULTURES\", \"HSVCX\", \"URCX\"  No results found for: \"EYECULTURE\", \"GCCX\", \"HSVCULTURE\", \"LABHSV\"  No results found for: \"LEGIONELLA\", \"MRSACX\", \"MUMPSCX\", \"MYCOPLASCX\"  No results found for: \"NOCARDIACX\", \"STOOLCX\"  No results found for: \"THROATCX\", \"UNSTIMCULT\", \"URINECX\", \"CULTURE\", \"VZVCULTUR\"  No results found for: \"VIRALCULTU\", \"WOUNDCX\"  Pain Management Panel  More data exists         Latest Ref Rng & Units 11/19/2024 8/8/2024   Pain Management Panel   Creatinine, Urine Not Estab. mg/dL 81.3  111.3       Details                   Review and interpretation of imaging:  CT Angiogram Head    Result Date: 1/21/2025   Status post right pterional craniotomy. An aneurysm clip is seen within the right paraclinoid region. A pipeline stent is seen within the cavernous and supraclinoid segments of the right ICA. There is no evidence to suggest residual or recurrent aneurysm.  Incidental note of small foci of encephalomalacia within the lateral aspect of the right frontal and anterior aspect of the right temporal lobes.   Radiation dose reduction techniques were utilized, " including automated exposure control and exposure modulation based on body size.  This report was finalized on 1/21/2025 4:48 PM by Dr. Milton Ponce M.D on Workstation: [id number]      CT Chest Without Contrast Diagnostic    Result Date: 1/17/2025  No acute findings identified in the chest.  This report was finalized on 1/17/2025 5:17 PM by Dr. Xu Morley M.D on Workstation: [id number]      XR Chest 2 View    Result Date: 1/17/2025  No focal consolidation. No pleural effusion or pneumothorax.  Normal size cardiomediastinal silhouette.  No focal osseous abnormality.   This report was finalized on 1/17/2025 4:09 PM by Dr. Fabrice Lorenzana M.D on Workstation: [id number]        Impression/Assessment:  This is a 69-year-old female with past medical history of type 2 diabetes, migraine headache, hypertension, hyperlipidemia, cerebral aneurysm status post right frontoparietal craniotomy for clipping and stenting who presented to the hospital on 1/17/2025 with complaints of shortness of breath.  She has been treated for acute asthma exacerbation since admission.  Our service was consulted for her complaints of a headache.  She has a history of migraine headaches and states her last bad headache was in January of last year.  Typically at home she will place a very hot towel over the top of her head and that usually relieves it.  She does have accompanying photophobia, phonophobia, and nausea.  She states that it is mainly over the right frontal-and at times is throbbing parietal part of her head.  She has been coughing a lot and this has been worsening her headache.  She has had this problem in the past.  She had a CTA head that did not show any acute changes, stable findings from her prior craniotomy.  She has had labile BPs with SBP's been in the 190s and as low as the low 100s.  At home she takes propranolol 40 mg twice daily and Keppra 250 mg daily for headache prophylaxis.  For acute relief she has Fioricet.   In addition she is also taking Lyrica 100 mg 3 times daily.     Diagnosis:  Migraine headache with aura, intractable  Asthma exacerbation  Obesity  Hypertension  At risk for sleep apnea  History of cerebral aneurysm status post clipping and stenting  History of right frontoparietal craniotomy    Plan:  - Inflammatory markers are normal.  Decent response to cocktails however she is still complaining of a headache today that was worsened when she was straining for a bowel movement on the toilet.  While lying down she rates it a 5/10.  - Give one time dose of IV Depacon 250mg now  - Continue Mg oxide 400mg and Riboflavin 400mg PO daily  - Given her migraines are typically well controlled in the outpatient setting she can continue the Keppra 250mg daily and Propanolol 40mg BID.  - She would be a good candidate for Ubrelvy or Nurtec for acute relief in the outpatient setting. Would avoid excessive Fioricet use.   - Will order MRI brain WO given intractable headache despite several cocktails with hx of cerebral aneursym.   - Recommend sleep medicine referral in the outpatient setting.  - Normalize BP, has been having labile Bps here with SBP high as 190s and low as 100s.   - Will have her f/u in the clinic for headache treatment, she had previously scheduled appointment but missed it.  Will follow.     Case discussed with patient and Dr. Blanco, and he agrees with plan above.     TATE Núñez

## 2025-01-22 NOTE — PLAN OF CARE
Goal Outcome Evaluation:  Plan of Care Reviewed With: patient        Progress: no change  Outcome Evaluation: Still with migraine this shift, no relief from PRN meds. Neuro ordered x1 dose depacon and MRI of brain. Screening sheet completed and faxed. Up with assist, bed alarm in use. Rested between care.

## 2025-01-22 NOTE — PROGRESS NOTES
Name: Khalida Gilliland ADMIT: 2025   : 1955  PCP: Sergo Owen MD    MRN: [id number] LOS: 3 days   AGE/SEX: 69 y.o. female  ROOM: [facility]     Subjective   Subjective   Patient's headache improved today but persists.  Worse with bearing down during bowel movement.  Discussed with RN and patient still having considerable headache this afternoon.  Patient this morning, she did not like she could walk.       Objective   Objective   Vital Signs  Temp:  [96.8 °F (36 °C)-97.5 °F (36.4 °C)] 97.1 °F (36.2 °C)  Heart Rate:  [61-78] 62  Resp:  [16-18] 18  BP: (112-151)/(60-78) 112/60  SpO2:  [92 %-99 %] 96 %  on   ;   Device (Oxygen Therapy): room air  Body mass index is 32.23 kg/m².  Physical Exam  Constitutional:       General: She is not in acute distress.     Appearance: She is ill-appearing.   Cardiovascular:      Rate and Rhythm: Normal rate and regular rhythm.   Pulmonary:      Effort: Pulmonary effort is normal. No respiratory distress.      Comments: Diminished bilaterally.  Faint expiratory wheezes.  Abdominal:      General: Abdomen is flat. There is no distension.      Tenderness: There is no abdominal tenderness.   Musculoskeletal:         General: No swelling or deformity. Normal range of motion.   Skin:     General: Skin is warm and dry.   Neurological:      General: No focal deficit present.      Mental Status: She is alert. Mental status is at baseline.         Results Review     I reviewed the patient's new clinical results.  Results from last 7 days   Lab Units 25  1159 25  0629 25  0712 25  0258   WBC 10*3/mm3 10.53 7.68 8.25 10.66   HEMOGLOBIN g/dL 14.2 12.8 12.3 13.5   PLATELETS 10*3/mm3 344 398 842 284     Results from last 7 days   Lab Units 25  1159 25  0629 25  0712 25  0258   SODIUM mmol/L 135* 137 140 137   POTASSIUM mmol/L 4.0 3.8 4.0 4.3   CHLORIDE mmol/L 101 102 106 99   CO2 mmol/L 26.0 24.9 26.0 23.7   BUN mg/dL 14 16 15 14    CREATININE mg/dL 0.68 0.64 0.62 0.67   GLUCOSE mg/dL 239* 148* 134* 342*   Estimated Creatinine Clearance: 94.7 mL/min (by C-G formula based on SCr of 0.68 mg/dL).  Results from last 7 days   Lab Units 01/20/25  0629 01/19/25  0712 01/18/25  0258 01/17/25  1558   ALBUMIN g/dL 3.6 3.5 3.5 3.9   BILIRUBIN mg/dL 0.4 0.4 0.3 0.4   ALK PHOS U/L 71 67 88 106   AST (SGOT) U/L 26 17 23 26   ALT (SGPT) U/L 42* 36* 45* 51*     Results from last 7 days   Lab Units 01/21/25  1159 01/20/25  0629 01/19/25  0712 01/18/25  0258 01/17/25  1558   CALCIUM mg/dL 9.2 8.8 9.1 9.3 9.5   ALBUMIN g/dL  --  3.6 3.5 3.5 3.9   MAGNESIUM mg/dL  --   --  2.3  --   --    PHOSPHORUS mg/dL  --   --  2.3*  --   --      Results from last 7 days   Lab Units 01/18/25 2055 01/18/25  1439 01/18/25  0836 01/18/25  0540   LACTATE mmol/L 2.9* 2.6* 3.4* 4.0*     COVID19   Date Value Ref Range Status   01/17/2025 Not Detected Not Detected - Ref. Range Final   01/17/2025 Not Detected Not Detected - Ref. Range Final     Glucose   Date/Time Value Ref Range Status   01/22/2025 1212 203 (H) 70 - 130 mg/dL Final   01/22/2025 0822 110 70 - 130 mg/dL Final   01/21/2025 2104 322 (H) 70 - 130 mg/dL Final   01/21/2025 1700 228 (H) 70 - 130 mg/dL Final   01/21/2025 1133 248 (H) 70 - 130 mg/dL Final   01/21/2025 0823 138 (H) 70 - 130 mg/dL Final   01/20/2025 2109 203 (H) 70 - 130 mg/dL Final       CT Angiogram Head  Narrative: CT ANGIOGRAM OF THE HEAD WITH CONTRAST     CLINICAL HISTORY: new sudden headache with nausea, hx of aneursym s/p  clipping; R06.02-Shortness of breath; J45.41-Moderate persistent asthma  with (acute) exacerbation; R03.0-Elevated blood-pressure reading,  without diagnosis of hypertension; E11.9-Type 2 diabetes mellitus  without complications; J40-Bronchitis, not specified as acute or chronic     TECHNIQUE: CT angiogram of the head was obtained with 1 mm axial images  following the administration of IV contrast. Sagittal, coronal,  and  3-dimensional reconstructed images were obtained. Additionally, a CT  scan of the head was obtained with 3 mm axial images before and after  the administration of IV contrast.     COMPARISON: CTA head dated 01/25/2024.     FINDINGS:     Head CT: Status post right pterional craniotomy. An aneurysm clip is  seen within the right paraclinoid region. Pipeline stent is seen within  the right internal carotid artery. Small foci of encephalomalacia within  the lateral aspect of the right frontal lobe and the anterior aspect of  the right temporal lobe are again noted.     Posterior Circulation: The vertebral arteries, basilar artery, and  posterior cerebral arteries are within normal limits.  Anterior Circulation: Pipeline stent within the cavernous and  supraclinoid segments of the right ICA and right paraclinoid region  aneurysm clip without evidence for recurrent or residual aneurysm.  Unremarkable appearance of the left internal carotid artery, the middle  cerebral arteries, and the anterior cerebral arteries.     Impression:    Status post right pterional craniotomy. An aneurysm clip is seen within  the right paraclinoid region. A pipeline stent is seen within the  cavernous and supraclinoid segments of the right ICA. There is no  evidence to suggest residual or recurrent aneurysm.     Incidental note of small foci of encephalomalacia within the lateral  aspect of the right frontal and anterior aspect of the right temporal  lobes.        Radiation dose reduction techniques were utilized, including automated  exposure control and exposure modulation based on body size.     This report was finalized on 1/21/2025 4:48 PM by Dr. Milton Ponce M.D  on Workstation: [id number]       I reviewed the patient's daily medications.  Scheduled Medications  aspirin, 81 mg, Oral, Daily  atorvastatin, 20 mg, Oral, Nightly  budesonide-formoterol, 2 puff, Inhalation, BID - RT  cholecalciferol, 1,000 Units, Oral,  Daily  cycloSPORINE, 1 drop, Both Eyes, BID  dextromethorphan polistirex ER, 60 mg, Oral, Q12H  enoxaparin, 40 mg, Subcutaneous, Daily  fluticasone, 2 spray, Each Nare, Daily  [Held by provider] hydroCHLOROthiazide, 25 mg, Oral, Daily  insulin glargine, 15 Units, Subcutaneous, Q12H  insulin lispro, 2-7 Units, Subcutaneous, 4x Daily AC & at Bedtime  insulin lispro, 3 Units, Subcutaneous, TID With Meals  ipratropium-albuterol, 3 mL, Nebulization, Q4H - RT  levETIRAcetam, 250 mg, Oral, Daily  lisinopril, 10 mg, Oral, Daily  magnesium oxide, 400 mg, Oral, Daily  montelukast, 10 mg, Oral, Nightly  pantoprazole, 40 mg, Oral, Q AM  [Held by provider] predniSONE, 40 mg, Oral, Daily With Breakfast  pregabalin, 100 mg, Oral, TID  propranolol, 40 mg, Oral, BID  revefenacin, 175 mcg, Nebulization, Daily - RT  sodium chloride, 10 mL, Intravenous, Q12H  traZODone, 100 mg, Oral, Nightly  valproate sodium, 250 mg, Intravenous, Once  Vitamin B-2, 400 mg, Oral, Daily    Infusions   Diet  Diet: Regular/House, Diabetic; Consistent Carbohydrate; Fluid Consistency: Thin (IDDSI 0)         I have personally reviewed:  [x]  Laboratory   [x]  Microbiology   [x]  Radiology   []  EKG/Telemetry   []  Cardiology/Vascular   []  Pathology   [x]  Records     Assessment/Plan     Active Hospital Problems    Diagnosis  POA    **Asthma [J45.909]  Yes    Hyponatremia [E87.1]  Unknown    Moderate asthma with exacerbation [J45.901]  Yes    Essential hypertension [I10]  Yes    Type 2 diabetes mellitus with hyperglycemia, without long-term current use of insulin [E11.65]  Yes      Resolved Hospital Problems   No resolved problems to display.       69 y.o. female admitted with Asthma.    Ms. Gilliland is a 69 y.o. female with a history of asthma, GERD, type 2 diabetes mellitus, migraines, hypertension, and cerebral aneurysm that presents to [facility] complaining of shortness of breath.      Acute migraine, intractable  History of cerebral  aneurysm status post clipping and stenting  -responded to stat response, at bedside. Stat labs unremarkable  -CTA head with no acute findings, patient required premedications because of contrast allergy  -Neurology consulted, plan for Depacon, added magnesium oxide and riboflavin.  Will order MRI brain with intractable headache.  Recommended sleep medicine referral as an outpatient.     Acute asthma exacerbation, improving  -CXR and CT chest negative for infiltrate or consolidation  -Respiratory panel negative  -On scheduled DuoNebs, Symbicort, prednisone.  Continue Delsym, continue Tessalon Perles  -Continue Singulair  -Pulmonology following, recommend starting biologic injectable with with Fasenra as an outpatient  -Possible vocal cord dysfunction.  Speech therapy evaluated, recommended outpatient  Speech Language Pathology for Videostroboscopy for further evaluation and treatment  -Discussed with pharmacy and her insurance covers Breztri        Type 2 diabetes mellitus  -Blood glucose elevated secondary to steroids  -Continue Lantus to 15 units twice daily  -Continue SSI,scheduled lispro with meals 3 units  -Blood glucose improved       Elevated lactic acid  -Lactic acid elevated at 2.3, no evidence of sepsis or hypoperfusion on exam  -Suspect secondary to albuterol/COPD exacerbation     Hypertension  -EKG was sinus rhythm, no ischemic changes  -Troponin negative, suspect chest pressure associated with above  -Continue lisinopril, metoprolol.  Hydrochlorothiazide has been held.     Elevated transaminases  - continue statin.  ALT seems chronically elevated.  Will need follow-up as an outpatient.    SCDs for DVT prophylaxis.  Full code.  Discussed with patient and nursing staff.  Anticipate discharge home tomorrow.  Pending improvement of migraine    Expected Discharge Date: 1/22/2025; Expected Discharge Time:       Hang Diaz MD  [facility]  01/22/25  15:20 EST

## 2025-01-23 ENCOUNTER — APPOINTMENT (OUTPATIENT)
Dept: MRI IMAGING | Facility: HOSPITAL | Age: 70
DRG: 202 | End: 2025-01-23
Payer: MEDICARE

## 2025-01-23 LAB
ANION GAP SERPL CALCULATED.3IONS-SCNC: 11.4 MMOL/L (ref 5–15)
BUN SERPL-MCNC: 14 MG/DL (ref 8–23)
BUN/CREAT SERPL: 18.4 (ref 7–25)
CALCIUM SPEC-SCNC: 8.9 MG/DL (ref 8.6–10.5)
CHLORIDE SERPL-SCNC: 101 MMOL/L (ref 98–107)
CO2 SERPL-SCNC: 25.6 MMOL/L (ref 22–29)
CREAT SERPL-MCNC: 0.76 MG/DL (ref 0.57–1)
DEPRECATED RDW RBC AUTO: 42.6 FL (ref 37–54)
EGFRCR SERPLBLD CKD-EPI 2021: 84.9 ML/MIN/1.73
ERYTHROCYTE [DISTWIDTH] IN BLOOD BY AUTOMATED COUNT: 12.8 % (ref 12.3–15.4)
GLUCOSE BLDC GLUCOMTR-MCNC: 118 MG/DL (ref 70–130)
GLUCOSE BLDC GLUCOMTR-MCNC: 196 MG/DL (ref 70–130)
GLUCOSE BLDC GLUCOMTR-MCNC: 228 MG/DL (ref 70–130)
GLUCOSE BLDC GLUCOMTR-MCNC: 267 MG/DL (ref 70–130)
GLUCOSE SERPL-MCNC: 203 MG/DL (ref 65–99)
HCT VFR BLD AUTO: 39.2 % (ref 34–46.6)
HGB BLD-MCNC: 12.9 G/DL (ref 12–15.9)
MCH RBC QN AUTO: 30.3 PG (ref 26.6–33)
MCHC RBC AUTO-ENTMCNC: 32.9 G/DL (ref 31.5–35.7)
MCV RBC AUTO: 92 FL (ref 79–97)
PLATELET # BLD AUTO: 305 10*3/MM3 (ref 140–450)
PMV BLD AUTO: 10.2 FL (ref 6–12)
POTASSIUM SERPL-SCNC: 4.4 MMOL/L (ref 3.5–5.2)
RBC # BLD AUTO: 4.26 10*6/MM3 (ref 3.77–5.28)
SODIUM SERPL-SCNC: 138 MMOL/L (ref 136–145)
WBC NRBC COR # BLD AUTO: 7.53 10*3/MM3 (ref 3.4–10.8)

## 2025-01-23 PROCEDURE — 25010000002 DIHYDROERGOTAMINE MESYLATE PER 1 MG: Performed by: NURSE PRACTITIONER

## 2025-01-23 PROCEDURE — 82948 REAGENT STRIP/BLOOD GLUCOSE: CPT

## 2025-01-23 PROCEDURE — 70551 MRI BRAIN STEM W/O DYE: CPT

## 2025-01-23 PROCEDURE — 63710000001 REVEFENACIN 175 MCG/3ML SOLUTION: Performed by: INTERNAL MEDICINE

## 2025-01-23 PROCEDURE — 63710000001 INSULIN LISPRO (HUMAN) PER 5 UNITS

## 2025-01-23 PROCEDURE — 63710000001 INSULIN GLARGINE PER 5 UNITS: Performed by: STUDENT IN AN ORGANIZED HEALTH CARE EDUCATION/TRAINING PROGRAM

## 2025-01-23 PROCEDURE — 99232 SBSQ HOSP IP/OBS MODERATE 35: CPT | Performed by: NURSE PRACTITIONER

## 2025-01-23 PROCEDURE — 94799 UNLISTED PULMONARY SVC/PX: CPT

## 2025-01-23 PROCEDURE — 25010000002 ENOXAPARIN PER 10 MG: Performed by: STUDENT IN AN ORGANIZED HEALTH CARE EDUCATION/TRAINING PROGRAM

## 2025-01-23 PROCEDURE — 63710000001 INSULIN LISPRO (HUMAN) PER 5 UNITS: Performed by: STUDENT IN AN ORGANIZED HEALTH CARE EDUCATION/TRAINING PROGRAM

## 2025-01-23 PROCEDURE — 85027 COMPLETE CBC AUTOMATED: CPT | Performed by: STUDENT IN AN ORGANIZED HEALTH CARE EDUCATION/TRAINING PROGRAM

## 2025-01-23 PROCEDURE — 94664 DEMO&/EVAL PT USE INHALER: CPT

## 2025-01-23 PROCEDURE — 25010000002 METOCLOPRAMIDE PER 10 MG: Performed by: NURSE PRACTITIONER

## 2025-01-23 PROCEDURE — 80048 BASIC METABOLIC PNL TOTAL CA: CPT | Performed by: STUDENT IN AN ORGANIZED HEALTH CARE EDUCATION/TRAINING PROGRAM

## 2025-01-23 RX ORDER — AMOXICILLIN 250 MG
1 CAPSULE ORAL 2 TIMES DAILY
Status: DISCONTINUED | OUTPATIENT
Start: 2025-01-23 | End: 2025-01-24 | Stop reason: HOSPADM

## 2025-01-23 RX ORDER — DIHYDROERGOTAMINE MESYLATE 1 MG/ML
0.5 INJECTION, SOLUTION INTRAMUSCULAR; INTRAVENOUS; SUBCUTANEOUS EVERY 8 HOURS
Status: COMPLETED | OUTPATIENT
Start: 2025-01-23 | End: 2025-01-24

## 2025-01-23 RX ORDER — METOCLOPRAMIDE HYDROCHLORIDE 5 MG/ML
10 INJECTION INTRAMUSCULAR; INTRAVENOUS EVERY 8 HOURS
Status: DISCONTINUED | OUTPATIENT
Start: 2025-01-23 | End: 2025-01-24 | Stop reason: HOSPADM

## 2025-01-23 RX ORDER — LISINOPRIL 20 MG/1
20 TABLET ORAL DAILY
Status: DISCONTINUED | OUTPATIENT
Start: 2025-01-23 | End: 2025-01-24 | Stop reason: HOSPADM

## 2025-01-23 RX ORDER — POLYETHYLENE GLYCOL 3350 17 G/17G
17 POWDER, FOR SOLUTION ORAL DAILY
Status: DISCONTINUED | OUTPATIENT
Start: 2025-01-23 | End: 2025-01-24 | Stop reason: HOSPADM

## 2025-01-23 RX ADMIN — IPRATROPIUM BROMIDE AND ALBUTEROL SULFATE 3 ML: 2.5; .5 SOLUTION RESPIRATORY (INHALATION) at 15:33

## 2025-01-23 RX ADMIN — IPRATROPIUM BROMIDE AND ALBUTEROL SULFATE 3 ML: 2.5; .5 SOLUTION RESPIRATORY (INHALATION) at 07:46

## 2025-01-23 RX ADMIN — IPRATROPIUM BROMIDE AND ALBUTEROL SULFATE 3 ML: 2.5; .5 SOLUTION RESPIRATORY (INHALATION) at 11:48

## 2025-01-23 RX ADMIN — HYDROCHLOROTHIAZIDE 25 MG: 25 TABLET ORAL at 08:50

## 2025-01-23 RX ADMIN — BUTALBITAL, ACETAMINOPHEN, AND CAFFEINE 1 TABLET: 50; 325; 40 TABLET ORAL at 05:57

## 2025-01-23 RX ADMIN — DEXTROMETHORPHAN 60 MG: 30 SUSPENSION, EXTENDED RELEASE ORAL at 20:16

## 2025-01-23 RX ADMIN — INSULIN LISPRO 3 UNITS: 100 INJECTION, SOLUTION INTRAVENOUS; SUBCUTANEOUS at 08:51

## 2025-01-23 RX ADMIN — BUDESONIDE AND FORMOTEROL FUMARATE DIHYDRATE 2 PUFF: 160; 4.5 AEROSOL RESPIRATORY (INHALATION) at 21:14

## 2025-01-23 RX ADMIN — ENOXAPARIN SODIUM 40 MG: 100 INJECTION SUBCUTANEOUS at 08:51

## 2025-01-23 RX ADMIN — MONTELUKAST 10 MG: 10 TABLET, FILM COATED ORAL at 20:16

## 2025-01-23 RX ADMIN — Medication 1000 UNITS: at 08:50

## 2025-01-23 RX ADMIN — MAGNESIUM OXIDE TAB 400 MG (241.3 MG ELEMENTAL MG) 400 MG: 400 (241.3 MG) TAB at 08:50

## 2025-01-23 RX ADMIN — INSULIN LISPRO 3 UNITS: 100 INJECTION, SOLUTION INTRAVENOUS; SUBCUTANEOUS at 17:38

## 2025-01-23 RX ADMIN — PREGABALIN 100 MG: 100 CAPSULE ORAL at 16:32

## 2025-01-23 RX ADMIN — PREGABALIN 100 MG: 100 CAPSULE ORAL at 20:16

## 2025-01-23 RX ADMIN — Medication 10 ML: at 20:20

## 2025-01-23 RX ADMIN — DEXTROMETHORPHAN 60 MG: 30 SUSPENSION, EXTENDED RELEASE ORAL at 08:51

## 2025-01-23 RX ADMIN — DIHYDROERGOTAMINE MESYLATE 0.5 MG: 1 INJECTION INTRAMUSCULAR; INTRAVENOUS; SUBCUTANEOUS at 23:15

## 2025-01-23 RX ADMIN — TRAZODONE HYDROCHLORIDE 100 MG: 100 TABLET ORAL at 20:15

## 2025-01-23 RX ADMIN — INSULIN GLARGINE 15 UNITS: 100 INJECTION, SOLUTION SUBCUTANEOUS at 08:51

## 2025-01-23 RX ADMIN — INSULIN LISPRO 3 UNITS: 100 INJECTION, SOLUTION INTRAVENOUS; SUBCUTANEOUS at 21:22

## 2025-01-23 RX ADMIN — Medication 400 MG: at 10:11

## 2025-01-23 RX ADMIN — INSULIN LISPRO 4 UNITS: 100 INJECTION, SOLUTION INTRAVENOUS; SUBCUTANEOUS at 17:37

## 2025-01-23 RX ADMIN — LISINOPRIL 20 MG: 20 TABLET ORAL at 08:50

## 2025-01-23 RX ADMIN — LORAZEPAM 1 MG: 1 TABLET ORAL at 04:55

## 2025-01-23 RX ADMIN — LEVETIRACETAM 250 MG: 500 TABLET, FILM COATED ORAL at 08:50

## 2025-01-23 RX ADMIN — FLUTICASONE PROPIONATE 2 SPRAY: 50 SPRAY, METERED NASAL at 08:51

## 2025-01-23 RX ADMIN — CYCLOSPORINE 1 DROP: 0.5 EMULSION OPHTHALMIC at 08:51

## 2025-01-23 RX ADMIN — BISACODYL 5 MG: 5 TABLET, COATED ORAL at 10:11

## 2025-01-23 RX ADMIN — BUTALBITAL, ACETAMINOPHEN, AND CAFFEINE 1 TABLET: 50; 325; 40 TABLET ORAL at 10:11

## 2025-01-23 RX ADMIN — PREGABALIN 100 MG: 100 CAPSULE ORAL at 08:51

## 2025-01-23 RX ADMIN — PROPRANOLOL HYDROCHLORIDE 40 MG: 20 TABLET ORAL at 08:50

## 2025-01-23 RX ADMIN — METOCLOPRAMIDE 10 MG: 5 INJECTION, SOLUTION INTRAMUSCULAR; INTRAVENOUS at 23:04

## 2025-01-23 RX ADMIN — BUDESONIDE AND FORMOTEROL FUMARATE DIHYDRATE 2 PUFF: 160; 4.5 AEROSOL RESPIRATORY (INHALATION) at 07:55

## 2025-01-23 RX ADMIN — IPRATROPIUM BROMIDE AND ALBUTEROL SULFATE 3 ML: 2.5; .5 SOLUTION RESPIRATORY (INHALATION) at 21:13

## 2025-01-23 RX ADMIN — ASPIRIN 81 MG: 81 TABLET, DELAYED RELEASE ORAL at 08:50

## 2025-01-23 RX ADMIN — ATORVASTATIN CALCIUM 20 MG: 20 TABLET, FILM COATED ORAL at 20:15

## 2025-01-23 RX ADMIN — CYCLOSPORINE 1 DROP: 0.5 EMULSION OPHTHALMIC at 20:16

## 2025-01-23 RX ADMIN — Medication 10 ML: at 08:52

## 2025-01-23 RX ADMIN — PROPRANOLOL HYDROCHLORIDE 40 MG: 20 TABLET ORAL at 20:18

## 2025-01-23 RX ADMIN — INSULIN GLARGINE 15 UNITS: 100 INJECTION, SOLUTION SUBCUTANEOUS at 21:22

## 2025-01-23 RX ADMIN — INSULIN LISPRO 3 UNITS: 100 INJECTION, SOLUTION INTRAVENOUS; SUBCUTANEOUS at 12:39

## 2025-01-23 RX ADMIN — REVEFENACIN 175 MCG: 175 SOLUTION RESPIRATORY (INHALATION) at 07:53

## 2025-01-23 RX ADMIN — PANTOPRAZOLE SODIUM 40 MG: 40 TABLET, DELAYED RELEASE ORAL at 05:57

## 2025-01-23 NOTE — PROGRESS NOTES
Name: Khalida Gilliland ADMIT: 2025   : 1955  PCP: Sergo Owen MD    MRN: [id number] LOS: 4 days   AGE/SEX: 69 y.o. female  ROOM: [facility]     Subjective   Subjective   Patient appears much more comfortable today.  Still having 8 out of 10 headache.  Constipated, headaches worse when she tries to bear down.  No nausea or vomiting eating breakfast.  Cough also appears much improved, but she says that she is trying to suppress it more because it makes her headache worse as well.       Objective   Objective   Vital Signs  Temp:  [97 °F (36.1 °C)-97.3 °F (36.3 °C)] 97 °F (36.1 °C)  Heart Rate:  [57-68] 62  Resp:  [16-20] 18  BP: (114-185)/(64-80) 114/64  SpO2:  [92 %-97 %] 95 %  on   ;   Device (Oxygen Therapy): room air  Body mass index is 32.23 kg/m².  Physical Exam  Constitutional:       General: She is not in acute distress.     Appearance: She is ill-appearing.   Cardiovascular:      Rate and Rhythm: Normal rate and regular rhythm.   Pulmonary:      Effort: Pulmonary effort is normal. No respiratory distress.      Comments: Diminished bilaterally.  Faint expiratory wheezes.  Abdominal:      General: Abdomen is flat. There is no distension.      Tenderness: There is no abdominal tenderness.   Musculoskeletal:         General: No swelling or deformity. Normal range of motion.   Skin:     General: Skin is warm and dry.   Neurological:      General: No focal deficit present.      Mental Status: She is alert. Mental status is at baseline.         Results Review     I reviewed the patient's new clinical results.  Results from last 7 days   Lab Units 25  1053 25  1159 25  0629 25  0712   WBC 10*3/mm3 7.53 10.53 7.68 8.25   HEMOGLOBIN g/dL 12.9 14.2 12.8 12.3   PLATELETS 10*3/mm3 305 344 275 266     Results from last 7 days   Lab Units 25  1146 25  1159 25  0629 25  0712   SODIUM mmol/L 138 135* 137 140   POTASSIUM mmol/L 4.4 4.0 3.8 4.0   CHLORIDE  mmol/L 101 101 102 106   CO2 mmol/L 25.6 26.0 24.9 26.0   BUN mg/dL 14 14 16 15   CREATININE mg/dL 0.76 0.68 0.64 0.62   GLUCOSE mg/dL 203* 239* 148* 134*   Estimated Creatinine Clearance: 84.7 mL/min (by C-G formula based on SCr of 0.76 mg/dL).  Results from last 7 days   Lab Units 01/20/25  0629 01/19/25  0712 01/18/25  0258 01/17/25  1558   ALBUMIN g/dL 3.6 3.5 3.5 3.9   BILIRUBIN mg/dL 0.4 0.4 0.3 0.4   ALK PHOS U/L 71 67 88 106   AST (SGOT) U/L 26 17 23 26   ALT (SGPT) U/L 42* 36* 45* 51*     Results from last 7 days   Lab Units 01/23/25  1146 01/21/25  1159 01/20/25  0629 01/19/25  0712 01/18/25  0258 01/17/25  1558   CALCIUM mg/dL 8.9 9.2 8.8 9.1 9.3 9.5   ALBUMIN g/dL  --   --  3.6 3.5 3.5 3.9   MAGNESIUM mg/dL  --   --   --  2.3  --   --    PHOSPHORUS mg/dL  --   --   --  2.3*  --   --      Results from last 7 days   Lab Units 01/18/25 2055 01/18/25  1439 01/18/25  0836 01/18/25  0540   LACTATE mmol/L 2.9* 2.6* 3.4* 4.0*     COVID19   Date Value Ref Range Status   01/17/2025 Not Detected Not Detected - Ref. Range Final   01/17/2025 Not Detected Not Detected - Ref. Range Final     Glucose   Date/Time Value Ref Range Status   01/23/2025 1234 196 (H) 70 - 130 mg/dL Final   01/23/2025 0837 118 70 - 130 mg/dL Final   01/22/2025 2143 164 (H) 70 - 130 mg/dL Final   01/22/2025 1713 129 70 - 130 mg/dL Final   01/22/2025 1212 203 (H) 70 - 130 mg/dL Final   01/22/2025 0822 110 70 - 130 mg/dL Final   01/21/2025 2104 322 (H) 70 - 130 mg/dL Final       MRI Outside Films  This procedure was auto-finalized with no dictation required.  MRI Brain Without Contrast  Narrative: BRAIN MRI WITHOUT CONTRAST     HISTORY: acute headache intractable; R06.02-Shortness of breath;  J45.41-Moderate persistent asthma with (acute) exacerbation;  R03.0-Elevated blood-pressure reading, without diagnosis of  hypertension; E11.9-Type 2 diabetes mellitus without complications;  J40-Bronchitis, not specified as acute or chronic     COMPARISON:  January 21, 2025.     FINDINGS:  Multiplanar images of the head were obtained without and with  gadolinium. No areas of restricted diffusion are seen to suggest acute  infarct. The ventricles are normal in size. There is no midline shift or  mass effect. There is some periventricular and deep white matter  microangiopathic change. The patient is status post right pterional  craniotomy. The patient is status post clipping of a right paraclinoid  aneurysm, and also has a stent within the right internal carotid artery.  There is a small amount of encephalomalacia which is noted deep to the  craniotomy flap. The clip and stent results in significant  susceptibility artifact. No additional areas of abnormal susceptibility  artifact are seen. I don't see any evidence of acute hemorrhage. There  is some mucosal thickening within the ethmoid sinuses. 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  However, this has been seen on prior imaging.     Impression: 1. No acute intracranial abnormality.        This report was finalized on 1/23/2025 6:01 AM by Dr. Isabel Jules M.D on Workstation: [id number]       I reviewed the patient's daily medications.  Scheduled Medications  aspirin, 81 mg, Oral, Daily  atorvastatin, 20 mg, Oral, Nightly  budesonide-formoterol, 2 puff, Inhalation, BID - RT  cholecalciferol, 1,000 Units, Oral, Daily  cycloSPORINE, 1 drop, Both Eyes, BID  dextromethorphan polistirex ER, 60 mg, Oral, Q12H  metoclopramide, 10 mg, Intravenous, Q8H   And  dihydroergotamine, 0.5 mg, Intravenous, Q8H  enoxaparin, 40 mg, Subcutaneous, Daily  fluticasone, 2 spray, Each Nare, Daily  hydroCHLOROthiazide, 25 mg, Oral, Daily  insulin glargine, 15 Units, Subcutaneous, Q12H  insulin lispro, 2-7 Units, Subcutaneous, 4x Daily AC & at Bedtime  insulin lispro, 3 Units, Subcutaneous, TID With Meals  ipratropium-albuterol, 3 mL, Nebulization, Q4H - RT  levETIRAcetam, 250 mg, Oral,  Daily  lisinopril, 20 mg, Oral, Daily  magnesium oxide, 400 mg, Oral, Daily  montelukast, 10 mg, Oral, Nightly  pantoprazole, 40 mg, Oral, Q AM  polyethylene glycol, 17 g, Oral, Daily  [Held by provider] predniSONE, 40 mg, Oral, Daily With Breakfast  pregabalin, 100 mg, Oral, TID  propranolol, 40 mg, Oral, BID  revefenacin, 175 mcg, Nebulization, Daily - RT  senna-docusate sodium, 1 tablet, Oral, BID  sodium chloride, 10 mL, Intravenous, Q12H  traZODone, 100 mg, Oral, Nightly  Vitamin B-2, 400 mg, Oral, Daily    Infusions   Diet  Diet: Regular/House, Diabetic; Consistent Carbohydrate; Fluid Consistency: Thin (IDDSI 0)         I have personally reviewed:  [x]  Laboratory   [x]  Microbiology   [x]  Radiology   []  EKG/Telemetry   []  Cardiology/Vascular   []  Pathology   [x]  Records     Assessment/Plan     Active Hospital Problems    Diagnosis  POA    **Asthma [J45.909]  Yes    Hyponatremia [E87.1]  Unknown    Moderate asthma with exacerbation [J45.901]  Yes    Essential hypertension [I10]  Yes    Type 2 diabetes mellitus with hyperglycemia, without long-term current use of insulin [E11.65]  Yes      Resolved Hospital Problems   No resolved problems to display.       69 y.o. female admitted with Asthma.    Ms. Gilliland is a 69 y.o. female with a history of asthma, GERD, type 2 diabetes mellitus, migraines, hypertension, and cerebral aneurysm that presents to [facility] complaining of shortness of breath.      Acute migraine, intractable  History of cerebral aneurysm status post clipping and stenting  -responded to stat response, at bedside. Stat labs unremarkable  -CTA head with no acute findings, patient required premedications because of contrast allergy  -Discussed with neurology plan for DHE, continue magnesium oxide and riboflavin.  MRI with no acute findings..  Recommended sleep medicine referral as an outpatient.  -No IV opioids    Constipation  -Scheduled bowel regimen.  Is received multiple  PRNs over the last few days     Acute asthma exacerbation, improving  -CXR and CT chest negative for infiltrate or consolidation  -Respiratory panel negative  -On scheduled DuoNebs, Symbicort, prednisone.  Continue Delsym, continue Tessalon Perles  -Continue Singulair  -Pulmonology following, recommend starting biologic injectable with with Fasenra as an outpatient  -Possible vocal cord dysfunction.  Speech therapy evaluated, recommended outpatient  Speech Language Pathology for Videostroboscopy for further evaluation and treatment  -Discussed with pharmacy and her insurance covers Breztri        Type 2 diabetes mellitus  -Blood glucose elevated secondary to steroids  -Continue Lantus to 15 units twice daily  -Continue SSI,scheduled lispro with meals 3 units  -Blood glucose improved       Elevated lactic acid  -Lactic acid elevated at 2.3, no evidence of sepsis or hypoperfusion on exam  -Suspect secondary to albuterol/COPD exacerbation     Hypertension  -EKG was sinus rhythm, no ischemic changes  -Troponin negative, suspect chest pressure associated with above  -Continue metoprolol.  Resume hydrochlorothiazide, increase lisinopril     Elevated transaminases  - continue statin.  ALT seems chronically elevated.  Will need follow-up as an outpatient.    SCDs for DVT prophylaxis.  Full code.  Discussed with patient and nursing staff.  Anticipate discharge home tomorrow.  Pending improvement of migraine    Expected Discharge Date: 1/23/2025; Expected Discharge Time:       Hang Diaz MD  [facility]  01/23/25  14:15 EST

## 2025-01-23 NOTE — PLAN OF CARE
Goal Outcome Evaluation:  Plan of Care Reviewed With: patient        Progress: no change  Outcome Evaluation: Dulcolax given this AM with positive results. Bed alarm for safety. Still c/o severe headache. Neuro ordered DHE w/ reglan to start tonight for 2 doses. Monitoring blood sugars, insulin given as needed. Possible DC tomorrow if headache improves.

## 2025-01-23 NOTE — PLAN OF CARE
Goal Outcome Evaluation:  Plan of Care Reviewed With: patient        Progress: no change  Outcome Evaluation: BP elevated- scheduled meds given, Fioricet given for headache, no c/o of nausea, went for MRI this AM, bed alarm in use, up w/ SBA, monitoring blood sugars, saline locked, axox4, Fioricet is not effective per pt

## 2025-01-23 NOTE — PROGRESS NOTES
DOS: 2025  NAME: Khalida Gilliland   : 1955  PCP: Sergo Owen MD    Chief Complaint   Patient presents with    Shortness of Breath    Cough        Subjective: No acute events overnight.  States she is still really constipated and this has been making her headache worse.  She does not want to bear down because it makes the headache worse.  States usually the only thing that helps with her hot beanbag at home and alternating migraine cocktails and morphine.    Objective:  Vital signs:   Vitals:    25 0755 25 0900 25 1148 25 1153   BP:  114/64     BP Location:  Right arm     Patient Position:  Lying     Pulse: 57 68 61 62   Resp: 18 16 18 18   Temp:  97 °F (36.1 °C)     TempSrc:  Oral     SpO2:  94% 95%    Weight:       Height:           Current Facility-Administered Medications:     acetaminophen (TYLENOL) tablet 1,000 mg, 1,000 mg, Oral, Q8H PRN, Luiza Taveras MD, 1,000 mg at 25 1418    aspirin EC tablet 81 mg, 81 mg, Oral, Daily, Luiza Taveras MD, 81 mg at 25 0850    atorvastatin (LIPITOR) tablet 20 mg, 20 mg, Oral, Nightly, Dontrell Iverson MD, 20 mg at 25 2016    benzonatate (TESSALON) capsule 100 mg, 100 mg, Oral, TID PRN, Dontrell Iverson MD, 100 mg at 25 0456    sennosides-docusate (PERICOLACE) 8.6-50 MG per tablet 2 tablet, 2 tablet, Oral, BID PRN, 2 tablet at 25 1041 **AND** polyethylene glycol (MIRALAX) packet 17 g, 17 g, Oral, Daily PRN, 17 g at 25 1706 **AND** bisacodyl (DULCOLAX) EC tablet 5 mg, 5 mg, Oral, Daily PRN, 5 mg at 25 1011 **AND** bisacodyl (DULCOLAX) suppository 10 mg, 10 mg, Rectal, Daily PRN, Luiza Taveras MD, 10 mg at 25 2112    budesonide-formoterol (SYMBICORT) 160-4.5 MCG/ACT inhaler 2 puff, 2 puff, Inhalation, BID - RT, Luiza Taveras MD, 2 puff at 25 0755    butalbital-acetaminophen-caffeine (FIORICET, ESGIC) -40 MG per tablet 1 tablet, 1 tablet, Oral, Q4H PRN, Hang Diaz  MD BERNA, 1 tablet at 01/23/25 1011    cholecalciferol (VITAMIN D3) tablet 1,000 Units, 1,000 Units, Oral, Daily, Luiza Taveras MD, 1,000 Units at 01/23/25 0850    cycloSPORINE (RESTASIS) 0.05 % ophthalmic emulsion 1 drop, 1 drop, Both Eyes, BID, Luiza Taveras MD, 1 drop at 01/23/25 0851    dextromethorphan polistirex ER (DELSYM) 30 MG/5ML oral suspension 60 mg, 60 mg, Oral, Q12H, Hang Diaz MD, 60 mg at 01/23/25 0851    dextrose (D50W) (25 g/50 mL) IV injection 25 g, 25 g, Intravenous, Q15 Min PRN, Ellen Jarrett APRN    dextrose (GLUTOSE) oral gel 15 g, 15 g, Oral, Q15 Min PRN, Ellen Jarrett APRN    diphenhydrAMINE (BENADRYL) capsule 25 mg, 25 mg, Oral, Nightly PRN, Dontrell Iverson MD, 25 mg at 01/18/25 2219    Enoxaparin Sodium (LOVENOX) syringe 40 mg, 40 mg, Subcutaneous, Daily, Luiza Taveras MD, 40 mg at 01/23/25 0851    fluticasone (FLONASE) 50 MCG/ACT nasal spray 2 spray, 2 spray, Each Nare, Daily, Luiza Taveras MD, 2 spray at 01/23/25 0851    glucagon (GLUCAGEN) injection 1 mg, 1 mg, Intramuscular, Q15 Min PRN, Ellen Jarrett APRN    hydroCHLOROthiazide tablet 25 mg, 25 mg, Oral, Daily, Hang Diaz MD, 25 mg at 01/23/25 0850    insulin glargine (LANTUS, SEMGLEE) injection 15 Units, 15 Units, Subcutaneous, Q12H, Dontrell Iverson MD, 15 Units at 01/23/25 0851    insulin lispro (HUMALOG/ADMELOG) injection 2-7 Units, 2-7 Units, Subcutaneous, 4x Daily AC & at Bedtime, Ellen Jarrett APRN, 3 Units at 01/23/25 1239    insulin lispro (HUMALOG/ADMELOG) injection 3 Units, 3 Units, Subcutaneous, TID With Meals, Dontrell Iverson MD, 3 Units at 01/23/25 1239    ipratropium-albuterol (DUO-NEB) nebulizer solution 3 mL, 3 mL, Nebulization, Q4H - RT, Luiza Taveras MD, 3 mL at 01/23/25 1148    levETIRAcetam (KEPPRA) tablet 250 mg, 250 mg, Oral, Daily, Luiza Taveras MD, 250 mg at 01/23/25 0850    lisinopril (PRINIVIL,ZESTRIL) tablet 20 mg, 20 mg, Oral, Daily, Hang Diaz MD,  20 mg at 01/23/25 0850    magnesium oxide (MAG-OX) tablet 400 mg, 400 mg, Oral, Daily, AlessandraNatalya ott PA-C, 400 mg at 01/23/25 0850    montelukast (SINGULAIR) tablet 10 mg, 10 mg, Oral, Nightly, Luiza Taveras MD, 10 mg at 01/22/25 2016    ondansetron (ZOFRAN) injection 4 mg, 4 mg, Intravenous, Q6H PRN, Luiza Taveras MD, 4 mg at 01/21/25 1142    pantoprazole (PROTONIX) EC tablet 40 mg, 40 mg, Oral, Q AM, Luiza Taveras MD, 40 mg at 01/23/25 0557    [Held by provider] predniSONE (DELTASONE) tablet 40 mg, 40 mg, Oral, Daily With Breakfast, Luiza Taveras MD, 40 mg at 01/21/25 0904    pregabalin (LYRICA) capsule 100 mg, 100 mg, Oral, TID, Luiza Taveras MD, 100 mg at 01/23/25 0851    propranolol (INDERAL) tablet 40 mg, 40 mg, Oral, BID, Luiza Taveras MD, 40 mg at 01/23/25 0850    revefenacin (YUPELRI) nebulizer solution 175 mcg, 175 mcg, Nebulization, Daily - RT, Burt Cannon MD, 175 mcg at 01/23/25 0753    sodium chloride 0.9 % flush 10 mL, 10 mL, Intravenous, Q12H, Luiza Taveras MD, 10 mL at 01/23/25 0852    sodium chloride 0.9 % flush 10 mL, 10 mL, Intravenous, PRN, Luiza Taveras MD    sodium chloride 0.9 % infusion 40 mL, 40 mL, Intravenous, PRN, Luiza Taveras MD    traZODone (DESYREL) tablet 100 mg, 100 mg, Oral, Nightly, Luiza Taveras MD, 100 mg at 01/22/25 2016    Vitamin B-2 (RIBOFLAVIN) tablet 400 mg, 400 mg, Oral, Daily, AlessandraNatalya ott PA-C, 400 mg at 01/23/25 1011    zolpidem (AMBIEN) tablet 5 mg, 5 mg, Oral, Nightly PRN, Luiza Taveras MD, 5 mg at 01/21/25 4020    PRN meds    acetaminophen    benzonatate    senna-docusate sodium **AND** polyethylene glycol **AND** bisacodyl **AND** bisacodyl    butalbital-acetaminophen-caffeine    dextrose    dextrose    diphenhydrAMINE    glucagon (human recombinant)    ondansetron    sodium chloride    sodium chloride    zolpidem    No current facility-administered medications on file prior to encounter.     Current Outpatient Medications on  File Prior to Encounter   Medication Sig    Ascorbic Acid (VITAMIN C PO) Take 1,000 mg by mouth Daily. PT INSTRUCTED TO CONTINUE PER DR. CALLUM URBINA    aspirin 81 MG EC tablet Take 1 tablet by mouth Daily.    atorvastatin (LIPITOR) 20 MG tablet TAKE 1 TABLET EVERY NIGHT    azelastine (ASTELIN) 0.1 % nasal spray 1 spray into the nostril(s) as directed by provider 2 (Two) Times a Day. Use in each nostril as directed    azithromycin (Zithromax Z-Aubrey) 250 MG tablet Take 2 tablets the first day, then 1 tablet daily for 4 days.    BD Pen Needle Milady 2nd Gen 32G X 4 MM misc INJECT 1 NEEDLE UNDER THE SKIN, INTO THE APPROPRIATE AREA AS DIRECTED FOUR TIMES A DAY (FOR INSULIN INJECTIONS)    Biotin 29347 MCG tablet Take 1 tablet by mouth Every Evening.    butalbital-acetaminophen-caffeine (Esgic) -40 MG per tablet Take 1 tablet by mouth Every 6 (Six) Hours As Needed for Headache.    cholecalciferol (VITAMIN D3) 1000 units tablet Take 1 tablet by mouth Daily. PER PT TO CONTINUE PER DR. CALLUM URBINA    Cyanocobalamin (VITAMIN B 12 PO) Take 5,000 mcg by mouth Every Night.    cycloSPORINE (RESTASIS) 0.05 % ophthalmic emulsion Administer 1 drop to both eyes 2 (Two) Times a Day.    diphenhydrAMINE-acetaminophen (TYLENOL PM)  MG tablet per tablet Take 1 tablet by mouth At Night As Needed for Sleep.    fluticasone (FLONASE) 50 MCG/ACT nasal spray USE 2 SPRAYS IN EACH NOSTRIL DAILY    fluticasone-salmeterol (Advair HFA) 45-21 MCG/ACT inhaler Inhale 2 puffs 2 (Two) Times a Day.    glipizide (GLUCOTROL XL) 5 MG ER tablet Take 1 tablet by mouth 2 (Two) Times a Day.    glucose blood (FREESTYLE LITE) test strip PT to use once daily    glucose blood test strip Use to test blood glucose up to four times daily as needed. Formulary Compliance Approval. Diagnosis: Type 2 Diabetes - Insulin Dependent    glucose monitor monitoring kit Use to test blood glucose up to four times daily as needed. Formulary Compliance Approval.  Diagnosis: Type 2 Diabetes - Insulin Dependent    hydroCHLOROthiazide 25 MG tablet Take 1 tablet by mouth Daily. Indications: High Blood Pressure Disorder    Lancets (freestyle) lancets Pt tests daily    Lancets misc Use to test blood glucose up to four times daily as needed. Formulary Compliance Approval. Diagnosis: Type 2 Diabetes - Insulin Dependent    levETIRAcetam (KEPPRA) 250 MG tablet TAKE 1 TABLET DAILY    lisinopril (PRINIVIL,ZESTRIL) 10 MG tablet TAKE 1 TABLET DAILY    magnesium oxide (MAG-OX) 400 tablet tablet Take 1 tablet by mouth Daily.    methylPREDNISolone (MEDROL) 4 MG dose pack Take as directed on package instructions.    montelukast (SINGULAIR) 10 MG tablet Take 1 tablet by mouth Every Night.    multivitamin (THERAGRAN) tablet tablet Take 1 tablet by mouth Daily.    nitrofurantoin, macrocrystal-monohydrate, (Macrobid) 100 MG capsule Take 1 capsule by mouth 2 (Two) Times a Day.    nystatin (MYCOSTATIN) 839680 UNIT/GM cream Apply 1 Application topically to the appropriate area as directed 2 (Two) Times a Day. Indications: Skin Infection due to Candida Yeast    nystatin 253800 UNIT/GM topical powder APPLY TOPICALLY TO THE APPROPRIATE AREA AS DIRECTED THREE TIMES A DAY    Omega-3 Fatty Acids (FISH OIL) 1000 MG capsule capsule Take  by mouth Daily With Breakfast.    omeprazole (priLOSEC) 40 MG capsule TAKE 1 CAPSULE EVERY MORNING    ondansetron (Zofran) 4 MG tablet Take 1 tablet by mouth Every 8 (Eight) Hours As Needed for Nausea or Vomiting.    ondansetron (ZOFRAN) 4 MG tablet Take 1 tablet by mouth Every 8 (Eight) Hours As Needed for Nausea or Vomiting.    ondansetron ODT (ZOFRAN-ODT) 4 MG disintegrating tablet Place 1 tablet on the tongue Every 6 (Six) Hours As Needed for Vomiting or Nausea.    pregabalin (LYRICA) 100 MG capsule Take 1 capsule by mouth 3 (Three) Times a Day.    Probiotic Product (PROBIOTIC-10 PO) Take  by mouth.    propranolol (INDERAL) 40 MG tablet TAKE 1 TABLET TWICE A DAY     raNITIdine (ZANTAC) 75 MG tablet Take 2 tablets by mouth 2 (Two) Times a Day.    Spacer/Aero-Holding Chambers device Use with all inhaled treatments    traZODone (DESYREL) 100 MG tablet Take 1 tablet by mouth Every Night.    Vitamin B-2 (RIBOFLAVIN) 100 MG tablet tablet Take 3 tablets by mouth Daily.    dicyclomine (BENTYL) 20 MG tablet Take 1 tablet by mouth 4 (Four) Times a Day Before Meals & at Bedtime As Needed for Abdominal Cramping (Diarrhea).    dicyclomine (BENTYL) 20 MG tablet Take 1 tablet by mouth Every 6 (Six) Hours As Needed (abdominal pain and /or diarrhea).    erythromycin (ROMYCIN) 5 MG/GM ophthalmic ointment Administer  to both eyes 2 (Two) Times a Day.    riFAXIMin (Xifaxan) 550 MG tablet TAKE 1 TABLET BY MOUTH EVERY 8 HOURS FOR 14 DAYS    zolpidem (Ambien) 5 MG tablet Take 1 tablet by mouth At Night As Needed for Sleep.     General appearance: Obese elderly female, NAD, alert and cooperative  HEENT: Normocephalic, atraumatic     Neurological:   MS: oriented x3, normal attention/concentration, language intact, no neglect, normal fund of knowledge  CN: visual fields full, EOMI, facial sensation equal, no facial droop, shoulder shrug equal, tongue midline  Motor: 5/5 in all 4 ext.  Sensory: light touch and cold sensation intact in all 4 ext.    Laboratory results:  Lab Results   Component Value Date    TSH 1.680 11/19/2024     Lab Results   Component Value Date    BYKAWSUQ01 372 09/02/2023     Lab Results   Component Value Date    HGBA1C 7.40 (H) 11/19/2024     Lab Results   Component Value Date    GLUCOSE 203 (H) 01/23/2025    BUN 14 01/23/2025    CREATININE 0.76 01/23/2025    EGFRIFNONA 93 02/03/2022    EGFRIFAFRI 106 09/07/2021    BCR 18.4 01/23/2025    K 4.4 01/23/2025    CO2 25.6 01/23/2025    CALCIUM 8.9 01/23/2025    PROTENTOTREF 7.6 11/19/2024    ALBUMIN 3.6 01/20/2025    LABIL2 1.3 11/19/2024    AST 26 01/20/2025    ALT 42 (H) 01/20/2025     Lab Results   Component Value Date    WBC 7.53  "01/23/2025    HGB 12.9 01/23/2025    HCT 39.2 01/23/2025    MCV 92.0 01/23/2025     01/23/2025     Brief Urine Lab Results  (Last result in the past 365 days)        Color   Clarity   Blood   Leuk Est   Nitrite   Protein   CREAT   Urine HCG        01/17/25 1534 Yellow   Clear   Negative   Negative   Negative   Negative                 No results found for: \"ACANTHNAEG\", \"AFBCX\", \"BPERTUSSISCX\", \"BLOODCX\"  No results found for: \"BCIDPCR\", \"CXREFLEX\", \"CSFCX\", \"CULTURETIS\"  No results found for: \"CULTURES\", \"HSVCX\", \"URCX\"  No results found for: \"EYECULTURE\", \"GCCX\", \"HSVCULTURE\", \"LABHSV\"  No results found for: \"LEGIONELLA\", \"MRSACX\", \"MUMPSCX\", \"MYCOPLASCX\"  No results found for: \"NOCARDIACX\", \"STOOLCX\"  No results found for: \"THROATCX\", \"UNSTIMCULT\", \"URINECX\", \"CULTURE\", \"VZVCULTUR\"  No results found for: \"VIRALCULTU\", \"WOUNDCX\"  Pain Management Panel  More data exists         Latest Ref Rng & Units 11/19/2024 8/8/2024   Pain Management Panel   Creatinine, Urine Not Estab. mg/dL 81.3  111.3       Details                   Review and interpretation of imaging:  MRI Brain Without Contrast    Result Date: 1/23/2025  1. No acute intracranial abnormality.   This report was finalized on 1/23/2025 6:01 AM by Dr. Isabel Jules M.D on Workstation: [id number]      CT Angiogram Head    Result Date: 1/21/2025   Status post right pterional craniotomy. An aneurysm clip is seen within the right paraclinoid region. A pipeline stent is seen within the cavernous and supraclinoid segments of the right ICA. There is no evidence to suggest residual or recurrent aneurysm.  Incidental note of small foci of encephalomalacia within the lateral aspect of the right frontal and anterior aspect of the right temporal lobes.   Radiation dose reduction techniques were utilized, including automated exposure control and exposure modulation based on body size.  This report was finalized on 1/21/2025 4:48 PM by Dr. Milton Ponce M.D on " Workstation: [id number]      CT Chest Without Contrast Diagnostic    Result Date: 1/17/2025  No acute findings identified in the chest.  This report was finalized on 1/17/2025 5:17 PM by Dr. Xu Morley M.D on Workstation: [id number]      XR Chest 2 View    Result Date: 1/17/2025  No focal consolidation. No pleural effusion or pneumothorax.  Normal size cardiomediastinal silhouette.  No focal osseous abnormality.   This report was finalized on 1/17/2025 4:09 PM by Dr. Fabrice Lorenzana M.D on Workstation: [id number]      Results for orders placed during the hospital encounter of 02/01/24    Adult Transthoracic Echo Complete W/ Cont if Necessary Per Protocol    Interpretation Summary    Left ventricular systolic function is normal. Calculated left ventricular EF = 59%    Left ventricular diastolic function is consistent with (grade I) impaired relaxation.    Mild mitral valve regurgitation is present.      Impression/Assessment:  This is a 69-year-old female with past medical history of type 2 diabetes, migraine headache, hypertension, hyperlipidemia, cerebral aneurysm status post right frontoparietal craniotomy for clipping and stenting who presented to the hospital on 1/17/2025 with complaints of shortness of breath.  She has been treated for acute asthma exacerbation since admission.  Our service was consulted for her complaints of a headache.  She has a history of migraine headaches and states her last bad headache was in January of last year.  Typically at home she will place a very hot towel over the top of her head and that usually relieves it.  She does have accompanying photophobia, phonophobia, and nausea.  She states that it is mainly over the right frontal-and at times is throbbing parietal part of her head.  She has been coughing a lot and this has been worsening her headache.  She has had this problem in the past.  She had a CTA head that did not show any acute changes, stable findings from her  "prior craniotomy.  She has had labile BPs with SBP's been in the 190s and as low as the low 100s.  At home she takes propranolol 40 mg twice daily and Keppra 250 mg daily for headache prophylaxis.  For acute relief she has Fioricet.  In addition she is also taking Lyrica 100 mg 3 times daily.      Diagnosis:  Migraine headache with aura, intractable  Asthma exacerbation  Obesity  Hypertension  At risk for sleep apnea  History of cerebral aneurysm status post clipping and stenting  History of right frontoparietal craniotomy    Plan:  - Her MRI brain looks okay.  - Still c/o bad headache today. She states that usually the only thing that helps her is a hot \"bean bag\" that she uses at home. I have asked her to have her  bring that in now and also we will give her a one time dose of DHE with option to repeat if needed. She denies history of CAD, MI, or stroke. If headache improves we will have her discharged with follow up in 2-4 weeks in our office to discuss other treatment options as needed but she is adamant that her migraines are typically well controlled and only worsen if she has flare ups of her asthma.   - Continue Mag-Ox 400 milligrams and riboflavin 40 mg p.o. daily  - Continue Keppra 250 mg daily  - Continue propranolol 40 mg twice daily  - Sleep medicine referral in the outpatient setting for risk factors for CAYDEN  - Normalize BP, BPs continue to be labile.  - She would be a good candidate for Ubrelvy or Nurtec for acute relief in the outpatient setting.  Avoid excessive Fioricet use.  Will follow peripherally.     Case discussed with patient, Dr. Diaz, and Dr. Blanco, and he agrees with plan above.     TATE Núñez    "

## 2025-01-23 NOTE — PROGRESS NOTES
[facility] INPATIENT PROGRESS NOTE         [address]    2025      PATIENT IDENTIFICATION:  Name: Khalida Gilliland ADMIT: 2025   : 1955  PCP: Sergo Owen MD    MRN: [id number] LOS: 4 days   AGE/SEX: 69 y.o. female  ROOM: [facility]                     LOS 4    Reason for visit: Acute asthma exacerbation      SUBJECTIVE:      Complains of headache.  Still with shortness of breath and wheezing.  Mild wheezing noted bilaterally on auscultation.      Objective   OBJECTIVE:    Vital Sign Min/Max for last 24 hours  Temp  Min: 97 °F (36.1 °C)  Max: 97.3 °F (36.3 °C)   BP  Min: 114/64  Max: 185/80   Pulse  Min: 57  Max: 68   Resp  Min: 16  Max: 20   SpO2  Min: 92 %  Max: 97 %   No data recorded   No data recorded    Vitals:    25 0552 25 0746 25 0755 25 0900   BP: 162/80   114/64   BP Location: Right arm   Right arm   Patient Position: Lying   Lying   Pulse: 67 60 57 68   Resp: 20 18 18 16   Temp: 97 °F (36.1 °C)   97 °F (36.1 °C)   TempSrc: Oral   Oral   SpO2: 96% 97%  94%   Weight:       Height:                25  1445   Weight: 96.2 kg (212 lb)       Body mass index is 32.23 kg/m².                          Body mass index is 32.23 kg/m².    Intake/Output Summary (Last 24 hours) at 2025 1035  Last data filed at 2025 0900  Gross per 24 hour   Intake 240 ml   Output --   Net 240 ml         Exam:  GEN:  No distress, appears stated age  EYES:   PERRL, anicteric sclerae  ENT:    External ears/nose normal, OP clear  NECK:  No adenopathy, midline trachea  LUNGS: Normal chest on inspection, palpation and diminished with mild wheeze on auscultation  CV:  Normal S1S2, without murmur  ABD:  Nontender, nondistended, no hepatosplenomegaly, +BS  EXT:  No edema.  No cyanosis or clubbing.  No mottling and normal cap refill.    Assessment     Scheduled meds:  aspirin, 81 mg, Oral, Daily  atorvastatin, 20 mg, Oral, Nightly  budesonide-formoterol, 2 puff,  Inhalation, BID - RT  cholecalciferol, 1,000 Units, Oral, Daily  cycloSPORINE, 1 drop, Both Eyes, BID  dextromethorphan polistirex ER, 60 mg, Oral, Q12H  enoxaparin, 40 mg, Subcutaneous, Daily  fluticasone, 2 spray, Each Nare, Daily  hydroCHLOROthiazide, 25 mg, Oral, Daily  insulin glargine, 15 Units, Subcutaneous, Q12H  insulin lispro, 2-7 Units, Subcutaneous, 4x Daily AC & at Bedtime  insulin lispro, 3 Units, Subcutaneous, TID With Meals  ipratropium-albuterol, 3 mL, Nebulization, Q4H - RT  levETIRAcetam, 250 mg, Oral, Daily  lisinopril, 20 mg, Oral, Daily  magnesium oxide, 400 mg, Oral, Daily  montelukast, 10 mg, Oral, Nightly  pantoprazole, 40 mg, Oral, Q AM  [Held by provider] predniSONE, 40 mg, Oral, Daily With Breakfast  pregabalin, 100 mg, Oral, TID  propranolol, 40 mg, Oral, BID  revefenacin, 175 mcg, Nebulization, Daily - RT  sodium chloride, 10 mL, Intravenous, Q12H  traZODone, 100 mg, Oral, Nightly  Vitamin B-2, 400 mg, Oral, Daily      IV meds:                         Data Review:  Results from last 7 days   Lab Units 01/21/25  1159 01/20/25  0629 01/19/25  0712 01/18/25  0258 01/17/25  1558   SODIUM mmol/L 135* 137 140 137 131*   POTASSIUM mmol/L 4.0 3.8 4.0 4.3 4.4   CHLORIDE mmol/L 101 102 106 99 95*   CO2 mmol/L 26.0 24.9 26.0 23.7 26.2   BUN mg/dL 14 16 15 14 13   CREATININE mg/dL 0.68 0.64 0.62 0.67 0.69   GLUCOSE mg/dL 239* 148* 134* 342* 366*   CALCIUM mg/dL 9.2 8.8 9.1 9.3 9.5         Estimated Creatinine Clearance: 94.7 mL/min (by C-G formula based on SCr of 0.68 mg/dL).  Results from last 7 days   Lab Units 01/21/25  1159 01/20/25  0629 01/19/25  0712 01/18/25  0258 01/17/25  1558   WBC 10*3/mm3 10.53 7.68 8.25 10.66 9.65   HEMOGLOBIN g/dL 14.2 12.8 12.3 13.5 14.2   PLATELETS 10*3/mm3 344 275 266 284 326         Results from last 7 days   Lab Units 01/20/25  0629 01/19/25  0712 01/18/25  0258 01/17/25  1558   ALT (SGPT) U/L 42* 36* 45* 51*   AST (SGOT) U/L 26 17 23 26         Results from  last 7 days   Lab Units 01/18/25  2055 01/18/25  1439 01/18/25  0836 01/18/25  0540 01/18/25  0258 01/17/25  2330 01/17/25  1558   LACTATE mmol/L 2.9* 2.6* 3.4* 4.0* 4.6* 4.4* 2.3*         Glucose   Date/Time Value Ref Range Status   01/23/2025 0837 118 70 - 130 mg/dL Final   01/22/2025 2143 164 (H) 70 - 130 mg/dL Final   01/22/2025 1713 129 70 - 130 mg/dL Final   01/22/2025 1212 203 (H) 70 - 130 mg/dL Final   01/22/2025 0822 110 70 - 130 mg/dL Final   01/21/2025 2104 322 (H) 70 - 130 mg/dL Final   01/21/2025 1700 228 (H) 70 - 130 mg/dL Final         Imaging reviewed  Chest x-ray 1/17 reviewed    Microbiology reviewed  RVP negative          Active Hospital Problems    Diagnosis  POA    **Asthma [J45.909]  Yes    Hyponatremia [E87.1]  Unknown    Moderate asthma with exacerbation [J45.901]  Yes    Essential hypertension [I10]  Yes    Type 2 diabetes mellitus with hyperglycemia, without long-term current use of insulin [E11.65]  Yes      Resolved Hospital Problems   No resolved problems to display.         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  With elevated eosinophil count of 660 would benefit from biologic injectable therapy with Fasenra as outpatient which can be set up in our office.  She follows with Dr. Liu and we will have her follow-up shortly after discharge in the office.  Headache, crampy abdominal discomfort and constipation holding up discharge.      Lane Alfredo MD  Pulmonary and Critical Care Medicine  [facility], [facility]  1/23/2025    10:35 EST

## 2025-01-24 ENCOUNTER — READMISSION MANAGEMENT (OUTPATIENT)
Dept: CALL CENTER | Facility: HOSPITAL | Age: 70
End: 2025-01-24
Payer: MEDICARE

## 2025-01-24 VITALS
HEIGHT: 68 IN | RESPIRATION RATE: 18 BRPM | WEIGHT: 212 LBS | SYSTOLIC BLOOD PRESSURE: 120 MMHG | DIASTOLIC BLOOD PRESSURE: 60 MMHG | OXYGEN SATURATION: 99 % | HEART RATE: 66 BPM | TEMPERATURE: 97 F | BODY MASS INDEX: 32.13 KG/M2

## 2025-01-24 LAB
ANION GAP SERPL CALCULATED.3IONS-SCNC: 19.9 MMOL/L (ref 5–15)
BUN SERPL-MCNC: 16 MG/DL (ref 8–23)
BUN/CREAT SERPL: 20 (ref 7–25)
CALCIUM SPEC-SCNC: 9.4 MG/DL (ref 8.6–10.5)
CHLORIDE SERPL-SCNC: 99 MMOL/L (ref 98–107)
CO2 SERPL-SCNC: 18.1 MMOL/L (ref 22–29)
CREAT SERPL-MCNC: 0.8 MG/DL (ref 0.57–1)
DEPRECATED RDW RBC AUTO: 44 FL (ref 37–54)
EGFRCR SERPLBLD CKD-EPI 2021: 79.9 ML/MIN/1.73
ERYTHROCYTE [DISTWIDTH] IN BLOOD BY AUTOMATED COUNT: 13 % (ref 12.3–15.4)
GLUCOSE BLDC GLUCOMTR-MCNC: 161 MG/DL (ref 70–130)
GLUCOSE BLDC GLUCOMTR-MCNC: 244 MG/DL (ref 70–130)
GLUCOSE SERPL-MCNC: 190 MG/DL (ref 65–99)
HCT VFR BLD AUTO: 49.6 % (ref 34–46.6)
HGB BLD-MCNC: 16.4 G/DL (ref 12–15.9)
MCH RBC QN AUTO: 30.8 PG (ref 26.6–33)
MCHC RBC AUTO-ENTMCNC: 33.1 G/DL (ref 31.5–35.7)
MCV RBC AUTO: 93.2 FL (ref 79–97)
PLATELET # BLD AUTO: 293 10*3/MM3 (ref 140–450)
PMV BLD AUTO: 9.9 FL (ref 6–12)
POTASSIUM SERPL-SCNC: 4.5 MMOL/L (ref 3.5–5.2)
RBC # BLD AUTO: 5.32 10*6/MM3 (ref 3.77–5.28)
SODIUM SERPL-SCNC: 137 MMOL/L (ref 136–145)
WBC NRBC COR # BLD AUTO: 9.96 10*3/MM3 (ref 3.4–10.8)

## 2025-01-24 PROCEDURE — 94799 UNLISTED PULMONARY SVC/PX: CPT

## 2025-01-24 PROCEDURE — 25010000002 ENOXAPARIN PER 10 MG: Performed by: STUDENT IN AN ORGANIZED HEALTH CARE EDUCATION/TRAINING PROGRAM

## 2025-01-24 PROCEDURE — 63710000001 INSULIN LISPRO (HUMAN) PER 5 UNITS: Performed by: STUDENT IN AN ORGANIZED HEALTH CARE EDUCATION/TRAINING PROGRAM

## 2025-01-24 PROCEDURE — 94761 N-INVAS EAR/PLS OXIMETRY MLT: CPT

## 2025-01-24 PROCEDURE — 85027 COMPLETE CBC AUTOMATED: CPT | Performed by: STUDENT IN AN ORGANIZED HEALTH CARE EDUCATION/TRAINING PROGRAM

## 2025-01-24 PROCEDURE — 25010000002 METOCLOPRAMIDE PER 10 MG: Performed by: NURSE PRACTITIONER

## 2025-01-24 PROCEDURE — 25010000002 DIHYDROERGOTAMINE MESYLATE PER 1 MG: Performed by: NURSE PRACTITIONER

## 2025-01-24 PROCEDURE — 63710000001 INSULIN GLARGINE PER 5 UNITS: Performed by: STUDENT IN AN ORGANIZED HEALTH CARE EDUCATION/TRAINING PROGRAM

## 2025-01-24 PROCEDURE — 63710000001 INSULIN LISPRO (HUMAN) PER 5 UNITS

## 2025-01-24 PROCEDURE — 80048 BASIC METABOLIC PNL TOTAL CA: CPT | Performed by: STUDENT IN AN ORGANIZED HEALTH CARE EDUCATION/TRAINING PROGRAM

## 2025-01-24 PROCEDURE — 94760 N-INVAS EAR/PLS OXIMETRY 1: CPT

## 2025-01-24 PROCEDURE — 82948 REAGENT STRIP/BLOOD GLUCOSE: CPT

## 2025-01-24 RX ADMIN — INSULIN LISPRO 3 UNITS: 100 INJECTION, SOLUTION INTRAVENOUS; SUBCUTANEOUS at 08:11

## 2025-01-24 RX ADMIN — MAGNESIUM OXIDE TAB 400 MG (241.3 MG ELEMENTAL MG) 400 MG: 400 (241.3 MG) TAB at 08:11

## 2025-01-24 RX ADMIN — Medication 10 ML: at 12:37

## 2025-01-24 RX ADMIN — POLYETHYLENE GLYCOL 3350 17 G: 17 POWDER, FOR SOLUTION ORAL at 08:12

## 2025-01-24 RX ADMIN — INSULIN LISPRO 3 UNITS: 100 INJECTION, SOLUTION INTRAVENOUS; SUBCUTANEOUS at 08:13

## 2025-01-24 RX ADMIN — CYCLOSPORINE 1 DROP: 0.5 EMULSION OPHTHALMIC at 12:34

## 2025-01-24 RX ADMIN — ASPIRIN 81 MG: 81 TABLET, DELAYED RELEASE ORAL at 12:34

## 2025-01-24 RX ADMIN — Medication 400 MG: at 12:34

## 2025-01-24 RX ADMIN — LEVETIRACETAM 250 MG: 500 TABLET, FILM COATED ORAL at 08:12

## 2025-01-24 RX ADMIN — PANTOPRAZOLE SODIUM 40 MG: 40 TABLET, DELAYED RELEASE ORAL at 06:27

## 2025-01-24 RX ADMIN — IPRATROPIUM BROMIDE AND ALBUTEROL SULFATE 3 ML: 2.5; .5 SOLUTION RESPIRATORY (INHALATION) at 11:11

## 2025-01-24 RX ADMIN — PREGABALIN 100 MG: 100 CAPSULE ORAL at 08:12

## 2025-01-24 RX ADMIN — IPRATROPIUM BROMIDE AND ALBUTEROL SULFATE 3 ML: 2.5; .5 SOLUTION RESPIRATORY (INHALATION) at 14:41

## 2025-01-24 RX ADMIN — IPRATROPIUM BROMIDE AND ALBUTEROL SULFATE 3 ML: 2.5; .5 SOLUTION RESPIRATORY (INHALATION) at 07:28

## 2025-01-24 RX ADMIN — BUTALBITAL, ACETAMINOPHEN, AND CAFFEINE 1 TABLET: 50; 325; 40 TABLET ORAL at 12:34

## 2025-01-24 RX ADMIN — SENNOSIDES AND DOCUSATE SODIUM 1 TABLET: 50; 8.6 TABLET ORAL at 08:11

## 2025-01-24 RX ADMIN — METOCLOPRAMIDE 10 MG: 5 INJECTION, SOLUTION INTRAMUSCULAR; INTRAVENOUS at 06:27

## 2025-01-24 RX ADMIN — FLUTICASONE PROPIONATE 2 SPRAY: 50 SPRAY, METERED NASAL at 08:17

## 2025-01-24 RX ADMIN — DIHYDROERGOTAMINE MESYLATE 0.5 MG: 1 INJECTION INTRAMUSCULAR; INTRAVENOUS; SUBCUTANEOUS at 06:41

## 2025-01-24 RX ADMIN — DEXTROMETHORPHAN 60 MG: 30 SUSPENSION, EXTENDED RELEASE ORAL at 12:35

## 2025-01-24 RX ADMIN — Medication 1000 UNITS: at 08:11

## 2025-01-24 RX ADMIN — HYDROCHLOROTHIAZIDE 25 MG: 25 TABLET ORAL at 08:11

## 2025-01-24 RX ADMIN — LISINOPRIL 20 MG: 20 TABLET ORAL at 08:12

## 2025-01-24 RX ADMIN — ENOXAPARIN SODIUM 40 MG: 100 INJECTION SUBCUTANEOUS at 08:12

## 2025-01-24 RX ADMIN — PROPRANOLOL HYDROCHLORIDE 40 MG: 20 TABLET ORAL at 08:11

## 2025-01-24 RX ADMIN — INSULIN GLARGINE 15 UNITS: 100 INJECTION, SOLUTION SUBCUTANEOUS at 08:11

## 2025-01-24 NOTE — OUTREACH NOTE
Prep Survey      Flowsheet Row Responses   [facility] patient discharged from? [facility]   Is LACE score < 7 ? Yes   Eligibility [facility]   Date of Admission 01/17/25   Date of Discharge 01/24/25   Discharge diagnosis Asthma   Does the patient have one of the following disease processes/diagnoses(primary or secondary)? Other   Prep survey completed? Yes            Caro RANDALL - Registered Nurse

## 2025-01-24 NOTE — PLAN OF CARE
Goal Outcome Evaluation:  Plan of Care Reviewed With: patient        Progress: improving  Outcome Evaluation: Went over the plan of care and answered all questions. Vitals stable and pain is well controlled. Patient is up ad pierce adn tolerating her diet. All meds given without complications. No other issues this shift.

## 2025-01-24 NOTE — PLAN OF CARE
Goal Outcome Evaluation:  Plan of Care Reviewed With: patient        Progress: no change  Outcome Evaluation: DHE and reglan given w/ good results, monitoring blood sugars, pt resting between care, bed alarm on, saline locked, voidng freely, no prn meds given, room air, possible d/c today

## 2025-01-24 NOTE — DISCHARGE SUMMARY
Patient Name: Khalida Gilliland  : 1955  MRN: [id number]    Date of Admission: 2025  Date of Discharge:  2025  Primary Care Physician: Sergo Owen MD      Chief Complaint:   Shortness of Breath and Cough      Discharge Diagnoses     Active Hospital Problems    Diagnosis  POA    **Asthma [J45.909]  Yes    Hyponatremia [E87.1]  Unknown    Moderate asthma with exacerbation [J45.901]  Yes    Essential hypertension [I10]  Yes    Type 2 diabetes mellitus with hyperglycemia, without long-term current use of insulin [E11.65]  Yes      Resolved Hospital Problems   No resolved problems to display.        Hospital Course     Ms. Gilliland is a 69 y.o. female with a history of asthma, migraines, cerebral aneurysm status post clipping, diabetes type 2, hypertension presenting with shortness of breath found to have acute COPD exacerbation.  Patient's wheezing improved with DuoNebs and steroids  And supportive care.  RVP was negative, CT chest was negative for infiltrate or consolidation.  Pulmonology was consulted and recommended possible initiation of biologic injectable as an outpatient for her elevated eosinophilia.  [facility]  She completed a course of steroids while here.    Patient stay was complicated by acute intractable migraine.  Neurology was consulted and CTA and MRI brain with no acute findings.  Patient had multiple different IV medications, but headache persisted.  Continue chronic migraine medications at home.  Follow-up with neurology as an outpatient.  Patient is tolerated diet, able to ambulate to the bathroom.  Discussed with neurology and plan to discharge home today.  Also had some constipation and finally had a bowel movement after multiple days of bowel regimen.    At the time of discharge patient was told to take all medications as prescribed, keep all follow-up appointments, and call their doctor or return to the hospital with any worsening or  concerning symptoms.    Day of Discharge     Subjective:  Patient continues to have headache, 8/10.  DHE did not help her headache and made her feel weird.  She says she will never take it again.  Discussed plan for discharge later today and she is agreeable discharge.        Physical Exam:  Temp:  [96.5 °F (35.8 °C)-97.5 °F (36.4 °C)] 97 °F (36.1 °C)  Heart Rate:  [58-66] 66  Resp:  [18] 18  BP: (104-137)/(48-76) 120/60  Body mass index is 32.23 kg/m².  Physical Exam  Constitutional:       General: She is not in acute distress.     Appearance: She is not ill-appearing.   Cardiovascular:      Rate and Rhythm: Normal rate and regular rhythm.   Pulmonary:      Effort: Pulmonary effort is normal. No respiratory distress.   Abdominal:      General: Abdomen is flat. There is no distension.      Tenderness: There is no abdominal tenderness.   Musculoskeletal:         General: No swelling or deformity. Normal range of motion.   Skin:     General: Skin is warm and dry.   Neurological:      General: No focal deficit present.      Mental Status: She is alert. Mental status is at baseline.         Consultants     Consult Orders (all) (From admission, onward)       Start     Ordered    01/21/25 1705  Inpatient Neurology Consult General  Once        Specialty:  Neurology  Provider:  Lefty Blanco MD    01/21/25 1704    01/17/25 2208  Inpatient Pulmonology Consult  Once        Specialty:  Pulmonary Disease  Provider:  (Not yet assigned)    01/17/25 2207                  Procedures     Imaging Results (All)       Procedure Component Value Units Date/Time    MRI Outside Films [[id number]] Resulted: 01/23/25 0821     Updated: 01/23/25 0821    Narrative:      This procedure was auto-finalized with no dictation required.    MRI Brain Without Contrast [[id number]] Collected: 01/23/25 0556     Updated: 01/23/25 0605    Narrative:      BRAIN MRI WITHOUT CONTRAST     HISTORY: acute headache intractable; R06.02-Shortness of  breath;  J45.41-Moderate persistent asthma with (acute) exacerbation;  R03.0-Elevated blood-pressure reading, without diagnosis of  hypertension; E11.9-Type 2 diabetes mellitus without complications;  J40-Bronchitis, not specified as acute or chronic     COMPARISON: January 21, 2025.     FINDINGS:  Multiplanar images of the head were obtained without and with  gadolinium. No areas of restricted diffusion are seen to suggest acute  infarct. The ventricles are normal in size. There is no midline shift or  mass effect. There is some periventricular and deep white matter  microangiopathic change. The patient is status post right pterional  craniotomy. The patient is status post clipping of a right paraclinoid  aneurysm, and also has a stent within the right internal carotid artery.  There is a small amount of encephalomalacia which is noted deep to the  craniotomy flap. The clip and stent results in significant  susceptibility artifact. No additional areas of abnormal susceptibility  artifact are seen. I don't see any evidence of acute hemorrhage. There  is some mucosal thickening within the ethmoid sinuses. 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  However, this has been seen on prior imaging.       Impression:      1. No acute intracranial abnormality.        This report was finalized on 1/23/2025 6:01 AM by Dr. Isabel Jules M.D on Workstation: [id number]       CT Angiogram Head [[id number]] Collected: 01/21/25 1444     Updated: 01/21/25 1651    Narrative:      CT ANGIOGRAM OF THE HEAD WITH CONTRAST     CLINICAL HISTORY: new sudden headache with nausea, hx of aneursym s/p  clipping; R06.02-Shortness of breath; J45.41-Moderate persistent asthma  with (acute) exacerbation; R03.0-Elevated blood-pressure reading,  without diagnosis of hypertension; E11.9-Type 2 diabetes mellitus  without complications; J40-Bronchitis, not specified as acute or chronic     TECHNIQUE: CT  angiogram of the head was obtained with 1 mm axial images  following the administration of IV contrast. Sagittal, coronal, and  3-dimensional reconstructed images were obtained. Additionally, a CT  scan of the head was obtained with 3 mm axial images before and after  the administration of IV contrast.     COMPARISON: CTA head dated 01/25/2024.     FINDINGS:     Head CT: Status post right pterional craniotomy. An aneurysm clip is  seen within the right paraclinoid region. Pipeline stent is seen within  the right internal carotid artery. Small foci of encephalomalacia within  the lateral aspect of the right frontal lobe and the anterior aspect of  the right temporal lobe are again noted.     Posterior Circulation: The vertebral arteries, basilar artery, and  posterior cerebral arteries are within normal limits.  Anterior Circulation: Pipeline stent within the cavernous and  supraclinoid segments of the right ICA and right paraclinoid region  aneurysm clip without evidence for recurrent or residual aneurysm.  Unremarkable appearance of the left internal carotid artery, the middle  cerebral arteries, and the anterior cerebral arteries.       Impression:         Status post right pterional craniotomy. An aneurysm clip is seen within  the right paraclinoid region. A pipeline stent is seen within the  cavernous and supraclinoid segments of the right ICA. There is no  evidence to suggest residual or recurrent aneurysm.     Incidental note of small foci of encephalomalacia within the lateral  aspect of the right frontal and anterior aspect of the right temporal  lobes.        Radiation dose reduction techniques were utilized, including automated  exposure control and exposure modulation based on body size.     This report was finalized on 1/21/2025 4:48 PM by Dr. Milton Ponce M.D  on Workstation: [id number]       CT Chest Without Contrast Diagnostic [[id number]] Collected: 01/17/25 1711     Updated: 01/17/25 1720     Narrative:      CT CHEST WO CONTRAST DIAGNOSTIC-     INDICATION: Shortness of air, wheezing     COMPARISON: CT chest February 27, 2024     TECHNIQUE:  Routine CT chest without IV contrast. Coronal and sagittal reformats.  Radiation dose reduction techniques were utilized, including automated  exposure control and exposure modulation based on body size.     FINDINGS:      Chest wall: No lymphadenopathy.     Mediastinum: No coronary artery atherosclerotic calcifications seen.  Heart is normal in size. No pericardial effusion. Right paratracheal/4R  lymph node, series 2, axial mage 32, measures 9 mm in short axis,  unchanged. No new or enlarging mediastinal or hilar lymph nodes seen.     Lungs/pleura: No effusions. Patent central airways. Small amount of  posterior dependent and bibasilar subsegmental atelectasis. Solid  pulmonary nodule in the superior segment right lower lobe, series 6,  axial mage 45, measures 5 mm, unchanged. No new or enlarging pulmonary  nodules identified. Calcified pulmonary nodule in the lateral basilar  left lower lobe base, consistent with prior granulomatous infection.     Upper abdomen: Cholecystectomy. Mild pancreatic lipomatosis.     Osseous structures: No destructive osseous lesions.       Impression:      No acute findings identified in the chest.     This report was finalized on 1/17/2025 5:17 PM by Dr. Xu Morley M.D on Workstation: [id number]       XR Chest 2 View [[id number]] Collected: 01/17/25 1604     Updated: 01/17/25 1612    Narrative:      XR CHEST 2 VW-     INDICATION: Shortness of breath, cough, asthma.     COMPARISON: Chest radiographs dating back to 1/11/2016       Impression:      No focal consolidation. No pleural effusion or pneumothorax.   Normal size cardiomediastinal silhouette.  No focal osseous  abnormality.       This report was finalized on 1/17/2025 4:09 PM by Dr. Fabrice Lorenzana M.D on Workstation: [id number]                  Pertinent Labs     Results  "from last 7 days   Lab Units 01/24/25  0854 01/23/25  1053 01/21/25  1159 01/20/25  0629   WBC 10*3/mm3 9.96 7.53 10.53 7.68   HEMOGLOBIN g/dL 16.4* 12.9 14.2 12.8   PLATELETS 10*3/mm3 293 305 344 275     Results from last 7 days   Lab Units 01/24/25  0854 01/23/25  1146 01/21/25  1159 01/20/25  0629   SODIUM mmol/L 137 138 135* 137   POTASSIUM mmol/L 4.5 4.4 4.0 3.8   CHLORIDE mmol/L 99 101 101 102   CO2 mmol/L 18.1* 25.6 26.0 24.9   BUN mg/dL 16 14 14 16   CREATININE mg/dL 0.80 0.76 0.68 0.64   GLUCOSE mg/dL 190* 203* 239* 148*   Estimated Creatinine Clearance: 80.5 mL/min (by C-G formula based on SCr of 0.8 mg/dL).  Results from last 7 days   Lab Units 01/20/25  0629 01/19/25  0712 01/18/25  0258 01/17/25  1558   ALBUMIN g/dL 3.6 3.5 3.5 3.9   BILIRUBIN mg/dL 0.4 0.4 0.3 0.4   ALK PHOS U/L 71 67 88 106   AST (SGOT) U/L 26 17 23 26   ALT (SGPT) U/L 42* 36* 45* 51*     Results from last 7 days   Lab Units 01/24/25  0854 01/23/25  1146 01/21/25  1159 01/20/25  0629 01/19/25  0712 01/18/25  0258 01/17/25  1558   CALCIUM mg/dL 9.4 8.9 9.2 8.8 9.1 9.3 9.5   ALBUMIN g/dL  --   --   --  3.6 3.5 3.5 3.9   MAGNESIUM mg/dL  --   --   --   --  2.3  --   --    PHOSPHORUS mg/dL  --   --   --   --  2.3*  --   --        Results from last 7 days   Lab Units 01/17/25  1648 01/17/25  1558   HSTROP T ng/L <6 <6           Invalid input(s): \"LDLCALC\"        Test Results Pending at Discharge       Discharge Details        Discharge Medications        Changes to Medications        Instructions Start Date   albuterol sulfate  (90 Base) MCG/ACT inhaler  Commonly known as: PROVENTIL HFA;VENTOLIN HFA;PROAIR HFA  What changed: reasons to take this   2 puffs, Inhalation, Every 4 Hours PRN      albuterol 1.25 MG/3ML nebulizer solution  Commonly known as: ACCUNEB  What changed: Another medication with the same name was changed. Make sure you understand how and when to take each.   Take 3 mL ( 1 vial) by nebulization Every 6 (Six) Hours " As Needed for Shortness of Air for up to 7 days.      dicyclomine 20 MG tablet  Commonly known as: BENTYL  What changed: Another medication with the same name was removed. Continue taking this medication, and follow the directions you see here.   20 mg, Oral, 4 Times Daily Before Meals & Nightly PRN      Lantus SoloStar 100 UNIT/ML injection pen  Generic drug: Insulin Glargine  What changed: how much to take   15 Units, Subcutaneous, Nightly             Continue These Medications        Instructions Start Date   aspirin 81 MG EC tablet   81 mg, Oral, Daily      atorvastatin 20 MG tablet  Commonly known as: LIPITOR   20 mg, Nightly      azelastine 0.1 % nasal spray  Commonly known as: ASTELIN   1 spray, Nasal, 2 Times Daily, Use in each nostril as directed      BD Pen Needle Milady 2nd Gen 32G X 4 MM misc  Generic drug: Insulin Pen Needle   INJECT 1 NEEDLE UNDER THE SKIN, INTO THE APPROPRIATE AREA AS DIRECTED FOUR TIMES A DAY (FOR INSULIN INJECTIONS)      Biotin 92619 MCG tablet   1 tablet, Every Evening      butalbital-acetaminophen-caffeine -40 MG per tablet  Commonly known as: Esgic   1 tablet, Oral, Every 6 Hours PRN      cholecalciferol 25 MCG (1000 UT) tablet  Commonly known as: VITAMIN D3   1,000 Units, Daily      cycloSPORINE 0.05 % ophthalmic emulsion  Commonly known as: RESTASIS   1 drop, 2 Times Daily      diphenhydrAMINE-acetaminophen  MG tablet per tablet  Commonly known as: TYLENOL PM   1 tablet, Nightly PRN      fish oil 1000 MG capsule capsule   Daily With Breakfast      fluticasone 50 MCG/ACT nasal spray  Commonly known as: FLONASE   USE 2 SPRAYS IN EACH NOSTRIL DAILY      fluticasone-salmeterol 45-21 MCG/ACT inhaler  Commonly known as: Advair HFA   2 puffs, Inhalation, 2 Times Daily - RT      freestyle lancets   Pt tests daily      Lancets misc   Use to test blood glucose up to four times daily as needed. Formulary Compliance Approval. Diagnosis: Type 2 Diabetes - Insulin Dependent       FREESTYLE LITE test strip  Generic drug: glucose blood   PT to use once daily      glucose blood test strip   Use to test blood glucose up to four times daily as needed. Formulary Compliance Approval. Diagnosis: Type 2 Diabetes - Insulin Dependent      glipizide 5 MG ER tablet  Commonly known as: GLUCOTROL XL   5 mg, Oral, 2 Times Daily      glucose monitor monitoring kit   Use to test blood glucose up to four times daily as needed. Formulary Compliance Approval. Diagnosis: Type 2 Diabetes - Insulin Dependent      hydroCHLOROthiazide 25 MG tablet   25 mg, Oral, Daily      levETIRAcetam 250 MG tablet  Commonly known as: KEPPRA   250 mg, Oral, Daily      lisinopril 10 MG tablet  Commonly known as: PRINIVIL,ZESTRIL   10 mg, Oral, Daily      magnesium oxide 400 tablet tablet  Commonly known as: MAG-OX   400 mg, Oral, Daily      montelukast 10 MG tablet  Commonly known as: SINGULAIR   10 mg, Oral, Nightly      multivitamin tablet tablet   1 tablet, Daily      nystatin 646744 UNIT/GM cream  Commonly known as: MYCOSTATIN   1 Application, Topical, 2 Times Daily      nystatin 195383 UNIT/GM topical powder  Generic drug: nystatin   APPLY TOPICALLY TO THE APPROPRIATE AREA AS DIRECTED THREE TIMES A DAY      omeprazole 40 MG capsule  Commonly known as: priLOSEC   TAKE 1 CAPSULE EVERY MORNING      pregabalin 100 MG capsule  Commonly known as: LYRICA   100 mg, Oral, 3 Times Daily      PROBIOTIC-10 PO   Take  by mouth.      propranolol 40 MG tablet  Commonly known as: INDERAL   40 mg, Oral, 2 Times Daily      raNITIdine 75 MG tablet  Commonly known as: ZANTAC   150 mg, 2 Times Daily      Spacer/Aero-Holding Chambers device   Use with all inhaled treatments      traZODone 100 MG tablet  Commonly known as: DESYREL   100 mg, Oral, Nightly      VITAMIN B 12 PO   5,000 mcg, Nightly      Vitamin B-2 100 MG tablet tablet  Commonly known as: RIBOFLAVIN   300 mg, Oral, Daily      VITAMIN C PO   1,000 mg, Daily      Xifaxan 550 MG  tablet  Generic drug: riFAXIMin   TAKE 1 TABLET BY MOUTH EVERY 8 HOURS FOR 14 DAYS      zolpidem 5 MG tablet  Commonly known as: Ambien   5 mg, Oral, Nightly PRN             Stop These Medications      azithromycin 250 MG tablet  Commonly known as: Zithromax Z-Aubrey     erythromycin 5 MG/GM ophthalmic ointment  Commonly known as: ROMYCIN     methylPREDNISolone 4 MG dose pack  Commonly known as: MEDROL     nitrofurantoin (macrocrystal-monohydrate) 100 MG capsule  Commonly known as: Macrobid     ondansetron 4 MG tablet  Commonly known as: ZOFRAN     ondansetron ODT 4 MG disintegrating tablet  Commonly known as: ZOFRAN-ODT              Allergies   Allergen Reactions    Contrast Dye (Echo Or Unknown Ct/Mr) Shortness Of Breath    Iodinated Contrast Media Shortness Of Breath    Codeine Nausea Only    Methylprednisolone Anxiety    Other Other (See Comments)     Bamlanivimab infusion- muscle aches, joint pain, nausea    Ciprofloxacin Hcl Rash         Discharge Disposition:  Home or Self Care    Discharge Diet:  Diet Order   Procedures    Diet: Regular/House, Diabetic; Consistent Carbohydrate; Fluid Consistency: Thin (IDDSI 0)       Discharge Activity:   As tolerated    CODE STATUS:    Code Status and Medical Interventions: CPR (Attempt to Resuscitate); Full Support   Ordered at: 01/17/25 2012     Code Status (Patient has no pulse and is not breathing):    CPR (Attempt to Resuscitate)     Medical Interventions (Patient has pulse or is breathing):    Full Support       Future Appointments   Date Time Provider Department Center   1/29/2025  2:30 PM Louisa Steele PA-C [facility]      Follow-up Information       Edson Liu MD Follow up in 1 day(s).    Specialties: Sleep Medicine, Pulmonary Disease  Why: To look into antieosinophil biologic therapy and to set up evaluation with  and [facility] speech therapy for VCD  Contact information:  [facility] [address] [address] [phone]                Sergo Owen MD .    Specialty: Internal Medicine  Contact information:  [address] [facility] [address] [phone]                             Time Spent on Discharge:  Greater than 30 minutes      Hang Diaz MD  [facility]  01/24/25  15:40 EST

## 2025-01-25 NOTE — CASE MANAGEMENT/SOCIAL WORK
Case Management Discharge Note      Final Note: dc home         Selected Continued Care - Discharged on 1/24/2025 Admission date: 1/17/2025 - Discharge disposition: Home or Self Care      Destination    No services have been selected for the patient.                Durable Medical Equipment    No services have been selected for the patient.                Dialysis/Infusion    No services have been selected for the patient.                Home Medical Care    No services have been selected for the patient.                Therapy    No services have been selected for the patient.                Community Resources    No services have been selected for the patient.                Community & DME    No services have been selected for the patient.                         Final Discharge Disposition Code: 01 - home or self-care

## 2025-01-27 ENCOUNTER — TRANSITIONAL CARE MANAGEMENT TELEPHONE ENCOUNTER (OUTPATIENT)
Dept: CALL CENTER | Facility: HOSPITAL | Age: 70
End: 2025-01-27
Payer: MEDICARE

## 2025-01-27 NOTE — OUTREACH NOTE
Call Center TCM Note      Flowsheet Row Responses   [facility] patient discharged from? [place]   Does the patient have one of the following disease processes/diagnoses(primary or secondary)? Other   TCM attempt successful? Yes   Call start time 1207   Call end time 1209   Discharge diagnosis Asthma   Person spoke with today (if not patient) and relationship spouse   Meds reviewed with patient/caregiver? Yes   Is the patient having any side effects they believe may be caused by any medication additions or changes? No   Does the patient have all medications ordered at discharge? Yes   Is the patient taking all medications as directed (includes completed medication regime)? Yes   Does the patient have an appointment with their PCP within 7-14 days of discharge? Yes  [2/3/2025 at 1:00 PM]   Psychosocial issues? No   Did the patient receive a copy of their discharge instructions? Yes   Nursing interventions Reviewed instructions with patient   What is the patient's perception of their health status since discharge? Improving   Is the patient/caregiver able to teach back signs and symptoms related to disease process for when to call PCP? Yes   Is the patient/caregiver able to teach back signs and symptoms related to disease process for when to call 911? Yes   Is the patient/caregiver able to teach back the hierarchy of who to call/visit for symptoms/problems? PCP, Specialist, Home health nurse, Urgent Care, ED, 911 Yes   If the patient is a current smoker, are they able to teach back resources for cessation? Not a smoker   TCM call completed? Yes   Wrap up additional comments Spouse states pt is doing better, and went to [facility] yesterday. Pt is still have HAs, and taking tylenol the pain management. Reviewed AVS/meds with spouse. Spouse verified PCP fu appt.   Call end time 1209   Would this patient benefit from a Referral to Amb Social Work? No   Is the patient interested in additional calls from an ambulatory  ? No            Gabby Hall RN    1/27/2025, 12:10 EST

## 2025-01-31 ENCOUNTER — APPOINTMENT (OUTPATIENT)
Dept: CT IMAGING | Facility: HOSPITAL | Age: 70
End: 2025-01-31
Payer: MEDICARE

## 2025-01-31 ENCOUNTER — HOSPITAL ENCOUNTER (EMERGENCY)
Facility: HOSPITAL | Age: 70
Discharge: HOME | End: 2025-01-31
Attending: EMERGENCY MEDICINE
Payer: MEDICARE

## 2025-01-31 VITALS
RESPIRATION RATE: 16 BRPM | BODY MASS INDEX: 30.77 KG/M2 | WEIGHT: 203 LBS | SYSTOLIC BLOOD PRESSURE: 150 MMHG | TEMPERATURE: 98 F | OXYGEN SATURATION: 95 % | HEIGHT: 68 IN | DIASTOLIC BLOOD PRESSURE: 70 MMHG | HEART RATE: 67 BPM

## 2025-01-31 DIAGNOSIS — R10.11 RIGHT UPPER QUADRANT ABDOMINAL PAIN: Primary | ICD-10-CM

## 2025-01-31 PROCEDURE — 99284 EMERGENCY DEPT VISIT MOD MDM: CPT | Performed by: EMERGENCY MEDICINE

## 2025-01-31 PROCEDURE — 99284 EMERGENCY DEPT VISIT MOD MDM: CPT

## 2025-01-31 PROCEDURE — 74176 CT ABD & PELVIS W/O CONTRAST: CPT

## 2025-01-31 PROCEDURE — 63710000001 ONDANSETRON ODT 4 MG TABLET DISPERSIBLE: Performed by: EMERGENCY MEDICINE

## 2025-01-31 RX ORDER — ONDANSETRON 4 MG/1
4 TABLET, ORALLY DISINTEGRATING ORAL EVERY 6 HOURS PRN
Qty: 20 TABLET | Refills: 0 | Status: SHIPPED | OUTPATIENT
Start: 2025-01-31

## 2025-01-31 RX ORDER — CYCLOBENZAPRINE HCL 10 MG
10 TABLET ORAL 3 TIMES DAILY PRN
Qty: 20 TABLET | Refills: 0 | Status: SHIPPED | OUTPATIENT
Start: 2025-01-31

## 2025-01-31 RX ORDER — HYDROCODONE BITARTRATE AND ACETAMINOPHEN 5; 325 MG/1; MG/1
1 TABLET ORAL 4 TIMES DAILY PRN
Qty: 12 TABLET | Refills: 0 | Status: SHIPPED | OUTPATIENT
Start: 2025-01-31

## 2025-01-31 RX ORDER — ONDANSETRON 4 MG/1
4 TABLET, ORALLY DISINTEGRATING ORAL ONCE
Status: COMPLETED | OUTPATIENT
Start: 2025-01-31 | End: 2025-01-31

## 2025-01-31 RX ORDER — OXYCODONE HYDROCHLORIDE 5 MG/1
5 TABLET ORAL ONCE
Status: COMPLETED | OUTPATIENT
Start: 2025-01-31 | End: 2025-01-31

## 2025-01-31 RX ADMIN — OXYCODONE HYDROCHLORIDE 5 MG: 5 TABLET ORAL at 16:23

## 2025-01-31 RX ADMIN — ONDANSETRON 4 MG: 4 TABLET, ORALLY DISINTEGRATING ORAL at 16:23

## 2025-01-31 NOTE — DISCHARGE INSTRUCTIONS
Take medication as prescribed for pain.  Take muscle laxer as needed for additional pain relief.  Return to ER for any emergent concerns.  Consider topical creams or ointments to the area for additional pain relief.  Follow-up with PCP for continued evaluation and treatment of your condition.
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
## 2025-02-03 ENCOUNTER — TELEPHONE (OUTPATIENT)
Dept: FAMILY MEDICINE CLINIC | Facility: CLINIC | Age: 70
End: 2025-02-03

## 2025-02-03 RX ORDER — MONTELUKAST SODIUM 10 MG/1
10 TABLET ORAL NIGHTLY
Qty: 90 TABLET | Refills: 3 | Status: SHIPPED | OUTPATIENT
Start: 2025-02-03

## 2025-02-03 NOTE — TELEPHONE ENCOUNTER
Caller: Khalida Gilliland     Relationship: SELF    Best call back number: [phone]     What is your medical concern? PATIENT STATES THAT SHE IS HAVING ISSUES SWALLOWING AND THAT HER THROAT FEELS ABNORMAL AND HAS PAIN    How long has this issue been going on? ABOUT 1 WEEK    Is your provider already aware of this issue? NO    Have you been treated for this issue? NO

## 2025-02-03 NOTE — TELEPHONE ENCOUNTER
Caller: Khalida Gilliland    Relationship to patient: Self    Best call back number: [phone]     Chief complaint: HOSPITAL FOLLOW UP     Type of visit: HOSPITAL FOLLOW UP     Requested date: ANY     Additional notes:PATIENT WOULD LIKE TO SEE DR MENSAH FOR THIS FOLLOW UP.

## 2025-02-07 ENCOUNTER — OFFICE VISIT (OUTPATIENT)
Dept: FAMILY MEDICINE CLINIC | Facility: CLINIC | Age: 70
End: 2025-02-07
Payer: MEDICARE

## 2025-02-07 ENCOUNTER — HOSPITAL ENCOUNTER (EMERGENCY)
Facility: HOSPITAL | Age: 70
Discharge: HOME | End: 2025-02-07
Attending: EMERGENCY MEDICINE
Payer: MEDICARE

## 2025-02-07 ENCOUNTER — APPOINTMENT (OUTPATIENT)
Dept: GENERAL RADIOLOGY | Facility: HOSPITAL | Age: 70
End: 2025-02-07
Payer: MEDICARE

## 2025-02-07 VITALS
OXYGEN SATURATION: 93 % | WEIGHT: 210.4 LBS | HEIGHT: 68 IN | SYSTOLIC BLOOD PRESSURE: 112 MMHG | RESPIRATION RATE: 20 BRPM | HEART RATE: 67 BPM | TEMPERATURE: 97.4 F | DIASTOLIC BLOOD PRESSURE: 64 MMHG | BODY MASS INDEX: 31.89 KG/M2

## 2025-02-07 VITALS
HEIGHT: 68 IN | DIASTOLIC BLOOD PRESSURE: 56 MMHG | RESPIRATION RATE: 20 BRPM | BODY MASS INDEX: 31.89 KG/M2 | WEIGHT: 210.38 LBS | HEART RATE: 62 BPM | OXYGEN SATURATION: 99 % | TEMPERATURE: 97.4 F | SYSTOLIC BLOOD PRESSURE: 110 MMHG

## 2025-02-07 DIAGNOSIS — J45.41 MODERATE PERSISTENT ASTHMA WITH EXACERBATION: Primary | ICD-10-CM

## 2025-02-07 DIAGNOSIS — R13.14 PHARYNGOESOPHAGEAL DYSPHAGIA: ICD-10-CM

## 2025-02-07 DIAGNOSIS — J45.901 EXACERBATION OF ASTHMA, UNSPECIFIED ASTHMA SEVERITY, UNSPECIFIED WHETHER PERSISTENT: Primary | ICD-10-CM

## 2025-02-07 DIAGNOSIS — R05.3 CHRONIC COUGH: ICD-10-CM

## 2025-02-07 LAB
ALBUMIN SERPL-MCNC: 4.4 G/DL (ref 3.5–5.2)
ALBUMIN/GLOB SERPL: 1.2 G/DL
ALP SERPL-CCNC: 76 U/L (ref 39–117)
ALT SERPL W P-5'-P-CCNC: 37 U/L (ref 1–33)
ANION GAP SERPL CALCULATED.3IONS-SCNC: 10.5 MMOL/L (ref 5–15)
AST SERPL-CCNC: 27 U/L (ref 1–32)
BASOPHILS # BLD AUTO: 0.01 10*3/MM3 (ref 0–0.2)
BASOPHILS NFR BLD AUTO: 0.2 % (ref 0–1.5)
BILIRUB SERPL-MCNC: 0.3 MG/DL (ref 0–1.2)
BUN SERPL-MCNC: 11 MG/DL (ref 8–23)
BUN/CREAT SERPL: 16.7 (ref 7–25)
CALCIUM SPEC-SCNC: 9.9 MG/DL (ref 8.6–10.5)
CHLORIDE SERPL-SCNC: 100 MMOL/L (ref 98–107)
CO2 SERPL-SCNC: 28.5 MMOL/L (ref 22–29)
CREAT SERPL-MCNC: 0.66 MG/DL (ref 0.57–1)
DEPRECATED RDW RBC AUTO: 46.7 FL (ref 37–54)
EGFRCR SERPLBLD CKD-EPI 2021: 95.1 ML/MIN/1.73
EOSINOPHIL # BLD AUTO: 0.22 10*3/MM3 (ref 0–0.4)
EOSINOPHIL NFR BLD AUTO: 3.5 % (ref 0.3–6.2)
ERYTHROCYTE [DISTWIDTH] IN BLOOD BY AUTOMATED COUNT: 13.7 % (ref 12.3–15.4)
GLOBULIN UR ELPH-MCNC: 3.6 GM/DL
GLUCOSE SERPL-MCNC: 127 MG/DL (ref 65–99)
HCT VFR BLD AUTO: 42 % (ref 34–46.6)
HGB BLD-MCNC: 13.7 G/DL (ref 12–15.9)
IMM GRANULOCYTES # BLD AUTO: 0.01 10*3/MM3 (ref 0–0.05)
IMM GRANULOCYTES NFR BLD AUTO: 0.2 % (ref 0–0.5)
LYMPHOCYTES # BLD AUTO: 2.4 10*3/MM3 (ref 0.7–3.1)
LYMPHOCYTES NFR BLD AUTO: 38.3 % (ref 19.6–45.3)
MCH RBC QN AUTO: 30.3 PG (ref 26.6–33)
MCHC RBC AUTO-ENTMCNC: 32.6 G/DL (ref 31.5–35.7)
MCV RBC AUTO: 92.9 FL (ref 79–97)
MONOCYTES # BLD AUTO: 0.57 10*3/MM3 (ref 0.1–0.9)
MONOCYTES NFR BLD AUTO: 9.1 % (ref 5–12)
NEUTROPHILS NFR BLD AUTO: 3.05 10*3/MM3 (ref 1.7–7)
NEUTROPHILS NFR BLD AUTO: 48.7 % (ref 42.7–76)
PLATELET # BLD AUTO: 220 10*3/MM3 (ref 140–450)
PMV BLD AUTO: 10.8 FL (ref 6–12)
POTASSIUM SERPL-SCNC: 3.9 MMOL/L (ref 3.5–5.2)
PROT SERPL-MCNC: 8 G/DL (ref 6–8.5)
RBC # BLD AUTO: 4.52 10*6/MM3 (ref 3.77–5.28)
SODIUM SERPL-SCNC: 139 MMOL/L (ref 136–145)
TROPONIN T SERPL HS-MCNC: <6 NG/L
WBC NRBC COR # BLD AUTO: 6.26 10*3/MM3 (ref 3.4–10.8)

## 2025-02-07 PROCEDURE — 3078F DIAST BP <80 MM HG: CPT | Performed by: INTERNAL MEDICINE

## 2025-02-07 PROCEDURE — 80053 COMPREHEN METABOLIC PANEL: CPT

## 2025-02-07 PROCEDURE — 99213 OFFICE O/P EST LOW 20 MIN: CPT | Performed by: INTERNAL MEDICINE

## 2025-02-07 PROCEDURE — 93010 ELECTROCARDIOGRAM REPORT: CPT | Performed by: INTERNAL MEDICINE

## 2025-02-07 PROCEDURE — 84484 ASSAY OF TROPONIN QUANT: CPT

## 2025-02-07 PROCEDURE — 1126F AMNT PAIN NOTED NONE PRSNT: CPT | Performed by: INTERNAL MEDICINE

## 2025-02-07 PROCEDURE — 25010000002 DEXAMETHASONE PER 1 MG

## 2025-02-07 PROCEDURE — 93005 ELECTROCARDIOGRAM TRACING: CPT

## 2025-02-07 PROCEDURE — 85025 COMPLETE CBC W/AUTO DIFF WBC: CPT

## 2025-02-07 PROCEDURE — 3074F SYST BP LT 130 MM HG: CPT | Performed by: INTERNAL MEDICINE

## 2025-02-07 PROCEDURE — 71045 X-RAY EXAM CHEST 1 VIEW: CPT

## 2025-02-07 PROCEDURE — 99284 EMERGENCY DEPT VISIT MOD MDM: CPT

## 2025-02-07 RX ORDER — METHYLPREDNISOLONE 4 MG/1
TABLET ORAL
Qty: 21 TABLET | Refills: 0 | Status: SHIPPED | OUTPATIENT
Start: 2025-02-07

## 2025-02-07 RX ORDER — LORAZEPAM 1 MG/1
0.5 TABLET ORAL ONCE
Status: COMPLETED | OUTPATIENT
Start: 2025-02-07 | End: 2025-02-07

## 2025-02-07 RX ORDER — SODIUM CHLORIDE 0.9 % (FLUSH) 0.9 %
10 SYRINGE (ML) INJECTION AS NEEDED
Status: DISCONTINUED | OUTPATIENT
Start: 2025-02-07 | End: 2025-02-07 | Stop reason: HOSPADM

## 2025-02-07 RX ORDER — IPRATROPIUM BROMIDE AND ALBUTEROL SULFATE 2.5; .5 MG/3ML; MG/3ML
3 SOLUTION RESPIRATORY (INHALATION) ONCE
Status: COMPLETED | OUTPATIENT
Start: 2025-02-07 | End: 2025-02-07

## 2025-02-07 RX ADMIN — LORAZEPAM 0.5 MG: 1 TABLET ORAL at 16:59

## 2025-02-07 RX ADMIN — IPRATROPIUM BROMIDE AND ALBUTEROL SULFATE 3 ML: .5; 3 SOLUTION RESPIRATORY (INHALATION) at 16:03

## 2025-02-07 RX ADMIN — DEXAMETHASONE SODIUM PHOSPHATE 10 MG: 10 INJECTION, SOLUTION INTRAMUSCULAR; INTRAVENOUS at 16:03

## 2025-02-07 NOTE — DISCHARGE INSTRUCTIONS
Start steroid pack tomorrow, take as prescribed until completion.  Sinew to monitor symptoms closely, and return to emergency department for worsening symptoms or other medical emergencies.  Recommended follow-up with PCP and your pulmonologist given your recent hospitalization.  Refer to the attached instructions for further information.
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
## 2025-02-08 LAB
QT INTERVAL: 407 MS
QTC INTERVAL: 417 MS

## 2025-02-09 NOTE — ASSESSMENT & PLAN NOTE
Patient is in asthma exacerbation which will require further therapy with IV steroids & breathing      Plan: Will transfer to ER for further evaluation and treatment      Patient Treatment Goals: symptom prevention, minimizing limitation in activity, prevention of exacerbations and use of ER/inpatient care, maintenance of optimal pulmonary function, and minimization of adverse effects of treatment.
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
## 2025-02-10 DIAGNOSIS — I10 ESSENTIAL HYPERTENSION: ICD-10-CM

## 2025-02-10 RX ORDER — HYDROCHLOROTHIAZIDE 25 MG/1
TABLET ORAL
Qty: 90 TABLET | Refills: 3 | Status: SHIPPED | OUTPATIENT
Start: 2025-02-10 | End: 2025-02-10 | Stop reason: SDUPTHER

## 2025-02-10 RX ORDER — HYDROCHLOROTHIAZIDE 25 MG/1
25 TABLET ORAL DAILY
Qty: 90 TABLET | Refills: 0 | Status: SHIPPED | OUTPATIENT
Start: 2025-02-10

## 2025-02-12 ENCOUNTER — PATIENT OUTREACH (OUTPATIENT)
Dept: CASE MANAGEMENT | Facility: OTHER | Age: 70
End: 2025-02-12
Payer: MEDICARE

## 2025-02-12 NOTE — OUTREACH NOTE
AMBULATORY CASE MANAGEMENT NOTE    Names and Relationships of Patient/Support Persons: Contact: Khalida Gilliland; Relationship: Self -     Patient Outreach    Brief call with pt who states she is unable to talk as she is not feeling well. She states she has contacted Dr for more steroids which were ordered. Reminded of need to schedule an appointment with her PCP.         Angela ARREAGA  Ambulatory Case Management    2/12/2025, 13:24 EST

## 2025-02-24 ENCOUNTER — TELEPHONE (OUTPATIENT)
Dept: FAMILY MEDICINE CLINIC | Facility: CLINIC | Age: 70
End: 2025-02-24

## 2025-02-24 NOTE — TELEPHONE ENCOUNTER
PATIENT CALLED FOR MEDICATION REFILL OF JANUVIA 100 MG(NOT ON CURRENT MED LIST)    SHE HAS 3 DAYS LEFT    [id number] - [place], [place] - [address] AT [facility] - [phone]  - [phone]  [phone]     CALL BACK NUMBER [phone]

## 2025-02-28 ENCOUNTER — TELEPHONE (OUTPATIENT)
Dept: FAMILY MEDICINE CLINIC | Facility: CLINIC | Age: 70
End: 2025-02-28

## 2025-02-28 DIAGNOSIS — T75.3XXA MOTION SICKNESS, INITIAL ENCOUNTER: Primary | ICD-10-CM

## 2025-02-28 RX ORDER — SCOPOLAMINE 1 MG/3D
1 PATCH, EXTENDED RELEASE TRANSDERMAL
Qty: 5 PATCH | Refills: 0 | Status: SHIPPED | OUTPATIENT
Start: 2025-02-28

## 2025-02-28 NOTE — TELEPHONE ENCOUNTER
Caller: Khalida Gilliland    Relationship: Self    Best call back number: [phone]     What medication are you requesting: MOTION SICKNESS PATCHES     If a prescription is needed, what is your preferred pharmacy and phone number:  [facility] #[id number] [place], [place] - [address] AT [facility] - [phone] [facility] [phone] FX     Additional notes: PATIENT IS LEAVING TO GO ON A CRUISE AND IS NEEDING HELP WITH MOTION SICKNESS, WILL BE LEAVING FOR 8 DAYS.

## 2025-03-19 DIAGNOSIS — J30.9 ALLERGIC RHINITIS, UNSPECIFIED SEASONALITY, UNSPECIFIED TRIGGER: ICD-10-CM

## 2025-03-20 RX ORDER — PREDNISONE 20 MG/1
TABLET ORAL
COMMUNITY
Start: 2025-03-18

## 2025-03-20 RX ORDER — OSELTAMIVIR PHOSPHATE 75 MG/1
CAPSULE ORAL
COMMUNITY
Start: 2025-03-18

## 2025-03-20 RX ORDER — HYDROCODONE BITARTRATE AND HOMATROPINE METHYLBROMIDE ORAL SOLUTION 5; 1.5 MG/5ML; MG/5ML
LIQUID ORAL
COMMUNITY
Start: 2025-03-18

## 2025-03-20 RX ORDER — AZELASTINE 1 MG/ML
SPRAY, METERED NASAL
Qty: 30 ML | Refills: 6 | Status: SHIPPED | OUTPATIENT
Start: 2025-03-20

## 2025-03-30 ENCOUNTER — HOSPITAL ENCOUNTER (OUTPATIENT)
Facility: HOSPITAL | Age: 70
Discharge: HOME | End: 2025-03-30
Attending: STUDENT IN AN ORGANIZED HEALTH CARE EDUCATION/TRAINING PROGRAM | Admitting: STUDENT IN AN ORGANIZED HEALTH CARE EDUCATION/TRAINING PROGRAM
Payer: MEDICARE

## 2025-03-30 VITALS
OXYGEN SATURATION: 95 % | SYSTOLIC BLOOD PRESSURE: 154 MMHG | BODY MASS INDEX: 31.37 KG/M2 | HEIGHT: 68 IN | WEIGHT: 207 LBS | DIASTOLIC BLOOD PRESSURE: 72 MMHG | HEART RATE: 75 BPM | TEMPERATURE: 98 F | RESPIRATION RATE: 16 BRPM

## 2025-03-30 DIAGNOSIS — N76.0 ACUTE VAGINITIS: Primary | ICD-10-CM

## 2025-03-30 LAB
BACTERIA UR QL AUTO: ABNORMAL /HPF
BILIRUB UR QL STRIP: NEGATIVE
CLARITY UR: ABNORMAL
COLOR UR: ABNORMAL
GLUCOSE BLDC GLUCOMTR-MCNC: 289 MG/DL (ref 70–130)
GLUCOSE UR STRIP-MCNC: ABNORMAL MG/DL
HGB UR QL STRIP.AUTO: NEGATIVE
HYALINE CASTS UR QL AUTO: ABNORMAL /LPF
KETONES UR QL STRIP: ABNORMAL
LEUKOCYTE ESTERASE UR QL STRIP.AUTO: ABNORMAL
NITRITE UR QL STRIP: NEGATIVE
PH UR STRIP.AUTO: 5.5 [PH] (ref 5–8)
PROT UR QL STRIP: NEGATIVE
RBC # UR STRIP: ABNORMAL /HPF
REF LAB TEST METHOD: ABNORMAL
SP GR UR STRIP: 1.02 (ref 1–1.03)
SQUAMOUS #/AREA URNS HPF: ABNORMAL /HPF
UROBILINOGEN UR QL STRIP: ABNORMAL
WBC # UR STRIP: ABNORMAL /HPF

## 2025-03-30 PROCEDURE — G0463 HOSPITAL OUTPT CLINIC VISIT: HCPCS | Performed by: STUDENT IN AN ORGANIZED HEALTH CARE EDUCATION/TRAINING PROGRAM

## 2025-03-30 PROCEDURE — 87086 URINE CULTURE/COLONY COUNT: CPT | Performed by: STUDENT IN AN ORGANIZED HEALTH CARE EDUCATION/TRAINING PROGRAM

## 2025-03-30 PROCEDURE — 87077 CULTURE AEROBIC IDENTIFY: CPT | Performed by: STUDENT IN AN ORGANIZED HEALTH CARE EDUCATION/TRAINING PROGRAM

## 2025-03-30 PROCEDURE — 82948 REAGENT STRIP/BLOOD GLUCOSE: CPT

## 2025-03-30 PROCEDURE — 87186 SC STD MICRODIL/AGAR DIL: CPT | Performed by: STUDENT IN AN ORGANIZED HEALTH CARE EDUCATION/TRAINING PROGRAM

## 2025-03-30 PROCEDURE — 81001 URINALYSIS AUTO W/SCOPE: CPT | Performed by: STUDENT IN AN ORGANIZED HEALTH CARE EDUCATION/TRAINING PROGRAM

## 2025-03-30 RX ORDER — FLUCONAZOLE 150 MG/1
150 TABLET ORAL ONCE
Qty: 1 TABLET | Refills: 0 | Status: SHIPPED | OUTPATIENT
Start: 2025-03-30 | End: 2025-03-30

## 2025-03-30 RX ORDER — FLUCONAZOLE 150 MG/1
150 TABLET ORAL ONCE
Status: COMPLETED | OUTPATIENT
Start: 2025-03-30 | End: 2025-03-30

## 2025-03-30 RX ADMIN — FLUCONAZOLE 150 MG: 150 TABLET ORAL at 14:37
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
## 2025-03-31 LAB — HOLD SPECIMEN: NORMAL

## 2025-04-02 ENCOUNTER — TELEPHONE (OUTPATIENT)
Dept: FAMILY MEDICINE CLINIC | Facility: CLINIC | Age: 70
End: 2025-04-02

## 2025-04-02 ENCOUNTER — OFFICE VISIT (OUTPATIENT)
Dept: FAMILY MEDICINE CLINIC | Facility: CLINIC | Age: 70
End: 2025-04-02
Payer: MEDICARE

## 2025-04-02 VITALS
DIASTOLIC BLOOD PRESSURE: 72 MMHG | BODY MASS INDEX: 32.42 KG/M2 | SYSTOLIC BLOOD PRESSURE: 106 MMHG | WEIGHT: 213.9 LBS | TEMPERATURE: 98.5 F | RESPIRATION RATE: 16 BRPM | HEIGHT: 68 IN | OXYGEN SATURATION: 94 % | HEART RATE: 74 BPM

## 2025-04-02 DIAGNOSIS — R79.89 ELEVATED LFTS: ICD-10-CM

## 2025-04-02 DIAGNOSIS — I10 ESSENTIAL HYPERTENSION: Primary | ICD-10-CM

## 2025-04-02 DIAGNOSIS — E11.9 TYPE 2 DIABETES MELLITUS WITHOUT COMPLICATION, WITHOUT LONG-TERM CURRENT USE OF INSULIN: ICD-10-CM

## 2025-04-02 DIAGNOSIS — G44.83 COUGH HEADACHE: ICD-10-CM

## 2025-04-02 DIAGNOSIS — E78.5 HYPERLIPIDEMIA, UNSPECIFIED HYPERLIPIDEMIA TYPE: ICD-10-CM

## 2025-04-02 DIAGNOSIS — K21.9 GASTROESOPHAGEAL REFLUX DISEASE WITHOUT ESOPHAGITIS: ICD-10-CM

## 2025-04-02 LAB
BACTERIA SPEC AEROBE CULT: ABNORMAL
BACTERIA SPEC AEROBE CULT: ABNORMAL
EXPIRATION DATE: NORMAL
EXPIRATION DATE: NORMAL
FLUAV AG NPH QL: NEGATIVE
FLUBV AG NPH QL: NEGATIVE
INTERNAL CONTROL: NORMAL
INTERNAL CONTROL: NORMAL
Lab: NORMAL
Lab: NORMAL
SARS-COV-2 AG UPPER RESP QL IA.RAPID: NOT DETECTED

## 2025-04-02 RX ORDER — FLUCONAZOLE 150 MG/1
1 TABLET ORAL DAILY
COMMUNITY
Start: 2025-03-31

## 2025-04-02 RX ORDER — SUCRALFATE 1 G/1
1 TABLET ORAL 4 TIMES DAILY
Qty: 20 TABLET | Refills: 0 | Status: SHIPPED | OUTPATIENT
Start: 2025-04-02 | End: 2025-04-11 | Stop reason: SDUPTHER

## 2025-04-02 NOTE — TELEPHONE ENCOUNTER
Caller: Khalida Gilliland    Relationship to patient: Self    Best call back number: [phone]     Chief complaint: PREVIOUS DIAGNOSIS OF FLU AND BRONCHITIS. WAS GIVEN STEROIDS, BUT NO ANTIBIOTIC. COUGH IS GETTING WORSE, SHORTNESS OF BREATH AT ALL TIMES. ADVISED ER, BUT REFUSED    Patient directed to call 911 or go to their nearest emergency room.     Patient verbalized understanding: [] Yes  [x] No  If no, why?    Additional notes:PATIENT WANTED TO SCHEDULE WITH THE OFFICE, BUT [facility] WAS UNABLE TO WT. TRANSFERRED TO [facility], BUT LOST PATIENT IN THE PROCESS. [facility] ATTEMPTED TO CONTACT PATIENT, BUT WAS SENT TO VOICEMAIL. LEFT A VOICEMAIL ADVISING PATIENT TO CALL BACK TO SCHEDULE

## 2025-04-03 NOTE — TELEPHONE ENCOUNTER
Caller: Khalida Gilliland    Relationship: Self    Best call back number:[phone]      Requested Prescriptions:   Requested Prescriptions     Pending Prescriptions Disp Refills    omeprazole (priLOSEC) 40 MG capsule 90 capsule 3     Sig: Take 1 capsule by mouth Every Morning.        Pharmacy where request should be sent: [facility] [address] [phone] [facility] [phone]      Last office visit with prescribing clinician: 9/18/2024   Last telemedicine visit with prescribing clinician: Visit date not found   Next office visit with prescribing clinician: 4/2/2025     Additional details provided by patient: SHE IS OUT OF THIS MEDICATION     Does the patient have less than a 3 day supply:  [x] Yes  [] No    Would you like a call back once the refill request has been completed: [] Yes [x] No    If the office needs to give you a call back, can they leave a voicemail: [] Yes [x] No    Abdirahman Thomas Rep   04/03/25 09:42 EDT

## 2025-04-04 ENCOUNTER — OFFICE VISIT (OUTPATIENT)
Dept: GASTROENTEROLOGY | Facility: CLINIC | Age: 70
End: 2025-04-04
Payer: MEDICARE

## 2025-04-04 VITALS
SYSTOLIC BLOOD PRESSURE: 113 MMHG | HEART RATE: 71 BPM | WEIGHT: 214.2 LBS | TEMPERATURE: 96.6 F | HEIGHT: 68 IN | BODY MASS INDEX: 32.46 KG/M2 | DIASTOLIC BLOOD PRESSURE: 70 MMHG

## 2025-04-04 DIAGNOSIS — K58.0 IRRITABLE BOWEL SYNDROME WITH DIARRHEA: Primary | ICD-10-CM

## 2025-04-04 DIAGNOSIS — K21.9 GASTROESOPHAGEAL REFLUX DISEASE, UNSPECIFIED WHETHER ESOPHAGITIS PRESENT: ICD-10-CM

## 2025-04-04 PROCEDURE — 99214 OFFICE O/P EST MOD 30 MIN: CPT | Performed by: PHYSICIAN ASSISTANT

## 2025-04-04 PROCEDURE — 1160F RVW MEDS BY RX/DR IN RCRD: CPT | Performed by: PHYSICIAN ASSISTANT

## 2025-04-04 PROCEDURE — 1159F MED LIST DOCD IN RCRD: CPT | Performed by: PHYSICIAN ASSISTANT

## 2025-04-04 PROCEDURE — 3074F SYST BP LT 130 MM HG: CPT | Performed by: PHYSICIAN ASSISTANT

## 2025-04-04 PROCEDURE — 3078F DIAST BP <80 MM HG: CPT | Performed by: PHYSICIAN ASSISTANT

## 2025-04-04 RX ORDER — OMEPRAZOLE 40 MG/1
40 CAPSULE, DELAYED RELEASE ORAL EVERY MORNING
Qty: 90 CAPSULE | Refills: 3 | Status: SHIPPED | OUTPATIENT
Start: 2025-04-04

## 2025-04-04 RX ORDER — DICYCLOMINE HCL 20 MG
20 TABLET ORAL
Qty: 90 TABLET | Refills: 5 | Status: SHIPPED | OUTPATIENT
Start: 2025-04-04

## 2025-04-04 NOTE — PROGRESS NOTES
"Chief Complaint  Difficulty Swallowing    Subjective          History Of Present Illness:    Khalida Gilliland is a  69 y.o. female patient of Dr. Regalado who presents as a follow-up for IBS-D and heartburn.     Patient was prescribed Xifaxan and dicyclomine on her last office visit.  Unclear whether she ever took the Xifaxan.  She has still not picked up dicyclomine.    She was admitted to the hospital in January for an upper respiratory infection.  She reports significant fatigue since then.  She does continue to endorse urgency with bowel movements but denies diarrhea.  No constipation.  No black or bloody stools.  She has been experiencing nausea but did discover she has been off her omeprazole therapy.  Recently restarted by Dr. Owen who also added sucralfate 4 times daily.  Has had some difficulty controlling her glucose lately secondary to steroids.    She does have a history of multiple abdominal surgeries which include appendectomy, partial colectomy which was then complicated by bowel perforation requiring ostomy status post reversal, cholecystectomy.      Additional data reviewed:  Flexible sigmoidoscopy 10/22/2024 with diverticulosis of the sigmoid and descending colon, otherwise normal.  Biopsies without microscopic colitis.  EGD 10/22/2024 -normal-appearing esophagus, stomach, duodenum.  Benign biopsies.    Objective   Vital Signs:   /70   Pulse 71   Temp 96.6 °F (35.9 °C)   Ht 172.7 cm (68\")   Wt 97.2 kg (214 lb 3.2 oz)   BMI 32.57 kg/m²       Physical Exam  Vitals reviewed.   Constitutional:       General: She is not in acute distress.     Appearance: Normal appearance. She is not ill-appearing.   HENT:      Head: Normocephalic and atraumatic.      Nose: Nose normal.      Mouth/Throat:      Pharynx: Oropharynx is clear.   Eyes:      Conjunctiva/sclera: Conjunctivae normal.   Pulmonary:      Effort: Pulmonary effort is normal.   Abdominal:      General: There is no distension.      " Palpations: Abdomen is soft. There is no mass.      Tenderness: There is no abdominal tenderness.   Musculoskeletal:         General: No swelling. Normal range of motion.      Cervical back: Normal range of motion.   Skin:     General: Skin is warm and dry.      Findings: No bruising or rash.   Neurological:      General: No focal deficit present.      Mental Status: She is alert and oriented to person, place, and time.      Motor: No weakness.      Gait: Gait normal.   Psychiatric:         Mood and Affect: Mood normal.          Result Review :   The following data was reviewed by: Louisa Modi PA-C on 04/04/2025:  CMP          1/23/2025    11:46 1/24/2025    08:54 2/7/2025    17:19   CMP   Glucose 203  190  127    BUN 14  16  11    Creatinine 0.76  0.80  0.66    EGFR 84.9  79.9  95.1    Sodium 138  137  139    Potassium 4.4  4.5  3.9    Chloride 101  99  100    Calcium 8.9  9.4  9.9    Total Protein   8.0    Albumin   4.4    Globulin   3.6    Total Bilirubin   0.3    Alkaline Phosphatase   76    AST (SGOT)   27    ALT (SGPT)   37    Albumin/Globulin Ratio   1.2    BUN/Creatinine Ratio 18.4  20.0  16.7    Anion Gap 11.4  19.9  10.5      CBC          1/23/2025    10:53 1/24/2025    08:54 2/7/2025    17:19   CBC   WBC 7.53  9.96  6.26    RBC 4.26  5.32  4.52    Hemoglobin 12.9  16.4  13.7    Hematocrit 39.2  49.6  42.0    MCV 92.0  93.2  92.9    MCH 30.3  30.8  30.3    MCHC 32.9  33.1  32.6    RDW 12.8  13.0  13.7    Platelets 305  293  220            Assessment and Plan    Diagnoses and all orders for this visit:    1. Irritable bowel syndrome with diarrhea (Primary)  -     dicyclomine (BENTYL) 20 MG tablet; Take 1 tablet by mouth 4 (Four) Times a Day Before Meals & at Bedtime As Needed for Abdominal Cramping (Diarrhea).  Dispense: 90 tablet; Refill: 5    2. Gastroesophageal reflux disease, unspecified whether esophagitis present       Continue omeprazole 40 mg daily  Continue sucralfate as needed 2-4 times  daily  She does have some urgency with her bowel movements and she would probably benefit from Bentyl therapy before meals.  She has not picked this up for the second time.  I have written this down for her and advised her to take it before meals.  She may be having some exacerbation of nausea due to hyperglycemia.  Continue current antacid therapy and work on glucose control.  We discussed she had a normal endoscopic evaluation last year.    Follow Up   Return in about 1 year (around 4/4/2026) for Louisa Steele PA-C.    Dragon dictation used throughout this note.            Louisa Steele PA-C   [facility]  [address] [place] [address]  Office: [phone]

## 2025-04-11 DIAGNOSIS — K21.9 GASTROESOPHAGEAL REFLUX DISEASE WITHOUT ESOPHAGITIS: ICD-10-CM

## 2025-04-11 RX ORDER — SUCRALFATE 1 G/1
1 TABLET ORAL 4 TIMES DAILY
Qty: 20 TABLET | Refills: 0 | Status: SHIPPED | OUTPATIENT
Start: 2025-04-11

## 2025-04-13 NOTE — PROGRESS NOTES
"Chief Complaint  Cough (Had flu and bronchitis 2 weeks ago after cruise. Feeling better but cough not gone )    Subjective        Khalida Gilliland presents to [facility]  Cough      For follow up on cough which is not going away even after other symptoms has gone away.  Pt is also not clear about her medications and is due for lab follow up.  She is travelling next month.  Objective   Vital Signs:  /72 (BP Location: Left arm, Patient Position: Sitting, Cuff Size: Large Adult)   Pulse 74   Temp 98.5 °F (36.9 °C) (Oral)   Resp 16   Ht 172.7 cm (67.99\")   Wt 97 kg (213 lb 14.4 oz)   SpO2 94%   BMI 32.53 kg/m²   Estimated body mass index is 32.53 kg/m² as calculated from the following:    Height as of this encounter: 172.7 cm (67.99\").    Weight as of this encounter: 97 kg (213 lb 14.4 oz).          Physical Exam  Cardiovascular:      Rate and Rhythm: Normal rate.      Pulses: Normal pulses.      Heart sounds: Normal heart sounds.   Pulmonary:      Effort: Pulmonary effort is normal.      Breath sounds: Normal breath sounds.   Abdominal:      General: Bowel sounds are normal.      Palpations: Abdomen is soft. There is no mass.      Tenderness: There is no abdominal tenderness.   Neurological:      Mental Status: She is alert and oriented to person, place, and time.        Result Review :                Assessment and Plan   Diagnoses and all orders for this visit:    1. Essential hypertension (Primary)  -     Hemoglobin A1c  -     Microalbumin / Creatinine Urine Ratio - Urine, Clean Catch  -     CBC Auto Differential  -     Comprehensive Metabolic Panel  -     Lipid Panel With / Chol / HDL Ratio  -     TSH  -     Urinalysis With Microscopic - Urine, Clean Catch    2. Type 2 diabetes mellitus without complication, without long-term current use of insulin  -     Hemoglobin A1c  -     Microalbumin / Creatinine Urine Ratio - Urine, Clean Catch  -     CBC Auto Differential  -     " Comprehensive Metabolic Panel  -     Lipid Panel With / Chol / HDL Ratio  -     TSH  -     Urinalysis With Microscopic - Urine, Clean Catch    3. Hyperlipidemia, unspecified hyperlipidemia type  -     Hemoglobin A1c  -     Microalbumin / Creatinine Urine Ratio - Urine, Clean Catch  -     CBC Auto Differential  -     Comprehensive Metabolic Panel  -     Lipid Panel With / Chol / HDL Ratio  -     TSH  -     Urinalysis With Microscopic - Urine, Clean Catch    4. Elevated LFTs  -     Hemoglobin A1c  -     Microalbumin / Creatinine Urine Ratio - Urine, Clean Catch  -     CBC Auto Differential  -     Comprehensive Metabolic Panel  -     Lipid Panel With / Chol / HDL Ratio  -     TSH  -     Urinalysis With Microscopic - Urine, Clean Catch    5. Gastroesophageal reflux disease without esophagitis  -     Discontinue: sucralfate (Carafate) 1 g tablet; Take 1 tablet by mouth 4 (Four) Times a Day.  Dispense: 20 tablet; Refill: 0    6. Cough headache  -     POC Influenza A / B  -     POCT SARS-CoV-2 Antigen    # HTN  Well controlled, continue same medication    #Post viral cough  Pt assured if no fever or shortness of breath or worsening cough than she should not worry about cough, it should self resolved, if something changes she will notify me    #Flu and covid negative   Advised patient to do labs and will adjust medication accordingly         Follow Up   No follow-ups on file.  Patient was given instructions and counseling regarding her condition or for health maintenance advice. Please see specific information pulled into the AVS if appropriate.

## 2025-04-21 DIAGNOSIS — K21.9 GASTROESOPHAGEAL REFLUX DISEASE WITHOUT ESOPHAGITIS: ICD-10-CM

## 2025-04-21 RX ORDER — SUCRALFATE 1 G/1
1 TABLET ORAL 4 TIMES DAILY
Qty: 20 TABLET | Refills: 0 | Status: SHIPPED | OUTPATIENT
Start: 2025-04-21

## 2025-04-29 DIAGNOSIS — K21.9 GASTROESOPHAGEAL REFLUX DISEASE WITHOUT ESOPHAGITIS: ICD-10-CM

## 2025-04-29 DIAGNOSIS — G43.809 OTHER MIGRAINE WITHOUT STATUS MIGRAINOSUS, NOT INTRACTABLE: ICD-10-CM

## 2025-04-29 DIAGNOSIS — G47.00 INSOMNIA, UNSPECIFIED TYPE: ICD-10-CM

## 2025-04-29 RX ORDER — BUTALBITAL, ACETAMINOPHEN AND CAFFEINE 50; 325; 40 MG/1; MG/1; MG/1
1 TABLET ORAL EVERY 6 HOURS PRN
Qty: 30 TABLET | Refills: 0 | Status: SHIPPED | OUTPATIENT
Start: 2025-04-29

## 2025-04-29 RX ORDER — SUCRALFATE 1 G/1
1 TABLET ORAL 4 TIMES DAILY
Qty: 20 TABLET | Refills: 0 | Status: SHIPPED | OUTPATIENT
Start: 2025-04-29

## 2025-04-29 RX ORDER — ZOLPIDEM TARTRATE 5 MG/1
5 TABLET ORAL NIGHTLY PRN
Qty: 30 TABLET | Refills: 0 | Status: SHIPPED | OUTPATIENT
Start: 2025-04-29

## 2025-04-29 NOTE — TELEPHONE ENCOUNTER
[id number] [place], [place] - [address] AT [facility] [place] [facility] [phone] [facility] [phone] FX    Relationship: Pharmacy    Best call back number: [phone]    Requested Prescriptions:   Requested Prescriptions     Pending Prescriptions Disp Refills    sucralfate (Carafate) 1 g tablet 20 tablet 0     Sig: Take 1 tablet by mouth 4 (Four) Times a Day.    zolpidem (Ambien) 5 MG tablet 30 tablet 0     Sig: Take 1 tablet by mouth At Night As Needed for Sleep.    butalbital-acetaminophen-caffeine (Esgic) -40 MG per tablet 30 tablet 0     Sig: Take 1 tablet by mouth Every 6 (Six) Hours As Needed for Headache.        Pharm[facility] where request should be sent: [id number] [place], [place] - [address] AT [facility] [phone] [facility] [phone] FX     Last office visit with prescribing clinician: 4/2/2025   Last telemedicine visit with prescribing clinician: Visit date not found   Next office visit with prescribing clinician: 5/8/2025     Additional details provided by patient:     Does the patient have less than a 3 day supply:  [] Yes  [] No    Would you like a call back once the refill request has been completed: [] Yes [x] No    If the office needs to give you a call back, can they leave a voicemail: [] Yes [x] No    Abdirahman George Rep   04/29/25 11:15 EDT

## 2025-05-03 LAB
ALBUMIN SERPL-MCNC: 4.4 G/DL (ref 3.9–4.9)
ALBUMIN/CREAT UR: 8 MG/G CREAT (ref 0–29)
ALP SERPL-CCNC: 79 IU/L (ref 44–121)
ALT SERPL-CCNC: 62 IU/L (ref 0–32)
APPEARANCE UR: CLEAR
AST SERPL-CCNC: 67 IU/L (ref 0–40)
BACTERIA #/AREA URNS HPF: ABNORMAL /[HPF]
BASOPHILS # BLD AUTO: 0 X10E3/UL (ref 0–0.2)
BASOPHILS NFR BLD AUTO: 1 %
BILIRUB SERPL-MCNC: 0.4 MG/DL (ref 0–1.2)
BILIRUB UR QL STRIP: NEGATIVE
BUN SERPL-MCNC: 14 MG/DL (ref 8–27)
BUN/CREAT SERPL: 24 (ref 12–28)
CALCIUM SERPL-MCNC: 9.8 MG/DL (ref 8.7–10.3)
CASTS URNS QL MICRO: ABNORMAL /LPF
CHLORIDE SERPL-SCNC: 100 MMOL/L (ref 96–106)
CHOLEST SERPL-MCNC: 133 MG/DL (ref 100–199)
CHOLEST/HDLC SERPL: 3.8 RATIO (ref 0–4.4)
CO2 SERPL-SCNC: 20 MMOL/L (ref 20–29)
COLOR UR: YELLOW
CREAT SERPL-MCNC: 0.59 MG/DL (ref 0.57–1)
CREAT UR-MCNC: 146.9 MG/DL
CRYSTALS URNS MICRO: ABNORMAL
EGFRCR SERPLBLD CKD-EPI 2021: 97 ML/MIN/1.73
EOSINOPHIL # BLD AUTO: 0.2 X10E3/UL (ref 0–0.4)
EOSINOPHIL NFR BLD AUTO: 4 %
EPI CELLS #/AREA URNS HPF: >10 /HPF (ref 0–10)
ERYTHROCYTE [DISTWIDTH] IN BLOOD BY AUTOMATED COUNT: 12.5 % (ref 11.7–15.4)
GLOBULIN SER CALC-MCNC: 2.8 G/DL (ref 1.5–4.5)
GLUCOSE SERPL-MCNC: 203 MG/DL (ref 70–99)
GLUCOSE UR QL STRIP: NEGATIVE
HBA1C MFR BLD: 9.2 % (ref 4.8–5.6)
HCT VFR BLD AUTO: 43.3 % (ref 34–46.6)
HDLC SERPL-MCNC: 35 MG/DL
HGB BLD-MCNC: 13.9 G/DL (ref 11.1–15.9)
HGB UR QL STRIP: NEGATIVE
IMM GRANULOCYTES # BLD AUTO: 0 X10E3/UL (ref 0–0.1)
IMM GRANULOCYTES NFR BLD AUTO: 0 %
KETONES UR QL STRIP: ABNORMAL
LDLC SERPL CALC-MCNC: 71 MG/DL (ref 0–99)
LEUKOCYTE ESTERASE UR QL STRIP: NEGATIVE
LYMPHOCYTES # BLD AUTO: 1.5 X10E3/UL (ref 0.7–3.1)
LYMPHOCYTES NFR BLD AUTO: 29 %
MCH RBC QN AUTO: 30.3 PG (ref 26.6–33)
MCHC RBC AUTO-ENTMCNC: 32.1 G/DL (ref 31.5–35.7)
MCV RBC AUTO: 94 FL (ref 79–97)
MICRO URNS: ABNORMAL
MICRO URNS: ABNORMAL
MICROALBUMIN UR-MCNC: 11.3 UG/ML
MONOCYTES # BLD AUTO: 0.5 X10E3/UL (ref 0.1–0.9)
MONOCYTES NFR BLD AUTO: 10 %
NEUTROPHILS # BLD AUTO: 3 X10E3/UL (ref 1.4–7)
NEUTROPHILS NFR BLD AUTO: 56 %
NITRITE UR QL STRIP: NEGATIVE
PH UR STRIP: 5.5 [PH] (ref 5–7.5)
PLATELET # BLD AUTO: 220 X10E3/UL (ref 150–450)
POTASSIUM SERPL-SCNC: 4.6 MMOL/L (ref 3.5–5.2)
PROT SERPL-MCNC: 7.2 G/DL (ref 6–8.5)
PROT UR QL STRIP: ABNORMAL
RBC # BLD AUTO: 4.59 X10E6/UL (ref 3.77–5.28)
RBC #/AREA URNS HPF: ABNORMAL /HPF (ref 0–2)
SODIUM SERPL-SCNC: 136 MMOL/L (ref 134–144)
SP GR UR STRIP: >=1.03 (ref 1–1.03)
TRIGL SERPL-MCNC: 153 MG/DL (ref 0–149)
TSH SERPL DL<=0.005 MIU/L-ACNC: 1.58 UIU/ML (ref 0.45–4.5)
UNIDENT CRYS URNS QL MICRO: PRESENT
UROBILINOGEN UR STRIP-MCNC: 0.2 MG/DL (ref 0.2–1)
VLDLC SERPL CALC-MCNC: 27 MG/DL (ref 5–40)
WBC # BLD AUTO: 5.2 X10E3/UL (ref 3.4–10.8)
WBC #/AREA URNS HPF: ABNORMAL /HPF (ref 0–5)

## 2025-05-05 DIAGNOSIS — K21.9 GASTROESOPHAGEAL REFLUX DISEASE WITHOUT ESOPHAGITIS: ICD-10-CM

## 2025-05-05 RX ORDER — SUCRALFATE 1 G/1
1 TABLET ORAL 4 TIMES DAILY
Qty: 20 TABLET | Refills: 0 | OUTPATIENT
Start: 2025-05-05

## 2025-05-05 NOTE — TELEPHONE ENCOUNTER
Caller: [facility] #[id number] - [place], [place] - [address] AT [facility] [phone] [facility] [phone] FX    Relationship: Pharmacy    Best call back number: [phone]     Requested Prescriptions:   Requested Prescriptions     Pending Prescriptions Disp Refills    sucralfate (Carafate) 1 g tablet 20 tablet 0     Sig: Take 1 tablet by mouth 4 (Four) Times a Day.        Pharmacy where request should be sent: [facility] #[id number] - [place], [place] - [address] AT [facility] [phone] [facility] [phone] FX     Last office visit with prescribing clinician: 4/2/2025   Last telemedicine visit with prescribing clinician: Visit date not found   Next office visit with prescribing clinician: 5/8/2025     Additional details provided by patient: PHARMACY CALLING TO REQUEST REFILL. PATIENT IS OUT OF MEDICATION.    Does the patient have less than a 3 day supply:  [x] Yes  [] No    Would you like a call back once the refill request has been completed: [] Yes [x] No    If the office needs to give you a call back, can they leave a voicemail: [] Yes [x] No    Abdirahman Akers Rep   05/05/25 13:10 EDT

## 2025-05-07 DIAGNOSIS — K21.9 GASTROESOPHAGEAL REFLUX DISEASE WITHOUT ESOPHAGITIS: ICD-10-CM

## 2025-05-07 NOTE — TELEPHONE ENCOUNTER
DELETE AFTER REVIEWING: Send the encounter HIGH priority, If patient has less than a 3 day supply. If the patient will run out of medication over the weekend add that information to the additional details line. Send to the appropriate pool. Check your call action grid or workflows.    Caller: [facility] #[id number] - [place], [place] - [address] AT [facility] [place] [facility] [phone] [facility] [phone] FX    Relationship: Pharmacy    Best call back number:[phone]     Requested Prescriptions:   Requested Prescriptions     Pending Prescriptions Disp Refills    sucralfate (Carafate) 1 g tablet 20 tablet 0     Sig: Take 1 tablet by mouth 4 (Four) Times a Day.        Pharmacy where request should be sent: [facility] #[id number] - [place], [place] - [address] AT [facility] [place] [facility] [phone] [facility] [phone] FX     Last office visit with prescribing clinician: 4/2/2025   Last telemedicine visit with prescribing clinician: Visit date not found   Next office visit with prescribing clinician: 5/8/2025     Additional details provided by patient: PHARMACY WANTS TO KNOW IF PATIENT NEEDS A REFILL    Does the patient have less than a 3 day supply:  [x] Yes  [] No    Would you like a call back once the refill request has been completed: [] Yes [] No    If the office needs to give you a call back, can they leave a voicemail: [] Yes [] No    Abdirahman Campos Rep   05/07/25 12:15 EDT

## 2025-05-08 ENCOUNTER — OFFICE VISIT (OUTPATIENT)
Dept: FAMILY MEDICINE CLINIC | Facility: CLINIC | Age: 70
End: 2025-05-08
Payer: MEDICARE

## 2025-05-08 VITALS
DIASTOLIC BLOOD PRESSURE: 64 MMHG | TEMPERATURE: 98.4 F | WEIGHT: 216.3 LBS | HEIGHT: 68 IN | HEART RATE: 96 BPM | OXYGEN SATURATION: 97 % | BODY MASS INDEX: 32.78 KG/M2 | RESPIRATION RATE: 16 BRPM | SYSTOLIC BLOOD PRESSURE: 132 MMHG

## 2025-05-08 DIAGNOSIS — I10 ESSENTIAL HYPERTENSION: Primary | Chronic | ICD-10-CM

## 2025-05-08 DIAGNOSIS — G47.00 INSOMNIA, UNSPECIFIED TYPE: ICD-10-CM

## 2025-05-08 DIAGNOSIS — R39.11 URINARY HESITANCY: ICD-10-CM

## 2025-05-08 DIAGNOSIS — G89.29 CHRONIC PAIN OF LEFT ANKLE: ICD-10-CM

## 2025-05-08 DIAGNOSIS — R79.89 ELEVATED LFTS: ICD-10-CM

## 2025-05-08 DIAGNOSIS — M25.572 CHRONIC PAIN OF LEFT ANKLE: ICD-10-CM

## 2025-05-08 DIAGNOSIS — G89.29 CHRONIC PAIN OF LEFT KNEE: ICD-10-CM

## 2025-05-08 DIAGNOSIS — M25.562 CHRONIC PAIN OF LEFT KNEE: ICD-10-CM

## 2025-05-08 DIAGNOSIS — E78.5 HYPERLIPIDEMIA, UNSPECIFIED HYPERLIPIDEMIA TYPE: ICD-10-CM

## 2025-05-08 DIAGNOSIS — E11.65 TYPE 2 DIABETES MELLITUS WITH HYPERGLYCEMIA, WITHOUT LONG-TERM CURRENT USE OF INSULIN: ICD-10-CM

## 2025-05-08 PROCEDURE — 99214 OFFICE O/P EST MOD 30 MIN: CPT | Performed by: STUDENT IN AN ORGANIZED HEALTH CARE EDUCATION/TRAINING PROGRAM

## 2025-05-08 PROCEDURE — 1160F RVW MEDS BY RX/DR IN RCRD: CPT | Performed by: STUDENT IN AN ORGANIZED HEALTH CARE EDUCATION/TRAINING PROGRAM

## 2025-05-08 PROCEDURE — 3046F HEMOGLOBIN A1C LEVEL >9.0%: CPT | Performed by: STUDENT IN AN ORGANIZED HEALTH CARE EDUCATION/TRAINING PROGRAM

## 2025-05-08 PROCEDURE — 3078F DIAST BP <80 MM HG: CPT | Performed by: STUDENT IN AN ORGANIZED HEALTH CARE EDUCATION/TRAINING PROGRAM

## 2025-05-08 PROCEDURE — 3075F SYST BP GE 130 - 139MM HG: CPT | Performed by: STUDENT IN AN ORGANIZED HEALTH CARE EDUCATION/TRAINING PROGRAM

## 2025-05-08 PROCEDURE — 1159F MED LIST DOCD IN RCRD: CPT | Performed by: STUDENT IN AN ORGANIZED HEALTH CARE EDUCATION/TRAINING PROGRAM

## 2025-05-08 PROCEDURE — 1125F AMNT PAIN NOTED PAIN PRSNT: CPT | Performed by: STUDENT IN AN ORGANIZED HEALTH CARE EDUCATION/TRAINING PROGRAM

## 2025-05-08 RX ORDER — SUCRALFATE 1 G/1
1 TABLET ORAL 4 TIMES DAILY
Qty: 20 TABLET | Refills: 0 | OUTPATIENT
Start: 2025-05-08

## 2025-05-08 RX ORDER — INSULIN GLARGINE 100 [IU]/ML
21 INJECTION, SOLUTION SUBCUTANEOUS NIGHTLY
Start: 2025-05-08

## 2025-05-08 NOTE — PROGRESS NOTES
"Chief Complaint  Hypertension and Diarrhea (ON GOING.)    Subjective    History of Present Illness  The patient presents for lab follow-up.    She reports persistent diarrhea, with bowel movements occurring 2 to 3 times daily. Her gastroenterologist advised discontinuing dicyclomine 20 mg due to adverse effects, and she has also discontinued sucralfate due to stomach discomfort.    She describes a sensation of incomplete bladder emptying during urination, requiring her to lean forward to facilitate voiding. This issue has progressively worsened over the past 3 to 6 months. She recalls a similar problem following a colon resection procedure years ago.    She experiences intermittent cramping in her left foot, which she attributes to arthritis. She is under the care of a podiatrist for this issue.    She reports an episode of severe pain in her left knee upon standing from a seated position, which rendered her unable to walk. This was a novel experience for her. She is considering the use of a knee brace for support during an upcoming trip that will involve extensive walking.    She has been experiencing symptoms suggestive of an upper respiratory infection, including coughing, hoarseness, and congestion. She requests a chest examination.    Her blood pressure is managed with hydrochlorothiazide 25 mg, lisinopril 10 mg daily, and propranolol twice daily.    Her diabetes is managed with insulin, with a current dosage of 19 units. She reports poorly controlled blood glucose levels, impacting her vision and exacerbating her neuropathy. She has been unable to achieve satisfactory blood glucose control since her hospitalization 9 months ago.    PAST SURGICAL HISTORY:  Colon resection: MARIANNE/EVY       Khalida Gilliland presents to [facility]  Hypertension  Diarrhea         Objective   Vital Signs:  /64   Pulse 96   Temp 98.4 °F (36.9 °C) (Oral)   Resp 16   Ht 172.7 cm (68\")   Wt 98.1 " "kg (216 lb 4.8 oz)   SpO2 97%   BMI 32.89 kg/m²   Estimated body mass index is 32.89 kg/m² as calculated from the following:    Height as of this encounter: 172.7 cm (68\").    Weight as of this encounter: 98.1 kg (216 lb 4.8 oz).    BMI is >= 30 and <35. (Class 1 Obesity). The following options were offered after discussion;: exercise counseling/recommendations and nutrition counseling/recommendations      Physical Exam  Cardiovascular:      Rate and Rhythm: Normal rate.      Pulses: Normal pulses.      Heart sounds: Normal heart sounds.   Pulmonary:      Effort: Pulmonary effort is normal.      Breath sounds: Normal breath sounds.   Abdominal:      General: Bowel sounds are normal.      Palpations: Abdomen is soft.   Neurological:      Mental Status: She is alert and oriented to person, place, and time.        Result Review :  The following data was reviewed by: Sergo Owen MD on 05/08/2025:  CMP          1/24/2025    08:54 2/7/2025    17:19 5/2/2025    11:03   CMP   Glucose 190  127  203    BUN 16  11  14    Creatinine 0.80  0.66  0.59    EGFR 79.9  95.1  97    Sodium 137  139  136    Potassium 4.5  3.9  4.6    Chloride 99  100  100    Calcium 9.4  9.9  9.8    Total Protein  8.0  7.2    Albumin  4.4  4.4    Globulin  3.6  2.8    Total Bilirubin  0.3  0.4    Alkaline Phosphatase  76  79    AST (SGOT)  27  67    ALT (SGPT)  37  62    Albumin/Globulin Ratio  1.2     BUN/Creatinine Ratio 20.0  16.7  24    Anion Gap 19.9  10.5       CBC          1/24/2025    08:54 2/7/2025    17:19 5/2/2025    11:03   CBC   WBC 9.96  6.26  5.2    RBC 5.32  4.52  4.59    Hemoglobin 16.4  13.7  13.9    Hematocrit 49.6  42.0  43.3    MCV 93.2  92.9  94    MCH 30.8  30.3  30.3    MCHC 33.1  32.6  32.1    RDW 13.0  13.7  12.5    Platelets 293  220  220      Lipid Panel          8/8/2024    09:07 11/19/2024    11:07 5/2/2025    11:03   Lipid Panel   Total Cholesterol 168  129  133    Triglycerides 357  154  153    HDL Cholesterol 39  " 38  35    VLDL Cholesterol 57  27  27    LDL Cholesterol  72  64  71      TSH          11/19/2024    11:07 5/2/2025    11:03   TSH   TSH 1.680  1.580                Assessment and Plan   Diagnoses and all orders for this visit:    1. Essential hypertension (Primary)    2. Type 2 diabetes mellitus with hyperglycemia, without long-term current use of insulin  -     Insulin Glargine (Lantus SoloStar) 100 UNIT/ML injection pen; Inject 21 Units under the skin into the appropriate area as directed Every Night.  -     Hemoglobin A1c; Future  -     Comprehensive Metabolic Panel; Future    3. Urinary hesitancy  -     Ambulatory Referral to Gynecologic Urology    4. Hyperlipidemia, unspecified hyperlipidemia type    5. Insomnia, unspecified type    6. Chronic pain of left ankle    7. Chronic pain of left knee    8. Elevated LFTs  -     Comprehensive Metabolic Panel; Future        Assessment & Plan  1. Hypertension.  - Blood pressure readings are within the normal range, indicating effective management of hypertension.  - Physical exam findings include a blood pressure reading of 132/64.  - Continued discussion of medication regimen, including hydrochlorothiazide 25 mg daily, lisinopril 10 mg daily, and propranolol twice daily.  - She will maintain her current medication regimen.    2. Diabetes Mellitus.  - Blood glucose levels are elevated, with a fasting glucose of 203 and an A1c of 9.2.  - Insulin dosage will be increased to 21 units at night.  - Advised to monitor fasting glucose levels in the morning. If fasting glucose remains above 140, increase nightly insulin dosage by an additional 2 units every 5 days until fasting glucose levels are consistently at or below 140.  - Continue with the increased dosage once the target level is achieved.    3. Diarrhea.  - Reports experiencing diarrhea.  - Physical exam findings and test results were not discussed.  - Discussion included stopping sucralfate and using dicyclomine 20  mg.  - Use Imodium as needed for diarrhea, but only if experiencing more than 4 bowel movements per day.    4. Incomplete Emptying of Urinary Bladder.  - Reports incomplete emptying of the urinary bladder.  - Physical exam findings and test results were not discussed.  - Referral to urogynecology for further evaluation and management.    5. Left Foot Cramping.  - Reports left foot cramping.  - Physical exam findings and test results were not discussed.  - Discussion included the need for exercise.  - Engage in ankle strengthening exercises; printed exercises provided.    6. Left Knee Pain.  - Reports left knee pain.  - Physical exam findings and test results were not discussed.  - Discussion included the need for exercise and the use of a knee brace.  - Perform knee exercises and use a knee brace for support during her trip; printed exercises provided.    7. Upper Respiratory Infection.  - Reports symptoms of an upper respiratory infection.  - Physical exam findings include clear lung sounds with no abnormal sounds.  - Discussion included managing symptoms with Tylenol and ibuprofen while on her trip.  - Manage symptoms with Tylenol and ibuprofen.    8. Elevated Liver Function Tests.  - Liver function tests are slightly elevated compared to previous results.  - Physical exam findings and test results were not discussed.  - Liver function tests will be repeated in 1 month.  - Follow-up liver function tests in 1 month.    Follow-up  - Follow up in 3 months.            Follow Up   No follow-ups on file.  Patient was given instructions and counseling regarding her condition or for health maintenance advice. Please see specific information pulled into the AVS if appropriate.     Patient or patient representative verbalized consent for the use of Ambient Listening during the visit with  Sergo Owen MD for chart documentation. 5/8/2025  13:36 EDT

## 2025-05-13 ENCOUNTER — TELEPHONE (OUTPATIENT)
Dept: FAMILY MEDICINE CLINIC | Facility: CLINIC | Age: 70
End: 2025-05-13
Payer: MEDICARE

## 2025-05-13 DIAGNOSIS — E11.65 TYPE 2 DIABETES MELLITUS WITH HYPERGLYCEMIA, WITHOUT LONG-TERM CURRENT USE OF INSULIN: ICD-10-CM

## 2025-05-13 RX ORDER — INSULIN GLARGINE 100 [IU]/ML
21 INJECTION, SOLUTION SUBCUTANEOUS NIGHTLY
Qty: 23 ML | Refills: 1
Start: 2025-05-13

## 2025-05-15 DIAGNOSIS — I10 ESSENTIAL HYPERTENSION: ICD-10-CM

## 2025-05-20 RX ORDER — PROPRANOLOL HYDROCHLORIDE 40 MG/1
40 TABLET ORAL 2 TIMES DAILY
Qty: 180 TABLET | Refills: 3 | Status: SHIPPED | OUTPATIENT
Start: 2025-05-20

## 2025-05-21 DIAGNOSIS — I10 ESSENTIAL HYPERTENSION: ICD-10-CM

## 2025-05-21 RX ORDER — LISINOPRIL 10 MG/1
10 TABLET ORAL DAILY
Qty: 90 TABLET | Refills: 1 | Status: SHIPPED | OUTPATIENT
Start: 2025-05-21

## 2025-05-29 NOTE — TELEPHONE ENCOUNTER
Caller: Khalida Gilliland CHRIS    Relationship: Self    Best call back number: [phone]     Requested Prescriptions:   Requested Prescriptions     Pending Prescriptions Disp Refills    pregabalin (LYRICA) 100 MG capsule 270 capsule 1     Sig: Take 1 capsule by mouth 3 (Three) Times a Day.        Pharmacy where request should be sent: [facility] #[id number] [facility] [address] AT [facility] [phone] [facility] [phone]      Last office visit with prescribing clinician: 5/8/2025   Last telemedicine visit with prescribing clinician: Visit date not found   Next office visit with prescribing clinician: 8/13/2025     Additional details provided by patient: WILL NEED NEW PRESCRIPTION    Does the patient have less than a 3 day supply:  [x] Yes  [] No    Would you like a call back once the refill request has been completed: [] Yes [] No    If the office needs to give you a call back, can they leave a voicemail: [] Yes [] No    Abdirahman Goldman Rep   05/29/25 11:41 EDT

## 2025-06-02 RX ORDER — PREGABALIN 100 MG/1
100 CAPSULE ORAL 3 TIMES DAILY
Qty: 270 CAPSULE | Refills: 1 | Status: SHIPPED | OUTPATIENT
Start: 2025-06-02

## 2025-06-02 NOTE — TELEPHONE ENCOUNTER
PT IS UPSET, HAS BEEN WAITING SINCE THURS TO HEAR BACK. INFORMED THAT DR MENSAH HAS BEEN OUT OF THE OFFICE. SINCE DR MENSAH IS OUT AND PT IS COMPLETELY OUT OF MEDICATION, COULD THIS BE REFILLED BY ON CALL DR? PT WOULD LIKE A CALL BACK. PT IS HOPING TO HEAR BACK BEFORE LUNCH.     THANK YOU

## 2025-07-22 ENCOUNTER — TELEPHONE (OUTPATIENT)
Dept: FAMILY MEDICINE CLINIC | Facility: CLINIC | Age: 70
End: 2025-07-22

## 2025-07-22 DIAGNOSIS — G47.00 INSOMNIA, UNSPECIFIED TYPE: ICD-10-CM

## 2025-07-22 DIAGNOSIS — G43.809 OTHER MIGRAINE WITHOUT STATUS MIGRAINOSUS, NOT INTRACTABLE: ICD-10-CM

## 2025-07-24 DIAGNOSIS — R19.7 DIARRHEA, UNSPECIFIED TYPE: ICD-10-CM

## 2025-07-24 DIAGNOSIS — B37.31 VAGINAL YEAST INFECTION: ICD-10-CM

## 2025-07-24 DIAGNOSIS — G43.009 MIGRAINE WITHOUT AURA AND WITHOUT STATUS MIGRAINOSUS, NOT INTRACTABLE: Primary | ICD-10-CM

## 2025-07-24 DIAGNOSIS — G43.809 OTHER MIGRAINE WITHOUT STATUS MIGRAINOSUS, NOT INTRACTABLE: ICD-10-CM

## 2025-07-24 DIAGNOSIS — G47.00 INSOMNIA, UNSPECIFIED TYPE: ICD-10-CM

## 2025-07-24 RX ORDER — BUTALBITAL, ACETAMINOPHEN AND CAFFEINE 50; 325; 40 MG/1; MG/1; MG/1
1 TABLET ORAL EVERY 6 HOURS PRN
Qty: 30 TABLET | Refills: 0 | Status: SHIPPED | OUTPATIENT
Start: 2025-07-24

## 2025-07-24 RX ORDER — BUTALBITAL, ACETAMINOPHEN AND CAFFEINE 50; 325; 40 MG/1; MG/1; MG/1
1 TABLET ORAL EVERY 6 HOURS PRN
Qty: 30 TABLET | Refills: 0 | Status: CANCELLED | OUTPATIENT
Start: 2025-07-24

## 2025-07-24 RX ORDER — NYSTATIN 100000 [USP'U]/G
POWDER TOPICAL 2 TIMES DAILY
Qty: 60 G | Refills: 4 | Status: SHIPPED | OUTPATIENT
Start: 2025-07-24

## 2025-07-24 RX ORDER — ZOLPIDEM TARTRATE 5 MG/1
5 TABLET ORAL NIGHTLY PRN
Qty: 30 TABLET | Refills: 0 | Status: SHIPPED | OUTPATIENT
Start: 2025-07-24

## 2025-07-24 RX ORDER — PREGABALIN 100 MG/1
100 CAPSULE ORAL 3 TIMES DAILY
Qty: 270 CAPSULE | Refills: 1 | Status: SHIPPED | OUTPATIENT
Start: 2025-07-24

## 2025-07-24 RX ORDER — PREGABALIN 100 MG/1
100 CAPSULE ORAL 3 TIMES DAILY
Qty: 270 CAPSULE | Refills: 1 | Status: CANCELLED | OUTPATIENT
Start: 2025-07-24

## 2025-07-24 RX ORDER — ZOLPIDEM TARTRATE 5 MG/1
5 TABLET ORAL NIGHTLY PRN
Qty: 30 TABLET | Refills: 0 | Status: CANCELLED | OUTPATIENT
Start: 2025-07-24

## 2025-07-29 ENCOUNTER — HOSPITAL ENCOUNTER (OUTPATIENT)
Dept: GENERAL RADIOLOGY | Facility: HOSPITAL | Age: 70
Discharge: HOME | End: 2025-07-29
Admitting: STUDENT IN AN ORGANIZED HEALTH CARE EDUCATION/TRAINING PROGRAM
Payer: MEDICARE

## 2025-07-29 ENCOUNTER — OFFICE VISIT (OUTPATIENT)
Dept: FAMILY MEDICINE CLINIC | Facility: CLINIC | Age: 70
End: 2025-07-29
Payer: MEDICARE

## 2025-07-29 VITALS
TEMPERATURE: 97.7 F | HEIGHT: 68 IN | RESPIRATION RATE: 16 BRPM | BODY MASS INDEX: 33.02 KG/M2 | HEART RATE: 75 BPM | OXYGEN SATURATION: 95 % | SYSTOLIC BLOOD PRESSURE: 122 MMHG | WEIGHT: 217.9 LBS | DIASTOLIC BLOOD PRESSURE: 70 MMHG

## 2025-07-29 DIAGNOSIS — E11.65 TYPE 2 DIABETES MELLITUS WITH HYPERGLYCEMIA, WITHOUT LONG-TERM CURRENT USE OF INSULIN: ICD-10-CM

## 2025-07-29 DIAGNOSIS — M25.561 ACUTE PAIN OF RIGHT KNEE: Primary | ICD-10-CM

## 2025-07-29 PROCEDURE — 1160F RVW MEDS BY RX/DR IN RCRD: CPT | Performed by: STUDENT IN AN ORGANIZED HEALTH CARE EDUCATION/TRAINING PROGRAM

## 2025-07-29 PROCEDURE — 3074F SYST BP LT 130 MM HG: CPT | Performed by: STUDENT IN AN ORGANIZED HEALTH CARE EDUCATION/TRAINING PROGRAM

## 2025-07-29 PROCEDURE — 1125F AMNT PAIN NOTED PAIN PRSNT: CPT | Performed by: STUDENT IN AN ORGANIZED HEALTH CARE EDUCATION/TRAINING PROGRAM

## 2025-07-29 PROCEDURE — 73560 X-RAY EXAM OF KNEE 1 OR 2: CPT

## 2025-07-29 PROCEDURE — 1159F MED LIST DOCD IN RCRD: CPT | Performed by: STUDENT IN AN ORGANIZED HEALTH CARE EDUCATION/TRAINING PROGRAM

## 2025-07-29 PROCEDURE — 3078F DIAST BP <80 MM HG: CPT | Performed by: STUDENT IN AN ORGANIZED HEALTH CARE EDUCATION/TRAINING PROGRAM

## 2025-07-29 PROCEDURE — 99214 OFFICE O/P EST MOD 30 MIN: CPT | Performed by: STUDENT IN AN ORGANIZED HEALTH CARE EDUCATION/TRAINING PROGRAM

## 2025-07-29 PROCEDURE — 3046F HEMOGLOBIN A1C LEVEL >9.0%: CPT | Performed by: STUDENT IN AN ORGANIZED HEALTH CARE EDUCATION/TRAINING PROGRAM

## 2025-07-29 RX ORDER — ASCORBIC ACID 1000 MG
TABLET ORAL
COMMUNITY

## 2025-07-29 RX ORDER — SODIUM PHOSPHATE,MONO-DIBASIC 19G-7G/118
ENEMA (ML) RECTAL
COMMUNITY

## 2025-07-29 RX ORDER — METHYLPREDNISOLONE 4 MG/1
TABLET ORAL
Qty: 21 TABLET | Refills: 0 | Status: SHIPPED | OUTPATIENT
Start: 2025-07-29

## 2025-07-29 RX ORDER — UBIDECARENONE 100 MG
100 CAPSULE ORAL DAILY
COMMUNITY

## 2025-07-29 RX ORDER — THIAMINE HCL 50 MG
50 TABLET ORAL DAILY
COMMUNITY

## 2025-07-29 NOTE — PROGRESS NOTES
"Chief Complaint  soreness (Knee./Denied injury. /OTC; [employer].) and Knee Pain (RIGHT./X MONTHS.)    Subjective        Khalida Gilliland presents to [facility]  Knee Pain       For bilateral knee pain.  In last visit patient was complaining of left knee pain.  Today she is complaining of knee pain and swelling on the right knee.  Denies having any trauma except 1 incident when her dog was sitting on her and she was sitting on couch and she was sitting on right knee bending position and after that she started feeling having worse pain        Objective   Vital Signs:  /70   Pulse 75   Temp 97.7 °F (36.5 °C) (Oral)   Resp 16   Ht 172.7 cm (68\")   Wt 98.8 kg (217 lb 14.4 oz)   SpO2 95%   BMI 33.13 kg/m²   Estimated body mass index is 33.13 kg/m² as calculated from the following:    Height as of this encounter: 172.7 cm (68\").    Weight as of this encounter: 98.8 kg (217 lb 14.4 oz).            Physical Exam  Cardiovascular:      Rate and Rhythm: Normal rate.      Pulses: Normal pulses.      Heart sounds: Normal heart sounds.   Pulmonary:      Effort: Pulmonary effort is normal.      Breath sounds: Normal breath sounds.   Musculoskeletal:         General: Swelling and tenderness present. No signs of injury.      Comments: Knee swelling tenderness present mostly on the medial side   Neurological:      Mental Status: She is alert and oriented to person, place, and time.   Psychiatric:         Mood and Affect: Mood normal.        Result Review :                Assessment and Plan   Diagnoses and all orders for this visit:    1. Acute pain of right knee (Primary)  -     XR Knee 1 or 2 View Right  -     methylPREDNISolone (MEDROL) 4 MG dose pack; Take as directed on package instructions.  Dispense: 21 tablet; Refill: 0    2. Type 2 diabetes mellitus with hyperglycemia, without long-term current use of insulin  -     Comprehensive Metabolic Panel  -     Hemoglobin A1c      #  Advised " patient to take Medrol Dosepak to decrease inflammation and for pain she can take ibuprofen 400 mg twice daily as needed  Will do x-ray knee to rule out any osteoarthritis.  Will refer her to the orthopedic if problem persist  # Patient is also due to do labs for her hemoglobin A1c and liver function test as they were high in the last visit.  Will follow-up       Follow Up   No follow-ups on file.  Patient was given instructions and counseling regarding her condition or for health maintenance advice. Please see specific information pulled into the AVS if appropriate.

## 2025-07-30 LAB
ALBUMIN SERPL-MCNC: 4.3 G/DL (ref 3.9–4.9)
ALP SERPL-CCNC: 86 IU/L (ref 44–121)
ALT SERPL-CCNC: 33 IU/L (ref 0–32)
AST SERPL-CCNC: 27 IU/L (ref 0–40)
BILIRUB SERPL-MCNC: 0.4 MG/DL (ref 0–1.2)
BUN SERPL-MCNC: 15 MG/DL (ref 8–27)
BUN/CREAT SERPL: 25 (ref 12–28)
CALCIUM SERPL-MCNC: 10.1 MG/DL (ref 8.7–10.3)
CHLORIDE SERPL-SCNC: 99 MMOL/L (ref 96–106)
CO2 SERPL-SCNC: 25 MMOL/L (ref 20–29)
CREAT SERPL-MCNC: 0.59 MG/DL (ref 0.57–1)
EGFRCR SERPLBLD CKD-EPI 2021: 97 ML/MIN/1.73
GLOBULIN SER CALC-MCNC: 3.1 G/DL (ref 1.5–4.5)
GLUCOSE SERPL-MCNC: 168 MG/DL (ref 70–99)
HBA1C MFR BLD: 9.5 % (ref 4.8–5.6)
POTASSIUM SERPL-SCNC: 4.5 MMOL/L (ref 3.5–5.2)
PROT SERPL-MCNC: 7.4 G/DL (ref 6–8.5)
SODIUM SERPL-SCNC: 138 MMOL/L (ref 134–144)

## 2025-08-04 DIAGNOSIS — I10 ESSENTIAL HYPERTENSION: ICD-10-CM

## 2025-08-04 RX ORDER — PROPRANOLOL HYDROCHLORIDE 40 MG/1
40 TABLET ORAL 2 TIMES DAILY
Qty: 180 TABLET | Refills: 3 | Status: SHIPPED | OUTPATIENT
Start: 2025-08-04

## 2025-08-14 DIAGNOSIS — E11.65 TYPE 2 DIABETES MELLITUS WITH HYPERGLYCEMIA, WITHOUT LONG-TERM CURRENT USE OF INSULIN: ICD-10-CM

## 2025-08-15 RX ORDER — GLIPIZIDE 5 MG/1
5 TABLET, FILM COATED, EXTENDED RELEASE ORAL 2 TIMES DAILY
Qty: 180 TABLET | Refills: 1 | Status: SHIPPED | OUTPATIENT
Start: 2025-08-15

## 2025-08-20 ENCOUNTER — OFFICE VISIT (OUTPATIENT)
Dept: FAMILY MEDICINE CLINIC | Facility: CLINIC | Age: 70
End: 2025-08-20
Payer: MEDICARE

## 2025-08-20 VITALS
TEMPERATURE: 98.2 F | SYSTOLIC BLOOD PRESSURE: 120 MMHG | HEIGHT: 68 IN | RESPIRATION RATE: 16 BRPM | DIASTOLIC BLOOD PRESSURE: 68 MMHG | OXYGEN SATURATION: 99 % | HEART RATE: 66 BPM | BODY MASS INDEX: 33.42 KG/M2 | WEIGHT: 220.5 LBS

## 2025-08-20 DIAGNOSIS — E11.65 TYPE 2 DIABETES MELLITUS WITH HYPERGLYCEMIA, WITHOUT LONG-TERM CURRENT USE OF INSULIN: ICD-10-CM

## 2025-08-20 DIAGNOSIS — M17.11 PRIMARY OSTEOARTHRITIS OF RIGHT KNEE: Primary | ICD-10-CM

## 2025-08-20 PROCEDURE — 3078F DIAST BP <80 MM HG: CPT | Performed by: STUDENT IN AN ORGANIZED HEALTH CARE EDUCATION/TRAINING PROGRAM

## 2025-08-20 PROCEDURE — 3074F SYST BP LT 130 MM HG: CPT | Performed by: STUDENT IN AN ORGANIZED HEALTH CARE EDUCATION/TRAINING PROGRAM

## 2025-08-20 PROCEDURE — 1159F MED LIST DOCD IN RCRD: CPT | Performed by: STUDENT IN AN ORGANIZED HEALTH CARE EDUCATION/TRAINING PROGRAM

## 2025-08-20 PROCEDURE — 1160F RVW MEDS BY RX/DR IN RCRD: CPT | Performed by: STUDENT IN AN ORGANIZED HEALTH CARE EDUCATION/TRAINING PROGRAM

## 2025-08-20 PROCEDURE — 99214 OFFICE O/P EST MOD 30 MIN: CPT | Performed by: STUDENT IN AN ORGANIZED HEALTH CARE EDUCATION/TRAINING PROGRAM

## 2025-08-20 PROCEDURE — 1125F AMNT PAIN NOTED PAIN PRSNT: CPT | Performed by: STUDENT IN AN ORGANIZED HEALTH CARE EDUCATION/TRAINING PROGRAM

## 2025-08-20 PROCEDURE — 3046F HEMOGLOBIN A1C LEVEL >9.0%: CPT | Performed by: STUDENT IN AN ORGANIZED HEALTH CARE EDUCATION/TRAINING PROGRAM

## 2025-08-20 RX ORDER — CETIRIZINE HYDROCHLORIDE 10 MG/1
TABLET ORAL
COMMUNITY

## 2025-08-20 RX ORDER — MAGNESIUM 30 MG
30 TABLET ORAL
COMMUNITY

## 2025-08-20 RX ORDER — INSULIN GLARGINE 100 [IU]/ML
28 INJECTION, SOLUTION SUBCUTANEOUS NIGHTLY
Start: 2025-08-20

## 2025-08-20 RX ORDER — AMLODIPINE BESYLATE AND ATORVASTATIN CALCIUM 10; 40 MG/1; MG/1
TABLET, FILM COATED ORAL
COMMUNITY

## 2025-08-20 RX ORDER — SUCRALFATE 1 G/1
1 TABLET ORAL 4 TIMES DAILY
COMMUNITY
Start: 2025-04-30

## 2025-08-20 RX ORDER — AMOXICILLIN 500 MG/1
TABLET, FILM COATED ORAL
COMMUNITY
Start: 2025-07-01 | End: 2025-08-20

## 2025-08-20 RX ORDER — HYDROCODONE BITARTRATE AND ACETAMINOPHEN 5; 325 MG/1; MG/1
1 TABLET ORAL EVERY 6 HOURS PRN
Qty: 12 TABLET | Refills: 0 | Status: SHIPPED | OUTPATIENT
Start: 2025-08-20

## (undated) DEVICE — PK ANGIO CERBRL RAD 40

## (undated) DEVICE — APPL CHLORAPREP W/TINT 26ML ORNG

## (undated) DEVICE — 0.2 MICRON INTRAVENOUS FILTER FOR 96 HOUR USE WITH MICRO-BORE EXTENSION TUBING AN LUER-LOCK ADAPTER: Brand: PALL POSIDYNE® ELD FILTER

## (undated) DEVICE — KT NEURO CUST

## (undated) DEVICE — GLV SURG SENSICARE MICRO PF LF 8 STRL

## (undated) DEVICE — PRESSURE MONITORING SET: Brand: TRUWAVE

## (undated) DEVICE — DEV ANGIO FLOSWITCH HP BX24

## (undated) DEVICE — NDL PERC 1PRT THNWALL W/BASEPLT 18G 7CM

## (undated) DEVICE — GW AMPLTZ SUPERSTIFF STR .035IN 260CM

## (undated) DEVICE — SHLD ANGIO 2LAYR CIR FEN

## (undated) DEVICE — SPNG GZ WOVN 4X4IN 12PLY 10/BX STRL

## (undated) DEVICE — DESTINATION CAROTID GUIDING SHEATH: Brand: DESTINATION

## (undated) DEVICE — ST ACC MICROPUNCTURE STFF .018 ECHO/PLDM/TP 4F/10CM 21G/7CM

## (undated) DEVICE — Device

## (undated) DEVICE — GOWN,PREVENTION PLUS,XXLARGE,STERILE: Brand: MEDLINE

## (undated) DEVICE — Device: Brand: FABCO

## (undated) DEVICE — TOTAL TRAY, 16FR 10ML SIL FOLEY, URN: Brand: MEDLINE

## (undated) DEVICE — CATH ANGIO TRCN NB BCN .038 5F 100CM DAV

## (undated) DEVICE — RADIFOCUS TORQUE DEVICE MULTI-TORQUE VISE: Brand: RADIFOCUS TORQUE DEVICE

## (undated) DEVICE — Device: Brand: D-STAT® DRY SILVER CLEAR TOPICAL HEMOSTAT

## (undated) DEVICE — EXTENSION SET, MALE LUER LOCK ADAPTER WITH RETRACTABLE COLLAR

## (undated) DEVICE — SYR LL 3CC

## (undated) DEVICE — STANDARD GUIDEWIRE WITH HYDROPHILIC COATING: Brand: SYNCHRO 2

## (undated) DEVICE — PINNACLE R/O II INTRODUCER SHEATH WITH RADIOPAQUE MARKER: Brand: PINNACLE

## (undated) DEVICE — STANDARD PRE-SHAPED GUIDEWIRE WITH HYDROPHILIC COATING: Brand: SYNCHRO 2

## (undated) DEVICE — RADIFOCUS GLIDEWIRE: Brand: GLIDEWIRE

## (undated) DEVICE — SOL NS 500ML

## (undated) DEVICE — CATH MIC PHENOM 27 .040IN 15CM

## (undated) DEVICE — COPILOT BLEEDBACK CONTROL VALVE: Brand: COPILOT

## (undated) DEVICE — ST IV PRI VENOSET NV W/CLAMP 72IN

## (undated) DEVICE — TOWEL,OR,DSP,ST,BLUE,STD,4/PK,20PK/CS: Brand: MEDLINE

## (undated) DEVICE — ENCORE® LATEX ORTHO SIZE 8.5, STERILE LATEX POWDER-FREE SURGICAL GLOVE: Brand: ENCORE

## (undated) DEVICE — BASN GW RINGMASTER

## (undated) DEVICE — TBG ART PRESS 60 IN

## (undated) DEVICE — STPCK 3WY HP ROT

## (undated) DEVICE — GLV SURG BIOGEL LTX PF 7 1/2

## (undated) DEVICE — SOL NACL 0.9PCT 1000ML

## (undated) DEVICE — PINNACLE INTRODUCER SHEATH: Brand: PINNACLE

## (undated) DEVICE — GLV SURG SENSICARE ALOE LF PF SZ7.5 GRN

## (undated) DEVICE — KT ORCA ORCAPOD DISP STRL

## (undated) DEVICE — SENSR O2 OXIMAX FNGR A/ 18IN NONSTR

## (undated) DEVICE — CVR PROB 96IN LF STRL

## (undated) DEVICE — ADAPT CLN BIOGUARD AIR/H2O DISP

## (undated) DEVICE — THE TORRENT IRRIGATION SCOPE CONNECTOR IS USED WITH THE TORRENT IRRIGATION TUBING TO PROVIDE IRRIGATION FLUIDS SUCH AS STERILE WATER DURING GASTROINTESTINAL ENDOSCOPIC PROCEDURES WHEN USED IN CONJUNCTION WITH AN IRRIGATION PUMP (OR ELECTROSURGICAL UNIT).: Brand: TORRENT

## (undated) DEVICE — DRSNG SURESITE WNDW 2.38X2.75

## (undated) DEVICE — SHEATH FLXOR SHUTTLESELECT .038 6F2.2 90

## (undated) DEVICE — CVR EQ IMG 48X27

## (undated) DEVICE — CATH GUIDE NAVIEN DIST STR 5F 8X115

## (undated) DEVICE — TUBING, SUCTION, 1/4" X 10', STRAIGHT: Brand: MEDLINE

## (undated) DEVICE — GLV SURG SENSICARE PI LF PF 7.5 GRN STRL

## (undated) DEVICE — SPNG GZ STRL 2S 4X4 12PLY

## (undated) DEVICE — DRSNG SURESITE123 2.4X2.8IN

## (undated) DEVICE — CANN O2 ETCO2 FITS ALL CONN CO2 SMPL A/ 7IN DISP LF

## (undated) DEVICE — DRSNG SURESITE WNDW 4X4.5

## (undated) DEVICE — MYNXGRIP 6F/7F: Brand: MYNXGRIP

## (undated) DEVICE — MYNXGRIP 5F: Brand: MYNXGRIP

## (undated) DEVICE — GW AMPLATZER SUPERSTIFF.035 3MM J 260CM